# Patient Record
Sex: FEMALE | Race: WHITE | NOT HISPANIC OR LATINO | Employment: FULL TIME | URBAN - METROPOLITAN AREA
[De-identification: names, ages, dates, MRNs, and addresses within clinical notes are randomized per-mention and may not be internally consistent; named-entity substitution may affect disease eponyms.]

---

## 2017-02-09 RX ORDER — HYDROCODONE BITARTRATE AND ACETAMINOPHEN 7.5; 325 MG/1; MG/1
1 TABLET ORAL 2 TIMES DAILY PRN
Qty: 60 TABLET | Refills: 0 | Status: SHIPPED | OUTPATIENT
Start: 2017-02-09 | End: 2017-04-07 | Stop reason: SDUPTHER

## 2017-02-10 ENCOUNTER — TELEPHONE (OUTPATIENT)
Dept: PAIN MEDICINE | Facility: CLINIC | Age: 57
End: 2017-02-10

## 2017-02-10 RX ORDER — GABAPENTIN 300 MG/1
300 CAPSULE ORAL 2 TIMES DAILY
Qty: 60 CAPSULE | Refills: 1 | Status: SHIPPED | OUTPATIENT
Start: 2017-02-10 | End: 2017-05-05 | Stop reason: SDUPTHER

## 2017-02-21 ENCOUNTER — OFFICE VISIT (OUTPATIENT)
Dept: PAIN MEDICINE | Facility: CLINIC | Age: 57
End: 2017-02-21
Payer: COMMERCIAL

## 2017-02-21 VITALS
WEIGHT: 174.63 LBS | BODY MASS INDEX: 32.99 KG/M2 | RESPIRATION RATE: 18 BRPM | TEMPERATURE: 99 F | DIASTOLIC BLOOD PRESSURE: 72 MMHG | HEART RATE: 68 BPM | SYSTOLIC BLOOD PRESSURE: 120 MMHG

## 2017-02-21 DIAGNOSIS — M47.814 FACET ARTHROPATHY, THORACIC: Primary | ICD-10-CM

## 2017-02-21 DIAGNOSIS — M50.30 DDD (DEGENERATIVE DISC DISEASE), CERVICAL: ICD-10-CM

## 2017-02-21 DIAGNOSIS — M51.36 DDD (DEGENERATIVE DISC DISEASE), LUMBAR: ICD-10-CM

## 2017-02-21 PROCEDURE — 1160F RVW MEDS BY RX/DR IN RCRD: CPT | Mod: S$GLB,,, | Performed by: PHYSICIAN ASSISTANT

## 2017-02-21 PROCEDURE — 99999 PR PBB SHADOW E&M-EST. PATIENT-LVL IV: CPT | Mod: PBBFAC,,, | Performed by: PHYSICIAN ASSISTANT

## 2017-02-21 PROCEDURE — 99214 OFFICE O/P EST MOD 30 MIN: CPT | Mod: S$GLB,,, | Performed by: PHYSICIAN ASSISTANT

## 2017-02-21 RX ORDER — MIDAZOLAM HYDROCHLORIDE 5 MG/ML
4 INJECTION INTRAMUSCULAR; INTRAVENOUS ONCE
Status: CANCELLED | OUTPATIENT
Start: 2017-03-14

## 2017-02-21 RX ORDER — SODIUM CHLORIDE, SODIUM LACTATE, POTASSIUM CHLORIDE, CALCIUM CHLORIDE 600; 310; 30; 20 MG/100ML; MG/100ML; MG/100ML; MG/100ML
INJECTION, SOLUTION INTRAVENOUS CONTINUOUS
Status: CANCELLED | OUTPATIENT
Start: 2017-03-14

## 2017-02-27 NOTE — PROGRESS NOTES
This note was completed with dictation software and grammatical errors may exist.    CC:Back pain, neck pain    HPI: The patient is a 56-year-old woman with history of hypothyroidism, otherwise fairly healthy but a long history of scoliosis causing back pain, self-referred for continued back pain.  She is status post C7-T1 cervical interlaminar epidural steroid injection on 12/9/16 with almost complete relief.  She denies having significant neck pain at this time.  She does have low back pain radiating to the right leg but this is currently tolerable.  Her biggest complaint today is a return of her right thoracic spine pain.  She states that this as a dull constant pain and she is unable to get comfortable.  She does have some relief with gabapentin at night but can't take this during the day.  She denies weakness, numbness, bladder or bowel incontinence.    Pain intervention history: She has been seeing Dr. Huffman for the last several years and has undergone epidural steroid injections and radiofrequency ablations with good relief.  The last was done about one year ago however.  As far as medications she has been taking Hydrocodone 7.5/325 one to 2 times a day at most and gabapentin 300 mg at night. She is status post cervical epidural steroid injection on 10/19/15 with 50% relief of her neck pain. She is status post right L4 transforaminal epidural steroid injection on 11/23/15 with 100% relief.   She is status post right T3, 4, 5 and 6 medial branch radio frequency ablation on 2/5/16 with greater than 50% relief.  She is status post right L4 transforaminal epidural sterile injection on 6/14/16 with significant relief lasting only about a month.  She is status post C7-T1 cervical interlaminar epidural steroid injection on 7/14/16 with 30-40% relief.   She is status post L5/S1 interlaminar epidural steroidal injection to the right on 8/12/16 with 100% relief of her right leg pain.  She is status post C7-T1  cervical interlaminar epidural steroid injection on 12/9/16 with almost complete relief.    According to her last pain management physician, she had undergone a right side T3, 4, 5, 6 medial branch radiofrequency ablation on 8/8/14 with good relief.  He had scheduled her for another one but did not complete this.      ROS: She reports fatigability, headaches, hypothyroidism, joint stiffness, back pain, difficulty sleeping and anxiety.  Balance of review of systems is negative.      Medical, surgical, family and social history reviewed elsewhere in record.  She is a  at [employer].    Medications/Allergies: See med card    Vitals:    02/21/17 1417   BP: 120/72   Pulse: 68   Resp: 18   Temp: 98.5 °F (36.9 °C)   TempSrc: Oral   Weight: 79.2 kg (174 lb 9.7 oz)   PainSc:   4   PainLoc: Back         Physical exam:  Gen: A and O x3, pleasant, well-groomed  Skin: No rashes or obvious lesions  HEENT: PERRLA, no obvious deformities on ears or in canals. Trachea midline.  CVS: Regular rate and rhythm, normal palpable pulses.  Resp:No increased work of breathing, symmetrical chest rise.  Abdomen: Soft, NT/ND.  Musculoskeletal:No antalgic gait.     Neuro:  Upper extremities: 5/5 strength bilaterally.  Lower extremities: 5/5 strength bilaterally.    Reflexes: Patellar 2+, Achilles 2+ bilaterally.  Upper extremity reflexes 2+ bilaterally equal.  Sensory:  Intact and symmetrical to light touch and pinprick in L2-S1 dermatomes bilaterally.    Lumbar spine:  Lumbar spine: Range of motion is full with flexion and extension without increased pain.  Brayden's test causes no increased pain on either side.    Supine straight leg raise is negative bilaterally.  Internal and external rotation of the hip causes no increased pain on either side.  Myofascial exam: No tenderness to palpation across lumbar paraspinous muscles.     Cervical spine exam: Range of motion is full with flexion, extension and lateral rotation without  increased pain.   Myofascial exam: No tenderness to palpation to the cervical paraspinous muscles.  Moderate tenderness to palpation to the right upper thoracic paraspinous muscles.    Imagin/16/15 Xray Scoliosis survey: There is thoracic dextroscoliosis with mild lumbar compensatory levoscoliosis. No structural abnormalities are evident. Diffuse spondylosis noted in the cervical, thoracic and to lesser extent lumbar spine. No fractures are identified. There is slight increased kyphotic curvature of the thoracic spine with alignment being otherwise normal. Measurements and evaluation are limited due to underpenetration. Land angle measures approximately in the thoracic spine is of approximately 17.0 degrees and for the lumbar spine 17.4 degrees. Soft tissues are unremarkable. IMPRESSION: 1. S shaped scoliotic curvature of the thoracolumbar spine with Land angle of approximately 17 degrees. 2. Diffuse spondylosis.    MRI report 12: Report notes that there is dextroconvex rotary curvature of the thoracic spine.  Posterior alignment is maintained.  There are scattered vertebral body hemangiomas.  There is minimal posterior disc bulges noted at T5/6, T6/7, T7/8 and T8/9.  There is no central spinal stenosis or neural foraminal narrowing.    MRI right shoulder 11: Minor distal subscapularis tendinosis.  Negative for full-thickness or significant partial-thickness rotator cuff tendon tear.  There is a small amount of subacromial subdeltoid bursal fluid which can be seen with recent injection or mild bursitis.    MRI cervical spine report 3/25/11.  Note that this is comparison to previous study on 2008.  This is a report only, no images were available to me.  The C2/3 disc appears grossly intact with no significant bulging.  C3/4 there is mild protrusion of the disc with no herniation and no nerve root entrapment seen.  Spinal stenosis is not noted.  At C4/5 there is broad-based disc protrusion which  compresses the subarachnoid space without cord compression.  There is no definite nerve root compression seen.  At C5/6 there is vertebral body lesion which is bright on T2 and dark on T1 but it does not enhance and may represent an atypical hemangioma.  This is unchanged from previous study.  At C5/6 there is a protrusion of the disc asymmetrically to the left but no significant entrapment.  No change from previous study.  At C6/7 there is a focal protrusion of the disc causing compression of the subarachnoid space without definite cord or nerve root compression.  C7/T1 appears intact.    Lumbar spine MRI report 3/31/14: No acute abnormality or significant degenerative changes.  No canal or foraminal stenosis.    Lumbar spine MRI 10/19/15  T12-L1: No central canal or neuroforaminal stenosis. No disc protrusion or extrusion.  L1-L2: No central canal or neuroforaminal stenosis. No disc protrusion or extrusion.  L2-L3: No central canal or neuroforaminal stenosis. No disc protrusion or extrusion.  L3-L4: No central canal or neuroforaminal stenosis. No disc protrusion or extrusion.  L4-L5: There is a broad disc bulge which extends into both neuroforamina. There is prominent ligamentum flavum thickening and facet arthropathy. There is mild bilateral neuroforaminal stenosis. No central canal stenosis.  L5-S1: There is bilateral hypertrophic facet arthropathy and ligamentum flavum thickening. No central canal or neuroforaminal stenosis. No disc protrusion or extrusion.    Cervical spine MRI 6/29/16  At C2-C3, the minimal interstitial excision covers a very mild broad-based disc bulge most prominent centrally but does not deform the ventral cord.  There is no canal or foraminal stenosis.  At C3-C4, there is mildly decreased disc height with a broad-based disc bulge most prominent centrally.  This minimally contact the ventral cord without significant deformity.  The canal is widely patent.  There is uncovertebral  hypertrophy and facet arthropathy, but the foramina are patent.  At C4-C5, there is decreased disc height with a broad disc bulge-marginal osteophyte complex that mildly lateralizes to the left and blends imperceptibly with right greater than left uncovertebral hypertrophy.  With ligamentum hypertrophy, there is no significant central canal stenosis.  Uncovertebral hypertrophy and facet arthropathy are causing mild left and moderate right foraminal stenoses.  At C5-C6, there is a broad-based disc bulge and osteophyte complex most prominent centrally.  There is also ligamentum flavum hypertrophy present, but the canal is patent.  There is mild left foraminal stenosis.  The right foramen is patent.  At C6-C7, there is disc desiccation with a minimal broad-based disc bulge most prominent in the right paracentral canal.  The canal is widely patent.  There is mild left foraminal stenosis.  At C7-T1, there is no significant disc bulge, protrusion, or herniation/extrusion.  The canal and foramina are widely patent.      Assessment:  The patient is a 56-year-old woman with history of hypothyroidism, otherwise fairly healthy but a long history of scoliosis causing back pain, self-referred for continued back pain.    1. Facet arthropathy, thoracic  Vital signs    Activity as tolerated    Place 18-22 [facility] IV     Verify informed consent    Notify physician     Notify physician     Notify physician (specify)    Diet NPO    Case Request Operating Room: RADIOFREQUENCY THERMOCOAGULATION T3, T4, T5,T6    Place in Outpatient    lactated ringers infusion    midazolam (PF) 5 mg/mL injection 4 mg   2. DDD (degenerative disc disease), lumbar     3. DDD (degenerative disc disease), cervical         Plan:  1.  The patient had almost complete relief of her neck pain following the cervical JINA.  This can be repeated in the future if necessary.  2.  I will schedule her to repeat right T3, 4, 5 and 6 medial branch radiofrequency  ablation.  She had about 11 months of almost complete relief with this the last time.  3.  If her low back and right leg pain worsens, we can repeat an L5/S1 interlaminar JINA to the right.  4.  Follow-up in 4 weeks post procedure or sooner as needed.    Greater than 50% of this 25 minute visit was spent on counseling the patient.

## 2017-03-13 ENCOUNTER — TELEPHONE (OUTPATIENT)
Dept: SURGERY | Facility: HOSPITAL | Age: 57
End: 2017-03-13

## 2017-03-14 ENCOUNTER — HOSPITAL ENCOUNTER (OUTPATIENT)
Dept: RADIOLOGY | Facility: HOSPITAL | Age: 57
Discharge: HOME | End: 2017-03-14
Attending: ANESTHESIOLOGY | Admitting: ANESTHESIOLOGY
Payer: COMMERCIAL

## 2017-03-14 DIAGNOSIS — M51.34 DDD (DEGENERATIVE DISC DISEASE), THORACIC: ICD-10-CM

## 2017-04-07 ENCOUNTER — PATIENT MESSAGE (OUTPATIENT)
Dept: PAIN MEDICINE | Facility: CLINIC | Age: 57
End: 2017-04-07

## 2017-04-07 RX ORDER — HYDROCODONE BITARTRATE AND ACETAMINOPHEN 7.5; 325 MG/1; MG/1
1 TABLET ORAL 2 TIMES DAILY PRN
Qty: 60 TABLET | Refills: 0 | Status: SHIPPED | OUTPATIENT
Start: 2017-04-07 | End: 2017-05-30 | Stop reason: SDUPTHER

## 2017-04-28 ENCOUNTER — OFFICE VISIT (OUTPATIENT)
Dept: PAIN MEDICINE | Facility: CLINIC | Age: 57
End: 2017-04-28
Payer: COMMERCIAL

## 2017-04-28 VITALS
WEIGHT: 167.31 LBS | DIASTOLIC BLOOD PRESSURE: 70 MMHG | BODY MASS INDEX: 31.62 KG/M2 | TEMPERATURE: 98 F | RESPIRATION RATE: 18 BRPM | SYSTOLIC BLOOD PRESSURE: 130 MMHG | HEART RATE: 74 BPM

## 2017-04-28 DIAGNOSIS — M54.12 CERVICAL RADICULOPATHY: ICD-10-CM

## 2017-04-28 DIAGNOSIS — M51.36 DDD (DEGENERATIVE DISC DISEASE), LUMBAR: ICD-10-CM

## 2017-04-28 DIAGNOSIS — M54.16 RIGHT LUMBAR RADICULOPATHY: ICD-10-CM

## 2017-04-28 DIAGNOSIS — M50.30 DDD (DEGENERATIVE DISC DISEASE), CERVICAL: ICD-10-CM

## 2017-04-28 DIAGNOSIS — M47.814 FACET ARTHROPATHY, THORACIC: Primary | ICD-10-CM

## 2017-04-28 DIAGNOSIS — M47.812 FACET ARTHROPATHY, CERVICAL: ICD-10-CM

## 2017-04-28 PROCEDURE — 99214 OFFICE O/P EST MOD 30 MIN: CPT | Mod: S$GLB,,, | Performed by: PHYSICIAN ASSISTANT

## 2017-04-28 PROCEDURE — 1160F RVW MEDS BY RX/DR IN RCRD: CPT | Mod: S$GLB,,, | Performed by: PHYSICIAN ASSISTANT

## 2017-04-28 PROCEDURE — 99999 PR PBB SHADOW E&M-EST. PATIENT-LVL V: CPT | Mod: PBBFAC,,, | Performed by: PHYSICIAN ASSISTANT

## 2017-04-28 RX ORDER — MIDAZOLAM HYDROCHLORIDE 5 MG/ML
4 INJECTION INTRAMUSCULAR; INTRAVENOUS ONCE
Status: CANCELLED | OUTPATIENT
Start: 2017-06-27

## 2017-04-28 RX ORDER — SODIUM CHLORIDE, SODIUM LACTATE, POTASSIUM CHLORIDE, CALCIUM CHLORIDE 600; 310; 30; 20 MG/100ML; MG/100ML; MG/100ML; MG/100ML
INJECTION, SOLUTION INTRAVENOUS CONTINUOUS
Status: CANCELLED | OUTPATIENT
Start: 2017-05-30

## 2017-04-28 RX ORDER — SODIUM CHLORIDE, SODIUM LACTATE, POTASSIUM CHLORIDE, CALCIUM CHLORIDE 600; 310; 30; 20 MG/100ML; MG/100ML; MG/100ML; MG/100ML
INJECTION, SOLUTION INTRAVENOUS CONTINUOUS
Status: CANCELLED | OUTPATIENT
Start: 2017-06-27

## 2017-04-28 RX ORDER — MIDAZOLAM HYDROCHLORIDE 5 MG/ML
4 INJECTION INTRAMUSCULAR; INTRAVENOUS ONCE
Status: CANCELLED | OUTPATIENT
Start: 2017-05-30

## 2017-05-01 NOTE — PROGRESS NOTES
This note was completed with dictation software and grammatical errors may exist.    CC:Back pain, neck pain    HPI: The patient is a 56-year-old woman with history of hypothyroidism, otherwise fairly healthy but a long history of scoliosis causing back pain, self-referred for continued back pain.  She returns in follow-up today with multiple complaints.  She reports her right mid back pain greater than neck pain greater than low back and right leg pain.  She states that this is significantly affecting her quality of life.  Her right mid back pain bothers her with whatever she does.  She feels that her most recent cervical JINA is starting to wear off.  She reports that her right leg pain is worse in the mornings.  She denies weakness, numbness, bladder or bowel incontinence.    Pain intervention history: She has been seeing Dr. Huffman for the last several years and has undergone epidural steroid injections and radiofrequency ablations with good relief.  The last was done about one year ago however.  As far as medications she has been taking Hydrocodone 7.5/325 one to 2 times a day at most and gabapentin 300 mg at night. She is status post cervical epidural steroid injection on 10/19/15 with 50% relief of her neck pain. She is status post right L4 transforaminal epidural steroid injection on 11/23/15 with 100% relief.   She is status post right T3, 4, 5 and 6 medial branch radio frequency ablation on 2/5/16 with greater than 50% relief.  She is status post right L4 transforaminal epidural sterile injection on 6/14/16 with significant relief lasting only about a month.  She is status post C7-T1 cervical interlaminar epidural steroid injection on 7/14/16 with 30-40% relief.   She is status post L5/S1 interlaminar epidural steroidal injection to the right on 8/12/16 with 100% relief of her right leg pain.  She is status post C7-T1 cervical interlaminar epidural steroid injection on 12/9/16 with almost complete  relief.    According to her last pain management physician, she had undergone a right side T3, 4, 5, 6 medial branch radiofrequency ablation on 8/8/14 with good relief.  He had scheduled her for another one but did not complete this.      ROS: She reports fatigability, headaches, hypothyroidism, joint stiffness, back pain, difficulty sleeping and anxiety.  Balance of review of systems is negative.      Medical, surgical, family and social history reviewed elsewhere in record.  She is a  at [employer].    Medications/Allergies: See med card    Vitals:    04/28/17 0949   BP: 130/70   Pulse: 74   Resp: 18   Temp: 98.2 °F (36.8 °C)   TempSrc: Oral   Weight: 75.9 kg (167 lb 5.3 oz)   PainSc:   4   PainLoc: Back         Physical exam:  Gen: A and O x3, pleasant, well-groomed  Skin: No rashes or obvious lesions  HEENT: PERRLA, no obvious deformities on ears or in canals. Trachea midline.  CVS: Regular rate and rhythm, normal palpable pulses.  Resp:No increased work of breathing, symmetrical chest rise.  Abdomen: Soft, NT/ND.  Musculoskeletal:No antalgic gait.     Neuro:  Upper extremities: 5/5 strength bilaterally.  Lower extremities: 5/5 strength bilaterally.    Reflexes: Patellar 2+, Achilles 2+ bilaterally.  Upper extremity reflexes 2+ bilaterally equal.  Sensory:  Intact and symmetrical to light touch and pinprick in L2-S1 dermatomes bilaterally.    Cervical spine exam: Range of motion is full with flexion, extension and lateral rotation without increased pain.   Myofascial exam: No tenderness to palpation to the cervical paraspinous muscles.  Exquisite tenderness to palpation to the right upper thoracic paraspinous muscles.    Lumbar spine:  Lumbar spine: Range of motion is full with flexion and extension without increased pain.  Brayden's test causes no increased pain on either side.    Supine straight leg raise is negative bilaterally.  Internal and external rotation of the hip causes no increased  pain on either side.  Myofascial exam: No tenderness to palpation across lumbar paraspinous muscles.         Imagin/16/15 Xray Scoliosis survey: There is thoracic dextroscoliosis with mild lumbar compensatory levoscoliosis. No structural abnormalities are evident. Diffuse spondylosis noted in the cervical, thoracic and to lesser extent lumbar spine. No fractures are identified. There is slight increased kyphotic curvature of the thoracic spine with alignment being otherwise normal. Measurements and evaluation are limited due to underpenetration. Land angle measures approximately in the thoracic spine is of approximately 17.0 degrees and for the lumbar spine 17.4 degrees. Soft tissues are unremarkable. IMPRESSION: 1. S shaped scoliotic curvature of the thoracolumbar spine with Land angle of approximately 17 degrees. 2. Diffuse spondylosis.    MRI report 12: Report notes that there is dextroconvex rotary curvature of the thoracic spine.  Posterior alignment is maintained.  There are scattered vertebral body hemangiomas.  There is minimal posterior disc bulges noted at T5/6, T6/7, T7/8 and T8/9.  There is no central spinal stenosis or neural foraminal narrowing.    MRI right shoulder 11: Minor distal subscapularis tendinosis.  Negative for full-thickness or significant partial-thickness rotator cuff tendon tear.  There is a small amount of subacromial subdeltoid bursal fluid which can be seen with recent injection or mild bursitis.    MRI cervical spine report 3/25/11.  Note that this is comparison to previous study on 2008.  This is a report only, no images were available to me.  The C2/3 disc appears grossly intact with no significant bulging.  C3/4 there is mild protrusion of the disc with no herniation and no nerve root entrapment seen.  Spinal stenosis is not noted.  At C4/5 there is broad-based disc protrusion which compresses the subarachnoid space without cord compression.  There is no  definite nerve root compression seen.  At C5/6 there is vertebral body lesion which is bright on T2 and dark on T1 but it does not enhance and may represent an atypical hemangioma.  This is unchanged from previous study.  At C5/6 there is a protrusion of the disc asymmetrically to the left but no significant entrapment.  No change from previous study.  At C6/7 there is a focal protrusion of the disc causing compression of the subarachnoid space without definite cord or nerve root compression.  C7/T1 appears intact.    Lumbar spine MRI report 3/31/14: No acute abnormality or significant degenerative changes.  No canal or foraminal stenosis.    Lumbar spine MRI 10/19/15  T12-L1: No central canal or neuroforaminal stenosis. No disc protrusion or extrusion.  L1-L2: No central canal or neuroforaminal stenosis. No disc protrusion or extrusion.  L2-L3: No central canal or neuroforaminal stenosis. No disc protrusion or extrusion.  L3-L4: No central canal or neuroforaminal stenosis. No disc protrusion or extrusion.  L4-L5: There is a broad disc bulge which extends into both neuroforamina. There is prominent ligamentum flavum thickening and facet arthropathy. There is mild bilateral neuroforaminal stenosis. No central canal stenosis.  L5-S1: There is bilateral hypertrophic facet arthropathy and ligamentum flavum thickening. No central canal or neuroforaminal stenosis. No disc protrusion or extrusion.    Cervical spine MRI 6/29/16  At C2-C3, the minimal interstitial excision covers a very mild broad-based disc bulge most prominent centrally but does not deform the ventral cord.  There is no canal or foraminal stenosis.  At C3-C4, there is mildly decreased disc height with a broad-based disc bulge most prominent centrally.  This minimally contact the ventral cord without significant deformity.  The canal is widely patent.  There is uncovertebral hypertrophy and facet arthropathy, but the foramina are patent.  At C4-C5, there is  decreased disc height with a broad disc bulge-marginal osteophyte complex that mildly lateralizes to the left and blends imperceptibly with right greater than left uncovertebral hypertrophy.  With ligamentum hypertrophy, there is no significant central canal stenosis.  Uncovertebral hypertrophy and facet arthropathy are causing mild left and moderate right foraminal stenoses.  At C5-C6, there is a broad-based disc bulge and osteophyte complex most prominent centrally.  There is also ligamentum flavum hypertrophy present, but the canal is patent.  There is mild left foraminal stenosis.  The right foramen is patent.  At C6-C7, there is disc desiccation with a minimal broad-based disc bulge most prominent in the right paracentral canal.  The canal is widely patent.  There is mild left foraminal stenosis.  At C7-T1, there is no significant disc bulge, protrusion, or herniation/extrusion.  The canal and foramina are widely patent.      Assessment:  The patient is a 56-year-old woman with history of hypothyroidism, otherwise fairly healthy but a long history of scoliosis causing back pain, self-referred for continued back pain.    1. Facet arthropathy, thoracic  Vital signs    Activity as tolerated    Place 18-22 [facility] IV     Verify informed consent    Notify physician     Notify physician     Notify physician (specify)    Diet NPO    Case Request Operating Room: RADIOFREQUENCY THERMOCOAGULATION THORACIC T3, T4, T5, T6    Place in Outpatient    lactated ringers infusion    midazolam (PF) 5 mg/mL injection 4 mg    Vital signs    Activity as tolerated    Place 18-22 [facility] peripheral IV     Verify informed consent    Notify physician     Notify physician     Notify physician (specify)    Diet NPO    Case Request Operating Room: INJECTION-STEROID-EPIDURAL-CERVICAL    Place in Outpatient    lactated ringers infusion    midazolam (PF) 5 mg/mL injection 4 mg   2. Facet arthropathy, cervical     3. DDD (degenerative  disc disease), lumbar     4. DDD (degenerative disc disease), cervical     5. Cervical radiculopathy     6. Right lumbar radiculopathy         Plan:  1.  I will set her up to repeat right T3, 4, 5 and 6 medial branch RFA.  We will follow this with repeating a cervical JINA 3-4 weeks later.  2.  If her low back and right leg pain worsens, we can repeat an L5/S1 interlaminar JINA to the right.  3.  Follow-up 4 weeks after the cervical JINA or sooner as needed.    Greater than 50% of this 25 minute visit was spent on counseling the patient.

## 2017-05-05 RX ORDER — GABAPENTIN 300 MG/1
CAPSULE ORAL
Qty: 60 CAPSULE | Refills: 5 | Status: SHIPPED | OUTPATIENT
Start: 2017-05-05 | End: 2017-08-28 | Stop reason: SDUPTHER

## 2017-05-30 ENCOUNTER — SURGERY (OUTPATIENT)
Age: 57
End: 2017-05-30

## 2017-05-30 ENCOUNTER — HOSPITAL ENCOUNTER (OUTPATIENT)
Dept: RADIOLOGY | Facility: HOSPITAL | Age: 57
Discharge: HOME | End: 2017-05-30
Attending: ANESTHESIOLOGY | Admitting: ANESTHESIOLOGY
Payer: COMMERCIAL

## 2017-05-30 ENCOUNTER — HOSPITAL ENCOUNTER (OUTPATIENT)
Facility: HOSPITAL | Age: 57
Discharge: HOME | End: 2017-05-30
Attending: ANESTHESIOLOGY | Admitting: ANESTHESIOLOGY
Payer: COMMERCIAL

## 2017-05-30 VITALS
TEMPERATURE: 98 F | WEIGHT: 165 LBS | HEART RATE: 67 BPM | RESPIRATION RATE: 16 BRPM | BODY MASS INDEX: 30.36 KG/M2 | HEIGHT: 62 IN | SYSTOLIC BLOOD PRESSURE: 114 MMHG | OXYGEN SATURATION: 100 % | DIASTOLIC BLOOD PRESSURE: 61 MMHG

## 2017-05-30 DIAGNOSIS — M47.814 FACET ARTHROPATHY, THORACIC: Primary | ICD-10-CM

## 2017-05-30 DIAGNOSIS — M51.34 DDD (DEGENERATIVE DISC DISEASE), THORACIC: ICD-10-CM

## 2017-05-30 PROCEDURE — 64634 DESTROY C/TH FACET JNT ADDL: CPT | Mod: PO | Performed by: ANESTHESIOLOGY

## 2017-05-30 PROCEDURE — 76000 FLUOROSCOPY <1 HR PHYS/QHP: CPT | Mod: TC,PO

## 2017-05-30 PROCEDURE — 25000003 PHARM REV CODE 250: Mod: PO | Performed by: ANESTHESIOLOGY

## 2017-05-30 PROCEDURE — 64633 DESTROY CERV/THOR FACET JNT: CPT | Mod: RT,,, | Performed by: ANESTHESIOLOGY

## 2017-05-30 PROCEDURE — 64634 DESTROY C/TH FACET JNT ADDL: CPT | Mod: RT,,, | Performed by: ANESTHESIOLOGY

## 2017-05-30 PROCEDURE — 63600175 PHARM REV CODE 636 W HCPCS: Mod: PO | Performed by: ANESTHESIOLOGY

## 2017-05-30 PROCEDURE — 64633 DESTROY CERV/THOR FACET JNT: CPT | Mod: PO | Performed by: ANESTHESIOLOGY

## 2017-05-30 PROCEDURE — 99152 MOD SED SAME PHYS/QHP 5/>YRS: CPT | Mod: ,,, | Performed by: ANESTHESIOLOGY

## 2017-05-30 RX ORDER — HYDROCODONE BITARTRATE AND ACETAMINOPHEN 7.5; 325 MG/1; MG/1
1 TABLET ORAL 2 TIMES DAILY PRN
Qty: 60 TABLET | Refills: 0 | Status: SHIPPED | OUTPATIENT
Start: 2017-05-30 | End: 2017-06-22 | Stop reason: SDUPTHER

## 2017-05-30 RX ORDER — MIDAZOLAM HYDROCHLORIDE 5 MG/ML
4 INJECTION INTRAMUSCULAR; INTRAVENOUS ONCE
Status: COMPLETED | OUTPATIENT
Start: 2017-05-30 | End: 2017-05-30

## 2017-05-30 RX ORDER — LIDOCAINE HYDROCHLORIDE 20 MG/ML
INJECTION, SOLUTION EPIDURAL; INFILTRATION; INTRACAUDAL; PERINEURAL
Status: DISCONTINUED | OUTPATIENT
Start: 2017-05-30 | End: 2017-05-30 | Stop reason: HOSPADM

## 2017-05-30 RX ORDER — METHYLPREDNISOLONE ACETATE 40 MG/ML
INJECTION, SUSPENSION INTRA-ARTICULAR; INTRALESIONAL; INTRAMUSCULAR; SOFT TISSUE
Status: DISCONTINUED | OUTPATIENT
Start: 2017-05-30 | End: 2017-05-30 | Stop reason: HOSPADM

## 2017-05-30 RX ORDER — LIDOCAINE HYDROCHLORIDE 10 MG/ML
1 INJECTION, SOLUTION EPIDURAL; INFILTRATION; INTRACAUDAL; PERINEURAL ONCE
Status: COMPLETED | OUTPATIENT
Start: 2017-05-30 | End: 2017-05-30

## 2017-05-30 RX ORDER — SODIUM CHLORIDE, SODIUM LACTATE, POTASSIUM CHLORIDE, CALCIUM CHLORIDE 600; 310; 30; 20 MG/100ML; MG/100ML; MG/100ML; MG/100ML
INJECTION, SOLUTION INTRAVENOUS CONTINUOUS
Status: DISCONTINUED | OUTPATIENT
Start: 2017-05-30 | End: 2017-05-30 | Stop reason: HOSPADM

## 2017-05-30 RX ORDER — LIDOCAINE HYDROCHLORIDE 10 MG/ML
INJECTION, SOLUTION EPIDURAL; INFILTRATION; INTRACAUDAL; PERINEURAL
Status: DISCONTINUED | OUTPATIENT
Start: 2017-05-30 | End: 2017-05-30 | Stop reason: HOSPADM

## 2017-05-30 RX ORDER — FENTANYL CITRATE 50 UG/ML
INJECTION, SOLUTION INTRAMUSCULAR; INTRAVENOUS
Status: DISCONTINUED | OUTPATIENT
Start: 2017-05-30 | End: 2017-05-30 | Stop reason: HOSPADM

## 2017-05-30 RX ORDER — SODIUM CHLORIDE 9 MG/ML
INJECTION, SOLUTION INTRAMUSCULAR; INTRAVENOUS; SUBCUTANEOUS
Status: DISCONTINUED | OUTPATIENT
Start: 2017-05-30 | End: 2017-05-30 | Stop reason: HOSPADM

## 2017-05-30 RX ADMIN — LIDOCAINE HYDROCHLORIDE 4 ML: 20 INJECTION, SOLUTION EPIDURAL; INFILTRATION; INTRACAUDAL; PERINEURAL at 01:05

## 2017-05-30 RX ADMIN — METHYLPREDNISOLONE ACETATE 40 MG: 40 INJECTION, SUSPENSION INTRA-ARTICULAR; INTRALESIONAL; INTRAMUSCULAR; SOFT TISSUE at 01:05

## 2017-05-30 RX ADMIN — FENTANYL CITRATE 25 MCG: 50 INJECTION, SOLUTION INTRAMUSCULAR; INTRAVENOUS at 01:05

## 2017-05-30 RX ADMIN — MIDAZOLAM HYDROCHLORIDE 4 MG: 5 INJECTION, SOLUTION INTRAMUSCULAR; INTRAVENOUS at 01:05

## 2017-05-30 RX ADMIN — SODIUM CHLORIDE, SODIUM LACTATE, POTASSIUM CHLORIDE, AND CALCIUM CHLORIDE: .6; .31; .03; .02 INJECTION, SOLUTION INTRAVENOUS at 12:05

## 2017-05-30 RX ADMIN — LIDOCAINE HYDROCHLORIDE 5 ML: 10 INJECTION, SOLUTION EPIDURAL; INFILTRATION; INTRACAUDAL; PERINEURAL at 01:05

## 2017-05-30 RX ADMIN — SODIUM CHLORIDE 3 ML: 9 INJECTION INTRAMUSCULAR; INTRAVENOUS; SUBCUTANEOUS at 01:05

## 2017-05-30 RX ADMIN — LIDOCAINE HYDROCHLORIDE: 10 INJECTION, SOLUTION EPIDURAL; INFILTRATION; INTRACAUDAL; PERINEURAL at 12:05

## 2017-05-30 NOTE — DISCHARGE INSTRUCTIONS
Home care instructions  Apply ice pack to the injection site for 20 minutes periods for the first 24 hrs for soreness/discomfort at injection site DO NOT USE HEAT FOR 24 HOURS  Keep site clean and dry for 24 hours, remove bandaid when desired  Do not drive until tomorrow    Avoid strenuous activities for 2 days    Resume home medication as prescribed today  Resume Aspirin, Plavix, or Coumadin the day after the procedure unless otherwise instructed.    SEE IMMEDIATE MEDICAL HELP FOR:  Severe increase in your usual pain or appearance of new pain  Prolonged or increasing weakness or numbness in the legs or arms  Drainage, redness, active bleeding, or increased swelling at the injection site  Temperature over 100.0 degrees F.  Headache that increases when your head is upright and decreases when you lie flat    CALL 911 OR GO DIRECTLY TO EMERGENCY DEPARTMENT FOR:  Shortness of breath, chest pain, or problems breathing

## 2017-05-30 NOTE — OP NOTE
PROCEDURE DATE: 5/30/2017    PROCEDURE:  Radiofrequency ablation of the right T4,5,6,7 medial branch nerves on the right-side utilizing fluoroscopy    DIAGNOSIS:  Thoracic facet arthopathy    Post op Diagnosis: Same    PHYSICIAN: Floyd Coleman MD    MEDICATIONS INJECTED:  From a mixture of 4ml of 2% lidocaine and 40mg of methylprednisone, 1ml of this solution was injected at each level.    LOCAL ANESTHETIC USED: Lidocaine 1%, 4 ml given at each site.    SEDATION MEDICATIONS: 4mg versed, 50mcg fentanyl    ESTIMATED BLOOD LOSS:  none    COMPLICATIONS:  none    TECHNIQUE:  A time out was taken to identify patient and procedure side prior to starting the procedure. Laying in a prone position, the patient was prepped and draped in the usual sterile fashion using ChloraPrep and sterile towels.  The levels were determined under fluoroscopic guidance and then marked.  Local anesthetic was given by raising a wheal at the skin over each site and then infiltrated approximately 2cm deeper.  A 20-gauge  100 mm Red Tricycle RF needle was introduced to the anatomic location of the right T4,5,6,7 medial branch nerves.  Motor stimulation up to 2 Volts at each level confirmed no motor nerve involvement.  Impedance was less than 800 ohms at each level. The above noted medication was then injected slowly.  Ablation was performed per level utilizing Walker radiofrequency generator 80°C for 90 seconds. The patient tolerated the procedure well.     The patient was monitored after the procedure.  Patient was given post procedure and discharge instructions to follow at home.  The patient was discharged in a stable condition

## 2017-05-30 NOTE — DISCHARGE SUMMARY
[facility]  Discharge Note  Short Stay    Admit Date: 5/30/2017    Discharge Date: 5/30/2017    Attending Physician: Floyd Coleman MD     Discharge Provider: Floyd Coleman    Diagnoses:  Active Hospital Problems    Diagnosis  POA    *Facet arthropathy, thoracic [M12.88]  Yes      Resolved Hospital Problems    Diagnosis Date Resolved POA   No resolved problems to display.       Discharged Condition: good    Final Diagnoses: Facet arthropathy, thoracic [M12.88]    Disposition: Home or Self Care    Hospital Course: no complications, uneventful    Outcome of Hospitalization, Treatment, Procedure, or Surgery:  Patient was admitted for outpatient procedure. The patient underwent procedure without complications and are discharged home    Follow up/Patient Instructions:  Follow up as scheduled/Patient has received instructions and follow up date    Medications:  Continue previous medications      Discharge Procedure Orders  Diet general     Activity as tolerated     Call MD for:  temperature >100.4     Call MD for:  severe uncontrolled pain     Call MD for:  redness, tenderness, or signs of infection (pain, swelling, redness, odor or green/yellow discharge around incision site)     Call MD for:  severe persistent headache     No dressing needed           Discharge Procedure Orders (must include Diet, Follow-up, Activity):    Discharge Procedure Orders (must include Diet, Follow-up, Activity)  Diet general     Activity as tolerated     Call MD for:  temperature >100.4     Call MD for:  severe uncontrolled pain     Call MD for:  redness, tenderness, or signs of infection (pain, swelling, redness, odor or green/yellow discharge around incision site)     Call MD for:  severe persistent headache     No dressing needed

## 2017-05-30 NOTE — H&P
CC: Back pain    HPI: The patient is a 57yo woman with a history of thoracic facet arthropathy here for a right T3,4,5,6 medial branch RFA. There are no major changes in history and physical from 4/28/17.    Past Medical History:   Diagnosis Date    Anxiety 8/25/2015    Arthritis     Cervical spondylosis     DDD (degenerative disc disease), lumbar     Difficult intubation 03/2016    anterior larynx, pt with metal dental appliance, unable to do glidescope, used LMA    Encounter for blood transfusion     Insomnia 8/25/2015    PONV (postoperative nausea and vomiting)     Scoliosis     Unspecified hypothyroidism 8/25/2015       Past Surgical History:   Procedure Laterality Date    BILATERAL OOPHORECTOMY      CHOLECYSTECTOMY  2010    COLONOSCOPY      Epidural Steroid Injection      Pain managment, at another facility, cervical, thoracic    Epidural Steroid injection      Pain management, cervical    HYSTERECTOMY      total    MOUTH SURGERY      Braces, with temporary metal implants in upper gum    RADIOFREQUENCY ABLATION  02/2016    thoracic nerve    TONSILLECTOMY      VULVA SURGERY  03/2016       Family History   Problem Relation Age of Onset    Arthritis Mother     Celiac disease Mother     COPD Father        Social History     Social History    Marital status:      Spouse name: N/A    Number of children: N/A    Years of education: N/A     Social History Main Topics    Smoking status: Never Smoker    Smokeless tobacco: Never Used    Alcohol use No    Drug use: No    Sexual activity: Yes     Partners: Male     Other Topics Concern    None     Social History Narrative    None       No current facility-administered medications for this encounter.        Review of patient's allergies indicates:   Allergen Reactions    Compazine [prochlorperazine edisylate] Anaphylaxis       Vitals:    05/26/17 1059 05/30/17 1213   BP:  (!) 143/71   Pulse:  72   Resp:  18   Temp:  97.3 °F (36.3 °C)  "  TempSrc:  Skin   SpO2:  98%   Weight: 74.8 kg (165 lb)    Height: 5' 2" (1.575 m)        REVIEW OF SYSTEMS:     GENERAL: No weight loss, malaise or fevers.  HEENT:  No recent changes in vision or hearing  NECK: Negative for lumps, no difficulty with swallowing.  RESPIRATORY: Negative for cough, wheezing or shortness of breath, patient denies any recent URI.  CARDIOVASCULAR: Negative for chest pain, leg swelling or palpitations.  GI: Negative for abdominal discomfort, blood in stools or black stools or change in bowel habits.  MUSCULOSKELETAL: See HPI.  SKIN: Negative for lesions, rash, and itching.  PSYCH: No suicidal or homicidal ideations, no current mood disturbances.  HEMATOLOGY/LYMPHOLOGY: Negative for prolonged bleeding, bruising easily or swollen nodes. Patient is not currently taking any anti-coagulants  ENDO: No history of diabetes or thyroid dysfunction  NEURO: No history of syncope, paralysis, seizures or tremors.All other reviewed and negative other than HPI.    Physical exam:  Gen: A and O x3, pleasant, well-groomed  Skin: No rashes or obvious lesions  HEENT: PERRLA, no obvious deformities on ears or in canals. No thyroid masses, trachea midline, no palpable lymph nodes in neck, axilla.  CVS: Regular rate and rhythm, normal S1 and S2, no murmurs.  Resp: Clear to auscultation bilaterally.  Abdomen: Soft, NT/ND, normal bowel sounds present.  Musculoskeletal/Neuro: Moving all extremities    Assessment:  Facet arthropathy, thoracic  -     Case Request Operating Room: RADIOFREQUENCY THERMOCOAGULATION THORACIC T3, T4, T5, T6  -     Activity as tolerated; Standing  -     Place in Outpatient; Standing  -     Diet NPO; Standing  -     lactated ringers infusion; Inject into the vein continuous.  -     midazolam (PF) injection 4 mg; Inject 0.8 mLs (4 mg total) into the vein once.  -     Notify physician ; Standing  -     Notify physician ; Standing  -     Notify physician (specify); Standing  -     Place 18-22 " [address] ; Standing  -     Verify informed consent; Standing  -     Vital signs; Standing

## 2017-06-22 RX ORDER — HYDROCODONE BITARTRATE AND ACETAMINOPHEN 7.5; 325 MG/1; MG/1
1 TABLET ORAL 2 TIMES DAILY PRN
Qty: 60 TABLET | Refills: 0 | Status: SHIPPED | OUTPATIENT
Start: 2017-06-22 | End: 2017-06-29 | Stop reason: SDUPTHER

## 2017-06-23 ENCOUNTER — TELEPHONE (OUTPATIENT)
Dept: PAIN MEDICINE | Facility: CLINIC | Age: 57
End: 2017-06-23

## 2017-06-23 NOTE — TELEPHONE ENCOUNTER
My chart message:    Lilliam Sidhu,     Thank you. It seems the pharmacy will not fill the prescription until Tuesday. I don't normally take enough of these meds to finish a prescription. Unfortunately, it seems I've waited too long between injections and the pain has increased significantly.      Spoke with pharmacy and they stated patient got medication filled on 5/30 so she should have 6 days left. Please advise.

## 2017-06-26 NOTE — TELEPHONE ENCOUNTER
Patient is only requesting 4 pills until her procedure with Dr. Coleman on tomorrow. Pharmacy will not fill prescription Dr Steen sent on 06/22/2017 stating it's too soon. Please advise.

## 2017-06-27 ENCOUNTER — HOSPITAL ENCOUNTER (OUTPATIENT)
Dept: RADIOLOGY | Facility: HOSPITAL | Age: 57
Discharge: HOME | End: 2017-06-27
Attending: ANESTHESIOLOGY
Payer: COMMERCIAL

## 2017-06-27 ENCOUNTER — SURGERY (OUTPATIENT)
Age: 57
End: 2017-06-27

## 2017-06-27 ENCOUNTER — HOSPITAL ENCOUNTER (OUTPATIENT)
Facility: HOSPITAL | Age: 57
Discharge: HOME | End: 2017-06-27
Attending: ANESTHESIOLOGY | Admitting: ANESTHESIOLOGY
Payer: COMMERCIAL

## 2017-06-27 DIAGNOSIS — M54.12 CERVICAL RADICULOPATHY: Primary | ICD-10-CM

## 2017-06-27 DIAGNOSIS — M47.812 FACET ARTHROPATHY, CERVICAL: ICD-10-CM

## 2017-06-27 DIAGNOSIS — M47.814 FACET ARTHROPATHY, THORACIC: ICD-10-CM

## 2017-06-27 DIAGNOSIS — M50.30 DDD (DEGENERATIVE DISC DISEASE), CERVICAL: ICD-10-CM

## 2017-06-27 PROCEDURE — 63600175 PHARM REV CODE 636 W HCPCS: Mod: PO | Performed by: ANESTHESIOLOGY

## 2017-06-27 PROCEDURE — 76000 FLUOROSCOPY <1 HR PHYS/QHP: CPT | Mod: TC,PO

## 2017-06-27 PROCEDURE — 62321 NJX INTERLAMINAR CRV/THRC: CPT | Mod: ,,, | Performed by: ANESTHESIOLOGY

## 2017-06-27 PROCEDURE — 25500020 PHARM REV CODE 255: Mod: PO | Performed by: ANESTHESIOLOGY

## 2017-06-27 PROCEDURE — 62321 NJX INTERLAMINAR CRV/THRC: CPT | Mod: PO | Performed by: ANESTHESIOLOGY

## 2017-06-27 PROCEDURE — 25000003 PHARM REV CODE 250: Mod: PO | Performed by: ANESTHESIOLOGY

## 2017-06-27 RX ORDER — LIDOCAINE HYDROCHLORIDE 10 MG/ML
INJECTION, SOLUTION EPIDURAL; INFILTRATION; INTRACAUDAL; PERINEURAL
Status: DISCONTINUED | OUTPATIENT
Start: 2017-06-27 | End: 2017-06-27 | Stop reason: HOSPADM

## 2017-06-27 RX ORDER — SODIUM CHLORIDE 9 MG/ML
INJECTION, SOLUTION INTRAMUSCULAR; INTRAVENOUS; SUBCUTANEOUS
Status: DISCONTINUED | OUTPATIENT
Start: 2017-06-27 | End: 2017-06-27 | Stop reason: HOSPADM

## 2017-06-27 RX ORDER — SODIUM CHLORIDE, SODIUM LACTATE, POTASSIUM CHLORIDE, CALCIUM CHLORIDE 600; 310; 30; 20 MG/100ML; MG/100ML; MG/100ML; MG/100ML
INJECTION, SOLUTION INTRAVENOUS CONTINUOUS
Status: DISCONTINUED | OUTPATIENT
Start: 2017-06-27 | End: 2017-06-27 | Stop reason: HOSPADM

## 2017-06-27 RX ORDER — MIDAZOLAM HYDROCHLORIDE 5 MG/ML
4 INJECTION INTRAMUSCULAR; INTRAVENOUS ONCE
Status: COMPLETED | OUTPATIENT
Start: 2017-06-27 | End: 2017-06-27

## 2017-06-27 RX ORDER — METHYLPREDNISOLONE ACETATE 80 MG/ML
INJECTION, SUSPENSION INTRA-ARTICULAR; INTRALESIONAL; INTRAMUSCULAR; SOFT TISSUE
Status: DISCONTINUED | OUTPATIENT
Start: 2017-06-27 | End: 2017-06-27 | Stop reason: HOSPADM

## 2017-06-27 RX ADMIN — SODIUM CHLORIDE 4 ML: 9 INJECTION INTRAMUSCULAR; INTRAVENOUS; SUBCUTANEOUS at 01:06

## 2017-06-27 RX ADMIN — SODIUM CHLORIDE, SODIUM LACTATE, POTASSIUM CHLORIDE, AND CALCIUM CHLORIDE: .6; .31; .03; .02 INJECTION, SOLUTION INTRAVENOUS at 12:06

## 2017-06-27 RX ADMIN — LIDOCAINE HYDROCHLORIDE 5 ML: 10 INJECTION, SOLUTION EPIDURAL; INFILTRATION; INTRACAUDAL; PERINEURAL at 01:06

## 2017-06-27 RX ADMIN — IOHEXOL 3 ML: 300 INJECTION, SOLUTION INTRAVENOUS at 01:06

## 2017-06-27 RX ADMIN — METHYLPREDNISOLONE ACETATE 80 MG: 80 INJECTION, SUSPENSION INTRA-ARTICULAR; INTRALESIONAL; INTRAMUSCULAR; SOFT TISSUE at 01:06

## 2017-06-27 RX ADMIN — MIDAZOLAM HYDROCHLORIDE 4 MG: 5 INJECTION, SOLUTION INTRAMUSCULAR; INTRAVENOUS at 01:06

## 2017-06-27 NOTE — OP NOTE
PROCEDURE DATE: 6/27/2017    Procedure: C7-T1 cervical interlaminar epidural steroid injection under utilizing fluoroscopy.    Diagnosis: Cervical Radiculopathy    POSTOP DIAGNOSIS: SAME    Physician: Floyd Coleman MD    Medications injected:  Methylprednisone 80mg followed by a slow injection of 4 mL sterile, preservative-free normal saline.    Local anesthetic used: Lidocaine 1%, 4 ml.    Sedation Medications: 4mg versed    Complications:  none    Estimated blood loss: none    Technique:  A time-out was taken to identify patient and procedure prior to starting the procedure.  With the patient laying in a prone position with the neck in a mid-flexed forward position, the area was prepped and draped in the usual sterile fashion using ChloraPrep and a fenestrated drape.  The area was determined under AP fluoroscopic guidance.  Local anesthetic was given using a 25-gauge 1.5 inch needle by raising a wheal and then infiltrating ventrally.  A 3.5 inch 20-gauge Touhy needle was introduced under fluoroscopic guidance to meet the lamina of C7.  The needle was then hinged under the lamina then advanced using loss of resistance technique.  Once the tip of the needle was in the desired position, the contrast dye Omnipaque was injected to determine placement and no uptake.  The steroid was then injected slowly followed by a slow injection of 4 mL of the sterile preservative-free normal saline.  The patient tolerated the procedure well.    The patient was monitored after the procedure and was given post-procedure and discharge instructions to follow at home. The patient was discharged in a stable condition.

## 2017-06-27 NOTE — PLAN OF CARE
Vss, vinod po fluids, denies pain, ambulates easily.  States ready to go home.  Discharged from facility with family.

## 2017-06-27 NOTE — H&P
CC: Neck pain    HPI: The patient is a 57yo with a history of cervical radiculopathy here for cervical JINA. There are no major changes in history and physical from 4/28/17.    Past Medical History:   Diagnosis Date    Anxiety 8/25/2015    Arthritis     Cervical spondylosis     DDD (degenerative disc disease), lumbar     Difficult intubation 03/2016    anterior larynx, pt with metal dental appliance, unable to do glidescope, used LMA    Encounter for blood transfusion     Facet arthropathy, thoracic     Hormone replacement therapy (HRT)     Insomnia 8/25/2015    PONV (postoperative nausea and vomiting)     Scoliosis     Unspecified hypothyroidism 8/25/2015       Past Surgical History:   Procedure Laterality Date    BILATERAL OOPHORECTOMY      CHOLECYSTECTOMY  2010    COLONOSCOPY      Epidural Steroid Injection      Pain managment, at another facility, cervical, thoracic    Epidural Steroid injection      Pain management, cervical    HYSTERECTOMY      total    MOUTH SURGERY      Braces, with temporary metal implants in upper gum    RADIOFREQUENCY ABLATION  02/2016    thoracic nerve    TONSILLECTOMY      VULVA SURGERY  03/2016       Family History   Problem Relation Age of Onset    Arthritis Mother     Celiac disease Mother     COPD Father        Social History     Social History    Marital status:      Spouse name: N/A    Number of children: N/A    Years of education: N/A     Social History Main Topics    Smoking status: Never Smoker    Smokeless tobacco: Never Used    Alcohol use No    Drug use: No    Sexual activity: Yes     Partners: Male     Other Topics Concern    None     Social History Narrative    None       No current facility-administered medications for this encounter.        Review of patient's allergies indicates:   Allergen Reactions    Compazine [prochlorperazine edisylate] Anaphylaxis       Vitals:    06/20/17 1316 06/27/17 1243   BP:  (!) 142/72   Pulse:  74  "  Resp:  17   Temp:  97.7 °F (36.5 °C)   TempSrc:  Skin   SpO2:  99%   Weight: 75.8 kg (167 lb)    Height: 5' 1" (1.549 m)        REVIEW OF SYSTEMS:     GENERAL: No weight loss, malaise or fevers.  HEENT:  No recent changes in vision or hearing  NECK: Negative for lumps, no difficulty with swallowing.  RESPIRATORY: Negative for cough, wheezing or shortness of breath, patient denies any recent URI.  CARDIOVASCULAR: Negative for chest pain, leg swelling or palpitations.  GI: Negative for abdominal discomfort, blood in stools or black stools or change in bowel habits.  MUSCULOSKELETAL: See HPI.  SKIN: Negative for lesions, rash, and itching.  PSYCH: No suicidal or homicidal ideations, no current mood disturbances.  HEMATOLOGY/LYMPHOLOGY: Negative for prolonged bleeding, bruising easily or swollen nodes. Patient is not currently taking any anti-coagulants  ENDO: No history of diabetes or thyroid dysfunction  NEURO: No history of syncope, paralysis, seizures or tremors.All other reviewed and negative other than HPI.    Physical exam:  Gen: A and O x3, pleasant, well-groomed  Skin: No rashes or obvious lesions  HEENT: PERRLA, no obvious deformities on ears or in canals. No thyroid masses, trachea midline, no palpable lymph nodes in neck, axilla.  CVS: Regular rate and rhythm, normal S1 and S2, no murmurs.  Resp: Clear to auscultation bilaterally.  Abdomen: Soft, NT/ND, normal bowel sounds present.  Musculoskeletal/Neuro: Moving all extremities    Assessment:  Facet arthropathy, thoracic  -     Case Request Operating Room: INJECTION-STEROID-EPIDURAL-CERVICAL  -     Activity as tolerated; Standing  -     Place in Outpatient; Standing  -     Diet NPO; Standing  -     lactated ringers infusion; Inject into the vein continuous.  -     midazolam (PF) injection 4 mg; Inject 0.8 mLs (4 mg total) into the vein once.  -     Notify physician ; Standing  -     Notify physician ; Standing  -     Notify physician (specify); Standing  - "     Place 18-22 [facility] IV ; Standing  -     Verify informed consent; Standing  -     Vital signs; Standing    Facet arthropathy, cervical

## 2017-06-27 NOTE — DISCHARGE SUMMARY
[facility]  Discharge Note  Short Stay    Admit Date: 6/27/2017    Discharge Date: 6/27/2017    Attending Physician: Floyd Coleman MD     Discharge Provider: Floyd Coleman    Diagnoses:  Active Hospital Problems    Diagnosis  POA    *Cervical radiculopathy [M54.12]  Yes      Resolved Hospital Problems    Diagnosis Date Resolved POA   No resolved problems to display.       Discharged Condition: good    Final Diagnoses: Facet arthropathy, thoracic [M12.88]    Disposition: Home or Self Care    Hospital Course: no complications, uneventful    Outcome of Hospitalization, Treatment, Procedure, or Surgery:  Patient was admitted for outpatient procedure. The patient underwent procedure without complications and are discharged home    Follow up/Patient Instructions:  Follow up as scheduled/Patient has received instructions and follow up date    Medications:  Continue previous medications      Discharge Procedure Orders  Diet general     Activity as tolerated     Call MD for:  temperature >100.4     Call MD for:  severe uncontrolled pain     Call MD for:  redness, tenderness, or signs of infection (pain, swelling, redness, odor or green/yellow discharge around incision site)     Call MD for:  severe persistent headache     No dressing needed           Discharge Procedure Orders (must include Diet, Follow-up, Activity):    Discharge Procedure Orders (must include Diet, Follow-up, Activity)  Diet general     Activity as tolerated     Call MD for:  temperature >100.4     Call MD for:  severe uncontrolled pain     Call MD for:  redness, tenderness, or signs of infection (pain, swelling, redness, odor or green/yellow discharge around incision site)     Call MD for:  severe persistent headache     No dressing needed

## 2017-06-28 VITALS
DIASTOLIC BLOOD PRESSURE: 64 MMHG | RESPIRATION RATE: 16 BRPM | OXYGEN SATURATION: 100 % | HEART RATE: 80 BPM | BODY MASS INDEX: 31.53 KG/M2 | HEIGHT: 61 IN | WEIGHT: 167 LBS | TEMPERATURE: 97 F | SYSTOLIC BLOOD PRESSURE: 118 MMHG

## 2017-06-29 RX ORDER — HYDROCODONE BITARTRATE AND ACETAMINOPHEN 7.5; 325 MG/1; MG/1
1 TABLET ORAL 2 TIMES DAILY PRN
Qty: 60 TABLET | Refills: 0 | Status: SHIPPED | OUTPATIENT
Start: 2017-07-27 | End: 2017-08-28 | Stop reason: SDUPTHER

## 2017-07-06 ENCOUNTER — OFFICE VISIT (OUTPATIENT)
Dept: PAIN MEDICINE | Facility: CLINIC | Age: 57
End: 2017-07-06
Payer: COMMERCIAL

## 2017-07-06 VITALS
TEMPERATURE: 98 F | RESPIRATION RATE: 18 BRPM | WEIGHT: 175.69 LBS | BODY MASS INDEX: 33.2 KG/M2 | SYSTOLIC BLOOD PRESSURE: 120 MMHG | DIASTOLIC BLOOD PRESSURE: 70 MMHG | HEART RATE: 74 BPM

## 2017-07-06 DIAGNOSIS — M50.30 DDD (DEGENERATIVE DISC DISEASE), CERVICAL: Primary | ICD-10-CM

## 2017-07-06 DIAGNOSIS — M54.16 LUMBAR RADICULOPATHY: ICD-10-CM

## 2017-07-06 DIAGNOSIS — M51.36 DDD (DEGENERATIVE DISC DISEASE), LUMBAR: ICD-10-CM

## 2017-07-06 DIAGNOSIS — M47.812 FACET ARTHROPATHY, CERVICAL: ICD-10-CM

## 2017-07-06 DIAGNOSIS — M54.12 CERVICAL RADICULOPATHY: ICD-10-CM

## 2017-07-06 DIAGNOSIS — M47.814 FACET ARTHROPATHY, THORACIC: ICD-10-CM

## 2017-07-06 DIAGNOSIS — M54.16 RIGHT LUMBAR RADICULOPATHY: ICD-10-CM

## 2017-07-06 PROCEDURE — 99215 OFFICE O/P EST HI 40 MIN: CPT | Mod: S$GLB,,, | Performed by: PHYSICIAN ASSISTANT

## 2017-07-06 PROCEDURE — 99999 PR PBB SHADOW E&M-EST. PATIENT-LVL IV: CPT | Mod: PBBFAC,,, | Performed by: PHYSICIAN ASSISTANT

## 2017-07-06 RX ORDER — ALPRAZOLAM 0.5 MG/1
1 TABLET, ORALLY DISINTEGRATING ORAL ONCE AS NEEDED
Status: CANCELLED | OUTPATIENT
Start: 2017-08-07 | End: 2017-08-07

## 2017-07-10 NOTE — PROGRESS NOTES
This note was completed with dictation software and grammatical errors may exist.    CC:Back pain, neck pain    HPI: The patient is a 56-year-old woman with history of hypothyroidism, otherwise fairly healthy but a long history of scoliosis causing back pain, self-referred for continued back pain.  She is status post right T4, 5, 6 and 7 medial branch radiofrequency ablation on 5/30/17 with 100% relief.  She is status post C7-T1 cervical interlaminar epidural steroid injection on 6/27/17 with 80% relief.  She reports mild neck pain but has worsening right low back and right leg pain.  This is typically worse in the mornings, improved with pain medication.  She denies having any weakness, numbness, bladder or bowel incontinence.    Pain intervention history: She has been seeing Dr. Huffman for the last several years and has undergone epidural steroid injections and radiofrequency ablations with good relief.  The last was done about one year ago however.  As far as medications she has been taking Hydrocodone 7.5/325 one to 2 times a day at most and gabapentin 300 mg at night. She is status post cervical epidural steroid injection on 10/19/15 with 50% relief of her neck pain. She is status post right L4 transforaminal epidural steroid injection on 11/23/15 with 100% relief.   She is status post right T3, 4, 5 and 6 medial branch radio frequency ablation on 2/5/16 with greater than 50% relief.  She is status post right L4 transforaminal epidural sterile injection on 6/14/16 with significant relief lasting only about a month.  She is status post C7-T1 cervical interlaminar epidural steroid injection on 7/14/16 with 30-40% relief.   She is status post L5/S1 interlaminar epidural steroidal injection to the right on 8/12/16 with 100% relief of her right leg pain.  She is status post C7-T1 cervical interlaminar epidural steroid injection on 12/9/16 with almost complete relief.  She is status post right T4, 5, 6 and 7 medial  branch radiofrequency ablation on 5/30/17 with 100% relief.  She is status post C7-T1 cervical interlaminar epidural steroid injection on 6/27/17 with 80% relief.    According to her last pain management physician, she had undergone a right side T3, 4, 5, 6 medial branch radiofrequency ablation on 8/8/14 with good relief.  He had scheduled her for another one but did not complete this.      ROS: She reports fatigability, headaches, hypothyroidism, joint stiffness, back pain, difficulty sleeping and anxiety.  Balance of review of systems is negative.      Medical, surgical, family and social history reviewed elsewhere in record.  She is a  at [employer].    Medications/Allergies: See med card    Vitals:    07/06/17 1114   BP: 120/70   Pulse: 74   Resp: 18   Temp: 98.4 °F (36.9 °C)   TempSrc: Oral   Weight: 79.7 kg (175 lb 11.3 oz)   PainSc:   4   PainLoc: Back         Physical exam:  Gen: A and O x3, pleasant, well-groomed  Skin: No rashes or obvious lesions  HEENT: PERRLA, no obvious deformities on ears or in canals. Trachea midline.  CVS: Regular rate and rhythm, normal palpable pulses.  Resp:No increased work of breathing, symmetrical chest rise.  Abdomen: Soft, NT/ND.  Musculoskeletal:No antalgic gait.     Neuro:  Upper extremities: 5/5 strength bilaterally.  Lower extremities: 5/5 strength bilaterally.    Reflexes: Patellar 2+, Achilles 2+ bilaterally.  Upper extremity reflexes 2+ bilaterally equal.  Sensory:  Intact and symmetrical to light touch and pinprick in L2-S1 dermatomes bilaterally.    Cervical spine exam: Range of motion is full with flexion, extension and lateral rotation without increased pain.   Myofascial exam: No tenderness to palpation to the cervical paraspinous muscles.      Lumbar spine:  Lumbar spine: Range of motion is full with flexion and extension with increased right low back pain during each maneuver but especially with extension.  Brayden's test causes no increased pain  on either side.    Supine straight leg raise causes right low back and right leg pain.  Internal and external rotation of the hip causes no increased pain on either side.  Myofascial exam: Mild tenderness to palpation across the right lumbar paraspinous muscles.       Imagin/16/15 Xray Scoliosis survey: There is thoracic dextroscoliosis with mild lumbar compensatory levoscoliosis. No structural abnormalities are evident. Diffuse spondylosis noted in the cervical, thoracic and to lesser extent lumbar spine. No fractures are identified. There is slight increased kyphotic curvature of the thoracic spine with alignment being otherwise normal. Measurements and evaluation are limited due to underpenetration. Land angle measures approximately in the thoracic spine is of approximately 17.0 degrees and for the lumbar spine 17.4 degrees. Soft tissues are unremarkable. IMPRESSION: 1. S shaped scoliotic curvature of the thoracolumbar spine with Land angle of approximately 17 degrees. 2. Diffuse spondylosis.    MRI report 12: Report notes that there is dextroconvex rotary curvature of the thoracic spine.  Posterior alignment is maintained.  There are scattered vertebral body hemangiomas.  There is minimal posterior disc bulges noted at T5/6, T6/7, T7/8 and T8/9.  There is no central spinal stenosis or neural foraminal narrowing.    MRI right shoulder 11: Minor distal subscapularis tendinosis.  Negative for full-thickness or significant partial-thickness rotator cuff tendon tear.  There is a small amount of subacromial subdeltoid bursal fluid which can be seen with recent injection or mild bursitis.    MRI cervical spine report 3/25/11.  Note that this is comparison to previous study on 2008.  This is a report only, no images were available to me.  The C2/3 disc appears grossly intact with no significant bulging.  C3/4 there is mild protrusion of the disc with no herniation and no nerve root entrapment seen.   Spinal stenosis is not noted.  At C4/5 there is broad-based disc protrusion which compresses the subarachnoid space without cord compression.  There is no definite nerve root compression seen.  At C5/6 there is vertebral body lesion which is bright on T2 and dark on T1 but it does not enhance and may represent an atypical hemangioma.  This is unchanged from previous study.  At C5/6 there is a protrusion of the disc asymmetrically to the left but no significant entrapment.  No change from previous study.  At C6/7 there is a focal protrusion of the disc causing compression of the subarachnoid space without definite cord or nerve root compression.  C7/T1 appears intact.    Lumbar spine MRI report 3/31/14: No acute abnormality or significant degenerative changes.  No canal or foraminal stenosis.    Lumbar spine MRI 10/19/15  T12-L1: No central canal or neuroforaminal stenosis. No disc protrusion or extrusion.  L1-L2: No central canal or neuroforaminal stenosis. No disc protrusion or extrusion.  L2-L3: No central canal or neuroforaminal stenosis. No disc protrusion or extrusion.  L3-L4: No central canal or neuroforaminal stenosis. No disc protrusion or extrusion.  L4-L5: There is a broad disc bulge which extends into both neuroforamina. There is prominent ligamentum flavum thickening and facet arthropathy. There is mild bilateral neuroforaminal stenosis. No central canal stenosis.  L5-S1: There is bilateral hypertrophic facet arthropathy and ligamentum flavum thickening. No central canal or neuroforaminal stenosis. No disc protrusion or extrusion.    Cervical spine MRI 6/29/16  At C2-C3, the minimal interstitial excision covers a very mild broad-based disc bulge most prominent centrally but does not deform the ventral cord.  There is no canal or foraminal stenosis.  At C3-C4, there is mildly decreased disc height with a broad-based disc bulge most prominent centrally.  This minimally contact the ventral cord without  significant deformity.  The canal is widely patent.  There is uncovertebral hypertrophy and facet arthropathy, but the foramina are patent.  At C4-C5, there is decreased disc height with a broad disc bulge-marginal osteophyte complex that mildly lateralizes to the left and blends imperceptibly with right greater than left uncovertebral hypertrophy.  With ligamentum hypertrophy, there is no significant central canal stenosis.  Uncovertebral hypertrophy and facet arthropathy are causing mild left and moderate right foraminal stenoses.  At C5-C6, there is a broad-based disc bulge and osteophyte complex most prominent centrally.  There is also ligamentum flavum hypertrophy present, but the canal is patent.  There is mild left foraminal stenosis.  The right foramen is patent.  At C6-C7, there is disc desiccation with a minimal broad-based disc bulge most prominent in the right paracentral canal.  The canal is widely patent.  There is mild left foraminal stenosis.  At C7-T1, there is no significant disc bulge, protrusion, or herniation/extrusion.  The canal and foramina are widely patent.      Assessment:  The patient is a 56-year-old woman with history of hypothyroidism, otherwise fairly healthy but a long history of scoliosis causing back pain, self-referred for continued back pain.    1. DDD (degenerative disc disease), cervical  Ambulatory Referral to Physical/Occupational Therapy   2. DDD (degenerative disc disease), lumbar  Ambulatory Referral to Physical/Occupational Therapy   3. Facet arthropathy, cervical     4. Facet arthropathy, thoracic  Ambulatory Referral to Physical/Occupational Therapy   5. Cervical radiculopathy     6. Right lumbar radiculopathy  Ambulatory Referral to Physical/Occupational Therapy   7. Lumbar radiculopathy  Vital signs    Activity as tolerated    Verify informed consent    Notify physician     Notify physician     Notify physician (specify)    Diet NPO    Case Request Operating Room:  INJECTION-STEROID-EPIDURAL-LUMBAR    Place in Outpatient    alprazolam ODT dissolvable tablet 1 mg       Plan:  1.  The patient did well following the right T3, 4, 5 and 6 medial branch RFA and cervical JINA.  These procedures can be repeated in the future if necessary.  2.  For her worsening low back and right leg pain, I will set her up with an L5/S1 interlaminar JINA to the right.  3.  She will continue to take gabapentin and we discussed increasing this temporarily if her pain worsens prior to having a procedure.  4.  Dr. Coleman provided a prescription for hydrocodone-acetaminophen 7.5/325 mg #60.  5.  We will avoid NSAIDs because she reports developing diverticulitis when taking these.  6.  I completed orders for physical therapy at [facility].  7.  Follow-up in 4 weeks post procedure sooner as needed.    Greater than 50% of this 40 minute visit was spent on counseling the patient.

## 2017-08-07 ENCOUNTER — HOSPITAL ENCOUNTER (OUTPATIENT)
Facility: HOSPITAL | Age: 57
Discharge: HOME | End: 2017-08-07
Attending: ANESTHESIOLOGY | Admitting: ANESTHESIOLOGY
Payer: COMMERCIAL

## 2017-08-07 ENCOUNTER — SURGERY (OUTPATIENT)
Age: 57
End: 2017-08-07

## 2017-08-07 ENCOUNTER — HOSPITAL ENCOUNTER (OUTPATIENT)
Dept: RADIOLOGY | Facility: HOSPITAL | Age: 57
Discharge: HOME | End: 2017-08-07
Attending: ANESTHESIOLOGY
Payer: COMMERCIAL

## 2017-08-07 VITALS
HEART RATE: 82 BPM | BODY MASS INDEX: 31.72 KG/M2 | TEMPERATURE: 98 F | SYSTOLIC BLOOD PRESSURE: 140 MMHG | WEIGHT: 168 LBS | OXYGEN SATURATION: 97 % | HEIGHT: 61 IN | DIASTOLIC BLOOD PRESSURE: 78 MMHG | RESPIRATION RATE: 16 BRPM

## 2017-08-07 DIAGNOSIS — M54.16 LUMBAR RADICULOPATHY: ICD-10-CM

## 2017-08-07 DIAGNOSIS — M51.36 DDD (DEGENERATIVE DISC DISEASE), LUMBAR: ICD-10-CM

## 2017-08-07 PROCEDURE — 99152 MOD SED SAME PHYS/QHP 5/>YRS: CPT | Mod: ,,, | Performed by: ANESTHESIOLOGY

## 2017-08-07 PROCEDURE — 63600175 PHARM REV CODE 636 W HCPCS: Mod: PO | Performed by: ANESTHESIOLOGY

## 2017-08-07 PROCEDURE — 62323 NJX INTERLAMINAR LMBR/SAC: CPT | Mod: ,,, | Performed by: ANESTHESIOLOGY

## 2017-08-07 PROCEDURE — 25000003 PHARM REV CODE 250: Mod: PO | Performed by: ANESTHESIOLOGY

## 2017-08-07 PROCEDURE — 76000 FLUOROSCOPY <1 HR PHYS/QHP: CPT | Mod: TC,PO

## 2017-08-07 PROCEDURE — 25500020 PHARM REV CODE 255: Mod: PO | Performed by: ANESTHESIOLOGY

## 2017-08-07 PROCEDURE — 62323 NJX INTERLAMINAR LMBR/SAC: CPT | Mod: PO | Performed by: ANESTHESIOLOGY

## 2017-08-07 PROCEDURE — A4216 STERILE WATER/SALINE, 10 ML: HCPCS | Mod: PO | Performed by: ANESTHESIOLOGY

## 2017-08-07 RX ORDER — MIDAZOLAM HYDROCHLORIDE 2 MG/2ML
INJECTION, SOLUTION INTRAMUSCULAR; INTRAVENOUS
Status: DISCONTINUED | OUTPATIENT
Start: 2017-08-07 | End: 2017-08-07 | Stop reason: HOSPADM

## 2017-08-07 RX ORDER — SODIUM CHLORIDE 9 MG/ML
INJECTION, SOLUTION INTRAMUSCULAR; INTRAVENOUS; SUBCUTANEOUS
Status: DISCONTINUED | OUTPATIENT
Start: 2017-08-07 | End: 2017-08-07 | Stop reason: HOSPADM

## 2017-08-07 RX ORDER — SODIUM CHLORIDE, SODIUM LACTATE, POTASSIUM CHLORIDE, CALCIUM CHLORIDE 600; 310; 30; 20 MG/100ML; MG/100ML; MG/100ML; MG/100ML
INJECTION, SOLUTION INTRAVENOUS CONTINUOUS
Status: DISCONTINUED | OUTPATIENT
Start: 2017-08-07 | End: 2017-08-07 | Stop reason: HOSPADM

## 2017-08-07 RX ORDER — LIDOCAINE HYDROCHLORIDE 10 MG/ML
1 INJECTION INFILTRATION; PERINEURAL ONCE
Status: COMPLETED | OUTPATIENT
Start: 2017-08-07 | End: 2017-08-07

## 2017-08-07 RX ORDER — LIDOCAINE HYDROCHLORIDE 10 MG/ML
INJECTION, SOLUTION EPIDURAL; INFILTRATION; INTRACAUDAL; PERINEURAL
Status: DISCONTINUED | OUTPATIENT
Start: 2017-08-07 | End: 2017-08-07 | Stop reason: HOSPADM

## 2017-08-07 RX ORDER — ALPRAZOLAM 0.5 MG/1
1 TABLET, ORALLY DISINTEGRATING ORAL ONCE AS NEEDED
Status: DISCONTINUED | OUTPATIENT
Start: 2017-08-07 | End: 2017-08-07

## 2017-08-07 RX ORDER — METHYLPREDNISOLONE ACETATE 80 MG/ML
INJECTION, SUSPENSION INTRA-ARTICULAR; INTRALESIONAL; INTRAMUSCULAR; SOFT TISSUE
Status: DISCONTINUED | OUTPATIENT
Start: 2017-08-07 | End: 2017-08-07 | Stop reason: HOSPADM

## 2017-08-07 RX ADMIN — SODIUM CHLORIDE, SODIUM LACTATE, POTASSIUM CHLORIDE, AND CALCIUM CHLORIDE: .6; .31; .03; .02 INJECTION, SOLUTION INTRAVENOUS at 04:08

## 2017-08-07 RX ADMIN — SODIUM CHLORIDE 4 ML: 9 INJECTION INTRAMUSCULAR; INTRAVENOUS; SUBCUTANEOUS at 04:08

## 2017-08-07 RX ADMIN — MIDAZOLAM HYDROCHLORIDE 4 MG: 1 INJECTION, SOLUTION INTRAMUSCULAR; INTRAVENOUS at 04:08

## 2017-08-07 RX ADMIN — LIDOCAINE HYDROCHLORIDE 3 ML: 10 INJECTION, SOLUTION EPIDURAL; INFILTRATION; INTRACAUDAL; PERINEURAL at 04:08

## 2017-08-07 RX ADMIN — METHYLPREDNISOLONE ACETATE 80 MG: 80 INJECTION, SUSPENSION INTRA-ARTICULAR; INTRALESIONAL; INTRAMUSCULAR; SOFT TISSUE at 04:08

## 2017-08-07 RX ADMIN — LIDOCAINE HYDROCHLORIDE 1 ML: 10 INJECTION, SOLUTION EPIDURAL; INFILTRATION; INTRACAUDAL; PERINEURAL at 04:08

## 2017-08-07 RX ADMIN — IOHEXOL 1 ML: 300 INJECTION, SOLUTION INTRAVENOUS at 04:08

## 2017-08-07 NOTE — OP NOTE

## 2017-08-07 NOTE — PLAN OF CARE
Patient tolerating oral liquids without difficulty. No apparent s&s of distress noted at this time, no complaints voiced at this time. Injection site free from drainage. Discharge instructions reviewed with patient/family/friend with good verbal feedback received. Patient ready for discharge

## 2017-08-07 NOTE — H&P
CC: Back pain    HPI: The patient is a 57yo woman with a history of lumbar radiculopathy here for L5/S1 JINA. There are no major changes in history and physical from 7/6/17.    Past Medical History:   Diagnosis Date    Anxiety 8/25/2015    Arthritis     Cervical spondylosis     DDD (degenerative disc disease), lumbar     Difficult intubation 03/2016    anterior larynx, pt with metal dental appliance, unable to do glidescope, used LMA    Encounter for blood transfusion     Facet arthropathy, thoracic     Hormone replacement therapy (HRT)     Insomnia 8/25/2015    PONV (postoperative nausea and vomiting)     Scoliosis     Unspecified hypothyroidism 8/25/2015       Past Surgical History:   Procedure Laterality Date    BILATERAL OOPHORECTOMY      CHOLECYSTECTOMY  2010    COLONOSCOPY      Epidural Steroid Injection      Pain managment, at another facility, cervical, thoracic    Epidural Steroid injection      Pain management, cervical    HYSTERECTOMY      total    MOUTH SURGERY      Braces, with temporary metal implants in upper gum    RADIOFREQUENCY ABLATION  02/2016    thoracic nerve    TONSILLECTOMY      VULVA SURGERY  03/2016       Family History   Problem Relation Age of Onset    Arthritis Mother     Celiac disease Mother     COPD Father        Social History     Social History    Marital status:      Spouse name: N/A    Number of children: N/A    Years of education: N/A     Social History Main Topics    Smoking status: Never Smoker    Smokeless tobacco: Never Used    Alcohol use No    Drug use: No    Sexual activity: Yes     Partners: Male     Other Topics Concern    None     Social History Narrative    None       Current Facility-Administered Medications   Medication Dose Route Frequency Provider Last Rate Last Dose    alprazolam ODT dissolvable tablet 1 mg  1 mg Oral Once PRN Floyd Coleman MD           Review of patient's allergies indicates:   Allergen Reactions  "   Compazine [prochlorperazine edisylate] Anaphylaxis       Vitals:    08/07/17 1531   BP: 131/61   Pulse: 75   Resp: 18   Temp: 97.5 °F (36.4 °C)   TempSrc: Temporal   SpO2: 97%   Weight: 76.2 kg (168 lb)   Height: 5' 1" (1.549 m)       REVIEW OF SYSTEMS:     GENERAL: No weight loss, malaise or fevers.  HEENT:  No recent changes in vision or hearing  NECK: Negative for lumps, no difficulty with swallowing.  RESPIRATORY: Negative for cough, wheezing or shortness of breath, patient denies any recent URI.  CARDIOVASCULAR: Negative for chest pain, leg swelling or palpitations.  GI: Negative for abdominal discomfort, blood in stools or black stools or change in bowel habits.  MUSCULOSKELETAL: See HPI.  SKIN: Negative for lesions, rash, and itching.  PSYCH: No suicidal or homicidal ideations, no current mood disturbances.  HEMATOLOGY/LYMPHOLOGY: Negative for prolonged bleeding, bruising easily or swollen nodes. Patient is not currently taking any anti-coagulants  ENDO: No history of diabetes or thyroid dysfunction  NEURO: No history of syncope, paralysis, seizures or tremors.All other reviewed and negative other than HPI.    Physical exam:  Gen: A and O x3, pleasant, well-groomed  Skin: No rashes or obvious lesions  HEENT: PERRLA, no obvious deformities on ears or in canals. No thyroid masses, trachea midline, no palpable lymph nodes in neck, axilla.  CVS: Regular rate and rhythm, normal S1 and S2, no murmurs.  Resp: Clear to auscultation bilaterally.  Abdomen: Soft, NT/ND, normal bowel sounds present.  Musculoskeletal/Neuro: Moving all extremities    Assessment:  Lumbar radiculopathy  -     Case Request Operating Room: INJECTION-STEROID-EPIDURAL-LUMBAR  -     Activity as tolerated; Standing  -     Place in Outpatient; Standing  -     Diet NPO; Standing  -     alprazolam ODT dissolvable tablet 1 mg; Take 2 tablets (1 mg total) by mouth once as needed for Anxiety.  -     Notify physician ; Standing  -     Notify " physician ; Standing  -     Notify physician (specify); Standing  -     Verify informed consent; Standing  -     Vital signs; Standing

## 2017-08-07 NOTE — DISCHARGE SUMMARY
[facility]  Discharge Note  Short Stay    Admit Date: 8/7/2017    Discharge Date: 8/7/2017    Attending Physician: Floyd Coleman MD     Discharge Provider: Floyd Coleman    Diagnoses:  Active Hospital Problems    Diagnosis  POA    *Lumbar radiculopathy [M54.16]  Yes      Resolved Hospital Problems    Diagnosis Date Resolved POA   No resolved problems to display.       Discharged Condition: good    Final Diagnoses: Lumbar radiculopathy [M54.16]    Disposition: Home or Self Care    Hospital Course: no complications, uneventful    Outcome of Hospitalization, Treatment, Procedure, or Surgery:  Patient was admitted for outpatient procedure. The patient underwent procedure without complications and are discharged home    Follow up/Patient Instructions:  Follow up as scheduled/Patient has received instructions and follow up date    Medications:  Continue previous medications      Discharge Procedure Orders  Diet general     Activity as tolerated     Call MD for:  temperature >100.4     Call MD for:  severe uncontrolled pain     Call MD for:  redness, tenderness, or signs of infection (pain, swelling, redness, odor or green/yellow discharge around incision site)     Call MD for:  severe persistent headache     No dressing needed           Discharge Procedure Orders (must include Diet, Follow-up, Activity):    Discharge Procedure Orders (must include Diet, Follow-up, Activity)  Diet general     Activity as tolerated     Call MD for:  temperature >100.4     Call MD for:  severe uncontrolled pain     Call MD for:  redness, tenderness, or signs of infection (pain, swelling, redness, odor or green/yellow discharge around incision site)     Call MD for:  severe persistent headache     No dressing needed

## 2017-08-28 RX ORDER — GABAPENTIN 300 MG/1
300 CAPSULE ORAL 2 TIMES DAILY
Qty: 60 CAPSULE | Refills: 5 | Status: SHIPPED | OUTPATIENT
Start: 2017-08-28 | End: 2018-01-10

## 2017-08-28 RX ORDER — HYDROCODONE BITARTRATE AND ACETAMINOPHEN 7.5; 325 MG/1; MG/1
1 TABLET ORAL 2 TIMES DAILY PRN
Qty: 60 TABLET | Refills: 0 | Status: SHIPPED | OUTPATIENT
Start: 2017-08-28 | End: 2017-09-21 | Stop reason: SDUPTHER

## 2017-08-29 ENCOUNTER — PATIENT MESSAGE (OUTPATIENT)
Dept: PAIN MEDICINE | Facility: CLINIC | Age: 57
End: 2017-08-29

## 2017-09-21 ENCOUNTER — OFFICE VISIT (OUTPATIENT)
Dept: PAIN MEDICINE | Facility: CLINIC | Age: 57
End: 2017-09-21
Payer: COMMERCIAL

## 2017-09-21 VITALS
SYSTOLIC BLOOD PRESSURE: 120 MMHG | TEMPERATURE: 99 F | WEIGHT: 179.69 LBS | HEART RATE: 70 BPM | RESPIRATION RATE: 18 BRPM | DIASTOLIC BLOOD PRESSURE: 74 MMHG | BODY MASS INDEX: 33.95 KG/M2

## 2017-09-21 DIAGNOSIS — M50.30 DDD (DEGENERATIVE DISC DISEASE), CERVICAL: ICD-10-CM

## 2017-09-21 DIAGNOSIS — M47.814 FACET ARTHROPATHY, THORACIC: ICD-10-CM

## 2017-09-21 DIAGNOSIS — M79.10 MUSCULAR PAIN: ICD-10-CM

## 2017-09-21 DIAGNOSIS — R26.81 GAIT INSTABILITY: ICD-10-CM

## 2017-09-21 DIAGNOSIS — M54.16 RIGHT LUMBAR RADICULOPATHY: ICD-10-CM

## 2017-09-21 DIAGNOSIS — M51.36 DDD (DEGENERATIVE DISC DISEASE), LUMBAR: Primary | ICD-10-CM

## 2017-09-21 DIAGNOSIS — M25.551 RIGHT HIP PAIN: ICD-10-CM

## 2017-09-21 PROCEDURE — 3008F BODY MASS INDEX DOCD: CPT | Mod: S$GLB,,, | Performed by: PHYSICIAN ASSISTANT

## 2017-09-21 PROCEDURE — 99214 OFFICE O/P EST MOD 30 MIN: CPT | Mod: S$GLB,,, | Performed by: PHYSICIAN ASSISTANT

## 2017-09-21 PROCEDURE — 99999 PR PBB SHADOW E&M-EST. PATIENT-LVL IV: CPT | Mod: PBBFAC,,, | Performed by: PHYSICIAN ASSISTANT

## 2017-09-21 RX ORDER — HYDROCODONE BITARTRATE AND ACETAMINOPHEN 7.5; 325 MG/1; MG/1
1 TABLET ORAL 2 TIMES DAILY PRN
Qty: 60 TABLET | Refills: 0 | Status: SHIPPED | OUTPATIENT
Start: 2017-09-27 | End: 2017-10-25 | Stop reason: SDUPTHER

## 2017-09-22 NOTE — PROGRESS NOTES
This note was completed with dictation software and grammatical errors may exist.    CC:Back pain, neck pain    HPI: The patient is a 56-year-old woman with history of hypothyroidism, otherwise fairly healthy but a long history of scoliosis causing back pain, self-referred for continued back pain.  She is status post L5/S1 interlaminar epidural steroid injection on a/7/17 with excellent relief lasting 2 weeks, now reporting 0% relief.  She complains of low back pain radiating into her right lateral hip, lateral thigh, lateral shin and into her right lateral foot.  This is significantly worse with sitting, improved with standing.   She reports some difficulty with her balance and feels that her right hip is not stable.  She was unable to go to physical therapy because [facility] is out of network for her insurance.  She denies weakness, numbness, bladder or bowel incontinence.    Pain intervention history: She has been seeing Dr. Huffman for the last several years and has undergone epidural steroid injections and radiofrequency ablations with good relief.  The last was done about one year ago however.  As far as medications she has been taking Hydrocodone 7.5/325 one to 2 times a day at most and gabapentin 300 mg at night. She is status post cervical epidural steroid injection on 10/19/15 with 50% relief of her neck pain. She is status post right L4 transforaminal epidural steroid injection on 11/23/15 with 100% relief.   She is status post right T3, 4, 5 and 6 medial branch radio frequency ablation on 2/5/16 with greater than 50% relief.  She is status post right L4 transforaminal epidural sterile injection on 6/14/16 with significant relief lasting only about a month.  She is status post C7-T1 cervical interlaminar epidural steroid injection on 7/14/16 with 30-40% relief.   She is status post L5/S1 interlaminar epidural steroidal injection to the right on 8/12/16 with 100% relief of her right leg pain.  She is  status post C7-T1 cervical interlaminar epidural steroid injection on 12/9/16 with almost complete relief.  She is status post right T4, 5, 6 and 7 medial branch radiofrequency ablation on 5/30/17 with 100% relief.  She is status post C7-T1 cervical interlaminar epidural steroid injection on 6/27/17 with 80% relief.  She is status post L5/S1 interlaminar epidural steroid injection on a/7/17 with excellent relief lasting 2 weeks, now reporting 0% relief.    According to her last pain management physician, she had undergone a right side T3, 4, 5, 6 medial branch radiofrequency ablation on 8/8/14 with good relief.  He had scheduled her for another one but did not complete this.      ROS: She reports fatigability, headaches, hypothyroidism, joint stiffness, back pain, difficulty sleeping and anxiety.  Balance of review of systems is negative.      Medical, surgical, family and social history reviewed elsewhere in record.  She is a  at [employer].    Medications/Allergies: See med card    Vitals:    09/21/17 1501   BP: 120/74   Pulse: 70   Resp: 18   Temp: 98.5 °F (36.9 °C)   TempSrc: Oral   Weight: 81.5 kg (179 lb 10.8 oz)   PainSc:   4   PainLoc: Back         Physical exam:  Gen: A and O x3, pleasant, well-groomed  Skin: No rashes or obvious lesions  HEENT: PERRLA, no obvious deformities on ears or in canals. Trachea midline.  CVS: Regular rate and rhythm, normal palpable pulses.  Resp:No increased work of breathing, symmetrical chest rise.  Abdomen: Soft, NT/ND.  Musculoskeletal:No antalgic gait.     Neuro:  Upper extremities: 5/5 strength bilaterally.  Lower extremities: 5/5 strength bilaterally.    Reflexes: Patellar 2+, Achilles 2+ bilaterally.  Upper extremity reflexes 2+ bilaterally equal.  Sensory:  Intact and symmetrical to light touch and pinprick in L2-S1 dermatomes bilaterally.    Cervical spine exam: Range of motion is full with flexion, extension and lateral rotation without increased pain.    Myofascial exam: No tenderness to palpation to the cervical paraspinous muscles.  Mild tenderness palpation to the right thoracic paraspinous muscles.    Lumbar spine:  Lumbar spine: Range of motion is full with flexion and extension with increased right low back pain during each maneuver but especially with extension.  Brayden's test causes no increased pain on either side.    Supine straight leg raise causes right low back and right leg pain.  Internal and external rotation of the hip causes no increased pain on either side.  Myofascial exam: Mild tenderness to palpation across the right lumbar paraspinous muscles and right upper gluteal region.       Imagin/16/15 Xray Scoliosis survey: There is thoracic dextroscoliosis with mild lumbar compensatory levoscoliosis. No structural abnormalities are evident. Diffuse spondylosis noted in the cervical, thoracic and to lesser extent lumbar spine. No fractures are identified. There is slight increased kyphotic curvature of the thoracic spine with alignment being otherwise normal. Measurements and evaluation are limited due to underpenetration. Land angle measures approximately in the thoracic spine is of approximately 17.0 degrees and for the lumbar spine 17.4 degrees. Soft tissues are unremarkable. IMPRESSION: 1. S shaped scoliotic curvature of the thoracolumbar spine with Land angle of approximately 17 degrees. 2. Diffuse spondylosis.    MRI report 12: Report notes that there is dextroconvex rotary curvature of the thoracic spine.  Posterior alignment is maintained.  There are scattered vertebral body hemangiomas.  There is minimal posterior disc bulges noted at T5/6, T6/7, T7/8 and T8/9.  There is no central spinal stenosis or neural foraminal narrowing.    MRI right shoulder 11: Minor distal subscapularis tendinosis.  Negative for full-thickness or significant partial-thickness rotator cuff tendon tear.  There is a small amount of subacromial  subdeltoid bursal fluid which can be seen with recent injection or mild bursitis.    MRI cervical spine report 3/25/11.  Note that this is comparison to previous study on 7/29/2008.  This is a report only, no images were available to me.  The C2/3 disc appears grossly intact with no significant bulging.  C3/4 there is mild protrusion of the disc with no herniation and no nerve root entrapment seen.  Spinal stenosis is not noted.  At C4/5 there is broad-based disc protrusion which compresses the subarachnoid space without cord compression.  There is no definite nerve root compression seen.  At C5/6 there is vertebral body lesion which is bright on T2 and dark on T1 but it does not enhance and may represent an atypical hemangioma.  This is unchanged from previous study.  At C5/6 there is a protrusion of the disc asymmetrically to the left but no significant entrapment.  No change from previous study.  At C6/7 there is a focal protrusion of the disc causing compression of the subarachnoid space without definite cord or nerve root compression.  C7/T1 appears intact.    Lumbar spine MRI report 3/31/14: No acute abnormality or significant degenerative changes.  No canal or foraminal stenosis.    Lumbar spine MRI 10/19/15  T12-L1: No central canal or neuroforaminal stenosis. No disc protrusion or extrusion.  L1-L2: No central canal or neuroforaminal stenosis. No disc protrusion or extrusion.  L2-L3: No central canal or neuroforaminal stenosis. No disc protrusion or extrusion.  L3-L4: No central canal or neuroforaminal stenosis. No disc protrusion or extrusion.  L4-L5: There is a broad disc bulge which extends into both neuroforamina. There is prominent ligamentum flavum thickening and facet arthropathy. There is mild bilateral neuroforaminal stenosis. No central canal stenosis.  L5-S1: There is bilateral hypertrophic facet arthropathy and ligamentum flavum thickening. No central canal or neuroforaminal stenosis. No disc  protrusion or extrusion.    Cervical spine MRI 6/29/16  At C2-C3, the minimal interstitial excision covers a very mild broad-based disc bulge most prominent centrally but does not deform the ventral cord.  There is no canal or foraminal stenosis.  At C3-C4, there is mildly decreased disc height with a broad-based disc bulge most prominent centrally.  This minimally contact the ventral cord without significant deformity.  The canal is widely patent.  There is uncovertebral hypertrophy and facet arthropathy, but the foramina are patent.  At C4-C5, there is decreased disc height with a broad disc bulge-marginal osteophyte complex that mildly lateralizes to the left and blends imperceptibly with right greater than left uncovertebral hypertrophy.  With ligamentum hypertrophy, there is no significant central canal stenosis.  Uncovertebral hypertrophy and facet arthropathy are causing mild left and moderate right foraminal stenoses.  At C5-C6, there is a broad-based disc bulge and osteophyte complex most prominent centrally.  There is also ligamentum flavum hypertrophy present, but the canal is patent.  There is mild left foraminal stenosis.  The right foramen is patent.  At C6-C7, there is disc desiccation with a minimal broad-based disc bulge most prominent in the right paracentral canal.  The canal is widely patent.  There is mild left foraminal stenosis.  At C7-T1, there is no significant disc bulge, protrusion, or herniation/extrusion.  The canal and foramina are widely patent.      Assessment:  The patient is a 56-year-old woman with history of hypothyroidism, otherwise fairly healthy but a long history of scoliosis causing back pain, self-referred for continued back pain.    1. DDD (degenerative disc disease), lumbar  MRI Lumbar Spine Without Contrast    Ambulatory Referral to Physical/Occupational Therapy   2. Right hip pain  X-Ray Hips Bilateral 2 View Incl AP Pelvis   3. DDD (degenerative disc disease), cervical   Ambulatory Referral to Physical/Occupational Therapy   4. Facet arthropathy, thoracic  Ambulatory Referral to Physical/Occupational Therapy   5. Right lumbar radiculopathy  Ambulatory Referral to Physical/Occupational Therapy   6. Gait instability  Ambulatory Referral to Physical/Occupational Therapy   7. Muscular pain  Ambulatory Referral to Physical/Occupational Therapy       Plan:  1.  Since she did not have any relief of her radicular right leg pain following the L5/S1 interlaminar JINA, I will update her lumbar spine MRI.  I have also ordered hip x-rays to rule out hip pathology.  2.  I completed orders for physical therapy and dry needling at [place].  3.  Dr. Coleman provided a prescription for hydrocodone-acetaminophen 7.5/325 mg #60.  4.  Follow-up in a week or so to review MRI results.    Greater than 50% of this 30 minute visit was spent on counseling the patient.

## 2017-09-25 ENCOUNTER — PATIENT MESSAGE (OUTPATIENT)
Dept: PAIN MEDICINE | Facility: CLINIC | Age: 57
End: 2017-09-25

## 2017-09-25 DIAGNOSIS — M25.551 RIGHT HIP PAIN: ICD-10-CM

## 2017-09-25 DIAGNOSIS — D16.9 OSTEOCHONDROMA: Primary | ICD-10-CM

## 2017-09-28 ENCOUNTER — OFFICE VISIT (OUTPATIENT)
Dept: PAIN MEDICINE | Facility: CLINIC | Age: 57
End: 2017-09-28
Payer: COMMERCIAL

## 2017-09-28 DIAGNOSIS — M79.10 MUSCULAR PAIN: ICD-10-CM

## 2017-09-28 DIAGNOSIS — M50.30 DDD (DEGENERATIVE DISC DISEASE), CERVICAL: ICD-10-CM

## 2017-09-28 DIAGNOSIS — M41.9 SCOLIOSIS OF THORACIC SPINE, UNSPECIFIED SCOLIOSIS TYPE: ICD-10-CM

## 2017-09-28 DIAGNOSIS — M54.16 RIGHT LUMBAR RADICULOPATHY: ICD-10-CM

## 2017-09-28 DIAGNOSIS — M51.36 DDD (DEGENERATIVE DISC DISEASE), LUMBAR: ICD-10-CM

## 2017-09-28 DIAGNOSIS — M47.814 FACET ARTHROPATHY, THORACIC: ICD-10-CM

## 2017-09-28 DIAGNOSIS — M47.816 FACET HYPERTROPHY OF LUMBAR REGION: Primary | ICD-10-CM

## 2017-09-28 PROCEDURE — 3008F BODY MASS INDEX DOCD: CPT | Mod: S$GLB,,, | Performed by: PHYSICIAN ASSISTANT

## 2017-09-28 PROCEDURE — 99999 PR PBB SHADOW E&M-EST. PATIENT-LVL III: CPT | Mod: PBBFAC,,, | Performed by: PHYSICIAN ASSISTANT

## 2017-09-28 PROCEDURE — 99214 OFFICE O/P EST MOD 30 MIN: CPT | Mod: S$GLB,,, | Performed by: PHYSICIAN ASSISTANT

## 2017-09-28 RX ORDER — SODIUM CHLORIDE, SODIUM LACTATE, POTASSIUM CHLORIDE, CALCIUM CHLORIDE 600; 310; 30; 20 MG/100ML; MG/100ML; MG/100ML; MG/100ML
INJECTION, SOLUTION INTRAVENOUS CONTINUOUS
Status: CANCELLED | OUTPATIENT
Start: 2017-10-17

## 2017-09-28 NOTE — PROGRESS NOTES
This note was completed with dictation software and grammatical errors may exist.    CC:Back pain, neck pain    HPI: The patient is a 56-year-old woman with history of hypothyroidism, otherwise fairly healthy but a long history of scoliosis causing back pain, self-referred for continued back pain.  She returns in follow-up today to review her lumbar spine MRI results and hip x-ray results.  She continues to complain of right low back pain radiating to the right lateral hip and lateral thigh, shin and right lateral foot.  Pain is worse with sitting and improved with standing.  She did dry needling at [facility] on Tuesday and had significant relief that day and the following day.  She denies weakness, numbness, bladder or bowel incontinence.    Pain intervention history: She has been seeing Dr. Huffman for the last several years and has undergone epidural steroid injections and radiofrequency ablations with good relief.  The last was done about one year ago however.  As far as medications she has been taking Hydrocodone 7.5/325 one to 2 times a day at most and gabapentin 300 mg at night. She is status post cervical epidural steroid injection on 10/19/15 with 50% relief of her neck pain. She is status post right L4 transforaminal epidural steroid injection on 11/23/15 with 100% relief.   She is status post right T3, 4, 5 and 6 medial branch radio frequency ablation on 2/5/16 with greater than 50% relief.  She is status post right L4 transforaminal epidural sterile injection on 6/14/16 with significant relief lasting only about a month.  She is status post C7-T1 cervical interlaminar epidural steroid injection on 7/14/16 with 30-40% relief.   She is status post L5/S1 interlaminar epidural steroidal injection to the right on 8/12/16 with 100% relief of her right leg pain.  She is status post C7-T1 cervical interlaminar epidural steroid injection on 12/9/16 with almost complete relief.  She is status post  right T4, 5, 6 and 7 medial branch radiofrequency ablation on 5/30/17 with 100% relief.  She is status post C7-T1 cervical interlaminar epidural steroid injection on 6/27/17 with 80% relief.  She is status post L5/S1 interlaminar epidural steroid injection on a/7/17 with excellent relief lasting 2 weeks, now reporting 0% relief.    According to her last pain management physician, she had undergone a right side T3, 4, 5, 6 medial branch radiofrequency ablation on 8/8/14 with good relief.  He had scheduled her for another one but did not complete this.      ROS: She reports fatigability, headaches, hypothyroidism, joint stiffness, back pain, difficulty sleeping and anxiety.  Balance of review of systems is negative.      Medical, surgical, family and social history reviewed elsewhere in record.  She is a  at hospitals.    Medications/Allergies: See med card    There were no vitals filed for this visit.      Physical exam:  Gen: A and O x3, pleasant, well-groomed  Skin: No rashes or obvious lesions  HEENT: PERRLA, no obvious deformities on ears or in canals. Trachea midline.  CVS: Regular rate and rhythm, normal palpable pulses.  Resp:No increased work of breathing, symmetrical chest rise.  Abdomen: Soft, NT/ND.  Musculoskeletal:No antalgic gait.     Neuro:  Upper extremities: 5/5 strength bilaterally.  Lower extremities: 5/5 strength bilaterally.    Reflexes: Patellar 2+, Achilles 2+ bilaterally.  Upper extremity reflexes 2+ bilaterally equal.  Sensory:  Intact and symmetrical to light touch and pinprick in L2-S1 dermatomes bilaterally.    Cervical spine exam: Range of motion is full with flexion, extension and lateral rotation without increased pain.   Myofascial exam: No tenderness to palpation to the cervical paraspinous muscles.  Mild tenderness palpation to the right thoracic paraspinous muscles.    Lumbar spine:  Lumbar spine: Range of motion is full with flexion and extension with increased  right low back pain during each maneuver but especially with extension.  Brayden's test causes no increased pain on either side.    Supine straight leg raise causes right low back and right leg pain.  Internal and external rotation of the hip causes no increased pain on either side.  Myofascial exam: Mild tenderness to palpation across the right lumbar paraspinous muscles and right upper gluteal region.       Imagin/16/15 Xray Scoliosis survey: There is thoracic dextroscoliosis with mild lumbar compensatory levoscoliosis. No structural abnormalities are evident. Diffuse spondylosis noted in the cervical, thoracic and to lesser extent lumbar spine. No fractures are identified. There is slight increased kyphotic curvature of the thoracic spine with alignment being otherwise normal. Measurements and evaluation are limited due to underpenetration. Land angle measures approximately in the thoracic spine is of approximately 17.0 degrees and for the lumbar spine 17.4 degrees. Soft tissues are unremarkable. IMPRESSION: 1. S shaped scoliotic curvature of the thoracolumbar spine with Land angle of approximately 17 degrees. 2. Diffuse spondylosis.    MRI report 12: Report notes that there is dextroconvex rotary curvature of the thoracic spine.  Posterior alignment is maintained.  There are scattered vertebral body hemangiomas.  There is minimal posterior disc bulges noted at T5/6, T6/7, T7/8 and T8/9.  There is no central spinal stenosis or neural foraminal narrowing.    MRI right shoulder 11: Minor distal subscapularis tendinosis.  Negative for full-thickness or significant partial-thickness rotator cuff tendon tear.  There is a small amount of subacromial subdeltoid bursal fluid which can be seen with recent injection or mild bursitis.    MRI cervical spine report 3/25/11.  Note that this is comparison to previous study on 2008.  This is a report only, no images were available to me.  The C2/3 disc  appears grossly intact with no significant bulging.  C3/4 there is mild protrusion of the disc with no herniation and no nerve root entrapment seen.  Spinal stenosis is not noted.  At C4/5 there is broad-based disc protrusion which compresses the subarachnoid space without cord compression.  There is no definite nerve root compression seen.  At C5/6 there is vertebral body lesion which is bright on T2 and dark on T1 but it does not enhance and may represent an atypical hemangioma.  This is unchanged from previous study.  At C5/6 there is a protrusion of the disc asymmetrically to the left but no significant entrapment.  No change from previous study.  At C6/7 there is a focal protrusion of the disc causing compression of the subarachnoid space without definite cord or nerve root compression.  C7/T1 appears intact.    Lumbar spine MRI report 3/31/14: No acute abnormality or significant degenerative changes.  No canal or foraminal stenosis.    Lumbar spine MRI 10/19/15  T12-L1: No central canal or neuroforaminal stenosis. No disc protrusion or extrusion.  L1-L2: No central canal or neuroforaminal stenosis. No disc protrusion or extrusion.  L2-L3: No central canal or neuroforaminal stenosis. No disc protrusion or extrusion.  L3-L4: No central canal or neuroforaminal stenosis. No disc protrusion or extrusion.  L4-L5: There is a broad disc bulge which extends into both neuroforamina. There is prominent ligamentum flavum thickening and facet arthropathy. There is mild bilateral neuroforaminal stenosis. No central canal stenosis.  L5-S1: There is bilateral hypertrophic facet arthropathy and ligamentum flavum thickening. No central canal or neuroforaminal stenosis. No disc protrusion or extrusion.    Cervical spine MRI 6/29/16  At C2-C3, the minimal interstitial excision covers a very mild broad-based disc bulge most prominent centrally but does not deform the ventral cord.  There is no canal or foraminal stenosis.  At  C3-C4, there is mildly decreased disc height with a broad-based disc bulge most prominent centrally.  This minimally contact the ventral cord without significant deformity.  The canal is widely patent.  There is uncovertebral hypertrophy and facet arthropathy, but the foramina are patent.  At C4-C5, there is decreased disc height with a broad disc bulge-marginal osteophyte complex that mildly lateralizes to the left and blends imperceptibly with right greater than left uncovertebral hypertrophy.  With ligamentum hypertrophy, there is no significant central canal stenosis.  Uncovertebral hypertrophy and facet arthropathy are causing mild left and moderate right foraminal stenoses.  At C5-C6, there is a broad-based disc bulge and osteophyte complex most prominent centrally.  There is also ligamentum flavum hypertrophy present, but the canal is patent.  There is mild left foraminal stenosis.  The right foramen is patent.  At C6-C7, there is disc desiccation with a minimal broad-based disc bulge most prominent in the right paracentral canal.  The canal is widely patent.  There is mild left foraminal stenosis.  At C7-T1, there is no significant disc bulge, protrusion, or herniation/extrusion.  The canal and foramina are widely patent.    MRI lumbar spine 9/22/17  L1-L2 demonstrates no significant disc protrusion, central spinal canal stenosis, or neural foraminal stenosis.  L2-L3 demonstrates no significant disc protrusion, central spinal canal stenosis, or neural foraminal stenosis. Mild facet arthrosis is seen.  L3-L4 demonstrates no significant disc protrusion, central spinal canal stenosis, or neural foraminal stenosis. Mild facet arthrosis is seen.  L4-L5 demonstrates mild broad-based disc bulge, ligamentum flavum hypertrophy, and mild/moderate bilateral facet arthrosis. No significant central spinal canal stenosis is seen. Mild bilateral lateral recess narrowing and neural foraminal narrowing is seen. Similar  findings were seen on the prior MRI.  L5-S1 demonstrates no significant posterior disc protrusion. Moderate bilateral facet arthrosis is seen along ligamentum flavum hypertrophy. No significant central spinal canal or neural foraminal stenosis is seen.    Hip x-rays 9/22/17  AP view of the pelvis and frog leg views of both hips were obtained. No evidence of acute displaced fracture or dislocation is visualized.  No radiopaque foreign bodies are visualized. The bilateral superior joint spaces are grossly maintained. Mild bilateral sacroiliac joint degenerative changes are seen including subchondral sclerosis and small marginal osteophytes. Mild to moderate symphyseal degenerative changes are seen. There is a slightly expansile cortically-based focus along the lateral proximal right femoral metadiaphysis. This could reflect a sessile osteochondroma, but correlation with MRI of the right hip is recommended.      Assessment:  The patient is a 56-year-old woman with history of hypothyroidism, otherwise fairly healthy but a long history of scoliosis causing back pain, self-referred for continued back pain.    1. Facet hypertrophy of lumbar region  Place in Outpatient    Vital signs    Activity as tolerated    Place 18-22 [facility] peripheral IV     Verify informed consent    Notify physician     Notify physician     Notify physician (specify)    Diet NPO    POCT urine pregnancy    POCT glucose    Case Request Operating Room: BLOCK-NERVE-MEDIAL BRANCH-LUMBAR L3, L4, L5    lactated ringers infusion   2. DDD (degenerative disc disease), lumbar     3. Right lumbar radiculopathy     4. Muscular pain     5. Scoliosis of thoracic spine, unspecified scoliosis type     6. Facet arthropathy, thoracic     7. DDD (degenerative disc disease), cervical         Plan:  1.  I reviewed the patient's lumbar spine MRI with her which is most significant for facet arthropathy as well as some triangulation of the canal and lateral recess narrowing  at L4-5.  Since she just had a lumbar JINA without lasting relief, we discussed that her pain may be more facet mediated.  I will schedule her for right L3, 4 and 5 medial branch blocks and RFA if indicated.  2.  She is going to continue dry needling at [facility] once a week.  3.  We discussed her hip x-rays and that she likely has an osteochondroma which is benign but we will further evaluate this with a right hip MRI to make sure.  4.  Follow-up in 4 weeks post procedure sooner as needed.

## 2017-10-11 ENCOUNTER — PATIENT MESSAGE (OUTPATIENT)
Dept: PAIN MEDICINE | Facility: CLINIC | Age: 57
End: 2017-10-11

## 2017-10-11 ENCOUNTER — TELEPHONE (OUTPATIENT)
Dept: PAIN MEDICINE | Facility: CLINIC | Age: 57
End: 2017-10-11

## 2017-10-11 DIAGNOSIS — S76.011A TEAR OF RIGHT GLUTEUS MINIMUS TENDON, INITIAL ENCOUNTER: Primary | ICD-10-CM

## 2017-10-11 DIAGNOSIS — M70.61 TROCHANTERIC BURSITIS, RIGHT HIP: ICD-10-CM

## 2017-10-11 DIAGNOSIS — S76.311A PARTIAL HAMSTRING TEAR, RIGHT, INITIAL ENCOUNTER: ICD-10-CM

## 2017-10-11 NOTE — TELEPHONE ENCOUNTER
Discussed MRI results of right hip with patient.  I placed a referral to Dr. Ramsay for further evaluation.  We will still proceed with procedure next week.

## 2017-10-16 DIAGNOSIS — M51.36 DDD (DEGENERATIVE DISC DISEASE), LUMBAR: Primary | ICD-10-CM

## 2017-10-17 ENCOUNTER — HOSPITAL ENCOUNTER (OUTPATIENT)
Facility: HOSPITAL | Age: 57
Discharge: HOME | End: 2017-10-17
Attending: ANESTHESIOLOGY | Admitting: ANESTHESIOLOGY
Payer: COMMERCIAL

## 2017-10-17 ENCOUNTER — SURGERY (OUTPATIENT)
Age: 57
End: 2017-10-17

## 2017-10-17 ENCOUNTER — HOSPITAL ENCOUNTER (OUTPATIENT)
Dept: RADIOLOGY | Facility: HOSPITAL | Age: 57
Discharge: HOME | End: 2017-10-17
Attending: ANESTHESIOLOGY
Payer: COMMERCIAL

## 2017-10-17 VITALS
SYSTOLIC BLOOD PRESSURE: 136 MMHG | BODY MASS INDEX: 31.72 KG/M2 | HEIGHT: 61 IN | DIASTOLIC BLOOD PRESSURE: 60 MMHG | TEMPERATURE: 97 F | WEIGHT: 168 LBS | HEART RATE: 87 BPM | OXYGEN SATURATION: 97 % | RESPIRATION RATE: 16 BRPM

## 2017-10-17 DIAGNOSIS — M51.36 DDD (DEGENERATIVE DISC DISEASE), LUMBAR: ICD-10-CM

## 2017-10-17 DIAGNOSIS — M47.816 FACET HYPERTROPHY OF LUMBAR REGION: Primary | ICD-10-CM

## 2017-10-17 PROCEDURE — 64493 INJ PARAVERT F JNT L/S 1 LEV: CPT | Mod: RT,,, | Performed by: ANESTHESIOLOGY

## 2017-10-17 PROCEDURE — 64494 INJ PARAVERT F JNT L/S 2 LEV: CPT | Mod: RT,,, | Performed by: ANESTHESIOLOGY

## 2017-10-17 PROCEDURE — 64495 INJ PARAVERT F JNT L/S 3 LEV: CPT | Mod: PO | Performed by: ANESTHESIOLOGY

## 2017-10-17 PROCEDURE — 64495 INJ PARAVERT F JNT L/S 3 LEV: CPT | Mod: RT,,, | Performed by: ANESTHESIOLOGY

## 2017-10-17 PROCEDURE — 64494 INJ PARAVERT F JNT L/S 2 LEV: CPT | Mod: PO | Performed by: ANESTHESIOLOGY

## 2017-10-17 PROCEDURE — 64493 INJ PARAVERT F JNT L/S 1 LEV: CPT | Mod: PO | Performed by: ANESTHESIOLOGY

## 2017-10-17 PROCEDURE — 25000003 PHARM REV CODE 250: Mod: PO | Performed by: ANESTHESIOLOGY

## 2017-10-17 PROCEDURE — 25500020 PHARM REV CODE 255: Mod: PO | Performed by: ANESTHESIOLOGY

## 2017-10-17 PROCEDURE — 99152 MOD SED SAME PHYS/QHP 5/>YRS: CPT | Mod: ,,, | Performed by: ANESTHESIOLOGY

## 2017-10-17 PROCEDURE — 76000 FLUOROSCOPY <1 HR PHYS/QHP: CPT | Mod: TC,PO

## 2017-10-17 PROCEDURE — 63600175 PHARM REV CODE 636 W HCPCS: Mod: PO | Performed by: ANESTHESIOLOGY

## 2017-10-17 RX ORDER — SODIUM CHLORIDE, SODIUM LACTATE, POTASSIUM CHLORIDE, CALCIUM CHLORIDE 600; 310; 30; 20 MG/100ML; MG/100ML; MG/100ML; MG/100ML
INJECTION, SOLUTION INTRAVENOUS CONTINUOUS
Status: DISCONTINUED | OUTPATIENT
Start: 2017-10-17 | End: 2017-10-17 | Stop reason: HOSPADM

## 2017-10-17 RX ORDER — LIDOCAINE HYDROCHLORIDE 10 MG/ML
INJECTION, SOLUTION EPIDURAL; INFILTRATION; INTRACAUDAL; PERINEURAL
Status: DISCONTINUED | OUTPATIENT
Start: 2017-10-17 | End: 2017-10-17 | Stop reason: HOSPADM

## 2017-10-17 RX ORDER — MIDAZOLAM HYDROCHLORIDE 2 MG/2ML
INJECTION, SOLUTION INTRAMUSCULAR; INTRAVENOUS
Status: DISCONTINUED | OUTPATIENT
Start: 2017-10-17 | End: 2017-10-17 | Stop reason: HOSPADM

## 2017-10-17 RX ORDER — BUPIVACAINE HYDROCHLORIDE 2.5 MG/ML
INJECTION, SOLUTION EPIDURAL; INFILTRATION; INTRACAUDAL
Status: DISCONTINUED | OUTPATIENT
Start: 2017-10-17 | End: 2017-10-17 | Stop reason: HOSPADM

## 2017-10-17 RX ADMIN — BUPIVACAINE HYDROCHLORIDE 4 ML: 2.5 INJECTION, SOLUTION EPIDURAL; INFILTRATION; INTRACAUDAL; PERINEURAL at 04:10

## 2017-10-17 RX ADMIN — MIDAZOLAM HYDROCHLORIDE 2 MG: 1 INJECTION, SOLUTION INTRAMUSCULAR; INTRAVENOUS at 04:10

## 2017-10-17 RX ADMIN — LIDOCAINE HYDROCHLORIDE 10 ML: 10 INJECTION, SOLUTION EPIDURAL; INFILTRATION; INTRACAUDAL; PERINEURAL at 04:10

## 2017-10-17 RX ADMIN — IOHEXOL 3 ML: 300 INJECTION, SOLUTION INTRAVENOUS at 04:10

## 2017-10-17 NOTE — H&P (VIEW-ONLY)
This note was completed with dictation software and grammatical errors may exist.    CC:Back pain, neck pain    HPI: The patient is a 56-year-old woman with history of hypothyroidism, otherwise fairly healthy but a long history of scoliosis causing back pain, self-referred for continued back pain.  She returns in follow-up today to review her lumbar spine MRI results and hip x-ray results.  She continues to complain of right low back pain radiating to the right lateral hip and lateral thigh, shin and right lateral foot.  Pain is worse with sitting and improved with standing.  She did dry needling at [facility] on Tuesday and had significant relief that day and the following day.  She denies weakness, numbness, bladder or bowel incontinence.    Pain intervention history: She has been seeing Dr. Huffman for the last several years and has undergone epidural steroid injections and radiofrequency ablations with good relief.  The last was done about one year ago however.  As far as medications she has been taking Hydrocodone 7.5/325 one to 2 times a day at most and gabapentin 300 mg at night. She is status post cervical epidural steroid injection on 10/19/15 with 50% relief of her neck pain. She is status post right L4 transforaminal epidural steroid injection on 11/23/15 with 100% relief.   She is status post right T3, 4, 5 and 6 medial branch radio frequency ablation on 2/5/16 with greater than 50% relief.  She is status post right L4 transforaminal epidural sterile injection on 6/14/16 with significant relief lasting only about a month.  She is status post C7-T1 cervical interlaminar epidural steroid injection on 7/14/16 with 30-40% relief.   She is status post L5/S1 interlaminar epidural steroidal injection to the right on 8/12/16 with 100% relief of her right leg pain.  She is status post C7-T1 cervical interlaminar epidural steroid injection on 12/9/16 with almost complete relief.  She is status post  right T4, 5, 6 and 7 medial branch radiofrequency ablation on 5/30/17 with 100% relief.  She is status post C7-T1 cervical interlaminar epidural steroid injection on 6/27/17 with 80% relief.  She is status post L5/S1 interlaminar epidural steroid injection on a/7/17 with excellent relief lasting 2 weeks, now reporting 0% relief.    According to her last pain management physician, she had undergone a right side T3, 4, 5, 6 medial branch radiofrequency ablation on 8/8/14 with good relief.  He had scheduled her for another one but did not complete this.      ROS: She reports fatigability, headaches, hypothyroidism, joint stiffness, back pain, difficulty sleeping and anxiety.  Balance of review of systems is negative.      Medical, surgical, family and social history reviewed elsewhere in record.  She is a  at [employer].    Medications/Allergies: See med card    There were no vitals filed for this visit.      Physical exam:  Gen: A and O x3, pleasant, well-groomed  Skin: No rashes or obvious lesions  HEENT: PERRLA, no obvious deformities on ears or in canals. Trachea midline.  CVS: Regular rate and rhythm, normal palpable pulses.  Resp:No increased work of breathing, symmetrical chest rise.  Abdomen: Soft, NT/ND.  Musculoskeletal:No antalgic gait.     Neuro:  Upper extremities: 5/5 strength bilaterally.  Lower extremities: 5/5 strength bilaterally.    Reflexes: Patellar 2+, Achilles 2+ bilaterally.  Upper extremity reflexes 2+ bilaterally equal.  Sensory:  Intact and symmetrical to light touch and pinprick in L2-S1 dermatomes bilaterally.    Cervical spine exam: Range of motion is full with flexion, extension and lateral rotation without increased pain.   Myofascial exam: No tenderness to palpation to the cervical paraspinous muscles.  Mild tenderness palpation to the right thoracic paraspinous muscles.    Lumbar spine:  Lumbar spine: Range of motion is full with flexion and extension with increased  right low back pain during each maneuver but especially with extension.  Brayden's test causes no increased pain on either side.    Supine straight leg raise causes right low back and right leg pain.  Internal and external rotation of the hip causes no increased pain on either side.  Myofascial exam: Mild tenderness to palpation across the right lumbar paraspinous muscles and right upper gluteal region.       Imagin/16/15 Xray Scoliosis survey: There is thoracic dextroscoliosis with mild lumbar compensatory levoscoliosis. No structural abnormalities are evident. Diffuse spondylosis noted in the cervical, thoracic and to lesser extent lumbar spine. No fractures are identified. There is slight increased kyphotic curvature of the thoracic spine with alignment being otherwise normal. Measurements and evaluation are limited due to underpenetration. Land angle measures approximately in the thoracic spine is of approximately 17.0 degrees and for the lumbar spine 17.4 degrees. Soft tissues are unremarkable. IMPRESSION: 1. S shaped scoliotic curvature of the thoracolumbar spine with Land angle of approximately 17 degrees. 2. Diffuse spondylosis.    MRI report 12: Report notes that there is dextroconvex rotary curvature of the thoracic spine.  Posterior alignment is maintained.  There are scattered vertebral body hemangiomas.  There is minimal posterior disc bulges noted at T5/6, T6/7, T7/8 and T8/9.  There is no central spinal stenosis or neural foraminal narrowing.    MRI right shoulder 11: Minor distal subscapularis tendinosis.  Negative for full-thickness or significant partial-thickness rotator cuff tendon tear.  There is a small amount of subacromial subdeltoid bursal fluid which can be seen with recent injection or mild bursitis.    MRI cervical spine report 3/25/11.  Note that this is comparison to previous study on 2008.  This is a report only, no images were available to me.  The C2/3 disc  appears grossly intact with no significant bulging.  C3/4 there is mild protrusion of the disc with no herniation and no nerve root entrapment seen.  Spinal stenosis is not noted.  At C4/5 there is broad-based disc protrusion which compresses the subarachnoid space without cord compression.  There is no definite nerve root compression seen.  At C5/6 there is vertebral body lesion which is bright on T2 and dark on T1 but it does not enhance and may represent an atypical hemangioma.  This is unchanged from previous study.  At C5/6 there is a protrusion of the disc asymmetrically to the left but no significant entrapment.  No change from previous study.  At C6/7 there is a focal protrusion of the disc causing compression of the subarachnoid space without definite cord or nerve root compression.  C7/T1 appears intact.    Lumbar spine MRI report 3/31/14: No acute abnormality or significant degenerative changes.  No canal or foraminal stenosis.    Lumbar spine MRI 10/19/15  T12-L1: No central canal or neuroforaminal stenosis. No disc protrusion or extrusion.  L1-L2: No central canal or neuroforaminal stenosis. No disc protrusion or extrusion.  L2-L3: No central canal or neuroforaminal stenosis. No disc protrusion or extrusion.  L3-L4: No central canal or neuroforaminal stenosis. No disc protrusion or extrusion.  L4-L5: There is a broad disc bulge which extends into both neuroforamina. There is prominent ligamentum flavum thickening and facet arthropathy. There is mild bilateral neuroforaminal stenosis. No central canal stenosis.  L5-S1: There is bilateral hypertrophic facet arthropathy and ligamentum flavum thickening. No central canal or neuroforaminal stenosis. No disc protrusion or extrusion.    Cervical spine MRI 6/29/16  At C2-C3, the minimal interstitial excision covers a very mild broad-based disc bulge most prominent centrally but does not deform the ventral cord.  There is no canal or foraminal stenosis.  At  C3-C4, there is mildly decreased disc height with a broad-based disc bulge most prominent centrally.  This minimally contact the ventral cord without significant deformity.  The canal is widely patent.  There is uncovertebral hypertrophy and facet arthropathy, but the foramina are patent.  At C4-C5, there is decreased disc height with a broad disc bulge-marginal osteophyte complex that mildly lateralizes to the left and blends imperceptibly with right greater than left uncovertebral hypertrophy.  With ligamentum hypertrophy, there is no significant central canal stenosis.  Uncovertebral hypertrophy and facet arthropathy are causing mild left and moderate right foraminal stenoses.  At C5-C6, there is a broad-based disc bulge and osteophyte complex most prominent centrally.  There is also ligamentum flavum hypertrophy present, but the canal is patent.  There is mild left foraminal stenosis.  The right foramen is patent.  At C6-C7, there is disc desiccation with a minimal broad-based disc bulge most prominent in the right paracentral canal.  The canal is widely patent.  There is mild left foraminal stenosis.  At C7-T1, there is no significant disc bulge, protrusion, or herniation/extrusion.  The canal and foramina are widely patent.    MRI lumbar spine 9/22/17  L1-L2 demonstrates no significant disc protrusion, central spinal canal stenosis, or neural foraminal stenosis.  L2-L3 demonstrates no significant disc protrusion, central spinal canal stenosis, or neural foraminal stenosis. Mild facet arthrosis is seen.  L3-L4 demonstrates no significant disc protrusion, central spinal canal stenosis, or neural foraminal stenosis. Mild facet arthrosis is seen.  L4-L5 demonstrates mild broad-based disc bulge, ligamentum flavum hypertrophy, and mild/moderate bilateral facet arthrosis. No significant central spinal canal stenosis is seen. Mild bilateral lateral recess narrowing and neural foraminal narrowing is seen. Similar  findings were seen on the prior MRI.  L5-S1 demonstrates no significant posterior disc protrusion. Moderate bilateral facet arthrosis is seen along ligamentum flavum hypertrophy. No significant central spinal canal or neural foraminal stenosis is seen.    Hip x-rays 9/22/17  AP view of the pelvis and frog leg views of both hips were obtained. No evidence of acute displaced fracture or dislocation is visualized.  No radiopaque foreign bodies are visualized. The bilateral superior joint spaces are grossly maintained. Mild bilateral sacroiliac joint degenerative changes are seen including subchondral sclerosis and small marginal osteophytes. Mild to moderate symphyseal degenerative changes are seen. There is a slightly expansile cortically-based focus along the lateral proximal right femoral metadiaphysis. This could reflect a sessile osteochondroma, but correlation with MRI of the right hip is recommended.      Assessment:  The patient is a 56-year-old woman with history of hypothyroidism, otherwise fairly healthy but a long history of scoliosis causing back pain, self-referred for continued back pain.    1. Facet hypertrophy of lumbar region  Place in Outpatient    Vital signs    Activity as tolerated    Place 18-22 [facility] peripheral IV     Verify informed consent    Notify physician     Notify physician     Notify physician (specify)    Diet NPO    POCT urine pregnancy    POCT glucose    Case Request Operating Room: BLOCK-NERVE-MEDIAL BRANCH-LUMBAR L3, L4, L5    lactated ringers infusion   2. DDD (degenerative disc disease), lumbar     3. Right lumbar radiculopathy     4. Muscular pain     5. Scoliosis of thoracic spine, unspecified scoliosis type     6. Facet arthropathy, thoracic     7. DDD (degenerative disc disease), cervical         Plan:  1.  I reviewed the patient's lumbar spine MRI with her which is most significant for facet arthropathy as well as some triangulation of the canal and lateral recess narrowing  at L4-5.  Since she just had a lumbar JINA without lasting relief, we discussed that her pain may be more facet mediated.  I will schedule her for right L3, 4 and 5 medial branch blocks and RFA if indicated.  2.  She is going to continue dry needling at [facility] once a week.  3.  We discussed her hip x-rays and that she likely has an osteochondroma which is benign but we will further evaluate this with a right hip MRI to make sure.  4.  Follow-up in 4 weeks post procedure sooner as needed.

## 2017-10-17 NOTE — DISCHARGE INSTRUCTIONS
Home care instructions  Apply ice pack to the injection site for 20 minutes periods for the first 24 hrs for soreness/discomfort at injection site DO NOT USE HEAT FOR 24 HOURS  Keep site clean and dry for 24 hours, remove bandaid when desired  Do not drive until tomorrow  Return to normal activity today.  Pain clinic will call you tomorrow to check your progress.  Make take 2 weeks to feel the full effects   Resume home medication as prescribed today  Resume Aspirin, Plavix, or Coumadin the day after the procedure unless otherwise instructed.    SEE IMMEDIATE MEDICAL HELP FOR:  Severe increase in your usual pain or appearance of new pain  Prolonged or increasing weakness or numbness in the legs or arms  Drainage, redness, active bleeding, or increased swelling at the injection site  Temperature over 100.0 degrees F.  Headache that increases when your head is upright and decreases when you lie flat    CALL 911 OR GO DIRECTLY TO EMERGENCY DEPARTMENT FOR:  Shortness of breath, chest pain, or problems breathing

## 2017-10-17 NOTE — OP NOTE
PROCEDURE DATE: 10/17/2017    PROCEDURE:  Right L3,4,5 medial branch nerve block on the right-side    DIAGNOSIS:  Lumbar facet hypertrophy     Post Op diagnosis: Same    PHYSICIAN: Floyd Coleman MD    MEDICATIONS INJECTED: 0.25% bupivicaine, 1ml at each level    LOCAL ANESTHETIC USED: Lidocaine 1%, 2ml at each level    SEDATION MEDICATIONS: 4mg versed    ESTIMATED BLOOD LOSS:  none    COMPLICATIONS:  none    TECHNIQUE: A time out was taken to identify the patient, procedure and side of the procedure. The patient was placed in a prone position, then prepped and draped in the usual sterile fashion using ChloraPrep and sterile towels.  The levels were determined under fluoroscopic guidance and then marked.  Local anesthetic was given by raising a wheal at the skin over each site and then infiltrated approximately 2cm deeper.  A 25-gauge 3.5 inch needle was introduced to the anatomic location of the right L3,4,5 medial branch nerves on the right side.  Appropriate location and medication spread confirmed by injecting 0.5ml of Omnipaque. The above medication was then injected. The patient tolerated the procedure well.     The patient was monitored after the procedure. The patient will be contacted in the next few days to determine extent of relief.  Patient was given post procedure and discharge instructions to follow at home.  The patient was discharged in a stable condition.

## 2017-10-17 NOTE — DISCHARGE SUMMARY
[facility]  Discharge Note  Short Stay    Admit Date: 10/17/2017    Discharge Date: 10/17/2017    Attending Physician: Floyd Coleman MD     Discharge Provider: Floyd Coleman    Diagnoses:  Active Hospital Problems    Diagnosis  POA    *Facet hypertrophy of lumbar region [M47.896]  Yes      Resolved Hospital Problems    Diagnosis Date Resolved POA   No resolved problems to display.       Discharged Condition: good    Final Diagnoses: Facet hypertrophy of lumbar region [M47.896]    Disposition: Home or Self Care    Hospital Course: no complications, uneventful    Outcome of Hospitalization, Treatment, Procedure, or Surgery:  Patient was admitted for outpatient procedure. The patient underwent procedure without complications and are discharged home    Follow up/Patient Instructions:  Follow up as scheduled/Patient has received instructions and follow up date    Medications:  Continue previous medications      Discharge Procedure Orders  Diet general     Activity as tolerated     Call MD for:  temperature >100.4     Call MD for:  severe uncontrolled pain     Call MD for:  redness, tenderness, or signs of infection (pain, swelling, redness, odor or green/yellow discharge around incision site)     Call MD for:  severe persistent headache     No dressing needed           Discharge Procedure Orders (must include Diet, Follow-up, Activity):    Discharge Procedure Orders (must include Diet, Follow-up, Activity)  Diet general     Activity as tolerated     Call MD for:  temperature >100.4     Call MD for:  severe uncontrolled pain     Call MD for:  redness, tenderness, or signs of infection (pain, swelling, redness, odor or green/yellow discharge around incision site)     Call MD for:  severe persistent headache     No dressing needed

## 2017-10-18 ENCOUNTER — TELEPHONE (OUTPATIENT)
Dept: PAIN MEDICINE | Facility: CLINIC | Age: 57
End: 2017-10-18

## 2017-10-18 DIAGNOSIS — M47.816 FACET HYPERTROPHY OF LUMBAR REGION: Primary | ICD-10-CM

## 2017-10-18 RX ORDER — SODIUM CHLORIDE, SODIUM LACTATE, POTASSIUM CHLORIDE, CALCIUM CHLORIDE 600; 310; 30; 20 MG/100ML; MG/100ML; MG/100ML; MG/100ML
INJECTION, SOLUTION INTRAVENOUS CONTINUOUS
Status: CANCELLED | OUTPATIENT
Start: 2017-10-19

## 2017-10-18 NOTE — TELEPHONE ENCOUNTER
----- Message from Nida Brambila sent at 10/18/2017 10:34 AM CDT -----  Contact: pt   Patient called and asked for a call back about her appointment for tomorrow call back [phone]

## 2017-10-18 NOTE — TELEPHONE ENCOUNTER
Spoke with the patient regarding the block done on 10/17 and received greater than 85% relief. The pain starts on the right buttock and travels down to back of the leg then turns outward and then runs down the outside to the right foot down to the little toe. This is where the block relief takes place and received relief to the knee and the back of the knee to the back outside portion of the knee. Stated that now she can sit where before could not before without putting all the weight on the left buttock with the right foot elevated. She would have to shift to sit on the left side. After the block she was able to sit and could put feet on the floor when sitting in the chair. When she was driving she had to sit and drive with the right leg up which makes it difficult to drive. Patient stated that after the block she was able to sit in a normal position with the leg on the floor. The block lasted about 6 hours before the pain started to return. RFA scheduled for 10/19.

## 2017-10-19 ENCOUNTER — HOSPITAL ENCOUNTER (OUTPATIENT)
Facility: HOSPITAL | Age: 57
Discharge: HOME | End: 2017-10-19
Attending: ANESTHESIOLOGY | Admitting: ANESTHESIOLOGY
Payer: COMMERCIAL

## 2017-10-19 ENCOUNTER — SURGERY (OUTPATIENT)
Age: 57
End: 2017-10-19

## 2017-10-19 ENCOUNTER — HOSPITAL ENCOUNTER (OUTPATIENT)
Dept: RADIOLOGY | Facility: HOSPITAL | Age: 57
Discharge: HOME | End: 2017-10-19
Attending: ANESTHESIOLOGY | Admitting: ANESTHESIOLOGY
Payer: COMMERCIAL

## 2017-10-19 VITALS
HEART RATE: 68 BPM | OXYGEN SATURATION: 97 % | SYSTOLIC BLOOD PRESSURE: 114 MMHG | BODY MASS INDEX: 31.72 KG/M2 | WEIGHT: 168 LBS | TEMPERATURE: 98 F | DIASTOLIC BLOOD PRESSURE: 78 MMHG | RESPIRATION RATE: 16 BRPM | HEIGHT: 61 IN

## 2017-10-19 DIAGNOSIS — M51.36 DDD (DEGENERATIVE DISC DISEASE), LUMBAR: ICD-10-CM

## 2017-10-19 DIAGNOSIS — M47.816 FACET HYPERTROPHY OF LUMBAR REGION: Primary | ICD-10-CM

## 2017-10-19 DIAGNOSIS — M51.36 DDD (DEGENERATIVE DISC DISEASE), LUMBAR: Primary | ICD-10-CM

## 2017-10-19 PROCEDURE — 64636 DESTROY L/S FACET JNT ADDL: CPT | Mod: RT,,, | Performed by: ANESTHESIOLOGY

## 2017-10-19 PROCEDURE — A4216 STERILE WATER/SALINE, 10 ML: HCPCS | Mod: PO | Performed by: ANESTHESIOLOGY

## 2017-10-19 PROCEDURE — 76000 FLUOROSCOPY <1 HR PHYS/QHP: CPT | Mod: TC,PO

## 2017-10-19 PROCEDURE — 64636 DESTROY L/S FACET JNT ADDL: CPT | Mod: PO | Performed by: ANESTHESIOLOGY

## 2017-10-19 PROCEDURE — 99152 MOD SED SAME PHYS/QHP 5/>YRS: CPT | Mod: ,,, | Performed by: ANESTHESIOLOGY

## 2017-10-19 PROCEDURE — 64635 DESTROY LUMB/SAC FACET JNT: CPT | Mod: PO | Performed by: ANESTHESIOLOGY

## 2017-10-19 PROCEDURE — 25000003 PHARM REV CODE 250: Mod: PO | Performed by: ANESTHESIOLOGY

## 2017-10-19 PROCEDURE — 63600175 PHARM REV CODE 636 W HCPCS: Mod: PO | Performed by: ANESTHESIOLOGY

## 2017-10-19 PROCEDURE — 64635 DESTROY LUMB/SAC FACET JNT: CPT | Mod: RT,,, | Performed by: ANESTHESIOLOGY

## 2017-10-19 RX ORDER — FENTANYL CITRATE 50 UG/ML
INJECTION, SOLUTION INTRAMUSCULAR; INTRAVENOUS
Status: DISCONTINUED | OUTPATIENT
Start: 2017-10-19 | End: 2017-10-19 | Stop reason: HOSPADM

## 2017-10-19 RX ORDER — MIDAZOLAM HYDROCHLORIDE 2 MG/2ML
INJECTION, SOLUTION INTRAMUSCULAR; INTRAVENOUS
Status: DISCONTINUED | OUTPATIENT
Start: 2017-10-19 | End: 2017-10-19 | Stop reason: HOSPADM

## 2017-10-19 RX ORDER — LIDOCAINE HYDROCHLORIDE 10 MG/ML
INJECTION, SOLUTION EPIDURAL; INFILTRATION; INTRACAUDAL; PERINEURAL
Status: DISCONTINUED | OUTPATIENT
Start: 2017-10-19 | End: 2017-10-19 | Stop reason: HOSPADM

## 2017-10-19 RX ORDER — METHYLPREDNISOLONE ACETATE 40 MG/ML
INJECTION, SUSPENSION INTRA-ARTICULAR; INTRALESIONAL; INTRAMUSCULAR; SOFT TISSUE
Status: DISCONTINUED | OUTPATIENT
Start: 2017-10-19 | End: 2017-10-19 | Stop reason: HOSPADM

## 2017-10-19 RX ORDER — LIDOCAINE HYDROCHLORIDE 20 MG/ML
INJECTION, SOLUTION EPIDURAL; INFILTRATION; INTRACAUDAL; PERINEURAL
Status: DISCONTINUED | OUTPATIENT
Start: 2017-10-19 | End: 2017-10-19 | Stop reason: HOSPADM

## 2017-10-19 RX ORDER — SODIUM CHLORIDE 9 MG/ML
INJECTION, SOLUTION INTRAMUSCULAR; INTRAVENOUS; SUBCUTANEOUS
Status: DISCONTINUED | OUTPATIENT
Start: 2017-10-19 | End: 2017-10-19 | Stop reason: HOSPADM

## 2017-10-19 RX ORDER — LIDOCAINE HYDROCHLORIDE 10 MG/ML
1 INJECTION INFILTRATION; PERINEURAL ONCE
Status: COMPLETED | OUTPATIENT
Start: 2017-10-19 | End: 2017-10-19

## 2017-10-19 RX ADMIN — LIDOCAINE HYDROCHLORIDE 4 ML: 20 INJECTION, SOLUTION EPIDURAL; INFILTRATION; INTRACAUDAL; PERINEURAL at 12:10

## 2017-10-19 RX ADMIN — MIDAZOLAM HYDROCHLORIDE 1 MG: 1 INJECTION, SOLUTION INTRAMUSCULAR; INTRAVENOUS at 12:10

## 2017-10-19 RX ADMIN — SODIUM CHLORIDE 3 ML: 9 INJECTION INTRAMUSCULAR; INTRAVENOUS; SUBCUTANEOUS at 12:10

## 2017-10-19 RX ADMIN — LIDOCAINE HYDROCHLORIDE 1 ML: 10 INJECTION, SOLUTION EPIDURAL; INFILTRATION; INTRACAUDAL; PERINEURAL at 12:10

## 2017-10-19 RX ADMIN — MIDAZOLAM HYDROCHLORIDE 2 MG: 1 INJECTION, SOLUTION INTRAMUSCULAR; INTRAVENOUS at 12:10

## 2017-10-19 RX ADMIN — METHYLPREDNISOLONE ACETATE 40 MG: 40 INJECTION, SUSPENSION INTRA-ARTICULAR; INTRALESIONAL; INTRAMUSCULAR; SOFT TISSUE at 12:10

## 2017-10-19 RX ADMIN — FENTANYL CITRATE 25 MCG: 50 INJECTION, SOLUTION INTRAMUSCULAR; INTRAVENOUS at 12:10

## 2017-10-19 RX ADMIN — LIDOCAINE HYDROCHLORIDE 10 ML: 10 INJECTION, SOLUTION EPIDURAL; INFILTRATION; INTRACAUDAL; PERINEURAL at 12:10

## 2017-10-19 NOTE — OP NOTE
PROCEDURE DATE: 10/19/2017    PROCEDURE:  Radiofrequency ablation of the right L3,4,5 medial branch nerves on the right-side utilizing fluoroscopy    DIAGNOSIS:  Lumbar facet hypertrophy    Post op Diagnosis: Same    PHYSICIAN: Floyd Coleman MD    MEDICATIONS INJECTED:  From a mixture of 4ml of 2% lidocaine and 40mg of methylprednisone, 1ml of this solution was injected at each level.    LOCAL ANESTHETIC USED: Lidocaine 1%, 4 ml given at each site.    SEDATION MEDICATIONS: 4mg versed, 25mcg fentanyl    ESTIMATED BLOOD LOSS:  none    COMPLICATIONS:  none    TECHNIQUE:  A time out was taken to identify patient and procedure side prior to starting the procedure. Laying in a prone position, the patient was prepped and draped in the usual sterile fashion using ChloraPrep and sterile towels.  The levels were determined under fluoroscopic guidance and then marked.  Local anesthetic was given by raising a wheal at the skin over each site and then infiltrated approximately 2cm deeper.  A 20-gauge  100 mm [employer] RF needle was introduced to the anatomic location of the right L3,4,5 medial branch nerves.  Motor stimulation up to 2 Volts at each level confirmed no motor nerve involvement.  Impedance was less than 800 ohms at each level. The above noted medication was then injected slowly.  Ablation was performed per level utilizing Nataliya radiofrequency generator 80°C for 90 seconds. The patient tolerated the procedure well.     The patient was monitored after the procedure.  Patient was given post procedure and discharge instructions to follow at home.  The patient was discharged in a stable condition

## 2017-10-19 NOTE — DISCHARGE SUMMARY
[facility]  Discharge Note  Short Stay    Admit Date: 10/19/2017    Discharge Date: 10/19/2017    Attending Physician: Floyd Coleman MD     Discharge Provider: Floyd Coleman    Diagnoses:  Active Hospital Problems    Diagnosis  POA    *Facet hypertrophy of lumbar region [M47.896]  Yes      Resolved Hospital Problems    Diagnosis Date Resolved POA   No resolved problems to display.       Discharged Condition: good    Final Diagnoses: Facet hypertrophy of lumbar region [M47.896]    Disposition: Home or Self Care    Hospital Course: no complications, uneventful    Outcome of Hospitalization, Treatment, Procedure, or Surgery:  Patient was admitted for outpatient procedure. The patient underwent procedure without complications and are discharged home    Follow up/Patient Instructions:  Follow up as scheduled/Patient has received instructions and follow up date    Medications:  Continue previous medications      Discharge Procedure Orders  Diet general     Activity as tolerated     Call MD for:  temperature >100.4     Call MD for:  severe uncontrolled pain     Call MD for:  redness, tenderness, or signs of infection (pain, swelling, redness, odor or green/yellow discharge around incision site)     Call MD for:  severe persistent headache     No dressing needed           Discharge Procedure Orders (must include Diet, Follow-up, Activity):    Discharge Procedure Orders (must include Diet, Follow-up, Activity)  Diet general     Activity as tolerated     Call MD for:  temperature >100.4     Call MD for:  severe uncontrolled pain     Call MD for:  redness, tenderness, or signs of infection (pain, swelling, redness, odor or green/yellow discharge around incision site)     Call MD for:  severe persistent headache     No dressing needed

## 2017-10-19 NOTE — H&P (VIEW-ONLY)
This note was completed with dictation software and grammatical errors may exist.    CC:Back pain, neck pain    HPI: The patient is a 56-year-old woman with history of hypothyroidism, otherwise fairly healthy but a long history of scoliosis causing back pain, self-referred for continued back pain.  She returns in follow-up today to review her lumbar spine MRI results and hip x-ray results.  She continues to complain of right low back pain radiating to the right lateral hip and lateral thigh, shin and right lateral foot.  Pain is worse with sitting and improved with standing.  She did dry needling at [facility] on Tuesday and had significant relief that day and the following day.  She denies weakness, numbness, bladder or bowel incontinence.    Pain intervention history: She has been seeing Dr. Huffman for the last several years and has undergone epidural steroid injections and radiofrequency ablations with good relief.  The last was done about one year ago however.  As far as medications she has been taking Hydrocodone 7.5/325 one to 2 times a day at most and gabapentin 300 mg at night. She is status post cervical epidural steroid injection on 10/19/15 with 50% relief of her neck pain. She is status post right L4 transforaminal epidural steroid injection on 11/23/15 with 100% relief.   She is status post right T3, 4, 5 and 6 medial branch radio frequency ablation on 2/5/16 with greater than 50% relief.  She is status post right L4 transforaminal epidural sterile injection on 6/14/16 with significant relief lasting only about a month.  She is status post C7-T1 cervical interlaminar epidural steroid injection on 7/14/16 with 30-40% relief.   She is status post L5/S1 interlaminar epidural steroidal injection to the right on 8/12/16 with 100% relief of her right leg pain.  She is status post C7-T1 cervical interlaminar epidural steroid injection on 12/9/16 with almost complete relief.  She is status post  right T4, 5, 6 and 7 medial branch radiofrequency ablation on 5/30/17 with 100% relief.  She is status post C7-T1 cervical interlaminar epidural steroid injection on 6/27/17 with 80% relief.  She is status post L5/S1 interlaminar epidural steroid injection on a/7/17 with excellent relief lasting 2 weeks, now reporting 0% relief.    According to her last pain management physician, she had undergone a right side T3, 4, 5, 6 medial branch radiofrequency ablation on 8/8/14 with good relief.  He had scheduled her for another one but did not complete this.      ROS: She reports fatigability, headaches, hypothyroidism, joint stiffness, back pain, difficulty sleeping and anxiety.  Balance of review of systems is negative.      Medical, surgical, family and social history reviewed elsewhere in record.  She is a  at [employer].    Medications/Allergies: See med card    There were no vitals filed for this visit.      Physical exam:  Gen: A and O x3, pleasant, well-groomed  Skin: No rashes or obvious lesions  HEENT: PERRLA, no obvious deformities on ears or in canals. Trachea midline.  CVS: Regular rate and rhythm, normal palpable pulses.  Resp:No increased work of breathing, symmetrical chest rise.  Abdomen: Soft, NT/ND.  Musculoskeletal:No antalgic gait.     Neuro:  Upper extremities: 5/5 strength bilaterally.  Lower extremities: 5/5 strength bilaterally.    Reflexes: Patellar 2+, Achilles 2+ bilaterally.  Upper extremity reflexes 2+ bilaterally equal.  Sensory:  Intact and symmetrical to light touch and pinprick in L2-S1 dermatomes bilaterally.    Cervical spine exam: Range of motion is full with flexion, extension and lateral rotation without increased pain.   Myofascial exam: No tenderness to palpation to the cervical paraspinous muscles.  Mild tenderness palpation to the right thoracic paraspinous muscles.    Lumbar spine:  Lumbar spine: Range of motion is full with flexion and extension with increased  right low back pain during each maneuver but especially with extension.  Brayden's test causes no increased pain on either side.    Supine straight leg raise causes right low back and right leg pain.  Internal and external rotation of the hip causes no increased pain on either side.  Myofascial exam: Mild tenderness to palpation across the right lumbar paraspinous muscles and right upper gluteal region.       Imagin/16/15 Xray Scoliosis survey: There is thoracic dextroscoliosis with mild lumbar compensatory levoscoliosis. No structural abnormalities are evident. Diffuse spondylosis noted in the cervical, thoracic and to lesser extent lumbar spine. No fractures are identified. There is slight increased kyphotic curvature of the thoracic spine with alignment being otherwise normal. Measurements and evaluation are limited due to underpenetration. Land angle measures approximately in the thoracic spine is of approximately 17.0 degrees and for the lumbar spine 17.4 degrees. Soft tissues are unremarkable. IMPRESSION: 1. S shaped scoliotic curvature of the thoracolumbar spine with Land angle of approximately 17 degrees. 2. Diffuse spondylosis.    MRI report 12: Report notes that there is dextroconvex rotary curvature of the thoracic spine.  Posterior alignment is maintained.  There are scattered vertebral body hemangiomas.  There is minimal posterior disc bulges noted at T5/6, T6/7, T7/8 and T8/9.  There is no central spinal stenosis or neural foraminal narrowing.    MRI right shoulder 11: Minor distal subscapularis tendinosis.  Negative for full-thickness or significant partial-thickness rotator cuff tendon tear.  There is a small amount of subacromial subdeltoid bursal fluid which can be seen with recent injection or mild bursitis.    MRI cervical spine report 3/25/11.  Note that this is comparison to previous study on 2008.  This is a report only, no images were available to me.  The C2/3 disc  appears grossly intact with no significant bulging.  C3/4 there is mild protrusion of the disc with no herniation and no nerve root entrapment seen.  Spinal stenosis is not noted.  At C4/5 there is broad-based disc protrusion which compresses the subarachnoid space without cord compression.  There is no definite nerve root compression seen.  At C5/6 there is vertebral body lesion which is bright on T2 and dark on T1 but it does not enhance and may represent an atypical hemangioma.  This is unchanged from previous study.  At C5/6 there is a protrusion of the disc asymmetrically to the left but no significant entrapment.  No change from previous study.  At C6/7 there is a focal protrusion of the disc causing compression of the subarachnoid space without definite cord or nerve root compression.  C7/T1 appears intact.    Lumbar spine MRI report 3/31/14: No acute abnormality or significant degenerative changes.  No canal or foraminal stenosis.    Lumbar spine MRI 10/19/15  T12-L1: No central canal or neuroforaminal stenosis. No disc protrusion or extrusion.  L1-L2: No central canal or neuroforaminal stenosis. No disc protrusion or extrusion.  L2-L3: No central canal or neuroforaminal stenosis. No disc protrusion or extrusion.  L3-L4: No central canal or neuroforaminal stenosis. No disc protrusion or extrusion.  L4-L5: There is a broad disc bulge which extends into both neuroforamina. There is prominent ligamentum flavum thickening and facet arthropathy. There is mild bilateral neuroforaminal stenosis. No central canal stenosis.  L5-S1: There is bilateral hypertrophic facet arthropathy and ligamentum flavum thickening. No central canal or neuroforaminal stenosis. No disc protrusion or extrusion.    Cervical spine MRI 6/29/16  At C2-C3, the minimal interstitial excision covers a very mild broad-based disc bulge most prominent centrally but does not deform the ventral cord.  There is no canal or foraminal stenosis.  At  C3-C4, there is mildly decreased disc height with a broad-based disc bulge most prominent centrally.  This minimally contact the ventral cord without significant deformity.  The canal is widely patent.  There is uncovertebral hypertrophy and facet arthropathy, but the foramina are patent.  At C4-C5, there is decreased disc height with a broad disc bulge-marginal osteophyte complex that mildly lateralizes to the left and blends imperceptibly with right greater than left uncovertebral hypertrophy.  With ligamentum hypertrophy, there is no significant central canal stenosis.  Uncovertebral hypertrophy and facet arthropathy are causing mild left and moderate right foraminal stenoses.  At C5-C6, there is a broad-based disc bulge and osteophyte complex most prominent centrally.  There is also ligamentum flavum hypertrophy present, but the canal is patent.  There is mild left foraminal stenosis.  The right foramen is patent.  At C6-C7, there is disc desiccation with a minimal broad-based disc bulge most prominent in the right paracentral canal.  The canal is widely patent.  There is mild left foraminal stenosis.  At C7-T1, there is no significant disc bulge, protrusion, or herniation/extrusion.  The canal and foramina are widely patent.    MRI lumbar spine 9/22/17  L1-L2 demonstrates no significant disc protrusion, central spinal canal stenosis, or neural foraminal stenosis.  L2-L3 demonstrates no significant disc protrusion, central spinal canal stenosis, or neural foraminal stenosis. Mild facet arthrosis is seen.  L3-L4 demonstrates no significant disc protrusion, central spinal canal stenosis, or neural foraminal stenosis. Mild facet arthrosis is seen.  L4-L5 demonstrates mild broad-based disc bulge, ligamentum flavum hypertrophy, and mild/moderate bilateral facet arthrosis. No significant central spinal canal stenosis is seen. Mild bilateral lateral recess narrowing and neural foraminal narrowing is seen. Similar  findings were seen on the prior MRI.  L5-S1 demonstrates no significant posterior disc protrusion. Moderate bilateral facet arthrosis is seen along ligamentum flavum hypertrophy. No significant central spinal canal or neural foraminal stenosis is seen.    Hip x-rays 9/22/17  AP view of the pelvis and frog leg views of both hips were obtained. No evidence of acute displaced fracture or dislocation is visualized.  No radiopaque foreign bodies are visualized. The bilateral superior joint spaces are grossly maintained. Mild bilateral sacroiliac joint degenerative changes are seen including subchondral sclerosis and small marginal osteophytes. Mild to moderate symphyseal degenerative changes are seen. There is a slightly expansile cortically-based focus along the lateral proximal right femoral metadiaphysis. This could reflect a sessile osteochondroma, but correlation with MRI of the right hip is recommended.      Assessment:  The patient is a 56-year-old woman with history of hypothyroidism, otherwise fairly healthy but a long history of scoliosis causing back pain, self-referred for continued back pain.    1. Facet hypertrophy of lumbar region  Place in Outpatient    Vital signs    Activity as tolerated    Place 18-22 [facility] peripheral IV     Verify informed consent    Notify physician     Notify physician     Notify physician (specify)    Diet NPO    POCT urine pregnancy    POCT glucose    Case Request Operating Room: BLOCK-NERVE-MEDIAL BRANCH-LUMBAR L3, L4, L5    lactated ringers infusion   2. DDD (degenerative disc disease), lumbar     3. Right lumbar radiculopathy     4. Muscular pain     5. Scoliosis of thoracic spine, unspecified scoliosis type     6. Facet arthropathy, thoracic     7. DDD (degenerative disc disease), cervical         Plan:  1.  I reviewed the patient's lumbar spine MRI with her which is most significant for facet arthropathy as well as some triangulation of the canal and lateral recess narrowing  at L4-5.  Since she just had a lumbar JINA without lasting relief, we discussed that her pain may be more facet mediated.  I will schedule her for right L3, 4 and 5 medial branch blocks and RFA if indicated.  2.  She is going to continue dry needling at [facility] once a week.  3.  We discussed her hip x-rays and that she likely has an osteochondroma which is benign but we will further evaluate this with a right hip MRI to make sure.  4.  Follow-up in 4 weeks post procedure sooner as needed.

## 2017-10-19 NOTE — PLAN OF CARE
PT TO PACU S/P RADIOFREQ OF LUMBAR SPINE.  DENIES C/O PAIN AT THIS TIME.  VSS.  SEE [facility] FOR DETAILS FURTHER.

## 2017-10-19 NOTE — DISCHARGE INSTRUCTIONS
Home care instructions  Apply ice pack to the injection site for 20 minutes periods for the first 24 hrs for soreness/discomfort at injection site DO NOT USE HEAT FOR 24 HOURS  Keep site clean and dry for 24 hours, remove bandaid when desired  Do not drive until tomorrow  Take care when walking after a lumbar injection  Avoid strenuous activities for 2 days  Make take 2 weeks to feel the full effects   Resume home medication as prescribed today  Resume Aspirin, Plavix, or Coumadin the day after the procedure unless otherwise instructed.    SEE IMMEDIATE MEDICAL HELP FOR:  Severe increase in your usual pain or appearance of new pain  Prolonged or increasing weakness or numbness in the legs or arms  Drainage, redness, active bleeding, or increased swelling at the injection site  Temperature over 100.0 degrees F.  Headache that increases when your head is upright and decreases when you lie flat    CALL 911 OR GO DIRECTLY TO EMERGENCY DEPARTMENT FOR:  Shortness of breath, chest pain, or problems breathing  Recovery After Procedural Sedation (Adult)  You have been given medicine by vein to make you sleep during your surgery. This may have included both a pain medicine and sleeping medicine. Most of the effects have worn off. But you may still have some drowsiness for the next 6 to 8 hours.  Home care  Follow these guidelines when you get home:  · For the next 8 hours, you should be watched by a responsible adult. This person should make sure your condition is not getting worse.  · Don't drink any alcohol for the next 24 hours.  · Don't drive, operate dangerous machinery, or make important business or personal decisions during the next 24 hours.  Note: Your healthcare provider may tell you not to take any medicine by mouth for pain or sleep in the next 4 hours. These medicines may react with the medicines you were given in the hospital. This could cause a much stronger response than usual.  Follow-up care  Follow up with  your healthcare provider if you are not alert and back to your usual level of activity within 12 hours.  When to seek medical advice  Call your healthcare provider right away if any of these occur:  · Drowsiness gets worse  · Weakness or dizziness gets worse  · Repeated vomiting  · You can't be awakened   Date Last Reviewed: 10/18/2016  © 1917-8156 [employer]. [address] [place] [address], [place] [address]. All rights reserved. This information is not intended as a substitute for professional medical care. Always follow your healthcare professional's instructions.

## 2017-10-24 ENCOUNTER — PATIENT MESSAGE (OUTPATIENT)
Dept: PAIN MEDICINE | Facility: CLINIC | Age: 57
End: 2017-10-24

## 2017-10-24 ENCOUNTER — OFFICE VISIT (OUTPATIENT)
Dept: PHYSICAL MEDICINE AND REHAB | Facility: CLINIC | Age: 57
End: 2017-10-24
Payer: COMMERCIAL

## 2017-10-24 VITALS
HEART RATE: 99 BPM | WEIGHT: 165 LBS | HEIGHT: 61 IN | DIASTOLIC BLOOD PRESSURE: 87 MMHG | BODY MASS INDEX: 31.15 KG/M2 | SYSTOLIC BLOOD PRESSURE: 139 MMHG

## 2017-10-24 DIAGNOSIS — M76.899 HAMSTRING TENDINITIS AT ORIGIN: ICD-10-CM

## 2017-10-24 DIAGNOSIS — M25.551 RIGHT HIP PAIN: Primary | ICD-10-CM

## 2017-10-24 PROCEDURE — 99203 OFFICE O/P NEW LOW 30 MIN: CPT | Mod: S$GLB,,, | Performed by: PHYSICAL MEDICINE & REHABILITATION

## 2017-10-24 PROCEDURE — 99999 PR PBB SHADOW E&M-EST. PATIENT-LVL II: CPT | Mod: PBBFAC,,, | Performed by: PHYSICAL MEDICINE & REHABILITATION

## 2017-10-24 NOTE — PROGRESS NOTES
[facility]    CHIEF COMPLAINT:   Chief Complaint   Patient presents with    Hip Pain     right hip pain     HISTORY OF PRESENT ILLNESS: Vonnie Garces is a 56 y.o. female who presents to me for the first time for evaluation and treatment of right hip pain.  She's had pain in the area for the past few years.  There was no specific injury or traumatic event.  The pain is been increasing over recent months.  She rates her pain as a 3 on a scale of 1-10.  Pain is achy and sharp in nature.  He's had no prior specific treatment for this issue.  She notes increased pain when sitting.  She locates her pain most probably to the inferior aspect of the right buttock region.  The pain does radiate down the posterior thigh at times.    Review of Systems   Constitutional: Negative for fever.   HENT: Negative for drooling.    Eyes: Negative for discharge.   Respiratory: Negative for choking.    Cardiovascular: Negative for chest pain.   Genitourinary: Negative for flank pain.   Skin: Negative for wound.   Allergic/Immunologic: Negative for immunocompromised state.   Neurological: Negative for tremors and syncope.   Psychiatric/Behavioral: Negative for behavioral problems.     Past Medical History:   Past Medical History:   Diagnosis Date    Anxiety 8/25/2015    Arthritis     Cervical spondylosis     DDD (degenerative disc disease), lumbar     Difficult intubation 03/2016    anterior larynx, pt with metal dental appliance, unable to do glidescope, used LMA    Encounter for blood transfusion     Facet arthropathy, thoracic     Hormone replacement therapy (HRT)     Insomnia 8/25/2015    PONV (postoperative nausea and vomiting)     Scoliosis     Unspecified hypothyroidism 8/25/2015       Past Surgical History:   Past Surgical History:   Procedure Laterality Date    BILATERAL OOPHORECTOMY      CHOLECYSTECTOMY  2010    COLONOSCOPY      Epidural Steroid Injection      Pain managment, at  another facility, cervical, thoracic    Epidural Steroid injection      Pain management, cervical    HYSTERECTOMY      total    MOUTH SURGERY      Braces, with temporary metal implants in upper gum    RADIOFREQUENCY ABLATION  02/2016    thoracic nerve    TONSILLECTOMY      VULVA SURGERY  03/2016       Family History:   Family History   Problem Relation Age of Onset    Arthritis Mother     Celiac disease Mother     COPD Father        Medications:   Current Outpatient Prescriptions on File Prior to Visit   Medication Sig Dispense Refill    amoxicillin-clavulanate 875-125mg (AUGMENTIN) 875-125 mg per tablet Take 1 tablet by mouth 2 (two) times daily. 14 tablet 0    estradiol (VIVELLE-DOT) 0.1 mg/24 hr PTSW Place 1 patch onto the skin twice a week.      gabapentin (NEURONTIN) 300 MG capsule Take 1 capsule (300 mg total) by mouth 2 (two) times daily. 60 capsule 5    hydrocodone-acetaminophen 7.5-325mg (NORCO) 7.5-325 mg per tablet Take 1 tablet by mouth 2 (two) times daily as needed for Pain. 60 tablet 0    levothyroxine (SYNTHROID) 125 MCG tablet Take 1 tablet (125 mcg total) by mouth before breakfast. 90 tablet 1    METHYL SALICYLATE/MENTH/CAMPH (PAIN RELIEVING CREAM TOP) Apply 1 application topically daily as needed.      mupirocin (BACTROBAN) 2 % ointment Apply topically 3 (three) times daily. 22 g 0    ondansetron (ZOFRAN-ODT) 8 MG TbDL Take 1 tablet (8 mg total) by mouth every 6 (six) hours as needed. 20 tablet 0     No current facility-administered medications on file prior to visit.        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    PHYSICAL EXAMINATION:   General    Vitals:    10/24/17 0903   BP: 139/87   Pulse: 99   Weight: 74.8 kg (165 lb)   Height: 5' 1" (1.549 m)     Constitutional: Oriented to person, place, and time. No apparent distress. Appears well-developed and well-nourished. Pleasant.  HENT:   Head: Normocephalic and atraumatic.   Eyes: Right eye exhibits no discharge. Left eye exhibits no discharge. No scleral icterus.   Pulmonary/Chest: Effort normal. No respiratory distress.   Abdominal: There is no guarding.   Neurological: Alert and oriented to person, place, and time.   Psychiatric: Behavior is normal.   Right Hip Exam     Tenderness   The patient is experiencing tenderness in the lateral, posterior and ischial tuberosity (She is tender over the greater trochanter, piriformis muscle and hamstring origin.  Her most tender area is the initial tuberosity).    Range of Motion   Flexion: 130   Internal Rotation: 20   External Rotation: 60   Abduction: 40   Adduction: 20     Muscle Strength   Abduction: 4/5   Adduction: 5/5   Flexion: 4/5     Tests   MAK: negative    Other   Scars: present  Sensation: normal  Pulse: present      Left Hip Exam     Tenderness   The patient is experiencing no tenderness.         Range of Motion   Flexion: normal   Internal Rotation: normal   External Rotation: normal   Abduction: normal   Adduction: normal     Muscle Strength   Abduction: 5/5   Adduction: 5/5   Flexion: 5/5     Other   Erythema: absent  Scars: absent  Sensation: normal  Pulse: present        INSPECTION: There is no swelling, ecchymoses, erythema or gross deformity about the right hip.  LIGAMENTOUS LAXITY AND STABILITY: Negative MAK test. No pain with SI joint compression. Negative log roll.  There is some hamstring pain with resisted knee " flexion and hip extension.  GAIT/DYNAMIC: Her gait is normal.    Imaging  MRI of the right hip from 10/10/2017: Moderate right greater trochanteric bursitis with mild partial-thickness tearing the right gluteus minimus tendon insertion.  Mild partial-thickness tearing of the common hamstring tendon origin. Small sessile osteochondroma versus desmoid along the lateral aspect of the right proximal femoral metadiaphysis    X-ray of the hip from 9/22/2017: No acute displaced fracture or dislocation is visualized.  2. There is a slightly expansile cortically based and smoothly marginated focus along the lateral right femoral metadiaphysis which may reflect a sessile osteochondroma. However, correlation with pre-and post contrast MRI is recommended.    Data Reviewed: X-ray, MRI    Supportive Actions: Independent visualization of images or test specimens    ASSESSMENT:   1. Right hip pain    2. Hamstring tendinitis at origin      PLAN:     1. Time was spent reviewing the above diagnosis in depth with Vonnie today, including acute management and rehabilitation.     2.  She has symptoms, physical exam findings, and MRI findings all consistent with a diagnosis of right proximal hamstring tendinosis/tearing.  We discussed several options for her issue including injection of corticosteroid, percutaneous needle fenestration, and platelet rich plasma injection.  At this time, she wishes to contact but these options and contact us when she would like to schedule one the above procedures.    3.  We discuss the likely need for physical therapy following the procedure directed to her hamstring and tendon origin.     4. RTC prn.    The above note was completed, in part, with the aid of Dragon dictation software/hardware. Translation errors may be present.

## 2017-10-26 RX ORDER — HYDROCODONE BITARTRATE AND ACETAMINOPHEN 7.5; 325 MG/1; MG/1
1 TABLET ORAL 2 TIMES DAILY PRN
Qty: 60 TABLET | Refills: 0 | Status: SHIPPED | OUTPATIENT
Start: 2017-10-26 | End: 2017-10-30 | Stop reason: SDUPTHER

## 2017-10-26 NOTE — TELEPHONE ENCOUNTER
I actually don't think that it would necessarily hurt to try the injection with steroid first and if the injection helps but is not producing long-term relief, then you can pursue the PRP.  I definitely think it will be difficult to get insurance to cover the PRP but at least a few due to steroid injection first and show benefit and it does not last, then you can have further evidence that it may be beneficial.  Sorry about the dog attack, that's crazy!

## 2017-10-27 ENCOUNTER — PATIENT MESSAGE (OUTPATIENT)
Dept: PAIN MEDICINE | Facility: CLINIC | Age: 57
End: 2017-10-27

## 2017-10-30 RX ORDER — HYDROCODONE BITARTRATE AND ACETAMINOPHEN 7.5; 325 MG/1; MG/1
1 TABLET ORAL 2 TIMES DAILY PRN
Qty: 60 TABLET | Refills: 0 | Status: SHIPPED | OUTPATIENT
Start: 2017-10-30 | End: 2017-11-28 | Stop reason: SDUPTHER

## 2017-11-28 ENCOUNTER — PATIENT MESSAGE (OUTPATIENT)
Dept: PAIN MEDICINE | Facility: CLINIC | Age: 57
End: 2017-11-28

## 2017-11-28 RX ORDER — HYDROCODONE BITARTRATE AND ACETAMINOPHEN 7.5; 325 MG/1; MG/1
1 TABLET ORAL 2 TIMES DAILY PRN
Qty: 60 TABLET | Refills: 0 | Status: SHIPPED | OUTPATIENT
Start: 2017-11-28 | End: 2017-12-26 | Stop reason: SDUPTHER

## 2017-12-05 ENCOUNTER — PATIENT MESSAGE (OUTPATIENT)
Dept: PAIN MEDICINE | Facility: CLINIC | Age: 57
End: 2017-12-05

## 2017-12-11 ENCOUNTER — OFFICE VISIT (OUTPATIENT)
Dept: PAIN MEDICINE | Facility: CLINIC | Age: 57
End: 2017-12-11
Payer: COMMERCIAL

## 2017-12-11 VITALS
BODY MASS INDEX: 34.07 KG/M2 | TEMPERATURE: 98 F | WEIGHT: 180.31 LBS | SYSTOLIC BLOOD PRESSURE: 120 MMHG | DIASTOLIC BLOOD PRESSURE: 70 MMHG | RESPIRATION RATE: 18 BRPM | HEART RATE: 74 BPM

## 2017-12-11 DIAGNOSIS — M50.30 DDD (DEGENERATIVE DISC DISEASE), CERVICAL: ICD-10-CM

## 2017-12-11 DIAGNOSIS — M79.671 RIGHT FOOT PAIN: ICD-10-CM

## 2017-12-11 DIAGNOSIS — M47.814 SPONDYLOSIS OF THORACIC REGION WITHOUT MYELOPATHY OR RADICULOPATHY: Primary | ICD-10-CM

## 2017-12-11 DIAGNOSIS — M47.816 LUMBAR SPONDYLOSIS: ICD-10-CM

## 2017-12-11 PROCEDURE — 99999 PR PBB SHADOW E&M-EST. PATIENT-LVL V: CPT | Mod: PBBFAC,,, | Performed by: PHYSICIAN ASSISTANT

## 2017-12-11 PROCEDURE — 99214 OFFICE O/P EST MOD 30 MIN: CPT | Mod: S$GLB,,, | Performed by: PHYSICIAN ASSISTANT

## 2017-12-11 RX ORDER — SODIUM CHLORIDE, SODIUM LACTATE, POTASSIUM CHLORIDE, CALCIUM CHLORIDE 600; 310; 30; 20 MG/100ML; MG/100ML; MG/100ML; MG/100ML
INJECTION, SOLUTION INTRAVENOUS CONTINUOUS
Status: CANCELLED | OUTPATIENT
Start: 2017-12-14

## 2017-12-13 NOTE — PROGRESS NOTES
This note was completed with dictation software and grammatical errors may exist.    CC:Back pain, neck pain    HPI: The patient is a 57-year-old woman with history of hypothyroidism, otherwise fairly healthy but a long history of scoliosis causing back pain, self-referred for continued back pain.  She is status post right L3, 4 and 5 medial branch radiofrequency ablation on 10/19/17 with 80% relief.  She points minimal low back pain.  However, she states that she recently woke up with her right-sided thoracic spine pain.  This is in the same location as before.  She describes it as aching, burning, significantly worse with driving and worse in the afternoons as the day goes on.  She has some mild relief with pain medication.  She does report that her blood pressure and heart rate has been elevating when she is in severe pain.  Otherwise it remains normal as it is today.  She denies weakness, numbness, bladder or bowel incontinence.  Her other complaint today is right Achilles/right lateral foot pain.  She has seen an outside podiatrist for this but would like another opinion.    Pain intervention history: She has been seeing Dr. Huffman for the last several years and has undergone epidural steroid injections and radiofrequency ablations with good relief.  The last was done about one year ago however.  As far as medications she has been taking Hydrocodone 7.5/325 one to 2 times a day at most and gabapentin 300 mg at night. She is status post cervical epidural steroid injection on 10/19/15 with 50% relief of her neck pain. She is status post right L4 transforaminal epidural steroid injection on 11/23/15 with 100% relief.   She is status post right T3, 4, 5 and 6 medial branch radio frequency ablation on 2/5/16 with greater than 50% relief.  She is status post right L4 transforaminal epidural sterile injection on 6/14/16 with significant relief lasting only about a month.  She is status post C7-T1 cervical  interlaminar epidural steroid injection on 7/14/16 with 30-40% relief.   She is status post L5/S1 interlaminar epidural steroidal injection to the right on 8/12/16 with 100% relief of her right leg pain.  She is status post C7-T1 cervical interlaminar epidural steroid injection on 12/9/16 with almost complete relief.  She is status post right T4, 5, 6 and 7 medial branch radiofrequency ablation on 5/30/17 with 100% relief.  She is status post C7-T1 cervical interlaminar epidural steroid injection on 6/27/17 with 80% relief.  She is status post L5/S1 interlaminar epidural steroid injection on a/7/17 with excellent relief lasting 2 weeks, now reporting 0% relief.  She is status post right L3, 4 and 5 medial branch radiofrequency ablation on 10/19/17 with 80% relief.     According to her last pain management physician, she had undergone a right side T3, 4, 5, 6 medial branch radiofrequency ablation on 8/8/14 with good relief.  He had scheduled her for another one but did not complete this.      ROS: She reports fatigability, headaches, hypothyroidism, joint stiffness, back pain, difficulty sleeping and anxiety.  Balance of review of systems is negative.      Medical, surgical, family and social history reviewed elsewhere in record.  She is a  at hospitals.    Medications/Allergies: See med card    Vitals:    12/11/17 0939   BP: 120/70   Pulse: 74   Resp: 18   Temp: 97.8 °F (36.6 °C)   TempSrc: Oral   Weight: 81.8 kg (180 lb 5.4 oz)   PainSc:   4   PainLoc: Back         Physical exam:  Gen: A and O x3, pleasant, well-groomed  Skin: No rashes or obvious lesions  HEENT: PERRLA, no obvious deformities on ears or in canals. Trachea midline.  CVS: Regular rate and rhythm, normal palpable pulses.  Resp:No increased work of breathing, symmetrical chest rise.  Abdomen: Soft, NT/ND.  Musculoskeletal:No antalgic gait.     Neuro:  Upper extremities: 5/5 strength bilaterally.  Lower extremities: 5/5 strength  bilaterally.    Reflexes: Patellar 2+, Achilles 2+ bilaterally.  Upper extremity reflexes 2+ bilaterally equal.  Sensory:  Intact and symmetrical to light touch and pinprick in L2-S1 dermatomes bilaterally.    Cervical spine exam: Range of motion is full with flexion, extension and lateral rotation without increased pain.   Myofascial exam: No tenderness to palpation to the cervical paraspinous muscles.  Exquisite tenderness palpation to the right mid thoracic paraspinous muscles approximately T4-T7.    Lumbar spine:  Lumbar spine: Range of motion is full with flexion and extension without increased pain.  Brayden's test causes no increased pain on either side.    Supine straight leg raise causes right low back and right leg pain.  Internal and external rotation of the hip causes no increased pain on either side.  Myofascial exam: No tenderness palpation to the lumbar paraspinous muscles.      Imagin/16/15 Xray Scoliosis survey: There is thoracic dextroscoliosis with mild lumbar compensatory levoscoliosis. No structural abnormalities are evident. Diffuse spondylosis noted in the cervical, thoracic and to lesser extent lumbar spine. No fractures are identified. There is slight increased kyphotic curvature of the thoracic spine with alignment being otherwise normal. Measurements and evaluation are limited due to underpenetration. Land angle measures approximately in the thoracic spine is of approximately 17.0 degrees and for the lumbar spine 17.4 degrees. Soft tissues are unremarkable. IMPRESSION: 1. S shaped scoliotic curvature of the thoracolumbar spine with Land angle of approximately 17 degrees. 2. Diffuse spondylosis.    MRI report 12: Report notes that there is dextroconvex rotary curvature of the thoracic spine.  Posterior alignment is maintained.  There are scattered vertebral body hemangiomas.  There is minimal posterior disc bulges noted at T5/6, T6/7, T7/8 and T8/9.  There is no central spinal  stenosis or neural foraminal narrowing.    MRI right shoulder 6/24/11: Minor distal subscapularis tendinosis.  Negative for full-thickness or significant partial-thickness rotator cuff tendon tear.  There is a small amount of subacromial subdeltoid bursal fluid which can be seen with recent injection or mild bursitis.    MRI cervical spine report 3/25/11.  Note that this is comparison to previous study on 7/29/2008.  This is a report only, no images were available to me.  The C2/3 disc appears grossly intact with no significant bulging.  C3/4 there is mild protrusion of the disc with no herniation and no nerve root entrapment seen.  Spinal stenosis is not noted.  At C4/5 there is broad-based disc protrusion which compresses the subarachnoid space without cord compression.  There is no definite nerve root compression seen.  At C5/6 there is vertebral body lesion which is bright on T2 and dark on T1 but it does not enhance and may represent an atypical hemangioma.  This is unchanged from previous study.  At C5/6 there is a protrusion of the disc asymmetrically to the left but no significant entrapment.  No change from previous study.  At C6/7 there is a focal protrusion of the disc causing compression of the subarachnoid space without definite cord or nerve root compression.  C7/T1 appears intact.    Lumbar spine MRI report 3/31/14: No acute abnormality or significant degenerative changes.  No canal or foraminal stenosis.    Lumbar spine MRI 10/19/15  T12-L1: No central canal or neuroforaminal stenosis. No disc protrusion or extrusion.  L1-L2: No central canal or neuroforaminal stenosis. No disc protrusion or extrusion.  L2-L3: No central canal or neuroforaminal stenosis. No disc protrusion or extrusion.  L3-L4: No central canal or neuroforaminal stenosis. No disc protrusion or extrusion.  L4-L5: There is a broad disc bulge which extends into both neuroforamina. There is prominent ligamentum flavum thickening and  facet arthropathy. There is mild bilateral neuroforaminal stenosis. No central canal stenosis.  L5-S1: There is bilateral hypertrophic facet arthropathy and ligamentum flavum thickening. No central canal or neuroforaminal stenosis. No disc protrusion or extrusion.    Cervical spine MRI 6/29/16  At C2-C3, the minimal interstitial excision covers a very mild broad-based disc bulge most prominent centrally but does not deform the ventral cord.  There is no canal or foraminal stenosis.  At C3-C4, there is mildly decreased disc height with a broad-based disc bulge most prominent centrally.  This minimally contact the ventral cord without significant deformity.  The canal is widely patent.  There is uncovertebral hypertrophy and facet arthropathy, but the foramina are patent.  At C4-C5, there is decreased disc height with a broad disc bulge-marginal osteophyte complex that mildly lateralizes to the left and blends imperceptibly with right greater than left uncovertebral hypertrophy.  With ligamentum hypertrophy, there is no significant central canal stenosis.  Uncovertebral hypertrophy and facet arthropathy are causing mild left and moderate right foraminal stenoses.  At C5-C6, there is a broad-based disc bulge and osteophyte complex most prominent centrally.  There is also ligamentum flavum hypertrophy present, but the canal is patent.  There is mild left foraminal stenosis.  The right foramen is patent.  At C6-C7, there is disc desiccation with a minimal broad-based disc bulge most prominent in the right paracentral canal.  The canal is widely patent.  There is mild left foraminal stenosis.  At C7-T1, there is no significant disc bulge, protrusion, or herniation/extrusion.  The canal and foramina are widely patent.    MRI lumbar spine 9/22/17  L1-L2 demonstrates no significant disc protrusion, central spinal canal stenosis, or neural foraminal stenosis.  L2-L3 demonstrates no significant disc protrusion, central spinal  canal stenosis, or neural foraminal stenosis. Mild facet arthrosis is seen.  L3-L4 demonstrates no significant disc protrusion, central spinal canal stenosis, or neural foraminal stenosis. Mild facet arthrosis is seen.  L4-L5 demonstrates mild broad-based disc bulge, ligamentum flavum hypertrophy, and mild/moderate bilateral facet arthrosis. No significant central spinal canal stenosis is seen. Mild bilateral lateral recess narrowing and neural foraminal narrowing is seen. Similar findings were seen on the prior MRI.  L5-S1 demonstrates no significant posterior disc protrusion. Moderate bilateral facet arthrosis is seen along ligamentum flavum hypertrophy. No significant central spinal canal or neural foraminal stenosis is seen.    Hip x-rays 9/22/17  AP view of the pelvis and frog leg views of both hips were obtained. No evidence of acute displaced fracture or dislocation is visualized.  No radiopaque foreign bodies are visualized. The bilateral superior joint spaces are grossly maintained. Mild bilateral sacroiliac joint degenerative changes are seen including subchondral sclerosis and small marginal osteophytes. Mild to moderate symphyseal degenerative changes are seen. There is a slightly expansile cortically-based focus along the lateral proximal right femoral metadiaphysis. This could reflect a sessile osteochondroma, but correlation with MRI of the right hip is recommended.      Assessment:  The patient is a 57-year-old woman with history of hypothyroidism, otherwise fairly healthy but a long history of scoliosis causing back pain, self-referred for continued back pain.    1. Spondylosis of thoracic region without myelopathy or radiculopathy  Vital signs    Activity as tolerated    [facility] [place] [facility]     Verify informed consent    Notify physician     Notify physician     Notify physician (specify)    Diet NPO    Case Request Operating Room: ABLATION, RADIOFREQUENCY T4,5,6,7    Place in  Outpatient    lactated ringers infusion   2. Lumbar spondylosis     3. DDD (degenerative disc disease), cervical     4. Right foot pain  Ambulatory consult to Podiatry       Plan:  1.  The patient had excellent relief following the right L3, 4 and 5 medial branch RFA.  This can be repeated in the future if necessary.  2.  Since her thoracic back pain has returned, I will schedule her for right T4, 5, 6 and 7 medial branch RFA.  She has done very well with this in the past.  3.  Placed a referral to podiatry for her right foot pain.  4.  Follow-up in 4 weeks post procedure or sooner as needed.    Greater than 50% of this 30 minute visit was spent in counseling the patient.

## 2017-12-14 ENCOUNTER — HOSPITAL ENCOUNTER (OUTPATIENT)
Facility: HOSPITAL | Age: 57
Discharge: HOME | End: 2017-12-14
Attending: ANESTHESIOLOGY | Admitting: ANESTHESIOLOGY
Payer: COMMERCIAL

## 2017-12-14 ENCOUNTER — SURGERY (OUTPATIENT)
Age: 57
End: 2017-12-14

## 2017-12-14 ENCOUNTER — TELEPHONE (OUTPATIENT)
Dept: PODIATRY | Facility: CLINIC | Age: 57
End: 2017-12-14

## 2017-12-14 VITALS
DIASTOLIC BLOOD PRESSURE: 56 MMHG | RESPIRATION RATE: 18 BRPM | HEART RATE: 67 BPM | HEIGHT: 61 IN | OXYGEN SATURATION: 98 % | BODY MASS INDEX: 33.99 KG/M2 | SYSTOLIC BLOOD PRESSURE: 120 MMHG | WEIGHT: 180 LBS | TEMPERATURE: 98 F

## 2017-12-14 DIAGNOSIS — M79.671 RIGHT FOOT PAIN: ICD-10-CM

## 2017-12-14 DIAGNOSIS — M47.24 THORACIC SPONDYLOSIS WITH RADICULOPATHY: Primary | ICD-10-CM

## 2017-12-14 DIAGNOSIS — M47.814 SPONDYLOSIS OF THORACIC REGION WITHOUT MYELOPATHY OR RADICULOPATHY: ICD-10-CM

## 2017-12-14 PROCEDURE — 64634 DESTROY C/TH FACET JNT ADDL: CPT | Mod: PO | Performed by: ANESTHESIOLOGY

## 2017-12-14 PROCEDURE — 63600175 PHARM REV CODE 636 W HCPCS: Mod: PO | Performed by: ANESTHESIOLOGY

## 2017-12-14 PROCEDURE — 99152 MOD SED SAME PHYS/QHP 5/>YRS: CPT | Mod: ,,, | Performed by: ANESTHESIOLOGY

## 2017-12-14 PROCEDURE — 25000003 PHARM REV CODE 250: Mod: PO | Performed by: ANESTHESIOLOGY

## 2017-12-14 PROCEDURE — 64633 DESTROY CERV/THOR FACET JNT: CPT | Mod: PO | Performed by: ANESTHESIOLOGY

## 2017-12-14 PROCEDURE — 64633 DESTROY CERV/THOR FACET JNT: CPT | Mod: RT,,, | Performed by: ANESTHESIOLOGY

## 2017-12-14 PROCEDURE — 64634 DESTROY C/TH FACET JNT ADDL: CPT | Mod: RT,,, | Performed by: ANESTHESIOLOGY

## 2017-12-14 RX ORDER — SODIUM CHLORIDE, SODIUM LACTATE, POTASSIUM CHLORIDE, CALCIUM CHLORIDE 600; 310; 30; 20 MG/100ML; MG/100ML; MG/100ML; MG/100ML
INJECTION, SOLUTION INTRAVENOUS CONTINUOUS
Status: DISCONTINUED | OUTPATIENT
Start: 2017-12-14 | End: 2017-12-14 | Stop reason: HOSPADM

## 2017-12-14 RX ORDER — LIDOCAINE HYDROCHLORIDE 10 MG/ML
INJECTION INFILTRATION; PERINEURAL
Status: DISCONTINUED | OUTPATIENT
Start: 2017-12-14 | End: 2017-12-14 | Stop reason: HOSPADM

## 2017-12-14 RX ORDER — MIDAZOLAM HYDROCHLORIDE 2 MG/2ML
INJECTION, SOLUTION INTRAMUSCULAR; INTRAVENOUS
Status: DISCONTINUED | OUTPATIENT
Start: 2017-12-14 | End: 2017-12-14 | Stop reason: HOSPADM

## 2017-12-14 RX ORDER — METHYLPREDNISOLONE ACETATE 40 MG/ML
INJECTION, SUSPENSION INTRA-ARTICULAR; INTRALESIONAL; INTRAMUSCULAR; SOFT TISSUE
Status: DISCONTINUED | OUTPATIENT
Start: 2017-12-14 | End: 2017-12-14 | Stop reason: HOSPADM

## 2017-12-14 RX ORDER — FENTANYL CITRATE 50 UG/ML
INJECTION, SOLUTION INTRAMUSCULAR; INTRAVENOUS
Status: DISCONTINUED | OUTPATIENT
Start: 2017-12-14 | End: 2017-12-14 | Stop reason: HOSPADM

## 2017-12-14 RX ORDER — LIDOCAINE HYDROCHLORIDE 20 MG/ML
INJECTION, SOLUTION INFILTRATION; PERINEURAL
Status: DISCONTINUED | OUTPATIENT
Start: 2017-12-14 | End: 2017-12-14 | Stop reason: HOSPADM

## 2017-12-14 RX ADMIN — MIDAZOLAM HYDROCHLORIDE 2 MG: 1 INJECTION, SOLUTION INTRAMUSCULAR; INTRAVENOUS at 12:12

## 2017-12-14 RX ADMIN — METHYLPREDNISOLONE ACETATE 40 MG: 40 INJECTION, SUSPENSION INTRA-ARTICULAR; INTRALESIONAL; INTRAMUSCULAR; SOFT TISSUE at 12:12

## 2017-12-14 RX ADMIN — LIDOCAINE HYDROCHLORIDE 4 ML: 20 INJECTION, SOLUTION INFILTRATION; PERINEURAL at 12:12

## 2017-12-14 RX ADMIN — SODIUM CHLORIDE, SODIUM LACTATE, POTASSIUM CHLORIDE, CALCIUM CHLORIDE: 600; 310; 30; 20 INJECTION, SOLUTION INTRAVENOUS at 11:12

## 2017-12-14 RX ADMIN — FENTANYL CITRATE 25 MCG: 50 INJECTION, SOLUTION INTRAMUSCULAR; INTRAVENOUS at 12:12

## 2017-12-14 RX ADMIN — LIDOCAINE HYDROCHLORIDE 5 ML: 10 INJECTION, SOLUTION INFILTRATION; PERINEURAL at 12:12

## 2017-12-14 RX ADMIN — MIDAZOLAM HYDROCHLORIDE 2 MG: 1 INJECTION, SOLUTION INTRAMUSCULAR; INTRAVENOUS at 11:12

## 2017-12-14 NOTE — OP NOTE
PROCEDURE DATE: 12/14/2017    PROCEDURE:  Radiofrequency ablation of the right T4,5,6,7 medial branch nerves on the right-side utilizing fluoroscopy    DIAGNOSIS:  Thoracic spondylosis    Post op Diagnosis: Same    PHYSICIAN: Floyd Coleman MD    MEDICATIONS INJECTED:  From a mixture of 4ml of 2% lidocaine and 40mg of methylprednisone, 1ml of this solution was injected at each level.    LOCAL ANESTHETIC USED: Lidocaine 1%, 3 ml given at each site.    SEDATION MEDICATIONS: 4mg versed, 50mcg fentanyl    ESTIMATED BLOOD LOSS:  none    COMPLICATIONS:  none    TECHNIQUE:  A time out was taken to identify patient and procedure side prior to starting the procedure. Laying in a prone position, the patient was prepped and draped in the usual sterile fashion using ChloraPrep and sterile towels.  The levels were determined under fluoroscopic guidance and then marked.  Local anesthetic was given by raising a wheal at the skin over each site and then infiltrated approximately 2cm deeper.  A 20-gauge  100 mm Vocollect RF needle was introduced to the anatomic location of the right T4,5,6,7 medial branch nerves.  Motor stimulation up to 2 Volts at each level confirmed no motor nerve involvement.  Impedance was less than 800 ohms at each level. The above noted medication was then injected slowly.  Ablation was performed per level utilizing Nataliya radiofrequency generator 80°C for 90 seconds. The patient tolerated the procedure well.     The patient was monitored after the procedure.  Patient was given post procedure and discharge instructions to follow at home.  The patient was discharged in a stable condition

## 2017-12-14 NOTE — INTERVAL H&P NOTE
The patient has been examined and the H&P has been reviewed:    I concur with the findings and no changes have occurred since H&P was written.    Anesthesia/Surgery risks, benefits and alternative options discussed and understood by patient/family.          Active Hospital Problems    Diagnosis  POA    Right foot pain [M79.671]  Yes      Resolved Hospital Problems    Diagnosis Date Resolved POA   No resolved problems to display.

## 2017-12-14 NOTE — PLAN OF CARE
Vss, vinod po fluids, denies pain, ambulates easily. IV removed, catheter intact. Discharge instructions provided and states understanding. States ready to go home.  Discharged from facility with family.

## 2017-12-14 NOTE — TELEPHONE ENCOUNTER
Left message that a referral has been put in by pain management for her to be seen in podiatry.  To please call for appt.

## 2017-12-14 NOTE — H&P (VIEW-ONLY)
This note was completed with dictation software and grammatical errors may exist.    CC:Back pain, neck pain    HPI: The patient is a 57-year-old woman with history of hypothyroidism, otherwise fairly healthy but a long history of scoliosis causing back pain, self-referred for continued back pain.  She is status post right L3, 4 and 5 medial branch radiofrequency ablation on 10/19/17 with 80% relief.  She points minimal low back pain.  However, she states that she recently woke up with her right-sided thoracic spine pain.  This is in the same location as before.  She describes it as aching, burning, significantly worse with driving and worse in the afternoons as the day goes on.  She has some mild relief with pain medication.  She does report that her blood pressure and heart rate has been elevating when she is in severe pain.  Otherwise it remains normal as it is today.  She denies weakness, numbness, bladder or bowel incontinence.  Her other complaint today is right Achilles/right lateral foot pain.  She has seen an outside podiatrist for this but would like another opinion.    Pain intervention history: She has been seeing Dr. Huffman for the last several years and has undergone epidural steroid injections and radiofrequency ablations with good relief.  The last was done about one year ago however.  As far as medications she has been taking Hydrocodone 7.5/325 one to 2 times a day at most and gabapentin 300 mg at night. She is status post cervical epidural steroid injection on 10/19/15 with 50% relief of her neck pain. She is status post right L4 transforaminal epidural steroid injection on 11/23/15 with 100% relief.   She is status post right T3, 4, 5 and 6 medial branch radio frequency ablation on 2/5/16 with greater than 50% relief.  She is status post right L4 transforaminal epidural sterile injection on 6/14/16 with significant relief lasting only about a month.  She is status post C7-T1 cervical  interlaminar epidural steroid injection on 7/14/16 with 30-40% relief.   She is status post L5/S1 interlaminar epidural steroidal injection to the right on 8/12/16 with 100% relief of her right leg pain.  She is status post C7-T1 cervical interlaminar epidural steroid injection on 12/9/16 with almost complete relief.  She is status post right T4, 5, 6 and 7 medial branch radiofrequency ablation on 5/30/17 with 100% relief.  She is status post C7-T1 cervical interlaminar epidural steroid injection on 6/27/17 with 80% relief.  She is status post L5/S1 interlaminar epidural steroid injection on a/7/17 with excellent relief lasting 2 weeks, now reporting 0% relief.  She is status post right L3, 4 and 5 medial branch radiofrequency ablation on 10/19/17 with 80% relief.     According to her last pain management physician, she had undergone a right side T3, 4, 5, 6 medial branch radiofrequency ablation on 8/8/14 with good relief.  He had scheduled her for another one but did not complete this.      ROS: She reports fatigability, headaches, hypothyroidism, joint stiffness, back pain, difficulty sleeping and anxiety.  Balance of review of systems is negative.      Medical, surgical, family and social history reviewed elsewhere in record.  She is a  at [employer].    Medications/Allergies: See med card    Vitals:    12/11/17 0939   BP: 120/70   Pulse: 74   Resp: 18   Temp: 97.8 °F (36.6 °C)   TempSrc: Oral   Weight: 81.8 kg (180 lb 5.4 oz)   PainSc:   4   PainLoc: Back         Physical exam:  Gen: A and O x3, pleasant, well-groomed  Skin: No rashes or obvious lesions  HEENT: PERRLA, no obvious deformities on ears or in canals. Trachea midline.  CVS: Regular rate and rhythm, normal palpable pulses.  Resp:No increased work of breathing, symmetrical chest rise.  Abdomen: Soft, NT/ND.  Musculoskeletal:No antalgic gait.     Neuro:  Upper extremities: 5/5 strength bilaterally.  Lower extremities: 5/5 strength  bilaterally.    Reflexes: Patellar 2+, Achilles 2+ bilaterally.  Upper extremity reflexes 2+ bilaterally equal.  Sensory:  Intact and symmetrical to light touch and pinprick in L2-S1 dermatomes bilaterally.    Cervical spine exam: Range of motion is full with flexion, extension and lateral rotation without increased pain.   Myofascial exam: No tenderness to palpation to the cervical paraspinous muscles.  Exquisite tenderness palpation to the right mid thoracic paraspinous muscles approximately T4-T7.    Lumbar spine:  Lumbar spine: Range of motion is full with flexion and extension without increased pain.  Brayden's test causes no increased pain on either side.    Supine straight leg raise causes right low back and right leg pain.  Internal and external rotation of the hip causes no increased pain on either side.  Myofascial exam: No tenderness palpation to the lumbar paraspinous muscles.      Imagin/16/15 Xray Scoliosis survey: There is thoracic dextroscoliosis with mild lumbar compensatory levoscoliosis. No structural abnormalities are evident. Diffuse spondylosis noted in the cervical, thoracic and to lesser extent lumbar spine. No fractures are identified. There is slight increased kyphotic curvature of the thoracic spine with alignment being otherwise normal. Measurements and evaluation are limited due to underpenetration. Land angle measures approximately in the thoracic spine is of approximately 17.0 degrees and for the lumbar spine 17.4 degrees. Soft tissues are unremarkable. IMPRESSION: 1. S shaped scoliotic curvature of the thoracolumbar spine with Land angle of approximately 17 degrees. 2. Diffuse spondylosis.    MRI report 12: Report notes that there is dextroconvex rotary curvature of the thoracic spine.  Posterior alignment is maintained.  There are scattered vertebral body hemangiomas.  There is minimal posterior disc bulges noted at T5/6, T6/7, T7/8 and T8/9.  There is no central spinal  stenosis or neural foraminal narrowing.    MRI right shoulder 6/24/11: Minor distal subscapularis tendinosis.  Negative for full-thickness or significant partial-thickness rotator cuff tendon tear.  There is a small amount of subacromial subdeltoid bursal fluid which can be seen with recent injection or mild bursitis.    MRI cervical spine report 3/25/11.  Note that this is comparison to previous study on 7/29/2008.  This is a report only, no images were available to me.  The C2/3 disc appears grossly intact with no significant bulging.  C3/4 there is mild protrusion of the disc with no herniation and no nerve root entrapment seen.  Spinal stenosis is not noted.  At C4/5 there is broad-based disc protrusion which compresses the subarachnoid space without cord compression.  There is no definite nerve root compression seen.  At C5/6 there is vertebral body lesion which is bright on T2 and dark on T1 but it does not enhance and may represent an atypical hemangioma.  This is unchanged from previous study.  At C5/6 there is a protrusion of the disc asymmetrically to the left but no significant entrapment.  No change from previous study.  At C6/7 there is a focal protrusion of the disc causing compression of the subarachnoid space without definite cord or nerve root compression.  C7/T1 appears intact.    Lumbar spine MRI report 3/31/14: No acute abnormality or significant degenerative changes.  No canal or foraminal stenosis.    Lumbar spine MRI 10/19/15  T12-L1: No central canal or neuroforaminal stenosis. No disc protrusion or extrusion.  L1-L2: No central canal or neuroforaminal stenosis. No disc protrusion or extrusion.  L2-L3: No central canal or neuroforaminal stenosis. No disc protrusion or extrusion.  L3-L4: No central canal or neuroforaminal stenosis. No disc protrusion or extrusion.  L4-L5: There is a broad disc bulge which extends into both neuroforamina. There is prominent ligamentum flavum thickening and  facet arthropathy. There is mild bilateral neuroforaminal stenosis. No central canal stenosis.  L5-S1: There is bilateral hypertrophic facet arthropathy and ligamentum flavum thickening. No central canal or neuroforaminal stenosis. No disc protrusion or extrusion.    Cervical spine MRI 6/29/16  At C2-C3, the minimal interstitial excision covers a very mild broad-based disc bulge most prominent centrally but does not deform the ventral cord.  There is no canal or foraminal stenosis.  At C3-C4, there is mildly decreased disc height with a broad-based disc bulge most prominent centrally.  This minimally contact the ventral cord without significant deformity.  The canal is widely patent.  There is uncovertebral hypertrophy and facet arthropathy, but the foramina are patent.  At C4-C5, there is decreased disc height with a broad disc bulge-marginal osteophyte complex that mildly lateralizes to the left and blends imperceptibly with right greater than left uncovertebral hypertrophy.  With ligamentum hypertrophy, there is no significant central canal stenosis.  Uncovertebral hypertrophy and facet arthropathy are causing mild left and moderate right foraminal stenoses.  At C5-C6, there is a broad-based disc bulge and osteophyte complex most prominent centrally.  There is also ligamentum flavum hypertrophy present, but the canal is patent.  There is mild left foraminal stenosis.  The right foramen is patent.  At C6-C7, there is disc desiccation with a minimal broad-based disc bulge most prominent in the right paracentral canal.  The canal is widely patent.  There is mild left foraminal stenosis.  At C7-T1, there is no significant disc bulge, protrusion, or herniation/extrusion.  The canal and foramina are widely patent.    MRI lumbar spine 9/22/17  L1-L2 demonstrates no significant disc protrusion, central spinal canal stenosis, or neural foraminal stenosis.  L2-L3 demonstrates no significant disc protrusion, central spinal  canal stenosis, or neural foraminal stenosis. Mild facet arthrosis is seen.  L3-L4 demonstrates no significant disc protrusion, central spinal canal stenosis, or neural foraminal stenosis. Mild facet arthrosis is seen.  L4-L5 demonstrates mild broad-based disc bulge, ligamentum flavum hypertrophy, and mild/moderate bilateral facet arthrosis. No significant central spinal canal stenosis is seen. Mild bilateral lateral recess narrowing and neural foraminal narrowing is seen. Similar findings were seen on the prior MRI.  L5-S1 demonstrates no significant posterior disc protrusion. Moderate bilateral facet arthrosis is seen along ligamentum flavum hypertrophy. No significant central spinal canal or neural foraminal stenosis is seen.    Hip x-rays 9/22/17  AP view of the pelvis and frog leg views of both hips were obtained. No evidence of acute displaced fracture or dislocation is visualized.  No radiopaque foreign bodies are visualized. The bilateral superior joint spaces are grossly maintained. Mild bilateral sacroiliac joint degenerative changes are seen including subchondral sclerosis and small marginal osteophytes. Mild to moderate symphyseal degenerative changes are seen. There is a slightly expansile cortically-based focus along the lateral proximal right femoral metadiaphysis. This could reflect a sessile osteochondroma, but correlation with MRI of the right hip is recommended.      Assessment:  The patient is a 57-year-old woman with history of hypothyroidism, otherwise fairly healthy but a long history of scoliosis causing back pain, self-referred for continued back pain.    1. Spondylosis of thoracic region without myelopathy or radiculopathy  Vital signs    Activity as tolerated    Place 18-22 [facility] peripheral IV     Verify informed consent    Notify physician     Notify physician     Notify physician (specify)    Diet NPO    Case Request Operating Room: ABLATION, RADIOFREQUENCY T4,5,6,7    Place in  Outpatient    lactated ringers infusion   2. Lumbar spondylosis     3. DDD (degenerative disc disease), cervical     4. Right foot pain  Ambulatory consult to Podiatry       Plan:  1.  The patient had excellent relief following the right L3, 4 and 5 medial branch RFA.  This can be repeated in the future if necessary.  2.  Since her thoracic back pain has returned, I will schedule her for right T4, 5, 6 and 7 medial branch RFA.  She has done very well with this in the past.  3.  Placed a referral to podiatry for her right foot pain.  4.  Follow-up in 4 weeks post procedure or sooner as needed.    Greater than 50% of this 30 minute visit was spent in counseling the patient.

## 2017-12-14 NOTE — DISCHARGE INSTRUCTIONS
Home care instructions  Apply ice pack to the injection site for 20 minutes periods for the first 24 hrs for soreness/discomfort at injection site DO NOT USE HEAT FOR 24 HOURS  Keep site clean and dry for 24 hours, remove bandaid when desired  Do not drive until tomorrow  Take care when walking after a lumbar injection  Avoid strenuous activities for 2 days  Make take 2 weeks to feel the full effects   Resume home medication as prescribed today  Resume Aspirin, Plavix, or Coumadin the day after the procedure unless otherwise instructed.    SEE IMMEDIATE MEDICAL HELP FOR:  Severe increase in your usual pain or appearance of new pain  Prolonged or increasing weakness or numbness in the legs or arms  Drainage, redness, active bleeding, or increased swelling at the injection site  Temperature over 100.0 degrees F.  Headache that increases when your head is upright and decreases when you lie flat    CALL 911 OR GO DIRECTLY TO EMERGENCY DEPARTMENT FOR:  Shortness of breath, chest pain, or problems breathing      Recovery After Procedural Sedation (Adult)  You have been given medicine by vein to make you sleep during your surgery. This may have included both a pain medicine and sleeping medicine. Most of the effects have worn off. But you may still have some drowsiness for the next 6 to 8 hours.  Home care  Follow these guidelines when you get home:  · For the next 8 hours, you should be watched by a responsible adult. This person should make sure your condition is not getting worse.  · Don't drink any alcohol for the next 24 hours.  · Don't drive, operate dangerous machinery, or make important business or personal decisions during the next 24 hours.  Note: Your healthcare provider may tell you not to take any medicine by mouth for pain or sleep in the next 4 hours. These medicines may react with the medicines you were given in the hospital. This could cause a much stronger response than usual.  Follow-up care  Follow up  with your healthcare provider if you are not alert and back to your usual level of activity within 12 hours.  When to seek medical advice  Call your healthcare provider right away if any of these occur:  · Drowsiness gets worse  · Weakness or dizziness gets worse  · Repeated vomiting  · You can't be awakened   Date Last Reviewed: 10/18/2016  © 3623-5646 [employer]. [address] [place] [address], [place] [address]. All rights reserved. This information is not intended as a substitute for professional medical care. Always follow your healthcare professional's instructions.

## 2017-12-14 NOTE — DISCHARGE SUMMARY
[facility]  Discharge Note  Short Stay    Admit Date: 12/14/2017    Discharge Date: 12/14/2017    Attending Physician: Floyd Coleman MD     Discharge Provider: Floyd Coleman    Diagnoses:  Active Hospital Problems    Diagnosis  POA    *Thoracic spondylosis with radiculopathy [M47.24]  Yes      Resolved Hospital Problems    Diagnosis Date Resolved POA   No resolved problems to display.       Discharged Condition: good    Final Diagnoses: Thoracic spondylosis with radiculopathy [M47.24]    Disposition: Home or Self Care    Hospital Course: no complications, uneventful    Outcome of Hospitalization, Treatment, Procedure, or Surgery:  Patient was admitted for outpatient procedure. The patient underwent procedure without complications and are discharged home    Follow up/Patient Instructions:  Follow up as scheduled/Patient has received instructions and follow up date    Medications:  Continue previous medications      Discharge Procedure Orders  Diet general     Activity as tolerated     Call MD for:  temperature >100.4     Call MD for:  severe uncontrolled pain     Call MD for:  redness, tenderness, or signs of infection (pain, swelling, redness, odor or green/yellow discharge around incision site)     Call MD for:  severe persistent headache     No dressing needed           Discharge Procedure Orders (must include Diet, Follow-up, Activity):    Discharge Procedure Orders (must include Diet, Follow-up, Activity)  Diet general     Activity as tolerated     Call MD for:  temperature >100.4     Call MD for:  severe uncontrolled pain     Call MD for:  redness, tenderness, or signs of infection (pain, swelling, redness, odor or green/yellow discharge around incision site)     Call MD for:  severe persistent headache     No dressing needed

## 2017-12-15 ENCOUNTER — PATIENT MESSAGE (OUTPATIENT)
Dept: PAIN MEDICINE | Facility: CLINIC | Age: 57
End: 2017-12-15

## 2017-12-20 ENCOUNTER — PATIENT MESSAGE (OUTPATIENT)
Dept: PAIN MEDICINE | Facility: CLINIC | Age: 57
End: 2017-12-20

## 2017-12-25 ENCOUNTER — PATIENT MESSAGE (OUTPATIENT)
Dept: PAIN MEDICINE | Facility: CLINIC | Age: 57
End: 2017-12-25

## 2017-12-26 RX ORDER — HYDROCODONE BITARTRATE AND ACETAMINOPHEN 7.5; 325 MG/1; MG/1
1 TABLET ORAL 2 TIMES DAILY PRN
Qty: 60 TABLET | Refills: 0 | Status: SHIPPED | OUTPATIENT
Start: 2017-12-26 | End: 2018-02-27 | Stop reason: SDUPTHER

## 2018-01-10 ENCOUNTER — OFFICE VISIT (OUTPATIENT)
Dept: PODIATRY | Facility: CLINIC | Age: 58
End: 2018-01-10
Payer: COMMERCIAL

## 2018-01-10 ENCOUNTER — OFFICE VISIT (OUTPATIENT)
Dept: PAIN MEDICINE | Facility: CLINIC | Age: 58
End: 2018-01-10
Payer: COMMERCIAL

## 2018-01-10 VITALS
DIASTOLIC BLOOD PRESSURE: 72 MMHG | HEART RATE: 76 BPM | WEIGHT: 177 LBS | TEMPERATURE: 99 F | RESPIRATION RATE: 18 BRPM | BODY MASS INDEX: 33.45 KG/M2 | SYSTOLIC BLOOD PRESSURE: 120 MMHG

## 2018-01-10 VITALS — HEIGHT: 61 IN | WEIGHT: 177 LBS | BODY MASS INDEX: 33.42 KG/M2

## 2018-01-10 DIAGNOSIS — M79.671 RIGHT FOOT PAIN: ICD-10-CM

## 2018-01-10 DIAGNOSIS — M47.816 LUMBAR SPONDYLOSIS: ICD-10-CM

## 2018-01-10 DIAGNOSIS — M50.30 DDD (DEGENERATIVE DISC DISEASE), CERVICAL: ICD-10-CM

## 2018-01-10 DIAGNOSIS — M21.6X1 EQUINUS DEFORMITY OF BOTH FEET: ICD-10-CM

## 2018-01-10 DIAGNOSIS — M72.2 PLANTAR FASCIITIS: Primary | ICD-10-CM

## 2018-01-10 DIAGNOSIS — M47.814 SPONDYLOSIS OF THORACIC REGION WITHOUT MYELOPATHY OR RADICULOPATHY: Primary | ICD-10-CM

## 2018-01-10 DIAGNOSIS — M21.6X2 EQUINUS DEFORMITY OF BOTH FEET: ICD-10-CM

## 2018-01-10 PROCEDURE — 99214 OFFICE O/P EST MOD 30 MIN: CPT | Mod: S$GLB,,, | Performed by: PHYSICIAN ASSISTANT

## 2018-01-10 PROCEDURE — 99999 PR PBB SHADOW E&M-EST. PATIENT-LVL III: CPT | Mod: PBBFAC,,, | Performed by: PODIATRIST

## 2018-01-10 PROCEDURE — 99999 PR PBB SHADOW E&M-EST. PATIENT-LVL III: CPT | Mod: PBBFAC,,, | Performed by: PHYSICIAN ASSISTANT

## 2018-01-10 PROCEDURE — 99203 OFFICE O/P NEW LOW 30 MIN: CPT | Mod: S$GLB,,, | Performed by: PODIATRIST

## 2018-01-10 RX ORDER — DICLOFENAC SODIUM 10 MG/G
2 GEL TOPICAL 4 TIMES DAILY
Qty: 1 TUBE | Refills: 3 | Status: SHIPPED | OUTPATIENT
Start: 2018-01-10 | End: 2018-03-20 | Stop reason: SDUPTHER

## 2018-01-10 RX ORDER — GABAPENTIN 100 MG/1
100 CAPSULE ORAL NIGHTLY
Qty: 30 CAPSULE | Refills: 5 | Status: SHIPPED | OUTPATIENT
Start: 2018-01-10 | End: 2018-03-20 | Stop reason: SDUPTHER

## 2018-01-10 RX ORDER — METHYLPREDNISOLONE 4 MG/1
TABLET ORAL
Qty: 1 PACKAGE | Refills: 0 | Status: SHIPPED | OUTPATIENT
Start: 2018-01-10 | End: 2018-01-31

## 2018-01-10 NOTE — PATIENT INSTRUCTIONS
- Perform stretching exercises, see handout for details, to address tightness of calf muscles.      - Recommend wearing supportive shoes or orthopedic sandals only.  Avoid barefoot walking, flip flops, and Crocs, as this will exacerbate current symptoms.      - Recommend wearing a pair of OTC insoles.  Given both verbal and written information regarding this.    - Recommend icing the affected heel a minimum of 20 minutes daily.    - Avoid high impact activities such as squatting, stooping, and running as these activities will exacerbate symptoms.      - May consider applying a topical analgesic (voltaren) to help with pain symptoms.

## 2018-01-10 NOTE — PROGRESS NOTES
Subjective:      Patient ID: Vonnie Garces is a 57 y.o. female.    Chief Complaint: Foot Pain (rt foot pain hx of plantar faciitis bottom of heel and achilles tendon)  Patient presents to clinic with the chief complaint of bilateral heel pain, Rt. > Lt., with an onset of symptoms > 4 months.  Describes pain as sharp and currently rates as a 3/10.  Symptoms are consistent with post static dyskinesia.  Symptoms are alleviated with rest and wearing shoes with a modest heel.  She as suffered with plantar fasciitis in the past and has been self treating by stretching, rolling a frozen water bottle with her feet, and limiting her physical activity with minimal improvement.   Presents today for additional treatment options.  Denies definitive trauma to the heels.  Denies any additional pedal complaints.        Past Medical History:   Diagnosis Date    Anxiety 8/25/2015    Arthritis     Cervical spondylosis     DDD (degenerative disc disease), lumbar     Difficult intubation 03/2016    anterior larynx, pt with metal dental appliance, unable to do glidescope, used LMA    Encounter for blood transfusion     Facet arthropathy, thoracic     Hormone replacement therapy (HRT)     Insomnia 8/25/2015    PONV (postoperative nausea and vomiting)     Scoliosis     Unspecified hypothyroidism 8/25/2015       Past Surgical History:   Procedure Laterality Date    BILATERAL OOPHORECTOMY      CHOLECYSTECTOMY  2010    COLONOSCOPY      Epidural Steroid Injection      Pain managment, at another facility, cervical, thoracic    Epidural Steroid injection      Pain management, cervical    HYSTERECTOMY      total    MOUTH SURGERY      Braces, with temporary metal implants in upper gum    RADIOFREQUENCY ABLATION  02/2016    thoracic nerve    TONSILLECTOMY      VULVA SURGERY  03/2016       Family History   Problem Relation Age of Onset    Arthritis Mother     Celiac disease Mother     COPD Father        Social  History     Social History    Marital status:      Spouse name: N/A    Number of children: N/A    Years of education: N/A     Social History Main Topics    Smoking status: Never Smoker    Smokeless tobacco: Never Used    Alcohol use No    Drug use: No    Sexual activity: Yes     Partners: Male     Other Topics Concern    None     Social History Narrative    None       Current Outpatient Prescriptions   Medication Sig Dispense Refill    ALPRAZolam (XANAX) 0.25 MG tablet Take 1 tablet (0.25 mg total) by mouth 2 (two) times daily as needed for Anxiety. 30 tablet 1    diclofenac sodium 1 % Gel Apply 2 g topically 4 (four) times daily. 1 Tube 3    estradiol (VIVELLE-DOT) 0.1 mg/24 hr PTSW Place 1 patch onto the skin twice a week.      gabapentin (NEURONTIN) 100 MG capsule Take 1 capsule (100 mg total) by mouth every evening. 30 capsule 5    hydrocodone-acetaminophen 7.5-325mg (NORCO) 7.5-325 mg per tablet Take 1 tablet by mouth 2 (two) times daily as needed for Pain. 60 tablet 0    levothyroxine (SYNTHROID) 125 MCG tablet Take 1 tablet (125 mcg total) by mouth before breakfast. 90 tablet 1    METHYL SALICYLATE/MENTH/CAMPH (PAIN RELIEVING CREAM TOP) Apply 1 application topically daily as needed.      methylPREDNISolone (MEDROL DOSEPACK) 4 mg tablet use as directed 1 Package 0    ondansetron (ZOFRAN-ODT) 8 MG TbDL Take 1 tablet (8 mg total) by mouth every 6 (six) hours as needed. 20 tablet 0     No current facility-administered medications for this visit.        Review of patient's allergies indicates:   Allergen Reactions    Compazine [prochlorperazine edisylate] Anaphylaxis         Review of Systems   Constitution: Negative for chills and fever.   Cardiovascular: Negative for claudication and leg swelling.   Skin: Negative for color change and nail changes.   Musculoskeletal: Positive for back pain. Negative for joint swelling.   Neurological: Negative for numbness and paresthesias.    Psychiatric/Behavioral: Negative for altered mental status.           Objective:      Physical Exam   Constitutional: She is oriented to person, place, and time. She appears well-developed and well-nourished. No distress.   Cardiovascular:   Pulses:       Dorsalis pedis pulses are 2+ on the right side, and 2+ on the left side.        Posterior tibial pulses are 2+ on the right side, and 2+ on the left side.   CFT <3 seconds bilateral.  Pedal hair growth present bilateral.   No varicosities noted bilateral.  No bilateral lower extremity edema.   Musculoskeletal: She exhibits tenderness. She exhibits no edema.   Muscle strength 5/5 in all muscle groups bilateral.  No tenderness nor crepitation with ROM of foot/ankle joints bilateral.  Pain with palpation of bilateral medial calcaneal tubercle with the Rt. > Lt.  (-) single heel squeeze test bilateral.  Bilateral pes cavus foot type.  Bilateral gastrocnemius equinus.  Bilateral retrocalcaneal exostosis.   Neurological: She is alert and oriented to person, place, and time. She has normal strength. No sensory deficit.   Light touch intact bilateral.     Skin: Skin is warm, dry and intact. Capillary refill takes less than 2 seconds. No abrasion, no bruising, no burn, no ecchymosis, no laceration, no lesion, no petechiae and no rash noted. She is not diaphoretic. No erythema. No pallor.   Pedal skin has normal turgor, temperature, and texture bilateral.  Toenails x 10 appear normotrophic. Examination of the skin reveals no evidence of significant maceration, rashes, open lesions, suspicious appearing nevi or other concerning lesions.              Assessment:       Encounter Diagnoses   Name Primary?    Plantar fasciitis Yes    Equinus deformity of both feet          Plan:       Vonnie was seen today for foot pain.    Diagnoses and all orders for this visit:    Plantar fasciitis  -     methylPREDNISolone (MEDROL DOSEPACK) 4 mg tablet; use as directed  -      diclofenac sodium 1 % Gel; Apply 2 g topically 4 (four) times daily.    Equinus deformity of both feet      I counseled the patient on her conditions, their implications and medical management.    - Reviewed appropriate stretching exercises to address bilateral equinus.    - Recommend wearing supportive shoes and avoidance of barefoot walking, flip flops, and Crocs, as this will exacerbate current symptoms.      - Recommend wearing a pair of OTC insoles that resist compensation and accommodate for pes cavus foot type.    - Recommend icing the affected heel a minimum of 20 minutes daily.     - Discussed avoidance of high impact activities such as squatting, stooping, and running as these activities will exacerbate symptoms.      - May consider applying a topical analgesic (Voltaren gel) to help with pain symptoms.      - RTC prn or sooner if symptoms fail to resolve.    Uriel Anderson DPM

## 2018-01-12 NOTE — PROGRESS NOTES
This note was completed with dictation software and grammatical errors may exist.    CC:Back pain, neck pain    HPI: The patient is a 57-year-old woman with history of hypothyroidism, otherwise fairly healthy but a long history of scoliosis causing back pain, self-referred for continued back pain.  She is status post right T4, 5, 6 and 7 medial branch radiofrequency ablation on 12/14/17 with 100% relief.  Currently her lower lumbar region is doing ok.  She reports having some neck pain when riding in her car to and from work.  She feels like she has some muscular pain in the upper lumbar region and lower thoracic region from sitting but she is able to do stretches throughout the day.  She is also doing some dry needling in [place].  Her main complaint is right foot, heel and Achilles pain.  She saw podiatry for this today.  She is taking less pain medication since her RFA and overall is doing well.  She denies weakness, numbness, bladder or bowel incontinence.    Pain intervention history: She has been seeing Dr. Huffman for the last several years and has undergone epidural steroid injections and radiofrequency ablations with good relief.  The last was done about one year ago however.  As far as medications she has been taking Hydrocodone 7.5/325 one to 2 times a day at most and gabapentin 300 mg at night. She is status post cervical epidural steroid injection on 10/19/15 with 50% relief of her neck pain. She is status post right L4 transforaminal epidural steroid injection on 11/23/15 with 100% relief.   She is status post right T3, 4, 5 and 6 medial branch radio frequency ablation on 2/5/16 with greater than 50% relief.  She is status post right L4 transforaminal epidural sterile injection on 6/14/16 with significant relief lasting only about a month.  She is status post C7-T1 cervical interlaminar epidural steroid injection on 7/14/16 with 30-40% relief.   She is status post L5/S1 interlaminar epidural  steroidal injection to the right on 8/12/16 with 100% relief of her right leg pain.  She is status post C7-T1 cervical interlaminar epidural steroid injection on 12/9/16 with almost complete relief.  She is status post right T4, 5, 6 and 7 medial branch radiofrequency ablation on 5/30/17 with 100% relief.  She is status post C7-T1 cervical interlaminar epidural steroid injection on 6/27/17 with 80% relief.  She is status post L5/S1 interlaminar epidural steroid injection on a/7/17 with excellent relief lasting 2 weeks, now reporting 0% relief.  She is status post right L3, 4 and 5 medial branch radiofrequency ablation on 10/19/17 with 80% relief.   She is status post right T4, 5, 6 and 7 medial branch radiofrequency ablation on 12/14/17 with 100% relief.     According to her last pain management physician, she had undergone a right side T3, 4, 5, 6 medial branch radiofrequency ablation on 8/8/14 with good relief.  He had scheduled her for another one but did not complete this.      ROS: She reports fatigability, headaches, hypothyroidism, joint stiffness, back pain, difficulty sleeping and anxiety.  Balance of review of systems is negative.      Medical, surgical, family and social history reviewed elsewhere in record.  She is a  at [employer].    Medications/Allergies: See med card    Vitals:    01/10/18 1439   BP: 120/72   Pulse: 76   Resp: 18   Temp: 98.5 °F (36.9 °C)   TempSrc: Oral   Weight: 80.3 kg (177 lb 0.5 oz)   PainSc:   4   PainLoc: Back         Physical exam:  Gen: A and O x3, pleasant, well-groomed  Skin: No rashes or obvious lesions  HEENT: PERRLA, no obvious deformities on ears or in canals. Trachea midline.  CVS: Regular rate and rhythm, normal palpable pulses.  Resp:No increased work of breathing, symmetrical chest rise.  Abdomen: Soft, NT/ND.  Musculoskeletal:No antalgic gait.     Neuro:  Upper extremities: 5/5 strength bilaterally.  Lower extremities: 5/5 strength bilaterally.     Reflexes: Patellar 2+, Achilles 2+ bilaterally.  Upper extremity reflexes 2+ bilaterally equal.  Sensory:  Intact and symmetrical to light touch and pinprick in L2-S1 dermatomes bilaterally.    Cervical spine exam: Range of motion is full with flexion, extension and lateral rotation without increased pain.   Myofascial exam: No tenderness to palpation to the cervical paraspinous muscles.      Lumbar spine:  Lumbar spine: Range of motion is full with flexion and extension without increased pain.  Brayden's test causes no increased pain on either side.    Supine straight leg raise causes right low back and right leg pain.  Internal and external rotation of the hip causes no increased pain on either side.  Myofascial exam: mild tenderness palpation to the lower thoracic and upper lumbar paraspinous muscles.       Imagin/16/15 Xray Scoliosis survey: There is thoracic dextroscoliosis with mild lumbar compensatory levoscoliosis. No structural abnormalities are evident. Diffuse spondylosis noted in the cervical, thoracic and to lesser extent lumbar spine. No fractures are identified. There is slight increased kyphotic curvature of the thoracic spine with alignment being otherwise normal. Measurements and evaluation are limited due to underpenetration. Land angle measures approximately in the thoracic spine is of approximately 17.0 degrees and for the lumbar spine 17.4 degrees. Soft tissues are unremarkable. IMPRESSION: 1. S shaped scoliotic curvature of the thoracolumbar spine with Land angle of approximately 17 degrees. 2. Diffuse spondylosis.    MRI report 12: Report notes that there is dextroconvex rotary curvature of the thoracic spine.  Posterior alignment is maintained.  There are scattered vertebral body hemangiomas.  There is minimal posterior disc bulges noted at T5/6, T6/7, T7/8 and T8/9.  There is no central spinal stenosis or neural foraminal narrowing.    MRI right shoulder 11: Minor distal  subscapularis tendinosis.  Negative for full-thickness or significant partial-thickness rotator cuff tendon tear.  There is a small amount of subacromial subdeltoid bursal fluid which can be seen with recent injection or mild bursitis.    MRI cervical spine report 3/25/11.  Note that this is comparison to previous study on 7/29/2008.  This is a report only, no images were available to me.  The C2/3 disc appears grossly intact with no significant bulging.  C3/4 there is mild protrusion of the disc with no herniation and no nerve root entrapment seen.  Spinal stenosis is not noted.  At C4/5 there is broad-based disc protrusion which compresses the subarachnoid space without cord compression.  There is no definite nerve root compression seen.  At C5/6 there is vertebral body lesion which is bright on T2 and dark on T1 but it does not enhance and may represent an atypical hemangioma.  This is unchanged from previous study.  At C5/6 there is a protrusion of the disc asymmetrically to the left but no significant entrapment.  No change from previous study.  At C6/7 there is a focal protrusion of the disc causing compression of the subarachnoid space without definite cord or nerve root compression.  C7/T1 appears intact.    Lumbar spine MRI report 3/31/14: No acute abnormality or significant degenerative changes.  No canal or foraminal stenosis.    Lumbar spine MRI 10/19/15  T12-L1: No central canal or neuroforaminal stenosis. No disc protrusion or extrusion.  L1-L2: No central canal or neuroforaminal stenosis. No disc protrusion or extrusion.  L2-L3: No central canal or neuroforaminal stenosis. No disc protrusion or extrusion.  L3-L4: No central canal or neuroforaminal stenosis. No disc protrusion or extrusion.  L4-L5: There is a broad disc bulge which extends into both neuroforamina. There is prominent ligamentum flavum thickening and facet arthropathy. There is mild bilateral neuroforaminal stenosis. No central canal  stenosis.  L5-S1: There is bilateral hypertrophic facet arthropathy and ligamentum flavum thickening. No central canal or neuroforaminal stenosis. No disc protrusion or extrusion.    Cervical spine MRI 6/29/16  At C2-C3, the minimal interstitial excision covers a very mild broad-based disc bulge most prominent centrally but does not deform the ventral cord.  There is no canal or foraminal stenosis.  At C3-C4, there is mildly decreased disc height with a broad-based disc bulge most prominent centrally.  This minimally contact the ventral cord without significant deformity.  The canal is widely patent.  There is uncovertebral hypertrophy and facet arthropathy, but the foramina are patent.  At C4-C5, there is decreased disc height with a broad disc bulge-marginal osteophyte complex that mildly lateralizes to the left and blends imperceptibly with right greater than left uncovertebral hypertrophy.  With ligamentum hypertrophy, there is no significant central canal stenosis.  Uncovertebral hypertrophy and facet arthropathy are causing mild left and moderate right foraminal stenoses.  At C5-C6, there is a broad-based disc bulge and osteophyte complex most prominent centrally.  There is also ligamentum flavum hypertrophy present, but the canal is patent.  There is mild left foraminal stenosis.  The right foramen is patent.  At C6-C7, there is disc desiccation with a minimal broad-based disc bulge most prominent in the right paracentral canal.  The canal is widely patent.  There is mild left foraminal stenosis.  At C7-T1, there is no significant disc bulge, protrusion, or herniation/extrusion.  The canal and foramina are widely patent.    MRI lumbar spine 9/22/17  L1-L2 demonstrates no significant disc protrusion, central spinal canal stenosis, or neural foraminal stenosis.  L2-L3 demonstrates no significant disc protrusion, central spinal canal stenosis, or neural foraminal stenosis. Mild facet arthrosis is seen.  L3-L4  demonstrates no significant disc protrusion, central spinal canal stenosis, or neural foraminal stenosis. Mild facet arthrosis is seen.  L4-L5 demonstrates mild broad-based disc bulge, ligamentum flavum hypertrophy, and mild/moderate bilateral facet arthrosis. No significant central spinal canal stenosis is seen. Mild bilateral lateral recess narrowing and neural foraminal narrowing is seen. Similar findings were seen on the prior MRI.  L5-S1 demonstrates no significant posterior disc protrusion. Moderate bilateral facet arthrosis is seen along ligamentum flavum hypertrophy. No significant central spinal canal or neural foraminal stenosis is seen.    Hip x-rays 9/22/17  AP view of the pelvis and frog leg views of both hips were obtained. No evidence of acute displaced fracture or dislocation is visualized.  No radiopaque foreign bodies are visualized. The bilateral superior joint spaces are grossly maintained. Mild bilateral sacroiliac joint degenerative changes are seen including subchondral sclerosis and small marginal osteophytes. Mild to moderate symphyseal degenerative changes are seen. There is a slightly expansile cortically-based focus along the lateral proximal right femoral metadiaphysis. This could reflect a sessile osteochondroma, but correlation with MRI of the right hip is recommended.      Assessment:  The patient is a 57-year-old woman with history of hypothyroidism, otherwise fairly healthy but a long history of scoliosis causing back pain, self-referred for continued back pain.    1. Spondylosis of thoracic region without myelopathy or radiculopathy     2. DDD (degenerative disc disease), cervical     3. Lumbar spondylosis     4. Right foot pain         Plan:  1.  The patient had complete relief following the right T4, 5, 6 and 7 medial branch RFA.  This can be repeated in the future if necessary.  2.  For her muscular back pain she is doing dry needling and stretching.  3.  She currently does not  need a prescription for hydrocodone.  4.  Follow-up in 3 months or sooner as needed.    Greater than 50% of this 25 minute visit was spent in counseling the patient.

## 2018-01-22 ENCOUNTER — PATIENT MESSAGE (OUTPATIENT)
Dept: PODIATRY | Facility: CLINIC | Age: 58
End: 2018-01-22

## 2018-02-15 ENCOUNTER — PATIENT MESSAGE (OUTPATIENT)
Dept: PAIN MEDICINE | Facility: CLINIC | Age: 58
End: 2018-02-15

## 2018-02-23 ENCOUNTER — PATIENT MESSAGE (OUTPATIENT)
Dept: PAIN MEDICINE | Facility: CLINIC | Age: 58
End: 2018-02-23

## 2018-02-25 ENCOUNTER — PATIENT MESSAGE (OUTPATIENT)
Dept: PAIN MEDICINE | Facility: CLINIC | Age: 58
End: 2018-02-25

## 2018-02-27 RX ORDER — HYDROCODONE BITARTRATE AND ACETAMINOPHEN 7.5; 325 MG/1; MG/1
TABLET ORAL
Qty: 60 TABLET | Refills: 0 | Status: SHIPPED | OUTPATIENT
Start: 2018-02-27 | End: 2018-03-20 | Stop reason: SDUPTHER

## 2018-03-09 ENCOUNTER — OFFICE VISIT (OUTPATIENT)
Dept: PAIN MEDICINE | Facility: CLINIC | Age: 58
End: 2018-03-09
Payer: COMMERCIAL

## 2018-03-09 VITALS
RESPIRATION RATE: 18 BRPM | BODY MASS INDEX: 33.91 KG/M2 | WEIGHT: 179.44 LBS | TEMPERATURE: 97 F | HEART RATE: 72 BPM | SYSTOLIC BLOOD PRESSURE: 146 MMHG | DIASTOLIC BLOOD PRESSURE: 73 MMHG | OXYGEN SATURATION: 100 %

## 2018-03-09 DIAGNOSIS — M47.816 LUMBAR SPONDYLOSIS: ICD-10-CM

## 2018-03-09 DIAGNOSIS — M54.12 CERVICAL RADICULOPATHY: Primary | ICD-10-CM

## 2018-03-09 DIAGNOSIS — M47.814 SPONDYLOSIS OF THORACIC REGION WITHOUT MYELOPATHY OR RADICULOPATHY: ICD-10-CM

## 2018-03-09 DIAGNOSIS — M50.30 DDD (DEGENERATIVE DISC DISEASE), CERVICAL: ICD-10-CM

## 2018-03-09 DIAGNOSIS — M51.36 DDD (DEGENERATIVE DISC DISEASE), LUMBAR: ICD-10-CM

## 2018-03-09 DIAGNOSIS — R76.8 POSITIVE ANA (ANTINUCLEAR ANTIBODY): ICD-10-CM

## 2018-03-09 PROCEDURE — 99214 OFFICE O/P EST MOD 30 MIN: CPT | Mod: S$GLB,,, | Performed by: PHYSICIAN ASSISTANT

## 2018-03-09 PROCEDURE — 99999 PR PBB SHADOW E&M-EST. PATIENT-LVL V: CPT | Mod: PBBFAC,,, | Performed by: PHYSICIAN ASSISTANT

## 2018-03-09 RX ORDER — SODIUM CHLORIDE, SODIUM LACTATE, POTASSIUM CHLORIDE, CALCIUM CHLORIDE 600; 310; 30; 20 MG/100ML; MG/100ML; MG/100ML; MG/100ML
INJECTION, SOLUTION INTRAVENOUS CONTINUOUS
Status: CANCELLED | OUTPATIENT
Start: 2018-03-16

## 2018-03-11 NOTE — PROGRESS NOTES
This note was completed with dictation software and grammatical errors may exist.    CC:Back pain, neck pain    HPI: The patient is a 57-year-old woman with history of hypothyroidism, otherwise fairly healthy but a long history of scoliosis causing back pain, self-referred for continued back pain.  She returns in follow-up today with severe worsening neck pain.  She complains of right greater than left neck pain radiating to the right trapezius muscle.  This is random but very constant.  Currently she denies any thoracic pain but does have some mild her right low back pain.  She also reports pain in her lower legs as well as reddish discoloration.  This had improved when she took an oral steroid a couple months ago but the relief was only short-term.  She reports having a positive FERNANDO and has an appointment with rheumatology in August.    Pain intervention history: She has been seeing Dr. Huffman for the last several years and has undergone epidural steroid injections and radiofrequency ablations with good relief.  The last was done about one year ago however.  As far as medications she has been taking Hydrocodone 7.5/325 one to 2 times a day at most and gabapentin 300 mg at night. She is status post cervical epidural steroid injection on 10/19/15 with 50% relief of her neck pain. She is status post right L4 transforaminal epidural steroid injection on 11/23/15 with 100% relief.   She is status post right T3, 4, 5 and 6 medial branch radio frequency ablation on 2/5/16 with greater than 50% relief.  She is status post right L4 transforaminal epidural sterile injection on 6/14/16 with significant relief lasting only about a month.  She is status post C7-T1 cervical interlaminar epidural steroid injection on 7/14/16 with 30-40% relief.   She is status post L5/S1 interlaminar epidural steroidal injection to the right on 8/12/16 with 100% relief of her right leg pain.  She is status post C7-T1 cervical interlaminar  epidural steroid injection on 12/9/16 with almost complete relief.  She is status post right T4, 5, 6 and 7 medial branch radiofrequency ablation on 5/30/17 with 100% relief.  She is status post C7-T1 cervical interlaminar epidural steroid injection on 6/27/17 with 80% relief.  She is status post L5/S1 interlaminar epidural steroid injection on a/7/17 with excellent relief lasting 2 weeks, now reporting 0% relief.  She is status post right L3, 4 and 5 medial branch radiofrequency ablation on 10/19/17 with 80% relief.   She is status post right T4, 5, 6 and 7 medial branch radiofrequency ablation on 12/14/17 with 100% relief.     According to her last pain management physician, she had undergone a right side T3, 4, 5, 6 medial branch radiofrequency ablation on 8/8/14 with good relief.  He had scheduled her for another one but did not complete this.      ROS: She reports fatigability, headaches, hypothyroidism, joint stiffness, back pain, difficulty sleeping and anxiety.  Balance of review of systems is negative.      Medical, surgical, family and social history reviewed elsewhere in record.  She is a  at [employer].    Medications/Allergies: See med card    Vitals:    03/09/18 1453   BP: (!) 146/73   Pulse: 72   Resp: 18   Temp: 97 °F (36.1 °C)   TempSrc: Oral   SpO2: 100%   Weight: 81.4 kg (179 lb 7.3 oz)   PainSc:   4   PainLoc: Neck         Physical exam:  Gen: A and O x3, pleasant, well-groomed  Skin: No rashes or obvious lesions  HEENT: PERRLA, no obvious deformities on ears or in canals. Trachea midline.  CVS: Regular rate and rhythm, normal palpable pulses.  Resp:No increased work of breathing, symmetrical chest rise.  Abdomen: Soft, NT/ND.  Musculoskeletal:No antalgic gait.     Neuro:  Upper extremities: 5/5 strength bilaterally.  Lower extremities: 5/5 strength bilaterally.    Reflexes: Patellar 2+, Achilles 2+ bilaterally.  Upper extremity reflexes 2+ bilaterally equal.  Sensory:  Intact and  symmetrical to light touch and pinprick in L2-S1 dermatomes bilaterally.    Cervical spine exam: Range of motion is full with flexion, extension and lateral rotation with increased right greater than left neck pain during each maneuver.  Right lateral rotation causes increased right trapezius muscle pain.  Myofascial exam: Moderate tenderness to palpation to the right cervical paraspinous muscles and right trapezius muscle.    Lumbar spine:  Lumbar spine: Range of motion is full with flexion and extension without increased pain.  Brayden's test causes no increased pain on either side.    Supine straight leg raise causes right low back and right leg pain.  Internal and external rotation of the hip causes no increased pain on either side.  Myofascial exam: No tenderness to palpation to the thoracic or lumbar paraspinous muscles.      Imagin/16/15 Xray Scoliosis survey: There is thoracic dextroscoliosis with mild lumbar compensatory levoscoliosis. No structural abnormalities are evident. Diffuse spondylosis noted in the cervical, thoracic and to lesser extent lumbar spine. No fractures are identified. There is slight increased kyphotic curvature of the thoracic spine with alignment being otherwise normal. Measurements and evaluation are limited due to underpenetration. Land angle measures approximately in the thoracic spine is of approximately 17.0 degrees and for the lumbar spine 17.4 degrees. Soft tissues are unremarkable. IMPRESSION: 1. S shaped scoliotic curvature of the thoracolumbar spine with Land angle of approximately 17 degrees. 2. Diffuse spondylosis.    MRI report 12: Report notes that there is dextroconvex rotary curvature of the thoracic spine.  Posterior alignment is maintained.  There are scattered vertebral body hemangiomas.  There is minimal posterior disc bulges noted at T5/6, T6/7, T7/8 and T8/9.  There is no central spinal stenosis or neural foraminal narrowing.    MRI right shoulder  6/24/11: Minor distal subscapularis tendinosis.  Negative for full-thickness or significant partial-thickness rotator cuff tendon tear.  There is a small amount of subacromial subdeltoid bursal fluid which can be seen with recent injection or mild bursitis.    MRI cervical spine report 3/25/11.  Note that this is comparison to previous study on 7/29/2008.  This is a report only, no images were available to me.  The C2/3 disc appears grossly intact with no significant bulging.  C3/4 there is mild protrusion of the disc with no herniation and no nerve root entrapment seen.  Spinal stenosis is not noted.  At C4/5 there is broad-based disc protrusion which compresses the subarachnoid space without cord compression.  There is no definite nerve root compression seen.  At C5/6 there is vertebral body lesion which is bright on T2 and dark on T1 but it does not enhance and may represent an atypical hemangioma.  This is unchanged from previous study.  At C5/6 there is a protrusion of the disc asymmetrically to the left but no significant entrapment.  No change from previous study.  At C6/7 there is a focal protrusion of the disc causing compression of the subarachnoid space without definite cord or nerve root compression.  C7/T1 appears intact.    Lumbar spine MRI report 3/31/14: No acute abnormality or significant degenerative changes.  No canal or foraminal stenosis.    Lumbar spine MRI 10/19/15  T12-L1: No central canal or neuroforaminal stenosis. No disc protrusion or extrusion.  L1-L2: No central canal or neuroforaminal stenosis. No disc protrusion or extrusion.  L2-L3: No central canal or neuroforaminal stenosis. No disc protrusion or extrusion.  L3-L4: No central canal or neuroforaminal stenosis. No disc protrusion or extrusion.  L4-L5: There is a broad disc bulge which extends into both neuroforamina. There is prominent ligamentum flavum thickening and facet arthropathy. There is mild bilateral neuroforaminal  stenosis. No central canal stenosis.  L5-S1: There is bilateral hypertrophic facet arthropathy and ligamentum flavum thickening. No central canal or neuroforaminal stenosis. No disc protrusion or extrusion.    Cervical spine MRI 6/29/16  At C2-C3, the minimal interstitial excision covers a very mild broad-based disc bulge most prominent centrally but does not deform the ventral cord.  There is no canal or foraminal stenosis.  At C3-C4, there is mildly decreased disc height with a broad-based disc bulge most prominent centrally.  This minimally contact the ventral cord without significant deformity.  The canal is widely patent.  There is uncovertebral hypertrophy and facet arthropathy, but the foramina are patent.  At C4-C5, there is decreased disc height with a broad disc bulge-marginal osteophyte complex that mildly lateralizes to the left and blends imperceptibly with right greater than left uncovertebral hypertrophy.  With ligamentum hypertrophy, there is no significant central canal stenosis.  Uncovertebral hypertrophy and facet arthropathy are causing mild left and moderate right foraminal stenoses.  At C5-C6, there is a broad-based disc bulge and osteophyte complex most prominent centrally.  There is also ligamentum flavum hypertrophy present, but the canal is patent.  There is mild left foraminal stenosis.  The right foramen is patent.  At C6-C7, there is disc desiccation with a minimal broad-based disc bulge most prominent in the right paracentral canal.  The canal is widely patent.  There is mild left foraminal stenosis.  At C7-T1, there is no significant disc bulge, protrusion, or herniation/extrusion.  The canal and foramina are widely patent.    MRI lumbar spine 9/22/17  L1-L2 demonstrates no significant disc protrusion, central spinal canal stenosis, or neural foraminal stenosis.  L2-L3 demonstrates no significant disc protrusion, central spinal canal stenosis, or neural foraminal stenosis. Mild facet  arthrosis is seen.  L3-L4 demonstrates no significant disc protrusion, central spinal canal stenosis, or neural foraminal stenosis. Mild facet arthrosis is seen.  L4-L5 demonstrates mild broad-based disc bulge, ligamentum flavum hypertrophy, and mild/moderate bilateral facet arthrosis. No significant central spinal canal stenosis is seen. Mild bilateral lateral recess narrowing and neural foraminal narrowing is seen. Similar findings were seen on the prior MRI.  L5-S1 demonstrates no significant posterior disc protrusion. Moderate bilateral facet arthrosis is seen along ligamentum flavum hypertrophy. No significant central spinal canal or neural foraminal stenosis is seen.    Hip x-rays 9/22/17  AP view of the pelvis and frog leg views of both hips were obtained. No evidence of acute displaced fracture or dislocation is visualized.  No radiopaque foreign bodies are visualized. The bilateral superior joint spaces are grossly maintained. Mild bilateral sacroiliac joint degenerative changes are seen including subchondral sclerosis and small marginal osteophytes. Mild to moderate symphyseal degenerative changes are seen. There is a slightly expansile cortically-based focus along the lateral proximal right femoral metadiaphysis. This could reflect a sessile osteochondroma, but correlation with MRI of the right hip is recommended.      Assessment:  The patient is a 57-year-old woman with history of hypothyroidism, otherwise fairly healthy but a long history of scoliosis causing back pain, self-referred for continued back pain.    1. Cervical radiculopathy  Vital signs    Activity as tolerated    Place 18-22 gauage peripheral IV     Verify informed consent    Notify physician     Notify physician     Notify physician (specify)    Diet NPO    Case Request Operating Room: INJECTION-STEROID-EPIDURAL-CERVICAL    Place in Outpatient    lactated ringers infusion   2. DDD (degenerative disc disease), cervical     3. Spondylosis  of thoracic region without myelopathy or radiculopathy     4. Lumbar spondylosis     5. DDD (degenerative disc disease), lumbar     6. Positive FERNANDO (antinuclear antibody)         Plan:  1.  For neck pain, I will set her up to repeat a cervical JINA to the right.  She has done well with this in the past.  2.  She'll continue gabapentin 100 mg in morning and 300 mg at night.  3.  She continues take hydrocodone but feels that she may need to take this more often because of her bilateral leg pain.  She does not have an appointment with rheumatology until August.  4.  We discussed repeating a lumbar JINA in the future if necessary and/or lumbar medial branch RFA.  5.  Follow-up in 4 weeks post procedure or sooner as needed.    Greater than 50% of this 30 minute visit was spent in counseling the patient.

## 2018-03-15 DIAGNOSIS — M50.30 DDD (DEGENERATIVE DISC DISEASE), CERVICAL: Primary | ICD-10-CM

## 2018-03-16 ENCOUNTER — SURGERY (OUTPATIENT)
Age: 58
End: 2018-03-16

## 2018-03-16 ENCOUNTER — HOSPITAL ENCOUNTER (OUTPATIENT)
Facility: HOSPITAL | Age: 58
Discharge: HOME | End: 2018-03-16
Attending: ANESTHESIOLOGY | Admitting: ANESTHESIOLOGY
Payer: COMMERCIAL

## 2018-03-16 ENCOUNTER — HOSPITAL ENCOUNTER (OUTPATIENT)
Dept: RADIOLOGY | Facility: HOSPITAL | Age: 58
Discharge: HOME | End: 2018-03-16
Attending: ANESTHESIOLOGY | Admitting: ANESTHESIOLOGY
Payer: COMMERCIAL

## 2018-03-16 VITALS
TEMPERATURE: 97 F | OXYGEN SATURATION: 99 % | DIASTOLIC BLOOD PRESSURE: 63 MMHG | RESPIRATION RATE: 16 BRPM | SYSTOLIC BLOOD PRESSURE: 140 MMHG | HEART RATE: 69 BPM | HEIGHT: 61 IN

## 2018-03-16 DIAGNOSIS — M54.12 CERVICAL RADICULOPATHY: Primary | ICD-10-CM

## 2018-03-16 DIAGNOSIS — M50.30 DDD (DEGENERATIVE DISC DISEASE), CERVICAL: ICD-10-CM

## 2018-03-16 PROCEDURE — 62321 NJX INTERLAMINAR CRV/THRC: CPT | Mod: ,,, | Performed by: ANESTHESIOLOGY

## 2018-03-16 PROCEDURE — 63600175 PHARM REV CODE 636 W HCPCS: Mod: PO | Performed by: ANESTHESIOLOGY

## 2018-03-16 PROCEDURE — 62321 NJX INTERLAMINAR CRV/THRC: CPT | Mod: PO | Performed by: ANESTHESIOLOGY

## 2018-03-16 PROCEDURE — 99152 MOD SED SAME PHYS/QHP 5/>YRS: CPT | Mod: ,,, | Performed by: ANESTHESIOLOGY

## 2018-03-16 PROCEDURE — 76000 FLUOROSCOPY <1 HR PHYS/QHP: CPT | Mod: TC,PO

## 2018-03-16 PROCEDURE — 25500020 PHARM REV CODE 255: Mod: PO | Performed by: ANESTHESIOLOGY

## 2018-03-16 PROCEDURE — 25000003 PHARM REV CODE 250: Mod: PO | Performed by: ANESTHESIOLOGY

## 2018-03-16 RX ORDER — METHYLPREDNISOLONE ACETATE 80 MG/ML
INJECTION, SUSPENSION INTRA-ARTICULAR; INTRALESIONAL; INTRAMUSCULAR; SOFT TISSUE
Status: DISCONTINUED | OUTPATIENT
Start: 2018-03-16 | End: 2018-03-16 | Stop reason: HOSPADM

## 2018-03-16 RX ORDER — SODIUM CHLORIDE, SODIUM LACTATE, POTASSIUM CHLORIDE, CALCIUM CHLORIDE 600; 310; 30; 20 MG/100ML; MG/100ML; MG/100ML; MG/100ML
INJECTION, SOLUTION INTRAVENOUS CONTINUOUS
Status: DISCONTINUED | OUTPATIENT
Start: 2018-03-16 | End: 2018-03-16 | Stop reason: HOSPADM

## 2018-03-16 RX ORDER — MIDAZOLAM HYDROCHLORIDE 1 MG/ML
INJECTION INTRAMUSCULAR; INTRAVENOUS
Status: DISCONTINUED | OUTPATIENT
Start: 2018-03-16 | End: 2018-03-16 | Stop reason: HOSPADM

## 2018-03-16 RX ORDER — LIDOCAINE HYDROCHLORIDE 10 MG/ML
INJECTION, SOLUTION EPIDURAL; INFILTRATION; INTRACAUDAL; PERINEURAL
Status: DISCONTINUED | OUTPATIENT
Start: 2018-03-16 | End: 2018-03-16 | Stop reason: HOSPADM

## 2018-03-16 RX ADMIN — MIDAZOLAM HYDROCHLORIDE 1 MG: 1 INJECTION, SOLUTION INTRAMUSCULAR; INTRAVENOUS at 03:03

## 2018-03-16 RX ADMIN — METHYLPREDNISOLONE ACETATE 80 MG: 80 INJECTION, SUSPENSION INTRA-ARTICULAR; INTRALESIONAL; INTRAMUSCULAR; SOFT TISSUE at 03:03

## 2018-03-16 RX ADMIN — MIDAZOLAM HYDROCHLORIDE 2 MG: 1 INJECTION, SOLUTION INTRAMUSCULAR; INTRAVENOUS at 03:03

## 2018-03-16 RX ADMIN — LIDOCAINE HYDROCHLORIDE 10 ML: 10 INJECTION, SOLUTION EPIDURAL; INFILTRATION; INTRACAUDAL; PERINEURAL at 03:03

## 2018-03-16 RX ADMIN — IOHEXOL 3 ML: 300 INJECTION, SOLUTION INTRAVENOUS at 03:03

## 2018-03-16 RX ADMIN — SODIUM CHLORIDE, SODIUM LACTATE, POTASSIUM CHLORIDE, CALCIUM CHLORIDE: 600; 310; 30; 20 INJECTION, SOLUTION INTRAVENOUS at 02:03

## 2018-03-16 NOTE — OP NOTE
PROCEDURE DATE: 3/16/2018    Procedure: C7-T1 cervical interlaminar epidural steroid injection under utilizing fluoroscopy.    Diagnosis: Cervical Radiculopathy    POSTOP DIAGNOSIS: SAME    Physician: Floyd Coleman MD    Medications injected:  Methylprednisone 80mg followed by a slow injection of 4 mL sterile, preservative-free normal saline.    Local anesthetic used: Lidocaine 1%, 4 ml.    Sedation Medications: 4mg versed    Complications:  none    Estimated blood loss: none    Technique:  A time-out was taken to identify patient and procedure prior to starting the procedure.  With the patient laying in a prone position with the neck in a mid-flexed forward position, the area was prepped and draped in the usual sterile fashion using ChloraPrep and a fenestrated drape.  The area was determined under AP fluoroscopic guidance.  Local anesthetic was given using a 25-gauge 1.5 inch needle by raising a wheal and then infiltrating ventrally.  A 3.5 inch 20-gauge Touhy needle was introduced under fluoroscopic guidance to meet the lamina of C7.  The needle was then hinged under the lamina then advanced using loss of resistance technique.  Once the tip of the needle was in the desired position, the contrast dye Omnipaque was injected to determine placement and no uptake.  The steroid was then injected slowly followed by a slow injection of 4 mL of the sterile preservative-free normal saline.  The patient tolerated the procedure well.    The patient was monitored after the procedure and was given post-procedure and discharge instructions to follow at home. The patient was discharged in a stable condition.

## 2018-03-16 NOTE — DISCHARGE SUMMARY
[facility]  Discharge Note  Short Stay    Admit Date: 3/16/2018    Discharge Date: 3/16/2018    Attending Physician: Floyd Coleman MD     Discharge Provider: Floyd Coleman    Diagnoses:  Active Hospital Problems    Diagnosis  POA    *Cervical radiculopathy [M54.12]  Yes      Resolved Hospital Problems    Diagnosis Date Resolved POA   No resolved problems to display.       Discharged Condition: good    Final Diagnoses: Cervical radiculopathy [M54.12]    Disposition: Home or Self Care    Hospital Course: no complications, uneventful    Outcome of Hospitalization, Treatment, Procedure, or Surgery:  Patient was admitted for outpatient procedure. The patient underwent procedure without complications and are discharged home    Follow up/Patient Instructions:  Follow up as scheduled/Patient has received instructions and follow up date    Medications:  Continue previous medications      Discharge Procedure Orders  Call MD for:  temperature >100.4     Call MD for:  severe uncontrolled pain     Call MD for:  redness, tenderness, or signs of infection (pain, swelling, redness, odor or green/yellow discharge around incision site)     Call MD for:  severe persistent headache     No dressing needed           Discharge Procedure Orders (must include Diet, Follow-up, Activity):    Discharge Procedure Orders (must include Diet, Follow-up, Activity)  Call MD for:  temperature >100.4     Call MD for:  severe uncontrolled pain     Call MD for:  redness, tenderness, or signs of infection (pain, swelling, redness, odor or green/yellow discharge around incision site)     Call MD for:  severe persistent headache     No dressing needed

## 2018-03-16 NOTE — DISCHARGE INSTRUCTIONS
Home care instructions  Apply ice pack to the injection site for 20 minutes periods for the first 24 hrs for soreness/discomfort at injection site DO NOT USE HEAT FOR 24 HOURS  Keep site clean and dry for 24 hours, remove bandaid when desired  Do not drive until tomorrow  Take care when walking after a lumbar injection  Avoid strenuous activities for 2 days  Make take 2 weeks to feel the full effects   Resume home medication as prescribed today  Resume Aspirin, Plavix, or Coumadin the day after the procedure unless otherwise instructed.    SEE IMMEDIATE MEDICAL HELP FOR:  Severe increase in your usual pain or appearance of new pain  Prolonged or increasing weakness or numbness in the legs or arms  Drainage, redness, active bleeding, or increased swelling at the injection site  Temperature over 100.0 degrees F.  Headache that increases when your head is upright and decreases when you lie flat    CALL 911 OR GO DIRECTLY TO EMERGENCY DEPARTMENT FOR:  Shortness of breath, chest pain, or problems breathing      Recovery After Procedural Sedation (Adult)  You have been given medicine by vein to make you sleep during your surgery. This may have included both a pain medicine and sleeping medicine. Most of the effects have worn off. But you may still have some drowsiness for the next 6 to 8 hours.  Home care  Follow these guidelines when you get home:  · For the next 8 hours, you should be watched by a responsible adult. This person should make sure your condition is not getting worse.  · Don't drink any alcohol for the next 24 hours.  · Don't drive, operate dangerous machinery, or make important business or personal decisions during the next 24 hours.  Note: Your healthcare provider may tell you not to take any medicine by mouth for pain or sleep in the next 4 hours. These medicines may react with the medicines you were given in the hospital. This could cause a much stronger response than usual.  Follow-up care  Follow up  with your healthcare provider if you are not alert and back to your usual level of activity within 12 hours.  When to seek medical advice  Call your healthcare provider right away if any of these occur:  · Drowsiness gets worse  · Weakness or dizziness gets worse  · Repeated vomiting  · You can't be awakened   Date Last Reviewed: 10/18/2016  © 2512-6102 [email or web address]. [address] [place] [address], [place] [address]. All rights reserved. This information is not intended as a substitute for professional medical care. Always follow your healthcare professional's instructions.

## 2018-03-16 NOTE — PLAN OF CARE
Pt AAOx3. Resp even, unlabored. No complaints of pain at this time. Injection site without redness, swelling or drainage. No dressing in place. NAD noted. Pt tolerating PO well. Discharge and follow-up instructions discussed. Pt and family verbalize understanding. Pt ready for discharge.

## 2018-03-16 NOTE — H&P (VIEW-ONLY)
This note was completed with dictation software and grammatical errors may exist.    CC:Back pain, neck pain    HPI: The patient is a 57-year-old woman with history of hypothyroidism, otherwise fairly healthy but a long history of scoliosis causing back pain, self-referred for continued back pain.  She returns in follow-up today with severe worsening neck pain.  She complains of right greater than left neck pain radiating to the right trapezius muscle.  This is random but very constant.  Currently she denies any thoracic pain but does have some mild her right low back pain.  She also reports pain in her lower legs as well as reddish discoloration.  This had improved when she took an oral steroid a couple months ago but the relief was only short-term.  She reports having a positive FERNANDO and has an appointment with rheumatology in August.    Pain intervention history: She has been seeing Dr. Huffman for the last several years and has undergone epidural steroid injections and radiofrequency ablations with good relief.  The last was done about one year ago however.  As far as medications she has been taking Hydrocodone 7.5/325 one to 2 times a day at most and gabapentin 300 mg at night. She is status post cervical epidural steroid injection on 10/19/15 with 50% relief of her neck pain. She is status post right L4 transforaminal epidural steroid injection on 11/23/15 with 100% relief.   She is status post right T3, 4, 5 and 6 medial branch radio frequency ablation on 2/5/16 with greater than 50% relief.  She is status post right L4 transforaminal epidural sterile injection on 6/14/16 with significant relief lasting only about a month.  She is status post C7-T1 cervical interlaminar epidural steroid injection on 7/14/16 with 30-40% relief.   She is status post L5/S1 interlaminar epidural steroidal injection to the right on 8/12/16 with 100% relief of her right leg pain.  She is status post C7-T1 cervical interlaminar  epidural steroid injection on 12/9/16 with almost complete relief.  She is status post right T4, 5, 6 and 7 medial branch radiofrequency ablation on 5/30/17 with 100% relief.  She is status post C7-T1 cervical interlaminar epidural steroid injection on 6/27/17 with 80% relief.  She is status post L5/S1 interlaminar epidural steroid injection on a/7/17 with excellent relief lasting 2 weeks, now reporting 0% relief.  She is status post right L3, 4 and 5 medial branch radiofrequency ablation on 10/19/17 with 80% relief.   She is status post right T4, 5, 6 and 7 medial branch radiofrequency ablation on 12/14/17 with 100% relief.     According to her last pain management physician, she had undergone a right side T3, 4, 5, 6 medial branch radiofrequency ablation on 8/8/14 with good relief.  He had scheduled her for another one but did not complete this.      ROS: She reports fatigability, headaches, hypothyroidism, joint stiffness, back pain, difficulty sleeping and anxiety.  Balance of review of systems is negative.      Medical, surgical, family and social history reviewed elsewhere in record.  She is a  at [employer].    Medications/Allergies: See med card    Vitals:    03/09/18 1453   BP: (!) 146/73   Pulse: 72   Resp: 18   Temp: 97 °F (36.1 °C)   TempSrc: Oral   SpO2: 100%   Weight: 81.4 kg (179 lb 7.3 oz)   PainSc:   4   PainLoc: Neck         Physical exam:  Gen: A and O x3, pleasant, well-groomed  Skin: No rashes or obvious lesions  HEENT: PERRLA, no obvious deformities on ears or in canals. Trachea midline.  CVS: Regular rate and rhythm, normal palpable pulses.  Resp:No increased work of breathing, symmetrical chest rise.  Abdomen: Soft, NT/ND.  Musculoskeletal:No antalgic gait.     Neuro:  Upper extremities: 5/5 strength bilaterally.  Lower extremities: 5/5 strength bilaterally.    Reflexes: Patellar 2+, Achilles 2+ bilaterally.  Upper extremity reflexes 2+ bilaterally equal.  Sensory:  Intact and  symmetrical to light touch and pinprick in L2-S1 dermatomes bilaterally.    Cervical spine exam: Range of motion is full with flexion, extension and lateral rotation with increased right greater than left neck pain during each maneuver.  Right lateral rotation causes increased right trapezius muscle pain.  Myofascial exam: Moderate tenderness to palpation to the right cervical paraspinous muscles and right trapezius muscle.    Lumbar spine:  Lumbar spine: Range of motion is full with flexion and extension without increased pain.  Brayden's test causes no increased pain on either side.    Supine straight leg raise causes right low back and right leg pain.  Internal and external rotation of the hip causes no increased pain on either side.  Myofascial exam: No tenderness to palpation to the thoracic or lumbar paraspinous muscles.      Imagin/16/15 Xray Scoliosis survey: There is thoracic dextroscoliosis with mild lumbar compensatory levoscoliosis. No structural abnormalities are evident. Diffuse spondylosis noted in the cervical, thoracic and to lesser extent lumbar spine. No fractures are identified. There is slight increased kyphotic curvature of the thoracic spine with alignment being otherwise normal. Measurements and evaluation are limited due to underpenetration. Land angle measures approximately in the thoracic spine is of approximately 17.0 degrees and for the lumbar spine 17.4 degrees. Soft tissues are unremarkable. IMPRESSION: 1. S shaped scoliotic curvature of the thoracolumbar spine with Land angle of approximately 17 degrees. 2. Diffuse spondylosis.    MRI report 12: Report notes that there is dextroconvex rotary curvature of the thoracic spine.  Posterior alignment is maintained.  There are scattered vertebral body hemangiomas.  There is minimal posterior disc bulges noted at T5/6, T6/7, T7/8 and T8/9.  There is no central spinal stenosis or neural foraminal narrowing.    MRI right shoulder  6/24/11: Minor distal subscapularis tendinosis.  Negative for full-thickness or significant partial-thickness rotator cuff tendon tear.  There is a small amount of subacromial subdeltoid bursal fluid which can be seen with recent injection or mild bursitis.    MRI cervical spine report 3/25/11.  Note that this is comparison to previous study on 7/29/2008.  This is a report only, no images were available to me.  The C2/3 disc appears grossly intact with no significant bulging.  C3/4 there is mild protrusion of the disc with no herniation and no nerve root entrapment seen.  Spinal stenosis is not noted.  At C4/5 there is broad-based disc protrusion which compresses the subarachnoid space without cord compression.  There is no definite nerve root compression seen.  At C5/6 there is vertebral body lesion which is bright on T2 and dark on T1 but it does not enhance and may represent an atypical hemangioma.  This is unchanged from previous study.  At C5/6 there is a protrusion of the disc asymmetrically to the left but no significant entrapment.  No change from previous study.  At C6/7 there is a focal protrusion of the disc causing compression of the subarachnoid space without definite cord or nerve root compression.  C7/T1 appears intact.    Lumbar spine MRI report 3/31/14: No acute abnormality or significant degenerative changes.  No canal or foraminal stenosis.    Lumbar spine MRI 10/19/15  T12-L1: No central canal or neuroforaminal stenosis. No disc protrusion or extrusion.  L1-L2: No central canal or neuroforaminal stenosis. No disc protrusion or extrusion.  L2-L3: No central canal or neuroforaminal stenosis. No disc protrusion or extrusion.  L3-L4: No central canal or neuroforaminal stenosis. No disc protrusion or extrusion.  L4-L5: There is a broad disc bulge which extends into both neuroforamina. There is prominent ligamentum flavum thickening and facet arthropathy. There is mild bilateral neuroforaminal  stenosis. No central canal stenosis.  L5-S1: There is bilateral hypertrophic facet arthropathy and ligamentum flavum thickening. No central canal or neuroforaminal stenosis. No disc protrusion or extrusion.    Cervical spine MRI 6/29/16  At C2-C3, the minimal interstitial excision covers a very mild broad-based disc bulge most prominent centrally but does not deform the ventral cord.  There is no canal or foraminal stenosis.  At C3-C4, there is mildly decreased disc height with a broad-based disc bulge most prominent centrally.  This minimally contact the ventral cord without significant deformity.  The canal is widely patent.  There is uncovertebral hypertrophy and facet arthropathy, but the foramina are patent.  At C4-C5, there is decreased disc height with a broad disc bulge-marginal osteophyte complex that mildly lateralizes to the left and blends imperceptibly with right greater than left uncovertebral hypertrophy.  With ligamentum hypertrophy, there is no significant central canal stenosis.  Uncovertebral hypertrophy and facet arthropathy are causing mild left and moderate right foraminal stenoses.  At C5-C6, there is a broad-based disc bulge and osteophyte complex most prominent centrally.  There is also ligamentum flavum hypertrophy present, but the canal is patent.  There is mild left foraminal stenosis.  The right foramen is patent.  At C6-C7, there is disc desiccation with a minimal broad-based disc bulge most prominent in the right paracentral canal.  The canal is widely patent.  There is mild left foraminal stenosis.  At C7-T1, there is no significant disc bulge, protrusion, or herniation/extrusion.  The canal and foramina are widely patent.    MRI lumbar spine 9/22/17  L1-L2 demonstrates no significant disc protrusion, central spinal canal stenosis, or neural foraminal stenosis.  L2-L3 demonstrates no significant disc protrusion, central spinal canal stenosis, or neural foraminal stenosis. Mild facet  arthrosis is seen.  L3-L4 demonstrates no significant disc protrusion, central spinal canal stenosis, or neural foraminal stenosis. Mild facet arthrosis is seen.  L4-L5 demonstrates mild broad-based disc bulge, ligamentum flavum hypertrophy, and mild/moderate bilateral facet arthrosis. No significant central spinal canal stenosis is seen. Mild bilateral lateral recess narrowing and neural foraminal narrowing is seen. Similar findings were seen on the prior MRI.  L5-S1 demonstrates no significant posterior disc protrusion. Moderate bilateral facet arthrosis is seen along ligamentum flavum hypertrophy. No significant central spinal canal or neural foraminal stenosis is seen.    Hip x-rays 9/22/17  AP view of the pelvis and frog leg views of both hips were obtained. No evidence of acute displaced fracture or dislocation is visualized.  No radiopaque foreign bodies are visualized. The bilateral superior joint spaces are grossly maintained. Mild bilateral sacroiliac joint degenerative changes are seen including subchondral sclerosis and small marginal osteophytes. Mild to moderate symphyseal degenerative changes are seen. There is a slightly expansile cortically-based focus along the lateral proximal right femoral metadiaphysis. This could reflect a sessile osteochondroma, but correlation with MRI of the right hip is recommended.      Assessment:  The patient is a 57-year-old woman with history of hypothyroidism, otherwise fairly healthy but a long history of scoliosis causing back pain, self-referred for continued back pain.    1. Cervical radiculopathy  Vital signs    Activity as tolerated    Place 18-22 gauage peripheral IV     Verify informed consent    Notify physician     Notify physician     Notify physician (specify)    Diet NPO    Case Request Operating Room: INJECTION-STEROID-EPIDURAL-CERVICAL    Place in Outpatient    lactated ringers infusion   2. DDD (degenerative disc disease), cervical     3. Spondylosis  of thoracic region without myelopathy or radiculopathy     4. Lumbar spondylosis     5. DDD (degenerative disc disease), lumbar     6. Positive FERNANDO (antinuclear antibody)         Plan:  1.  For neck pain, I will set her up to repeat a cervical JINA to the right.  She has done well with this in the past.  2.  She'll continue gabapentin 100 mg in morning and 300 mg at night.  3.  She continues take hydrocodone but feels that she may need to take this more often because of her bilateral leg pain.  She does not have an appointment with rheumatology until August.  4.  We discussed repeating a lumbar JINA in the future if necessary and/or lumbar medial branch RFA.  5.  Follow-up in 4 weeks post procedure or sooner as needed.    Greater than 50% of this 30 minute visit was spent in counseling the patient.

## 2018-03-19 ENCOUNTER — PATIENT MESSAGE (OUTPATIENT)
Dept: PAIN MEDICINE | Facility: CLINIC | Age: 58
End: 2018-03-19

## 2018-03-20 DIAGNOSIS — M72.2 PLANTAR FASCIITIS: ICD-10-CM

## 2018-03-20 DIAGNOSIS — M72.2 PLANTAR FASCIITIS: Primary | ICD-10-CM

## 2018-03-20 RX ORDER — DICLOFENAC SODIUM 10 MG/G
2 GEL TOPICAL 4 TIMES DAILY
Qty: 1 TUBE | Refills: 3 | Status: SHIPPED | OUTPATIENT
Start: 2018-03-20 | End: 2018-08-08

## 2018-03-20 RX ORDER — HYDROCODONE BITARTRATE AND ACETAMINOPHEN 7.5; 325 MG/1; MG/1
1 TABLET ORAL 2 TIMES DAILY PRN
Qty: 60 TABLET | Refills: 0 | Status: CANCELLED | OUTPATIENT
Start: 2018-03-20

## 2018-03-20 RX ORDER — HYDROCODONE BITARTRATE AND ACETAMINOPHEN 7.5; 325 MG/1; MG/1
1 TABLET ORAL 2 TIMES DAILY PRN
Qty: 60 TABLET | Refills: 0 | Status: SHIPPED | OUTPATIENT
Start: 2018-03-20 | End: 2018-04-26 | Stop reason: SDUPTHER

## 2018-03-20 RX ORDER — GABAPENTIN 100 MG/1
100 CAPSULE ORAL NIGHTLY
Qty: 30 CAPSULE | Refills: 5 | Status: SHIPPED | OUTPATIENT
Start: 2018-03-20 | End: 2018-06-19

## 2018-03-20 RX ORDER — DICLOFENAC SODIUM 10 MG/G
2 GEL TOPICAL 4 TIMES DAILY
Qty: 1 TUBE | Refills: 3 | Status: CANCELLED | OUTPATIENT
Start: 2018-03-20 | End: 2018-03-30

## 2018-04-26 RX ORDER — HYDROCODONE BITARTRATE AND ACETAMINOPHEN 7.5; 325 MG/1; MG/1
1 TABLET ORAL 2 TIMES DAILY PRN
Qty: 60 TABLET | Refills: 0 | Status: SHIPPED | OUTPATIENT
Start: 2018-04-28 | End: 2018-05-28

## 2018-04-27 ENCOUNTER — OFFICE VISIT (OUTPATIENT)
Dept: PAIN MEDICINE | Facility: CLINIC | Age: 58
End: 2018-04-27
Payer: COMMERCIAL

## 2018-04-27 VITALS
OXYGEN SATURATION: 100 % | TEMPERATURE: 96 F | HEART RATE: 67 BPM | RESPIRATION RATE: 18 BRPM | WEIGHT: 181.13 LBS | BODY MASS INDEX: 34.22 KG/M2 | SYSTOLIC BLOOD PRESSURE: 126 MMHG | DIASTOLIC BLOOD PRESSURE: 60 MMHG

## 2018-04-27 DIAGNOSIS — M47.816 LUMBAR SPONDYLOSIS: ICD-10-CM

## 2018-04-27 DIAGNOSIS — R76.8 POSITIVE ANA (ANTINUCLEAR ANTIBODY): ICD-10-CM

## 2018-04-27 DIAGNOSIS — M54.16 RIGHT LUMBAR RADICULOPATHY: Primary | ICD-10-CM

## 2018-04-27 DIAGNOSIS — M47.814 SPONDYLOSIS OF THORACIC REGION WITHOUT MYELOPATHY OR RADICULOPATHY: ICD-10-CM

## 2018-04-27 DIAGNOSIS — M54.12 CERVICAL RADICULOPATHY: ICD-10-CM

## 2018-04-27 DIAGNOSIS — M50.30 DDD (DEGENERATIVE DISC DISEASE), CERVICAL: ICD-10-CM

## 2018-04-27 PROCEDURE — 99214 OFFICE O/P EST MOD 30 MIN: CPT | Mod: S$GLB,,, | Performed by: PHYSICIAN ASSISTANT

## 2018-04-27 PROCEDURE — 99999 PR PBB SHADOW E&M-EST. PATIENT-LVL III: CPT | Mod: PBBFAC,,, | Performed by: PHYSICIAN ASSISTANT

## 2018-04-27 RX ORDER — ALPRAZOLAM 0.5 MG/1
1 TABLET, ORALLY DISINTEGRATING ORAL ONCE AS NEEDED
Status: CANCELLED | OUTPATIENT
Start: 2018-05-15 | End: 2018-05-15

## 2018-04-27 NOTE — PROGRESS NOTES
This note was completed with dictation software and grammatical errors may exist.    CC:Back pain, neck pain    HPI: The patient is a 57-year-old woman with history of hypothyroidism, otherwise fairly healthy but a long history of scoliosis causing back pain, self-referred for continued back pain.  She is status post C7-T1 cervical interlaminar epidural steroid injection on 3/16/18 with at least 80% relief.  She denies major neck or thoracic back pain today, her main complaint today is right low back pain radiating to the right lateral thigh, lateral shin.  This is worse with walking and improved with sitting and pain medication.  During last visit, we discussed her positive FERNANDO and her pain and reddish discoloration in her lower legs.  This is still present but has improved since her steroid injection.  She denies weakness, numbness, bladder or bowel incontinence.    Pain intervention history: She has been seeing Dr. Huffman for the last several years and has undergone epidural steroid injections and radiofrequency ablations with good relief.  According to her last pain management physician, she had undergone a right side T3, 4, 5, 6 medial branch radiofrequency ablation on 8/8/14 with good relief.  He had scheduled her for another one but did not complete this.  As far as medications she has been taking Hydrocodone 7.5/325 one to 2 times a day at most and gabapentin 300 mg at night. She is status post cervical epidural steroid injection on 10/19/15 with 50% relief of her neck pain. She is status post right L4 transforaminal epidural steroid injection on 11/23/15 with 100% relief.   She is status post right T3, 4, 5 and 6 medial branch radio frequency ablation on 2/5/16 with greater than 50% relief.  She is status post right L4 transforaminal epidural sterile injection on 6/14/16 with significant relief lasting only about a month.  She is status post C7-T1 cervical interlaminar epidural steroid injection on  7/14/16 with 30-40% relief.   She is status post L5/S1 interlaminar epidural steroidal injection to the right on 8/12/16 with 100% relief of her right leg pain.  She is status post C7-T1 cervical interlaminar epidural steroid injection on 12/9/16 with almost complete relief.  She is status post right T4, 5, 6 and 7 medial branch radiofrequency ablation on 5/30/17 with 100% relief.  She is status post C7-T1 cervical interlaminar epidural steroid injection on 6/27/17 with 80% relief.  She is status post L5/S1 interlaminar epidural steroid injection on a/7/17 with excellent relief lasting 2 weeks, now reporting 0% relief.  She is status post right L3, 4 and 5 medial branch radiofrequency ablation on 10/19/17 with 80% relief.   She is status post right T4, 5, 6 and 7 medial branch radiofrequency ablation on 12/14/17 with 100% relief.  She is status post C7-T1 cervical interlaminar epidural steroid injection on 3/16/18 with at least 80% relief.     ROS: She reports fatigability, headaches, hypothyroidism, joint stiffness, back pain, difficulty sleeping and anxiety.  Balance of review of systems is negative.      Medical, surgical, family and social history reviewed elsewhere in record.  She is a  at [employer].    Medications/Allergies: See med card    Vitals:    04/27/18 1125   BP: 126/60   Pulse: 67   Resp: 18   Temp: 96.2 °F (35.7 °C)   TempSrc: Oral   SpO2: 100%   Weight: 82.2 kg (181 lb 1.7 oz)   PainSc:   2   PainLoc: Neck         Physical exam:  Gen: A and O x3, pleasant, well-groomed  Skin: No rashes or obvious lesions  HEENT: PERRLA, no obvious deformities on ears or in canals. Trachea midline.  CVS: Regular rate and rhythm, normal palpable pulses.  Resp:No increased work of breathing, symmetrical chest rise.  Abdomen: Soft, NT/ND.  Musculoskeletal:No antalgic gait.     Neuro:  Upper extremities: 5/5 strength bilaterally.  Lower extremities: 5/5 strength bilaterally.    Reflexes: Patellar 2+,  Achilles 2+ bilaterally.  Upper extremity reflexes 2+ bilaterally equal.  Sensory:  Intact and symmetrical to light touch and pinprick in L2-S1 dermatomes bilaterally.    Cervical spine exam: Range of motion is full with flexion, extension and lateral rotation without pain.  Myofascial exam: No tenderness to palpation to the cervical paraspinous muscles.    Lumbar spine:  Lumbar spine: Range of motion is full with flexion and extension with increased right low back pain during each maneuver.  Brayden's test causes no increased pain on either side.    Supine straight leg raise is negative bilaterally.  Internal and external rotation of the hip causes no increased pain on either side.  Myofascial exam: Mild tenderness to palpation to the right lumbar paraspinous muscles.      Imagin/16/15 Xray Scoliosis survey: There is thoracic dextroscoliosis with mild lumbar compensatory levoscoliosis. No structural abnormalities are evident. Diffuse spondylosis noted in the cervical, thoracic and to lesser extent lumbar spine. No fractures are identified. There is slight increased kyphotic curvature of the thoracic spine with alignment being otherwise normal. Measurements and evaluation are limited due to underpenetration. Land angle measures approximately in the thoracic spine is of approximately 17.0 degrees and for the lumbar spine 17.4 degrees. Soft tissues are unremarkable. IMPRESSION: 1. S shaped scoliotic curvature of the thoracolumbar spine with Land angle of approximately 17 degrees. 2. Diffuse spondylosis.    MRI report 12: Report notes that there is dextroconvex rotary curvature of the thoracic spine.  Posterior alignment is maintained.  There are scattered vertebral body hemangiomas.  There is minimal posterior disc bulges noted at T5/6, T6/7, T7/8 and T8/9.  There is no central spinal stenosis or neural foraminal narrowing.    MRI right shoulder 11: Minor distal subscapularis tendinosis.  Negative for  full-thickness or significant partial-thickness rotator cuff tendon tear.  There is a small amount of subacromial subdeltoid bursal fluid which can be seen with recent injection or mild bursitis.    MRI cervical spine report 3/25/11.  Note that this is comparison to previous study on 7/29/2008.  This is a report only, no images were available to me.  The C2/3 disc appears grossly intact with no significant bulging.  C3/4 there is mild protrusion of the disc with no herniation and no nerve root entrapment seen.  Spinal stenosis is not noted.  At C4/5 there is broad-based disc protrusion which compresses the subarachnoid space without cord compression.  There is no definite nerve root compression seen.  At C5/6 there is vertebral body lesion which is bright on T2 and dark on T1 but it does not enhance and may represent an atypical hemangioma.  This is unchanged from previous study.  At C5/6 there is a protrusion of the disc asymmetrically to the left but no significant entrapment.  No change from previous study.  At C6/7 there is a focal protrusion of the disc causing compression of the subarachnoid space without definite cord or nerve root compression.  C7/T1 appears intact.    Lumbar spine MRI report 3/31/14: No acute abnormality or significant degenerative changes.  No canal or foraminal stenosis.    Lumbar spine MRI 10/19/15  T12-L1: No central canal or neuroforaminal stenosis. No disc protrusion or extrusion.  L1-L2: No central canal or neuroforaminal stenosis. No disc protrusion or extrusion.  L2-L3: No central canal or neuroforaminal stenosis. No disc protrusion or extrusion.  L3-L4: No central canal or neuroforaminal stenosis. No disc protrusion or extrusion.  L4-L5: There is a broad disc bulge which extends into both neuroforamina. There is prominent ligamentum flavum thickening and facet arthropathy. There is mild bilateral neuroforaminal stenosis. No central canal stenosis.  L5-S1: There is bilateral  hypertrophic facet arthropathy and ligamentum flavum thickening. No central canal or neuroforaminal stenosis. No disc protrusion or extrusion.    Cervical spine MRI 6/29/16  At C2-C3, the minimal interstitial excision covers a very mild broad-based disc bulge most prominent centrally but does not deform the ventral cord.  There is no canal or foraminal stenosis.  At C3-C4, there is mildly decreased disc height with a broad-based disc bulge most prominent centrally.  This minimally contact the ventral cord without significant deformity.  The canal is widely patent.  There is uncovertebral hypertrophy and facet arthropathy, but the foramina are patent.  At C4-C5, there is decreased disc height with a broad disc bulge-marginal osteophyte complex that mildly lateralizes to the left and blends imperceptibly with right greater than left uncovertebral hypertrophy.  With ligamentum hypertrophy, there is no significant central canal stenosis.  Uncovertebral hypertrophy and facet arthropathy are causing mild left and moderate right foraminal stenoses.  At C5-C6, there is a broad-based disc bulge and osteophyte complex most prominent centrally.  There is also ligamentum flavum hypertrophy present, but the canal is patent.  There is mild left foraminal stenosis.  The right foramen is patent.  At C6-C7, there is disc desiccation with a minimal broad-based disc bulge most prominent in the right paracentral canal.  The canal is widely patent.  There is mild left foraminal stenosis.  At C7-T1, there is no significant disc bulge, protrusion, or herniation/extrusion.  The canal and foramina are widely patent.    MRI lumbar spine 9/22/17  L1-L2 demonstrates no significant disc protrusion, central spinal canal stenosis, or neural foraminal stenosis.  L2-L3 demonstrates no significant disc protrusion, central spinal canal stenosis, or neural foraminal stenosis. Mild facet arthrosis is seen.  L3-L4 demonstrates no significant disc  protrusion, central spinal canal stenosis, or neural foraminal stenosis. Mild facet arthrosis is seen.  L4-L5 demonstrates mild broad-based disc bulge, ligamentum flavum hypertrophy, and mild/moderate bilateral facet arthrosis. No significant central spinal canal stenosis is seen. Mild bilateral lateral recess narrowing and neural foraminal narrowing is seen. Similar findings were seen on the prior MRI.  L5-S1 demonstrates no significant posterior disc protrusion. Moderate bilateral facet arthrosis is seen along ligamentum flavum hypertrophy. No significant central spinal canal or neural foraminal stenosis is seen.    Hip x-rays 9/22/17  AP view of the pelvis and frog leg views of both hips were obtained. No evidence of acute displaced fracture or dislocation is visualized.  No radiopaque foreign bodies are visualized. The bilateral superior joint spaces are grossly maintained. Mild bilateral sacroiliac joint degenerative changes are seen including subchondral sclerosis and small marginal osteophytes. Mild to moderate symphyseal degenerative changes are seen. There is a slightly expansile cortically-based focus along the lateral proximal right femoral metadiaphysis. This could reflect a sessile osteochondroma, but correlation with MRI of the right hip is recommended.      Assessment:  The patient is a 57-year-old woman with history of hypothyroidism, otherwise fairly healthy but a long history of scoliosis causing back pain, self-referred for continued back pain.    1. Right lumbar radiculopathy     2. Lumbar spondylosis     3. Cervical radiculopathy     4. DDD (degenerative disc disease), cervical     5. Spondylosis of thoracic region without myelopathy or radiculopathy     6. Positive FERNANDO (antinuclear antibody)         Plan:  1.  She had almost complete relief following the cervical JINA.  This can be repeated in the future if necessary.  2.  I will schedule her for an L5/S1 interlaminar JINA to the right.  She has  done well with this in the past.  If she has relief of her right leg pain but not of her back pain, we can repeat lumbar RFA.  3.  Dr. Coleman provided prescription for hydrocodone-acetaminophen 7.5/325 mg up to 2 times a day as needed for pain.  I have reviewed the [place] Board [employer] website and there are no abberancies.    4.  Follow-up in 4 weeks post procedure or sooner as needed.    Greater than 50% of this 25 minute visit was spent in counseling the patient.

## 2018-05-10 ENCOUNTER — TELEPHONE (OUTPATIENT)
Dept: PAIN MEDICINE | Facility: CLINIC | Age: 58
End: 2018-05-10

## 2018-05-14 DIAGNOSIS — M51.36 DDD (DEGENERATIVE DISC DISEASE), LUMBAR: Primary | ICD-10-CM

## 2018-05-15 ENCOUNTER — HOSPITAL ENCOUNTER (OUTPATIENT)
Facility: HOSPITAL | Age: 58
Discharge: HOME | End: 2018-05-15
Attending: ANESTHESIOLOGY | Admitting: ANESTHESIOLOGY
Payer: COMMERCIAL

## 2018-05-15 ENCOUNTER — HOSPITAL ENCOUNTER (OUTPATIENT)
Dept: RADIOLOGY | Facility: HOSPITAL | Age: 58
Discharge: HOME | End: 2018-05-15
Attending: ANESTHESIOLOGY
Payer: COMMERCIAL

## 2018-05-15 ENCOUNTER — SURGERY (OUTPATIENT)
Age: 58
End: 2018-05-15

## 2018-05-15 VITALS
TEMPERATURE: 98 F | HEIGHT: 61 IN | RESPIRATION RATE: 16 BRPM | OXYGEN SATURATION: 96 % | SYSTOLIC BLOOD PRESSURE: 120 MMHG | HEART RATE: 81 BPM | DIASTOLIC BLOOD PRESSURE: 75 MMHG

## 2018-05-15 DIAGNOSIS — M51.36 DDD (DEGENERATIVE DISC DISEASE), LUMBAR: ICD-10-CM

## 2018-05-15 DIAGNOSIS — M54.16 RIGHT LUMBAR RADICULOPATHY: Primary | ICD-10-CM

## 2018-05-15 PROCEDURE — 99152 MOD SED SAME PHYS/QHP 5/>YRS: CPT | Mod: ,,, | Performed by: ANESTHESIOLOGY

## 2018-05-15 PROCEDURE — 63600175 PHARM REV CODE 636 W HCPCS: Mod: PO | Performed by: ANESTHESIOLOGY

## 2018-05-15 PROCEDURE — 62323 NJX INTERLAMINAR LMBR/SAC: CPT | Mod: PO | Performed by: ANESTHESIOLOGY

## 2018-05-15 PROCEDURE — 25500020 PHARM REV CODE 255: Mod: PO | Performed by: ANESTHESIOLOGY

## 2018-05-15 PROCEDURE — 25000003 PHARM REV CODE 250: Mod: PO | Performed by: ANESTHESIOLOGY

## 2018-05-15 PROCEDURE — 76000 FLUOROSCOPY <1 HR PHYS/QHP: CPT | Mod: TC,PO

## 2018-05-15 PROCEDURE — 62323 NJX INTERLAMINAR LMBR/SAC: CPT | Mod: ,,, | Performed by: ANESTHESIOLOGY

## 2018-05-15 PROCEDURE — A4216 STERILE WATER/SALINE, 10 ML: HCPCS | Mod: PO | Performed by: ANESTHESIOLOGY

## 2018-05-15 RX ORDER — MIDAZOLAM HYDROCHLORIDE 2 MG/2ML
INJECTION, SOLUTION INTRAMUSCULAR; INTRAVENOUS
Status: DISCONTINUED | OUTPATIENT
Start: 2018-05-15 | End: 2018-05-15 | Stop reason: HOSPADM

## 2018-05-15 RX ORDER — SODIUM CHLORIDE 9 MG/ML
INJECTION, SOLUTION INTRAMUSCULAR; INTRAVENOUS; SUBCUTANEOUS
Status: DISCONTINUED | OUTPATIENT
Start: 2018-05-15 | End: 2018-05-15 | Stop reason: HOSPADM

## 2018-05-15 RX ORDER — ALPRAZOLAM 0.5 MG/1
1 TABLET, ORALLY DISINTEGRATING ORAL ONCE AS NEEDED
Status: DISCONTINUED | OUTPATIENT
Start: 2018-05-15 | End: 2018-05-15

## 2018-05-15 RX ORDER — METHYLPREDNISOLONE ACETATE 80 MG/ML
INJECTION, SUSPENSION INTRA-ARTICULAR; INTRALESIONAL; INTRAMUSCULAR; SOFT TISSUE
Status: DISCONTINUED | OUTPATIENT
Start: 2018-05-15 | End: 2018-05-15 | Stop reason: HOSPADM

## 2018-05-15 RX ORDER — LIDOCAINE HYDROCHLORIDE 10 MG/ML
INJECTION, SOLUTION EPIDURAL; INFILTRATION; INTRACAUDAL; PERINEURAL
Status: DISCONTINUED | OUTPATIENT
Start: 2018-05-15 | End: 2018-05-15 | Stop reason: HOSPADM

## 2018-05-15 RX ADMIN — MIDAZOLAM HYDROCHLORIDE 1 MG: 1 INJECTION, SOLUTION INTRAMUSCULAR; INTRAVENOUS at 02:05

## 2018-05-15 RX ADMIN — MIDAZOLAM HYDROCHLORIDE 2 MG: 1 INJECTION, SOLUTION INTRAMUSCULAR; INTRAVENOUS at 02:05

## 2018-05-15 RX ADMIN — METHYLPREDNISOLONE ACETATE 80 MG: 80 INJECTION, SUSPENSION INTRA-ARTICULAR; INTRALESIONAL; INTRAMUSCULAR; SOFT TISSUE at 02:05

## 2018-05-15 RX ADMIN — LIDOCAINE HYDROCHLORIDE 10 ML: 10 INJECTION, SOLUTION EPIDURAL; INFILTRATION; INTRACAUDAL; PERINEURAL at 02:05

## 2018-05-15 RX ADMIN — IOHEXOL 3 ML: 300 INJECTION, SOLUTION INTRAVENOUS at 02:05

## 2018-05-15 RX ADMIN — SODIUM CHLORIDE 4 ML: 9 INJECTION INTRAMUSCULAR; INTRAVENOUS; SUBCUTANEOUS at 02:05

## 2018-05-15 NOTE — INTERVAL H&P NOTE
The patient has been examined and the H&P has been reviewed:    I concur with the findings and no changes have occurred since H&P was written.    Anesthesia/Surgery risks, benefits and alternative options discussed and understood by patient/family.          Active Hospital Problems    Diagnosis  POA    Right lumbar radiculopathy [M54.16]  Yes      Resolved Hospital Problems    Diagnosis Date Resolved POA   No resolved problems to display.

## 2018-05-15 NOTE — DISCHARGE SUMMARY
[facility]  Discharge Note  Short Stay    Admit Date: 5/15/2018    Discharge Date: 5/15/2018    Attending Physician: Floyd Coleman MD     Discharge Provider: Floyd Coleman    Diagnoses:  Active Hospital Problems    Diagnosis  POA    *Right lumbar radiculopathy [M54.16]  Yes      Resolved Hospital Problems    Diagnosis Date Resolved POA   No resolved problems to display.       Discharged Condition: good    Final Diagnoses: Right lumbar radiculopathy [M54.16]    Disposition: Home or Self Care    Hospital Course: no complications, uneventful    Outcome of Hospitalization, Treatment, Procedure, or Surgery:  Patient was admitted for outpatient procedure. The patient underwent procedure without complications and are discharged home    Follow up/Patient Instructions:  Follow up as scheduled/Patient has received instructions and follow up date    Medications:  Continue previous medications      Discharge Procedure Orders  Call MD for:  temperature >100.4     Call MD for:  severe uncontrolled pain     Call MD for:  redness, tenderness, or signs of infection (pain, swelling, redness, odor or green/yellow discharge around incision site)     Call MD for:  severe persistent headache     No dressing needed           Discharge Procedure Orders (must include Diet, Follow-up, Activity):    Discharge Procedure Orders (must include Diet, Follow-up, Activity)  Call MD for:  temperature >100.4     Call MD for:  severe uncontrolled pain     Call MD for:  redness, tenderness, or signs of infection (pain, swelling, redness, odor or green/yellow discharge around incision site)     Call MD for:  severe persistent headache     No dressing needed

## 2018-05-15 NOTE — H&P (VIEW-ONLY)
This note was completed with dictation software and grammatical errors may exist.    CC:Back pain, neck pain    HPI: The patient is a 57-year-old woman with history of hypothyroidism, otherwise fairly healthy but a long history of scoliosis causing back pain, self-referred for continued back pain.  She is status post C7-T1 cervical interlaminar epidural steroid injection on 3/16/18 with at least 80% relief.  She denies major neck or thoracic back pain today, her main complaint today is right low back pain radiating to the right lateral thigh, lateral shin.  This is worse with walking and improved with sitting and pain medication.  During last visit, we discussed her positive FERNANDO and her pain and reddish discoloration in her lower legs.  This is still present but has improved since her steroid injection.  She denies weakness, numbness, bladder or bowel incontinence.    Pain intervention history: She has been seeing Dr. Huffman for the last several years and has undergone epidural steroid injections and radiofrequency ablations with good relief.  According to her last pain management physician, she had undergone a right side T3, 4, 5, 6 medial branch radiofrequency ablation on 8/8/14 with good relief.  He had scheduled her for another one but did not complete this.  As far as medications she has been taking Hydrocodone 7.5/325 one to 2 times a day at most and gabapentin 300 mg at night. She is status post cervical epidural steroid injection on 10/19/15 with 50% relief of her neck pain. She is status post right L4 transforaminal epidural steroid injection on 11/23/15 with 100% relief.   She is status post right T3, 4, 5 and 6 medial branch radio frequency ablation on 2/5/16 with greater than 50% relief.  She is status post right L4 transforaminal epidural sterile injection on 6/14/16 with significant relief lasting only about a month.  She is status post C7-T1 cervical interlaminar epidural steroid injection on  7/14/16 with 30-40% relief.   She is status post L5/S1 interlaminar epidural steroidal injection to the right on 8/12/16 with 100% relief of her right leg pain.  She is status post C7-T1 cervical interlaminar epidural steroid injection on 12/9/16 with almost complete relief.  She is status post right T4, 5, 6 and 7 medial branch radiofrequency ablation on 5/30/17 with 100% relief.  She is status post C7-T1 cervical interlaminar epidural steroid injection on 6/27/17 with 80% relief.  She is status post L5/S1 interlaminar epidural steroid injection on a/7/17 with excellent relief lasting 2 weeks, now reporting 0% relief.  She is status post right L3, 4 and 5 medial branch radiofrequency ablation on 10/19/17 with 80% relief.   She is status post right T4, 5, 6 and 7 medial branch radiofrequency ablation on 12/14/17 with 100% relief.  She is status post C7-T1 cervical interlaminar epidural steroid injection on 3/16/18 with at least 80% relief.     ROS: She reports fatigability, headaches, hypothyroidism, joint stiffness, back pain, difficulty sleeping and anxiety.  Balance of review of systems is negative.      Medical, surgical, family and social history reviewed elsewhere in record.  She is a  at [employer].    Medications/Allergies: See med card    Vitals:    04/27/18 1125   BP: 126/60   Pulse: 67   Resp: 18   Temp: 96.2 °F (35.7 °C)   TempSrc: Oral   SpO2: 100%   Weight: 82.2 kg (181 lb 1.7 oz)   PainSc:   2   PainLoc: Neck         Physical exam:  Gen: A and O x3, pleasant, well-groomed  Skin: No rashes or obvious lesions  HEENT: PERRLA, no obvious deformities on ears or in canals. Trachea midline.  CVS: Regular rate and rhythm, normal palpable pulses.  Resp:No increased work of breathing, symmetrical chest rise.  Abdomen: Soft, NT/ND.  Musculoskeletal:No antalgic gait.     Neuro:  Upper extremities: 5/5 strength bilaterally.  Lower extremities: 5/5 strength bilaterally.    Reflexes: Patellar 2+,  Achilles 2+ bilaterally.  Upper extremity reflexes 2+ bilaterally equal.  Sensory:  Intact and symmetrical to light touch and pinprick in L2-S1 dermatomes bilaterally.    Cervical spine exam: Range of motion is full with flexion, extension and lateral rotation without pain.  Myofascial exam: No tenderness to palpation to the cervical paraspinous muscles.    Lumbar spine:  Lumbar spine: Range of motion is full with flexion and extension with increased right low back pain during each maneuver.  Brayden's test causes no increased pain on either side.    Supine straight leg raise is negative bilaterally.  Internal and external rotation of the hip causes no increased pain on either side.  Myofascial exam: Mild tenderness to palpation to the right lumbar paraspinous muscles.      Imagin/16/15 Xray Scoliosis survey: There is thoracic dextroscoliosis with mild lumbar compensatory levoscoliosis. No structural abnormalities are evident. Diffuse spondylosis noted in the cervical, thoracic and to lesser extent lumbar spine. No fractures are identified. There is slight increased kyphotic curvature of the thoracic spine with alignment being otherwise normal. Measurements and evaluation are limited due to underpenetration. Land angle measures approximately in the thoracic spine is of approximately 17.0 degrees and for the lumbar spine 17.4 degrees. Soft tissues are unremarkable. IMPRESSION: 1. S shaped scoliotic curvature of the thoracolumbar spine with Land angle of approximately 17 degrees. 2. Diffuse spondylosis.    MRI report 12: Report notes that there is dextroconvex rotary curvature of the thoracic spine.  Posterior alignment is maintained.  There are scattered vertebral body hemangiomas.  There is minimal posterior disc bulges noted at T5/6, T6/7, T7/8 and T8/9.  There is no central spinal stenosis or neural foraminal narrowing.    MRI right shoulder 11: Minor distal subscapularis tendinosis.  Negative for  full-thickness or significant partial-thickness rotator cuff tendon tear.  There is a small amount of subacromial subdeltoid bursal fluid which can be seen with recent injection or mild bursitis.    MRI cervical spine report 3/25/11.  Note that this is comparison to previous study on 7/29/2008.  This is a report only, no images were available to me.  The C2/3 disc appears grossly intact with no significant bulging.  C3/4 there is mild protrusion of the disc with no herniation and no nerve root entrapment seen.  Spinal stenosis is not noted.  At C4/5 there is broad-based disc protrusion which compresses the subarachnoid space without cord compression.  There is no definite nerve root compression seen.  At C5/6 there is vertebral body lesion which is bright on T2 and dark on T1 but it does not enhance and may represent an atypical hemangioma.  This is unchanged from previous study.  At C5/6 there is a protrusion of the disc asymmetrically to the left but no significant entrapment.  No change from previous study.  At C6/7 there is a focal protrusion of the disc causing compression of the subarachnoid space without definite cord or nerve root compression.  C7/T1 appears intact.    Lumbar spine MRI report 3/31/14: No acute abnormality or significant degenerative changes.  No canal or foraminal stenosis.    Lumbar spine MRI 10/19/15  T12-L1: No central canal or neuroforaminal stenosis. No disc protrusion or extrusion.  L1-L2: No central canal or neuroforaminal stenosis. No disc protrusion or extrusion.  L2-L3: No central canal or neuroforaminal stenosis. No disc protrusion or extrusion.  L3-L4: No central canal or neuroforaminal stenosis. No disc protrusion or extrusion.  L4-L5: There is a broad disc bulge which extends into both neuroforamina. There is prominent ligamentum flavum thickening and facet arthropathy. There is mild bilateral neuroforaminal stenosis. No central canal stenosis.  L5-S1: There is bilateral  hypertrophic facet arthropathy and ligamentum flavum thickening. No central canal or neuroforaminal stenosis. No disc protrusion or extrusion.    Cervical spine MRI 6/29/16  At C2-C3, the minimal interstitial excision covers a very mild broad-based disc bulge most prominent centrally but does not deform the ventral cord.  There is no canal or foraminal stenosis.  At C3-C4, there is mildly decreased disc height with a broad-based disc bulge most prominent centrally.  This minimally contact the ventral cord without significant deformity.  The canal is widely patent.  There is uncovertebral hypertrophy and facet arthropathy, but the foramina are patent.  At C4-C5, there is decreased disc height with a broad disc bulge-marginal osteophyte complex that mildly lateralizes to the left and blends imperceptibly with right greater than left uncovertebral hypertrophy.  With ligamentum hypertrophy, there is no significant central canal stenosis.  Uncovertebral hypertrophy and facet arthropathy are causing mild left and moderate right foraminal stenoses.  At C5-C6, there is a broad-based disc bulge and osteophyte complex most prominent centrally.  There is also ligamentum flavum hypertrophy present, but the canal is patent.  There is mild left foraminal stenosis.  The right foramen is patent.  At C6-C7, there is disc desiccation with a minimal broad-based disc bulge most prominent in the right paracentral canal.  The canal is widely patent.  There is mild left foraminal stenosis.  At C7-T1, there is no significant disc bulge, protrusion, or herniation/extrusion.  The canal and foramina are widely patent.    MRI lumbar spine 9/22/17  L1-L2 demonstrates no significant disc protrusion, central spinal canal stenosis, or neural foraminal stenosis.  L2-L3 demonstrates no significant disc protrusion, central spinal canal stenosis, or neural foraminal stenosis. Mild facet arthrosis is seen.  L3-L4 demonstrates no significant disc  protrusion, central spinal canal stenosis, or neural foraminal stenosis. Mild facet arthrosis is seen.  L4-L5 demonstrates mild broad-based disc bulge, ligamentum flavum hypertrophy, and mild/moderate bilateral facet arthrosis. No significant central spinal canal stenosis is seen. Mild bilateral lateral recess narrowing and neural foraminal narrowing is seen. Similar findings were seen on the prior MRI.  L5-S1 demonstrates no significant posterior disc protrusion. Moderate bilateral facet arthrosis is seen along ligamentum flavum hypertrophy. No significant central spinal canal or neural foraminal stenosis is seen.    Hip x-rays 9/22/17  AP view of the pelvis and frog leg views of both hips were obtained. No evidence of acute displaced fracture or dislocation is visualized.  No radiopaque foreign bodies are visualized. The bilateral superior joint spaces are grossly maintained. Mild bilateral sacroiliac joint degenerative changes are seen including subchondral sclerosis and small marginal osteophytes. Mild to moderate symphyseal degenerative changes are seen. There is a slightly expansile cortically-based focus along the lateral proximal right femoral metadiaphysis. This could reflect a sessile osteochondroma, but correlation with MRI of the right hip is recommended.      Assessment:  The patient is a 57-year-old woman with history of hypothyroidism, otherwise fairly healthy but a long history of scoliosis causing back pain, self-referred for continued back pain.    1. Right lumbar radiculopathy     2. Lumbar spondylosis     3. Cervical radiculopathy     4. DDD (degenerative disc disease), cervical     5. Spondylosis of thoracic region without myelopathy or radiculopathy     6. Positive FERNANDO (antinuclear antibody)         Plan:  1.  She had almost complete relief following the cervical JINA.  This can be repeated in the future if necessary.  2.  I will schedule her for an L5/S1 interlaminar JINA to the right.  She has  done well with this in the past.  If she has relief of her right leg pain but not of her back pain, we can repeat lumbar RFA.  3.  Dr. Coleman provided prescription for hydrocodone-acetaminophen 7.5/325 mg up to 2 times a day as needed for pain.  I have reviewed the [place] Board of Pharmacy website and there are no abberancies.    4.  Follow-up in 4 weeks post procedure or sooner as needed.    Greater than 50% of this 25 minute visit was spent in counseling the patient.

## 2018-05-15 NOTE — OP NOTE

## 2018-06-03 ENCOUNTER — PATIENT MESSAGE (OUTPATIENT)
Dept: PAIN MEDICINE | Facility: CLINIC | Age: 58
End: 2018-06-03

## 2018-06-04 ENCOUNTER — PATIENT MESSAGE (OUTPATIENT)
Dept: PAIN MEDICINE | Facility: CLINIC | Age: 58
End: 2018-06-04

## 2018-06-04 RX ORDER — HYDROCODONE BITARTRATE AND ACETAMINOPHEN 7.5; 325 MG/1; MG/1
1 TABLET ORAL 2 TIMES DAILY PRN
Qty: 60 TABLET | Refills: 0 | Status: SHIPPED | OUTPATIENT
Start: 2018-06-04 | End: 2018-06-19 | Stop reason: SDUPTHER

## 2018-06-04 RX ORDER — GABAPENTIN 100 MG/1
100 CAPSULE ORAL NIGHTLY
Qty: 30 CAPSULE | Refills: 5 | Status: CANCELLED | OUTPATIENT
Start: 2018-06-04 | End: 2019-06-04

## 2018-06-04 RX ORDER — GABAPENTIN 300 MG/1
300 CAPSULE ORAL NIGHTLY
Qty: 30 CAPSULE | Refills: 5 | Status: SHIPPED | OUTPATIENT
Start: 2018-06-04 | End: 2018-06-19

## 2018-06-19 ENCOUNTER — OFFICE VISIT (OUTPATIENT)
Dept: PAIN MEDICINE | Facility: CLINIC | Age: 58
End: 2018-06-19
Payer: COMMERCIAL

## 2018-06-19 VITALS
DIASTOLIC BLOOD PRESSURE: 70 MMHG | RESPIRATION RATE: 18 BRPM | OXYGEN SATURATION: 99 % | WEIGHT: 177.81 LBS | TEMPERATURE: 97 F | BODY MASS INDEX: 33.6 KG/M2 | SYSTOLIC BLOOD PRESSURE: 142 MMHG | HEART RATE: 84 BPM

## 2018-06-19 DIAGNOSIS — M41.9 SCOLIOSIS OF LUMBAR SPINE, UNSPECIFIED SCOLIOSIS TYPE: Primary | ICD-10-CM

## 2018-06-19 PROCEDURE — 99999 PR PBB SHADOW E&M-EST. PATIENT-LVL IV: CPT | Mod: PBBFAC,,, | Performed by: ANESTHESIOLOGY

## 2018-06-19 PROCEDURE — 99213 OFFICE O/P EST LOW 20 MIN: CPT | Mod: S$GLB,,, | Performed by: ANESTHESIOLOGY

## 2018-06-19 RX ORDER — TOPIRAMATE 50 MG/1
50 TABLET, FILM COATED ORAL NIGHTLY
Qty: 30 TABLET | Refills: 2 | Status: SHIPPED | OUTPATIENT
Start: 2018-06-19 | End: 2018-08-08

## 2018-06-19 RX ORDER — HYDROCODONE BITARTRATE AND ACETAMINOPHEN 7.5; 325 MG/1; MG/1
1 TABLET ORAL 2 TIMES DAILY PRN
Qty: 60 TABLET | Refills: 0 | Status: SHIPPED | OUTPATIENT
Start: 2018-08-03 | End: 2018-06-26 | Stop reason: SDUPTHER

## 2018-06-19 RX ORDER — HYDROCODONE BITARTRATE AND ACETAMINOPHEN 7.5; 325 MG/1; MG/1
1 TABLET ORAL 2 TIMES DAILY PRN
Qty: 60 TABLET | Refills: 0 | Status: SHIPPED | OUTPATIENT
Start: 2018-07-04 | End: 2018-08-03

## 2018-06-19 NOTE — PROGRESS NOTES
This note was completed with dictation software and grammatical errors may exist.    CC:Back pain, neck pain    HPI: The patient is a 57-year-old woman with history of hypothyroidism, otherwise fairly healthy but a long history of scoliosis causing back pain, self-referred for continued back pain.  She is status post lumbar epidural steroid injection at L5/S1 on 5/15/18 with 90% relief of her bilateral low back and right leg pain.  She states that this is much better than it probably ever has been.  She also feels that her cervical spine region continues to do very well without discomfort.  Unfortunately she does complain of midthoracic pain like she has had in the past, who like pressure, tight worse on the right side than the left side.  It is worse with sitting too long, bending, worse the end of the day.  She denies any radiation into the chest.  Her other complaint is that she is having difficulty sleeping, has been taking gabapentin and it does seem to help but she cannot take less than 300 otherwise it does not help but at this amount it also worsens her sleep apnea, she uses a CPAP.      Pain intervention history: She has been seeing Dr. Huffman for the last several years and has undergone epidural steroid injections and radiofrequency ablations with good relief.  According to her last pain management physician, she had undergone a right side T3, 4, 5, 6 medial branch radiofrequency ablation on 8/8/14 with good relief.  He had scheduled her for another one but did not complete this.  As far as medications she has been taking Hydrocodone 7.5/325 one to 2 times a day at most and gabapentin 300 mg at night. She is status post cervical epidural steroid injection on 10/19/15 with 50% relief of her neck pain. She is status post right L4 transforaminal epidural steroid injection on 11/23/15 with 100% relief.   She is status post right T3, 4, 5 and 6 medial branch radio frequency ablation on 2/5/16 with greater  than 50% relief.  She is status post right L4 transforaminal epidural sterile injection on 6/14/16 with significant relief lasting only about a month.  She is status post C7-T1 cervical interlaminar epidural steroid injection on 7/14/16 with 30-40% relief.   She is status post L5/S1 interlaminar epidural steroidal injection to the right on 8/12/16 with 100% relief of her right leg pain.  She is status post C7-T1 cervical interlaminar epidural steroid injection on 12/9/16 with almost complete relief.  She is status post right T4, 5, 6 and 7 medial branch radiofrequency ablation on 5/30/17 with 100% relief.  She is status post C7-T1 cervical interlaminar epidural steroid injection on 6/27/17 with 80% relief.  She is status post L5/S1 interlaminar epidural steroid injection on a/7/17 with excellent relief lasting 2 weeks, now reporting 0% relief.  She is status post right L3, 4 and 5 medial branch radiofrequency ablation on 10/19/17 with 80% relief.   She is status post right T4, 5, 6 and 7 medial branch radiofrequency ablation on 12/14/17 with 100% relief.  She is status post C7-T1 cervical interlaminar epidural steroid injection on 3/16/18 with at least 80% relief.     ROS: She reports fatigability, headaches, hypothyroidism, joint stiffness, back pain, difficulty sleeping and anxiety.  Balance of review of systems is negative.      Medical, surgical, family and social history reviewed elsewhere in record.  She is a  at \[employer]\"".    Medications/Allergies: See med card    Vitals:    06/19/18 1041   BP: (!) 142/70   Pulse: 84   Resp: 18   Temp: 96.8 °F (36 °C)   TempSrc: Oral   SpO2: 99%   Weight: 80.7 kg (177 lb 12.8 oz)   PainSc:   2   PainLoc: Back         Physical exam:  Gen: A and O x3, pleasant, well-groomed  Skin: No rashes or obvious lesions  HEENT: PERRLA, no obvious deformities on ears or in canals. Trachea midline.  CVS: Regular rate and rhythm, normal palpable pulses.  Resp:No increased  work of breathing, symmetrical chest rise.  Abdomen: Soft, NT/ND.  Musculoskeletal:No antalgic gait.     Neuro:  Upper extremities: 5/5 strength bilaterally.  Lower extremities: 5/5 strength bilaterally.    Reflexes: Patellar 2+, Achilles 2+ bilaterally.  Upper extremity reflexes 2+ bilaterally equal.  Sensory:  Intact and symmetrical to light touch and pinprick in L2-S1 dermatomes bilaterally.    Cervical spine exam: Range of motion is full with flexion, extension and lateral rotation without pain.  Myofascial exam: No tenderness to palpation to the cervical paraspinous muscles.    Lumbar spine:  Lumbar spine: Range of motion is full with flexion and extension with minimal increased pain in her bilateral low back.  She does have significant discomfort with flexion in her mid back however and this seems to be in about the area of T6 down to T10.  Brayden's test causes no increased pain on either side.    Supine straight leg raise is negative bilaterally.  Internal and external rotation of the hip causes no increased pain on either side.  Myofascial exam: Mild tenderness to palpation to the right lumbar paraspinous muscles.      Imagin/16/15 Xray Scoliosis survey: There is thoracic dextroscoliosis with mild lumbar compensatory levoscoliosis. No structural abnormalities are evident. Diffuse spondylosis noted in the cervical, thoracic and to lesser extent lumbar spine. No fractures are identified. There is slight increased kyphotic curvature of the thoracic spine with alignment being otherwise normal. Measurements and evaluation are limited due to underpenetration. Land angle measures approximately in the thoracic spine is of approximately 17.0 degrees and for the lumbar spine 17.4 degrees. Soft tissues are unremarkable. IMPRESSION: 1. S shaped scoliotic curvature of the thoracolumbar spine with Land angle of approximately 17 degrees. 2. Diffuse spondylosis.    MRI report 12: Report notes that there is  dextroconvex rotary curvature of the thoracic spine.  Posterior alignment is maintained.  There are scattered vertebral body hemangiomas.  There is minimal posterior disc bulges noted at T5/6, T6/7, T7/8 and T8/9.  There is no central spinal stenosis or neural foraminal narrowing.    MRI right shoulder 6/24/11: Minor distal subscapularis tendinosis.  Negative for full-thickness or significant partial-thickness rotator cuff tendon tear.  There is a small amount of subacromial subdeltoid bursal fluid which can be seen with recent injection or mild bursitis.    MRI cervical spine report 3/25/11.  Note that this is comparison to previous study on 7/29/2008.  This is a report only, no images were available to me.  The C2/3 disc appears grossly intact with no significant bulging.  C3/4 there is mild protrusion of the disc with no herniation and no nerve root entrapment seen.  Spinal stenosis is not noted.  At C4/5 there is broad-based disc protrusion which compresses the subarachnoid space without cord compression.  There is no definite nerve root compression seen.  At C5/6 there is vertebral body lesion which is bright on T2 and dark on T1 but it does not enhance and may represent an atypical hemangioma.  This is unchanged from previous study.  At C5/6 there is a protrusion of the disc asymmetrically to the left but no significant entrapment.  No change from previous study.  At C6/7 there is a focal protrusion of the disc causing compression of the subarachnoid space without definite cord or nerve root compression.  C7/T1 appears intact.    Lumbar spine MRI report 3/31/14: No acute abnormality or significant degenerative changes.  No canal or foraminal stenosis.    Lumbar spine MRI 10/19/15  T12-L1: No central canal or neuroforaminal stenosis. No disc protrusion or extrusion.  L1-L2: No central canal or neuroforaminal stenosis. No disc protrusion or extrusion.  L2-L3: No central canal or neuroforaminal stenosis. No disc  protrusion or extrusion.  L3-L4: No central canal or neuroforaminal stenosis. No disc protrusion or extrusion.  L4-L5: There is a broad disc bulge which extends into both neuroforamina. There is prominent ligamentum flavum thickening and facet arthropathy. There is mild bilateral neuroforaminal stenosis. No central canal stenosis.  L5-S1: There is bilateral hypertrophic facet arthropathy and ligamentum flavum thickening. No central canal or neuroforaminal stenosis. No disc protrusion or extrusion.    Cervical spine MRI 6/29/16  At C2-C3, the minimal interstitial excision covers a very mild broad-based disc bulge most prominent centrally but does not deform the ventral cord.  There is no canal or foraminal stenosis.  At C3-C4, there is mildly decreased disc height with a broad-based disc bulge most prominent centrally.  This minimally contact the ventral cord without significant deformity.  The canal is widely patent.  There is uncovertebral hypertrophy and facet arthropathy, but the foramina are patent.  At C4-C5, there is decreased disc height with a broad disc bulge-marginal osteophyte complex that mildly lateralizes to the left and blends imperceptibly with right greater than left uncovertebral hypertrophy.  With ligamentum hypertrophy, there is no significant central canal stenosis.  Uncovertebral hypertrophy and facet arthropathy are causing mild left and moderate right foraminal stenoses.  At C5-C6, there is a broad-based disc bulge and osteophyte complex most prominent centrally.  There is also ligamentum flavum hypertrophy present, but the canal is patent.  There is mild left foraminal stenosis.  The right foramen is patent.  At C6-C7, there is disc desiccation with a minimal broad-based disc bulge most prominent in the right paracentral canal.  The canal is widely patent.  There is mild left foraminal stenosis.  At C7-T1, there is no significant disc bulge, protrusion, or herniation/extrusion.  The canal  and foramina are widely patent.    MRI lumbar spine 9/22/17  L1-L2 demonstrates no significant disc protrusion, central spinal canal stenosis, or neural foraminal stenosis.  L2-L3 demonstrates no significant disc protrusion, central spinal canal stenosis, or neural foraminal stenosis. Mild facet arthrosis is seen.  L3-L4 demonstrates no significant disc protrusion, central spinal canal stenosis, or neural foraminal stenosis. Mild facet arthrosis is seen.  L4-L5 demonstrates mild broad-based disc bulge, ligamentum flavum hypertrophy, and mild/moderate bilateral facet arthrosis. No significant central spinal canal stenosis is seen. Mild bilateral lateral recess narrowing and neural foraminal narrowing is seen. Similar findings were seen on the prior MRI.  L5-S1 demonstrates no significant posterior disc protrusion. Moderate bilateral facet arthrosis is seen along ligamentum flavum hypertrophy. No significant central spinal canal or neural foraminal stenosis is seen.    Hip x-rays 9/22/17  AP view of the pelvis and frog leg views of both hips were obtained. No evidence of acute displaced fracture or dislocation is visualized.  No radiopaque foreign bodies are visualized. The bilateral superior joint spaces are grossly maintained. Mild bilateral sacroiliac joint degenerative changes are seen including subchondral sclerosis and small marginal osteophytes. Mild to moderate symphyseal degenerative changes are seen. There is a slightly expansile cortically-based focus along the lateral proximal right femoral metadiaphysis. This could reflect a sessile osteochondroma, but correlation with MRI of the right hip is recommended.      Assessment:  The patient is a 57-year-old woman with history of hypothyroidism, otherwise fairly healthy but a long history of scoliosis causing back pain, self-referred for continued back pain.    1. Scoliosis of lumbar spine, unspecified scoliosis type  X-Ray Spine Survey AP And Lateral        Plan:  1.  We discussed that we could try a different anti-neuropathic medication going to switch her from gabapentin to Topamax.  I've given her 50 mg take nightly to see if this is helpful she will let me know if she is having any side effects with this.  2.  As far as her mid back pain, I'm going to get another scoliosis survey to make sure that she is not having any worsening curvature, the last x-rays were in 2016.  I will call her with the results.  3.  We also discussed starting an exercise program, she has learned exercises for her back in physical therapy in the past, I told her to try this in the morning since she is too uncomfortable in the afternoon.  Hopefully by exercising the morning she will feel better overall.  Otherwise have given her follow-up appointment for 3 months.

## 2018-06-22 ENCOUNTER — TELEPHONE (OUTPATIENT)
Dept: CARDIOLOGY | Facility: CLINIC | Age: 58
End: 2018-06-22

## 2018-06-22 NOTE — TELEPHONE ENCOUNTER
Contact patient and left a voicemail regarding rescheduling her 7/3/2018 appointment with Dr. Wills to 6/27/2015 at 11:45am.

## 2018-06-22 NOTE — TELEPHONE ENCOUNTER
----- Message from Kenyatta Jonas sent at 6/22/2018  8:52 AM CDT -----  Contact: Claudette with [facility]  Type:  Sooner Apoointment Request    Caller is requesting a sooner appointment.  Caller declined first available appointment listed below.  Caller will not accept being placed on the waitlist and is requesting a message be sent to doctor.    Name of Caller:  Claudette  When is the first available appointment?  7/3  Symptoms:  Patient is not feeling well, feeling weak and palpatations  Best Call Back Number:    Additional Information:

## 2018-06-22 NOTE — TELEPHONE ENCOUNTER
----- Message from Claudette Vera sent at 6/22/2018  2:21 PM CDT -----  Regarding: VIP Appointment Request   Good Afternoon,    One of our [employer] benefactors,  Mrs. Garces,  would like to be worked in  next week if possible with Dr. Ellsworth for heart palpitations.     Dr. Elslworth has treated Mrs. Garces's daughter in the past. She was highly impressed with the level of care her daugther received  and would naturally like to see Dr. Ellsworth for her own needs.     Thank you for your assistance with this request. Please let me know the date and time and I will contact Mrs. Garces with updated appointment information.    Thank you again,     Claudette Vera  [facility]  [phone]

## 2018-06-22 NOTE — TELEPHONE ENCOUNTER
Spoke with patient she is having chest heaviness and elevated blood pressure.  Already seen her pcp. Offered to see one of 's associates. She refuses wanting to only see him. Reports her daughter and him are friends and would like to be seen next week.  Please advise if you can work patient in.

## 2018-06-26 ENCOUNTER — OFFICE VISIT (OUTPATIENT)
Dept: CARDIOLOGY | Facility: CLINIC | Age: 58
End: 2018-06-26
Payer: COMMERCIAL

## 2018-06-26 VITALS
HEART RATE: 76 BPM | SYSTOLIC BLOOD PRESSURE: 138 MMHG | WEIGHT: 177.25 LBS | DIASTOLIC BLOOD PRESSURE: 84 MMHG | HEIGHT: 61 IN | BODY MASS INDEX: 33.47 KG/M2

## 2018-06-26 DIAGNOSIS — R06.02 SOB (SHORTNESS OF BREATH): ICD-10-CM

## 2018-06-26 DIAGNOSIS — R07.9 CHEST PAIN, UNSPECIFIED TYPE: Primary | ICD-10-CM

## 2018-06-26 PROCEDURE — 99999 PR PBB SHADOW E&M-EST. PATIENT-LVL II: CPT | Mod: PBBFAC,,, | Performed by: INTERNAL MEDICINE

## 2018-06-26 PROCEDURE — 99204 OFFICE O/P NEW MOD 45 MIN: CPT | Mod: S$GLB,,, | Performed by: INTERNAL MEDICINE

## 2018-06-26 NOTE — Clinical Note
June 26, 2018      Provider Ariana [place] - Cardiology  [address] [facility] [address]  [facility] [address]  Phone: [phone]          Patient: Vonnie Garces   MR Number: [id number]   YOB: 1960   Date of Visit: 6/26/2018       Dear Provider Ariana:    Thank you for referring Vonnie Garces to me for evaluation. Attached you will find relevant portions of my assessment and plan of care.    If you have questions, please do not hesitate to call me. I look forward to following Vonnie Garces along with you.    Sincerely,    Russell Ellsworth MD    Enclosure  CC:  No Recipients    If you would like to receive this communication electronically, please contact [email or web address] or [phone] to request more information on [facility] Link access.    For providers and/or their staff who would like to refer a patient to [facility], please contact us through our one-stop-shop provider referral line, [facility], at [phone].    If you feel you have received this communication in error or would no longer like to receive these types of communications, please e-mail [email or web address]

## 2018-06-26 NOTE — PROGRESS NOTES
Subjective:    Patient ID:  Vonnie Garces is a 57 y.o. female who presents for evaluation of chest pain    HPI  She comes with atypical chest pain for the last few months, not related to any type of activity  BP better after starting hctz    Review of Systems   Constitution: Negative for decreased appetite, weakness, malaise/fatigue, weight gain and weight loss.   Cardiovascular: Positive for chest pain. Negative for dyspnea on exertion, leg swelling, palpitations and syncope.   Respiratory: Negative for cough and shortness of breath.    Gastrointestinal: Negative.    All other systems reviewed and are negative.       Objective:    Physical Exam   Constitutional: She is oriented to person, place, and time. She appears well-developed and well-nourished.   HENT:   Head: Normocephalic.   Eyes: Pupils are equal, round, and reactive to light.   Neck: Normal range of motion. Neck supple. No JVD present. Carotid bruit is not present. No thyromegaly present.   Cardiovascular: Normal rate, regular rhythm, normal heart sounds, intact distal pulses and normal pulses.  PMI is not displaced.  Exam reveals no gallop.    No murmur heard.  Pulmonary/Chest: Effort normal and breath sounds normal.   Abdominal: Soft. Normal appearance. She exhibits no mass. There is no hepatosplenomegaly. There is no tenderness.   Musculoskeletal: Normal range of motion. She exhibits no edema.   Neurological: She is alert and oriented to person, place, and time. She has normal strength and normal reflexes. No sensory deficit.   Skin: Skin is warm and intact.   Psychiatric: She has a normal mood and affect.   Nursing note and vitals reviewed.        Assessment:       1. Chest pain, unspecified type    2. SOB (shortness of breath)         Plan:   Stress echo/ccfd  Call with results

## 2018-06-27 ENCOUNTER — CLINICAL SUPPORT (OUTPATIENT)
Dept: CARDIOLOGY | Facility: CLINIC | Age: 58
End: 2018-06-27
Attending: INTERNAL MEDICINE
Payer: COMMERCIAL

## 2018-06-27 VITALS — BODY MASS INDEX: 33.42 KG/M2 | WEIGHT: 177 LBS | HEIGHT: 61 IN

## 2018-06-27 DIAGNOSIS — R06.02 SOB (SHORTNESS OF BREATH): ICD-10-CM

## 2018-06-27 DIAGNOSIS — R07.9 CHEST PAIN, UNSPECIFIED TYPE: ICD-10-CM

## 2018-06-27 PROCEDURE — 99999 PR PBB SHADOW E&M-EST. PATIENT-LVL I: CPT | Mod: PBBFAC,,,

## 2018-06-27 PROCEDURE — 93351 STRESS TTE COMPLETE: CPT | Mod: S$GLB,,, | Performed by: INTERNAL MEDICINE

## 2018-06-28 ENCOUNTER — PATIENT MESSAGE (OUTPATIENT)
Dept: CARDIOLOGY | Facility: CLINIC | Age: 58
End: 2018-06-28

## 2018-07-02 ENCOUNTER — TELEPHONE (OUTPATIENT)
Dept: CARDIOLOGY | Facility: CLINIC | Age: 58
End: 2018-07-02

## 2018-07-02 LAB
ASCENDING AORTA: 2.67 CM
AV MEAN GRADIENT: 5.92 MMHG
AV PEAK GRADIENT: 10.13 MMHG
AV VALVE AREA: 2.05 CM2
BSA FOR ECHO PROCEDURE: 1.86 M2
CV ECHO LV RWT: 0.39 CM
CV STRESS BASE HR: 85
CVPEAKDIABP: 71
CVPEAKSYSBP: 136
CVRESTSYSBP: 123
DOP CALC AO PEAK VEL: 1.59 M/S
DOP CALC AO VTI: 34.47 CM
DOP CALC LVOT AREA: 2.69 CM2
DOP CALC LVOT DIAMETER: 1.85 CM
DOP CALC LVOT STROKE VOLUME: 70.69 CM3
DOP CALCLVOT PEAK VEL VTI: 26.31 CM
E WAVE DECELERATION TIME: 317.38 MSEC
E/A RATIO: 0.81
E/E' RATIO: 7.8
ECHO LV POSTERIOR WALL: 0.71 CM (ref 0.6–1.1)
FRACTIONAL SHORTENING: 48 % (ref 28–44)
INTERVENTRICULAR SEPTUM: 0.87 CM (ref 0.6–1.1)
IVRT: 0.13 MSEC
LA MAJOR: 4.67 CM
LA MINOR: 4.91 CM
LA WIDTH: 3.35 CM
LEFT ATRIUM SIZE: 3.05 CM
LEFT ATRIUM VOLUME INDEX: 22.4 ML/M2
LEFT ATRIUM VOLUME: 41.57 CM3
LEFT INTERNAL DIMENSION IN SYSTOLE: 2.1 CM (ref 2.1–4)
LEFT VENTRICLE MASS INDEX: 50 G/M2
LEFT VENTRICULAR INTERNAL DIMENSION IN DIASTOLE: 4.03 CM (ref 3.5–6)
LEFT VENTRICULAR MASS: 93.04 G
LV LATERAL E/E' RATIO: 6.5
LV SEPTAL E/E' RATIO: 9.75
MV PEAK A VEL: 0.96 M/S
MV PEAK E VEL: 0.78 M/S
MV STENOSIS PRESSURE HALF TIME: 92.04 MS
MV VALVE AREA P 1/2 METHOD: 2.39 CM2
PISA TR MAX VEL: 2.44 M/S
PULM VEIN S/D RATIO: 2
PV PEAK D VEL: 0.34 M/S
PV PEAK S VEL: 0.68 M/S
RA MAJOR: 4.31 CM
RA WIDTH: 3.07 CM
RIGHT VENTRICULAR END-DIASTOLIC DIMENSION: 3.27 CM
SINUS: 2.55 CM
STJ: 2.21 CM
STRESS ECHO POST ESTIMATED WORKLOAD: 9 METS
STRESS ECHO POST EXERCISE DUR MIN: 5 MIN
STRESS ECHO POST EXERCISE DUR SEC: 22
TDI LATERAL: 0.12
TDI SEPTAL: 0.08
TDI: 0.1
TR MAX PG: 23.81 MMHG
TRICUSPID ANNULAR PLANE SYSTOLIC EXCURSION: 0.02 CM

## 2018-07-02 NOTE — TELEPHONE ENCOUNTER
----- Message from Alma Egan sent at 7/2/2018  8:27 AM CDT -----  Contact: pt  Pt calling states that she got a notice on the portal that she no showed for a test on 6/27 but she states that she was there did test and when finished the tech told her that she was finished. Also she states that she has not gotten a call with the results of test took,would like a call back today please concerning this...[phone] (home)

## 2018-08-08 ENCOUNTER — OFFICE VISIT (OUTPATIENT)
Dept: RHEUMATOLOGY | Facility: CLINIC | Age: 58
End: 2018-08-08
Payer: COMMERCIAL

## 2018-08-08 ENCOUNTER — PATIENT MESSAGE (OUTPATIENT)
Dept: RHEUMATOLOGY | Facility: CLINIC | Age: 58
End: 2018-08-08

## 2018-08-08 VITALS
HEIGHT: 61 IN | SYSTOLIC BLOOD PRESSURE: 130 MMHG | DIASTOLIC BLOOD PRESSURE: 80 MMHG | RESPIRATION RATE: 14 BRPM | HEART RATE: 65 BPM | BODY MASS INDEX: 33.88 KG/M2 | WEIGHT: 179.44 LBS

## 2018-08-08 DIAGNOSIS — R21 RASH: ICD-10-CM

## 2018-08-08 DIAGNOSIS — M02.30 REACTIVE ARTHRITIS: Primary | ICD-10-CM

## 2018-08-08 DIAGNOSIS — R76.8 ANA POSITIVE: ICD-10-CM

## 2018-08-08 DIAGNOSIS — E06.3 HASHIMOTO'S THYROIDITIS: ICD-10-CM

## 2018-08-08 PROCEDURE — 96372 THER/PROPH/DIAG INJ SC/IM: CPT | Mod: S$GLB,,, | Performed by: INTERNAL MEDICINE

## 2018-08-08 PROCEDURE — 99999 PR PBB SHADOW E&M-EST. PATIENT-LVL IV: CPT | Mod: PBBFAC,,, | Performed by: INTERNAL MEDICINE

## 2018-08-08 PROCEDURE — 99205 OFFICE O/P NEW HI 60 MIN: CPT | Mod: 25,S$GLB,, | Performed by: INTERNAL MEDICINE

## 2018-08-08 RX ORDER — KETOROLAC TROMETHAMINE 30 MG/ML
60 INJECTION, SOLUTION INTRAMUSCULAR; INTRAVENOUS
Status: COMPLETED | OUTPATIENT
Start: 2018-08-08 | End: 2018-08-08

## 2018-08-08 RX ORDER — PREDNISONE 2.5 MG/1
5 TABLET ORAL DAILY
Qty: 60 TABLET | Refills: 3 | Status: SHIPPED | OUTPATIENT
Start: 2018-08-08 | End: 2018-09-07

## 2018-08-08 RX ORDER — METHYLPREDNISOLONE ACETATE 80 MG/ML
160 INJECTION, SUSPENSION INTRA-ARTICULAR; INTRALESIONAL; INTRAMUSCULAR; SOFT TISSUE
Status: COMPLETED | OUTPATIENT
Start: 2018-08-08 | End: 2018-08-08

## 2018-08-08 RX ORDER — IBUPROFEN AND FAMOTIDINE 26.6; 8 MG/1; MG/1
1 TABLET ORAL 3 TIMES DAILY
Qty: 90 TABLET | Refills: 11 | Status: SHIPPED | OUTPATIENT
Start: 2018-08-08 | End: 2019-01-28 | Stop reason: SDUPTHER

## 2018-08-08 RX ORDER — METHOCARBAMOL 750 MG/1
750 TABLET, FILM COATED ORAL 3 TIMES DAILY
Qty: 90 TABLET | Refills: 3 | Status: SHIPPED | OUTPATIENT
Start: 2018-08-08 | End: 2018-09-07

## 2018-08-08 RX ADMIN — KETOROLAC TROMETHAMINE 60 MG: 30 INJECTION, SOLUTION INTRAMUSCULAR; INTRAVENOUS at 11:08

## 2018-08-08 RX ADMIN — METHYLPREDNISOLONE ACETATE 160 MG: 80 INJECTION, SUSPENSION INTRA-ARTICULAR; INTRALESIONAL; INTRAMUSCULAR; SOFT TISSUE at 11:08

## 2018-08-08 NOTE — PROGRESS NOTES
Administered 2 cc ( 30 mg/ml ) of toradol to the left upper outer gluteal. Informed of s/s to report verbalized understanding. No adverse reactions noted.    Lot # [id number]  Expiration 1 dec 2019    Administered 2 cc ( 80 mg/ml ) of depomedrol to the right upper outer gluteal. Informed of s/s to report verbalized understanding. No adverse reactions noted.    Lot # [id number]  Expiration 07/19

## 2018-08-08 NOTE — LETTER
August 8, 2018      Hernan Benton MD  [address] [facility]  [address]  [facility] [address]           [place] - Rheumatology  [address] [facility] [address] [place] [address]  Phone: [phone]  Fax: [phone]          Patient: Vonnie Garces   MR Number: [id number]   YOB: 1960   Date of Visit: 8/8/2018       Dear Dr. Hernan Benton:    Thank you for referring Vonnie Garces to me for evaluation. Attached you will find relevant portions of my assessment and plan of care.    If you have questions, please do not hesitate to call me. I look forward to following Vonnie Garces along with you.    Sincerely,    Landen Garcia MD    Enclosure  CC:  No Recipients    If you would like to receive this communication electronically, please contact [email or web address] or [phone] to request more information on [facility] Link access.    For providers and/or their staff who would like to refer a patient to [facility], please contact us through our one-stop-shop provider referral line, [facility], at [phone].    If you feel you have received this communication in error or would no longer like to receive these types of communications, please e-mail [email or web address]

## 2018-08-08 NOTE — PROGRESS NOTES
Subjective:       Patient ID: Vonnie Garces is a 57 y.o. female.    Chief Complaint: Positive FERNANDO    HPI: pt had a FERNANDO positive, at age 42 yr old, with  Livido reticularis legs and a rash on her lower ext,  Prednisone helped. Patient complains of arthralgias and myalgias for which has been present for a few years. Pain is located in multiple joints, both shoulder(s), both elbow(s), both wrist(s), both MCP(s): 1st, 2nd, 3rd, 4th and 5th, both PIP(s): 1st, 2nd, 3rd, 4th and 5th, both DIP(s): 1st and 2nd, both hip(s), both knee(s) and both MTP(s): 1st, 2nd, 3rd, 4th and 5th, is described as aching, pulsating, shooting and throbbing, and is constant, moderate .  Associated symptoms include: crepitation, decreased range of motion, edema, effusion, tenderness and warmth.  Patient's mother was diagnosed with ankylosing spondylitis the daughter has a presumed autoimmune disease no family history of miscarriages or blood clot.  Social history is positive for 2 kids.  The patient had scoliosis diagnosis as a child she was part of a research program that the physical therapy as opposed to surgery for her scoliosis.  Also of note she had a hysterectomy age 42  and she feels that her symptoms started soon after.  The patient has am gelling lasting 40-50 minutes gelling with inactivity pain back upper lower back as well as hands wrists knees and feet the patient does stretching in the a.m. and during the day to relieve some of the discomfort it helps minimal at best.  Patient received Medrol Dosepak for her rash and of note had significant decrease in overall pain.      Review of Systems   Constitutional: Positive for chills and fatigue. Negative for activity change, appetite change, diaphoresis, fever and unexpected weight change.   HENT: Negative for congestion, dental problem, ear discharge, ear pain, facial swelling, mouth sores, nosebleeds, postnasal drip, rhinorrhea, sinus pressure, sneezing, sore throat,  "tinnitus, trouble swallowing and voice change.    Eyes: Negative for photophobia, pain, discharge, redness and itching.   Respiratory: Negative for apnea, cough, chest tightness, shortness of breath and wheezing.    Cardiovascular: Positive for leg swelling. Negative for chest pain and palpitations.   Gastrointestinal: Positive for abdominal pain. Negative for abdominal distention, constipation, diarrhea, nausea and vomiting.   Endocrine: Negative for cold intolerance, heat intolerance, polydipsia and polyuria.   Genitourinary: Negative for decreased urine volume, difficulty urinating, dysuria, flank pain, frequency, hematuria and urgency.   Musculoskeletal: Positive for arthralgias, back pain, joint swelling, myalgias, neck pain and neck stiffness. Negative for gait problem.   Skin: Positive for rash. Negative for pallor and wound.   Allergic/Immunologic: Negative for immunocompromised state.   Neurological: Positive for weakness. Negative for dizziness, tremors, numbness and headaches.   Hematological: Negative for adenopathy. Does not bruise/bleed easily.   Psychiatric/Behavioral: Negative for sleep disturbance. The patient is not nervous/anxious.          Objective:   /80   Pulse 65   Resp 14   Ht 5' 1" (1.549 m)   Wt 81.4 kg (179 lb 7.3 oz)   BMI 33.91 kg/m²      Physical Exam   Vitals reviewed.  Constitutional: She is oriented to person, place, and time.   HENT:   Head: Normocephalic and atraumatic.   Eyes: EOM are normal. Pupils are equal, round, and reactive to light. Right eye exhibits no discharge. Left eye exhibits no discharge.   Neck: Neck supple. No thyromegaly present.   Cardiovascular: Normal rate, regular rhythm and normal heart sounds.  Exam reveals no gallop and no friction rub.    No murmur heard.  Pulmonary/Chest: Breath sounds normal. She has no wheezes. She has no rales. She exhibits no tenderness.   Abdominal: There is no tenderness. There is no rebound and no guarding.       Right " Side Rheumatological Exam     Examination finds the elbow normal.    The patient is tender to palpation of the shoulder, wrist, knee, 1st PIP, 1st MCP, 2nd PIP, 2nd MCP, 3rd PIP, 3rd MCP, 4th PIP, 4th MCP, 5th PIP and 5th MCP    She has swelling of the 1st PIP, 1st MCP, 2nd PIP, 2nd MCP, 3rd PIP, 3rd MCP, 4th PIP, 4th MCP, 5th PIP and 5th MCP    Shoulder Exam   Tenderness Location: no tenderness    Range of Motion   Active Abduction: abnormal   Adduction: abnormal  Sensation: normal    Knee Exam   Patellofemoral Crepitus: positive  Effusion: positive  Sensation: normal    Hip Exam   Tenderness Location: posterior  Sensation: normal    Elbow/Wrist Exam   Tenderness Location: no tenderness  Sensation: normal    Left Side Rheumatological Exam     The patient is tender to palpation of the shoulder, elbow, wrist, knee, 1st PIP, 1st MCP, 2nd PIP, 2nd MCP, 3rd PIP, 3rd MCP, 4th PIP, 4th MCP, 5th PIP and 5th MCP.    She has swelling of the 1st PIP, 1st MCP, 2nd PIP, 2nd MCP, 3rd PIP, 3rd MCP, 4th PIP, 4th MCP, 5th PIP and 5th MCP    Shoulder Exam   Tenderness Location: no tenderness    Range of Motion   Active Abduction: abnormal   Sensation: normal    Knee Exam     Patellofemoral Crepitus: positive  Effusion: positive  Sensation: normal    Hip Exam   Tenderness Location: posterior  Sensation: normal    Elbow/Wrist Exam   Sensation: normal      Back/Neck Exam   General Inspection   Gait: normal         Lymphadenopathy:     She has no cervical adenopathy.   Neurological: She is alert and oriented to person, place, and time. Gait normal.   Skin: Skin is dry. No rash noted. No erythema. There is pallor.     Psychiatric: Mood and affect normal.   Musculoskeletal: She exhibits tenderness and deformity.           Results for orders placed or performed in visit on 07/10/18   CBC auto differential   Result Value Ref Range    WBC 6.39 3.90 - 12.70 K/uL    RBC 4.65 4.00 - 5.40 M/uL    Hemoglobin 13.7 12.0 - 16.0 g/dL    Hematocrit  40.8 37.0 - 48.5 %    MCV 88 82 - 98 fL    MCH 29.5 27.0 - 31.0 pg    MCHC 33.6 32.0 - 36.0 g/dL    RDW 13.0 11.5 - 14.5 %    Platelets 244 150 - 350 K/uL    MPV 10.2 9.2 - 12.9 fL    Gran # (ANC) 4.2 1.8 - 7.7 K/uL    Lymph # 1.5 1.0 - 4.8 K/uL    Mono # 0.5 0.3 - 1.0 K/uL    Eos # 0.1 0.0 - 0.5 K/uL    Baso # 0.03 0.00 - 0.20 K/uL    nRBC 0 0 /100 WBC    Gran% 66.5 38.0 - 73.0 %    Lymph% 24.1 18.0 - 48.0 %    Mono% 7.0 4.0 - 15.0 %    Eosinophil% 1.9 0.0 - 8.0 %    Basophil% 0.5 0.0 - 1.9 %    Differential Method Automated    Comprehensive metabolic panel   Result Value Ref Range    Sodium 141 136 - 145 mmol/L    Potassium 3.7 3.5 - 5.1 mmol/L    Chloride 99 95 - 110 mmol/L    CO2 29 22 - 31 mmol/L    Glucose 92 70 - 110 mg/dL    BUN, Bld 12 7 - 18 mg/dL    Creatinine 0.58 0.50 - 1.40 mg/dL    Calcium 9.8 8.4 - 10.2 mg/dL    Total Protein 7.6 6.0 - 8.4 g/dL    Albumin 4.5 3.5 - 5.2 g/dL    Total Bilirubin 1.1 0.2 - 1.3 mg/dL    Alkaline Phosphatase 79 38 - 145 U/L    AST 26 14 - 36 U/L    ALT 46 (H) 10 - 44 U/L    Anion Gap 13 8 - 16 mmol/L    eGFR if African American >60 >60 mL/min/1.73 m^2    eGFR if non African American >60 >60 mL/min/1.73 m^2   TSH   Result Value Ref Range    TSH 1.600 0.400 - 4.000 uIU/mL   T3, free   Result Value Ref Range    T3, Free 3.68 2.77 - 5.27 pg/mL   T4, free   Result Value Ref Range    Free T4 1.38 0.78 - 2.19 ng/dL   Hemoglobin A1c   Result Value Ref Range    Hemoglobin A1C 5.4 0.0 - 5.6 %    Estimated Avg Glucose 108 68 - 131 mg/dL   Thyroid Peroxidase and Thyroblobulin Ab   Result Value Ref Range    Thyroperoxidase Antibodies 11.0 (H) 0.0 - 9.0 IU/mL    Thyroglobulin Ab Screen <0.9 0.0 - 4.0 IU/mL       Assessment:       1. Reactive arthritis    2. MUNA positive    3. Rash    4. Hashimoto's thyroiditis            Plan:       Vonnie was seen today for positive muna.    Diagnoses and all orders for this visit:    Reactive arthritis  -     MUNA; Future  -     Anti Sm/RNP Antibody;  Future  -     Anti-DNA antibody, double-stranded; Future  -     Anti-Histone Antibody; Future  -     Anti-scleroderma antibody; Future  -     Anti-Smith antibody; Future  -     Sjogrens syndrome-A extractable nuclear antibody; Future  -     Sjogrens syndrome-B extractable nuclear antibody; Future  -     Sedimentation rate; Future  -     Rheumatoid factor; Future  -     IgA; Future  -     IgG; Future  -     IgG 1, 2, 3, and 4; Future  -     IgM; Future  -     Anti-thyroglobulin antibody; Future  -     HLA B27 Antigen; Future  -     C3 complement; Future  -     Celiac Disease HLA Genotyping; Future  -     Celiac Disease Panel; Future  -     Celiac Disease HLA Genotyping  -     FERNANDO  -     Anti Sm/RNP Antibody  -     Anti-DNA antibody, double-stranded  -     Anti-Histone Antibody  -     Anti-scleroderma antibody  -     Anti-Smith antibody  -     Sjogrens syndrome-A extractable nuclear antibody  -     Sjogrens syndrome-B extractable nuclear antibody  -     Sedimentation rate  -     Rheumatoid factor  -     IgA  -     IgG  -     IgG 1, 2, 3, and 4  -     IgM  -     Anti-thyroglobulin antibody  -     HLA B27 Antigen  -     C3 complement  -     Celiac Disease Panel  -     ketorolac injection 60 mg; Inject 2 mLs (60 mg total) into the muscle one time.  -     methylPREDNISolone acetate injection 160 mg; Inject 2 mLs (160 mg total) into the muscle one time.  -     ibuprofen-famotidine (DUEXIS) 800-26.6 mg Tab; Take 1 tablet by mouth 3 (three) times daily.  -     predniSONE (DELTASONE) 2.5 MG tablet; Take 2 tablets (5 mg total) by mouth once daily.    FERNANDO positive  -     FERNANDO; Future  -     Anti Sm/RNP Antibody; Future  -     Anti-DNA antibody, double-stranded; Future  -     Anti-Histone Antibody; Future  -     Anti-scleroderma antibody; Future  -     Anti-Smith antibody; Future  -     Sjogrens syndrome-A extractable nuclear antibody; Future  -     Sjogrens syndrome-B extractable nuclear antibody; Future  -     Sedimentation  rate; Future  -     Rheumatoid factor; Future  -     IgA; Future  -     IgG; Future  -     IgG 1, 2, 3, and 4; Future  -     IgM; Future  -     Anti-thyroglobulin antibody; Future  -     HLA B27 Antigen; Future  -     C3 complement; Future  -     Celiac Disease HLA Genotyping; Future  -     Celiac Disease Panel; Future  -     Celiac Disease HLA Genotyping  -     FERNANDO  -     Anti Sm/RNP Antibody  -     Anti-DNA antibody, double-stranded  -     Anti-Histone Antibody  -     Anti-scleroderma antibody  -     Anti-Smith antibody  -     Sjogrens syndrome-A extractable nuclear antibody  -     Sjogrens syndrome-B extractable nuclear antibody  -     Sedimentation rate  -     Rheumatoid factor  -     IgA  -     IgG  -     IgG 1, 2, 3, and 4  -     IgM  -     Anti-thyroglobulin antibody  -     HLA B27 Antigen  -     C3 complement  -     Celiac Disease Panel  -     ketorolac injection 60 mg; Inject 2 mLs (60 mg total) into the muscle one time.  -     methylPREDNISolone acetate injection 160 mg; Inject 2 mLs (160 mg total) into the muscle one time.  -     ibuprofen-famotidine (DUEXIS) 800-26.6 mg Tab; Take 1 tablet by mouth 3 (three) times daily.  -     predniSONE (DELTASONE) 2.5 MG tablet; Take 2 tablets (5 mg total) by mouth once daily.    Rash  -     FERNANDO; Future  -     Anti Sm/RNP Antibody; Future  -     Anti-DNA antibody, double-stranded; Future  -     Anti-Histone Antibody; Future  -     Anti-scleroderma antibody; Future  -     Anti-Smith antibody; Future  -     Sjogrens syndrome-A extractable nuclear antibody; Future  -     Sjogrens syndrome-B extractable nuclear antibody; Future  -     Sedimentation rate; Future  -     Rheumatoid factor; Future  -     IgA; Future  -     IgG; Future  -     IgG 1, 2, 3, and 4; Future  -     IgM; Future  -     Anti-thyroglobulin antibody; Future  -     HLA B27 Antigen; Future  -     C3 complement; Future  -     Celiac Disease HLA Genotyping; Future  -     Celiac Disease Panel; Future  -      Celiac Disease HLA Genotyping  -     FERNANDO  -     Anti Sm/RNP Antibody  -     Anti-DNA antibody, double-stranded  -     Anti-Histone Antibody  -     Anti-scleroderma antibody  -     Anti-Smith antibody  -     Sjogrens syndrome-A extractable nuclear antibody  -     Sjogrens syndrome-B extractable nuclear antibody  -     Sedimentation rate  -     Rheumatoid factor  -     IgA  -     IgG  -     IgG 1, 2, 3, and 4  -     IgM  -     Anti-thyroglobulin antibody  -     HLA B27 Antigen  -     C3 complement  -     Celiac Disease Panel  -     ketorolac injection 60 mg; Inject 2 mLs (60 mg total) into the muscle one time.  -     methylPREDNISolone acetate injection 160 mg; Inject 2 mLs (160 mg total) into the muscle one time.  -     ibuprofen-famotidine (DUEXIS) 800-26.6 mg Tab; Take 1 tablet by mouth 3 (three) times daily.  -     predniSONE (DELTASONE) 2.5 MG tablet; Take 2 tablets (5 mg total) by mouth once daily.    Hashimoto's thyroiditis  -     ibuprofen-famotidine (DUEXIS) 800-26.6 mg Tab; Take 1 tablet by mouth 3 (three) times daily.  -     predniSONE (DELTASONE) 2.5 MG tablet; Take 2 tablets (5 mg total) by mouth once daily.    Other orders  -     methocarbamol (ROBAXIN) 750 MG Tab; Take 1 tablet (750 mg total) by mouth 3 (three) times daily. As needed     At present patient appears to have mixed connective tissue disorder she does already have an autoimmune disease of Hashimoto's with a mother with ankylosing I will check her HLA B27 we are going to start a mild muscle relaxer p.r.n. prednisone and Duexis which is simply ibuprofen p.r.n.

## 2018-08-27 ENCOUNTER — PATIENT MESSAGE (OUTPATIENT)
Dept: RHEUMATOLOGY | Facility: CLINIC | Age: 58
End: 2018-08-27

## 2018-09-04 ENCOUNTER — PATIENT MESSAGE (OUTPATIENT)
Dept: PAIN MEDICINE | Facility: CLINIC | Age: 58
End: 2018-09-04

## 2018-09-04 RX ORDER — HYDROCODONE BITARTRATE AND ACETAMINOPHEN 7.5; 325 MG/1; MG/1
1 TABLET ORAL EVERY 12 HOURS PRN
Qty: 60 TABLET | Refills: 0 | Status: SHIPPED | OUTPATIENT
Start: 2018-09-04 | End: 2018-09-26 | Stop reason: SDUPTHER

## 2018-09-12 ENCOUNTER — PATIENT MESSAGE (OUTPATIENT)
Dept: RHEUMATOLOGY | Facility: CLINIC | Age: 58
End: 2018-09-12

## 2018-09-13 ENCOUNTER — TELEPHONE (OUTPATIENT)
Dept: RHEUMATOLOGY | Facility: CLINIC | Age: 58
End: 2018-09-13

## 2018-09-13 NOTE — TELEPHONE ENCOUNTER
----- Message from Diego Garcia sent at 9/12/2018  3:04 PM CDT -----  Contact: PT  Type:  Patient Returning Call    Who Called:  pt  Who Left Message for Patient:  Pt thinks it was victorina  Does the patient know what this is regarding?:  Test results  Best Call Back Number:    Additional Information:  Call was not noted on the chart

## 2018-09-13 NOTE — TELEPHONE ENCOUNTER
Contacted patient regarding lab results. Informed patient that results show 90 percent chance of having allergy or sensitivity to gluten. Dr Garcia advised to remove gluten from diet to see if symptoms go away. Patient stated her mother has celiac so she is aware of what not to eat. Patient has upcoming appointment on 10-1-18 to follow up on diet change.

## 2018-09-17 ENCOUNTER — TELEPHONE (OUTPATIENT)
Dept: PAIN MEDICINE | Facility: CLINIC | Age: 58
End: 2018-09-17

## 2018-09-17 NOTE — TELEPHONE ENCOUNTER
----- Message from Blanca Peters sent at 9/17/2018 10:36 AM CDT -----  Contact: pt  Pt is requesting to speak with nurse in regards to her appt on tomorrow  Pt would like to know if she can get her appt schedule for a different time because she forgot to schedule an appt to have her X Ray done  Please call to advise  Call back   Thanks

## 2018-09-24 ENCOUNTER — PATIENT MESSAGE (OUTPATIENT)
Dept: PAIN MEDICINE | Facility: CLINIC | Age: 58
End: 2018-09-24

## 2018-09-25 ENCOUNTER — OFFICE VISIT (OUTPATIENT)
Dept: PAIN MEDICINE | Facility: CLINIC | Age: 58
End: 2018-09-25
Payer: COMMERCIAL

## 2018-09-25 VITALS
TEMPERATURE: 97 F | DIASTOLIC BLOOD PRESSURE: 73 MMHG | RESPIRATION RATE: 18 BRPM | OXYGEN SATURATION: 99 % | SYSTOLIC BLOOD PRESSURE: 128 MMHG | HEART RATE: 74 BPM | WEIGHT: 174.5 LBS | BODY MASS INDEX: 32.97 KG/M2

## 2018-09-25 DIAGNOSIS — M54.12 CERVICAL RADICULOPATHY: ICD-10-CM

## 2018-09-25 DIAGNOSIS — M54.16 RIGHT LUMBAR RADICULOPATHY: ICD-10-CM

## 2018-09-25 DIAGNOSIS — M51.36 DDD (DEGENERATIVE DISC DISEASE), LUMBAR: ICD-10-CM

## 2018-09-25 DIAGNOSIS — M47.814 SPONDYLOSIS OF THORACIC REGION WITHOUT MYELOPATHY OR RADICULOPATHY: Primary | ICD-10-CM

## 2018-09-25 DIAGNOSIS — M41.9 SCOLIOSIS OF LUMBAR SPINE, UNSPECIFIED SCOLIOSIS TYPE: ICD-10-CM

## 2018-09-25 DIAGNOSIS — M50.30 DDD (DEGENERATIVE DISC DISEASE), CERVICAL: ICD-10-CM

## 2018-09-25 DIAGNOSIS — M47.816 LUMBAR SPONDYLOSIS: ICD-10-CM

## 2018-09-25 PROCEDURE — 99999 PR PBB SHADOW E&M-EST. PATIENT-LVL V: CPT | Mod: PBBFAC,,, | Performed by: PHYSICIAN ASSISTANT

## 2018-09-25 PROCEDURE — 99214 OFFICE O/P EST MOD 30 MIN: CPT | Mod: S$GLB,,, | Performed by: PHYSICIAN ASSISTANT

## 2018-09-25 RX ORDER — SODIUM CHLORIDE, SODIUM LACTATE, POTASSIUM CHLORIDE, CALCIUM CHLORIDE 600; 310; 30; 20 MG/100ML; MG/100ML; MG/100ML; MG/100ML
INJECTION, SOLUTION INTRAVENOUS CONTINUOUS
Status: CANCELLED | OUTPATIENT
Start: 2018-10-05

## 2018-09-26 RX ORDER — HYDROCODONE BITARTRATE AND ACETAMINOPHEN 7.5; 325 MG/1; MG/1
1 TABLET ORAL 2 TIMES DAILY PRN
Qty: 60 TABLET | Refills: 0 | Status: SHIPPED | OUTPATIENT
Start: 2018-10-04 | End: 2018-11-03

## 2018-09-26 RX ORDER — HYDROCODONE BITARTRATE AND ACETAMINOPHEN 7.5; 325 MG/1; MG/1
1 TABLET ORAL 2 TIMES DAILY PRN
Qty: 60 TABLET | Refills: 0 | Status: SHIPPED | OUTPATIENT
Start: 2018-11-03 | End: 2018-11-15 | Stop reason: SDUPTHER

## 2018-09-26 RX ORDER — HYDROCODONE BITARTRATE AND ACETAMINOPHEN 7.5; 325 MG/1; MG/1
1 TABLET ORAL 2 TIMES DAILY PRN
Qty: 60 TABLET | Refills: 0 | Status: SHIPPED | OUTPATIENT
Start: 2018-12-03 | End: 2019-01-02 | Stop reason: SDUPTHER

## 2018-09-26 NOTE — PROGRESS NOTES
This note was completed with dictation software and grammatical errors may exist.    CC:Back pain, neck pain    HPI: The patient is a 57-year-old woman with history of hypothyroidism, otherwise fairly healthy but a long history of scoliosis causing back pain, self-referred for continued back pain.  She returns in follow-up today with multiple pain complaints.  Her worst pain is in the right mid thoracic region.  She states that this had been slowly returning but tolerable up until this morning when she woke up with severe pain. This has been constant and is only improved with medication.  She reports mild neck pain but is still having benefit from the cervical JINA in March.  She reports complete relief of her right leg pain and almost complete relief of her low back pain following her lumbar JINA in May.  She denies having weakness, numbness, bladder or bowel incontinence.    Pain intervention history: She has been seeing Dr. Huffman for the last several years and has undergone epidural steroid injections and radiofrequency ablations with good relief.  According to her last pain management physician, she had undergone a right side T3, 4, 5, 6 medial branch radiofrequency ablation on 8/8/14 with good relief.  He had scheduled her for another one but did not complete this.  As far as medications she has been taking Hydrocodone 7.5/325 one to 2 times a day at most and gabapentin 300 mg at night. She is status post cervical epidural steroid injection on 10/19/15 with 50% relief of her neck pain. She is status post right L4 transforaminal epidural steroid injection on 11/23/15 with 100% relief.   She is status post right T3, 4, 5 and 6 medial branch radio frequency ablation on 2/5/16 with greater than 50% relief.  She is status post right L4 transforaminal epidural sterile injection on 6/14/16 with significant relief lasting only about a month.  She is status post C7-T1 cervical interlaminar epidural steroid injection on  7/14/16 with 30-40% relief.   She is status post L5/S1 interlaminar epidural steroidal injection to the right on 8/12/16 with 100% relief of her right leg pain.  She is status post C7-T1 cervical interlaminar epidural steroid injection on 12/9/16 with almost complete relief.  She is status post right T4, 5, 6 and 7 medial branch radiofrequency ablation on 5/30/17 with 100% relief.  She is status post C7-T1 cervical interlaminar epidural steroid injection on 6/27/17 with 80% relief.  She is status post L5/S1 interlaminar epidural steroid injection on a/7/17 with excellent relief lasting 2 weeks, now reporting 0% relief.  She is status post right L3, 4 and 5 medial branch radiofrequency ablation on 10/19/17 with 80% relief.   She is status post right T4, 5, 6 and 7 medial branch radiofrequency ablation on 12/14/17 with 100% relief.  She is status post C7-T1 cervical interlaminar epidural steroid injection on 3/16/18 with at least 80% relief.     ROS: She reports fatigability, headaches, hypothyroidism, joint stiffness, back pain, difficulty sleeping and anxiety.  Balance of review of systems is negative.      Medical, surgical, family and social history reviewed elsewhere in record.  She is a  at [employer].    Medications/Allergies: See med card    Vitals:    09/25/18 1506   BP: 128/73   Pulse: 74   Resp: 18   Temp: 97 °F (36.1 °C)   TempSrc: Oral   SpO2: 99%   Weight: 79.2 kg (174 lb 7.9 oz)   PainSc:   4   PainLoc: Back         Physical exam:  Gen: A and O x3, pleasant, well-groomed  Skin: No rashes or obvious lesions  HEENT: PERRLA, no obvious deformities on ears or in canals. Trachea midline.  CVS: Regular rate and rhythm, normal palpable pulses.  Resp:No increased work of breathing, symmetrical chest rise.  Abdomen: Soft, NT/ND.  Musculoskeletal:No antalgic gait.     Neuro:  Upper extremities: 5/5 strength bilaterally.  Lower extremities: 5/5 strength bilaterally.    Reflexes: Patellar 2+,  Achilles 2+ bilaterally.  Upper extremity reflexes 2+ bilaterally equal.  Sensory:  Intact and symmetrical to light touch and pinprick in L2-S1 dermatomes bilaterally.    Cervical spine exam: Range of motion is full with flexion, extension and lateral rotation without pain.  Myofascial exam: No tenderness to palpation to the cervical paraspinous muscles.  Moderate tenderness to palpation to the right midthoracic paraspinous muscles.    Lumbar spine:  Lumbar spine: Range of motion is full with flexion and extension with minimal increased pain in her bilateral low back.    Brayden's test causes no increased pain on either side.    Supine straight leg raise is negative bilaterally.  Internal and external rotation of the hip causes no increased pain on either side.  Myofascial exam: Mild tenderness to palpation to the right lumbar paraspinous muscles.      Imagin/16/15 Xray Scoliosis survey: There is thoracic dextroscoliosis with mild lumbar compensatory levoscoliosis. No structural abnormalities are evident. Diffuse spondylosis noted in the cervical, thoracic and to lesser extent lumbar spine. No fractures are identified. There is slight increased kyphotic curvature of the thoracic spine with alignment being otherwise normal. Measurements and evaluation are limited due to underpenetration. Land angle measures approximately in the thoracic spine is of approximately 17.0 degrees and for the lumbar spine 17.4 degrees. Soft tissues are unremarkable. IMPRESSION: 1. S shaped scoliotic curvature of the thoracolumbar spine with Land angle of approximately 17 degrees. 2. Diffuse spondylosis.    MRI report 12: Report notes that there is dextroconvex rotary curvature of the thoracic spine.  Posterior alignment is maintained.  There are scattered vertebral body hemangiomas.  There is minimal posterior disc bulges noted at T5/6, T6/7, T7/8 and T8/9.  There is no central spinal stenosis or neural foraminal  narrowing.    MRI right shoulder 6/24/11: Minor distal subscapularis tendinosis.  Negative for full-thickness or significant partial-thickness rotator cuff tendon tear.  There is a small amount of subacromial subdeltoid bursal fluid which can be seen with recent injection or mild bursitis.    MRI cervical spine report 3/25/11.  Note that this is comparison to previous study on 7/29/2008.  This is a report only, no images were available to me.  The C2/3 disc appears grossly intact with no significant bulging.  C3/4 there is mild protrusion of the disc with no herniation and no nerve root entrapment seen.  Spinal stenosis is not noted.  At C4/5 there is broad-based disc protrusion which compresses the subarachnoid space without cord compression.  There is no definite nerve root compression seen.  At C5/6 there is vertebral body lesion which is bright on T2 and dark on T1 but it does not enhance and may represent an atypical hemangioma.  This is unchanged from previous study.  At C5/6 there is a protrusion of the disc asymmetrically to the left but no significant entrapment.  No change from previous study.  At C6/7 there is a focal protrusion of the disc causing compression of the subarachnoid space without definite cord or nerve root compression.  C7/T1 appears intact.    Lumbar spine MRI report 3/31/14: No acute abnormality or significant degenerative changes.  No canal or foraminal stenosis.    Lumbar spine MRI 10/19/15  T12-L1: No central canal or neuroforaminal stenosis. No disc protrusion or extrusion.  L1-L2: No central canal or neuroforaminal stenosis. No disc protrusion or extrusion.  L2-L3: No central canal or neuroforaminal stenosis. No disc protrusion or extrusion.  L3-L4: No central canal or neuroforaminal stenosis. No disc protrusion or extrusion.  L4-L5: There is a broad disc bulge which extends into both neuroforamina. There is prominent ligamentum flavum thickening and facet arthropathy. There is mild  bilateral neuroforaminal stenosis. No central canal stenosis.  L5-S1: There is bilateral hypertrophic facet arthropathy and ligamentum flavum thickening. No central canal or neuroforaminal stenosis. No disc protrusion or extrusion.    Cervical spine MRI 6/29/16  At C2-C3, the minimal interstitial excision covers a very mild broad-based disc bulge most prominent centrally but does not deform the ventral cord.  There is no canal or foraminal stenosis.  At C3-C4, there is mildly decreased disc height with a broad-based disc bulge most prominent centrally.  This minimally contact the ventral cord without significant deformity.  The canal is widely patent.  There is uncovertebral hypertrophy and facet arthropathy, but the foramina are patent.  At C4-C5, there is decreased disc height with a broad disc bulge-marginal osteophyte complex that mildly lateralizes to the left and blends imperceptibly with right greater than left uncovertebral hypertrophy.  With ligamentum hypertrophy, there is no significant central canal stenosis.  Uncovertebral hypertrophy and facet arthropathy are causing mild left and moderate right foraminal stenoses.  At C5-C6, there is a broad-based disc bulge and osteophyte complex most prominent centrally.  There is also ligamentum flavum hypertrophy present, but the canal is patent.  There is mild left foraminal stenosis.  The right foramen is patent.  At C6-C7, there is disc desiccation with a minimal broad-based disc bulge most prominent in the right paracentral canal.  The canal is widely patent.  There is mild left foraminal stenosis.  At C7-T1, there is no significant disc bulge, protrusion, or herniation/extrusion.  The canal and foramina are widely patent.    MRI lumbar spine 9/22/17  L1-L2 demonstrates no significant disc protrusion, central spinal canal stenosis, or neural foraminal stenosis.  L2-L3 demonstrates no significant disc protrusion, central spinal canal stenosis, or neural  foraminal stenosis. Mild facet arthrosis is seen.  L3-L4 demonstrates no significant disc protrusion, central spinal canal stenosis, or neural foraminal stenosis. Mild facet arthrosis is seen.  L4-L5 demonstrates mild broad-based disc bulge, ligamentum flavum hypertrophy, and mild/moderate bilateral facet arthrosis. No significant central spinal canal stenosis is seen. Mild bilateral lateral recess narrowing and neural foraminal narrowing is seen. Similar findings were seen on the prior MRI.  L5-S1 demonstrates no significant posterior disc protrusion. Moderate bilateral facet arthrosis is seen along ligamentum flavum hypertrophy. No significant central spinal canal or neural foraminal stenosis is seen.    Hip x-rays 9/22/17  AP view of the pelvis and frog leg views of both hips were obtained. No evidence of acute displaced fracture or dislocation is visualized.  No radiopaque foreign bodies are visualized. The bilateral superior joint spaces are grossly maintained. Mild bilateral sacroiliac joint degenerative changes are seen including subchondral sclerosis and small marginal osteophytes. Mild to moderate symphyseal degenerative changes are seen. There is a slightly expansile cortically-based focus along the lateral proximal right femoral metadiaphysis. This could reflect a sessile osteochondroma, but correlation with MRI of the right hip is recommended.      Assessment:  The patient is a 57-year-old woman with history of hypothyroidism, otherwise fairly healthy but a long history of scoliosis causing back pain, self-referred for continued back pain.    1. Spondylosis of thoracic region without myelopathy or radiculopathy  Vital signs    Place 18-22 gauage peripheral IV     Verify informed consent    Notify physician     Notify physician     Notify physician (specify)    Diet NPO    Case Request Operating Room: RADIOFREQUENCY ABLATION THORACIC T4, T5, T6, T7    Place in Outpatient    lactated ringers infusion   2.  Scoliosis of lumbar spine, unspecified scoliosis type     3. Right lumbar radiculopathy     4. Lumbar spondylosis     5. Cervical radiculopathy     6. DDD (degenerative disc disease), cervical     7. DDD (degenerative disc disease), lumbar         Plan:  1.  I will schedule the patient to repeat right T4, 5, 6 and 7 medial branch radiofrequency ablation.  She has had excellent relief with this in the past.  2.  Dr. Coleman provided prescriptions for hydrocodone-acetaminophen 7.5/325 mg up to 2 times a day as needed for pain.  I have reviewed the [place] [employer] website and there are no abberancies.    3.  Follow-up in 4 weeks postprocedure or sooner as needed.    Greater than 50% of this 30 min visit was spent counseling patient.

## 2018-09-26 NOTE — H&P (VIEW-ONLY)
This note was completed with dictation software and grammatical errors may exist.    CC:Back pain, neck pain    HPI: The patient is a 57-year-old woman with history of hypothyroidism, otherwise fairly healthy but a long history of scoliosis causing back pain, self-referred for continued back pain.  She returns in follow-up today with multiple pain complaints.  Her worst pain is in the right mid thoracic region.  She states that this had been slowly returning but tolerable up until this morning when she woke up with severe pain. This has been constant and is only improved with medication.  She reports mild neck pain but is still having benefit from the cervical JINA in March.  She reports complete relief of her right leg pain and almost complete relief of her low back pain following her lumbar JINA in May.  She denies having weakness, numbness, bladder or bowel incontinence.    Pain intervention history: She has been seeing Dr. Huffman for the last several years and has undergone epidural steroid injections and radiofrequency ablations with good relief.  According to her last pain management physician, she had undergone a right side T3, 4, 5, 6 medial branch radiofrequency ablation on 8/8/14 with good relief.  He had scheduled her for another one but did not complete this.  As far as medications she has been taking Hydrocodone 7.5/325 one to 2 times a day at most and gabapentin 300 mg at night. She is status post cervical epidural steroid injection on 10/19/15 with 50% relief of her neck pain. She is status post right L4 transforaminal epidural steroid injection on 11/23/15 with 100% relief.   She is status post right T3, 4, 5 and 6 medial branch radio frequency ablation on 2/5/16 with greater than 50% relief.  She is status post right L4 transforaminal epidural sterile injection on 6/14/16 with significant relief lasting only about a month.  She is status post C7-T1 cervical interlaminar epidural steroid injection on  7/14/16 with 30-40% relief.   She is status post L5/S1 interlaminar epidural steroidal injection to the right on 8/12/16 with 100% relief of her right leg pain.  She is status post C7-T1 cervical interlaminar epidural steroid injection on 12/9/16 with almost complete relief.  She is status post right T4, 5, 6 and 7 medial branch radiofrequency ablation on 5/30/17 with 100% relief.  She is status post C7-T1 cervical interlaminar epidural steroid injection on 6/27/17 with 80% relief.  She is status post L5/S1 interlaminar epidural steroid injection on a/7/17 with excellent relief lasting 2 weeks, now reporting 0% relief.  She is status post right L3, 4 and 5 medial branch radiofrequency ablation on 10/19/17 with 80% relief.   She is status post right T4, 5, 6 and 7 medial branch radiofrequency ablation on 12/14/17 with 100% relief.  She is status post C7-T1 cervical interlaminar epidural steroid injection on 3/16/18 with at least 80% relief.     ROS: She reports fatigability, headaches, hypothyroidism, joint stiffness, back pain, difficulty sleeping and anxiety.  Balance of review of systems is negative.      Medical, surgical, family and social history reviewed elsewhere in record.  She is a  at [employer].    Medications/Allergies: See med card    Vitals:    09/25/18 1506   BP: 128/73   Pulse: 74   Resp: 18   Temp: 97 °F (36.1 °C)   TempSrc: Oral   SpO2: 99%   Weight: 79.2 kg (174 lb 7.9 oz)   PainSc:   4   PainLoc: Back         Physical exam:  Gen: A and O x3, pleasant, well-groomed  Skin: No rashes or obvious lesions  HEENT: PERRLA, no obvious deformities on ears or in canals. Trachea midline.  CVS: Regular rate and rhythm, normal palpable pulses.  Resp:No increased work of breathing, symmetrical chest rise.  Abdomen: Soft, NT/ND.  Musculoskeletal:No antalgic gait.     Neuro:  Upper extremities: 5/5 strength bilaterally.  Lower extremities: 5/5 strength bilaterally.    Reflexes: Patellar 2+,  Achilles 2+ bilaterally.  Upper extremity reflexes 2+ bilaterally equal.  Sensory:  Intact and symmetrical to light touch and pinprick in L2-S1 dermatomes bilaterally.    Cervical spine exam: Range of motion is full with flexion, extension and lateral rotation without pain.  Myofascial exam: No tenderness to palpation to the cervical paraspinous muscles.  Moderate tenderness to palpation to the right midthoracic paraspinous muscles.    Lumbar spine:  Lumbar spine: Range of motion is full with flexion and extension with minimal increased pain in her bilateral low back.    Brayden's test causes no increased pain on either side.    Supine straight leg raise is negative bilaterally.  Internal and external rotation of the hip causes no increased pain on either side.  Myofascial exam: Mild tenderness to palpation to the right lumbar paraspinous muscles.      Imagin/16/15 Xray Scoliosis survey: There is thoracic dextroscoliosis with mild lumbar compensatory levoscoliosis. No structural abnormalities are evident. Diffuse spondylosis noted in the cervical, thoracic and to lesser extent lumbar spine. No fractures are identified. There is slight increased kyphotic curvature of the thoracic spine with alignment being otherwise normal. Measurements and evaluation are limited due to underpenetration. Land angle measures approximately in the thoracic spine is of approximately 17.0 degrees and for the lumbar spine 17.4 degrees. Soft tissues are unremarkable. IMPRESSION: 1. S shaped scoliotic curvature of the thoracolumbar spine with Land angle of approximately 17 degrees. 2. Diffuse spondylosis.    MRI report 12: Report notes that there is dextroconvex rotary curvature of the thoracic spine.  Posterior alignment is maintained.  There are scattered vertebral body hemangiomas.  There is minimal posterior disc bulges noted at T5/6, T6/7, T7/8 and T8/9.  There is no central spinal stenosis or neural foraminal  narrowing.    MRI right shoulder 6/24/11: Minor distal subscapularis tendinosis.  Negative for full-thickness or significant partial-thickness rotator cuff tendon tear.  There is a small amount of subacromial subdeltoid bursal fluid which can be seen with recent injection or mild bursitis.    MRI cervical spine report 3/25/11.  Note that this is comparison to previous study on 7/29/2008.  This is a report only, no images were available to me.  The C2/3 disc appears grossly intact with no significant bulging.  C3/4 there is mild protrusion of the disc with no herniation and no nerve root entrapment seen.  Spinal stenosis is not noted.  At C4/5 there is broad-based disc protrusion which compresses the subarachnoid space without cord compression.  There is no definite nerve root compression seen.  At C5/6 there is vertebral body lesion which is bright on T2 and dark on T1 but it does not enhance and may represent an atypical hemangioma.  This is unchanged from previous study.  At C5/6 there is a protrusion of the disc asymmetrically to the left but no significant entrapment.  No change from previous study.  At C6/7 there is a focal protrusion of the disc causing compression of the subarachnoid space without definite cord or nerve root compression.  C7/T1 appears intact.    Lumbar spine MRI report 3/31/14: No acute abnormality or significant degenerative changes.  No canal or foraminal stenosis.    Lumbar spine MRI 10/19/15  T12-L1: No central canal or neuroforaminal stenosis. No disc protrusion or extrusion.  L1-L2: No central canal or neuroforaminal stenosis. No disc protrusion or extrusion.  L2-L3: No central canal or neuroforaminal stenosis. No disc protrusion or extrusion.  L3-L4: No central canal or neuroforaminal stenosis. No disc protrusion or extrusion.  L4-L5: There is a broad disc bulge which extends into both neuroforamina. There is prominent ligamentum flavum thickening and facet arthropathy. There is mild  bilateral neuroforaminal stenosis. No central canal stenosis.  L5-S1: There is bilateral hypertrophic facet arthropathy and ligamentum flavum thickening. No central canal or neuroforaminal stenosis. No disc protrusion or extrusion.    Cervical spine MRI 6/29/16  At C2-C3, the minimal interstitial excision covers a very mild broad-based disc bulge most prominent centrally but does not deform the ventral cord.  There is no canal or foraminal stenosis.  At C3-C4, there is mildly decreased disc height with a broad-based disc bulge most prominent centrally.  This minimally contact the ventral cord without significant deformity.  The canal is widely patent.  There is uncovertebral hypertrophy and facet arthropathy, but the foramina are patent.  At C4-C5, there is decreased disc height with a broad disc bulge-marginal osteophyte complex that mildly lateralizes to the left and blends imperceptibly with right greater than left uncovertebral hypertrophy.  With ligamentum hypertrophy, there is no significant central canal stenosis.  Uncovertebral hypertrophy and facet arthropathy are causing mild left and moderate right foraminal stenoses.  At C5-C6, there is a broad-based disc bulge and osteophyte complex most prominent centrally.  There is also ligamentum flavum hypertrophy present, but the canal is patent.  There is mild left foraminal stenosis.  The right foramen is patent.  At C6-C7, there is disc desiccation with a minimal broad-based disc bulge most prominent in the right paracentral canal.  The canal is widely patent.  There is mild left foraminal stenosis.  At C7-T1, there is no significant disc bulge, protrusion, or herniation/extrusion.  The canal and foramina are widely patent.    MRI lumbar spine 9/22/17  L1-L2 demonstrates no significant disc protrusion, central spinal canal stenosis, or neural foraminal stenosis.  L2-L3 demonstrates no significant disc protrusion, central spinal canal stenosis, or neural  foraminal stenosis. Mild facet arthrosis is seen.  L3-L4 demonstrates no significant disc protrusion, central spinal canal stenosis, or neural foraminal stenosis. Mild facet arthrosis is seen.  L4-L5 demonstrates mild broad-based disc bulge, ligamentum flavum hypertrophy, and mild/moderate bilateral facet arthrosis. No significant central spinal canal stenosis is seen. Mild bilateral lateral recess narrowing and neural foraminal narrowing is seen. Similar findings were seen on the prior MRI.  L5-S1 demonstrates no significant posterior disc protrusion. Moderate bilateral facet arthrosis is seen along ligamentum flavum hypertrophy. No significant central spinal canal or neural foraminal stenosis is seen.    Hip x-rays 9/22/17  AP view of the pelvis and frog leg views of both hips were obtained. No evidence of acute displaced fracture or dislocation is visualized.  No radiopaque foreign bodies are visualized. The bilateral superior joint spaces are grossly maintained. Mild bilateral sacroiliac joint degenerative changes are seen including subchondral sclerosis and small marginal osteophytes. Mild to moderate symphyseal degenerative changes are seen. There is a slightly expansile cortically-based focus along the lateral proximal right femoral metadiaphysis. This could reflect a sessile osteochondroma, but correlation with MRI of the right hip is recommended.      Assessment:  The patient is a 57-year-old woman with history of hypothyroidism, otherwise fairly healthy but a long history of scoliosis causing back pain, self-referred for continued back pain.    1. Spondylosis of thoracic region without myelopathy or radiculopathy  Vital signs    Place 18-22 gauage peripheral IV     Verify informed consent    Notify physician     Notify physician     Notify physician (specify)    Diet NPO    Case Request Operating Room: RADIOFREQUENCY ABLATION THORACIC T4, T5, T6, T7    Place in Outpatient    lactated ringers infusion   2.  Scoliosis of lumbar spine, unspecified scoliosis type     3. Right lumbar radiculopathy     4. Lumbar spondylosis     5. Cervical radiculopathy     6. DDD (degenerative disc disease), cervical     7. DDD (degenerative disc disease), lumbar         Plan:  1.  I will schedule the patient to repeat right T4, 5, 6 and 7 medial branch radiofrequency ablation.  She has had excellent relief with this in the past.  2.  Dr. Coleman provided prescriptions for hydrocodone-acetaminophen 7.5/325 mg up to 2 times a day as needed for pain.  I have reviewed the [place] Board of Pharmacy website and there are no abberancies.    3.  Follow-up in 4 weeks postprocedure or sooner as needed.    Greater than 50% of this 30 min visit was spent counseling patient.

## 2018-10-01 ENCOUNTER — DOCUMENTATION ONLY (OUTPATIENT)
Dept: RHEUMATOLOGY | Facility: CLINIC | Age: 58
End: 2018-10-01

## 2018-10-01 ENCOUNTER — OFFICE VISIT (OUTPATIENT)
Dept: RHEUMATOLOGY | Facility: CLINIC | Age: 58
End: 2018-10-01
Payer: COMMERCIAL

## 2018-10-01 VITALS
BODY MASS INDEX: 33.34 KG/M2 | DIASTOLIC BLOOD PRESSURE: 75 MMHG | HEIGHT: 61 IN | SYSTOLIC BLOOD PRESSURE: 135 MMHG | WEIGHT: 176.56 LBS | HEART RATE: 69 BPM

## 2018-10-01 DIAGNOSIS — M02.30 REACTIVE ARTHRITIS: Primary | ICD-10-CM

## 2018-10-01 DIAGNOSIS — J30.2 SEASONAL ALLERGIES: ICD-10-CM

## 2018-10-01 DIAGNOSIS — K90.0 CELIAC SYNDROME: ICD-10-CM

## 2018-10-01 PROCEDURE — 99214 OFFICE O/P EST MOD 30 MIN: CPT | Mod: S$GLB,,, | Performed by: INTERNAL MEDICINE

## 2018-10-01 PROCEDURE — 99999 PR PBB SHADOW E&M-EST. PATIENT-LVL IV: CPT | Mod: PBBFAC,,, | Performed by: INTERNAL MEDICINE

## 2018-10-01 NOTE — PROGRESS NOTES
Subjective:       Patient ID: Vonnie Garces is a 57 y.o. female.    Chief Complaint: Follow-up    Follow up:  Patient complains of arthralgias and myalgias for which has been present for a few years. Pain is located in multiple joints, both shoulder(s), both elbow(s), both wrist(s), both MCP(s): 1st, 2nd, 3rd, 4th and 5th, both PIP(s): 1st, 2nd, 3rd, 4th and 5th, both DIP(s): 1st and 2nd, both hip(s), both knee(s) and both MTP(s): 1st, 2nd, 3rd, 4th and 5th, is described as aching, pulsating, shooting and throbbing, and is constant, moderate .  Associated symptoms include: crepitation, decreased range of motion, edema, effusion, tenderness and warmth.        Review of Systems   Constitutional: Positive for chills and fatigue. Negative for activity change, appetite change, diaphoresis, fever and unexpected weight change.   HENT: Negative for congestion, dental problem, ear discharge, ear pain, facial swelling, mouth sores, nosebleeds, postnasal drip, rhinorrhea, sinus pressure, sneezing, sore throat, tinnitus, trouble swallowing and voice change.    Eyes: Negative for photophobia, pain, discharge, redness and itching.   Respiratory: Negative for apnea, cough, chest tightness, shortness of breath and wheezing.    Cardiovascular: Positive for leg swelling. Negative for chest pain and palpitations.   Gastrointestinal: Positive for abdominal pain. Negative for abdominal distention, constipation, diarrhea, nausea and vomiting.   Endocrine: Negative for cold intolerance, heat intolerance, polydipsia and polyuria.   Genitourinary: Negative for decreased urine volume, difficulty urinating, dysuria, flank pain, frequency, hematuria and urgency.   Musculoskeletal: Positive for arthralgias, back pain, joint swelling, myalgias, neck pain and neck stiffness. Negative for gait problem.   Skin: Positive for rash. Negative for pallor and wound.   Allergic/Immunologic: Negative for immunocompromised state.   Neurological:  "Positive for weakness. Negative for dizziness, tremors, numbness and headaches.   Hematological: Negative for adenopathy. Does not bruise/bleed easily.   Psychiatric/Behavioral: Negative for sleep disturbance. The patient is not nervous/anxious.          Objective:   /75   Pulse 69   Ht 5' 1" (1.549 m)   Wt 80.1 kg (176 lb 9.4 oz)   BMI 33.37 kg/m²      Physical Exam   Vitals reviewed.  Constitutional: She is oriented to person, place, and time.   HENT:   Head: Normocephalic and atraumatic.   Eyes: EOM are normal. Pupils are equal, round, and reactive to light. Right eye exhibits no discharge. Left eye exhibits no discharge.   Neck: Neck supple. No thyromegaly present.   Cardiovascular: Normal rate, regular rhythm and normal heart sounds.  Exam reveals no gallop and no friction rub.    No murmur heard.  Pulmonary/Chest: Breath sounds normal. She has no wheezes. She has no rales. She exhibits no tenderness.   Abdominal: There is no tenderness. There is no rebound and no guarding.       Right Side Rheumatological Exam     Examination finds the elbow normal.    The patient is tender to palpation of the shoulder, wrist, knee, 1st PIP, 1st MCP, 2nd PIP, 2nd MCP, 3rd PIP, 3rd MCP, 4th PIP, 4th MCP, 5th PIP and 5th MCP    She has swelling of the 1st PIP, 1st MCP, 2nd PIP, 2nd MCP, 3rd PIP, 3rd MCP, 4th PIP, 4th MCP, 5th PIP and 5th MCP    Shoulder Exam   Tenderness Location: no tenderness    Range of Motion   Active abduction: abnormal   Adduction: abnormal  Sensation: normal    Knee Exam   Patellofemoral Crepitus: positive  Effusion: positive  Sensation: normal    Hip Exam   Tenderness Location: posterior  Sensation: normal    Elbow/Wrist Exam   Tenderness Location: no tenderness  Sensation: normal    Left Side Rheumatological Exam     The patient is tender to palpation of the shoulder, elbow, wrist, knee, 1st PIP, 1st MCP, 2nd PIP, 2nd MCP, 3rd PIP, 3rd MCP, 4th PIP, 4th MCP, 5th PIP and 5th MCP.    She has " swelling of the 1st PIP, 1st MCP, 2nd PIP, 2nd MCP, 3rd PIP, 3rd MCP, 4th PIP, 4th MCP, 5th PIP and 5th MCP    Shoulder Exam   Tenderness Location: no tenderness    Range of Motion   Active abduction: abnormal   Sensation: normal    Knee Exam     Patellofemoral Crepitus: positive  Effusion: positive  Sensation: normal    Hip Exam   Tenderness Location: posterior  Sensation: normal    Elbow/Wrist Exam   Sensation: normal      Back/Neck Exam   General Inspection   Gait: normal         Lymphadenopathy:     She has no cervical adenopathy.   Neurological: She is alert and oriented to person, place, and time. Gait normal.   Skin: Skin is dry. No rash noted. No erythema. There is pallor.     Psychiatric: Mood and affect normal.   Musculoskeletal: She exhibits tenderness and deformity.           Results for orders placed or performed in visit on 08/08/18   FERNANDO   Result Value Ref Range    FERNANDO Screen None Detected None Detected   Anti Sm/RNP Antibody   Result Value Ref Range    Osorio (GATITO) Ab, IgG 0 0 - 40 AU/mL    RNP Antibody 0 0 - 40 AU/mL   Anti-DNA antibody, double-stranded   Result Value Ref Range    ds DNA Ab None Detected None Detected   ANTI-HISTONE ANTIBODY   Result Value Ref Range    Anti-Histone Antibody 0.4 0.0 - 0.9 Units   Anti-scleroderma antibody   Result Value Ref Range    SCL-70 Antibody 9 0 - 40 AU/mL   Anti-Smith antibody   Result Value Ref Range    Osorio (GATITO) Ab, IgG 0 0 - 40 AU/mL   Sjogrens syndrome-A extractable nuclear antibody   Result Value Ref Range    SSA Antibody 8 0 - 40 AU/mL    SSA 60 (Ro) GATITO Antibody 0 0 - 40 AU/mL   Sjogrens syndrome-B extractable nuclear antibody   Result Value Ref Range    SSB Antibody 6 0 - 40 AU/mL   Sedimentation rate   Result Value Ref Range    Sed Rate 19 0 - 29 mm/Hr   Rheumatoid factor   Result Value Ref Range    Rheumatoid Factor <8.6 0.0 - 15.0 IU/mL   IgA   Result Value Ref Range    IgA 193 40 - 350 mg/dL   IgG   Result Value Ref Range    IgG - Serum 974 650 -  1600 mg/dL   IgG 1, 2, 3, and 4   Result Value Ref Range    IgG 1 532 240 - 1118 mg/dL    IgG 2 298 124 - 549 mg/dL    IgG 3 47 21 - 134 mg/dL    IgG 4 27 1 - 123 mg/dL   IgM   Result Value Ref Range    IgM 62 50 - 300 mg/dL   Anti-thyroglobulin antibody   Result Value Ref Range    Thyroglobulin Ab Screen <0.9 0.0 - 4.0 IU/mL   HLA B27 Antigen   Result Value Ref Range    HLA B27 Negative Negative   C3 complement   Result Value Ref Range    Complement (C-3) 112 50 - 180 mg/dL   Celiac Disease HLA Genotyping   Result Value Ref Range    HLA Celiac Specimen Whole Blood     Celiac (HLA-DQA1*05) Positive (A)     Celiac (HLA-DQB1*02) Positive (A)     Celiac (HLA-DQ8) Negative     HLA Celiac Interpretation See Note    Celiac Disease Comprehensive Panel   Result Value Ref Range    IgA 193 68 - 408 mg/dL   Tissue transglutaminase, IgA   Result Value Ref Range    Celiac Anit-Human Tissue Transglutaminase Iga 0 0 - 3 U/mL       Assessment:       1. Reactive arthritis    2. Celiac syndrome    3. Seasonal allergies            Plan:       Vonnie was seen today for follow-up.    Diagnoses and all orders for this visit:    Reactive arthritis  -     Ambulatory consult to Allergy  -     CBC auto differential; Future  -     Comprehensive metabolic panel; Future  -     Sedimentation rate; Future  -     CBC auto differential  -     Comprehensive metabolic panel  -     Sedimentation rate  -     CBC auto differential; Future  -     Comprehensive metabolic panel; Future  -     Sedimentation rate; Future  -     C-reactive protein; Future    Celiac syndrome  -     Ambulatory consult to Allergy  -     CBC auto differential; Future  -     Comprehensive metabolic panel; Future  -     Sedimentation rate; Future  -     CBC auto differential  -     Comprehensive metabolic panel  -     Sedimentation rate  -     CBC auto differential; Future  -     Comprehensive metabolic panel; Future  -     Sedimentation rate; Future  -     C-reactive protein;  Future    Seasonal allergies  -     Ambulatory consult to Allergy  -     CBC auto differential; Future  -     Comprehensive metabolic panel; Future  -     Sedimentation rate; Future  -     CBC auto differential  -     Comprehensive metabolic panel  -     Sedimentation rate  -     CBC auto differential; Future  -     Comprehensive metabolic panel; Future  -     Sedimentation rate; Future  -     C-reactive protein; Future      If doing poorly will add plaquenil

## 2018-10-03 ENCOUNTER — PATIENT MESSAGE (OUTPATIENT)
Dept: RHEUMATOLOGY | Facility: CLINIC | Age: 58
End: 2018-10-03

## 2018-10-03 ENCOUNTER — TELEPHONE (OUTPATIENT)
Dept: PAIN MEDICINE | Facility: CLINIC | Age: 58
End: 2018-10-03

## 2018-10-03 NOTE — TELEPHONE ENCOUNTER
----- Message from Kenyatta Jonas sent at 10/3/2018  9:53 AM CDT -----  Contact: Patient  Patient would like to reschedule her procedure that is scheduled for 10/5.  Please call to discuss.  Call Back#[phone]  Thanks

## 2018-10-11 DIAGNOSIS — M51.34 DDD (DEGENERATIVE DISC DISEASE), THORACIC: Primary | ICD-10-CM

## 2018-10-12 ENCOUNTER — HOSPITAL ENCOUNTER (OUTPATIENT)
Facility: HOSPITAL | Age: 58
Discharge: HOME | End: 2018-10-12
Attending: ANESTHESIOLOGY | Admitting: ANESTHESIOLOGY
Payer: COMMERCIAL

## 2018-10-12 ENCOUNTER — HOSPITAL ENCOUNTER (OUTPATIENT)
Dept: RADIOLOGY | Facility: HOSPITAL | Age: 58
Discharge: HOME | End: 2018-10-12
Attending: ANESTHESIOLOGY
Payer: COMMERCIAL

## 2018-10-12 VITALS
TEMPERATURE: 97 F | DIASTOLIC BLOOD PRESSURE: 65 MMHG | OXYGEN SATURATION: 98 % | RESPIRATION RATE: 17 BRPM | SYSTOLIC BLOOD PRESSURE: 130 MMHG | HEART RATE: 63 BPM

## 2018-10-12 DIAGNOSIS — M51.34 DDD (DEGENERATIVE DISC DISEASE), THORACIC: ICD-10-CM

## 2018-10-12 DIAGNOSIS — M47.814 SPONDYLOSIS OF THORACIC REGION WITHOUT MYELOPATHY OR RADICULOPATHY: ICD-10-CM

## 2018-10-12 DIAGNOSIS — M47.814 THORACIC SPONDYLOSIS: Primary | ICD-10-CM

## 2018-10-12 PROCEDURE — 25000003 PHARM REV CODE 250: Mod: PO | Performed by: ANESTHESIOLOGY

## 2018-10-12 PROCEDURE — 63600175 PHARM REV CODE 636 W HCPCS: Mod: PO | Performed by: ANESTHESIOLOGY

## 2018-10-12 PROCEDURE — 64634 DESTROY C/TH FACET JNT ADDL: CPT | Mod: RT,,, | Performed by: ANESTHESIOLOGY

## 2018-10-12 PROCEDURE — 64633 DESTROY CERV/THOR FACET JNT: CPT | Mod: RT,,, | Performed by: ANESTHESIOLOGY

## 2018-10-12 PROCEDURE — 99152 MOD SED SAME PHYS/QHP 5/>YRS: CPT | Mod: ,,, | Performed by: ANESTHESIOLOGY

## 2018-10-12 PROCEDURE — 64633 DESTROY CERV/THOR FACET JNT: CPT | Mod: PO | Performed by: ANESTHESIOLOGY

## 2018-10-12 PROCEDURE — 76000 FLUOROSCOPY <1 HR PHYS/QHP: CPT | Mod: TC,PO

## 2018-10-12 PROCEDURE — 64634 DESTROY C/TH FACET JNT ADDL: CPT | Mod: PO | Performed by: ANESTHESIOLOGY

## 2018-10-12 RX ORDER — SODIUM CHLORIDE, SODIUM LACTATE, POTASSIUM CHLORIDE, CALCIUM CHLORIDE 600; 310; 30; 20 MG/100ML; MG/100ML; MG/100ML; MG/100ML
INJECTION, SOLUTION INTRAVENOUS CONTINUOUS
Status: DISCONTINUED | OUTPATIENT
Start: 2018-10-12 | End: 2018-10-12 | Stop reason: HOSPADM

## 2018-10-12 RX ORDER — LIDOCAINE HYDROCHLORIDE 20 MG/ML
INJECTION, SOLUTION EPIDURAL; INFILTRATION; INTRACAUDAL; PERINEURAL
Status: DISCONTINUED | OUTPATIENT
Start: 2018-10-12 | End: 2018-10-12 | Stop reason: HOSPADM

## 2018-10-12 RX ORDER — MIDAZOLAM HYDROCHLORIDE 2 MG/2ML
INJECTION, SOLUTION INTRAMUSCULAR; INTRAVENOUS
Status: DISCONTINUED | OUTPATIENT
Start: 2018-10-12 | End: 2018-10-12 | Stop reason: HOSPADM

## 2018-10-12 RX ORDER — FENTANYL CITRATE 50 UG/ML
INJECTION, SOLUTION INTRAMUSCULAR; INTRAVENOUS
Status: DISCONTINUED | OUTPATIENT
Start: 2018-10-12 | End: 2018-10-12 | Stop reason: HOSPADM

## 2018-10-12 RX ORDER — METHYLPREDNISOLONE ACETATE 40 MG/ML
INJECTION, SUSPENSION INTRA-ARTICULAR; INTRALESIONAL; INTRAMUSCULAR; SOFT TISSUE
Status: DISCONTINUED | OUTPATIENT
Start: 2018-10-12 | End: 2018-10-12 | Stop reason: HOSPADM

## 2018-10-12 RX ORDER — LIDOCAINE HYDROCHLORIDE 10 MG/ML
INJECTION, SOLUTION EPIDURAL; INFILTRATION; INTRACAUDAL; PERINEURAL
Status: DISCONTINUED | OUTPATIENT
Start: 2018-10-12 | End: 2018-10-12 | Stop reason: HOSPADM

## 2018-10-12 RX ADMIN — SODIUM CHLORIDE, SODIUM LACTATE, POTASSIUM CHLORIDE, AND CALCIUM CHLORIDE: .6; .31; .03; .02 INJECTION, SOLUTION INTRAVENOUS at 03:10

## 2018-10-12 NOTE — INTERVAL H&P NOTE
The patient has been examined and the H&P has been reviewed:    I concur with the findings and no changes have occurred since H&P was written.    Anesthesia/Surgery risks, benefits and alternative options discussed and understood by patient/family.          Active Hospital Problems    Diagnosis  POA    Thoracic spondylosis [M47.814]  Yes      Resolved Hospital Problems   No resolved problems to display.

## 2018-10-12 NOTE — DISCHARGE SUMMARY
[facility]  Discharge Note  Short Stay    Admit Date: 10/12/2018    Discharge Date: 10/12/2018    Attending Physician: Floyd Coleman MD     Discharge Provider: Floyd Coleman    Diagnoses:  Active Hospital Problems    Diagnosis  POA    *Thoracic spondylosis [M47.814]  Yes      Resolved Hospital Problems   No resolved problems to display.       Discharged Condition: good    Final Diagnoses: Spondylosis of thoracic region without myelopathy or radiculopathy [M47.814]    Disposition: Home or Self Care    Hospital Course: no complications, uneventful    Outcome of Hospitalization, Treatment, Procedure, or Surgery:  Patient was admitted for outpatient procedure. The patient underwent procedure without complications and are discharged home    Follow up/Patient Instructions:  Follow up as scheduled in Pain Management clinic in 3-4 weeks/Patient has received instructions and follow up date and time    Medications:  Continue previous medications    Discharge Procedure Orders   Call MD for:  temperature >100.4     Call MD for:  severe uncontrolled pain     Call MD for:  redness, tenderness, or signs of infection (pain, swelling, redness, odor or green/yellow discharge around incision site)     Call MD for:  severe persistent headache     No dressing needed         Discharge Procedure Orders (must include Diet, Follow-up, Activity):   Discharge Procedure Orders (must include Diet, Follow-up, Activity)   Call MD for:  temperature >100.4     Call MD for:  severe uncontrolled pain     Call MD for:  redness, tenderness, or signs of infection (pain, swelling, redness, odor or green/yellow discharge around incision site)     Call MD for:  severe persistent headache     No dressing needed

## 2018-10-12 NOTE — DISCHARGE INSTRUCTIONS
Home care instructions  Apply ice pack to the injection site for 20 minutes periods for the first 24 hrs for soreness/discomfort at injection site DO NOT USE HEAT FOR 24 HOURS  Keep site clean and dry for 24 hours, remove bandaid when desired  Do not drive until tomorrow  Return to normal activity.  Pain clinic will call to check on you Monday.  Make take 2 weeks to feel the full effects   Resume home medication as prescribed today  Resume Aspirin, Plavix, or Coumadin the day after the procedure unless otherwise instructed.    SEE IMMEDIATE MEDICAL HELP FOR:  Severe increase in your usual pain or appearance of new pain  Prolonged or increasing weakness or numbness in the legs or arms  Drainage, redness, active bleeding, or increased swelling at the injection site  Temperature over 100.0 degrees F.  Headache that increases when your head is upright and decreases when you lie flat    CALL 911 OR GO DIRECTLY TO EMERGENCY DEPARTMENT FOR:  Shortness of breath, chest pain, or problems breathing

## 2018-10-12 NOTE — OP NOTE
PROCEDURE DATE: 10/12/2018    PROCEDURE:  Radiofrequency ablation of the right T4,5,6,7 medial branch nerves on the right-side utilizing fluoroscopy    DIAGNOSIS:  Thoracic spondylosis    Post op Diagnosis: Same    PHYSICIAN: Floyd Coleman MD    MEDICATIONS INJECTED:  From a mixture of 4ml of 2% lidocaine and 40mg of methylprednisone, 1ml of this solution was injected at each level.    LOCAL ANESTHETIC USED: Lidocaine 1%, 4 ml given at each site.    SEDATION MEDICATIONS: 4mg versed, 50mcg fentanyl    ESTIMATED BLOOD LOSS:  none    COMPLICATIONS:  none    TECHNIQUE:  A time out was taken to identify patient and procedure side prior to starting the procedure. Laying in a prone position, the patient was prepped and draped in the usual sterile fashion using ChloraPrep and sterile towels.  The levels were determined under fluoroscopic guidance and then marked.  Local anesthetic was given by raising a wheal at the skin over each site and then infiltrated approximately 2cm deeper.  A 20-gauge  100 mm ImageTag RF needle was introduced to the anatomic location of the right T4,5,6,7 medial branch nerves.  Motor stimulation up to 2 Volts at each level confirmed no motor nerve involvement.  Impedance was less than 800 ohms at each level. The above noted medication was then injected slowly.  Ablation was performed per level utilizing Nataliya radiofrequency generator 80°C for 90 seconds. The patient tolerated the procedure well.     The patient was monitored after the procedure.  Patient was given post procedure and discharge instructions to follow at home.  The patient was discharged in a stable condition

## 2018-11-06 ENCOUNTER — TELEPHONE (OUTPATIENT)
Dept: PAIN MEDICINE | Facility: CLINIC | Age: 58
End: 2018-11-06

## 2018-11-06 ENCOUNTER — OFFICE VISIT (OUTPATIENT)
Dept: PAIN MEDICINE | Facility: CLINIC | Age: 58
End: 2018-11-06
Payer: COMMERCIAL

## 2018-11-06 VITALS
HEIGHT: 61 IN | HEART RATE: 62 BPM | WEIGHT: 177.81 LBS | SYSTOLIC BLOOD PRESSURE: 138 MMHG | DIASTOLIC BLOOD PRESSURE: 88 MMHG | BODY MASS INDEX: 33.57 KG/M2

## 2018-11-06 DIAGNOSIS — M50.30 DDD (DEGENERATIVE DISC DISEASE), CERVICAL: ICD-10-CM

## 2018-11-06 DIAGNOSIS — M41.9 SCOLIOSIS OF THORACIC SPINE, UNSPECIFIED SCOLIOSIS TYPE: ICD-10-CM

## 2018-11-06 DIAGNOSIS — M47.814 SPONDYLOSIS OF THORACIC REGION WITHOUT MYELOPATHY OR RADICULOPATHY: Primary | ICD-10-CM

## 2018-11-06 DIAGNOSIS — M51.36 DDD (DEGENERATIVE DISC DISEASE), LUMBAR: ICD-10-CM

## 2018-11-06 DIAGNOSIS — M41.9 SCOLIOSIS OF LUMBAR SPINE, UNSPECIFIED SCOLIOSIS TYPE: ICD-10-CM

## 2018-11-06 PROCEDURE — 99999 PR PBB SHADOW E&M-EST. PATIENT-LVL III: CPT | Mod: PBBFAC,,, | Performed by: PHYSICIAN ASSISTANT

## 2018-11-06 PROCEDURE — 99214 OFFICE O/P EST MOD 30 MIN: CPT | Mod: S$GLB,,, | Performed by: PHYSICIAN ASSISTANT

## 2018-11-06 NOTE — TELEPHONE ENCOUNTER
----- Message from Wilfrid Smith sent at 11/6/2018 11:38 AM CST -----  Contact: pt  Pt is calling to see if she would be able to come in on November 15 instead of today, there is nothing for me to schedule, pls call pt back and advise  Call Back#[phone]  Thanks

## 2018-11-06 NOTE — PROGRESS NOTES
This note was completed with dictation software and grammatical errors may exist.    CC:Back pain, neck pain    HPI: The patient is a 57-year-old woman with history of hypothyroidism, otherwise fairly healthy but a long history of scoliosis causing back pain, self-referred for continued back pain.  The she is status post right T4, 5, 6 and 7 medial branch radiofrequency ablation on 10/12/2018 with 100% relief.  She reports intermittent low back pain and mi neck pain but still reports relief from her epidural steroid injections earlier this year.  She has been seeing Rheumatology and has been referred to immunology to have further workup of her autoimmune processes.  She reports possibly having a respiratory fungal infection.  She continues to take pain medication but has taken less since her procedure and overall has been doing well. She denies weakness, numbness, bladder or bowel incontinence.    Pain intervention history: She has been seeing Dr. Huffman for the last several years and has undergone epidural steroid injections and radiofrequency ablations with good relief.  According to her last pain management physician, she had undergone a right side T3, 4, 5, 6 medial branch radiofrequency ablation on 8/8/14 with good relief.  He had scheduled her for another one but did not complete this.  As far as medications she has been taking Hydrocodone 7.5/325 one to 2 times a day at most and gabapentin 300 mg at night. She is status post cervical epidural steroid injection on 10/19/15 with 50% relief of her neck pain. She is status post right L4 transforaminal epidural steroid injection on 11/23/15 with 100% relief.   She is status post right T3, 4, 5 and 6 medial branch radio frequency ablation on 2/5/16 with greater than 50% relief.  She is status post right L4 transforaminal epidural sterile injection on 6/14/16 with significant relief lasting only about a month.  She is status post C7-T1 cervical interlaminar  "epidural steroid injection on 7/14/16 with 30-40% relief.   She is status post L5/S1 interlaminar epidural steroidal injection to the right on 8/12/16 with 100% relief of her right leg pain.  She is status post C7-T1 cervical interlaminar epidural steroid injection on 12/9/16 with almost complete relief.  She is status post right T4, 5, 6 and 7 medial branch radiofrequency ablation on 5/30/17 with 100% relief.  She is status post C7-T1 cervical interlaminar epidural steroid injection on 6/27/17 with 80% relief.  She is status post L5/S1 interlaminar epidural steroid injection on a/7/17 with excellent relief lasting 2 weeks, now reporting 0% relief.  She is status post right L3, 4 and 5 medial branch radiofrequency ablation on 10/19/17 with 80% relief.   She is status post right T4, 5, 6 and 7 medial branch radiofrequency ablation on 12/14/17 with 100% relief.  She is status post C7-T1 cervical interlaminar epidural steroid injection on 3/16/18 with at least 80% relief.   The she is status post right T4, 5, 6 and 7 medial branch radiofrequency ablation on 10/12/2018 with 100% relief.     ROS: She reports fatigability, headaches, hypothyroidism, joint stiffness, back pain, difficulty sleeping and anxiety.  Balance of review of systems is negative.      Medical, surgical, family and social history reviewed elsewhere in record.  She is a  at [employer].    Medications/Allergies: See med card    Vitals:    11/06/18 1354   BP: 138/88   Pulse: 62   Weight: 80.7 kg (177 lb 12.8 oz)   Height: 5' 1" (1.549 m)   PainSc:   2   PainLoc: Back         Physical exam:  Gen: A and O x3, pleasant, well-groomed  Skin: No rashes or obvious lesions  HEENT: PERRLA, no obvious deformities on ears or in canals. Trachea midline.  CVS: Regular rate and rhythm, normal palpable pulses.  Resp:No increased work of breathing, symmetrical chest rise.  Abdomen: Soft, NT/ND.  Musculoskeletal:No antalgic gait.     Neuro:  Upper " extremities: 5/5 strength bilaterally.  Lower extremities: 5/5 strength bilaterally.    Reflexes: Patellar 2+, Achilles 2+ bilaterally.  Upper extremity reflexes 2+ bilaterally equal.  Sensory:  Intact and symmetrical to light touch and pinprick in L2-S1 dermatomes bilaterally.    Cervical spine exam: Range of motion is full with flexion, extension and lateral rotation without pain.  Myofascial exam: No tenderness to palpation to the cervical or thoracic paraspinous muscles.    Lumbar spine:  Lumbar spine: Range of motion is full with flexion and extension with minimal increased pain in her bilateral low back.    Brayden's test causes no increased pain on either side.    Supine straight leg raise is negative bilaterally.  Internal and external rotation of the hip causes no increased pain on either side.  Myofascial exam: Minimal tenderness to palpation to the right lumbar paraspinous muscles.      Imagin/16/15 Xray Scoliosis survey: There is thoracic dextroscoliosis with mild lumbar compensatory levoscoliosis. No structural abnormalities are evident. Diffuse spondylosis noted in the cervical, thoracic and to lesser extent lumbar spine. No fractures are identified. There is slight increased kyphotic curvature of the thoracic spine with alignment being otherwise normal. Measurements and evaluation are limited due to underpenetration. Land angle measures approximately in the thoracic spine is of approximately 17.0 degrees and for the lumbar spine 17.4 degrees. Soft tissues are unremarkable. IMPRESSION: 1. S shaped scoliotic curvature of the thoracolumbar spine with Land angle of approximately 17 degrees. 2. Diffuse spondylosis.    MRI report 12: Report notes that there is dextroconvex rotary curvature of the thoracic spine.  Posterior alignment is maintained.  There are scattered vertebral body hemangiomas.  There is minimal posterior disc bulges noted at T5/6, T6/7, T7/8 and T8/9.  There is no central spinal  stenosis or neural foraminal narrowing.    MRI right shoulder 6/24/11: Minor distal subscapularis tendinosis.  Negative for full-thickness or significant partial-thickness rotator cuff tendon tear.  There is a small amount of subacromial subdeltoid bursal fluid which can be seen with recent injection or mild bursitis.    MRI cervical spine report 3/25/11.  Note that this is comparison to previous study on 7/29/2008.  This is a report only, no images were available to me.  The C2/3 disc appears grossly intact with no significant bulging.  C3/4 there is mild protrusion of the disc with no herniation and no nerve root entrapment seen.  Spinal stenosis is not noted.  At C4/5 there is broad-based disc protrusion which compresses the subarachnoid space without cord compression.  There is no definite nerve root compression seen.  At C5/6 there is vertebral body lesion which is bright on T2 and dark on T1 but it does not enhance and may represent an atypical hemangioma.  This is unchanged from previous study.  At C5/6 there is a protrusion of the disc asymmetrically to the left but no significant entrapment.  No change from previous study.  At C6/7 there is a focal protrusion of the disc causing compression of the subarachnoid space without definite cord or nerve root compression.  C7/T1 appears intact.    Lumbar spine MRI report 3/31/14: No acute abnormality or significant degenerative changes.  No canal or foraminal stenosis.    Lumbar spine MRI 10/19/15  T12-L1: No central canal or neuroforaminal stenosis. No disc protrusion or extrusion.  L1-L2: No central canal or neuroforaminal stenosis. No disc protrusion or extrusion.  L2-L3: No central canal or neuroforaminal stenosis. No disc protrusion or extrusion.  L3-L4: No central canal or neuroforaminal stenosis. No disc protrusion or extrusion.  L4-L5: There is a broad disc bulge which extends into both neuroforamina. There is prominent ligamentum flavum thickening and  facet arthropathy. There is mild bilateral neuroforaminal stenosis. No central canal stenosis.  L5-S1: There is bilateral hypertrophic facet arthropathy and ligamentum flavum thickening. No central canal or neuroforaminal stenosis. No disc protrusion or extrusion.    Cervical spine MRI 6/29/16  At C2-C3, the minimal interstitial excision covers a very mild broad-based disc bulge most prominent centrally but does not deform the ventral cord.  There is no canal or foraminal stenosis.  At C3-C4, there is mildly decreased disc height with a broad-based disc bulge most prominent centrally.  This minimally contact the ventral cord without significant deformity.  The canal is widely patent.  There is uncovertebral hypertrophy and facet arthropathy, but the foramina are patent.  At C4-C5, there is decreased disc height with a broad disc bulge-marginal osteophyte complex that mildly lateralizes to the left and blends imperceptibly with right greater than left uncovertebral hypertrophy.  With ligamentum hypertrophy, there is no significant central canal stenosis.  Uncovertebral hypertrophy and facet arthropathy are causing mild left and moderate right foraminal stenoses.  At C5-C6, there is a broad-based disc bulge and osteophyte complex most prominent centrally.  There is also ligamentum flavum hypertrophy present, but the canal is patent.  There is mild left foraminal stenosis.  The right foramen is patent.  At C6-C7, there is disc desiccation with a minimal broad-based disc bulge most prominent in the right paracentral canal.  The canal is widely patent.  There is mild left foraminal stenosis.  At C7-T1, there is no significant disc bulge, protrusion, or herniation/extrusion.  The canal and foramina are widely patent.    MRI lumbar spine 9/22/17  L1-L2 demonstrates no significant disc protrusion, central spinal canal stenosis, or neural foraminal stenosis.  L2-L3 demonstrates no significant disc protrusion, central spinal  canal stenosis, or neural foraminal stenosis. Mild facet arthrosis is seen.  L3-L4 demonstrates no significant disc protrusion, central spinal canal stenosis, or neural foraminal stenosis. Mild facet arthrosis is seen.  L4-L5 demonstrates mild broad-based disc bulge, ligamentum flavum hypertrophy, and mild/moderate bilateral facet arthrosis. No significant central spinal canal stenosis is seen. Mild bilateral lateral recess narrowing and neural foraminal narrowing is seen. Similar findings were seen on the prior MRI.  L5-S1 demonstrates no significant posterior disc protrusion. Moderate bilateral facet arthrosis is seen along ligamentum flavum hypertrophy. No significant central spinal canal or neural foraminal stenosis is seen.    Hip x-rays 9/22/17  AP view of the pelvis and frog leg views of both hips were obtained. No evidence of acute displaced fracture or dislocation is visualized.  No radiopaque foreign bodies are visualized. The bilateral superior joint spaces are grossly maintained. Mild bilateral sacroiliac joint degenerative changes are seen including subchondral sclerosis and small marginal osteophytes. Mild to moderate symphyseal degenerative changes are seen. There is a slightly expansile cortically-based focus along the lateral proximal right femoral metadiaphysis. This could reflect a sessile osteochondroma, but correlation with MRI of the right hip is recommended.      Assessment:  The patient is a 57-year-old woman with history of hypothyroidism, otherwise fairly healthy but a long history of scoliosis causing back pain, self-referred for continued back pain.    1. Spondylosis of thoracic region without myelopathy or radiculopathy     2. Scoliosis of thoracic spine, unspecified scoliosis type     3. Scoliosis of lumbar spine, unspecified scoliosis type     4. DDD (degenerative disc disease), lumbar     5. DDD (degenerative disc disease), cervical         Plan:  1.  She had complete relief following  the right T4, 5, 6 and 7 medial branch RFA.  This can be repeated in the future if necessary.  She recently had a CT of the chest and we reviewed this for the purposes of viewing her thoracic spine scoliosis.  2.  We can repeat cervical or lumbar epidural steroid injections in the future if necessary.  3.  She continues to take hydrocodone as needed, has taken this less since the procedure and will call if she needs a prescription before her next visit.  She has prescriptions dated for November and December.  4.  Follow-up in 3 months or sooner as needed.

## 2018-11-15 ENCOUNTER — OFFICE VISIT (OUTPATIENT)
Dept: OBSTETRICS AND GYNECOLOGY | Facility: CLINIC | Age: 58
End: 2018-11-15
Payer: COMMERCIAL

## 2018-11-15 VITALS
DIASTOLIC BLOOD PRESSURE: 70 MMHG | SYSTOLIC BLOOD PRESSURE: 132 MMHG | WEIGHT: 179.44 LBS | BODY MASS INDEX: 33.91 KG/M2

## 2018-11-15 DIAGNOSIS — Z90.710 S/P HYSTERECTOMY WITH OOPHORECTOMY: ICD-10-CM

## 2018-11-15 DIAGNOSIS — R53.83 FATIGUE, UNSPECIFIED TYPE: ICD-10-CM

## 2018-11-15 DIAGNOSIS — Z01.419 GYNECOLOGIC EXAM NORMAL: Primary | ICD-10-CM

## 2018-11-15 DIAGNOSIS — Z79.890 HORMONE REPLACEMENT THERAPY (HRT): ICD-10-CM

## 2018-11-15 DIAGNOSIS — Z90.721 S/P HYSTERECTOMY WITH OOPHORECTOMY: ICD-10-CM

## 2018-11-15 PROCEDURE — 99386 PREV VISIT NEW AGE 40-64: CPT | Mod: S$GLB,,, | Performed by: OBSTETRICS & GYNECOLOGY

## 2018-11-15 PROCEDURE — 99999 PR PBB SHADOW E&M-EST. PATIENT-LVL III: CPT | Mod: PBBFAC,,, | Performed by: OBSTETRICS & GYNECOLOGY

## 2018-11-15 RX ORDER — ESTRADIOL 0.1 MG/D
1 FILM, EXTENDED RELEASE TRANSDERMAL
Qty: 8 PATCH | Refills: 11 | Status: SHIPPED | OUTPATIENT
Start: 2018-11-15 | End: 2019-11-19 | Stop reason: SDUPTHER

## 2018-11-15 NOTE — PROGRESS NOTES
Chief Complaint   Patient presents with    Establish Care     hysterectomy; mammo will be done at [facility]     History of Present Illness: Vonnie Garces is a 58 y.o. female that presents today 11/15/2018 for well gyn visit.    Past Medical History:   Diagnosis Date    Anxiety 8/25/2015    Arthritis     Cervical spondylosis     Chronic back pain     DDD (degenerative disc disease), lumbar     Difficult intubation 03/2016    anterior larynx, pt with metal dental appliance, unable to do glidescope, used LMA    Encounter for blood transfusion     Facet arthropathy, thoracic     Hormone replacement therapy (HRT)     Insomnia 8/25/2015    Neck pain     PONV (postoperative nausea and vomiting)     Right foot pain     Scoliosis     Unspecified hypothyroidism 8/25/2015       Past Surgical History:   Procedure Laterality Date    ABLATION, RADIOFREQUENCY T4,5,6,7 Right 12/14/2017    [facility]    BILATERAL OOPHORECTOMY      BLOCK-NERVE-MEDIAL BRANCH-LUMBAR L3, L4, L5 Right 10/17/2017    [facility]    CHOLECYSTECTOMY  2010    COLONOSCOPY  2016    [place]    Epidural Steroid Injection      Pain managment, at another facility, cervical, thoracic    Epidural Steroid injection      Pain management, cervical    JINA-TRANSFORAMINAL L4 Right 6/14/2016    [facility]    JINA-TRANSFORAMINAL L4/5 Right 11/23/2015    [facility]    HYSTERECTOMY      total    INJECTION-STEROID-EPIDURAL-CERVICAL N/A 3/16/2018    [facility]    INJECTION-STEROID-EPIDURAL-CERVICAL N/A 6/27/2017    [facility]    INJECTION-STEROID-EPIDURAL-CERVICAL N/A 12/9/2016    [facility]    INJECTION-STEROID-EPIDURAL-CERVICAL N/A 7/14/2016    [facility]     INJECTION-STEROID-EPIDURAL-CERVICAL N/A 10/19/2015    [facility]    INJECTION-STEROID-EPIDURAL-LUMBAR N/A 5/15/2018    [facility]    INJECTION-STEROID-EPIDURAL-LUMBAR N/A 8/7/2017    [facility]    INJECTION-STEROID-EPIDURAL-LUMBAR N/A 8/12/2016    [facility]    MOUTH SURGERY      Braces, with temporary metal implants in upper gum    PERINEOPLASTY-w/WIDE LOCAL EXCISION N/A 3/18/2016    [facility]    RADIOFREQUENCY ABLATION  02/2016    thoracic nerve    RADIOFREQUENCY ABLATION THORACIC T4, T5, T6, T7 Right 10/12/2018    [facility]    RADIOFREQUENCY THERMOCOAGULATION (RFTC)-NERVE-MEDIAN BRANCH-LUMBAR L3,4,5 Right 10/19/2017    [facility]    RADIOFREQUENCY THERMOCOAGULATION MEDIAN BRANCHES T3,4,5,6 Right 2/5/2016    [facility]    RADIOFREQUENCY THERMOCOAGULATION THORACIC T3, T4, T5, T6 Right 5/30/2017    [facility]    TONSILLECTOMY      VULVA SURGERY  03/2016       Current Outpatient Medications   Medication Sig Dispense Refill    ALPRAZolam (XANAX) 0.25 MG tablet TAKE 1 TABLET BY MOUTH TWICE DAILY AS NEEDED for anxiety 30 tablet 0    estradiol (VIVELLE-DOT) 0.1 mg/24 hr PTSW Place 1 patch onto the skin twice a week. 8 patch 11    [START ON 12/3/2018] HYDROcodone-acetaminophen (NORCO) 7.5-325 mg per tablet Take 1 tablet by mouth 2 (two) times daily as needed for Pain. 60 tablet 0    ibuprofen-famotidine (DUEXIS) 800-26.6 mg Tab Take 1 tablet by mouth 3 (three) times daily. 90 tablet 11    METHYL SALICYLATE/MENTH/CAMPH (PAIN RELIEVING CREAM TOP) Apply 1 application topically daily as needed.      ondansetron (ZOFRAN-ODT) 8 MG TbDL Take 1 tablet (8 mg total) by mouth every 6 (six)  hours as needed. 20 tablet 0    SYNTHROID 125 mcg tablet TAKE 1 TABLET BY MOUTH before breakfast 90 tablet 1     No current facility-administered medications for this visit.        Review of patient's allergies indicates:   Allergen Reactions    Compazine [prochlorperazine edisylate] Anaphylaxis    Avelox [moxifloxacin] Rash       Family History   Problem Relation Age of Onset    Arthritis Mother     Celiac disease Mother     COPD Father        Social History     Socioeconomic History    Marital status:      Spouse name: None    Number of children: None    Years of education: None    Highest education level: None   Social Needs    Financial resource strain: None    Food insecurity - worry: None    Food insecurity - inability: None    Transportation needs - medical: None    Transportation needs - non-medical: None   Occupational History    None   Tobacco Use    Smoking status: Never Smoker    Smokeless tobacco: Never Used   Substance and Sexual Activity    Alcohol use: No    Drug use: No    Sexual activity: Yes     Partners: Male   Other Topics Concern    None   Social History Narrative    None       OB History   No data available       Review of Symptoms:  GENERAL: fatigue and loss of concentration.   SKIN: Denies rash or lesions.   HEAD: Denies head injury or headache.   NODES: Denies enlarged lymph nodes.   CHEST: Denies chest pain or shortness of breath.   CARDIOVASCULAR: Denies palpitations or left sided chest pain.   ABDOMEN: No abdominal pain, constipation, diarrhea, nausea, vomiting or rectal bleeding. ++celiac disease  URINARY: No frequency, dysuria, hematuria, or burning on urination.  HEMATOLOGIC: No easy bruisability or excessive bleeding.   MUSCULOSKELETAL: back pain    /70   Wt 81.4 kg (179 lb 7.3 oz)   Physical Exam:  APPEARANCE: Well nourished, well developed, in no acute distress.  SKIN: Normal skin turgor, no lesions.  NECK: Neck symmetric without masses    RESPIRATORY: Normal respiratory effort with no retractions or use of accessory muscles  CARDIOVASCULAR: Peripheral vascular system with no swelling no varicosities and palpation of pulses normal  LYMPHATIC: No enlargements of the lymph nodes noted in the neck, axillae, or groin  ABDOMEN: Soft. No tenderness or masses. No hepatosplenomegaly. No hernias.  BREASTS: Symmetrical, no skin changes or visible lesions. No palpable masses, nipple discharge or adenopathy bilaterally.  PELVIC: Normal external female genitalia without lesions. Normal hair distribution. Adequate perineal body, normal urethral meatus. Urethra with no masses.  Bladder nontender. Vagina moist and well rugated without lesions or discharge. No significant cystocele or rectocele.  Adnexa without masses or tenderness. Urethra and bladder normal.   EXTREMITIES: No clubbing cyanosis or edema.    ASSESSMENT/PLAN:  Gynecologic exam normal    Hormone replacement therapy (HRT)  -     estradiol (VIVELLE-DOT) 0.1 mg/24 hr PTSW; Place 1 patch onto the skin twice a week.  Dispense: 8 patch; Refill: 11    S/P hysterectomy with oophorectomy    Fatigue, unspecified type      We discussed compounded HRT with testo. Mammo plans with [facility]    Patient was counseled today on Pap guidelines, recommendation for yearly exams, mammograms every other year after the age of 40 and annually after the age of 50, Colonoscopy after the age of 50, Dexa Bone Scan and calcium and vitamin D supplementation in menopause and to see her PCP for other health maintenance.   FOLLOW-UP:prn

## 2019-01-02 ENCOUNTER — OFFICE VISIT (OUTPATIENT)
Dept: PAIN MEDICINE | Facility: CLINIC | Age: 59
End: 2019-01-02
Payer: COMMERCIAL

## 2019-01-02 VITALS
HEART RATE: 64 BPM | DIASTOLIC BLOOD PRESSURE: 72 MMHG | SYSTOLIC BLOOD PRESSURE: 122 MMHG | BODY MASS INDEX: 33.6 KG/M2 | WEIGHT: 177.81 LBS | TEMPERATURE: 99 F | RESPIRATION RATE: 18 BRPM

## 2019-01-02 DIAGNOSIS — M54.16 LUMBAR RADICULOPATHY: Primary | ICD-10-CM

## 2019-01-02 DIAGNOSIS — M50.30 DDD (DEGENERATIVE DISC DISEASE), CERVICAL: ICD-10-CM

## 2019-01-02 DIAGNOSIS — M51.36 DDD (DEGENERATIVE DISC DISEASE), LUMBAR: ICD-10-CM

## 2019-01-02 DIAGNOSIS — M41.9 SCOLIOSIS OF THORACIC SPINE, UNSPECIFIED SCOLIOSIS TYPE: ICD-10-CM

## 2019-01-02 DIAGNOSIS — M47.814 SPONDYLOSIS OF THORACIC REGION WITHOUT MYELOPATHY OR RADICULOPATHY: ICD-10-CM

## 2019-01-02 DIAGNOSIS — M47.816 LUMBAR SPONDYLOSIS: ICD-10-CM

## 2019-01-02 DIAGNOSIS — M41.9 SCOLIOSIS OF LUMBAR SPINE, UNSPECIFIED SCOLIOSIS TYPE: ICD-10-CM

## 2019-01-02 PROCEDURE — 99999 PR PBB SHADOW E&M-EST. PATIENT-LVL III: ICD-10-PCS | Mod: PBBFAC,,, | Performed by: PHYSICIAN ASSISTANT

## 2019-01-02 PROCEDURE — 99999 PR PBB SHADOW E&M-EST. PATIENT-LVL III: CPT | Mod: PBBFAC,,, | Performed by: PHYSICIAN ASSISTANT

## 2019-01-02 PROCEDURE — 99214 PR OFFICE/OUTPT VISIT, EST, LEVL IV, 30-39 MIN: ICD-10-PCS | Mod: S$GLB,,, | Performed by: PHYSICIAN ASSISTANT

## 2019-01-02 PROCEDURE — 99214 OFFICE O/P EST MOD 30 MIN: CPT | Mod: S$GLB,,, | Performed by: PHYSICIAN ASSISTANT

## 2019-01-02 RX ORDER — HYDROCODONE BITARTRATE AND ACETAMINOPHEN 7.5; 325 MG/1; MG/1
1 TABLET ORAL 2 TIMES DAILY PRN
Qty: 60 TABLET | Refills: 0 | Status: SHIPPED | OUTPATIENT
Start: 2019-02-21 | End: 2019-03-13 | Stop reason: SDUPTHER

## 2019-01-02 RX ORDER — HYDROCODONE BITARTRATE AND ACETAMINOPHEN 7.5; 325 MG/1; MG/1
1 TABLET ORAL 2 TIMES DAILY PRN
Qty: 60 TABLET | Refills: 0 | Status: SHIPPED | OUTPATIENT
Start: 2019-01-22 | End: 2019-02-21

## 2019-01-02 RX ORDER — ALPRAZOLAM 0.5 MG/1
1 TABLET, ORALLY DISINTEGRATING ORAL ONCE AS NEEDED
Status: CANCELLED | OUTPATIENT
Start: 2019-01-16 | End: 2030-06-13

## 2019-01-02 NOTE — PROGRESS NOTES
This note was completed with dictation software and grammatical errors may exist.    CC:Back pain, neck pain    HPI: The patient is a 58-year-old woman with history of hypothyroidism, otherwise fairly healthy but a long history of scoliosis causing back pain, self-referred for continued back pain.  She returns in follow-up today with worsening low back pain. She describes right low back pain that radiates to the right buttock and right posterior and lateral thigh.  This pain started about 1 month ago and has been slowly worsening.  She had about 6 months of almost complete relief following her last lumbar JINA in May.  Her pain is typically worse at the end of the day and at night.  She has difficulty getting in a comfortable position.  She reports relief with pain medication.  She denies weakness, numbness, bladder or bowel incontinence.    Pain intervention history: She has been seeing Dr. Huffman for the last several years and has undergone epidural steroid injections and radiofrequency ablations with good relief.  According to her last pain management physician, she had undergone a right side T3, 4, 5, 6 medial branch radiofrequency ablation on 8/8/14 with good relief.  He had scheduled her for another one but did not complete this.  As far as medications she has been taking Hydrocodone 7.5/325 one to 2 times a day at most and gabapentin 300 mg at night. She is status post cervical epidural steroid injection on 10/19/15 with 50% relief of her neck pain. She is status post right L4 transforaminal epidural steroid injection on 11/23/15 with 100% relief.   She is status post right T3, 4, 5 and 6 medial branch radio frequency ablation on 2/5/16 with greater than 50% relief.  She is status post right L4 transforaminal epidural sterile injection on 6/14/16 with significant relief lasting only about a month.  She is status post C7-T1 cervical interlaminar epidural steroid injection on 7/14/16 with 30-40% relief.   She  is status post L5/S1 interlaminar epidural steroidal injection to the right on 8/12/16 with 100% relief of her right leg pain.  She is status post C7-T1 cervical interlaminar epidural steroid injection on 12/9/16 with almost complete relief.  She is status post right T4, 5, 6 and 7 medial branch radiofrequency ablation on 5/30/17 with 100% relief.  She is status post C7-T1 cervical interlaminar epidural steroid injection on 6/27/17 with 80% relief.  She is status post L5/S1 interlaminar epidural steroid injection on a/7/17 with excellent relief lasting 2 weeks, now reporting 0% relief.  She is status post right L3, 4 and 5 medial branch radiofrequency ablation on 10/19/17 with 80% relief.   She is status post right T4, 5, 6 and 7 medial branch radiofrequency ablation on 12/14/17 with 100% relief.  She is status post C7-T1 cervical interlaminar epidural steroid injection on 3/16/18 with at least 80% relief.  She is status post lumbar epidural steroid injection at L5/S1 on 5/15/18 with 90% relief of her bilateral low back and right leg pain.  The she is status post right T4, 5, 6 and 7 medial branch radiofrequency ablation on 10/12/2018 with 100% relief.     ROS: She reports fatigability, headaches, hypothyroidism, joint stiffness, back pain, difficulty sleeping and anxiety.  Balance of review of systems is negative.      Medical, surgical, family and social history reviewed elsewhere in record.  She is a  at [employer] [facility] [place].    Medications/Allergies: See med card    Vitals:    01/02/19 1338   BP: 122/72   Pulse: 64   Resp: 18   Temp: 98.6 °F (37 °C)   TempSrc: Oral   Weight: 80.7 kg (177 lb 12.8 oz)   PainSc:   4   PainLoc: Back         Physical exam:  Gen: A and O x3, pleasant, well-groomed  Skin: No rashes or obvious lesions  HEENT: PERRLA, no obvious deformities on ears or in canals. Trachea midline.  CVS: Regular rate and rhythm, normal palpable pulses.  Resp:No increased work of breathing, symmetrical  chest rise.  Abdomen: Soft, NT/ND.  Musculoskeletal:No antalgic gait.     Neuro:  Upper extremities: 5/5 strength bilaterally.  Lower extremities: 5/5 strength bilaterally.    Reflexes: Patellar 2+, Achilles 2+ bilaterally.  Upper extremity reflexes 2+ bilaterally equal.  Sensory:  Intact and symmetrical to light touch and pinprick in L2-S1 dermatomes bilaterally.    Cervical spine exam: Range of motion is full with flexion, extension and lateral rotation without pain.  Myofascial exam: No tenderness to palpation to the cervical or thoracic paraspinous muscles.    Lumbar spine:  Lumbar spine: Range of motion is full with flexion without increased pain. Range of motion is full with extension with increased right low back pain during extension and right oblique extension.  Brayden's test causes no increased pain on either side.    Supine straight leg raise causes right low back and right buttock pain.  Internal and external rotation of the hip causes no increased pain on either side.  Myofascial exam:  Moderate tenderness to palpation to the right lumbar paraspinous muscles.      Imagin/16/15 Xray Scoliosis survey: There is thoracic dextroscoliosis with mild lumbar compensatory levoscoliosis. No structural abnormalities are evident. Diffuse spondylosis noted in the cervical, thoracic and to lesser extent lumbar spine. No fractures are identified. There is slight increased kyphotic curvature of the thoracic spine with alignment being otherwise normal. Measurements and evaluation are limited due to underpenetration. Land angle measures approximately in the thoracic spine is of approximately 17.0 degrees and for the lumbar spine 17.4 degrees. Soft tissues are unremarkable. IMPRESSION: 1. S shaped scoliotic curvature of the thoracolumbar spine with Land angle of approximately 17 degrees. 2. Diffuse spondylosis.    MRI report 12: Report notes that there is dextroconvex rotary curvature of the thoracic spine.   Posterior alignment is maintained.  There are scattered vertebral body hemangiomas.  There is minimal posterior disc bulges noted at T5/6, T6/7, T7/8 and T8/9.  There is no central spinal stenosis or neural foraminal narrowing.    MRI right shoulder 6/24/11: Minor distal subscapularis tendinosis.  Negative for full-thickness or significant partial-thickness rotator cuff tendon tear.  There is a small amount of subacromial subdeltoid bursal fluid which can be seen with recent injection or mild bursitis.    MRI cervical spine report 3/25/11.  Note that this is comparison to previous study on 7/29/2008.  This is a report only, no images were available to me.  The C2/3 disc appears grossly intact with no significant bulging.  C3/4 there is mild protrusion of the disc with no herniation and no nerve root entrapment seen.  Spinal stenosis is not noted.  At C4/5 there is broad-based disc protrusion which compresses the subarachnoid space without cord compression.  There is no definite nerve root compression seen.  At C5/6 there is vertebral body lesion which is bright on T2 and dark on T1 but it does not enhance and may represent an atypical hemangioma.  This is unchanged from previous study.  At C5/6 there is a protrusion of the disc asymmetrically to the left but no significant entrapment.  No change from previous study.  At C6/7 there is a focal protrusion of the disc causing compression of the subarachnoid space without definite cord or nerve root compression.  C7/T1 appears intact.    Lumbar spine MRI report 3/31/14: No acute abnormality or significant degenerative changes.  No canal or foraminal stenosis.    Lumbar spine MRI 10/19/15  T12-L1: No central canal or neuroforaminal stenosis. No disc protrusion or extrusion.  L1-L2: No central canal or neuroforaminal stenosis. No disc protrusion or extrusion.  L2-L3: No central canal or neuroforaminal stenosis. No disc protrusion or extrusion.  L3-L4: No central canal or  neuroforaminal stenosis. No disc protrusion or extrusion.  L4-L5: There is a broad disc bulge which extends into both neuroforamina. There is prominent ligamentum flavum thickening and facet arthropathy. There is mild bilateral neuroforaminal stenosis. No central canal stenosis.  L5-S1: There is bilateral hypertrophic facet arthropathy and ligamentum flavum thickening. No central canal or neuroforaminal stenosis. No disc protrusion or extrusion.    Cervical spine MRI 6/29/16  At C2-C3, the minimal interstitial excision covers a very mild broad-based disc bulge most prominent centrally but does not deform the ventral cord.  There is no canal or foraminal stenosis.  At C3-C4, there is mildly decreased disc height with a broad-based disc bulge most prominent centrally.  This minimally contact the ventral cord without significant deformity.  The canal is widely patent.  There is uncovertebral hypertrophy and facet arthropathy, but the foramina are patent.  At C4-C5, there is decreased disc height with a broad disc bulge-marginal osteophyte complex that mildly lateralizes to the left and blends imperceptibly with right greater than left uncovertebral hypertrophy.  With ligamentum hypertrophy, there is no significant central canal stenosis.  Uncovertebral hypertrophy and facet arthropathy are causing mild left and moderate right foraminal stenoses.  At C5-C6, there is a broad-based disc bulge and osteophyte complex most prominent centrally.  There is also ligamentum flavum hypertrophy present, but the canal is patent.  There is mild left foraminal stenosis.  The right foramen is patent.  At C6-C7, there is disc desiccation with a minimal broad-based disc bulge most prominent in the right paracentral canal.  The canal is widely patent.  There is mild left foraminal stenosis.  At C7-T1, there is no significant disc bulge, protrusion, or herniation/extrusion.  The canal and foramina are widely patent.    MRI lumbar spine  9/22/17  L1-L2 demonstrates no significant disc protrusion, central spinal canal stenosis, or neural foraminal stenosis.  L2-L3 demonstrates no significant disc protrusion, central spinal canal stenosis, or neural foraminal stenosis. Mild facet arthrosis is seen.  L3-L4 demonstrates no significant disc protrusion, central spinal canal stenosis, or neural foraminal stenosis. Mild facet arthrosis is seen.  L4-L5 demonstrates mild broad-based disc bulge, ligamentum flavum hypertrophy, and mild/moderate bilateral facet arthrosis. No significant central spinal canal stenosis is seen. Mild bilateral lateral recess narrowing and neural foraminal narrowing is seen. Similar findings were seen on the prior MRI.  L5-S1 demonstrates no significant posterior disc protrusion. Moderate bilateral facet arthrosis is seen along ligamentum flavum hypertrophy. No significant central spinal canal or neural foraminal stenosis is seen.    Hip x-rays 9/22/17  AP view of the pelvis and frog leg views of both hips were obtained. No evidence of acute displaced fracture or dislocation is visualized.  No radiopaque foreign bodies are visualized. The bilateral superior joint spaces are grossly maintained. Mild bilateral sacroiliac joint degenerative changes are seen including subchondral sclerosis and small marginal osteophytes. Mild to moderate symphyseal degenerative changes are seen. There is a slightly expansile cortically-based focus along the lateral proximal right femoral metadiaphysis. This could reflect a sessile osteochondroma, but correlation with MRI of the right hip is recommended.      Assessment:  The patient is a 58-year-old woman with history of hypothyroidism, otherwise fairly healthy but a long history of scoliosis causing back pain, self-referred for continued back pain.    1. Lumbar radiculopathy     2. DDD (degenerative disc disease), lumbar     3. Lumbar spondylosis     4. Scoliosis of thoracic spine, unspecified scoliosis  type     5. Scoliosis of lumbar spine, unspecified scoliosis type     6. Spondylosis of thoracic region without myelopathy or radiculopathy     7. DDD (degenerative disc disease), cervical         Plan:  1.  Since the patient has a return of her lumbar radicular pain, I will schedule her for an L5/S1 interlaminar JINA to the right.  She had 6 months of relief with her last injection.  2.  We discussed repeating a cervical JINA in the future if necessary but currently her neck pain is very with tolerable.   3.  Dr. Coleman provided prescriptions for hydrocodone-acetaminophen 7.5/325 mg up to 2 times daily as needed for pain.  I have reviewed the [place] [employer] website and there are no abberancies.  Her prescriptions typically last longer than 1 month.  4.  Follow-up in 4 weeks postprocedure sooner as needed.    Greater than 50% of this 25 min visit was spent counseling the patient.

## 2019-01-02 NOTE — H&P (VIEW-ONLY)
This note was completed with dictation software and grammatical errors may exist.    CC:Back pain, neck pain    HPI: The patient is a 58-year-old woman with history of hypothyroidism, otherwise fairly healthy but a long history of scoliosis causing back pain, self-referred for continued back pain.  She returns in follow-up today with worsening low back pain. She describes right low back pain that radiates to the right buttock and right posterior and lateral thigh.  This pain started about 1 month ago and has been slowly worsening.  She had about 6 months of almost complete relief following her last lumbar JINA in May.  Her pain is typically worse at the end of the day and at night.  She has difficulty getting in a comfortable position.  She reports relief with pain medication.  She denies weakness, numbness, bladder or bowel incontinence.    Pain intervention history: She has been seeing Dr. Huffman for the last several years and has undergone epidural steroid injections and radiofrequency ablations with good relief.  According to her last pain management physician, she had undergone a right side T3, 4, 5, 6 medial branch radiofrequency ablation on 8/8/14 with good relief.  He had scheduled her for another one but did not complete this.  As far as medications she has been taking Hydrocodone 7.5/325 one to 2 times a day at most and gabapentin 300 mg at night. She is status post cervical epidural steroid injection on 10/19/15 with 50% relief of her neck pain. She is status post right L4 transforaminal epidural steroid injection on 11/23/15 with 100% relief.   She is status post right T3, 4, 5 and 6 medial branch radio frequency ablation on 2/5/16 with greater than 50% relief.  She is status post right L4 transforaminal epidural sterile injection on 6/14/16 with significant relief lasting only about a month.  She is status post C7-T1 cervical interlaminar epidural steroid injection on 7/14/16 with 30-40% relief.   She  is status post L5/S1 interlaminar epidural steroidal injection to the right on 8/12/16 with 100% relief of her right leg pain.  She is status post C7-T1 cervical interlaminar epidural steroid injection on 12/9/16 with almost complete relief.  She is status post right T4, 5, 6 and 7 medial branch radiofrequency ablation on 5/30/17 with 100% relief.  She is status post C7-T1 cervical interlaminar epidural steroid injection on 6/27/17 with 80% relief.  She is status post L5/S1 interlaminar epidural steroid injection on a/7/17 with excellent relief lasting 2 weeks, now reporting 0% relief.  She is status post right L3, 4 and 5 medial branch radiofrequency ablation on 10/19/17 with 80% relief.   She is status post right T4, 5, 6 and 7 medial branch radiofrequency ablation on 12/14/17 with 100% relief.  She is status post C7-T1 cervical interlaminar epidural steroid injection on 3/16/18 with at least 80% relief.  She is status post lumbar epidural steroid injection at L5/S1 on 5/15/18 with 90% relief of her bilateral low back and right leg pain.  The she is status post right T4, 5, 6 and 7 medial branch radiofrequency ablation on 10/12/2018 with 100% relief.     ROS: She reports fatigability, headaches, hypothyroidism, joint stiffness, back pain, difficulty sleeping and anxiety.  Balance of review of systems is negative.      Medical, surgical, family and social history reviewed elsewhere in record.  She is a  at [employer].    Medications/Allergies: See med card    Vitals:    01/02/19 1338   BP: 122/72   Pulse: 64   Resp: 18   Temp: 98.6 °F (37 °C)   TempSrc: Oral   Weight: 80.7 kg (177 lb 12.8 oz)   PainSc:   4   PainLoc: Back         Physical exam:  Gen: A and O x3, pleasant, well-groomed  Skin: No rashes or obvious lesions  HEENT: PERRLA, no obvious deformities on ears or in canals. Trachea midline.  CVS: Regular rate and rhythm, normal palpable pulses.  Resp:No increased work of breathing, symmetrical  chest rise.  Abdomen: Soft, NT/ND.  Musculoskeletal:No antalgic gait.     Neuro:  Upper extremities: 5/5 strength bilaterally.  Lower extremities: 5/5 strength bilaterally.    Reflexes: Patellar 2+, Achilles 2+ bilaterally.  Upper extremity reflexes 2+ bilaterally equal.  Sensory:  Intact and symmetrical to light touch and pinprick in L2-S1 dermatomes bilaterally.    Cervical spine exam: Range of motion is full with flexion, extension and lateral rotation without pain.  Myofascial exam: No tenderness to palpation to the cervical or thoracic paraspinous muscles.    Lumbar spine:  Lumbar spine: Range of motion is full with flexion without increased pain. Range of motion is full with extension with increased right low back pain during extension and right oblique extension.  Brayden's test causes no increased pain on either side.    Supine straight leg raise causes right low back and right buttock pain.  Internal and external rotation of the hip causes no increased pain on either side.  Myofascial exam:  Moderate tenderness to palpation to the right lumbar paraspinous muscles.      Imagin/16/15 Xray Scoliosis survey: There is thoracic dextroscoliosis with mild lumbar compensatory levoscoliosis. No structural abnormalities are evident. Diffuse spondylosis noted in the cervical, thoracic and to lesser extent lumbar spine. No fractures are identified. There is slight increased kyphotic curvature of the thoracic spine with alignment being otherwise normal. Measurements and evaluation are limited due to underpenetration. Land angle measures approximately in the thoracic spine is of approximately 17.0 degrees and for the lumbar spine 17.4 degrees. Soft tissues are unremarkable. IMPRESSION: 1. S shaped scoliotic curvature of the thoracolumbar spine with Land angle of approximately 17 degrees. 2. Diffuse spondylosis.    MRI report 12: Report notes that there is dextroconvex rotary curvature of the thoracic spine.   Posterior alignment is maintained.  There are scattered vertebral body hemangiomas.  There is minimal posterior disc bulges noted at T5/6, T6/7, T7/8 and T8/9.  There is no central spinal stenosis or neural foraminal narrowing.    MRI right shoulder 6/24/11: Minor distal subscapularis tendinosis.  Negative for full-thickness or significant partial-thickness rotator cuff tendon tear.  There is a small amount of subacromial subdeltoid bursal fluid which can be seen with recent injection or mild bursitis.    MRI cervical spine report 3/25/11.  Note that this is comparison to previous study on 7/29/2008.  This is a report only, no images were available to me.  The C2/3 disc appears grossly intact with no significant bulging.  C3/4 there is mild protrusion of the disc with no herniation and no nerve root entrapment seen.  Spinal stenosis is not noted.  At C4/5 there is broad-based disc protrusion which compresses the subarachnoid space without cord compression.  There is no definite nerve root compression seen.  At C5/6 there is vertebral body lesion which is bright on T2 and dark on T1 but it does not enhance and may represent an atypical hemangioma.  This is unchanged from previous study.  At C5/6 there is a protrusion of the disc asymmetrically to the left but no significant entrapment.  No change from previous study.  At C6/7 there is a focal protrusion of the disc causing compression of the subarachnoid space without definite cord or nerve root compression.  C7/T1 appears intact.    Lumbar spine MRI report 3/31/14: No acute abnormality or significant degenerative changes.  No canal or foraminal stenosis.    Lumbar spine MRI 10/19/15  T12-L1: No central canal or neuroforaminal stenosis. No disc protrusion or extrusion.  L1-L2: No central canal or neuroforaminal stenosis. No disc protrusion or extrusion.  L2-L3: No central canal or neuroforaminal stenosis. No disc protrusion or extrusion.  L3-L4: No central canal or  neuroforaminal stenosis. No disc protrusion or extrusion.  L4-L5: There is a broad disc bulge which extends into both neuroforamina. There is prominent ligamentum flavum thickening and facet arthropathy. There is mild bilateral neuroforaminal stenosis. No central canal stenosis.  L5-S1: There is bilateral hypertrophic facet arthropathy and ligamentum flavum thickening. No central canal or neuroforaminal stenosis. No disc protrusion or extrusion.    Cervical spine MRI 6/29/16  At C2-C3, the minimal interstitial excision covers a very mild broad-based disc bulge most prominent centrally but does not deform the ventral cord.  There is no canal or foraminal stenosis.  At C3-C4, there is mildly decreased disc height with a broad-based disc bulge most prominent centrally.  This minimally contact the ventral cord without significant deformity.  The canal is widely patent.  There is uncovertebral hypertrophy and facet arthropathy, but the foramina are patent.  At C4-C5, there is decreased disc height with a broad disc bulge-marginal osteophyte complex that mildly lateralizes to the left and blends imperceptibly with right greater than left uncovertebral hypertrophy.  With ligamentum hypertrophy, there is no significant central canal stenosis.  Uncovertebral hypertrophy and facet arthropathy are causing mild left and moderate right foraminal stenoses.  At C5-C6, there is a broad-based disc bulge and osteophyte complex most prominent centrally.  There is also ligamentum flavum hypertrophy present, but the canal is patent.  There is mild left foraminal stenosis.  The right foramen is patent.  At C6-C7, there is disc desiccation with a minimal broad-based disc bulge most prominent in the right paracentral canal.  The canal is widely patent.  There is mild left foraminal stenosis.  At C7-T1, there is no significant disc bulge, protrusion, or herniation/extrusion.  The canal and foramina are widely patent.    MRI lumbar spine  9/22/17  L1-L2 demonstrates no significant disc protrusion, central spinal canal stenosis, or neural foraminal stenosis.  L2-L3 demonstrates no significant disc protrusion, central spinal canal stenosis, or neural foraminal stenosis. Mild facet arthrosis is seen.  L3-L4 demonstrates no significant disc protrusion, central spinal canal stenosis, or neural foraminal stenosis. Mild facet arthrosis is seen.  L4-L5 demonstrates mild broad-based disc bulge, ligamentum flavum hypertrophy, and mild/moderate bilateral facet arthrosis. No significant central spinal canal stenosis is seen. Mild bilateral lateral recess narrowing and neural foraminal narrowing is seen. Similar findings were seen on the prior MRI.  L5-S1 demonstrates no significant posterior disc protrusion. Moderate bilateral facet arthrosis is seen along ligamentum flavum hypertrophy. No significant central spinal canal or neural foraminal stenosis is seen.    Hip x-rays 9/22/17  AP view of the pelvis and frog leg views of both hips were obtained. No evidence of acute displaced fracture or dislocation is visualized.  No radiopaque foreign bodies are visualized. The bilateral superior joint spaces are grossly maintained. Mild bilateral sacroiliac joint degenerative changes are seen including subchondral sclerosis and small marginal osteophytes. Mild to moderate symphyseal degenerative changes are seen. There is a slightly expansile cortically-based focus along the lateral proximal right femoral metadiaphysis. This could reflect a sessile osteochondroma, but correlation with MRI of the right hip is recommended.      Assessment:  The patient is a 58-year-old woman with history of hypothyroidism, otherwise fairly healthy but a long history of scoliosis causing back pain, self-referred for continued back pain.    1. Lumbar radiculopathy     2. DDD (degenerative disc disease), lumbar     3. Lumbar spondylosis     4. Scoliosis of thoracic spine, unspecified scoliosis  type     5. Scoliosis of lumbar spine, unspecified scoliosis type     6. Spondylosis of thoracic region without myelopathy or radiculopathy     7. DDD (degenerative disc disease), cervical         Plan:  1.  Since the patient has a return of her lumbar radicular pain, I will schedule her for an L5/S1 interlaminar JINA to the right.  She had 6 months of relief with her last injection.  2.  We discussed repeating a cervical JINA in the future if necessary but currently her neck pain is very with tolerable.   3.  Dr. Coleman provided prescriptions for hydrocodone-acetaminophen 7.5/325 mg up to 2 times daily as needed for pain.  I have reviewed the [place] Board of Pharmacy website and there are no abberancies.  Her prescriptions typically last longer than 1 month.  4.  Follow-up in 4 weeks postprocedure sooner as needed.    Greater than 50% of this 25 min visit was spent counseling the patient.

## 2019-01-15 DIAGNOSIS — M51.36 DDD (DEGENERATIVE DISC DISEASE), LUMBAR: Primary | ICD-10-CM

## 2019-01-16 ENCOUNTER — HOSPITAL ENCOUNTER (OUTPATIENT)
Dept: RADIOLOGY | Facility: HOSPITAL | Age: 59
Discharge: HOME | End: 2019-01-16
Attending: ANESTHESIOLOGY
Payer: COMMERCIAL

## 2019-01-16 ENCOUNTER — HOSPITAL ENCOUNTER (OUTPATIENT)
Facility: HOSPITAL | Age: 59
Discharge: HOME | End: 2019-01-16
Attending: ANESTHESIOLOGY | Admitting: ANESTHESIOLOGY
Payer: COMMERCIAL

## 2019-01-16 DIAGNOSIS — M51.36 DDD (DEGENERATIVE DISC DISEASE), LUMBAR: ICD-10-CM

## 2019-01-16 DIAGNOSIS — M54.16 LUMBAR RADICULOPATHY: Primary | ICD-10-CM

## 2019-01-16 PROCEDURE — 76000 FLUOROSCOPY <1 HR PHYS/QHP: CPT | Mod: TC,PO

## 2019-01-16 PROCEDURE — 99152 PR MOD CONSCIOUS SEDATION, SAME PHYS, 5+ YRS, FIRST 15 MIN: ICD-10-PCS | Mod: ,,, | Performed by: ANESTHESIOLOGY

## 2019-01-16 PROCEDURE — A4216 STERILE WATER/SALINE, 10 ML: HCPCS | Mod: PO | Performed by: ANESTHESIOLOGY

## 2019-01-16 PROCEDURE — 99152 MOD SED SAME PHYS/QHP 5/>YRS: CPT | Mod: ,,, | Performed by: ANESTHESIOLOGY

## 2019-01-16 PROCEDURE — 62323 PR INJ LUMBAR/SACRAL, W/IMAGING GUIDANCE: ICD-10-PCS | Mod: ,,, | Performed by: ANESTHESIOLOGY

## 2019-01-16 PROCEDURE — 63600175 PHARM REV CODE 636 W HCPCS: Mod: PO | Performed by: ANESTHESIOLOGY

## 2019-01-16 PROCEDURE — 25000003 PHARM REV CODE 250: Mod: PO | Performed by: ANESTHESIOLOGY

## 2019-01-16 PROCEDURE — 62323 NJX INTERLAMINAR LMBR/SAC: CPT | Mod: ,,, | Performed by: ANESTHESIOLOGY

## 2019-01-16 PROCEDURE — 25500020 PHARM REV CODE 255: Mod: PO | Performed by: ANESTHESIOLOGY

## 2019-01-16 PROCEDURE — 62323 NJX INTERLAMINAR LMBR/SAC: CPT | Mod: PO | Performed by: ANESTHESIOLOGY

## 2019-01-16 RX ORDER — SODIUM CHLORIDE, SODIUM LACTATE, POTASSIUM CHLORIDE, CALCIUM CHLORIDE 600; 310; 30; 20 MG/100ML; MG/100ML; MG/100ML; MG/100ML
INJECTION, SOLUTION INTRAVENOUS CONTINUOUS
Status: DISCONTINUED | OUTPATIENT
Start: 2019-01-16 | End: 2019-01-16 | Stop reason: HOSPADM

## 2019-01-16 RX ORDER — METHYLPREDNISOLONE ACETATE 80 MG/ML
INJECTION, SUSPENSION INTRA-ARTICULAR; INTRALESIONAL; INTRAMUSCULAR; SOFT TISSUE
Status: DISCONTINUED | OUTPATIENT
Start: 2019-01-16 | End: 2019-01-16 | Stop reason: HOSPADM

## 2019-01-16 RX ORDER — MIDAZOLAM HYDROCHLORIDE 2 MG/2ML
INJECTION, SOLUTION INTRAMUSCULAR; INTRAVENOUS
Status: DISCONTINUED | OUTPATIENT
Start: 2019-01-16 | End: 2019-01-16 | Stop reason: HOSPADM

## 2019-01-16 RX ORDER — SODIUM CHLORIDE 9 MG/ML
INJECTION, SOLUTION INTRAMUSCULAR; INTRAVENOUS; SUBCUTANEOUS
Status: DISCONTINUED | OUTPATIENT
Start: 2019-01-16 | End: 2019-01-16 | Stop reason: HOSPADM

## 2019-01-16 RX ORDER — ALPRAZOLAM 0.5 MG/1
1 TABLET, ORALLY DISINTEGRATING ORAL ONCE AS NEEDED
Status: DISCONTINUED | OUTPATIENT
Start: 2019-01-16 | End: 2019-01-16

## 2019-01-16 RX ORDER — LIDOCAINE HYDROCHLORIDE 10 MG/ML
INJECTION, SOLUTION EPIDURAL; INFILTRATION; INTRACAUDAL; PERINEURAL
Status: DISCONTINUED | OUTPATIENT
Start: 2019-01-16 | End: 2019-01-16 | Stop reason: HOSPADM

## 2019-01-16 RX ADMIN — SODIUM CHLORIDE, SODIUM LACTATE, POTASSIUM CHLORIDE, AND CALCIUM CHLORIDE: .6; .31; .03; .02 INJECTION, SOLUTION INTRAVENOUS at 02:01

## 2019-01-16 NOTE — OP NOTE

## 2019-01-16 NOTE — DISCHARGE SUMMARY
[facility]  Discharge Note  Short Stay    Admit Date: 1/16/2019    Discharge Date: 1/16/2019    Attending Physician: Floyd Coleman MD     Discharge Provider: Floyd Coleman    Diagnoses:  Active Hospital Problems    Diagnosis  POA    *Lumbar radiculopathy [M54.16]  Yes      Resolved Hospital Problems   No resolved problems to display.       Discharged Condition: good    Final Diagnoses: Lumbar radiculopathy [M54.16]    Disposition: Home or Self Care    Hospital Course: no complications, uneventful    Outcome of Hospitalization, Treatment, Procedure, or Surgery:  Patient was admitted for outpatient procedure. The patient underwent procedure without complications and are discharged home    Follow up/Patient Instructions:  Follow up as scheduled in Pain Management clinic in 3-4 weeks/Patient has received instructions and follow up date and time    Medications:  Continue previous medications    Discharge Procedure Orders   Call MD for:  temperature >100.4     Call MD for:  severe uncontrolled pain     Call MD for:  redness, tenderness, or signs of infection (pain, swelling, redness, odor or green/yellow discharge around incision site)     Call MD for:  severe persistent headache     No dressing needed         Discharge Procedure Orders (must include Diet, Follow-up, Activity):   Discharge Procedure Orders (must include Diet, Follow-up, Activity)   Call MD for:  temperature >100.4     Call MD for:  severe uncontrolled pain     Call MD for:  redness, tenderness, or signs of infection (pain, swelling, redness, odor or green/yellow discharge around incision site)     Call MD for:  severe persistent headache     No dressing needed

## 2019-01-16 NOTE — DISCHARGE INSTRUCTIONS
Home care instructions  Apply ice pack to the injection site for 20 minutes periods for the first 24 hrs for soreness/discomfort at injection site DO NOT USE HEAT FOR 24 HOURS  Keep site clean and dry for 24 hours, remove bandaid when desired  Do not drive until tomorrow  Take care when walking after a lumbar injection  Avoid strenuous activities for 2 days  Make take 2 weeks to feel the full effects   Resume home medication as prescribed today  Resume Aspirin, Plavix, or Coumadin the day after the procedure unless otherwise instructed.    SEE IMMEDIATE MEDICAL HELP FOR:  Severe increase in your usual pain or appearance of new pain  Prolonged or increasing weakness or numbness in the legs or arms  Drainage, redness, active bleeding, or increased swelling at the injection site  Temperature over 100.0 degrees F.  Headache that increases when your head is upright and decreases when you lie flat    CALL 911 OR GO DIRECTLY TO EMERGENCY DEPARTMENT FOR:  Shortness of breath, chest pain, or problems breathing        Recovery After Procedural Sedation (Adult)  You have been given medicine by vein to make you sleep during your surgery. This may have included both a pain medicine and sleeping medicine. Most of the effects have worn off. But you may still have some drowsiness for the next 6 to 8 hours.  Home care  Follow these guidelines when you get home:  · For the next 8 hours, you should be watched by a responsible adult. This person should make sure your condition is not getting worse.  · Don't drink any alcohol for the next 24 hours.  · Don't drive, operate dangerous machinery, or make important business or personal decisions during the next 24 hours.  Note: Your healthcare provider may tell you not to take any medicine by mouth for pain or sleep in the next 4 hours. These medicines may react with the medicines you were given in the hospital. This could cause a much stronger response than usual.  Follow-up care  Follow up  with your healthcare provider if you are not alert and back to your usual level of activity within 12 hours.  When to seek medical advice  Call your healthcare provider right away if any of these occur:  · Drowsiness gets worse  · Weakness or dizziness gets worse  · Repeated vomiting  · You can't be awakened   Date Last Reviewed: 10/18/2016  © 7479-4251 [employer] [address] [place] [address], [place] [address]. All rights reserved. This information is not intended as a substitute for professional medical care. Always follow your healthcare professional's instructions.

## 2019-01-17 VITALS
DIASTOLIC BLOOD PRESSURE: 70 MMHG | OXYGEN SATURATION: 98 % | TEMPERATURE: 97 F | WEIGHT: 170 LBS | BODY MASS INDEX: 32.1 KG/M2 | HEART RATE: 65 BPM | HEIGHT: 61 IN | SYSTOLIC BLOOD PRESSURE: 134 MMHG | RESPIRATION RATE: 16 BRPM

## 2019-01-28 DIAGNOSIS — E06.3 HASHIMOTO'S THYROIDITIS: ICD-10-CM

## 2019-01-28 DIAGNOSIS — M02.30 REACTIVE ARTHRITIS: ICD-10-CM

## 2019-01-28 DIAGNOSIS — R21 RASH: ICD-10-CM

## 2019-01-28 DIAGNOSIS — R76.8 ANA POSITIVE: ICD-10-CM

## 2019-01-28 RX ORDER — IBUPROFEN AND FAMOTIDINE 26.6; 8 MG/1; MG/1
1 TABLET ORAL 3 TIMES DAILY
Qty: 90 TABLET | Refills: 11 | Status: SHIPPED | OUTPATIENT
Start: 2019-01-28 | End: 2021-03-23 | Stop reason: SDUPTHER

## 2019-02-04 ENCOUNTER — TELEPHONE (OUTPATIENT)
Dept: PAIN MEDICINE | Facility: CLINIC | Age: 59
End: 2019-02-04

## 2019-02-16 ENCOUNTER — PATIENT MESSAGE (OUTPATIENT)
Dept: RHEUMATOLOGY | Facility: CLINIC | Age: 59
End: 2019-02-16

## 2019-02-25 ENCOUNTER — TELEPHONE (OUTPATIENT)
Dept: RHEUMATOLOGY | Facility: CLINIC | Age: 59
End: 2019-02-25

## 2019-02-25 NOTE — TELEPHONE ENCOUNTER
----- Message from Blanca Peters sent at 2/25/2019  9:35 AM CST -----  Contact: pt  ..Type: Needs Medical Advice    Who Called: pt  Symptoms (please be specific): Auto Immune rash all over body  How long has patient had these symptoms:  3 weeks    Best Call Back Number:   Additional Information: Pt stated she has a Burning rash all over her body that keeps coming back, needs advise on what to do.  Please call to advise  Thanks

## 2019-02-25 NOTE — TELEPHONE ENCOUNTER
Contacted patient and scheduled appointment with Destiny GALDAMEZ for rash assessment. Patient aware of date and time of appointment.

## 2019-02-26 ENCOUNTER — OFFICE VISIT (OUTPATIENT)
Dept: DERMATOLOGY | Facility: CLINIC | Age: 59
End: 2019-02-26
Payer: COMMERCIAL

## 2019-02-26 ENCOUNTER — OFFICE VISIT (OUTPATIENT)
Dept: RHEUMATOLOGY | Facility: CLINIC | Age: 59
End: 2019-02-26
Payer: COMMERCIAL

## 2019-02-26 VITALS — HEIGHT: 61 IN | BODY MASS INDEX: 32.85 KG/M2 | RESPIRATION RATE: 16 BRPM | WEIGHT: 174 LBS

## 2019-02-26 VITALS
BODY MASS INDEX: 32.89 KG/M2 | DIASTOLIC BLOOD PRESSURE: 87 MMHG | HEIGHT: 61 IN | HEART RATE: 78 BPM | WEIGHT: 174.19 LBS | SYSTOLIC BLOOD PRESSURE: 146 MMHG

## 2019-02-26 DIAGNOSIS — R21 RASH: Primary | ICD-10-CM

## 2019-02-26 DIAGNOSIS — R21 RASH AND NONSPECIFIC SKIN ERUPTION: ICD-10-CM

## 2019-02-26 DIAGNOSIS — K90.0 CELIAC SYNDROME: ICD-10-CM

## 2019-02-26 DIAGNOSIS — R20.9 DISTURBANCE OF SKIN SENSATION: Primary | ICD-10-CM

## 2019-02-26 DIAGNOSIS — E06.3 HASHIMOTO'S THYROIDITIS: ICD-10-CM

## 2019-02-26 DIAGNOSIS — M02.30 REACTIVE ARTHRITIS: ICD-10-CM

## 2019-02-26 DIAGNOSIS — R11.0 NAUSEA: ICD-10-CM

## 2019-02-26 DIAGNOSIS — E66.9 OBESITY (BMI 30.0-34.9): ICD-10-CM

## 2019-02-26 PROBLEM — E66.811 OBESITY (BMI 30.0-34.9): Status: ACTIVE | Noted: 2019-02-26

## 2019-02-26 PROBLEM — M54.16 RIGHT LUMBAR RADICULOPATHY: Status: RESOLVED | Noted: 2018-05-15 | Resolved: 2019-02-26

## 2019-02-26 PROCEDURE — 88312 SPECIAL STAINS GROUP 1: CPT | Performed by: PATHOLOGY

## 2019-02-26 PROCEDURE — 99214 PR OFFICE/OUTPT VISIT, EST, LEVL IV, 30-39 MIN: ICD-10-PCS | Mod: 25,S$GLB,, | Performed by: PHYSICIAN ASSISTANT

## 2019-02-26 PROCEDURE — 88341 PR IHC OR ICC EACH ADD'L SINGLE ANTIBODY  STAINPR: ICD-10-PCS | Mod: 26,,, | Performed by: PATHOLOGY

## 2019-02-26 PROCEDURE — 99999 PR PBB SHADOW E&M-EST. PATIENT-LVL III: ICD-10-PCS | Mod: PBBFAC,,, | Performed by: PHYSICIAN ASSISTANT

## 2019-02-26 PROCEDURE — 88342 IMHCHEM/IMCYTCHM 1ST ANTB: CPT | Mod: 26,,, | Performed by: PATHOLOGY

## 2019-02-26 PROCEDURE — 11104 PR PUNCH BIOPSY, SKIN, SINGLE LESION: ICD-10-PCS | Mod: S$GLB,,, | Performed by: DERMATOLOGY

## 2019-02-26 PROCEDURE — 99203 PR OFFICE/OUTPT VISIT, NEW, LEVL III, 30-44 MIN: ICD-10-PCS | Mod: 25,S$GLB,, | Performed by: DERMATOLOGY

## 2019-02-26 PROCEDURE — 96372 THER/PROPH/DIAG INJ SC/IM: CPT | Mod: S$GLB,,, | Performed by: PHYSICIAN ASSISTANT

## 2019-02-26 PROCEDURE — 88312 SPECIAL STAINS GROUP 1: CPT | Mod: 26,,, | Performed by: PATHOLOGY

## 2019-02-26 PROCEDURE — 88341 IMHCHEM/IMCYTCHM EA ADD ANTB: CPT | Mod: 26,,, | Performed by: PATHOLOGY

## 2019-02-26 PROCEDURE — 96372 PR INJECTION,THERAP/PROPH/DIAG2ST, IM OR SUBCUT: ICD-10-PCS | Mod: S$GLB,,, | Performed by: PHYSICIAN ASSISTANT

## 2019-02-26 PROCEDURE — 99999 PR PBB SHADOW E&M-EST. PATIENT-LVL III: ICD-10-PCS | Mod: PBBFAC,,, | Performed by: DERMATOLOGY

## 2019-02-26 PROCEDURE — 88305 TISSUE EXAM BY PATHOLOGIST: CPT | Performed by: PATHOLOGY

## 2019-02-26 PROCEDURE — 99999 PR PBB SHADOW E&M-EST. PATIENT-LVL III: CPT | Mod: PBBFAC,,, | Performed by: DERMATOLOGY

## 2019-02-26 PROCEDURE — 11104 PUNCH BX SKIN SINGLE LESION: CPT | Mod: S$GLB,,, | Performed by: DERMATOLOGY

## 2019-02-26 PROCEDURE — 88305 TISSUE EXAM BY PATHOLOGIST: CPT | Mod: 26,,, | Performed by: PATHOLOGY

## 2019-02-26 PROCEDURE — 88305 TISSUE SPECIMEN TO PATHOLOGY, DERMATOLOGY: ICD-10-PCS | Mod: 26,,, | Performed by: PATHOLOGY

## 2019-02-26 PROCEDURE — 88312 TISSUE SPECIMEN TO PATHOLOGY, DERMATOLOGY: ICD-10-PCS | Mod: 26,,, | Performed by: PATHOLOGY

## 2019-02-26 PROCEDURE — 99203 OFFICE O/P NEW LOW 30 MIN: CPT | Mod: 25,S$GLB,, | Performed by: DERMATOLOGY

## 2019-02-26 PROCEDURE — 88341 IMHCHEM/IMCYTCHM EA ADD ANTB: CPT | Performed by: PATHOLOGY

## 2019-02-26 PROCEDURE — 99214 OFFICE O/P EST MOD 30 MIN: CPT | Mod: 25,S$GLB,, | Performed by: PHYSICIAN ASSISTANT

## 2019-02-26 PROCEDURE — 88342 CHG IMMUNOCYTOCHEMISTRY: ICD-10-PCS | Mod: 26,,, | Performed by: PATHOLOGY

## 2019-02-26 PROCEDURE — 99999 PR PBB SHADOW E&M-EST. PATIENT-LVL III: CPT | Mod: PBBFAC,,, | Performed by: PHYSICIAN ASSISTANT

## 2019-02-26 RX ORDER — METHYLPREDNISOLONE ACETATE 80 MG/ML
160 INJECTION, SUSPENSION INTRA-ARTICULAR; INTRALESIONAL; INTRAMUSCULAR; SOFT TISSUE
Status: COMPLETED | OUTPATIENT
Start: 2019-02-26 | End: 2019-02-26

## 2019-02-26 RX ORDER — TRIAMCINOLONE ACETONIDE 1 MG/G
CREAM TOPICAL 2 TIMES DAILY
Qty: 454 G | Refills: 1 | Status: SHIPPED | OUTPATIENT
Start: 2019-02-26 | End: 2019-05-28 | Stop reason: SDUPTHER

## 2019-02-26 RX ORDER — METHYLPREDNISOLONE 4 MG/1
TABLET ORAL
Qty: 1 PACKAGE | Refills: 0 | Status: SHIPPED | OUTPATIENT
Start: 2019-02-26 | End: 2019-04-02

## 2019-02-26 RX ORDER — ONDANSETRON 8 MG/1
8 TABLET, ORALLY DISINTEGRATING ORAL EVERY 6 HOURS PRN
Qty: 30 TABLET | Refills: 0 | Status: SHIPPED | OUTPATIENT
Start: 2019-02-26 | End: 2021-06-23

## 2019-02-26 RX ADMIN — METHYLPREDNISOLONE ACETATE 160 MG: 80 INJECTION, SUSPENSION INTRA-ARTICULAR; INTRALESIONAL; INTRAMUSCULAR; SOFT TISSUE at 01:02

## 2019-02-26 NOTE — PROGRESS NOTES
Subjective:       Patient ID: Vonnie Garces is a 58 y.o. female.    Chief Complaint: Disease Management and Rash    Mrs. Garces is a 58 year old female who presents to clinic for urgent evaluation of rash. She is a new patient to me. She has history of reactive arthritis and hashimoto's thyroiditis currently on duexis and occasional use of prednisone for flares. She is here today for eval of new onset rash that started on her forearms on 2/10 and has since spread to her chest, neck, back, and legs. Rash is not pruritic, but is described as burning in nature. She has tried OTC benadryl, tagamet, prednisone 5 mg, and topical benadryl and hydrocortisone with no relief. She denies new soaps, lotions, detergents. No new medications. She is following her gluten free diet. She complains of increased arthralgias involving her shoulders, neck, and spine over the last week. She complains of generalized fatigue and fogginess.     Current treatment:  1. Duexis TID PRN  2. Prednisone 2.5 mg PRN      Review of Systems   Constitutional: Negative for activity change, appetite change, chills, fatigue and fever.   Eyes: Negative for visual disturbance.   Respiratory: Negative for cough and shortness of breath.    Cardiovascular: Negative for chest pain, palpitations and leg swelling.   Gastrointestinal: Negative for abdominal pain, constipation, diarrhea, nausea and vomiting.   Musculoskeletal: Positive for arthralgias, back pain, myalgias and neck pain. Negative for gait problem.   Skin: Positive for rash.   Neurological: Negative for dizziness, weakness, light-headedness and headaches.   Psychiatric/Behavioral: The patient is nervous/anxious.          Objective:     Vitals:    02/26/19 1004   BP: (!) 146/87   Pulse: 78       Past Medical History:   Diagnosis Date    Anxiety 8/25/2015    Arthritis     Cervical spondylosis     Chronic back pain     DDD (degenerative disc disease), lumbar     Difficult intubation  03/2016    anterior larynx, pt with metal dental appliance, unable to do glidescope, used LMA    Encounter for blood transfusion     Facet arthropathy, thoracic     Hormone replacement therapy (HRT)     Insomnia 8/25/2015    Neck pain     PONV (postoperative nausea and vomiting)     Right foot pain     Scoliosis     Unspecified hypothyroidism 8/25/2015     Past Surgical History:   Procedure Laterality Date    ABLATION, RADIOFREQUENCY T4,5,6,7 Right 12/14/2017    [facility]    BILATERAL OOPHORECTOMY      BLOCK-NERVE-MEDIAL BRANCH-LUMBAR L3, L4, L5 Right 10/17/2017    [facility]    CHOLECYSTECTOMY  2010    COLONOSCOPY  2016    [facility]    Epidural Steroid Injection      Pain managment, at another facility, cervical, thoracic    Epidural Steroid injection      Pain management, cervical    JINA-TRANSFORAMINAL L4 Right 6/14/2016    [facility]    JINA-TRANSFORAMINAL L4/5 Right 11/23/2015    [facility]    HYSTERECTOMY      total    INJECTION-STEROID-EPIDURAL-CERVICAL N/A 3/16/2018    [facility]    INJECTION-STEROID-EPIDURAL-CERVICAL N/A 6/27/2017    [facility]    INJECTION-STEROID-EPIDURAL-CERVICAL N/A 12/9/2016    [facility]    INJECTION-STEROID-EPIDURAL-CERVICAL N/A 7/14/2016    [facility]    INJECTION-STEROID-EPIDURAL-CERVICAL N/A 10/19/2015    [facility]    INJECTION-STEROID-EPIDURAL-LUMBAR N/A 5/15/2018    [facility]    INJECTION-STEROID-EPIDURAL-LUMBAR N/A 8/7/2017    [facility]    INJECTION-STEROID-EPIDURAL-LUMBAR N/A 8/12/2016    [facility]     Injection-steroid-epidural-lumbar L5/S1 Right 1/16/2019    [facility]    MOUTH SURGERY      Braces, with temporary metal implants in upper gum    PERINEOPLASTY-w/WIDE LOCAL EXCISION N/A 3/18/2016    [facility]    RADIOFREQUENCY ABLATION  02/2016    thoracic nerve    RADIOFREQUENCY ABLATION THORACIC T4, T5, T6, T7 Right 10/12/2018    [facility]    RADIOFREQUENCY THERMOCOAGULATION (RFTC)-NERVE-MEDIAN BRANCH-LUMBAR L3,4,5 Right 10/19/2017    [facility]    RADIOFREQUENCY THERMOCOAGULATION MEDIAN BRANCHES T3,4,5,6 Right 2/5/2016    [facility]    RADIOFREQUENCY THERMOCOAGULATION THORACIC T3, T4, T5, T6 Right 5/30/2017    [facility]    TONSILLECTOMY      VULVA SURGERY  03/2016          Physical Exam   Constitutional:   Obese body habitus.   Eyes: Right conjunctiva is not injected. Left conjunctiva is not injected. Right eye exhibits normal extraocular motion. Left eye exhibits normal extraocular motion.   Neck: No JVD present. No thyromegaly present.   Cardiovascular: Normal rate and regular rhythm.  Exam reveals no decreased pulses.    Pulmonary/Chest: She has no wheezes. She has no rhonchi. She has no rales.       Right Side Rheumatological Exam     Examination finds the elbow, wrist, knee, 1st PIP, 1st MCP, 2nd PIP, 2nd MCP, 3rd PIP, 3rd MCP, 4th PIP, 4th MCP, 5th PIP and 5th MCP normal.    The patient is tender to palpation of the shoulder    Left Side Rheumatological Exam     Examination finds the elbow, wrist, knee, 1st PIP, 1st MCP, 2nd PIP, 2nd MCP, 3rd PIP, 3rd MCP, 4th PIP, 4th MCP, 5th PIP and 5th MCP normal.    The patient is tender to palpation of the shoulder.      Lymphadenopathy:     She has no cervical adenopathy.   Neurological: Gait normal.   Skin: Rash (erythematous oval lesions-  some lesions with scaly appearance involving the forearms, chest, abdomen, back, posterior legs) noted.     Psychiatric: Mood and affect normal.         Assessment:       1. Rash    2. Reactive arthritis    3. Celiac syndrome    4. Hashimoto's thyroiditis    5. Nausea    6. Obesity (BMI 30.0-34.9)            Plan:       Rash  -     Ambulatory consult to Dermatology  -     methylPREDNISolone acetate injection 160 mg  -     methylPREDNISolone (MEDROL DOSEPACK) 4 mg tablet; use as directed  Dispense: 1 Package; Refill: 0    Reactive arthritis  -     methylPREDNISolone acetate injection 160 mg  -     methylPREDNISolone (MEDROL DOSEPACK) 4 mg tablet; use as directed  Dispense: 1 Package; Refill: 0    Celiac syndrome    Hashimoto's thyroiditis    Nausea  -     ondansetron (ZOFRAN-ODT) 8 MG TbDL; Take 1 tablet (8 mg total) by mouth every 6 (six) hours as needed.  Dispense: 30 tablet; Refill: 0    Obesity (BMI 30.0-34.9)          Assessment:  58 year old female with  Reactive arthritis, hashimoto's thyroiditis (+thyroperoxidase antibody), history of FERNANDO positivity (repeat testing negative), celiac syndrome    Plan:  1. Refer to Dermatology for evaluation, differential includes allergic rxn vs. pityriasis rosea. Does not appear to be autoimmune in nature.  2. Depomedrol 160 mg IM given today for arthritis flare followed by medrol dose pack starting tomorrow  3. Zofran refill provided  4. Continue celiac diet  5. Follow up with PCP of obesity management    Follow up: 2 months with Dr. Garcia with routine labs prior

## 2019-02-26 NOTE — LETTER
February 27, 2019      Destiny Blanc PA-C  [address] [facility] [address] [place] [address]           [facility]  [address] [facility] [address] [place] [address]  Phone: [phone]  Fax: [phone]          Patient: Vonnie Garces   MR Number: [id number]   YOB: 1960   Date of Visit: 2/26/2019       Dear Destiny Blanc:    Thank you for referring Vonnie Garces to me for evaluation. Attached you will find relevant portions of my assessment and plan of care.    If you have questions, please do not hesitate to call me. I look forward to following Vonnie Garces along with you.    Sincerely,    Sita Escamilla MD    Enclosure  CC:  No Recipients    If you would like to receive this communication electronically, please contact [email or web address] or [phone] to request more information on [facility] Link access.    For providers and/or their staff who would like to refer a patient to [facility], please contact us through our one-stop-shop provider referral line, [facility], at [phone].    If you feel you have received this communication in error or would no longer like to receive these types of communications, please e-mail [email or web address]

## 2019-02-26 NOTE — PROGRESS NOTES
Administered 2 cc ( 80 mg/ml ) of depomedrol to the right upper outer gluteal. Informed of s/s to report verbalized understanding. No adverse reactions noted.    Lot # [id number]  Expiration 03/20

## 2019-02-26 NOTE — PROGRESS NOTES
"  Subjective:       Patient ID:  Vonnie Garces is a 58 y.o. female who presents for   Chief Complaint   Patient presents with    Rash     59 yo F presents for evaluation of a red raised rash that began around Feb 10th. She has a h/o reactive arthritis and hashimoto's thyroiditis (currently on duexis and occasional use of prednisone for flares). This rash started on her arms (R arm-->L arm) and spread to her trunk.  She denies prior URI Sx, only fatigue and fogginess.  The rash is not itchy.  It "feels burning sensation from inside body." The rash is worsening despite prednisone (low dose), benadryl, tagamet    Pt denies any new medications in last 6 months.        No sick contacts    Past Medical History:   Diagnosis Date    Anxiety 8/25/2015    Arthritis     Cervical spondylosis     Chronic back pain     DDD (degenerative disc disease), lumbar     Difficult intubation 03/2016    anterior larynx, pt with metal dental appliance, unable to do glidescope, used LMA    Encounter for blood transfusion     Facet arthropathy, thoracic     Hormone replacement therapy (HRT)     Insomnia 8/25/2015    Neck pain     PONV (postoperative nausea and vomiting)     Right foot pain     Scoliosis     Unspecified hypothyroidism 8/25/2015     Review of Systems   HENT: Negative for mouth sores.    Genitourinary: Negative for genital sores.   Skin: Positive for rash, dry skin, daily sunscreen use and activity-related sunscreen use. Negative for sensitivity to antibiotic ointment, sensitivity to bandage adhesive and tendency to form keloidal scars.   Hematologic/Lymphatic: Does not bruise/bleed easily.        Objective:    Physical Exam   Constitutional: She appears well-developed and well-nourished.   HENT:   Mouth/Throat: Lips normal.    Eyes: Lids are normal.    Neurological: She is alert and oriented to person, place, and time.   Psychiatric: She has a normal mood and affect.   Skin:   Areas Examined " (abnormalities noted in diagram):   Head / Face Inspection Performed  Neck Inspection Performed  Chest / Axilla Inspection Performed  Abdomen Inspection Performed  Genitals / Buttocks / Groin Inspection Performed  Back Inspection Performed  RUE Inspected  LUE Inspection Performed  RLE Inspected  LLE Inspection Performed  Nails and Digits Inspection Performed              Diagram Legend     Erythematous scaling macule/papule c/w actinic keratosis       Vascular papule c/w angioma      Pigmented verrucoid papule/plaque c/w seborrheic keratosis      Yellow umbilicated papule c/w sebaceous hyperplasia      Irregularly shaped tan macule c/w lentigo     1-2 mm smooth white papules consistent with Milia      Movable subcutaneous cyst with punctum c/w epidermal inclusion cyst      Subcutaneous movable cyst c/w pilar cyst      Firm pink to brown papule c/w dermatofibroma      Pedunculated fleshy papule(s) c/w skin tag(s)      Evenly pigmented macule c/w junctional nevus     Mildly variegated pigmented, slightly irregular-bordered macule c/w mildly atypical nevus      Flesh colored to evenly pigmented papule c/w intradermal nevus       Pink pearly papule/plaque c/w basal cell carcinoma      Erythematous hyperkeratotic cursted plaque c/w SCC      Surgical scar with no sign of skin cancer recurrence      Open and closed comedones      Inflammatory papules and pustules      Verrucoid papule consistent consistent with wart     Erythematous eczematous patches and plaques     Dystrophic onycholytic nail with subungual debris c/w onychomycosis     Umbilicated papule    Erythematous-base heme-crusted tan verrucoid plaque consistent with inflamed seborrheic keratosis     Erythematous Silvery Scaling Plaque c/w Psoriasis     See annotation      Assessment / Plan:      Pathology Orders:     Normal Orders This Visit    Tissue Specimen To Pathology, Dermatology     Questions:    Directional Terms:  Other(comment)    Clinical Information:   57 yo F with 2 week h/o erythematous papules and plaques on trunk and proximal limbs; suspect pityriasis rosea    Specific Site:  R abdomen        Rash and nonspecific skin eruption  -     Tissue Specimen To Pathology, Dermatology  Punch biopsy procedure note:  Punch biopsy performed after verbal consent obtained. Area marked and prepped with alcohol. Approximately 1cc of 1% lidocaine with epinephrine injected. 4 mm disposable punch used to remove lesion. Hemostasis obtained and biopsy site closed with 2 Prolene sutures. Wound care instructions reviewed with patient and handout given.    Disturbance of skin sensation/Burning sensation  Continue antihistamines qam and qhs (Zyrtec, Benadryl)  -     triamcinolone acetonide 0.1% (KENALOG) 0.1 % cream; Apply topically 2 (two) times daily.  Dispense: 454 g; Refill: 1             Follow-up in about 2 weeks (around 3/12/2019).

## 2019-03-11 ENCOUNTER — PATIENT MESSAGE (OUTPATIENT)
Dept: DERMATOLOGY | Facility: CLINIC | Age: 59
End: 2019-03-11

## 2019-03-13 ENCOUNTER — OFFICE VISIT (OUTPATIENT)
Dept: PAIN MEDICINE | Facility: CLINIC | Age: 59
End: 2019-03-13
Payer: COMMERCIAL

## 2019-03-13 VITALS
BODY MASS INDEX: 33.28 KG/M2 | HEART RATE: 72 BPM | TEMPERATURE: 97 F | OXYGEN SATURATION: 95 % | RESPIRATION RATE: 20 BRPM | WEIGHT: 176.13 LBS | DIASTOLIC BLOOD PRESSURE: 61 MMHG | SYSTOLIC BLOOD PRESSURE: 139 MMHG

## 2019-03-13 DIAGNOSIS — M47.814 SPONDYLOSIS OF THORACIC REGION WITHOUT MYELOPATHY OR RADICULOPATHY: ICD-10-CM

## 2019-03-13 DIAGNOSIS — M50.30 DDD (DEGENERATIVE DISC DISEASE), CERVICAL: ICD-10-CM

## 2019-03-13 DIAGNOSIS — M41.9 SCOLIOSIS OF THORACIC SPINE, UNSPECIFIED SCOLIOSIS TYPE: ICD-10-CM

## 2019-03-13 DIAGNOSIS — M47.816 LUMBAR SPONDYLOSIS: ICD-10-CM

## 2019-03-13 DIAGNOSIS — M51.36 DDD (DEGENERATIVE DISC DISEASE), LUMBAR: Primary | ICD-10-CM

## 2019-03-13 PROCEDURE — 99215 PR OFFICE/OUTPT VISIT, EST, LEVL V, 40-54 MIN: ICD-10-PCS | Mod: S$GLB,,, | Performed by: PHYSICIAN ASSISTANT

## 2019-03-13 PROCEDURE — 99999 PR PBB SHADOW E&M-EST. PATIENT-LVL IV: CPT | Mod: PBBFAC,,, | Performed by: PHYSICIAN ASSISTANT

## 2019-03-13 PROCEDURE — 99215 OFFICE O/P EST HI 40 MIN: CPT | Mod: S$GLB,,, | Performed by: PHYSICIAN ASSISTANT

## 2019-03-13 PROCEDURE — 99999 PR PBB SHADOW E&M-EST. PATIENT-LVL IV: ICD-10-PCS | Mod: PBBFAC,,, | Performed by: PHYSICIAN ASSISTANT

## 2019-03-13 RX ORDER — HYDROCODONE BITARTRATE AND ACETAMINOPHEN 7.5; 325 MG/1; MG/1
1 TABLET ORAL 2 TIMES DAILY PRN
Qty: 60 TABLET | Refills: 0 | Status: SHIPPED | OUTPATIENT
Start: 2019-05-27 | End: 2019-04-02 | Stop reason: SDUPTHER

## 2019-03-13 RX ORDER — HYDROCODONE BITARTRATE AND ACETAMINOPHEN 7.5; 325 MG/1; MG/1
1 TABLET ORAL 2 TIMES DAILY PRN
Qty: 60 TABLET | Refills: 0 | Status: SHIPPED | OUTPATIENT
Start: 2019-03-28 | End: 2019-04-27

## 2019-03-13 RX ORDER — HYDROCODONE BITARTRATE AND ACETAMINOPHEN 7.5; 325 MG/1; MG/1
1 TABLET ORAL 2 TIMES DAILY PRN
Qty: 60 TABLET | Refills: 0 | Status: SHIPPED | OUTPATIENT
Start: 2019-04-27 | End: 2019-04-02 | Stop reason: SDUPTHER

## 2019-03-17 NOTE — PROGRESS NOTES
This note was completed with dictation software and grammatical errors may exist.    CC:Back pain, neck pain    HPI: The patient is a 58-year-old woman with history of hypothyroidism, otherwise fairly healthy but a long history of scoliosis causing back pain, self-referred for continued back pain. She is status post L5/S1 interlaminar epidural steroid injection on 01/16/2019 with almost 100% relief of her low back pain. She continues to have neck pain and thoracic back pain but currently she feels that her pain is tolerable with her medication.  She reports having a severe autoimmune flare up that lasted 2 weeks.  She saw her rheumatologist and was given a steroid injection with improvement.  She denies weakness, numbness, bladder or bowel incontinence.    Pain intervention history: She has been seeing Dr. Huffman for the last several years and has undergone epidural steroid injections and radiofrequency ablations with good relief.  According to her last pain management physician, she had undergone a right side T3, 4, 5, 6 medial branch radiofrequency ablation on 8/8/14 with good relief.  He had scheduled her for another one but did not complete this.  As far as medications she has been taking Hydrocodone 7.5/325 one to 2 times a day at most and gabapentin 300 mg at night. She is status post cervical epidural steroid injection on 10/19/15 with 50% relief of her neck pain. She is status post right L4 transforaminal epidural steroid injection on 11/23/15 with 100% relief.   She is status post right T3, 4, 5 and 6 medial branch radio frequency ablation on 2/5/16 with greater than 50% relief.  She is status post right L4 transforaminal epidural sterile injection on 6/14/16 with significant relief lasting only about a month.  She is status post C7-T1 cervical interlaminar epidural steroid injection on 7/14/16 with 30-40% relief.   She is status post L5/S1 interlaminar epidural steroidal injection to the right on 8/12/16  with 100% relief of her right leg pain.  She is status post C7-T1 cervical interlaminar epidural steroid injection on 12/9/16 with almost complete relief.  She is status post right T4, 5, 6 and 7 medial branch radiofrequency ablation on 5/30/17 with 100% relief.  She is status post C7-T1 cervical interlaminar epidural steroid injection on 6/27/17 with 80% relief.  She is status post L5/S1 interlaminar epidural steroid injection on a/7/17 with excellent relief lasting 2 weeks, now reporting 0% relief.  She is status post right L3, 4 and 5 medial branch radiofrequency ablation on 10/19/17 with 80% relief.   She is status post right T4, 5, 6 and 7 medial branch radiofrequency ablation on 12/14/17 with 100% relief.  She is status post C7-T1 cervical interlaminar epidural steroid injection on 3/16/18 with at least 80% relief.  She is status post lumbar epidural steroid injection at L5/S1 on 5/15/18 with 90% relief of her bilateral low back and right leg pain.  The she is status post right T4, 5, 6 and 7 medial branch radiofrequency ablation on 10/12/2018 with 100% relief.  She is status post L5/S1 interlaminar epidural steroid injection on 01/16/2019 with almost 100% relief of her low back pain.    ROS: She reports fatigability, headaches, hypothyroidism, joint stiffness, back pain, difficulty sleeping and anxiety.  Balance of review of systems is negative.      Medical, surgical, family and social history reviewed elsewhere in record.  She is a  at [employer].    Medications/Allergies: See med card    Vitals:    03/13/19 1321   BP: 139/61   Pulse: 72   Resp: 20   Temp: 96.9 °F (36.1 °C)   TempSrc: Oral   SpO2: 95%   Weight: 79.9 kg (176 lb 2.4 oz)   PainSc:   4   PainLoc: Back         Physical exam:  Gen: A and O x3, pleasant, well-groomed  Skin: No rashes or obvious lesions  HEENT: PERRLA, no obvious deformities on ears or in canals. Trachea midline.  CVS: Regular rate and rhythm, normal palpable  pulses.  Resp:No increased work of breathing, symmetrical chest rise.  Abdomen: Soft, NT/ND.  Musculoskeletal:No antalgic gait.     Neuro:  Upper extremities: 5/5 strength bilaterally.  Lower extremities: 5/5 strength bilaterally.    Reflexes: Patellar 0+, Achilles 0+ bilaterally.  Upper extremity reflexes 2+ bilaterally equal.  Sensory:  Intact and symmetrical to light touch and pinprick in L2-S1 dermatomes bilaterally.    Cervical spine exam: Range of motion is full with flexion, extension and lateral rotation without pain.  Myofascial exam: No tenderness to palpation to the cervical or thoracic paraspinous muscles.    Lumbar spine:  Lumbar spine: Range of motion is full with flexion without increased pain. Range of motion is full with extension without increased pain.  Braydne's test causes no increased pain on either side.    Supine straight leg raise is negative bilaterally.  Internal and external rotation of the hip causes no increased pain on either side.  Myofascial exam:  No tenderness to palpation to the lumbar paraspinous muscles.      Imagin/16/15 Xray Scoliosis survey: There is thoracic dextroscoliosis with mild lumbar compensatory levoscoliosis. No structural abnormalities are evident. Diffuse spondylosis noted in the cervical, thoracic and to lesser extent lumbar spine. No fractures are identified. There is slight increased kyphotic curvature of the thoracic spine with alignment being otherwise normal. Measurements and evaluation are limited due to underpenetration. Land angle measures approximately in the thoracic spine is of approximately 17.0 degrees and for the lumbar spine 17.4 degrees. Soft tissues are unremarkable. IMPRESSION: 1. S shaped scoliotic curvature of the thoracolumbar spine with Land angle of approximately 17 degrees. 2. Diffuse spondylosis.    MRI report 12: Report notes that there is dextroconvex rotary curvature of the thoracic spine.  Posterior alignment is maintained.   There are scattered vertebral body hemangiomas.  There is minimal posterior disc bulges noted at T5/6, T6/7, T7/8 and T8/9.  There is no central spinal stenosis or neural foraminal narrowing.    MRI right shoulder 6/24/11: Minor distal subscapularis tendinosis.  Negative for full-thickness or significant partial-thickness rotator cuff tendon tear.  There is a small amount of subacromial subdeltoid bursal fluid which can be seen with recent injection or mild bursitis.    MRI cervical spine report 3/25/11.  Note that this is comparison to previous study on 7/29/2008.  This is a report only, no images were available to me.  The C2/3 disc appears grossly intact with no significant bulging.  C3/4 there is mild protrusion of the disc with no herniation and no nerve root entrapment seen.  Spinal stenosis is not noted.  At C4/5 there is broad-based disc protrusion which compresses the subarachnoid space without cord compression.  There is no definite nerve root compression seen.  At C5/6 there is vertebral body lesion which is bright on T2 and dark on T1 but it does not enhance and may represent an atypical hemangioma.  This is unchanged from previous study.  At C5/6 there is a protrusion of the disc asymmetrically to the left but no significant entrapment.  No change from previous study.  At C6/7 there is a focal protrusion of the disc causing compression of the subarachnoid space without definite cord or nerve root compression.  C7/T1 appears intact.    Lumbar spine MRI report 3/31/14: No acute abnormality or significant degenerative changes.  No canal or foraminal stenosis.    Lumbar spine MRI 10/19/15  T12-L1: No central canal or neuroforaminal stenosis. No disc protrusion or extrusion.  L1-L2: No central canal or neuroforaminal stenosis. No disc protrusion or extrusion.  L2-L3: No central canal or neuroforaminal stenosis. No disc protrusion or extrusion.  L3-L4: No central canal or neuroforaminal stenosis. No disc  protrusion or extrusion.  L4-L5: There is a broad disc bulge which extends into both neuroforamina. There is prominent ligamentum flavum thickening and facet arthropathy. There is mild bilateral neuroforaminal stenosis. No central canal stenosis.  L5-S1: There is bilateral hypertrophic facet arthropathy and ligamentum flavum thickening. No central canal or neuroforaminal stenosis. No disc protrusion or extrusion.    Cervical spine MRI 6/29/16  At C2-C3, the minimal interstitial excision covers a very mild broad-based disc bulge most prominent centrally but does not deform the ventral cord.  There is no canal or foraminal stenosis.  At C3-C4, there is mildly decreased disc height with a broad-based disc bulge most prominent centrally.  This minimally contact the ventral cord without significant deformity.  The canal is widely patent.  There is uncovertebral hypertrophy and facet arthropathy, but the foramina are patent.  At C4-C5, there is decreased disc height with a broad disc bulge-marginal osteophyte complex that mildly lateralizes to the left and blends imperceptibly with right greater than left uncovertebral hypertrophy.  With ligamentum hypertrophy, there is no significant central canal stenosis.  Uncovertebral hypertrophy and facet arthropathy are causing mild left and moderate right foraminal stenoses.  At C5-C6, there is a broad-based disc bulge and osteophyte complex most prominent centrally.  There is also ligamentum flavum hypertrophy present, but the canal is patent.  There is mild left foraminal stenosis.  The right foramen is patent.  At C6-C7, there is disc desiccation with a minimal broad-based disc bulge most prominent in the right paracentral canal.  The canal is widely patent.  There is mild left foraminal stenosis.  At C7-T1, there is no significant disc bulge, protrusion, or herniation/extrusion.  The canal and foramina are widely patent.    MRI lumbar spine 9/22/17  L1-L2 demonstrates no  significant disc protrusion, central spinal canal stenosis, or neural foraminal stenosis.  L2-L3 demonstrates no significant disc protrusion, central spinal canal stenosis, or neural foraminal stenosis. Mild facet arthrosis is seen.  L3-L4 demonstrates no significant disc protrusion, central spinal canal stenosis, or neural foraminal stenosis. Mild facet arthrosis is seen.  L4-L5 demonstrates mild broad-based disc bulge, ligamentum flavum hypertrophy, and mild/moderate bilateral facet arthrosis. No significant central spinal canal stenosis is seen. Mild bilateral lateral recess narrowing and neural foraminal narrowing is seen. Similar findings were seen on the prior MRI.  L5-S1 demonstrates no significant posterior disc protrusion. Moderate bilateral facet arthrosis is seen along ligamentum flavum hypertrophy. No significant central spinal canal or neural foraminal stenosis is seen.    Hip x-rays 9/22/17  AP view of the pelvis and frog leg views of both hips were obtained. No evidence of acute displaced fracture or dislocation is visualized.  No radiopaque foreign bodies are visualized. The bilateral superior joint spaces are grossly maintained. Mild bilateral sacroiliac joint degenerative changes are seen including subchondral sclerosis and small marginal osteophytes. Mild to moderate symphyseal degenerative changes are seen. There is a slightly expansile cortically-based focus along the lateral proximal right femoral metadiaphysis. This could reflect a sessile osteochondroma, but correlation with MRI of the right hip is recommended.      Assessment:  The patient is a 58-year-old woman with history of hypothyroidism, otherwise fairly healthy but a long history of scoliosis causing back pain, self-referred for continued back pain.    1. DDD (degenerative disc disease), lumbar     2. Lumbar spondylosis     3. Scoliosis of thoracic spine, unspecified scoliosis type     4. Spondylosis of thoracic region without  myelopathy or radiculopathy     5. DDD (degenerative disc disease), cervical         Plan:  1.  The patient had almost complete relief following the L5/S1 interlaminar JINA.  This can be repeated in the future if necessary.  2.  We discussed repeating cervical and thoracic procedures in the future as well but currently she is managing her pain well.  3.  We discussed her autoimmune flare ups and this is typically not frequent.  She states that she is going to try to avoid intramuscular steroids during this so that she can receive epidural steroid injections when needed.  We discussed an alternative by giving her a shot of Toradol in the future if necessary whether that through our clinic or Rheumatology.  4.  Dr. Coleman provided prescriptions for hydrocodone-acetaminophen 7.5/325 mg up to 2 times daily as needed for pain.  I have reviewed the [place] Board of Pharmacy website and there are no abberancies.    5.  Follow-up in 3 months or sooner as needed.    Greater than 50% of this 45 min visit was spent counseling the patient.

## 2019-04-02 ENCOUNTER — OFFICE VISIT (OUTPATIENT)
Dept: RHEUMATOLOGY | Facility: CLINIC | Age: 59
End: 2019-04-02
Payer: COMMERCIAL

## 2019-04-02 VITALS
WEIGHT: 176 LBS | HEIGHT: 61 IN | SYSTOLIC BLOOD PRESSURE: 136 MMHG | HEART RATE: 68 BPM | BODY MASS INDEX: 33.23 KG/M2 | DIASTOLIC BLOOD PRESSURE: 79 MMHG

## 2019-04-02 DIAGNOSIS — R11.0 NAUSEA: ICD-10-CM

## 2019-04-02 DIAGNOSIS — M02.30 REACTIVE ARTHRITIS: ICD-10-CM

## 2019-04-02 DIAGNOSIS — R76.8 ANA POSITIVE: ICD-10-CM

## 2019-04-02 DIAGNOSIS — K90.0 CELIAC SYNDROME: Primary | ICD-10-CM

## 2019-04-02 PROCEDURE — 99999 PR PBB SHADOW E&M-EST. PATIENT-LVL III: CPT | Mod: PBBFAC,,, | Performed by: INTERNAL MEDICINE

## 2019-04-02 PROCEDURE — 99214 PR OFFICE/OUTPT VISIT, EST, LEVL IV, 30-39 MIN: ICD-10-PCS | Mod: S$GLB,,, | Performed by: INTERNAL MEDICINE

## 2019-04-02 PROCEDURE — 99214 OFFICE O/P EST MOD 30 MIN: CPT | Mod: S$GLB,,, | Performed by: INTERNAL MEDICINE

## 2019-04-02 PROCEDURE — 99999 PR PBB SHADOW E&M-EST. PATIENT-LVL III: ICD-10-PCS | Mod: PBBFAC,,, | Performed by: INTERNAL MEDICINE

## 2019-04-02 RX ORDER — PREDNISONE 2.5 MG/1
7.5 TABLET ORAL DAILY
Qty: 90 TABLET | Refills: 3 | Status: SHIPPED | OUTPATIENT
Start: 2019-04-02 | End: 2019-05-02

## 2019-04-02 RX ORDER — HYDROXYCHLOROQUINE SULFATE 200 MG/1
200 TABLET, FILM COATED ORAL 2 TIMES DAILY
Qty: 60 TABLET | Refills: 6 | Status: SHIPPED | OUTPATIENT
Start: 2019-04-02 | End: 2019-05-02

## 2019-04-02 ASSESSMENT — ROUTINE ASSESSMENT OF PATIENT INDEX DATA (RAPID3)
MDHAQ FUNCTION SCORE: .7
PSYCHOLOGICAL DISTRESS SCORE: 2.2
PATIENT GLOBAL ASSESSMENT SCORE: 4
TOTAL RAPID3 SCORE: 3.61
PAIN SCORE: 4.5

## 2019-04-02 NOTE — PROGRESS NOTES
Subjective:       Patient ID: Vonnie Garces is a 58 y.o. female.    Chief Complaint: Arthritis (reactive arthritis)    Follow up: 58 year old female DX with Pityriasis rosacea.  . Rash is not pruritic, but is described as burning in nature.    Patient complains of arthralgias and myalgias for which has been present for a few years. Pain is located in multiple joints, both shoulder(s), both elbow(s), both wrist(s), both MCP(s): 1st, 2nd, 3rd, 4th and 5th, both PIP(s): 1st, 2nd, 3rd, 4th and 5th, both DIP(s): 1st and 2nd, both hip(s), both knee(s) and both MTP(s): 1st, 2nd, 3rd, 4th and 5th, is described as aching, pulsating, shooting and throbbing, and is constant, moderate .  Associated symptoms include: crepitation, decreased range of motion, edema, effusion, tenderness and warmth.      Arthritis   Associated symptoms include arthralgias, myalgias, neck pain and a rash. Pertinent negatives include no abdominal pain, chest pain, chills, coughing, fatigue, fever, headaches, nausea, vomiting or weakness.     Review of Systems   Constitutional: Negative for activity change, appetite change, chills, fatigue and fever.   Eyes: Negative for visual disturbance.   Respiratory: Negative for cough and shortness of breath.    Cardiovascular: Negative for chest pain, palpitations and leg swelling.   Gastrointestinal: Negative for abdominal pain, constipation, diarrhea, nausea and vomiting.   Musculoskeletal: Positive for arthralgias, arthritis, back pain, myalgias and neck pain. Negative for gait problem.   Skin: Positive for rash.   Neurological: Negative for dizziness, weakness, light-headedness and headaches.   Psychiatric/Behavioral: The patient is nervous/anxious.          Objective:     Vitals:    04/02/19 1032   BP: 136/79   Pulse: 68            Physical Exam   Constitutional:   Obese body habitus.   Eyes: Right conjunctiva is not injected. Left conjunctiva is not injected. Right eye exhibits normal extraocular  motion. Left eye exhibits normal extraocular motion.   Neck: No JVD present. No thyromegaly present.   Cardiovascular: Normal rate and regular rhythm.  Exam reveals no decreased pulses.    Pulmonary/Chest: She has no wheezes. She has no rhonchi. She has no rales.       Right Side Rheumatological Exam     Examination finds the elbow, wrist, knee, 1st PIP, 1st MCP, 2nd PIP, 2nd MCP, 3rd PIP, 3rd MCP, 4th PIP, 4th MCP, 5th PIP and 5th MCP normal.    The patient is tender to palpation of the shoulder    Left Side Rheumatological Exam     Examination finds the elbow, wrist, knee, 1st PIP, 1st MCP, 2nd PIP, 2nd MCP, 3rd PIP, 3rd MCP, 4th PIP, 4th MCP, 5th PIP and 5th MCP normal.    The patient is tender to palpation of the shoulder.      Lymphadenopathy:     She has no cervical adenopathy.   Neurological: Gait normal.   Skin: Rash (erythematous oval lesions- some lesions with scaly appearance involving the forearms, chest, abdomen, back, posterior legs) noted.     Psychiatric: Mood and affect normal.         Results for orders placed or performed in visit on 10/23/18   CBC auto differential   Result Value Ref Range    WBC 9.53 3.90 - 12.70 K/uL    RBC 4.73 4.00 - 5.40 M/uL    Hemoglobin 13.5 12.0 - 16.0 g/dL    Hematocrit 42.4 37.0 - 48.5 %    MCV 90 82 - 98 fL    MCH 28.5 27.0 - 31.0 pg    MCHC 31.8 (L) 32.0 - 36.0 g/dL    RDW 13.3 11.5 - 14.5 %    Platelets 252 150 - 350 K/uL    MPV 9.7 9.2 - 12.9 fL    Gran # (ANC) 7.3 1.8 - 7.7 K/uL    Lymph # 1.5 1.0 - 4.8 K/uL    Mono # 0.5 0.3 - 1.0 K/uL    Eos # 0.1 0.0 - 0.5 K/uL    Baso # 0.03 0.00 - 0.20 K/uL    nRBC 0 0 /100 WBC    Gran% 76.7 (H) 38.0 - 73.0 %    Lymph% 15.8 (L) 18.0 - 48.0 %    Mono% 5.7 4.0 - 15.0 %    Eosinophil% 1.5 0.0 - 8.0 %    Basophil% 0.3 0.0 - 1.9 %    Differential Method Automated    Comprehensive metabolic panel   Result Value Ref Range    Sodium 140 136 - 145 mmol/L    Potassium 4.1 3.5 - 5.1 mmol/L    Chloride 101 95 - 110 mmol/L    CO2 30 22 -  31 mmol/L    Glucose 87 70 - 110 mg/dL    BUN, Bld 16 7 - 18 mg/dL    Creatinine 0.62 0.50 - 1.40 mg/dL    Calcium 9.8 8.4 - 10.2 mg/dL    Total Protein 7.7 6.0 - 8.4 g/dL    Albumin 4.7 3.5 - 5.2 g/dL    Total Bilirubin 0.8 0.2 - 1.3 mg/dL    Alkaline Phosphatase 82 38 - 145 U/L    AST 17 14 - 36 U/L    ALT 32 10 - 44 U/L    Anion Gap 9 8 - 16 mmol/L    eGFR if African American >60 >60 mL/min/1.73 m^2    eGFR if non African American >60 >60 mL/min/1.73 m^2   Gluten IgE   Result Value Ref Range    ALLERGEN GLUTEN IGE <0.10 <=0.34 kU/L   ALLERGEN CLAMS   Result Value Ref Range    Clams <0.10 <=0.34 kU/L   ALLERGEN-CODFISH   Result Value Ref Range    Codfish IgE <0.10 <=0.34 kU/L   ALLERGEN CORN   Result Value Ref Range    Corn <0.10 <=0.34 kU/L   ALLERGEN EGG WHITE   Result Value Ref Range    Egg White <0.10 <=0.34 kU/L   ALLERGEN MILK   Result Value Ref Range    Milk IgE <0.10 <=0.34 kU/L   ALLERGEN PEANUT   Result Value Ref Range    Peanut IgE <0.10 <=0.34 kU/L   ALLERGEN-SCALLOP   Result Value Ref Range    Scallop IgE <0.10 <=0.34 kU/L   ALLERGEN SESAME SEED IGE   Result Value Ref Range    Sesame Seed, IgE <0.10 <=0.34 kU/L   ALLERGEN SHRIMP   Result Value Ref Range    Shrimp IgE <0.10 <=0.34 kU/L   ALLERGEN SOYBEAN   Result Value Ref Range    Soybean IgE <0.10 <=0.34 kU/L   ALLERGEN WALNUTS   Result Value Ref Range    [place] <0.10 <=0.34 kU/L   ALLERGEN WHEAT   Result Value Ref Range    Wheat IgE <0.10 <=0.34 kU/L   Immunoglobulins (IgG, IgA, IgM) Quantitative   Result Value Ref Range    IgG - Serum 1002 650 - 1600 mg/dL    IgA 210 40 - 350 mg/dL    IgM 72 50 - 300 mg/dL   IgE   Result Value Ref Range    IgE <35 0 - 100 IU/mL   IgG 1, 2, 3, and 4   Result Value Ref Range    IgG 1 556 240 - 1118 mg/dL    IgG 2 292 124 - 549 mg/dL    IgG 3 54 21 - 134 mg/dL    IgG 4 30 1 - 123 mg/dL   S.pneumoniae IgG Serotypes   Result Value Ref Range    Pneumococcal Serotype 1 IgG (P13,PNX) 0.05 ug/mL    Pneumococcal Serotype 3  IgG  (P13,PNX) 0.55 ug/mL    Pneumococcal Serotype 4 IgG  (P7,P13,PNX) 0.07 ug/mL    Pneumococcal Serotype 5 IgG (P13,PNX) 2.60 ug/mL    Pneumococcal Serotype 6B IgG (P7,P13,PNX) 0.40 ug/mL    Pneumococcal Serotype 7F IgG (P13,PNX) 1.74 ug/mL    Pneumococcal Serotype 8 IgG (PNX) 0.06 ug/mL    Pneumococcal Serotype 9N IgG (PNX) 0.15 ug/mL    Pneumococcal Serotype 9V IgG (P7,P13,PNX) 0.20 ug/mL    Pneumococcal Serotype 12F IgG (PNX) 0.05 ug/mL    Pneumococcal Serotype 14 IgG (P7,P13,PNX) 0.45 ug/mL    Pneumococcal Serotype 18C IgG (P7,P13,PNX) 1.75 ug/mL    Pneumococcal Serotype 19F IgG (P7,P13,PNX) 3.57 ug/mL    Pneumococcal Serotype 23F IgG (P7,P13,PNX) 0.69 ug/mL    Pneumococcal Serotype Interpretation See Note    Lymphocyte Subset Panel 5 Extended   Result Value Ref Range    CD3 % Total T Cell 76 62 - 87 %    Absolute CD3 1214 570 - 2400 cells/uL    CD4 % [place] T Cell 44 32 - 64 %    Absolute CD4 700 430 - 1800 cells/uL    CD8 % Suppressor T Cell 32 15 - 46 %    Absolute CD8 513 210 - 1200 cells/uL    CD4/CD8 Ratio 1.38 0.80 - 3.90 ratio    CD19 B Cells 13 6 - 23 %    Absolute CD19 214 91 - 610 cells/uL    NATURAL KILLER CELLS 9 4 - 26 %    Absolute Natural Killer Cells 151 78 - 470 cells/uL    Lymphocyte Subset Pnl 5 Information See Note    TSH   Result Value Ref Range    TSH 1.780 0.400 - 4.000 uIU/mL   Joel-Barr Virus antibody panel   Result Value Ref Range    Joel-Barr Virus Capsid Ag IgG >750.0 (H) 0.0 - 21.9 U/mL    Joel-Barr Virus Capsid Ag IgM <10.0 0.0 - 43.9 U/mL   RAST ALLERGEN INTERPRETATION   Result Value Ref Range    RAST Allergen Interpretation See Note        Assessment:       1. Celiac syndrome    2. Reactive arthritis    3. Nausea    4. FERNANDO positive            Plan:       Celiac syndrome  -     TSH; Future; Expected date: 04/02/2019  -     T4, free; Future; Expected date: 04/02/2019  -     T3, free; Future; Expected date: 04/02/2019  -     Vitamin D; Future; Expected date: 04/02/2019  -      Thyroid peroxidase antibody; Future; Expected date: 04/02/2019  -     Anti-thyroglobulin antibody; Future; Expected date: 04/02/2019  -     Sedimentation rate; Future; Expected date: 04/02/2019  -     C-reactive protein; Future; Expected date: 04/02/2019  -     hydroxychloroquine (PLAQUENIL) 200 mg tablet; Take 1 tablet (200 mg total) by mouth 2 (two) times daily. for 60 doses  Dispense: 60 tablet; Refill: 6  -     predniSONE (DELTASONE) 2.5 MG tablet; Take 3 tablets (7.5 mg total) by mouth once daily.  Dispense: 90 tablet; Refill: 3    Reactive arthritis  -     TSH; Future; Expected date: 04/02/2019  -     T4, free; Future; Expected date: 04/02/2019  -     T3, free; Future; Expected date: 04/02/2019  -     Vitamin D; Future; Expected date: 04/02/2019  -     Thyroid peroxidase antibody; Future; Expected date: 04/02/2019  -     Anti-thyroglobulin antibody; Future; Expected date: 04/02/2019  -     Sedimentation rate; Future; Expected date: 04/02/2019  -     C-reactive protein; Future; Expected date: 04/02/2019  -     hydroxychloroquine (PLAQUENIL) 200 mg tablet; Take 1 tablet (200 mg total) by mouth 2 (two) times daily. for 60 doses  Dispense: 60 tablet; Refill: 6  -     predniSONE (DELTASONE) 2.5 MG tablet; Take 3 tablets (7.5 mg total) by mouth once daily.  Dispense: 90 tablet; Refill: 3    Nausea  -     TSH; Future; Expected date: 04/02/2019  -     T4, free; Future; Expected date: 04/02/2019  -     T3, free; Future; Expected date: 04/02/2019  -     Vitamin D; Future; Expected date: 04/02/2019  -     Thyroid peroxidase antibody; Future; Expected date: 04/02/2019  -     Anti-thyroglobulin antibody; Future; Expected date: 04/02/2019  -     Sedimentation rate; Future; Expected date: 04/02/2019  -     C-reactive protein; Future; Expected date: 04/02/2019  -     hydroxychloroquine (PLAQUENIL) 200 mg tablet; Take 1 tablet (200 mg total) by mouth 2 (two) times daily. for 60 doses  Dispense: 60 tablet; Refill: 6  -      predniSONE (DELTASONE) 2.5 MG tablet; Take 3 tablets (7.5 mg total) by mouth once daily.  Dispense: 90 tablet; Refill: 3    FERNANDO positive  -     TSH; Future; Expected date: 04/02/2019  -     T4, free; Future; Expected date: 04/02/2019  -     T3, free; Future; Expected date: 04/02/2019  -     Vitamin D; Future; Expected date: 04/02/2019  -     Thyroid peroxidase antibody; Future; Expected date: 04/02/2019  -     Anti-thyroglobulin antibody; Future; Expected date: 04/02/2019  -     Sedimentation rate; Future; Expected date: 04/02/2019  -     C-reactive protein; Future; Expected date: 04/02/2019  -     hydroxychloroquine (PLAQUENIL) 200 mg tablet; Take 1 tablet (200 mg total) by mouth 2 (two) times daily. for 60 doses  Dispense: 60 tablet; Refill: 6  -     predniSONE (DELTASONE) 2.5 MG tablet; Take 3 tablets (7.5 mg total) by mouth once daily.  Dispense: 90 tablet; Refill: 3          Starting plaquenil

## 2019-05-03 ENCOUNTER — PATIENT MESSAGE (OUTPATIENT)
Dept: PAIN MEDICINE | Facility: CLINIC | Age: 59
End: 2019-05-03

## 2019-05-03 DIAGNOSIS — M54.16 LUMBAR RADICULOPATHY: Primary | ICD-10-CM

## 2019-05-03 RX ORDER — ALPRAZOLAM 0.5 MG/1
1 TABLET, ORALLY DISINTEGRATING ORAL ONCE AS NEEDED
Status: CANCELLED | OUTPATIENT
Start: 2019-05-14 | End: 2030-10-09

## 2019-05-03 NOTE — TELEPHONE ENCOUNTER
Spoke with the patient and lumbar injection scheduled for 5/14 with a 1 week follow up. She will be leaving [facility] June [facility] and may need the thoracic RFA before leaving. She will discuss that at her follow up.

## 2019-05-13 DIAGNOSIS — M51.34 DDD (DEGENERATIVE DISC DISEASE), THORACIC: ICD-10-CM

## 2019-05-13 DIAGNOSIS — M51.36 DDD (DEGENERATIVE DISC DISEASE), LUMBAR: Primary | ICD-10-CM

## 2019-05-14 ENCOUNTER — HOSPITAL ENCOUNTER (OUTPATIENT)
Facility: HOSPITAL | Age: 59
Discharge: HOME | End: 2019-05-14
Attending: ANESTHESIOLOGY | Admitting: ANESTHESIOLOGY
Payer: COMMERCIAL

## 2019-05-14 ENCOUNTER — HOSPITAL ENCOUNTER (OUTPATIENT)
Dept: RADIOLOGY | Facility: HOSPITAL | Age: 59
Discharge: HOME | End: 2019-05-14
Attending: ANESTHESIOLOGY
Payer: COMMERCIAL

## 2019-05-14 VITALS
TEMPERATURE: 97 F | SYSTOLIC BLOOD PRESSURE: 128 MMHG | HEIGHT: 61 IN | HEART RATE: 64 BPM | RESPIRATION RATE: 18 BRPM | WEIGHT: 165 LBS | OXYGEN SATURATION: 98 % | DIASTOLIC BLOOD PRESSURE: 60 MMHG | BODY MASS INDEX: 31.15 KG/M2

## 2019-05-14 DIAGNOSIS — M51.36 DDD (DEGENERATIVE DISC DISEASE), LUMBAR: ICD-10-CM

## 2019-05-14 DIAGNOSIS — M54.16 LUMBAR RADICULOPATHY: Primary | ICD-10-CM

## 2019-05-14 PROCEDURE — 63600175 PHARM REV CODE 636 W HCPCS: Mod: PO | Performed by: ANESTHESIOLOGY

## 2019-05-14 PROCEDURE — 99152 PR MOD CONSCIOUS SEDATION, SAME PHYS, 5+ YRS, FIRST 15 MIN: ICD-10-PCS | Mod: ,,, | Performed by: ANESTHESIOLOGY

## 2019-05-14 PROCEDURE — 62323 NJX INTERLAMINAR LMBR/SAC: CPT | Performed by: ANESTHESIOLOGY

## 2019-05-14 PROCEDURE — 76000 FLUOROSCOPY <1 HR PHYS/QHP: CPT | Mod: TC,PO

## 2019-05-14 PROCEDURE — 62326 NJX INTERLAMINAR LMBR/SAC: CPT | Mod: PO | Performed by: ANESTHESIOLOGY

## 2019-05-14 PROCEDURE — 62323 NJX INTERLAMINAR LMBR/SAC: CPT | Mod: ,,, | Performed by: ANESTHESIOLOGY

## 2019-05-14 PROCEDURE — 25000003 PHARM REV CODE 250: Mod: PO | Performed by: ANESTHESIOLOGY

## 2019-05-14 PROCEDURE — 25500020 PHARM REV CODE 255: Mod: PO | Performed by: ANESTHESIOLOGY

## 2019-05-14 PROCEDURE — 99152 MOD SED SAME PHYS/QHP 5/>YRS: CPT | Mod: ,,, | Performed by: ANESTHESIOLOGY

## 2019-05-14 PROCEDURE — 62323 PR INJ LUMBAR/SACRAL, W/IMAGING GUIDANCE: ICD-10-PCS | Mod: ,,, | Performed by: ANESTHESIOLOGY

## 2019-05-14 RX ORDER — SODIUM CHLORIDE, SODIUM LACTATE, POTASSIUM CHLORIDE, CALCIUM CHLORIDE 600; 310; 30; 20 MG/100ML; MG/100ML; MG/100ML; MG/100ML
INJECTION, SOLUTION INTRAVENOUS CONTINUOUS
Status: DISCONTINUED | OUTPATIENT
Start: 2019-05-14 | End: 2019-05-14 | Stop reason: HOSPADM

## 2019-05-14 RX ORDER — METHYLPREDNISOLONE ACETATE 80 MG/ML
INJECTION, SUSPENSION INTRA-ARTICULAR; INTRALESIONAL; INTRAMUSCULAR; SOFT TISSUE
Status: DISCONTINUED | OUTPATIENT
Start: 2019-05-14 | End: 2019-05-14 | Stop reason: HOSPADM

## 2019-05-14 RX ORDER — LIDOCAINE HYDROCHLORIDE 10 MG/ML
1 INJECTION INFILTRATION; PERINEURAL ONCE
Status: COMPLETED | OUTPATIENT
Start: 2019-05-14 | End: 2019-05-14

## 2019-05-14 RX ORDER — LIDOCAINE HYDROCHLORIDE 10 MG/ML
INJECTION, SOLUTION EPIDURAL; INFILTRATION; INTRACAUDAL; PERINEURAL
Status: DISCONTINUED | OUTPATIENT
Start: 2019-05-14 | End: 2019-05-14 | Stop reason: HOSPADM

## 2019-05-14 RX ORDER — ALPRAZOLAM 0.5 MG/1
1 TABLET, ORALLY DISINTEGRATING ORAL ONCE AS NEEDED
Status: DISCONTINUED | OUTPATIENT
Start: 2019-05-14 | End: 2019-05-14

## 2019-05-14 RX ORDER — MIDAZOLAM HYDROCHLORIDE 1 MG/ML
INJECTION INTRAMUSCULAR; INTRAVENOUS
Status: DISCONTINUED | OUTPATIENT
Start: 2019-05-14 | End: 2019-05-14 | Stop reason: HOSPADM

## 2019-05-14 RX ADMIN — LIDOCAINE HYDROCHLORIDE: 10 INJECTION, SOLUTION EPIDURAL; INFILTRATION; INTRACAUDAL; PERINEURAL at 01:05

## 2019-05-14 RX ADMIN — SODIUM CHLORIDE, SODIUM LACTATE, POTASSIUM CHLORIDE, AND CALCIUM CHLORIDE: .6; .31; .03; .02 INJECTION, SOLUTION INTRAVENOUS at 01:05

## 2019-05-14 NOTE — OP NOTE

## 2019-05-14 NOTE — PLAN OF CARE
Vss, vinod po fluids, denies pain, ambulates easily. IV removed, catheter intact. Discharge instructions provided and states understanding. States ready to go home.  Discharged from facility with family per wheelchair.

## 2019-05-14 NOTE — DISCHARGE INSTRUCTIONS
Home care instructions  Apply ice pack to the injection site for 20 minutes periods for the first 24 hrs for soreness/discomfort at injection site DO NOT USE HEAT FOR 24 HOURS  Keep site clean and dry for 24 hours, remove bandaid when desired  Do not drive until tomorrow  Take care when walking after a lumbar injection  Avoid strenuous activities for 2 days  Make take 2 weeks to feel the full effects   Resume home medication as prescribed today  Resume Aspirin, Plavix, or Coumadin the day after the procedure unless otherwise instructed.    SEE IMMEDIATE MEDICAL HELP FOR:  Severe increase in your usual pain or appearance of new pain  Prolonged or increasing weakness or numbness in the legs or arms  Drainage, redness, active bleeding, or increased swelling at the injection site  Temperature over 100.0 degrees F.  Headache that increases when your head is upright and decreases when you lie flat    CALL 911 OR GO DIRECTLY TO EMERGENCY DEPARTMENT FOR:  Shortness of breath, chest pain, or problems breathing      Recovery After Procedural Sedation (Adult)  You have been given medicine by vein to make you sleep during your surgery. This may have included both a pain medicine and sleeping medicine. Most of the effects have worn off. But you may still have some drowsiness for the next 6 to 8 hours.  Home care  Follow these guidelines when you get home:  · For the next 8 hours, you should be watched by a responsible adult. This person should make sure your condition is not getting worse.  · Don't drink any alcohol for the next 24 hours.  · Don't drive, operate dangerous machinery, or make important business or personal decisions during the next 24 hours.  Note: Your healthcare provider may tell you not to take any medicine by mouth for pain or sleep in the next 4 hours. These medicines may react with the medicines you were given in the hospital. This could cause a much stronger response than usual.  Follow-up care  Follow up  with your healthcare provider if you are not alert and back to your usual level of activity within 12 hours.  When to seek medical advice  Call your healthcare provider right away if any of these occur:  · Drowsiness gets worse  · Weakness or dizziness gets worse  · Repeated vomiting  · You can't be awakened   Date Last Reviewed: 10/18/2016  © 2523-3539 [employer]. [address] [place] [address], [place] [address]. All rights reserved. This information is not intended as a substitute for professional medical care. Always follow your healthcare professional's instructions.

## 2019-05-14 NOTE — H&P
CC: Back pain    HPI: The patient is a 57yo woman with a history of lumbar radiculopathy here for L5/S1 JINA. There are no major changes in history and physical from 3/13/19.    Past Medical History:   Diagnosis Date    Anxiety 8/25/2015    Arthritis     Cervical spondylosis     Chronic back pain     DDD (degenerative disc disease), lumbar     Difficult intubation 03/2016    anterior larynx, pt with metal dental appliance, unable to do glidescope, used LMA    Encounter for blood transfusion     Facet arthropathy, thoracic     Hormone replacement therapy (HRT)     Insomnia 8/25/2015    Neck pain     PONV (postoperative nausea and vomiting)     Right foot pain     Scoliosis     Unspecified hypothyroidism 8/25/2015       Past Surgical History:   Procedure Laterality Date    ABLATION, RADIOFREQUENCY T4,5,6,7 Right 12/14/2017    [facility] OR    BILATERAL OOPHORECTOMY      BLOCK-NERVE-MEDIAL BRANCH-LUMBAR L3, L4, L5 Right 10/17/2017    [facility]    CHOLECYSTECTOMY  2010    COL[place]OSCOPY  2016    [place]    Epidural Steroid Injection      Pain managment, at another facility, cervical, thoracic    Epidural Steroid injection      Pain management, cervical    JINA-TRANSFORAMINAL L4 Right 6/14/2016    [facility]    JINA-TRANSFORAMINAL L4/5 Right 11/23/2015    [facility]    HYSTERECTOMY      total    INJECTION-STEROID-EPIDURAL-CERVICAL N/A 3/16/2018    [facility]    INJECTION-STEROID-EPIDURAL-CERVICAL N/A 6/27/2017    [facility]    INJECTION-STEROID-EPIDURAL-CERVICAL N/A 12/9/2016    [facility]    INJECTION-STEROID-EPIDURAL-CERVICAL N/A 7/14/2016    [facility]    INJECTION-STEROID-EPIDURAL-CERVICAL N/A 10/19/2015     [facility]    INJECTION-STEROID-EPIDURAL-LUMBAR N/A 5/15/2018    [facility]    INJECTION-STEROID-EPIDURAL-LUMBAR N/A 8/7/2017    Performed [facility] at [facility]    INJECTION-STEROID-EPIDURAL-LUMBAR N/A 8/12/2016    [facility] at [facility] [place]    Injection-steroid-epidural-lumbar L5/S1 Right 1/16/2019    [facility]    MOUTH SURGERY      Braces, with temporary metal implants in upper gum    PERINEOPLASTY-w/WIDE LOCAL EXCISION N/A 3/18/2016    [facility]    RADIOFREQUENCY ABLATION  02/2016    thoracic nerve    RADIOFREQUENCY ABLATION THORACIC T4, T5, T6, T7 Right 10/12/2018    Performed by [facility] at [facility] [place]    RADIOFREQUENCY THERMOCOAGULATION (RFTC)-NERVE-MEDIAN BRANCH-LUMBAR L3,4,5 Right 10/19/2017    [facility] [place]    RADIOFREQUENCY THERMOCOAGULATION MEDIAN BRANCHES T3,4,5,6 Right 2/5/2016    Performed by [facility] at [facility] [place]    RADIOFREQUENCY THERMOCOAGULATION THORACIC T3, T4, T5, T6 Right 5/30/2017    Per[facility] at [facility]    TONSILLECTOMY      VULVA SURGERY  03/2016       Family History   Problem Relation Age of Onset    Arthritis Mother     Celiac disease Mother     COPD Father        Social History     Socioeconomic History    Marital status:      Spouse name: Not on file    Number of children: Not on file    Years of education: Not on file    Highest education level: Not on file   Occupational History    Not on file   Social Needs    Financial resource strain: Not on file    Food insecurity:     Worry: Not on file     Inability: Not on file    Transportation needs:     Medical: Not on file     Non-medical: Not on file   Tobacco Use    Smoking status: Never Smoker    Smokeless tobacco: Never Used   Substance and Sexual  "Activity    Alcohol use: No    Drug use: No    Sexual activity: Yes     Partners: Male   Lifestyle    Physical activity:     Days per week: Not on file     Minutes per session: Not on file    Stress: Not on file   Relationships    Social connections:     Talks on phone: Not on file     Gets together: Not on file     Attends Gnosticism service: Not on file     Active member of club or organization: Not on file     Attends meetings of clubs or organizations: Not on file     Relationship status: Not on file   Other Topics Concern    Not on file   Social History Narrative    Not on file       No current facility-administered medications for this encounter.        Review of patient's allergies indicates:   Allergen Reactions    Compazine [prochlorperazine edisylate] Anaphylaxis    Avelox [moxifloxacin] Rash       Vitals:    05/14/19 1345   BP: (!) 168/76   Pulse: 61   Resp: 16   Temp: 97.7 °F (36.5 °C)   TempSrc: Skin   SpO2: 98%   Weight: 74.8 kg (165 lb)   Height: 5' 1" (1.549 m)       REVIEW OF SYSTEMS:     GENERAL: No weight loss, malaise or fevers.  HEENT:  No recent changes in vision or hearing  NECK: Negative for lumps, no difficulty with swallowing.  RESPIRATORY: Negative for cough, wheezing or shortness of breath, patient denies any recent URI.  CARDIOVASCULAR: Negative for chest pain, leg swelling or palpitations.  GI: Negative for abdominal discomfort, blood in stools or black stools or change in bowel habits.  MUSCULOSKELETAL: See HPI.  SKIN: Negative for lesions, rash, and itching.  PSYCH: No suicidal or homicidal ideations, no current mood disturbances.  HEMATOLOGY/LYMPHOLOGY: Negative for prolonged bleeding, bruising easily or swollen nodes. Patient is not currently taking any anti-coagulants  ENDO: No history of diabetes or thyroid dysfunction  NEURO: No history of syncope, paralysis, seizures or tremors.All other reviewed and negative other than HPI.    Physical exam:  Gen: A and O x3, pleasant, " well-groomed  Skin: No rashes or obvious lesions  HEENT: PERRLA, no obvious deformities on ears or in canals. No thyroid masses, trachea midline, no palpable lymph nodes in neck, axilla.  CVS: Regular rate and rhythm, normal S1 and S2, no murmurs.  Resp: Clear to auscultation bilaterally.  Abdomen: Soft, NT/ND, normal bowel sounds present.  Musculoskeletal/Neuro: Moving all extremities    Assessment:  Lumbar radiculopathy  -     Case Request Operating Room: Injection-steroid-epidural-lumbar  -     Place in Outpatient; Standing  -     Diet NPO; Standing  -     Discontinue: alprazolam ODT dissolvable tablet 1 mg  -     Notify physician ; Standing  -     Notify physician ; Standing  -     Notify physician (specify); Standing  -     Verify informed consent; Standing  -     Vital signs; Standing    Other orders  -     IP VTE LOW RISK PATIENT; Standing  -     Insert peripheral IV; Standing  -     lactated ringers infusion  -     lidocaine HCL 10 mg/ml (1%) injection 1 mL

## 2019-05-14 NOTE — DISCHARGE SUMMARY
[facility]  Discharge Note  Short Stay    Admit Date: 5/14/2019    Discharge Date: 5/14/2019    Attending Physician: Floyd Coleman MD     Discharge Provider: Floyd Coleman    Diagnoses:  Active Hospital Problems    Diagnosis  POA    *Lumbar radiculopathy [M54.16]  Yes      Resolved Hospital Problems   No resolved problems to display.       Discharged Condition: good    Final Diagnoses: Lumbar radiculopathy [M54.16]    Disposition: Home or Self Care    Hospital Course: no complications, uneventful    Outcome of Hospitalization, Treatment, Procedure, or Surgery:  Patient was admitted for outpatient procedure. The patient underwent procedure without complications and are discharged home    Follow up/Patient Instructions:  Follow up as scheduled in Pain Management clinic in 3-4 weeks/Patient has received instructions and follow up date and time    Medications:  Continue previous medications    Discharge Procedure Orders   Call MD for:  temperature >100.4     Call MD for:  severe uncontrolled pain     Call MD for:  redness, tenderness, or signs of infection (pain, swelling, redness, odor or green/yellow discharge around incision site)     Call MD for:  severe persistent headache     No dressing needed         Discharge Procedure Orders (must include Diet, Follow-up, Activity):   Discharge Procedure Orders (must include Diet, Follow-up, Activity)   Call MD for:  temperature >100.4     Call MD for:  severe uncontrolled pain     Call MD for:  redness, tenderness, or signs of infection (pain, swelling, redness, odor or green/yellow discharge around incision site)     Call MD for:  severe persistent headache     No dressing needed

## 2019-05-22 ENCOUNTER — OFFICE VISIT (OUTPATIENT)
Dept: PAIN MEDICINE | Facility: CLINIC | Age: 59
End: 2019-05-22
Payer: COMMERCIAL

## 2019-05-22 VITALS
HEIGHT: 61 IN | WEIGHT: 173.75 LBS | HEART RATE: 71 BPM | SYSTOLIC BLOOD PRESSURE: 141 MMHG | BODY MASS INDEX: 32.8 KG/M2 | DIASTOLIC BLOOD PRESSURE: 89 MMHG

## 2019-05-22 DIAGNOSIS — M41.9 SCOLIOSIS OF THORACIC SPINE, UNSPECIFIED SCOLIOSIS TYPE: ICD-10-CM

## 2019-05-22 DIAGNOSIS — M50.30 DDD (DEGENERATIVE DISC DISEASE), CERVICAL: ICD-10-CM

## 2019-05-22 DIAGNOSIS — M47.814 SPONDYLOSIS OF THORACIC REGION WITHOUT MYELOPATHY OR RADICULOPATHY: Primary | ICD-10-CM

## 2019-05-22 DIAGNOSIS — M51.36 DDD (DEGENERATIVE DISC DISEASE), LUMBAR: ICD-10-CM

## 2019-05-22 PROCEDURE — 99214 OFFICE O/P EST MOD 30 MIN: CPT | Mod: S$GLB,,, | Performed by: PHYSICIAN ASSISTANT

## 2019-05-22 PROCEDURE — 99999 PR PBB SHADOW E&M-EST. PATIENT-LVL IV: CPT | Mod: PBBFAC,,, | Performed by: PHYSICIAN ASSISTANT

## 2019-05-22 PROCEDURE — 99214 PR OFFICE/OUTPT VISIT, EST, LEVL IV, 30-39 MIN: ICD-10-PCS | Mod: S$GLB,,, | Performed by: PHYSICIAN ASSISTANT

## 2019-05-22 PROCEDURE — 99999 PR PBB SHADOW E&M-EST. PATIENT-LVL IV: ICD-10-PCS | Mod: PBBFAC,,, | Performed by: PHYSICIAN ASSISTANT

## 2019-05-22 RX ORDER — SODIUM CHLORIDE, SODIUM LACTATE, POTASSIUM CHLORIDE, CALCIUM CHLORIDE 600; 310; 30; 20 MG/100ML; MG/100ML; MG/100ML; MG/100ML
INJECTION, SOLUTION INTRAVENOUS CONTINUOUS
Status: CANCELLED | OUTPATIENT
Start: 2019-06-04

## 2019-05-22 NOTE — H&P (VIEW-ONLY)
This note was completed with dictation software and grammatical errors may exist.    CC:Back pain, neck pain    HPI: The patient is a 58-year-old woman with history of hypothyroidism, otherwise fairly healthy but a long history of scoliosis causing back pain, self-referred for continued back pain. She is status post L5/S1 interlaminar epidural steroid injection on 05/14/2019 with 97-98% relief.  Her main complaint is right mid thoracic back pain.  This is worse at work and improved with rest and medication.  She denies weakness, numbness, bladder or bowel incontinence.    Pain intervention history: She has been seeing Dr. Huffman for the last several years and has undergone epidural steroid injections and radiofrequency ablations with good relief.  According to her last pain management physician, she had undergone a right side T3, 4, 5, 6 medial branch radiofrequency ablation on 8/8/14 with good relief.  He had scheduled her for another one but did not complete this.  As far as medications she has been taking Hydrocodone 7.5/325 one to 2 times a day at most and gabapentin 300 mg at night. She is status post cervical epidural steroid injection on 10/19/15 with 50% relief of her neck pain. She is status post right L4 transforaminal epidural steroid injection on 11/23/15 with 100% relief.   She is status post right T3, 4, 5 and 6 medial branch radio frequency ablation on 2/5/16 with greater than 50% relief.  She is status post right L4 transforaminal epidural sterile injection on 6/14/16 with significant relief lasting only about a month.  She is status post C7-T1 cervical interlaminar epidural steroid injection on 7/14/16 with 30-40% relief.   She is status post L5/S1 interlaminar epidural steroidal injection to the right on 8/12/16 with 100% relief of her right leg pain.  She is status post C7-T1 cervical interlaminar epidural steroid injection on 12/9/16 with almost complete relief.  She is status post right T4, 5,  "6 and 7 medial branch radiofrequency ablation on 5/30/17 with 100% relief.  She is status post C7-T1 cervical interlaminar epidural steroid injection on 6/27/17 with 80% relief.  She is status post L5/S1 interlaminar epidural steroid injection on a/7/17 with excellent relief lasting 2 weeks, now reporting 0% relief.  She is status post right L3, 4 and 5 medial branch radiofrequency ablation on 10/19/17 with 80% relief.   She is status post right T4, 5, 6 and 7 medial branch radiofrequency ablation on 12/14/17 with 100% relief.  She is status post C7-T1 cervical interlaminar epidural steroid injection on 3/16/18 with at least 80% relief.  She is status post lumbar epidural steroid injection at L5/S1 on 5/15/18 with 90% relief of her bilateral low back and right leg pain.  The she is status post right T4, 5, 6 and 7 medial branch radiofrequency ablation on 10/12/2018 with 100% relief.  She is status post L5/S1 interlaminar epidural steroid injection on 01/16/2019 with almost 100% relief of her low back pain.    ROS: She reports fatigability, headaches, hypothyroidism, joint stiffness, back pain, difficulty sleeping and anxiety.  Balance of review of systems is negative.      Medical, surgical, family and social history reviewed elsewhere in record.  She is a  at [employer].    Medications/Allergies: See med card    Vitals:    05/22/19 1307   BP: (!) 141/89   Pulse: 71   Weight: 78.8 kg (173 lb 11.6 oz)   Height: 5' 1" (1.549 m)   PainSc:   3   PainLoc: Back         Physical exam:  Gen: A and O x3, pleasant, well-groomed  Skin: No rashes or obvious lesions  HEENT: PERRLA, no obvious deformities on ears or in canals. Trachea midline.  CVS: Regular rate and rhythm, normal palpable pulses.  Resp:No increased work of breathing, symmetrical chest rise.  Abdomen: Soft, NT/ND.  Musculoskeletal:No antalgic gait.     Neuro:  Upper extremities: 5/5 strength bilaterally.  Lower extremities: 5/5 strength " bilaterally.    Reflexes: Patellar 0+, Achilles 0+ bilaterally.  Upper extremity reflexes 2+ bilaterally equal.  Sensory:  Intact and symmetrical to light touch and pinprick in L2-S1 dermatomes bilaterally.    Cervical spine exam: Range of motion is full with flexion, extension and lateral rotation without pain.  Myofascial exam: No tenderness to palpation to the cervical paraspinous muscles.  Moderate tenderness to palpation to the mid thoracic paraspinous muscles.    Lumbar spine:  Lumbar spine: Range of motion is full with flexion without increased pain. Range of motion is full with extension without increased pain.  Brayden's test causes no increased pain on either side.    Supine straight leg raise is negative bilaterally.  Internal and external rotation of the hip causes no increased pain on either side.  Myofascial exam:  No tenderness to palpation to the lumbar paraspinous muscles.      Imagin/16/15 Xray Scoliosis survey: There is thoracic dextroscoliosis with mild lumbar compensatory levoscoliosis. No structural abnormalities are evident. Diffuse spondylosis noted in the cervical, thoracic and to lesser extent lumbar spine. No fractures are identified. There is slight increased kyphotic curvature of the thoracic spine with alignment being otherwise normal. Measurements and evaluation are limited due to underpenetration. Land angle measures approximately in the thoracic spine is of approximately 17.0 degrees and for the lumbar spine 17.4 degrees. Soft tissues are unremarkable. IMPRESSION: 1. S shaped scoliotic curvature of the thoracolumbar spine with Land angle of approximately 17 degrees. 2. Diffuse spondylosis.    MRI report 12: Report notes that there is dextroconvex rotary curvature of the thoracic spine.  Posterior alignment is maintained.  There are scattered vertebral body hemangiomas.  There is minimal posterior disc bulges noted at T5/6, T6/7, T7/8 and T8/9.  There is no central spinal  stenosis or neural foraminal narrowing.    MRI right shoulder 6/24/11: Minor distal subscapularis tendinosis.  Negative for full-thickness or significant partial-thickness rotator cuff tendon tear.  There is a small amount of subacromial subdeltoid bursal fluid which can be seen with recent injection or mild bursitis.    MRI cervical spine report 3/25/11.  Note that this is comparison to previous study on 7/29/2008.  This is a report only, no images were available to me.  The C2/3 disc appears grossly intact with no significant bulging.  C3/4 there is mild protrusion of the disc with no herniation and no nerve root entrapment seen.  Spinal stenosis is not noted.  At C4/5 there is broad-based disc protrusion which compresses the subarachnoid space without cord compression.  There is no definite nerve root compression seen.  At C5/6 there is vertebral body lesion which is bright on T2 and dark on T1 but it does not enhance and may represent an atypical hemangioma.  This is unchanged from previous study.  At C5/6 there is a protrusion of the disc asymmetrically to the left but no significant entrapment.  No change from previous study.  At C6/7 there is a focal protrusion of the disc causing compression of the subarachnoid space without definite cord or nerve root compression.  C7/T1 appears intact.    Lumbar spine MRI report 3/31/14: No acute abnormality or significant degenerative changes.  No canal or foraminal stenosis.    Lumbar spine MRI 10/19/15  T12-L1: No central canal or neuroforaminal stenosis. No disc protrusion or extrusion.  L1-L2: No central canal or neuroforaminal stenosis. No disc protrusion or extrusion.  L2-L3: No central canal or neuroforaminal stenosis. No disc protrusion or extrusion.  L3-L4: No central canal or neuroforaminal stenosis. No disc protrusion or extrusion.  L4-L5: There is a broad disc bulge which extends into both neuroforamina. There is prominent ligamentum flavum thickening and  facet arthropathy. There is mild bilateral neuroforaminal stenosis. No central canal stenosis.  L5-S1: There is bilateral hypertrophic facet arthropathy and ligamentum flavum thickening. No central canal or neuroforaminal stenosis. No disc protrusion or extrusion.    Cervical spine MRI 6/29/16  At C2-C3, the minimal interstitial excision covers a very mild broad-based disc bulge most prominent centrally but does not deform the ventral cord.  There is no canal or foraminal stenosis.  At C3-C4, there is mildly decreased disc height with a broad-based disc bulge most prominent centrally.  This minimally contact the ventral cord without significant deformity.  The canal is widely patent.  There is uncovertebral hypertrophy and facet arthropathy, but the foramina are patent.  At C4-C5, there is decreased disc height with a broad disc bulge-marginal osteophyte complex that mildly lateralizes to the left and blends imperceptibly with right greater than left uncovertebral hypertrophy.  With ligamentum hypertrophy, there is no significant central canal stenosis.  Uncovertebral hypertrophy and facet arthropathy are causing mild left and moderate right foraminal stenoses.  At C5-C6, there is a broad-based disc bulge and osteophyte complex most prominent centrally.  There is also ligamentum flavum hypertrophy present, but the canal is patent.  There is mild left foraminal stenosis.  The right foramen is patent.  At C6-C7, there is disc desiccation with a minimal broad-based disc bulge most prominent in the right paracentral canal.  The canal is widely patent.  There is mild left foraminal stenosis.  At C7-T1, there is no significant disc bulge, protrusion, or herniation/extrusion.  The canal and foramina are widely patent.    MRI lumbar spine 9/22/17  L1-L2 demonstrates no significant disc protrusion, central spinal canal stenosis, or neural foraminal stenosis.  L2-L3 demonstrates no significant disc protrusion, central spinal  canal stenosis, or neural foraminal stenosis. Mild facet arthrosis is seen.  L3-L4 demonstrates no significant disc protrusion, central spinal canal stenosis, or neural foraminal stenosis. Mild facet arthrosis is seen.  L4-L5 demonstrates mild broad-based disc bulge, ligamentum flavum hypertrophy, and mild/moderate bilateral facet arthrosis. No significant central spinal canal stenosis is seen. Mild bilateral lateral recess narrowing and neural foraminal narrowing is seen. Similar findings were seen on the prior MRI.  L5-S1 demonstrates no significant posterior disc protrusion. Moderate bilateral facet arthrosis is seen along ligamentum flavum hypertrophy. No significant central spinal canal or neural foraminal stenosis is seen.    Hip x-rays 9/22/17  AP view of the pelvis and frog leg views of both hips were obtained. No evidence of acute displaced fracture or dislocation is visualized.  No radiopaque foreign bodies are visualized. The bilateral superior joint spaces are grossly maintained. Mild bilateral sacroiliac joint degenerative changes are seen including subchondral sclerosis and small marginal osteophytes. Mild to moderate symphyseal degenerative changes are seen. There is a slightly expansile cortically-based focus along the lateral proximal right femoral metadiaphysis. This could reflect a sessile osteochondroma, but correlation with MRI of the right hip is recommended.      Assessment:  The patient is a 58-year-old woman with history of hypothyroidism, otherwise fairly healthy but a long history of scoliosis causing back pain, self-referred for continued back pain.    1. Spondylosis of thoracic region without myelopathy or radiculopathy  Vital signs    Place 18-22 gauage peripheral IV     Verify informed consent    Notify physician     Notify physician     Notify physician (specify)    Diet NPO    Case Request Operating Room: Radiofrequency Ablation T4,5,6,7    Place in Outpatient    lactated ringers  infusion   2. DDD (degenerative disc disease), lumbar     3. DDD (degenerative disc disease), cervical     4. Scoliosis of thoracic spine, unspecified scoliosis type         Plan:  1.  The patient did well following the L5/S1 interlaminar JINA in this can be repeated in the future if necessary.  2.  I will set her up to repeat right T4, 5, 6 and 7 medial branch radiofrequency ablation.  She has done well with this in the past.  3.  She continues to take hydrocodone twice daily as needed for pain and has another prescription that she will fill at the end of the month.  4.  Follow-up in 4 weeks postprocedure or sooner as needed.    Greater than 50% of this 25 min visit was spent counseling the patient.

## 2019-05-22 NOTE — PROGRESS NOTES
This note was completed with dictation software and grammatical errors may exist.    CC:Back pain, neck pain    HPI: The patient is a 58-year-old woman with history of hypothyroidism, otherwise fairly healthy but a long history of scoliosis causing back pain, self-referred for continued back pain. She is status post L5/S1 interlaminar epidural steroid injection on 05/14/2019 with 97-98% relief.  Her main complaint is right mid thoracic back pain.  This is worse at work and improved with rest and medication.  She denies weakness, numbness, bladder or bowel incontinence.    Pain intervention history: She has been seeing Dr. Huffman for the last several years and has undergone epidural steroid injections and radiofrequency ablations with good relief.  According to her last pain management physician, she had undergone a right side T3, 4, 5, 6 medial branch radiofrequency ablation on 8/8/14 with good relief.  He had scheduled her for another one but did not complete this.  As far as medications she has been taking Hydrocodone 7.5/325 one to 2 times a day at most and gabapentin 300 mg at night. She is status post cervical epidural steroid injection on 10/19/15 with 50% relief of her neck pain. She is status post right L4 transforaminal epidural steroid injection on 11/23/15 with 100% relief.   She is status post right T3, 4, 5 and 6 medial branch radio frequency ablation on 2/5/16 with greater than 50% relief.  She is status post right L4 transforaminal epidural sterile injection on 6/14/16 with significant relief lasting only about a month.  She is status post C7-T1 cervical interlaminar epidural steroid injection on 7/14/16 with 30-40% relief.   She is status post L5/S1 interlaminar epidural steroidal injection to the right on 8/12/16 with 100% relief of her right leg pain.  She is status post C7-T1 cervical interlaminar epidural steroid injection on 12/9/16 with almost complete relief.  She is status post right T4, 5,  "6 and 7 medial branch radiofrequency ablation on 5/30/17 with 100% relief.  She is status post C7-T1 cervical interlaminar epidural steroid injection on 6/27/17 with 80% relief.  She is status post L5/S1 interlaminar epidural steroid injection on a/7/17 with excellent relief lasting 2 weeks, now reporting 0% relief.  She is status post right L3, 4 and 5 medial branch radiofrequency ablation on 10/19/17 with 80% relief.   She is status post right T4, 5, 6 and 7 medial branch radiofrequency ablation on 12/14/17 with 100% relief.  She is status post C7-T1 cervical interlaminar epidural steroid injection on 3/16/18 with at least 80% relief.  She is status post lumbar epidural steroid injection at L5/S1 on 5/15/18 with 90% relief of her bilateral low back and right leg pain.  The she is status post right T4, 5, 6 and 7 medial branch radiofrequency ablation on 10/12/2018 with 100% relief.  She is status post L5/S1 interlaminar epidural steroid injection on 01/16/2019 with almost 100% relief of her low back pain.    ROS: She reports fatigability, headaches, hypothyroidism, joint stiffness, back pain, difficulty sleeping and anxiety.  Balance of review of systems is negative.      Medical, surgical, family and social history reviewed elsewhere in record.  She is a  at \[employer]\"".    Medications/Allergies: See med card    Vitals:    05/22/19 1307   BP: (!) 141/89   Pulse: 71   Weight: 78.8 kg (173 lb 11.6 oz)   Height: 5' 1" (1.549 m)   PainSc:   3   PainLoc: Back         Physical exam:  Gen: A and O x3, pleasant, well-groomed  Skin: No rashes or obvious lesions  HEENT: PERRLA, no obvious deformities on ears or in canals. Trachea midline.  CVS: Regular rate and rhythm, normal palpable pulses.  Resp:No increased work of breathing, symmetrical chest rise.  Abdomen: Soft, NT/ND.  Musculoskeletal:No antalgic gait.     Neuro:  Upper extremities: 5/5 strength bilaterally.  Lower extremities: 5/5 strength " bilaterally.    Reflexes: Patellar 0+, Achilles 0+ bilaterally.  Upper extremity reflexes 2+ bilaterally equal.  Sensory:  Intact and symmetrical to light touch and pinprick in L2-S1 dermatomes bilaterally.    Cervical spine exam: Range of motion is full with flexion, extension and lateral rotation without pain.  Myofascial exam: No tenderness to palpation to the cervical paraspinous muscles.  Moderate tenderness to palpation to the mid thoracic paraspinous muscles.    Lumbar spine:  Lumbar spine: Range of motion is full with flexion without increased pain. Range of motion is full with extension without increased pain.  Brayden's test causes no increased pain on either side.    Supine straight leg raise is negative bilaterally.  Internal and external rotation of the hip causes no increased pain on either side.  Myofascial exam:  No tenderness to palpation to the lumbar paraspinous muscles.      Imagin/16/15 Xray Scoliosis survey: There is thoracic dextroscoliosis with mild lumbar compensatory levoscoliosis. No structural abnormalities are evident. Diffuse spondylosis noted in the cervical, thoracic and to lesser extent lumbar spine. No fractures are identified. There is slight increased kyphotic curvature of the thoracic spine with alignment being otherwise normal. Measurements and evaluation are limited due to underpenetration. Land angle measures approximately in the thoracic spine is of approximately 17.0 degrees and for the lumbar spine 17.4 degrees. Soft tissues are unremarkable. IMPRESSION: 1. S shaped scoliotic curvature of the thoracolumbar spine with Land angle of approximately 17 degrees. 2. Diffuse spondylosis.    MRI report 12: Report notes that there is dextroconvex rotary curvature of the thoracic spine.  Posterior alignment is maintained.  There are scattered vertebral body hemangiomas.  There is minimal posterior disc bulges noted at T5/6, T6/7, T7/8 and T8/9.  There is no central spinal  stenosis or neural foraminal narrowing.    MRI right shoulder 6/24/11: Minor distal subscapularis tendinosis.  Negative for full-thickness or significant partial-thickness rotator cuff tendon tear.  There is a small amount of subacromial subdeltoid bursal fluid which can be seen with recent injection or mild bursitis.    MRI cervical spine report 3/25/11.  Note that this is comparison to previous study on 7/29/2008.  This is a report only, no images were available to me.  The C2/3 disc appears grossly intact with no significant bulging.  C3/4 there is mild protrusion of the disc with no herniation and no nerve root entrapment seen.  Spinal stenosis is not noted.  At C4/5 there is broad-based disc protrusion which compresses the subarachnoid space without cord compression.  There is no definite nerve root compression seen.  At C5/6 there is vertebral body lesion which is bright on T2 and dark on T1 but it does not enhance and may represent an atypical hemangioma.  This is unchanged from previous study.  At C5/6 there is a protrusion of the disc asymmetrically to the left but no significant entrapment.  No change from previous study.  At C6/7 there is a focal protrusion of the disc causing compression of the subarachnoid space without definite cord or nerve root compression.  C7/T1 appears intact.    Lumbar spine MRI report 3/31/14: No acute abnormality or significant degenerative changes.  No canal or foraminal stenosis.    Lumbar spine MRI 10/19/15  T12-L1: No central canal or neuroforaminal stenosis. No disc protrusion or extrusion.  L1-L2: No central canal or neuroforaminal stenosis. No disc protrusion or extrusion.  L2-L3: No central canal or neuroforaminal stenosis. No disc protrusion or extrusion.  L3-L4: No central canal or neuroforaminal stenosis. No disc protrusion or extrusion.  L4-L5: There is a broad disc bulge which extends into both neuroforamina. There is prominent ligamentum flavum thickening and  facet arthropathy. There is mild bilateral neuroforaminal stenosis. No central canal stenosis.  L5-S1: There is bilateral hypertrophic facet arthropathy and ligamentum flavum thickening. No central canal or neuroforaminal stenosis. No disc protrusion or extrusion.    Cervical spine MRI 6/29/16  At C2-C3, the minimal interstitial excision covers a very mild broad-based disc bulge most prominent centrally but does not deform the ventral cord.  There is no canal or foraminal stenosis.  At C3-C4, there is mildly decreased disc height with a broad-based disc bulge most prominent centrally.  This minimally contact the ventral cord without significant deformity.  The canal is widely patent.  There is uncovertebral hypertrophy and facet arthropathy, but the foramina are patent.  At C4-C5, there is decreased disc height with a broad disc bulge-marginal osteophyte complex that mildly lateralizes to the left and blends imperceptibly with right greater than left uncovertebral hypertrophy.  With ligamentum hypertrophy, there is no significant central canal stenosis.  Uncovertebral hypertrophy and facet arthropathy are causing mild left and moderate right foraminal stenoses.  At C5-C6, there is a broad-based disc bulge and osteophyte complex most prominent centrally.  There is also ligamentum flavum hypertrophy present, but the canal is patent.  There is mild left foraminal stenosis.  The right foramen is patent.  At C6-C7, there is disc desiccation with a minimal broad-based disc bulge most prominent in the right paracentral canal.  The canal is widely patent.  There is mild left foraminal stenosis.  At C7-T1, there is no significant disc bulge, protrusion, or herniation/extrusion.  The canal and foramina are widely patent.    MRI lumbar spine 9/22/17  L1-L2 demonstrates no significant disc protrusion, central spinal canal stenosis, or neural foraminal stenosis.  L2-L3 demonstrates no significant disc protrusion, central spinal  canal stenosis, or neural foraminal stenosis. Mild facet arthrosis is seen.  L3-L4 demonstrates no significant disc protrusion, central spinal canal stenosis, or neural foraminal stenosis. Mild facet arthrosis is seen.  L4-L5 demonstrates mild broad-based disc bulge, ligamentum flavum hypertrophy, and mild/moderate bilateral facet arthrosis. No significant central spinal canal stenosis is seen. Mild bilateral lateral recess narrowing and neural foraminal narrowing is seen. Similar findings were seen on the prior MRI.  L5-S1 demonstrates no significant posterior disc protrusion. Moderate bilateral facet arthrosis is seen along ligamentum flavum hypertrophy. No significant central spinal canal or neural foraminal stenosis is seen.    Hip x-rays 9/22/17  AP view of the pelvis and frog leg views of both hips were obtained. No evidence of acute displaced fracture or dislocation is visualized.  No radiopaque foreign bodies are visualized. The bilateral superior joint spaces are grossly maintained. Mild bilateral sacroiliac joint degenerative changes are seen including subchondral sclerosis and small marginal osteophytes. Mild to moderate symphyseal degenerative changes are seen. There is a slightly expansile cortically-based focus along the lateral proximal right femoral metadiaphysis. This could reflect a sessile osteochondroma, but correlation with MRI of the right hip is recommended.      Assessment:  The patient is a 58-year-old woman with history of hypothyroidism, otherwise fairly healthy but a long history of scoliosis causing back pain, self-referred for continued back pain.    1. Spondylosis of thoracic region without myelopathy or radiculopathy  Vital signs    Place 18-22 gauage peripheral IV     Verify informed consent    Notify physician     Notify physician     Notify physician (specify)    Diet NPO    Case Request Operating Room: Radiofrequency Ablation T4,5,6,7    Place in Outpatient    lactated ringers  infusion   2. DDD (degenerative disc disease), lumbar     3. DDD (degenerative disc disease), cervical     4. Scoliosis of thoracic spine, unspecified scoliosis type         Plan:  1.  The patient did well following the L5/S1 interlaminar JINA in this can be repeated in the future if necessary.  2.  I will set her up to repeat right T4, 5, 6 and 7 medial branch radiofrequency ablation.  She has done well with this in the past.  3.  She continues to take hydrocodone twice daily as needed for pain and has another prescription that she will fill at the end of the month.  4.  Follow-up in 4 weeks postprocedure or sooner as needed.    Greater than 50% of this 25 min visit was spent counseling the patient.

## 2019-05-28 DIAGNOSIS — R20.9 DISTURBANCE OF SKIN SENSATION: ICD-10-CM

## 2019-05-28 RX ORDER — TRIAMCINOLONE ACETONIDE 1 MG/G
CREAM TOPICAL 2 TIMES DAILY
Qty: 454 G | Refills: 1 | Status: SHIPPED | OUTPATIENT
Start: 2019-05-28 | End: 2019-12-17 | Stop reason: SDUPTHER

## 2019-06-04 ENCOUNTER — PATIENT MESSAGE (OUTPATIENT)
Dept: PAIN MEDICINE | Facility: CLINIC | Age: 59
End: 2019-06-04

## 2019-06-04 ENCOUNTER — HOSPITAL ENCOUNTER (OUTPATIENT)
Facility: HOSPITAL | Age: 59
Discharge: HOME | End: 2019-06-04
Attending: ANESTHESIOLOGY | Admitting: ANESTHESIOLOGY
Payer: COMMERCIAL

## 2019-06-04 ENCOUNTER — HOSPITAL ENCOUNTER (OUTPATIENT)
Dept: RADIOLOGY | Facility: HOSPITAL | Age: 59
Discharge: HOME | End: 2019-06-04
Attending: ANESTHESIOLOGY
Payer: COMMERCIAL

## 2019-06-04 VITALS
HEIGHT: 61 IN | WEIGHT: 170 LBS | DIASTOLIC BLOOD PRESSURE: 72 MMHG | TEMPERATURE: 98 F | RESPIRATION RATE: 14 BRPM | HEART RATE: 70 BPM | BODY MASS INDEX: 32.1 KG/M2 | OXYGEN SATURATION: 97 % | SYSTOLIC BLOOD PRESSURE: 126 MMHG

## 2019-06-04 DIAGNOSIS — M47.814 THORACIC SPONDYLOSIS: Primary | ICD-10-CM

## 2019-06-04 DIAGNOSIS — M51.34 DDD (DEGENERATIVE DISC DISEASE), THORACIC: ICD-10-CM

## 2019-06-04 DIAGNOSIS — M47.814 SPONDYLOSIS OF THORACIC REGION WITHOUT MYELOPATHY OR RADICULOPATHY: ICD-10-CM

## 2019-06-04 PROCEDURE — 64633 DESTROY CERV/THOR FACET JNT: CPT | Mod: RT,,, | Performed by: ANESTHESIOLOGY

## 2019-06-04 PROCEDURE — 64633 DESTROY CERV/THOR FACET JNT: CPT | Mod: PO | Performed by: ANESTHESIOLOGY

## 2019-06-04 PROCEDURE — 64634 PR DESTROY C/TH FACET JNT ADDL: ICD-10-PCS | Mod: RT,,, | Performed by: ANESTHESIOLOGY

## 2019-06-04 PROCEDURE — 99152 MOD SED SAME PHYS/QHP 5/>YRS: CPT | Mod: ,,, | Performed by: ANESTHESIOLOGY

## 2019-06-04 PROCEDURE — 25000003 PHARM REV CODE 250: Mod: PO | Performed by: ANESTHESIOLOGY

## 2019-06-04 PROCEDURE — 64633 PR DESTROY CERV/THOR FACET JNT: ICD-10-PCS | Mod: RT,,, | Performed by: ANESTHESIOLOGY

## 2019-06-04 PROCEDURE — 64634 DESTROY C/TH FACET JNT ADDL: CPT | Mod: PO | Performed by: ANESTHESIOLOGY

## 2019-06-04 PROCEDURE — 63600175 PHARM REV CODE 636 W HCPCS: Mod: PO | Performed by: ANESTHESIOLOGY

## 2019-06-04 PROCEDURE — 76000 FLUOROSCOPY <1 HR PHYS/QHP: CPT | Mod: TC,PO

## 2019-06-04 PROCEDURE — 64634 DESTROY C/TH FACET JNT ADDL: CPT | Mod: RT,,, | Performed by: ANESTHESIOLOGY

## 2019-06-04 PROCEDURE — 99152 PR MOD CONSCIOUS SEDATION, SAME PHYS, 5+ YRS, FIRST 15 MIN: ICD-10-PCS | Mod: ,,, | Performed by: ANESTHESIOLOGY

## 2019-06-04 RX ORDER — LIDOCAINE HYDROCHLORIDE 20 MG/ML
INJECTION, SOLUTION EPIDURAL; INFILTRATION; INTRACAUDAL; PERINEURAL
Status: DISCONTINUED | OUTPATIENT
Start: 2019-06-04 | End: 2019-06-04 | Stop reason: HOSPADM

## 2019-06-04 RX ORDER — FENTANYL CITRATE 50 UG/ML
INJECTION, SOLUTION INTRAMUSCULAR; INTRAVENOUS
Status: DISCONTINUED | OUTPATIENT
Start: 2019-06-04 | End: 2019-06-04 | Stop reason: HOSPADM

## 2019-06-04 RX ORDER — METHYLPREDNISOLONE ACETATE 40 MG/ML
INJECTION, SUSPENSION INTRA-ARTICULAR; INTRALESIONAL; INTRAMUSCULAR; SOFT TISSUE
Status: DISCONTINUED | OUTPATIENT
Start: 2019-06-04 | End: 2019-06-04 | Stop reason: HOSPADM

## 2019-06-04 RX ORDER — MIDAZOLAM HYDROCHLORIDE 2 MG/2ML
INJECTION, SOLUTION INTRAMUSCULAR; INTRAVENOUS
Status: DISCONTINUED | OUTPATIENT
Start: 2019-06-04 | End: 2019-06-04 | Stop reason: HOSPADM

## 2019-06-04 RX ORDER — LIDOCAINE HYDROCHLORIDE 10 MG/ML
INJECTION, SOLUTION EPIDURAL; INFILTRATION; INTRACAUDAL; PERINEURAL
Status: DISCONTINUED | OUTPATIENT
Start: 2019-06-04 | End: 2019-06-04 | Stop reason: HOSPADM

## 2019-06-04 RX ORDER — LIDOCAINE HYDROCHLORIDE 10 MG/ML
1 INJECTION INFILTRATION; PERINEURAL ONCE
Status: COMPLETED | OUTPATIENT
Start: 2019-06-04 | End: 2019-06-04

## 2019-06-04 RX ORDER — SODIUM CHLORIDE, SODIUM LACTATE, POTASSIUM CHLORIDE, CALCIUM CHLORIDE 600; 310; 30; 20 MG/100ML; MG/100ML; MG/100ML; MG/100ML
INJECTION, SOLUTION INTRAVENOUS CONTINUOUS
Status: DISCONTINUED | OUTPATIENT
Start: 2019-06-04 | End: 2019-06-04 | Stop reason: HOSPADM

## 2019-06-04 RX ADMIN — SODIUM CHLORIDE, SODIUM LACTATE, POTASSIUM CHLORIDE, AND CALCIUM CHLORIDE: .6; .31; .03; .02 INJECTION, SOLUTION INTRAVENOUS at 03:06

## 2019-06-04 RX ADMIN — LIDOCAINE HYDROCHLORIDE 1 ML: 10 INJECTION, SOLUTION INFILTRATION; PERINEURAL at 03:06

## 2019-06-04 NOTE — OP NOTE
PROCEDURE DATE: 6/4/2019    PROCEDURE:  Radiofrequency ablation of the right T4,5,6,7 medial branch nerves on the right-side utilizing fluoroscopy    DIAGNOSIS:  Thoracic spondylosis    Post op Diagnosis: Same    PHYSICIAN: Floyd Coleman MD    MEDICATIONS INJECTED:  From a mixture of 4ml of 2% lidocaine and 40mg of methylprednisone, 1ml of this solution was injected at each level.    LOCAL ANESTHETIC USED: Lidocaine 1%, 3 ml given at each site.    SEDATION MEDICATIONS: 4mg versed, 50mcg fentanyl    ESTIMATED BLOOD LOSS:  none    COMPLICATIONS:  none    TECHNIQUE:  A time out was taken to identify patient and procedure side prior to starting the procedure. Laying in a prone position, the patient was prepped and draped in the usual sterile fashion using ChloraPrep and sterile towels.  The levels were determined under fluoroscopic guidance and then marked.  Local anesthetic was given by raising a wheal at the skin over each site and then infiltrated approximately 2cm deeper.  A 20-gauge  100 mm Cenzic RF needle was introduced to the anatomic location of the right T4,5,6,7 medial branch nerves.  Motor stimulation up to 2 Volts at each level confirmed no motor nerve involvement.  Impedance was less than 800 ohms at each level. The above noted medication was then injected slowly.  Ablation was performed per level utilizing Nataliya radiofrequency generator 80°C for 90 seconds. The patient tolerated the procedure well.     The patient was monitored after the procedure.  Patient was given post procedure and discharge instructions to follow at home.  The patient was discharged in a stable condition

## 2019-06-04 NOTE — DISCHARGE INSTRUCTIONS
Home care instructions  Apply ice pack to the injection site for 20 minutes periods for the first 24 hrs for soreness/discomfort at injection site DO NOT USE HEAT FOR 24 HOURS  Keep site clean and dry for 24 hours, remove bandaid when desired  Do not drive until tomorrow  Take care when walking after a lumbar injection  Avoid strenuous activities for 2 days  Make take 2 weeks to feel the full effects   Resume home medication as prescribed today  Resume Aspirin, Plavix, or Coumadin the day after the procedure unless otherwise instructed.    SEE IMMEDIATE MEDICAL HELP FOR:  Severe increase in your usual pain or appearance of new pain  Prolonged or increasing weakness or numbness in the legs or arms  Drainage, redness, active bleeding, or increased swelling at the injection site  Temperature over 100.0 degrees F.  Headache that increases when your head is upright and decreases when you lie flat    CALL 911 OR GO DIRECTLY TO EMERGENCY DEPARTMENT FOR:  Shortness of breath, chest pain, or problems breathing      Recovery After Procedural Sedation (Adult)  You have been given medicine by vein to make you sleep during your surgery. This may have included both a pain medicine and sleeping medicine. Most of the effects have worn off. But you may still have some drowsiness for the next 6 to 8 hours.  Home care  Follow these guidelines when you get home:  · For the next 8 hours, you should be watched by a responsible adult. This person should make sure your condition is not getting worse.  · Don't drink any alcohol for the next 24 hours.  · Don't drive, operate dangerous machinery, or make important business or personal decisions during the next 24 hours.  Note: Your healthcare provider may tell you not to take any medicine by mouth for pain or sleep in the next 4 hours. These medicines may react with the medicines you were given in the hospital. This could cause a much stronger response than usual.  Follow-up care  Follow up  with your healthcare provider if you are not alert and back to your usual level of activity within 12 hours.  When to seek medical advice  Call your healthcare provider right away if any of these occur:  · Drowsiness gets worse  · Weakness or dizziness gets worse  · Repeated vomiting  · You can't be awakened   Date Last Reviewed: 10/18/2016  © 9082-0227 [employer]. [address] [place] [address], [place] [address]. All rights reserved. This information is not intended as a substitute for professional medical care. Always follow your healthcare professional's instructions.

## 2019-06-04 NOTE — DISCHARGE SUMMARY
[facility]  Discharge Note  Short Stay    Admit Date: 6/4/2019    Discharge Date: 6/4/2019    Attending Physician: Floyd Coleman MD     Discharge Provider: Floyd Coleman    Diagnoses:  Active Hospital Problems    Diagnosis  POA    *Thoracic spondylosis [M47.814]  Yes      Resolved Hospital Problems   No resolved problems to display.       Discharged Condition: good    Final Diagnoses: Spondylosis of thoracic region without myelopathy or radiculopathy [M47.814]    Disposition: Home or Self Care    Hospital Course: no complications, uneventful    Outcome of Hospitalization, Treatment, Procedure, or Surgery:  Patient was admitted for outpatient procedure. The patient underwent procedure without complications and are discharged home    Follow up/Patient Instructions:  Follow up as scheduled in Pain Management clinic in 3-4 weeks/Patient has received instructions and follow up date and time    Medications:  Continue previous medications    Discharge Procedure Orders   Call MD for:  temperature >100.4     Call MD for:  severe uncontrolled pain     Call MD for:  redness, tenderness, or signs of infection (pain, swelling, redness, odor or green/yellow discharge around incision site)     Call MD for:  severe persistent headache     No dressing needed         Discharge Procedure Orders (must include Diet, Follow-up, Activity):   Discharge Procedure Orders (must include Diet, Follow-up, Activity)   Call MD for:  temperature >100.4     Call MD for:  severe uncontrolled pain     Call MD for:  redness, tenderness, or signs of infection (pain, swelling, redness, odor or green/yellow discharge around incision site)     Call MD for:  severe persistent headache     No dressing needed

## 2019-06-07 RX ORDER — HYDROCODONE BITARTRATE AND ACETAMINOPHEN 7.5; 325 MG/1; MG/1
1 TABLET ORAL 2 TIMES DAILY PRN
Qty: 60 TABLET | Refills: 0 | Status: SHIPPED | OUTPATIENT
Start: 2019-06-28 | End: 2019-06-25

## 2019-06-25 ENCOUNTER — OFFICE VISIT (OUTPATIENT)
Dept: PAIN MEDICINE | Facility: CLINIC | Age: 59
End: 2019-06-25
Payer: COMMERCIAL

## 2019-06-25 VITALS
DIASTOLIC BLOOD PRESSURE: 81 MMHG | SYSTOLIC BLOOD PRESSURE: 125 MMHG | WEIGHT: 169.75 LBS | RESPIRATION RATE: 18 BRPM | BODY MASS INDEX: 32.07 KG/M2 | OXYGEN SATURATION: 97 % | TEMPERATURE: 98 F | HEART RATE: 75 BPM

## 2019-06-25 DIAGNOSIS — M47.814 SPONDYLOSIS OF THORACIC REGION WITHOUT MYELOPATHY OR RADICULOPATHY: Primary | ICD-10-CM

## 2019-06-25 DIAGNOSIS — M51.36 DDD (DEGENERATIVE DISC DISEASE), LUMBAR: ICD-10-CM

## 2019-06-25 DIAGNOSIS — M50.30 DDD (DEGENERATIVE DISC DISEASE), CERVICAL: ICD-10-CM

## 2019-06-25 DIAGNOSIS — M41.9 SCOLIOSIS OF THORACIC SPINE, UNSPECIFIED SCOLIOSIS TYPE: ICD-10-CM

## 2019-06-25 PROCEDURE — 99999 PR PBB SHADOW E&M-EST. PATIENT-LVL IV: CPT | Mod: PBBFAC,,, | Performed by: PHYSICIAN ASSISTANT

## 2019-06-25 PROCEDURE — 99214 PR OFFICE/OUTPT VISIT, EST, LEVL IV, 30-39 MIN: ICD-10-PCS | Mod: S$GLB,,, | Performed by: PHYSICIAN ASSISTANT

## 2019-06-25 PROCEDURE — 99214 OFFICE O/P EST MOD 30 MIN: CPT | Mod: S$GLB,,, | Performed by: PHYSICIAN ASSISTANT

## 2019-06-25 PROCEDURE — 99999 PR PBB SHADOW E&M-EST. PATIENT-LVL IV: ICD-10-PCS | Mod: PBBFAC,,, | Performed by: PHYSICIAN ASSISTANT

## 2019-06-25 RX ORDER — HYDROCODONE BITARTRATE AND ACETAMINOPHEN 7.5; 325 MG/1; MG/1
1 TABLET ORAL 2 TIMES DAILY PRN
Qty: 60 TABLET | Refills: 0 | Status: SHIPPED | OUTPATIENT
Start: 2019-07-28 | End: 2019-07-01 | Stop reason: SDUPTHER

## 2019-06-25 RX ORDER — HYDROCODONE BITARTRATE AND ACETAMINOPHEN 7.5; 325 MG/1; MG/1
1 TABLET ORAL 2 TIMES DAILY PRN
Qty: 60 TABLET | Refills: 0 | Status: SHIPPED | OUTPATIENT
Start: 2019-08-27 | End: 2019-09-24 | Stop reason: SDUPTHER

## 2019-06-27 NOTE — PROGRESS NOTES
This note was completed with dictation software and grammatical errors may exist.    CC:Back pain, neck pain    HPI: The patient is a 58-year-old woman with history of hypothyroidism, otherwise fairly healthy but a long history of scoliosis causing back pain, self-referred for continued back pain. She is status post right T4, 5, 6 and 7 medial branch radiofrequency ablation on 06/04/2019 with 80% relief.  She did have some increased pain initially but is now pleased with the results.  She has mild neck and low back pain but currently she is doing well and her pain is well controlled with her medication.  She denies weakness, numbness, bladder or bowel incontinence.    Pain intervention history: She has been seeing Dr. Huffman for the last several years and has undergone epidural steroid injections and radiofrequency ablations with good relief.  According to her last pain management physician, she had undergone a right side T3, 4, 5, 6 medial branch radiofrequency ablation on 8/8/14 with good relief.  He had scheduled her for another one but did not complete this.  As far as medications she has been taking Hydrocodone 7.5/325 one to 2 times a day at most and gabapentin 300 mg at night. She is status post cervical epidural steroid injection on 10/19/15 with 50% relief of her neck pain. She is status post right L4 transforaminal epidural steroid injection on 11/23/15 with 100% relief.   She is status post right T3, 4, 5 and 6 medial branch radio frequency ablation on 2/5/16 with greater than 50% relief.  She is status post right L4 transforaminal epidural sterile injection on 6/14/16 with significant relief lasting only about a month.  She is status post C7-T1 cervical interlaminar epidural steroid injection on 7/14/16 with 30-40% relief.   She is status post L5/S1 interlaminar epidural steroidal injection to the right on 8/12/16 with 100% relief of her right leg pain.  She is status post C7-T1 cervical interlaminar  epidural steroid injection on 12/9/16 with almost complete relief.  She is status post right T4, 5, 6 and 7 medial branch radiofrequency ablation on 5/30/17 with 100% relief.  She is status post C7-T1 cervical interlaminar epidural steroid injection on 6/27/17 with 80% relief.  She is status post L5/S1 interlaminar epidural steroid injection on a/7/17 with excellent relief lasting 2 weeks, now reporting 0% relief.  She is status post right L3, 4 and 5 medial branch radiofrequency ablation on 10/19/17 with 80% relief.   She is status post right T4, 5, 6 and 7 medial branch radiofrequency ablation on 12/14/17 with 100% relief.  She is status post C7-T1 cervical interlaminar epidural steroid injection on 3/16/18 with at least 80% relief.  She is status post lumbar epidural steroid injection at L5/S1 on 5/15/18 with 90% relief of her bilateral low back and right leg pain.  The she is status post right T4, 5, 6 and 7 medial branch radiofrequency ablation on 10/12/2018 with 100% relief.  She is status post L5/S1 interlaminar epidural steroid injection on 01/16/2019 with almost 100% relief of her low back pain. She is status post right T4, 5, 6 and 7 medial branch radiofrequency ablation on 06/04/2019 with 80% relief.    ROS: She reports fatigability, headaches, hypothyroidism, joint stiffness, back pain, difficulty sleeping and anxiety.  Balance of review of systems is negative.      Medical, surgical, family and social history reviewed elsewhere in record.  She is a  at \[employer]\"".    Medications/Allergies: See med card    Vitals:    06/25/19 0902   BP: 125/81   Pulse: 75   Resp: 18   Temp: 97.5 °F (36.4 °C)   TempSrc: Oral   SpO2: 97%   Weight: 77 kg (169 lb 12.1 oz)   PainSc:   4   PainLoc: Back         Physical exam:  Gen: A and O x3, pleasant, well-groomed  Skin: No rashes or obvious lesions  HEENT: PERRLA, no obvious deformities on ears or in canals. Trachea midline.  CVS: Regular rate and rhythm,  normal palpable pulses.  Resp:No increased work of breathing, symmetrical chest rise.  Abdomen: Soft, NT/ND.  Musculoskeletal:No antalgic gait.     Neuro:  Upper extremities: 5/5 strength bilaterally.  Lower extremities: 5/5 strength bilaterally.    Reflexes: Patellar 0+, Achilles 0+ bilaterally.  Upper extremity reflexes 2+ bilaterally equal.  Sensory:  Intact and symmetrical to light touch and pinprick in L2-S1 dermatomes bilaterally.    Cervical spine exam: Range of motion is full with flexion, extension and lateral rotation without pain.  Myofascial exam: No tenderness to palpation to the cervical paraspinous muscles.  Mild tenderness to palpation to the mid thoracic paraspinous muscles.    Lumbar spine:  Lumbar spine: Range of motion is full with flexion without increased pain. Range of motion is full with extension without increased pain.  Brayden's test causes no increased pain on either side.    Supine straight leg raise is negative bilaterally.  Internal and external rotation of the hip causes no increased pain on either side.  Myofascial exam:  No tenderness to palpation to the lumbar paraspinous muscles.      Imagin/16/15 Xray Scoliosis survey: There is thoracic dextroscoliosis with mild lumbar compensatory levoscoliosis. No structural abnormalities are evident. Diffuse spondylosis noted in the cervical, thoracic and to lesser extent lumbar spine. No fractures are identified. There is slight increased kyphotic curvature of the thoracic spine with alignment being otherwise normal. Measurements and evaluation are limited due to underpenetration. Land angle measures approximately in the thoracic spine is of approximately 17.0 degrees and for the lumbar spine 17.4 degrees. Soft tissues are unremarkable. IMPRESSION: 1. S shaped scoliotic curvature of the thoracolumbar spine with Land angle of approximately 17 degrees. 2. Diffuse spondylosis.    MRI report 12: Report notes that there is dextroconvex  rotary curvature of the thoracic spine.  Posterior alignment is maintained.  There are scattered vertebral body hemangiomas.  There is minimal posterior disc bulges noted at T5/6, T6/7, T7/8 and T8/9.  There is no central spinal stenosis or neural foraminal narrowing.    MRI right shoulder 6/24/11: Minor distal subscapularis tendinosis.  Negative for full-thickness or significant partial-thickness rotator cuff tendon tear.  There is a small amount of subacromial subdeltoid bursal fluid which can be seen with recent injection or mild bursitis.    MRI cervical spine report 3/25/11.  Note that this is comparison to previous study on 7/29/2008.  This is a report only, no images were available to me.  The C2/3 disc appears grossly intact with no significant bulging.  C3/4 there is mild protrusion of the disc with no herniation and no nerve root entrapment seen.  Spinal stenosis is not noted.  At C4/5 there is broad-based disc protrusion which compresses the subarachnoid space without cord compression.  There is no definite nerve root compression seen.  At C5/6 there is vertebral body lesion which is bright on T2 and dark on T1 but it does not enhance and may represent an atypical hemangioma.  This is unchanged from previous study.  At C5/6 there is a protrusion of the disc asymmetrically to the left but no significant entrapment.  No change from previous study.  At C6/7 there is a focal protrusion of the disc causing compression of the subarachnoid space without definite cord or nerve root compression.  C7/T1 appears intact.    Lumbar spine MRI report 3/31/14: No acute abnormality or significant degenerative changes.  No canal or foraminal stenosis.    Lumbar spine MRI 10/19/15  T12-L1: No central canal or neuroforaminal stenosis. No disc protrusion or extrusion.  L1-L2: No central canal or neuroforaminal stenosis. No disc protrusion or extrusion.  L2-L3: No central canal or neuroforaminal stenosis. No disc protrusion or  extrusion.  L3-L4: No central canal or neuroforaminal stenosis. No disc protrusion or extrusion.  L4-L5: There is a broad disc bulge which extends into both neuroforamina. There is prominent ligamentum flavum thickening and facet arthropathy. There is mild bilateral neuroforaminal stenosis. No central canal stenosis.  L5-S1: There is bilateral hypertrophic facet arthropathy and ligamentum flavum thickening. No central canal or neuroforaminal stenosis. No disc protrusion or extrusion.    Cervical spine MRI 6/29/16  At C2-C3, the minimal interstitial excision covers a very mild broad-based disc bulge most prominent centrally but does not deform the ventral cord.  There is no canal or foraminal stenosis.  At C3-C4, there is mildly decreased disc height with a broad-based disc bulge most prominent centrally.  This minimally contact the ventral cord without significant deformity.  The canal is widely patent.  There is uncovertebral hypertrophy and facet arthropathy, but the foramina are patent.  At C4-C5, there is decreased disc height with a broad disc bulge-marginal osteophyte complex that mildly lateralizes to the left and blends imperceptibly with right greater than left uncovertebral hypertrophy.  With ligamentum hypertrophy, there is no significant central canal stenosis.  Uncovertebral hypertrophy and facet arthropathy are causing mild left and moderate right foraminal stenoses.  At C5-C6, there is a broad-based disc bulge and osteophyte complex most prominent centrally.  There is also ligamentum flavum hypertrophy present, but the canal is patent.  There is mild left foraminal stenosis.  The right foramen is patent.  At C6-C7, there is disc desiccation with a minimal broad-based disc bulge most prominent in the right paracentral canal.  The canal is widely patent.  There is mild left foraminal stenosis.  At C7-T1, there is no significant disc bulge, protrusion, or herniation/extrusion.  The canal and foramina are  widely patent.    MRI lumbar spine 9/22/17  L1-L2 demonstrates no significant disc protrusion, central spinal canal stenosis, or neural foraminal stenosis.  L2-L3 demonstrates no significant disc protrusion, central spinal canal stenosis, or neural foraminal stenosis. Mild facet arthrosis is seen.  L3-L4 demonstrates no significant disc protrusion, central spinal canal stenosis, or neural foraminal stenosis. Mild facet arthrosis is seen.  L4-L5 demonstrates mild broad-based disc bulge, ligamentum flavum hypertrophy, and mild/moderate bilateral facet arthrosis. No significant central spinal canal stenosis is seen. Mild bilateral lateral recess narrowing and neural foraminal narrowing is seen. Similar findings were seen on the prior MRI.  L5-S1 demonstrates no significant posterior disc protrusion. Moderate bilateral facet arthrosis is seen along ligamentum flavum hypertrophy. No significant central spinal canal or neural foraminal stenosis is seen.    Hip x-rays 9/22/17  AP view of the pelvis and frog leg views of both hips were obtained. No evidence of acute displaced fracture or dislocation is visualized.  No radiopaque foreign bodies are visualized. The bilateral superior joint spaces are grossly maintained. Mild bilateral sacroiliac joint degenerative changes are seen including subchondral sclerosis and small marginal osteophytes. Mild to moderate symphyseal degenerative changes are seen. There is a slightly expansile cortically-based focus along the lateral proximal right femoral metadiaphysis. This could reflect a sessile osteochondroma, but correlation with MRI of the right hip is recommended.      Assessment:  The patient is a 58-year-old woman with history of hypothyroidism, otherwise fairly healthy but a long history of scoliosis causing back pain, self-referred for continued back pain.    1. Spondylosis of thoracic region without myelopathy or radiculopathy     2. DDD (degenerative disc disease), lumbar      3. DDD (degenerative disc disease), cervical     4. Scoliosis of thoracic spine, unspecified scoliosis type         Plan:  1.  The patient did well following the right T4, 5, 6 and 7 medial branch RFA.  This can be repeated in the future if necessary.  2.  Dr. Coleman provided prescriptions for hydrocodone-acetaminophen up to 2 times a day as needed for pain.  I have reviewed the [place] Board of [employer]acy website and there are no abberancies.    3.  Follow-up in 3 months or sooner as needed.    Greater than 50% of this 25 min visit was spent counseling the patient.

## 2019-07-01 ENCOUNTER — PATIENT MESSAGE (OUTPATIENT)
Dept: PAIN MEDICINE | Facility: CLINIC | Age: 59
End: 2019-07-01

## 2019-07-01 ENCOUNTER — TELEPHONE (OUTPATIENT)
Dept: PAIN MEDICINE | Facility: CLINIC | Age: 59
End: 2019-07-01

## 2019-07-01 RX ORDER — HYDROCODONE BITARTRATE AND ACETAMINOPHEN 7.5; 325 MG/1; MG/1
1 TABLET ORAL 2 TIMES DAILY PRN
Qty: 60 TABLET | Refills: 0 | Status: SHIPPED | OUTPATIENT
Start: 2019-07-01 | End: 2019-07-31

## 2019-07-24 ENCOUNTER — OFFICE VISIT (OUTPATIENT)
Dept: DERMATOLOGY | Facility: CLINIC | Age: 59
End: 2019-07-24
Payer: COMMERCIAL

## 2019-07-24 VITALS — HEIGHT: 61 IN | BODY MASS INDEX: 31.91 KG/M2 | RESPIRATION RATE: 16 BRPM | WEIGHT: 169 LBS

## 2019-07-24 DIAGNOSIS — R21 RASH AND NONSPECIFIC SKIN ERUPTION: ICD-10-CM

## 2019-07-24 DIAGNOSIS — L29.9 PRURITIC CONDITION: Primary | ICD-10-CM

## 2019-07-24 PROCEDURE — 88312 TISSUE SPECIMEN TO PATHOLOGY, DERMATOLOGY: ICD-10-PCS | Mod: 26,,, | Performed by: PATHOLOGY

## 2019-07-24 PROCEDURE — 99999 PR PBB SHADOW E&M-EST. PATIENT-LVL III: CPT | Mod: PBBFAC,,, | Performed by: DERMATOLOGY

## 2019-07-24 PROCEDURE — 88312 SPECIAL STAINS GROUP 1: CPT | Performed by: PATHOLOGY

## 2019-07-24 PROCEDURE — 99214 OFFICE O/P EST MOD 30 MIN: CPT | Mod: 25,S$GLB,, | Performed by: DERMATOLOGY

## 2019-07-24 PROCEDURE — 11104 PUNCH BX SKIN SINGLE LESION: CPT | Mod: S$GLB,,, | Performed by: DERMATOLOGY

## 2019-07-24 PROCEDURE — 99214 PR OFFICE/OUTPT VISIT, EST, LEVL IV, 30-39 MIN: ICD-10-PCS | Mod: 25,S$GLB,, | Performed by: DERMATOLOGY

## 2019-07-24 PROCEDURE — 88305 TISSUE EXAM BY PATHOLOGIST: CPT | Performed by: PATHOLOGY

## 2019-07-24 PROCEDURE — 99999 PR PBB SHADOW E&M-EST. PATIENT-LVL III: ICD-10-PCS | Mod: PBBFAC,,, | Performed by: DERMATOLOGY

## 2019-07-24 PROCEDURE — 88305 TISSUE EXAM BY PATHOLOGIST: CPT | Mod: 26,,, | Performed by: PATHOLOGY

## 2019-07-24 PROCEDURE — 88312 SPECIAL STAINS GROUP 1: CPT | Mod: 26,,, | Performed by: PATHOLOGY

## 2019-07-24 PROCEDURE — 11104 PR PUNCH BIOPSY, SKIN, SINGLE LESION: ICD-10-PCS | Mod: S$GLB,,, | Performed by: DERMATOLOGY

## 2019-07-24 PROCEDURE — 88305 TISSUE SPECIMEN TO PATHOLOGY, DERMATOLOGY: ICD-10-PCS | Mod: 26,,, | Performed by: PATHOLOGY

## 2019-07-24 NOTE — PROGRESS NOTES
Subjective:       Patient ID:  Vonnie Garces is a 58 y.o. female who presents for   Chief Complaint   Patient presents with    Follow-up    PITYRIASIS ROSEA     59 yo F presents for evaluation of a rash.  This seems to be a different rash than her biopsy proven OH in 2-26-19.  She notices a faint scaly rash, itchy.  In addition, she notices a purplish discoloration on her legs, seems to be exacerbated by getting overheated.  This is asymptomatic      Tx include antihistamines qam and qhs (Zyrtec, Benadryl) late Feb 2019 - April 2019 and triamcinolone acetonide 0.1% (KENALOG) 0.1 % cream - BID               Past Medical History:   Diagnosis Date    Anxiety 8/25/2015    Arthritis     Cervical spondylosis     Chronic back pain     DDD (degenerative disc disease), lumbar     Difficult intubation 03/2016    anterior larynx, pt with metal dental appliance, unable to do glidescope, used LMA    Encounter for blood transfusion     Facet arthropathy, thoracic     Hormone replacement therapy (HRT)     Insomnia 8/25/2015    Neck pain     PONV (postoperative nausea and vomiting)     Right foot pain     Scoliosis     Unspecified hypothyroidism 8/25/2015     Review of Systems   Skin: Positive for rash, dry skin, daily sunscreen use and activity-related sunscreen use. Negative for sensitivity to antibiotic ointment, sensitivity to bandage adhesive and tendency to form keloidal scars.   Hematologic/Lymphatic: Does not bruise/bleed easily.        Objective:    Physical Exam   Constitutional: She appears well-developed and well-nourished.   Neurological: She is alert and oriented to person, place, and time.   Psychiatric: She has a normal mood and affect.   Skin:   Areas Examined (abnormalities noted in diagram):   Head / Face Inspection Performed  Neck Inspection Performed  Chest / Axilla Inspection Performed  Abdomen Inspection Performed  Back Inspection Performed  RUE Inspected  LUE Inspection  Performed  RLE Inspected  LLE Inspection Performed              Diagram Legend     Erythematous scaling macule/papule c/w actinic keratosis       Vascular papule c/w angioma      Pigmented verrucoid papule/plaque c/w seborrheic keratosis      Yellow umbilicated papule c/w sebaceous hyperplasia      Irregularly shaped tan macule c/w lentigo     1-2 mm smooth white papules consistent with Milia      Movable subcutaneous cyst with punctum c/w epidermal inclusion cyst      Subcutaneous movable cyst c/w pilar cyst      Firm pink to brown papule c/w dermatofibroma      Pedunculated fleshy papule(s) c/w skin tag(s)      Evenly pigmented macule c/w junctional nevus     Mildly variegated pigmented, slightly irregular-bordered macule c/w mildly atypical nevus      Flesh colored to evenly pigmented papule c/w intradermal nevus       Pink pearly papule/plaque c/w basal cell carcinoma      Erythematous hyperkeratotic cursted plaque c/w SCC      Surgical scar with no sign of skin cancer recurrence      Open and closed comedones      Inflammatory papules and pustules      Verrucoid papule consistent consistent with wart     Erythematous eczematous patches and plaques     Dystrophic onycholytic nail with subungual debris c/w onychomycosis     Umbilicated papule    Erythematous-base heme-crusted tan verrucoid plaque consistent with inflamed seborrheic keratosis     Erythematous Silvery Scaling Plaque c/w Psoriasis     See annotation    FINAL PATHOLOGIC DIAGNOSIS DATED 2-26-19   Skin, right abdomen, punch biopsy:  -PITYRIASIS ROSEA, consistent with    Assessment / Plan:      Pathology Orders:     Normal Orders This Visit    Tissue Specimen To Pathology, Dermatology     Questions:    Directional Terms:  Other(comment)    Clinical Information:  thin slightly scaly atrophic patches on proximal thighs and back; parapsoriasis vs CTCL vs other    Specific Site:  L thigh        Rash and nonspecific skin eruption  -     Tissue Specimen To  Pathology, Dermatology    Punch biopsy procedure note:  Punch biopsy performed after verbal consent obtained. Area marked and prepped with alcohol. Approximately 1cc of 1% lidocaine with epinephrine injected. 4 mm disposable punch used to remove lesion. Hemostasis obtained and biopsy site closed with 2 Prolene sutures. Wound care instructions reviewed with patient and handout given.    Pt deferred Bx of thighs for now since this is ASx    Pruritic condition  Cont TAC cream bid  Ok to use antihistamines prn itch         Follow up for daughter removing sutures.

## 2019-08-01 ENCOUNTER — PATIENT MESSAGE (OUTPATIENT)
Dept: RHEUMATOLOGY | Facility: CLINIC | Age: 59
End: 2019-08-01

## 2019-08-02 ENCOUNTER — PATIENT MESSAGE (OUTPATIENT)
Dept: DERMATOLOGY | Facility: CLINIC | Age: 59
End: 2019-08-02

## 2019-08-13 ENCOUNTER — PATIENT MESSAGE (OUTPATIENT)
Dept: PAIN MEDICINE | Facility: CLINIC | Age: 59
End: 2019-08-13

## 2019-08-13 NOTE — TELEPHONE ENCOUNTER
Spoke with patient. She stated she would like to get the Toradol shot tomorrow morning. She is scheduled for 9:00am. Also, she wants to see how the Toradol injection helps before scheduling the steroid injection. Please place order. Thanks.

## 2019-08-13 NOTE — TELEPHONE ENCOUNTER
Please schedule an L5/S1 interlaminar epidural steroid injection to the right.  She can come in for a Toradol injection today if she would like but I would not suggest a steroid injection along with that because that would delay her epidural steroid injection 4 weeks.  If she wants the Toradol I will put the order in.

## 2019-08-14 ENCOUNTER — CLINICAL SUPPORT (OUTPATIENT)
Dept: PAIN MEDICINE | Facility: CLINIC | Age: 59
End: 2019-08-14
Payer: COMMERCIAL

## 2019-08-14 DIAGNOSIS — M51.36 DDD (DEGENERATIVE DISC DISEASE), LUMBAR: Primary | ICD-10-CM

## 2019-08-14 PROCEDURE — 96372 PR INJECTION,THERAP/PROPH/DIAG2ST, IM OR SUBCUT: ICD-10-PCS | Mod: S$GLB,,, | Performed by: PHYSICIAN ASSISTANT

## 2019-08-14 PROCEDURE — 96372 THER/PROPH/DIAG INJ SC/IM: CPT | Mod: S$GLB,,, | Performed by: PHYSICIAN ASSISTANT

## 2019-08-14 RX ORDER — KETOROLAC TROMETHAMINE 30 MG/ML
60 INJECTION, SOLUTION INTRAMUSCULAR; INTRAVENOUS ONCE
Status: COMPLETED | OUTPATIENT
Start: 2019-08-14 | End: 2019-08-14

## 2019-08-14 RX ORDER — KETOROLAC TROMETHAMINE 30 MG/ML
60 INJECTION, SOLUTION INTRAMUSCULAR; INTRAVENOUS ONCE
Status: DISCONTINUED | OUTPATIENT
Start: 2019-08-14 | End: 2019-08-14

## 2019-08-14 RX ADMIN — KETOROLAC TROMETHAMINE 60 MG: 30 INJECTION, SOLUTION INTRAMUSCULAR; INTRAVENOUS at 09:08

## 2019-08-15 ENCOUNTER — TELEPHONE (OUTPATIENT)
Dept: PAIN MEDICINE | Facility: CLINIC | Age: 59
End: 2019-08-15

## 2019-08-15 DIAGNOSIS — M41.9 SCOLIOSIS OF LUMBAR SPINE, UNSPECIFIED SCOLIOSIS TYPE: ICD-10-CM

## 2019-08-15 DIAGNOSIS — M79.10 MUSCULAR PAIN: ICD-10-CM

## 2019-08-15 DIAGNOSIS — M51.36 DDD (DEGENERATIVE DISC DISEASE), LUMBAR: Primary | ICD-10-CM

## 2019-08-15 DIAGNOSIS — M54.16 LUMBAR RADICULOPATHY: ICD-10-CM

## 2019-08-15 DIAGNOSIS — M47.816 LUMBAR SPONDYLOSIS: ICD-10-CM

## 2019-08-15 RX ORDER — GABAPENTIN 100 MG/1
100 CAPSULE ORAL 3 TIMES DAILY
Qty: 90 CAPSULE | Refills: 5 | Status: SHIPPED | OUTPATIENT
Start: 2019-08-15 | End: 2019-09-24 | Stop reason: SDUPTHER

## 2019-08-15 NOTE — TELEPHONE ENCOUNTER
----- Message from Dany Rosas sent at 8/15/2019 11:17 AM CDT -----  Contact: same  Patient called in and stated she needs to talk to nurse about the procedure that Dr. Coleman wants to schedule.  Patient call back number is [phone]

## 2019-08-15 NOTE — TELEPHONE ENCOUNTER
Yes, she can restart her Duexis.  Gabapentin may be beneficial for her leg pain. I do not remember why she discontinued this.  Please ask her.  I completed orders for physical therapy.  Does she no where she wants to go?

## 2019-08-15 NOTE — TELEPHONE ENCOUNTER
Patient said previously she stopped this because her pain was better. She will let us know where she will like to go.

## 2019-08-15 NOTE — TELEPHONE ENCOUNTER
Spoke with patient and she stated theToradol injection a little for 7 hours. She stated the MRI is scheduled for Sunday.  She is wondering if she can start back taking the duexis today. She also is wondering if you think the gabapentin could help again? Also, she would like to start back PT somewhere close by her work. Please advise and place orders. Thanks.

## 2019-08-15 NOTE — TELEPHONE ENCOUNTER
rx sent, start 200mg nightly. Can increase to 100 in am and 200 in pm if tolerated. Or 300 mg nightly

## 2019-08-19 ENCOUNTER — TELEPHONE (OUTPATIENT)
Dept: PAIN MEDICINE | Facility: CLINIC | Age: 59
End: 2019-08-19

## 2019-08-19 NOTE — TELEPHONE ENCOUNTER
Please let the patient know I reviewed her lumbar spine MRI and there have been no changes.  If she would like to hold off on a lumbar JINA as she discussed with Jacki, we can repeat right L3, 4 and 5 RFA which will likely help her back but may not help her leg.  Also, she may have some relief with physical therapy.  I completed orders last week.

## 2019-08-20 ENCOUNTER — TELEPHONE (OUTPATIENT)
Dept: NEUROSURGERY | Facility: CLINIC | Age: 59
End: 2019-08-20

## 2019-08-20 NOTE — TELEPHONE ENCOUNTER
----- Message from Saray Pathak sent at 8/20/2019 10:34 AM CDT -----  Contact: Pt  Pt is requesting a callback from office says she need to see about getting a procedure done for her back and spine issues says she has a MRI done     Pt can be reached at [phone]

## 2019-09-24 ENCOUNTER — OFFICE VISIT (OUTPATIENT)
Dept: PAIN MEDICINE | Facility: CLINIC | Age: 59
End: 2019-09-24
Payer: COMMERCIAL

## 2019-09-24 VITALS
DIASTOLIC BLOOD PRESSURE: 73 MMHG | RESPIRATION RATE: 18 BRPM | SYSTOLIC BLOOD PRESSURE: 133 MMHG | TEMPERATURE: 98 F | BODY MASS INDEX: 33.43 KG/M2 | HEART RATE: 69 BPM | WEIGHT: 176.94 LBS

## 2019-09-24 DIAGNOSIS — M51.36 DDD (DEGENERATIVE DISC DISEASE), LUMBAR: ICD-10-CM

## 2019-09-24 DIAGNOSIS — M47.816 LUMBAR SPONDYLOSIS: ICD-10-CM

## 2019-09-24 DIAGNOSIS — M47.814 SPONDYLOSIS OF THORACIC REGION WITHOUT MYELOPATHY OR RADICULOPATHY: ICD-10-CM

## 2019-09-24 DIAGNOSIS — M54.16 LUMBAR RADICULOPATHY: ICD-10-CM

## 2019-09-24 DIAGNOSIS — M41.9 SCOLIOSIS OF LUMBAR SPINE, UNSPECIFIED SCOLIOSIS TYPE: ICD-10-CM

## 2019-09-24 DIAGNOSIS — M50.30 DDD (DEGENERATIVE DISC DISEASE), CERVICAL: ICD-10-CM

## 2019-09-24 DIAGNOSIS — Z79.891 OPIOID CONTRACT EXISTS: Primary | ICD-10-CM

## 2019-09-24 PROCEDURE — 99999 PR PBB SHADOW E&M-EST. PATIENT-LVL IV: CPT | Mod: PBBFAC,,, | Performed by: PHYSICIAN ASSISTANT

## 2019-09-24 PROCEDURE — 99214 OFFICE O/P EST MOD 30 MIN: CPT | Mod: S$GLB,,, | Performed by: PHYSICIAN ASSISTANT

## 2019-09-24 PROCEDURE — 99214 PR OFFICE/OUTPT VISIT, EST, LEVL IV, 30-39 MIN: ICD-10-PCS | Mod: S$GLB,,, | Performed by: PHYSICIAN ASSISTANT

## 2019-09-24 PROCEDURE — 99999 PR PBB SHADOW E&M-EST. PATIENT-LVL IV: ICD-10-PCS | Mod: PBBFAC,,, | Performed by: PHYSICIAN ASSISTANT

## 2019-09-24 PROCEDURE — 80307 DRUG TEST PRSMV CHEM ANLYZR: CPT

## 2019-09-24 RX ORDER — HYDROCODONE BITARTRATE AND ACETAMINOPHEN 7.5; 325 MG/1; MG/1
1 TABLET ORAL 2 TIMES DAILY PRN
Qty: 60 TABLET | Refills: 0 | Status: SHIPPED | OUTPATIENT
Start: 2019-09-28 | End: 2019-10-28

## 2019-09-24 RX ORDER — GABAPENTIN 100 MG/1
400 CAPSULE ORAL NIGHTLY
Qty: 360 CAPSULE | Refills: 3 | Status: SHIPPED | OUTPATIENT
Start: 2019-09-24 | End: 2019-12-19 | Stop reason: SDUPTHER

## 2019-09-24 RX ORDER — HYDROCODONE BITARTRATE AND ACETAMINOPHEN 7.5; 325 MG/1; MG/1
1 TABLET ORAL 2 TIMES DAILY PRN
Qty: 60 TABLET | Refills: 0 | Status: SHIPPED | OUTPATIENT
Start: 2019-11-27 | End: 2019-12-19 | Stop reason: SDUPTHER

## 2019-09-24 RX ORDER — HYDROCODONE BITARTRATE AND ACETAMINOPHEN 7.5; 325 MG/1; MG/1
1 TABLET ORAL 2 TIMES DAILY PRN
Qty: 60 TABLET | Refills: 0 | Status: SHIPPED | OUTPATIENT
Start: 2019-10-28 | End: 2019-11-27

## 2019-09-24 NOTE — PROGRESS NOTES
This note was completed with dictation software and grammatical errors may exist.    CC:Back pain, neck pain    HPI: The patient is a 58-year-old woman with history of hypothyroidism, otherwise fairly healthy but a long history of scoliosis causing back pain, self-referred for continued back pain. She returns in follow-up today with multiple pain complaints.  Currently her neck and thoracic back pain are doing well.  She had severe increased low back and right leg pain a couple months ago so we restarted gabapentin.  She is currently taking 300 mg nightly with sufficient relief of her pain. She would like to increase this to 400 mg but feels that when she tried higher doses in the past she could not think as clearly throughout the day.  Her back and leg pain is worse with prolonged standing.  Pain radiates into the right buttock, right posterior and anterolateral thigh and right calf.  She denies weakness, numbness, bladder or bowel incontinence.    Pain intervention history: She has been seeing Dr. Huffman for the last several years and has undergone epidural steroid injections and radiofrequency ablations with good relief.  According to her last pain management physician, she had undergone a right side T3, 4, 5, 6 medial branch radiofrequency ablation on 8/8/14 with good relief.  He had scheduled her for another one but did not complete this.  As far as medications she has been taking Hydrocodone 7.5/325 one to 2 times a day at most and gabapentin 300 mg at night. She is status post cervical epidural steroid injection on 10/19/15 with 50% relief of her neck pain. She is status post right L4 transforaminal epidural steroid injection on 11/23/15 with 100% relief.   She is status post right T3, 4, 5 and 6 medial branch radio frequency ablation on 2/5/16 with greater than 50% relief.  She is status post right L4 transforaminal epidural sterile injection on 6/14/16 with significant relief lasting only about a month.   She is status post C7-T1 cervical interlaminar epidural steroid injection on 7/14/16 with 30-40% relief.   She is status post L5/S1 interlaminar epidural steroidal injection to the right on 8/12/16 with 100% relief of her right leg pain.  She is status post C7-T1 cervical interlaminar epidural steroid injection on 12/9/16 with almost complete relief.  She is status post right T4, 5, 6 and 7 medial branch radiofrequency ablation on 5/30/17 with 100% relief.  She is status post C7-T1 cervical interlaminar epidural steroid injection on 6/27/17 with 80% relief.  She is status post L5/S1 interlaminar epidural steroid injection on a/7/17 with excellent relief lasting 2 weeks, now reporting 0% relief.  She is status post right L3, 4 and 5 medial branch radiofrequency ablation on 10/19/17 with 80% relief.   She is status post right T4, 5, 6 and 7 medial branch radiofrequency ablation on 12/14/17 with 100% relief.  She is status post C7-T1 cervical interlaminar epidural steroid injection on 3/16/18 with at least 80% relief.  She is status post lumbar epidural steroid injection at L5/S1 on 5/15/18 with 90% relief of her bilateral low back and right leg pain.  The she is status post right T4, 5, 6 and 7 medial branch radiofrequency ablation on 10/12/2018 with 100% relief.  She is status post L5/S1 interlaminar epidural steroid injection on 01/16/2019 with almost 100% relief of her low back pain. She is status post right T4, 5, 6 and 7 medial branch radiofrequency ablation on 06/04/2019 with 80% relief.    ROS: She reports fatigability, headaches, hypothyroidism, joint stiffness, back pain, difficulty sleeping and anxiety.  Balance of review of systems is negative.      Medical, surgical, family and social history reviewed elsewhere in record.  She is a  at [employer].    Medications/Allergies: See med card    Vitals:    09/24/19 0932   BP: 133/73   Pulse: 69   Resp: 18   Temp: 97.7 °F (36.5 °C)   TempSrc: Oral    Weight: 80.3 kg (176 lb 14.7 oz)   PainSc:   4   PainLoc: Back         Physical exam:  Gen: A and O x3, pleasant, well-groomed  Skin: No rashes or obvious lesions  HEENT: PERRLA, no obvious deformities on ears or in canals. Trachea midline.  CVS: Regular rate and rhythm, normal palpable pulses.  Resp:No increased work of breathing, symmetrical chest rise.  Abdomen: Soft, NT/ND.  Musculoskeletal:No antalgic gait.     Neuro:  Upper extremities: 5/5 strength bilaterally.  Lower extremities: 5/5 strength bilaterally.    Reflexes: Patellar 0+, Achilles 0+ bilaterally.  Upper extremity reflexes 2+ bilaterally equal.  Sensory:  Intact and symmetrical to light touch and pinprick in L2-S1 dermatomes bilaterally.    Cervical spine exam: Range of motion is full with flexion, extension and lateral rotation without pain.  Myofascial exam: No tenderness to palpation to the cervical paraspinous muscles.  Mild tenderness to palpation to the mid thoracic paraspinous muscles.    Lumbar spine:  Lumbar spine: Range of motion is full with flexion and extension with mild increased right low back pain.  Brayden's test causes no increased pain on either side.    Supine straight leg raise causes right low back and buttock pain.  Internal and external rotation of the hip causes no increased pain on either side.  Myofascial exam:  Mild tenderness to palpation to the right lumbar paraspinous muscles.      Imagin/16/15 Xray Scoliosis survey: There is thoracic dextroscoliosis with mild lumbar compensatory levoscoliosis. No structural abnormalities are evident. Diffuse spondylosis noted in the cervical, thoracic and to lesser extent lumbar spine. No fractures are identified. There is slight increased kyphotic curvature of the thoracic spine with alignment being otherwise normal. Measurements and evaluation are limited due to underpenetration. Land angle measures approximately in the thoracic spine is of approximately 17.0 degrees and for  the lumbar spine 17.4 degrees. Soft tissues are unremarkable. IMPRESSION: 1. S shaped scoliotic curvature of the thoracolumbar spine with Land angle of approximately 17 degrees. 2. Diffuse spondylosis.    MRI report 4/12/12: Report notes that there is dextroconvex rotary curvature of the thoracic spine.  Posterior alignment is maintained.  There are scattered vertebral body hemangiomas.  There is minimal posterior disc bulges noted at T5/6, T6/7, T7/8 and T8/9.  There is no central spinal stenosis or neural foraminal narrowing.    MRI right shoulder 6/24/11: Minor distal subscapularis tendinosis.  Negative for full-thickness or significant partial-thickness rotator cuff tendon tear.  There is a small amount of subacromial subdeltoid bursal fluid which can be seen with recent injection or mild bursitis.    MRI cervical spine report 3/25/11.  Note that this is comparison to previous study on 7/29/2008.  This is a report only, no images were available to me.  The C2/3 disc appears grossly intact with no significant bulging.  C3/4 there is mild protrusion of the disc with no herniation and no nerve root entrapment seen.  Spinal stenosis is not noted.  At C4/5 there is broad-based disc protrusion which compresses the subarachnoid space without cord compression.  There is no definite nerve root compression seen.  At C5/6 there is vertebral body lesion which is bright on T2 and dark on T1 but it does not enhance and may represent an atypical hemangioma.  This is unchanged from previous study.  At C5/6 there is a protrusion of the disc asymmetrically to the left but no significant entrapment.  No change from previous study.  At C6/7 there is a focal protrusion of the disc causing compression of the subarachnoid space without definite cord or nerve root compression.  C7/T1 appears intact.    Lumbar spine MRI report 3/31/14: No acute abnormality or significant degenerative changes.  No canal or foraminal stenosis.    Lumbar  spine MRI 10/19/15  T12-L1: No central canal or neuroforaminal stenosis. No disc protrusion or extrusion.  L1-L2: No central canal or neuroforaminal stenosis. No disc protrusion or extrusion.  L2-L3: No central canal or neuroforaminal stenosis. No disc protrusion or extrusion.  L3-L4: No central canal or neuroforaminal stenosis. No disc protrusion or extrusion.  L4-L5: There is a broad disc bulge which extends into both neuroforamina. There is prominent ligamentum flavum thickening and facet arthropathy. There is mild bilateral neuroforaminal stenosis. No central canal stenosis.  L5-S1: There is bilateral hypertrophic facet arthropathy and ligamentum flavum thickening. No central canal or neuroforaminal stenosis. No disc protrusion or extrusion.    Cervical spine MRI 6/29/16  At C2-C3, the minimal interstitial excision covers a very mild broad-based disc bulge most prominent centrally but does not deform the ventral cord.  There is no canal or foraminal stenosis.  At C3-C4, there is mildly decreased disc height with a broad-based disc bulge most prominent centrally.  This minimally contact the ventral cord without significant deformity.  The canal is widely patent.  There is uncovertebral hypertrophy and facet arthropathy, but the foramina are patent.  At C4-C5, there is decreased disc height with a broad disc bulge-marginal osteophyte complex that mildly lateralizes to the left and blends imperceptibly with right greater than left uncovertebral hypertrophy.  With ligamentum hypertrophy, there is no significant central canal stenosis.  Uncovertebral hypertrophy and facet arthropathy are causing mild left and moderate right foraminal stenoses.  At C5-C6, there is a broad-based disc bulge and osteophyte complex most prominent centrally.  There is also ligamentum flavum hypertrophy present, but the canal is patent.  There is mild left foraminal stenosis.  The right foramen is patent.  At C6-C7, there is disc  desiccation with a minimal broad-based disc bulge most prominent in the right paracentral canal.  The canal is widely patent.  There is mild left foraminal stenosis.  At C7-T1, there is no significant disc bulge, protrusion, or herniation/extrusion.  The canal and foramina are widely patent.    MRI lumbar spine 9/22/17  L1-L2 demonstrates no significant disc protrusion, central spinal canal stenosis, or neural foraminal stenosis.  L2-L3 demonstrates no significant disc protrusion, central spinal canal stenosis, or neural foraminal stenosis. Mild facet arthrosis is seen.  L3-L4 demonstrates no significant disc protrusion, central spinal canal stenosis, or neural foraminal stenosis. Mild facet arthrosis is seen.  L4-L5 demonstrates mild broad-based disc bulge, ligamentum flavum hypertrophy, and mild/moderate bilateral facet arthrosis. No significant central spinal canal stenosis is seen. Mild bilateral lateral recess narrowing and neural foraminal narrowing is seen. Similar findings were seen on the prior MRI.  L5-S1 demonstrates no significant posterior disc protrusion. Moderate bilateral facet arthrosis is seen along ligamentum flavum hypertrophy. No significant central spinal canal or neural foraminal stenosis is seen.    Hip x-rays 9/22/17  AP view of the pelvis and frog leg views of both hips were obtained. No evidence of acute displaced fracture or dislocation is visualized.  No radiopaque foreign bodies are visualized. The bilateral superior joint spaces are grossly maintained. Mild bilateral sacroiliac joint degenerative changes are seen including subchondral sclerosis and small marginal osteophytes. Mild to moderate symphyseal degenerative changes are seen. There is a slightly expansile cortically-based focus along the lateral proximal right femoral metadiaphysis. This could reflect a sessile osteochondroma, but correlation with MRI of the right hip is recommended.      Assessment:  The patient is a  58-year-old woman with history of hypothyroidism, otherwise fairly healthy but a long history of scoliosis causing back pain, self-referred for continued back pain.    1. Opioid contract exists  [facility] Drug Screen   2. DDD (degenerative disc disease), lumbar     3. Lumbar radiculopathy     4. Lumbar spondylosis     5. Scoliosis of lumbar spine, unspecified scoliosis type     6. Spondylosis of thoracic region without myelopathy or radiculopathy     7. DDD (degenerative disc disease), cervical         Plan:  1.  I provided a prescription for gabapentin 400 mg nightly.  If she has difficulty with her concentration, she will go back to 300 mg nightly.  The gabapentin has helped her right leg enough to where she does not feel like she needs another lumbar JINA at this time. We can repeat this for her in the future if necessary.  2.  Dr. Coleman provided prescriptions for hydrocodone-acetaminophen up to 2 times a day as needed for pain.  I have reviewed the [place] Board of Pharmacy website and there are no abberancies.  A urine drug screen was completed today.  3.  Follow-up in 3 months or sooner as needed.    Greater than 50% of this 25 min visit was spent counseling the patient.

## 2019-09-28 LAB

## 2019-10-01 ENCOUNTER — PATIENT MESSAGE (OUTPATIENT)
Dept: DERMATOLOGY | Facility: CLINIC | Age: 59
End: 2019-10-01

## 2019-11-19 DIAGNOSIS — Z79.890 HORMONE REPLACEMENT THERAPY (HRT): ICD-10-CM

## 2019-11-19 RX ORDER — ESTRADIOL 0.1 MG/D
PATCH, EXTENDED RELEASE TRANSDERMAL
Qty: 8 PATCH | Refills: 11 | Status: SHIPPED | OUTPATIENT
Start: 2019-11-19 | End: 2019-12-17 | Stop reason: SDUPTHER

## 2019-12-17 DIAGNOSIS — Z79.890 HORMONE REPLACEMENT THERAPY (HRT): ICD-10-CM

## 2019-12-17 DIAGNOSIS — R20.9 DISTURBANCE OF SKIN SENSATION: ICD-10-CM

## 2019-12-17 RX ORDER — ESTRADIOL 0.1 MG/D
1 FILM, EXTENDED RELEASE TRANSDERMAL
Qty: 8 PATCH | Refills: 11 | Status: SHIPPED | OUTPATIENT
Start: 2019-12-19 | End: 2020-08-14 | Stop reason: SDUPTHER

## 2019-12-17 NOTE — TELEPHONE ENCOUNTER
Vonnie Garces would like a refill of the following medications:         triamcinolone acetonide 0.1% (KENALOG) 0.1 % cream [Sita Escamilla MD]     Preferred pharmacy: [facility]. - - [address]   Delivery method: Pickup

## 2019-12-18 RX ORDER — TRIAMCINOLONE ACETONIDE 1 MG/G
CREAM TOPICAL 2 TIMES DAILY
Qty: 454 G | Refills: 1 | Status: SHIPPED | OUTPATIENT
Start: 2019-12-18 | End: 2020-03-24 | Stop reason: SDUPTHER

## 2019-12-19 ENCOUNTER — OFFICE VISIT (OUTPATIENT)
Dept: PAIN MEDICINE | Facility: CLINIC | Age: 59
End: 2019-12-19
Payer: COMMERCIAL

## 2019-12-19 VITALS
SYSTOLIC BLOOD PRESSURE: 126 MMHG | WEIGHT: 175.5 LBS | TEMPERATURE: 97 F | BODY MASS INDEX: 33.16 KG/M2 | RESPIRATION RATE: 18 BRPM | OXYGEN SATURATION: 98 % | DIASTOLIC BLOOD PRESSURE: 58 MMHG | HEART RATE: 70 BPM

## 2019-12-19 DIAGNOSIS — M51.36 DDD (DEGENERATIVE DISC DISEASE), LUMBAR: ICD-10-CM

## 2019-12-19 DIAGNOSIS — M41.9 SCOLIOSIS OF LUMBAR SPINE, UNSPECIFIED SCOLIOSIS TYPE: ICD-10-CM

## 2019-12-19 DIAGNOSIS — M79.671 FOOT PAIN, RIGHT: Primary | ICD-10-CM

## 2019-12-19 DIAGNOSIS — M47.816 LUMBAR SPONDYLOSIS: ICD-10-CM

## 2019-12-19 DIAGNOSIS — M79.10 MUSCULAR PAIN: ICD-10-CM

## 2019-12-19 DIAGNOSIS — M41.9 SCOLIOSIS OF THORACIC SPINE, UNSPECIFIED SCOLIOSIS TYPE: ICD-10-CM

## 2019-12-19 DIAGNOSIS — M79.651 RIGHT THIGH PAIN: ICD-10-CM

## 2019-12-19 DIAGNOSIS — M47.814 SPONDYLOSIS OF THORACIC REGION WITHOUT MYELOPATHY OR RADICULOPATHY: ICD-10-CM

## 2019-12-19 PROCEDURE — 99214 PR OFFICE/OUTPT VISIT, EST, LEVL IV, 30-39 MIN: ICD-10-PCS | Mod: S$GLB,,, | Performed by: ANESTHESIOLOGY

## 2019-12-19 PROCEDURE — 99999 PR PBB SHADOW E&M-EST. PATIENT-LVL IV: ICD-10-PCS | Mod: PBBFAC,,, | Performed by: ANESTHESIOLOGY

## 2019-12-19 PROCEDURE — 99214 OFFICE O/P EST MOD 30 MIN: CPT | Mod: S$GLB,,, | Performed by: ANESTHESIOLOGY

## 2019-12-19 PROCEDURE — 99999 PR PBB SHADOW E&M-EST. PATIENT-LVL IV: CPT | Mod: PBBFAC,,, | Performed by: ANESTHESIOLOGY

## 2019-12-19 RX ORDER — GABAPENTIN 100 MG/1
CAPSULE ORAL
Qty: 450 CAPSULE | Refills: 3 | Status: SHIPPED | OUTPATIENT
Start: 2019-12-19 | End: 2020-07-28

## 2019-12-19 RX ORDER — HYDROCODONE BITARTRATE AND ACETAMINOPHEN 7.5; 325 MG/1; MG/1
1 TABLET ORAL 2 TIMES DAILY PRN
Qty: 60 TABLET | Refills: 0 | Status: SHIPPED | OUTPATIENT
Start: 2020-01-26 | End: 2020-02-25

## 2019-12-19 RX ORDER — HYDROCODONE BITARTRATE AND ACETAMINOPHEN 7.5; 325 MG/1; MG/1
1 TABLET ORAL 2 TIMES DAILY PRN
Qty: 60 TABLET | Refills: 0 | Status: SHIPPED | OUTPATIENT
Start: 2020-02-25 | End: 2020-03-17 | Stop reason: SDUPTHER

## 2019-12-19 RX ORDER — HYDROCODONE BITARTRATE AND ACETAMINOPHEN 7.5; 325 MG/1; MG/1
1 TABLET ORAL 2 TIMES DAILY PRN
Qty: 60 TABLET | Refills: 0 | Status: SHIPPED | OUTPATIENT
Start: 2019-12-27 | End: 2020-01-26

## 2019-12-19 NOTE — PROGRESS NOTES
This note was completed with dictation software and grammatical errors may exist.    CC:Back pain, neck pain    HPI: The patient is a 59-year-old woman with history of hypothyroidism, otherwise fairly healthy but a long history of scoliosis causing back pain, self-referred for continued back pain.       She returns in follow-up today for chronic neck and back pain. She states that in terms of her neck and low back, these are actually doing very well and her pain level has been stable, no worsening.  She has some slight increasing pain in her upper right thoracic region where we have done radiofrequency ablation procedures in the past, however she states that this is fairly manageable right now.  She reports that she has had better improvement in her symptoms with the change from 300 milligrams of gabapentin at night up to 400 milligrams.  She states that she actually does very well through the evening and into the morning and feels that her pain starts to come back around 10:00 a.m..  She denies any major drowsiness or dizziness with this.    Her other big complaint today is 2 separate pains in her right leg.  She has 1 pain in her right anterior thigh over the quadriceps muscles confined to 1 4 inch area.  There is not necessarily tenderness with palpation there, states that it comes and goes, she does not state that it is more painful when sitting down, lying down or with walking, it can happen in the middle of the night and cause pain. Her other pain is in her right dorsum of her foot at the 2nd through 4th metatarsal region.  She states that this is painful to walk on, worse with flexion and extension of her toes.  There is some pain with palpation, she denies any numbness or tingling with this.      Pain intervention history: She has been seeing Dr. Huffman for the last several years and has undergone epidural steroid injections and radiofrequency ablations with good relief.  According to her last pain  management physician, she had undergone a right side T3, 4, 5, 6 medial branch radiofrequency ablation on 8/8/14 with good relief.  He had scheduled her for another one but did not complete this.  As far as medications she has been taking Hydrocodone 7.5/325 one to 2 times a day at most and gabapentin 300 mg at night. She is status post cervical epidural steroid injection on 10/19/15 with 50% relief of her neck pain. She is status post right L4 transforaminal epidural steroid injection on 11/23/15 with 100% relief.   She is status post right T3, 4, 5 and 6 medial branch radio frequency ablation on 2/5/16 with greater than 50% relief.  She is status post right L4 transforaminal epidural sterile injection on 6/14/16 with significant relief lasting only about a month.  She is status post C7-T1 cervical interlaminar epidural steroid injection on 7/14/16 with 30-40% relief.   She is status post L5/S1 interlaminar epidural steroidal injection to the right on 8/12/16 with 100% relief of her right leg pain.  She is status post C7-T1 cervical interlaminar epidural steroid injection on 12/9/16 with almost complete relief.  She is status post right T4, 5, 6 and 7 medial branch radiofrequency ablation on 5/30/17 with 100% relief.  She is status post C7-T1 cervical interlaminar epidural steroid injection on 6/27/17 with 80% relief.  She is status post L5/S1 interlaminar epidural steroid injection on a/7/17 with excellent relief lasting 2 weeks, now reporting 0% relief.  She is status post right L3, 4 and 5 medial branch radiofrequency ablation on 10/19/17 with 80% relief.   She is status post right T4, 5, 6 and 7 medial branch radiofrequency ablation on 12/14/17 with 100% relief.  She is status post C7-T1 cervical interlaminar epidural steroid injection on 3/16/18 with at least 80% relief.  She is status post lumbar epidural steroid injection at L5/S1 on 5/15/18 with 90% relief of her bilateral low back and right leg pain.  The  she is status post right T4, 5, 6 and 7 medial branch radiofrequency ablation on 10/12/2018 with 100% relief.  She is status post L5/S1 interlaminar epidural steroid injection on 01/16/2019 with almost 100% relief of her low back pain. She is status post right T4, 5, 6 and 7 medial branch radiofrequency ablation on 06/04/2019 with 80% relief.    ROS: She reports fatigability, headaches, hypothyroidism, joint stiffness, back pain, difficulty sleeping and anxiety.  Balance of review of systems is negative.      Medical, surgical, family and social history reviewed elsewhere in record.  She is a  at [employer].    Medications/Allergies: See med card    Vitals:    12/19/19 0937   BP: (!) 126/58   Pulse: 70   Resp: 18   Temp: 97 °F (36.1 °C)   TempSrc: Oral   SpO2: 98%   Weight: 79.6 kg (175 lb 7.8 oz)   PainSc:   4   PainLoc: Back         Physical exam:  Gen: A and O x3, pleasant, well-groomed  Skin: No rashes or obvious lesions  HEENT: PERRLA, no obvious deformities on ears or in canals. Trachea midline.  CVS: Regular rate and rhythm, normal palpable pulses.  Resp:No increased work of breathing, symmetrical chest rise.  Abdomen: Soft, NT/ND.  Musculoskeletal:No antalgic gait.     Neuro:  Upper extremities: 5/5 strength bilaterally.  Lower extremities: 5/5 strength bilaterally.    Reflexes: Patellar 0+, Achilles 0+ bilaterally.  Upper extremity reflexes 2+ bilaterally equal.  Sensory:  Intact and symmetrical to light touch and pinprick in L2-S1 dermatomes bilaterally.    Cervical spine exam: Range of motion is full with flexion, extension and lateral rotation without pain.  Myofascial exam: No tenderness to palpation to the cervical paraspinous muscles.  Mild tenderness to palpation to the mid thoracic paraspinous muscles.    Lumbar spine:  Lumbar spine: Range of motion is full with flexion and extension with mild increased right low back pain.  Brayden's test causes no increased pain on either side.     Supine straight leg raise causes right low back and buttock pain.  Internal and external rotation of the hip causes no increased pain on either side.  Myofascial exam:  Mild tenderness to palpation to the right lumbar paraspinous muscles.      Imagin/16/15 Xray Scoliosis survey: There is thoracic dextroscoliosis with mild lumbar compensatory levoscoliosis. No structural abnormalities are evident. Diffuse spondylosis noted in the cervical, thoracic and to lesser extent lumbar spine. No fractures are identified. There is slight increased kyphotic curvature of the thoracic spine with alignment being otherwise normal. Measurements and evaluation are limited due to underpenetration. Land angle measures approximately in the thoracic spine is of approximately 17.0 degrees and for the lumbar spine 17.4 degrees. Soft tissues are unremarkable. IMPRESSION: 1. S shaped scoliotic curvature of the thoracolumbar spine with Land angle of approximately 17 degrees. 2. Diffuse spondylosis.    MRI report 12: Report notes that there is dextroconvex rotary curvature of the thoracic spine.  Posterior alignment is maintained.  There are scattered vertebral body hemangiomas.  There is minimal posterior disc bulges noted at T5/6, T6/7, T7/8 and T8/9.  There is no central spinal stenosis or neural foraminal narrowing.    MRI right shoulder 11: Minor distal subscapularis tendinosis.  Negative for full-thickness or significant partial-thickness rotator cuff tendon tear.  There is a small amount of subacromial subdeltoid bursal fluid which can be seen with recent injection or mild bursitis.    MRI cervical spine report 3/25/11.  Note that this is comparison to previous study on 2008.  This is a report only, no images were available to me.  The C2/3 disc appears grossly intact with no significant bulging.  C3/4 there is mild protrusion of the disc with no herniation and no nerve root entrapment seen.  Spinal stenosis is not  noted.  At C4/5 there is broad-based disc protrusion which compresses the subarachnoid space without cord compression.  There is no definite nerve root compression seen.  At C5/6 there is vertebral body lesion which is bright on T2 and dark on T1 but it does not enhance and may represent an atypical hemangioma.  This is unchanged from previous study.  At C5/6 there is a protrusion of the disc asymmetrically to the left but no significant entrapment.  No change from previous study.  At C6/7 there is a focal protrusion of the disc causing compression of the subarachnoid space without definite cord or nerve root compression.  C7/T1 appears intact.    Lumbar spine MRI report 3/31/14: No acute abnormality or significant degenerative changes.  No canal or foraminal stenosis.    Lumbar spine MRI 10/19/15  T12-L1: No central canal or neuroforaminal stenosis. No disc protrusion or extrusion.  L1-L2: No central canal or neuroforaminal stenosis. No disc protrusion or extrusion.  L2-L3: No central canal or neuroforaminal stenosis. No disc protrusion or extrusion.  L3-L4: No central canal or neuroforaminal stenosis. No disc protrusion or extrusion.  L4-L5: There is a broad disc bulge which extends into both neuroforamina. There is prominent ligamentum flavum thickening and facet arthropathy. There is mild bilateral neuroforaminal stenosis. No central canal stenosis.  L5-S1: There is bilateral hypertrophic facet arthropathy and ligamentum flavum thickening. No central canal or neuroforaminal stenosis. No disc protrusion or extrusion.    Cervical spine MRI 6/29/16  At C2-C3, the minimal interstitial excision covers a very mild broad-based disc bulge most prominent centrally but does not deform the ventral cord.  There is no canal or foraminal stenosis.  At C3-C4, there is mildly decreased disc height with a broad-based disc bulge most prominent centrally.  This minimally contact the ventral cord without significant deformity.  The  canal is widely patent.  There is uncovertebral hypertrophy and facet arthropathy, but the foramina are patent.  At C4-C5, there is decreased disc height with a broad disc bulge-marginal osteophyte complex that mildly lateralizes to the left and blends imperceptibly with right greater than left uncovertebral hypertrophy.  With ligamentum hypertrophy, there is no significant central canal stenosis.  Uncovertebral hypertrophy and facet arthropathy are causing mild left and moderate right foraminal stenoses.  At C5-C6, there is a broad-based disc bulge and osteophyte complex most prominent centrally.  There is also ligamentum flavum hypertrophy present, but the canal is patent.  There is mild left foraminal stenosis.  The right foramen is patent.  At C6-C7, there is disc desiccation with a minimal broad-based disc bulge most prominent in the right paracentral canal.  The canal is widely patent.  There is mild left foraminal stenosis.  At C7-T1, there is no significant disc bulge, protrusion, or herniation/extrusion.  The canal and foramina are widely patent.    MRI lumbar spine 9/22/17  L1-L2 demonstrates no significant disc protrusion, central spinal canal stenosis, or neural foraminal stenosis.  L2-L3 demonstrates no significant disc protrusion, central spinal canal stenosis, or neural foraminal stenosis. Mild facet arthrosis is seen.  L3-L4 demonstrates no significant disc protrusion, central spinal canal stenosis, or neural foraminal stenosis. Mild facet arthrosis is seen.  L4-L5 demonstrates mild broad-based disc bulge, ligamentum flavum hypertrophy, and mild/moderate bilateral facet arthrosis. No significant central spinal canal stenosis is seen. Mild bilateral lateral recess narrowing and neural foraminal narrowing is seen. Similar findings were seen on the prior MRI.  L5-S1 demonstrates no significant posterior disc protrusion. Moderate bilateral facet arthrosis is seen along ligamentum flavum hypertrophy. No  significant central spinal canal or neural foraminal stenosis is seen.    Hip x-rays 9/22/17  AP view of the pelvis and frog leg views of both hips were obtained. No evidence of acute displaced fracture or dislocation is visualized.  No radiopaque foreign bodies are visualized. The bilateral superior joint spaces are grossly maintained. Mild bilateral sacroiliac joint degenerative changes are seen including subchondral sclerosis and small marginal osteophytes. Mild to moderate symphyseal degenerative changes are seen. There is a slightly expansile cortically-based focus along the lateral proximal right femoral metadiaphysis. This could reflect a sessile osteochondroma, but correlation with MRI of the right hip is recommended.      Assessment:  The patient is a 59-year-old woman with history of hypothyroidism, otherwise fairly healthy but a long history of scoliosis causing back pain, self-referred for continued back pain.    1. Foot pain, right  Ambulatory consult to Podiatry   2. Right thigh pain     3. DDD (degenerative disc disease), lumbar     4. Lumbar spondylosis     5. Spondylosis of thoracic region without myelopathy or radiculopathy     6. Muscular pain     7. Scoliosis of lumbar spine, unspecified scoliosis type     8. Scoliosis of thoracic spine, unspecified scoliosis type         Plan:  1.  We discussed that her right thigh pain and foot pain do not seem related, I do not see anything at L3 or L5 that would necessarily account for this.  I am not sure what is causing the right thigh pain but she definitely has increased pain on stretching of the quadriceps, would suggest that this is more muscular.  I am going to have her see Dr. Ramsay, she has seen him in in the past.  2.  For her right foot pain, she has seen Dr. Anderson but it has been several years, I am going to refer her back.  Again this does not seem to be lumbar radicular pain.  3.  In terms of her neck back and thoracic pain, these are doing  fairly well right now.  She is managing with gabapentin 400 milligrams nightly and since her pain returned somewhat at 10:00 a.m., I suggested that she take a 100 milligram capsule over the next few weeks at that time to see if this provides more relief throughout the day.  4.  She continues to have improvement in her functioning with hydrocodone twice daily 7.5/325.  I have refilled this for the next 3 months. [place] Board [employer] Pharmacy website checked and no aberrant patterns noted.      Greater than 50% of this 45min visit was spent educating and counseling this patient.

## 2019-12-26 ENCOUNTER — TELEPHONE (OUTPATIENT)
Dept: PAIN MEDICINE | Facility: CLINIC | Age: 59
End: 2019-12-26

## 2019-12-26 NOTE — TELEPHONE ENCOUNTER
----- Message from Carolina Maharaj sent at 12/26/2019 10:10 AM CST -----  Contact: [facility] pharmacy  Type:  Pharmacy Calling to Clarify an RX    Name of Caller: Ada  Pharmacy Name:  [facility]  Prescription Name:  HYDROcodone-acetaminophen (NORCO) 7.5-325 mg per tablet  What do they need to clarify?:  Stated pt was bringing script to pharmacy...it will be a day or so early, wants to make sure it is okay to fill script since patient is going out of town  Best Call Back Number:  [phone]  Additional Information:

## 2019-12-26 NOTE — TELEPHONE ENCOUNTER
Spoke with armando at the [facility] pharmacy. The patient is going out of town for 2 weeks and is requesting an early refill of her hydrocodone. The last fill date was 12/6 so this will be a week early. Please advise. Thanks.

## 2019-12-27 ENCOUNTER — TELEPHONE (OUTPATIENT)
Dept: PHYSICAL MEDICINE AND REHAB | Facility: CLINIC | Age: 59
End: 2019-12-27

## 2019-12-27 ENCOUNTER — TELEPHONE (OUTPATIENT)
Dept: PODIATRY | Facility: CLINIC | Age: 59
End: 2019-12-27

## 2019-12-27 NOTE — TELEPHONE ENCOUNTER
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 She was wanting to know if she could get in sooner,but Dr. Anderson did not have any open appts that I could see. Patient has foot pain and is wearing a boot. She is due to see Dr. Ramsay on 1/7/2020 and will stay all day or come back if she needs to if she could fit in or if anyone cancels that day or any other day. 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 Let her know I would inform the staff since everyone was out of clinic today. Patient voiced all understanding.

## 2019-12-27 NOTE — TELEPHONE ENCOUNTER
----- Message from Clara Jeffrey sent at 12/27/2019 10:26 AM CST -----  Contact: pt  Type: Needs Medical Advice    Who Called:      Best Call Back Number:     Additional Information: Requesting a call back from Nurse, regarding access to a different appt from 01/20/20 pt would like to come in sooner ,please call

## 2019-12-27 NOTE — TELEPHONE ENCOUNTER
----- Message from Clara  sent at 12/27/2019 10:24 AM CST -----  Contact: pt  Type:  Patient Returning Call    Who Called:  pt  Who Left Message for Patient:  Tali  Does the patient know what this is regarding?:  Yes ashwin  Best Call Back Number:    Additional Information:  Pt called back

## 2020-01-14 ENCOUNTER — OFFICE VISIT (OUTPATIENT)
Dept: PHYSICAL MEDICINE AND REHAB | Facility: CLINIC | Age: 60
End: 2020-01-14
Payer: COMMERCIAL

## 2020-01-14 VITALS — WEIGHT: 170 LBS | BODY MASS INDEX: 32.1 KG/M2 | HEIGHT: 61 IN

## 2020-01-14 DIAGNOSIS — M76.31 ILIOTIBIAL BAND TENDONITIS OF RIGHT SIDE: Primary | ICD-10-CM

## 2020-01-14 PROCEDURE — 99213 PR OFFICE/OUTPT VISIT, EST, LEVL III, 20-29 MIN: ICD-10-PCS | Mod: S$GLB,,, | Performed by: PHYSICAL MEDICINE & REHABILITATION

## 2020-01-14 PROCEDURE — 99213 OFFICE O/P EST LOW 20 MIN: CPT | Mod: S$GLB,,, | Performed by: PHYSICAL MEDICINE & REHABILITATION

## 2020-01-14 PROCEDURE — 99999 PR PBB SHADOW E&M-EST. PATIENT-LVL III: CPT | Mod: PBBFAC,,, | Performed by: PHYSICAL MEDICINE & REHABILITATION

## 2020-01-14 PROCEDURE — 99999 PR PBB SHADOW E&M-EST. PATIENT-LVL III: ICD-10-PCS | Mod: PBBFAC,,, | Performed by: PHYSICAL MEDICINE & REHABILITATION

## 2020-01-14 NOTE — PROGRESS NOTES
[facility]    CHIEF COMPLAINT:   Chief Complaint   Patient presents with    Hip Pain     right     HISTORY OF PRESENT ILLNESS: Vonnie Garces is a 59 y.o. female who presents to me for f/u. LCV was 10/24/17. At this visit we felt that she was likely suffering from right proximal hamstring tendinosis/tearing that resolved. She is now here for a new problem in her right anterolateral thigh. Started approx months ago without trauma and gradual onset. The pain has worsened with time. It is at times 10/10 but this is usually at night. During the day she is more easily distracted from the pain. It is waking her up at night. Worse with laying on either of her sides and descending stairs. She denies ever having an emg. She denies weakness but does not feel as stable in the right LE. The pain is improved with nothing. She had not tried ibuprofen, although she takes an opiate for her low back pain and that does help. She additionally takes gabapentin at night which helps with her thigh pain. She is currently wearing a boot on the right LE. She has not participated in PT. No catching/locking/pulling. She stretches a lot and has noticed no relief. 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     She also has pain that radiates into her right LE although she feels that these issues do not connect.      Review of Systems   Constitutional: Negative for fever.   HENT: Negative for drooling.    Eyes: Negative for discharge.   Respiratory: Negative for choking.    Cardiovascular: Negative for chest pain.   Genitourinary: Negative for flank pain.   Skin: Negative for wound.   Allergic/Immunologic: Negative for immunocompromised state.   Neurological: Negative for tremors and syncope.   Psychiatric/Behavioral: Negative for behavioral problems.     Past Medical History:   Past Medical History:   Diagnosis Date    Anxiety 8/25/2015    Arthritis     Celiac disease     Cervical spondylosis     Chronic back pain     DDD  (degenerative disc disease), lumbar     Difficult intubation 03/2016    anterior larynx, pt with metal dental appliance, unable to do glidescope, used LMA    Encounter for blood transfusion     Facet arthropathy, thoracic     Hormone replacement therapy (HRT)     Insomnia 8/25/2015    Neck pain     PONV (postoperative nausea and vomiting)     Right foot pain     Scoliosis     Unspecified hypothyroidism 8/25/2015     Past Surgical History:   Past Surgical History:   Procedure Laterality Date    BILATERAL OOPHORECTOMY      BREAST BIOPSY Left 2010    Benign    CHOLECYSTECTOMY  2010    COLONOSCOPY  2016    [place]    Epidural Steroid Injection      Pain managment, at another facility, cervical, thoracic    Epidural Steroid injection      Pain management, cervical    EPIDURAL STEROID INJECTION INTO LUMBAR SPINE Right 1/16/2019    Procedure: Injection-steroid-epidural-lumbar L5/S1;  Surgeon: Floyd Coleman MD;  Location: [facility] OR;  Service: Pain Management;  Laterality: Right;  to right    EPIDURAL STEROID INJECTION INTO LUMBAR SPINE N/A 5/14/2019    Procedure: Injection-steroid-epidural-lumbar;  Surgeon: Floyd Coleman MD;  Location: [facility] OR;  Service: Pain Management;  Laterality: N/A;  L5/S1 interlaminar    HYSTERECTOMY  2004    Complete    MOUTH SURGERY      Braces, with temporary metal implants in upper gum    OOPHORECTOMY  2004    w/ hysterectomy    RADIOFREQUENCY ABLATION  02/2016    thoracic nerve    RADIOFREQUENCY ABLATION Right 6/4/2019    Procedure: Radiofrequency Ablation T4,5,6,7;  Surgeon: Floyd Coleman MD;  Location: [facility] OR;  Service: Pain Management;  Laterality: Right;    TONSILLECTOMY      VULVA SURGERY  03/2016     Family History:   Family History   Problem Relation Age of Onset    Arthritis Mother     Celiac disease Mother     COPD Father      Medications:   Current Outpatient Medications on File Prior to Visit   Medication Sig Dispense Refill    ALPRAZolam  (XANAX) 0.25 MG tablet Take 1 tablet (0.25 mg total) by mouth 2 (two) times daily. as needed for anxiety. 30 tablet 1    estradiol (VIVELLE-DOT) 0.1 mg/24 hr PTSW Place 1 patch onto the skin twice a week. for 8 doses 8 patch 11    gabapentin (NEURONTIN) 100 MG capsule Take 100 milligrams in the morning and 400 milligrams at night 450 capsule 3    hydroCHLOROthiazide (HYDRODIURIL) 25 MG tablet Take 1 tablet (25 mg total) by mouth daily as needed. 90 tablet 1    HYDROcodone-acetaminophen (NORCO) 7.5-325 mg per tablet Take 1 tablet by mouth 2 (two) times daily as needed for Pain. 60 tablet 0    [START ON 1/26/2020] HYDROcodone-acetaminophen (NORCO) 7.5-325 mg per tablet Take 1 tablet by mouth 2 (two) times daily as needed for Pain. 60 tablet 0    [START ON 2/25/2020] HYDROcodone-acetaminophen (NORCO) 7.5-325 mg per tablet Take 1 tablet by mouth 2 (two) times daily as needed for Pain. 60 tablet 0    ibuprofen-famotidine (DUEXIS) 800-26.6 mg Tab Take 1 tablet by mouth 3 (three) times daily. 90 tablet 11    METHYL SALICYLATE/MENTH/CAMPH (PAIN RELIEVING CREAM TOP) Apply 1 application topically daily as needed.      ondansetron (ZOFRAN-ODT) 8 MG TbDL Take 1 tablet (8 mg total) by mouth every 6 (six) hours as needed. 30 tablet 0    SYNTHROID 125 mcg tablet TAKE 1 TABLET BY MOUTH BEFORE BREAKFAST 90 tablet 0    triamcinolone acetonide 0.1% (KENALOG) 0.1 % cream Apply topically 2 (two) times daily. 454 g 1     No current facility-administered medications on file prior to visit.      Allergies:   Review of patient's allergies indicates:   Allergen Reactions    Compazine [prochlorperazine edisylate] Anaphylaxis       Social History:   Social History     Socioeconomic History    Marital status:      Spouse name: Not on file    Number of children: Not on file    Years of education: Not on file    Highest education level: Not on file   Occupational History    Not on file   Social Needs    Financial resource  "strain: Not on file    Food insecurity:     Worry: Not on file     Inability: Not on file    Transportation needs:     Medical: Not on file     Non-medical: Not on file   Tobacco Use    Smoking status: Never Smoker    Smokeless tobacco: Never Used   Substance and Sexual Activity    Alcohol use: No    Drug use: No    Sexual activity: Yes     Partners: Male   Lifestyle    Physical activity:     Days per week: Not on file     Minutes per session: Not on file    Stress: Not on file   Relationships    Social connections:     Talks on phone: Not on file     Gets together: Not on file     Attends Hinduism service: Not on file     Active member of club or organization: Not on file     Attends meetings of clubs or organizations: Not on file     Relationship status: Not on file   Other Topics Concern    Not on file   Social History Narrative    Not on file     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.    PHYSICAL EXAMINATION:   General    Vitals:    01/14/20 1537   Weight: 77.1 kg (170 lb)   Height: 5' 1" (1.549 m)     Constitutional: Oriented to person, place, and time. No apparent distress. Appears well-developed and well-nourished. Pleasant.  HENT:   Head: Normocephalic and atraumatic.   Eyes: Right eye exhibits no discharge. Left eye exhibits no discharge. No scleral icterus.   Pulmonary/Chest: Effortf normal. No respiratory distress.   Abdominal: There is no guarding.   Neurological: Alert and oriented to person, place, and time.   Psychiatric: Behavior is normal.   Right Hip Exam     Tenderness   The patient is experiencing tenderness in the lateral (She is tender over the greater trochanter and right anterolateral thigh, along the course of the ITB band. ).    Range of Motion   Abduction: 40   Adduction: 20   Flexion: 130   External rotation: 60   Internal rotation: 20     Muscle Strength   Abduction: 5/5   Adduction: 5/5   Flexion: 5/5     Tests   MAK: negative    Other   Scars: " absent  Sensation: normal  Pulse: present    Comments:  No pain with resisted KE or hip aBduction  Pain in quads and right GTB with MAK test      Left Hip Exam     Tenderness   The patient is experiencing no tenderness.     Range of Motion   Abduction: normal   Adduction: normal   Flexion: normal   External rotation: normal   Internal rotation: normal     Muscle Strength   Abduction: 5/5   Adduction: 5/5   Flexion: 5/5     Other   Erythema: absent  Scars: absent  Sensation: normal  Pulse: present        INSPECTION: There is no swelling, ecchymoses, erythema or gross deformity about the right hip or thigh.  LIGAMENTOUS LAXITY AND STABILITY: No pain with SI joint compression. Negative log roll.   GAIT/DYNAMIC: Her gait is normal.    Imaging  MRI of the right hip from 10/10/2017: Moderate right greater trochanteric bursitis with mild partial-thickness tearing the right gluteus minimus tendon insertion.  Mild partial-thickness tearing of the common hamstring tendon origin. Small sessile osteochondroma versus desmoid along the lateral aspect of the right proximal femoral metadiaphysis    X-ray of the hip from 9/22/2017: No acute displaced fracture or dislocation is visualized.  2. There is a slightly expansile cortically based and smoothly marginated focus along the lateral right femoral metadiaphysis which may reflect a sessile osteochondroma. However, correlation with pre-and post contrast MRI is recommended.    Data Reviewed: X-ray, MRI    Supportive Actions: Independent visualization of images or test specimens    ASSESSMENT:   1. Iliotibial band tendonitis of right side      PLAN:     1. Time was spent reviewing the above diagnosis in depth with Vonnie today, including acute management and rehabilitation.     2.  She has symptoms, physical exam findings most consistent with a diagnosis of IT band tendonitis. We discussed several options for her issue, and Ms. Garces would like to start formal PT for stretching  and strengthening as well as appropriate therapeutic modalities. Consider injection with corticosteroid or ITB hydrodissection in the future. She additionally has corticosteroid injections regularly in her lumbar spine and would like to limit those types of injections for this at the moment. We will send her for PT at [facility].     3.  RTC prn. If not better with at least 1 month of therapy, RTC for evaluation and consideration of injection therapy.    The above note was completed, in part, with the aid of Dragon dictation software/hardware. Translation errors may be present.

## 2020-01-20 ENCOUNTER — OFFICE VISIT (OUTPATIENT)
Dept: PODIATRY | Facility: CLINIC | Age: 60
End: 2020-01-20
Payer: COMMERCIAL

## 2020-01-20 VITALS — WEIGHT: 174.5 LBS | BODY MASS INDEX: 32.94 KG/M2 | HEIGHT: 61 IN

## 2020-01-20 DIAGNOSIS — M62.461 GASTROCNEMIUS EQUINUS, RIGHT: ICD-10-CM

## 2020-01-20 DIAGNOSIS — M77.41 METATARSALGIA OF RIGHT FOOT: Primary | ICD-10-CM

## 2020-01-20 PROCEDURE — 99213 PR OFFICE/OUTPT VISIT, EST, LEVL III, 20-29 MIN: ICD-10-PCS | Mod: S$GLB,,, | Performed by: PODIATRIST

## 2020-01-20 PROCEDURE — 99999 PR PBB SHADOW E&M-EST. PATIENT-LVL II: CPT | Mod: PBBFAC,,, | Performed by: PODIATRIST

## 2020-01-20 PROCEDURE — 99213 OFFICE O/P EST LOW 20 MIN: CPT | Mod: S$GLB,,, | Performed by: PODIATRIST

## 2020-01-20 PROCEDURE — 99999 PR PBB SHADOW E&M-EST. PATIENT-LVL II: ICD-10-PCS | Mod: PBBFAC,,, | Performed by: PODIATRIST

## 2020-01-20 NOTE — LETTER
January 20, 2020      Floyd Coleman MD  [address] [facility] [address] [place] [address]           [facility]  [address] [facility] [address] [place] [address]  Phone: [phone]          Patient: Vonnie Garces   MR Number: [id number]   YOB: 1960   Date of Visit: 1/20/2020       Dear Dr. Floyd Coleman:    Thank you for referring Vonnie Garces to me for evaluation. Attached you will find relevant portions of my assessment and plan of care.    If you have questions, please do not hesitate to call me. I look forward to following Vonnie Garces along with you.    Sincerely,    Uriel Anderson, HARLEY    Enclosure  CC:  No Recipients    If you would like to receive this communication electronically, please contact [email or web address] or [phone] to request more information on [employer] access.    For providers and/or their staff who would like to refer a patient to [facility], please contact us through our one-stop-shop provider referral line, [facility] , at [phone].    If you feel you have received this communication in error or would no longer like to receive these types of communications, please e-mail [email or web address]          Problem: OCCUPATIONAL THERAPY ADULT  Goal: Performs self-care activities at highest level of function for planned discharge setting  See evaluation for individualized goals  Treatment Interventions: ADL retraining, Functional transfer training, UE strengthening/ROM, Endurance training, Cognitive reorientation, Patient/family training, Equipment evaluation/education, Compensatory technique education, Activityengagement, Energy conservation          See flowsheet documentation for full assessment, interventions and recommendations  Limitation: Decreased ADL status, Decreased UE strength, Decreased Safe judgement during ADL, Decreased endurance, Decreased cognition, Decreased high-level ADLs, Decreased self-care trans  Prognosis: Fair  Assessment: Pt is a 83 y/o male seen for OT re-reval s/p acute respiratory failure w/ decreased in O2 saturation on 100% high flow  Pt transferred to MICU and placed on bipap  Pt now back on nasal cannula  Pt initially admitted 1/1/19 w/ sepsis  Pt seen for initial OT evaluation on 1/4/19 and performed at a level of Max A UB ADLS, Total A LB ADLS, and did not perform transfers/functional mobility at the time  Pt currently performing @ level of S UB ADLS, Min A LB ADLS, Mod A transfers/functional mobility w/ HHA  Pt remains limited 2* decreased endurance, activity tolerance, standing balance, forward functional reach, decreased understanding of deficits and safety awareness, decreased functional mobility, decreased strength, and decreased I w/ ADLS/IADLS compared to previous level of functioning  Continue to recommend STR upon D/C when medically stable  Pt continues to benefit from immediate inpatient skilled OT services 3-5x/wk   Will continue to follow to address goals stated below to be met within 10-14 days:     OT Discharge Recommendation: Short Term Rehab  OT - OK to Discharge: Yes (when medically stable)      Comments: Ivelisse Stout MS, OTR/L

## 2020-01-21 NOTE — PROGRESS NOTES
"Subjective:      Patient ID: Vonnie Garces is a 59 y.o. female.    Chief Complaint: Foot Pain (rt foot between great toe and 2nd and 3rd toe)  Patient presents to clinic with the chief complaint of pain within the Rt. Forefoot that has been present since August of last year.  Relates waking up one morning with sharp pain near the base of the Rt. 2nd and 3rd toes.  States symptoms appeared to worsen with prolonged weight bearing activity.  Relates that symptoms wax and wane with today being a "better day".  She has been wearing a postoperative shoe for the past several weeks, as this has been most successful at lessening her symptoms.  Denies recent injury to the affected foot.  Denies any additional pedal complaints.        Past Medical History:   Diagnosis Date    Anxiety 8/25/2015    Arthritis     Celiac disease     Cervical spondylosis     Chronic back pain     DDD (degenerative disc disease), lumbar     Difficult intubation 03/2016    anterior larynx, pt with metal dental appliance, unable to do glidescope, used LMA    Encounter for blood transfusion     Facet arthropathy, thoracic     Hormone replacement therapy (HRT)     Insomnia 8/25/2015    Neck pain     PONV (postoperative nausea and vomiting)     Right foot pain     Scoliosis     Unspecified hypothyroidism 8/25/2015       Past Surgical History:   Procedure Laterality Date    BILATERAL OOPHORECTOMY      BREAST BIOPSY Left 2010    Benign    CHOLECYSTECTOMY  2010    [place]OS[place]  2016    [place]    Epidural Steroid Injection      Pain managment, at another facility, cervical, thoracic    Epidural Steroid injection      Pain management, cervical    EPIDURAL STEROID INJECTION INTO LUMBAR SPINE Right 1/16/2019    Procedure: Injection-steroid-epidural-lumbar L5/S1;  Surgeon: Floyd Coleman MD;  Location: [facility] OR;  Service: Pain Management;  Laterality: Right;  to right    EPIDURAL STEROID INJECTION INTO LUMBAR SPINE N/A " 5/14/2019    Procedure: Injection-steroid-epidural-lumbar;  Surgeon: Floyd Coleman MD;  Location: [facility] OR;  Service: Pain Management;  Laterality: N/A;  L5/S1 interlaminar    HYSTERECTOMY  2004    Complete    MOUTH SURGERY      Braces, with temporary metal implants in upper gum    OOPHORECTOMY  2004    w/ hysterectomy    RADIOFREQUENCY ABLATION  02/2016    thoracic nerve    RADIOFREQUENCY ABLATION Right 6/4/2019    Procedure: Radiofrequency Ablation T4,5,6,7;  Surgeon: Floyd Coleman MD;  Location: [facility] OR;  Service: Pain Management;  Laterality: Right;    TONSILLECTOMY      VULVA SURGERY  03/2016       Family History   Problem Relation Age of Onset    Arthritis Mother     Celiac disease Mother     COPD Father        Social History     Socioeconomic History    Marital status:      Spouse name: Not on file    Number of children: Not on file    Years of education: Not on file    Highest education level: Not on file   Occupational History    Not on file   Social Needs    Financial resource strain: Not on file    Food insecurity:     Worry: Not on file     Inability: Not on file    Transportation needs:     Medical: Not on file     Non-medical: Not on file   Tobacco Use    Smoking status: Never Smoker    Smokeless tobacco: Never Used   Substance and Sexual Activity    Alcohol use: No    Drug use: No    Sexual activity: Yes     Partners: Male   Lifestyle    Physical activity:     Days per week: Not on file     Minutes per session: Not on file    Stress: Not on file   Relationships    Social connections:     Talks on phone: Not on file     Gets together: Not on file     Attends Mormon service: Not on file     Active member of club or organization: Not on file     Attends meetings of clubs or organizations: Not on file     Relationship status: Not on file   Other Topics Concern    Not on file   Social History Narrative    Not on file       Current Outpatient Medications    Medication Sig Dispense Refill    ALPRAZolam (XANAX) 0.25 MG tablet Take 1 tablet (0.25 mg total) by mouth 2 (two) times daily. as needed for anxiety. 30 tablet 1    gabapentin (NEURONTIN) 100 MG capsule Take 100 milligrams in the morning and 400 milligrams at night 450 capsule 3    hydroCHLOROthiazide (HYDRODIURIL) 25 MG tablet Take 1 tablet (25 mg total) by mouth daily as needed. 90 tablet 1    HYDROcodone-acetaminophen (NORCO) 7.5-325 mg per tablet Take 1 tablet by mouth 2 (two) times daily as needed for Pain. 60 tablet 0    [START ON 1/26/2020] HYDROcodone-acetaminophen (NORCO) 7.5-325 mg per tablet Take 1 tablet by mouth 2 (two) times daily as needed for Pain. 60 tablet 0    [START ON 2/25/2020] HYDROcodone-acetaminophen (NORCO) 7.5-325 mg per tablet Take 1 tablet by mouth 2 (two) times daily as needed for Pain. 60 tablet 0    ibuprofen-famotidine (DUEXIS) 800-26.6 mg Tab Take 1 tablet by mouth 3 (three) times daily. 90 tablet 11    METHYL SALICYLATE/MENTH/CAMPH (PAIN RELIEVING CREAM TOP) Apply 1 application topically daily as needed.      metroNIDAZOLE (FLAGYL) 500 MG tablet Take 1 tablet (500 mg total) by mouth 3 (three) times daily. for 10 days 30 tablet 0    ondansetron (ZOFRAN) 4 MG tablet Take 1 tablet (4 mg total) by mouth every 8 (eight) hours as needed for Nausea. 30 tablet 0    ondansetron (ZOFRAN-ODT) 8 MG TbDL Take 1 tablet (8 mg total) by mouth every 6 (six) hours as needed. 30 tablet 0    sulfamethoxazole-trimethoprim 800-160mg (BACTRIM DS) 800-160 mg Tab Take 1 tablet by mouth 2 (two) times daily. for 10 days 20 tablet 0    SYNTHROID 125 mcg tablet TAKE 1 TABLET BY MOUTH BEFORE BREAKFAST 90 tablet 0    triamcinolone acetonide 0.1% (KENALOG) 0.1 % cream Apply topically 2 (two) times daily. 454 g 1    estradiol (VIVELLE-DOT) 0.1 mg/24 hr PTSW Place 1 patch onto the skin twice a week. for 8 doses 8 patch 11     No current facility-administered medications for this visit.         Review of patient's allergies indicates:   Allergen Reactions    Compazine [prochlorperazine edisylate] Anaphylaxis         Review of Systems   Constitution: Negative for chills and fever.   Cardiovascular: Negative for claudication and leg swelling.   Skin: Negative for color change and nail changes.   Musculoskeletal: Positive for back pain and joint pain. Negative for joint swelling, muscle cramps and muscle weakness.   Neurological: Negative for numbness and paresthesias.   Psychiatric/Behavioral: Negative for altered mental status.           Objective:      Physical Exam   Constitutional: She is oriented to person, place, and time. She appears well-developed and well-nourished. No distress.   Cardiovascular:   Pulses:       Dorsalis pedis pulses are 2+ on the right side, and 2+ on the left side.        Posterior tibial pulses are 2+ on the right side, and 2+ on the left side.   CFT <3 seconds bilateral.  Pedal hair growth present bilateral.   No varicosities noted bilateral.  Mild nonpitting edema noted to the Rt. Sub 2nd and 3rd met heads.   Musculoskeletal: She exhibits edema and tenderness.   Muscle strength 5/5 in all muscle groups bilateral.  No tenderness nor crepitation with ROM of foot/ankle joints bilateral.  Pain with palpation to the Rt. Sub 2nd and 3rd met head.  (-) Lachman sign on the Rt.  (-) song sign on the Rt.  (-) for pain with medial and lateral compression of the Rt. forefoot  Bilateral pes cavus foot type.  Bilateral gastrocnemius equinus.  Bilateral retrocalcaneal exostosis.   Neurological: She is alert and oriented to person, place, and time. She has normal strength. No sensory deficit.   Light touch intact bilateral.     Skin: Skin is warm, dry and intact. Capillary refill takes less than 2 seconds. No abrasion, no bruising, no burn, no ecchymosis, no laceration, no lesion, no petechiae and no rash noted. She is not diaphoretic. No erythema. No pallor.   Pedal skin has normal  turgor, temperature, and texture bilateral.  Toenails x 10 appear normotrophic. Examination of the skin reveals no evidence of significant maceration, rashes, open lesions, suspicious appearing nevi or other concerning lesions.              Assessment:       Encounter Diagnoses   Name Primary?    Metatarsalgia of right foot Yes    Gastrocnemius equinus, right          Plan:       Vonnie was seen today for foot pain.    Diagnoses and all orders for this visit:    Metatarsalgia of right foot    Gastrocnemius equinus, right      I counseled the patient on her conditions, their implications and medical management.    - Reviewed appropriate stretching exercises to address bilateral equinus.    - Recommend wearing supportive shoes and avoidance of barefoot walking, flip flops, and Crocs, as this will exacerbate current symptoms.      - Fitted and dispensed a metatarsal pad to offload the Rt. 2nd and 3rd met heads.  Advised to return to use of the [employer] insole will replicate the same offloading while in daily shoe gear.    - Recommend icing the affected area a minimum of 20 minutes daily.     - Discussed avoidance of high impact activities such as squatting, stooping, and running as these activities will exacerbate symptoms.      - May consider applying a topical analgesic (Voltaren gel) to help with pain symptoms.      - Recommend Physical Therapy to address Rt. Sided equinus.  She would benefit from iontophoresis, Astym, and dry needling.    - RTC prn or sooner if symptoms fail to resolve within the next 6-8 weeks.    Uriel Anderson DPM

## 2020-02-08 ENCOUNTER — PATIENT MESSAGE (OUTPATIENT)
Dept: PAIN MEDICINE | Facility: CLINIC | Age: 60
End: 2020-02-08

## 2020-02-09 ENCOUNTER — PATIENT MESSAGE (OUTPATIENT)
Dept: DERMATOLOGY | Facility: CLINIC | Age: 60
End: 2020-02-09

## 2020-02-11 RX ORDER — LIDOCAINE 50 MG/G
1 PATCH TOPICAL DAILY
Qty: 30 PATCH | Refills: 5 | Status: SHIPPED | OUTPATIENT
Start: 2020-02-11 | End: 2021-06-25

## 2020-03-02 NOTE — TELEPHONE ENCOUNTER
Please let the patient know that I sent in gabapentin to her pharmacy.  Have her start 300 mg nightly and after a few days, increase to 300 mg twice a day if tolerated.  
02-Mar-2020 14:06

## 2020-03-16 ENCOUNTER — PATIENT MESSAGE (OUTPATIENT)
Dept: PAIN MEDICINE | Facility: CLINIC | Age: 60
End: 2020-03-16

## 2020-03-17 ENCOUNTER — TELEPHONE (OUTPATIENT)
Dept: PAIN MEDICINE | Facility: CLINIC | Age: 60
End: 2020-03-17

## 2020-03-17 RX ORDER — HYDROCODONE BITARTRATE AND ACETAMINOPHEN 7.5; 325 MG/1; MG/1
1 TABLET ORAL 2 TIMES DAILY PRN
Qty: 60 TABLET | Refills: 0 | Status: SHIPPED | OUTPATIENT
Start: 2020-05-05 | End: 2020-03-27 | Stop reason: SDUPTHER

## 2020-03-17 RX ORDER — HYDROCODONE BITARTRATE AND ACETAMINOPHEN 7.5; 325 MG/1; MG/1
1 TABLET ORAL 2 TIMES DAILY PRN
Qty: 60 TABLET | Refills: 0 | Status: SHIPPED | OUTPATIENT
Start: 2020-04-05 | End: 2020-03-27 | Stop reason: SDUPTHER

## 2020-03-17 NOTE — TELEPHONE ENCOUNTER
She is now asking for increase after you sent in for her.  Please see message. She also sent another msg asking that it be sent to [place] instead.

## 2020-03-17 NOTE — TELEPHONE ENCOUNTER
Patient sent a message earlier about an increase in medication bur the prescription was sent to [facility] but the patient can't get into the hospital so she would like it sent to the [facility] in [place] please. thanks

## 2020-03-17 NOTE — TELEPHONE ENCOUNTER
I'll increase it for the next month. There should be no concern for not having access to medication, there are no shortages. She just filled her medication on 3/6/20, contact us when needing a refill and where to send it.

## 2020-03-24 DIAGNOSIS — R20.9 DISTURBANCE OF SKIN SENSATION: ICD-10-CM

## 2020-03-24 RX ORDER — TRIAMCINOLONE ACETONIDE 1 MG/G
CREAM TOPICAL 2 TIMES DAILY
Qty: 454 G | Refills: 1 | Status: SHIPPED | OUTPATIENT
Start: 2020-03-24 | End: 2021-06-23

## 2020-03-24 NOTE — TELEPHONE ENCOUNTER
Vonnie Garces would like a refill of the following medications:         triamcinolone acetonide 0.1% (KENALOG) 0.1 % cream [Sita Escamilla MD]     Preferred pharmacy: [facility] #[id number] - [place], [place] - [address]   Delivery method: Pickup

## 2020-03-26 ENCOUNTER — PATIENT MESSAGE (OUTPATIENT)
Dept: PAIN MEDICINE | Facility: CLINIC | Age: 60
End: 2020-03-26

## 2020-03-27 ENCOUNTER — PATIENT MESSAGE (OUTPATIENT)
Dept: PAIN MEDICINE | Facility: CLINIC | Age: 60
End: 2020-03-27

## 2020-03-27 RX ORDER — HYDROCODONE BITARTRATE AND ACETAMINOPHEN 7.5; 325 MG/1; MG/1
1 TABLET ORAL 2 TIMES DAILY PRN
Qty: 60 TABLET | Refills: 0 | Status: SHIPPED | OUTPATIENT
Start: 2020-04-05 | End: 2020-03-27 | Stop reason: SDUPTHER

## 2020-03-27 RX ORDER — HYDROCODONE BITARTRATE AND ACETAMINOPHEN 7.5; 325 MG/1; MG/1
1 TABLET ORAL 2 TIMES DAILY PRN
Qty: 60 TABLET | Refills: 0 | Status: SHIPPED | OUTPATIENT
Start: 2020-05-05 | End: 2020-06-04

## 2020-03-27 RX ORDER — HYDROCODONE BITARTRATE AND ACETAMINOPHEN 7.5; 325 MG/1; MG/1
1 TABLET ORAL EVERY 8 HOURS PRN
Qty: 90 TABLET | Refills: 0 | Status: SHIPPED | OUTPATIENT
Start: 2020-04-05 | End: 2020-04-03 | Stop reason: SDUPTHER

## 2020-04-03 ENCOUNTER — PATIENT MESSAGE (OUTPATIENT)
Dept: PAIN MEDICINE | Facility: CLINIC | Age: 60
End: 2020-04-03

## 2020-04-03 RX ORDER — HYDROCODONE BITARTRATE AND ACETAMINOPHEN 7.5; 325 MG/1; MG/1
1 TABLET ORAL EVERY 8 HOURS PRN
Qty: 90 TABLET | Refills: 0 | Status: SHIPPED | OUTPATIENT
Start: 2020-04-05 | End: 2020-05-05

## 2020-04-03 RX ORDER — HYDROCODONE BITARTRATE AND ACETAMINOPHEN 7.5; 325 MG/1; MG/1
1 TABLET ORAL EVERY 8 HOURS PRN
Qty: 90 TABLET | Refills: 0 | Status: CANCELLED | OUTPATIENT
Start: 2020-04-03 | End: 2020-05-03

## 2020-04-03 NOTE — TELEPHONE ENCOUNTER
Spoke with patient and she explained that the pharmacy in [place] is out of her medication. Please send her prescription to [facility] pharmacy.

## 2020-04-03 NOTE — TELEPHONE ENCOUNTER
----- Message from Kirill Kathleen sent at 4/3/2020  2:11 PM CDT -----  Contact: [facility] pharmacy  [phone]  Type:  RX Refill Request    Who Called:  [facility] pharmacy  [phone]    Refill or New Rx:  Refill    RX Name and Strength:  HYDROcodone-acetaminophen (NORCO) 7.5-325 mg per tablet 60 tablet 0 5/5/2020 6/4/2020   Sig - Route: Take 1 tablet by mouth 2 (two) times daily as needed for Pain. - Oral   Sent to pharmacy as: HYDROcodone-acetaminophen (NORCO) 7.5-325 mg per tablet   Earliest Fill Date: 5/5/2020   Notes to Pharmacy: Medically necessary for more than 7 days, ICD 10: G89.4       How is the patient currently taking it? (ex. 1XDay):  See above    Is this a 30 day or 90 day RX: 30 day    Preferred Pharmacy with phone number:      [facility] - - [address] [address]  Phone: [phone] Fax: [phone]    Local or Mail Order:  Local    Ordering Provider:  Dr Jared Harmon Call Back Number:  [facility] pharmacy  [phone]    Additional Information:  Advised ptnt is out of the medication,please send as soon as possible today before weekend.

## 2020-04-06 ENCOUNTER — TELEPHONE (OUTPATIENT)
Dept: PAIN MEDICINE | Facility: CLINIC | Age: 60
End: 2020-04-06

## 2020-04-06 RX ORDER — HYDROCODONE BITARTRATE AND ACETAMINOPHEN 7.5; 325 MG/1; MG/1
1 TABLET ORAL EVERY 8 HOURS PRN
Qty: 90 TABLET | Refills: 0 | Status: SHIPPED | OUTPATIENT
Start: 2020-04-06 | End: 2020-04-06 | Stop reason: SDUPTHER

## 2020-04-06 RX ORDER — HYDROCODONE BITARTRATE AND ACETAMINOPHEN 7.5; 325 MG/1; MG/1
1 TABLET ORAL EVERY 8 HOURS PRN
Qty: 90 TABLET | Refills: 0 | Status: SHIPPED | OUTPATIENT
Start: 2020-04-06 | End: 2020-05-06

## 2020-04-14 ENCOUNTER — OFFICE VISIT (OUTPATIENT)
Dept: RHEUMATOLOGY | Facility: CLINIC | Age: 60
End: 2020-04-14
Payer: COMMERCIAL

## 2020-04-14 VITALS — WEIGHT: 174.63 LBS | BODY MASS INDEX: 32.97 KG/M2 | HEIGHT: 61 IN

## 2020-04-14 DIAGNOSIS — M13.80 MIGRATORY POLYARTHRITIS: ICD-10-CM

## 2020-04-14 DIAGNOSIS — R23.1 LIVEDO RETICULARIS: ICD-10-CM

## 2020-04-14 DIAGNOSIS — E06.3 HASHIMOTO'S THYROIDITIS: ICD-10-CM

## 2020-04-14 DIAGNOSIS — K90.41 NON-CELIAC GLUTEN SENSITIVITY: Primary | ICD-10-CM

## 2020-04-14 PROCEDURE — 99215 OFFICE O/P EST HI 40 MIN: CPT | Mod: 95,,, | Performed by: INTERNAL MEDICINE

## 2020-04-14 PROCEDURE — 99215 PR OFFICE/OUTPT VISIT, EST, LEVL V, 40-54 MIN: ICD-10-PCS | Mod: 95,,, | Performed by: INTERNAL MEDICINE

## 2020-04-14 RX ORDER — IBUPROFEN AND FAMOTIDINE 26.6; 8 MG/1; MG/1
1 TABLET ORAL 3 TIMES DAILY
Qty: 90 TABLET | Refills: 12 | Status: SHIPPED | OUTPATIENT
Start: 2020-04-14 | End: 2020-05-14

## 2020-04-14 RX ORDER — FLUCONAZOLE 150 MG/1
150 TABLET ORAL DAILY
Qty: 7 TABLET | Refills: 3 | Status: SHIPPED | OUTPATIENT
Start: 2020-04-14 | End: 2020-04-21

## 2020-04-14 NOTE — PROGRESS NOTES
Subjective:       Patient ID: Vonnie Garces is a 59 y.o. female.    Chief Complaint: Disease Management (Follow Up)  Mrs. Garces is a 59 year old female  She has history of reactive arthritis, NCGS, hashimoto's thyroiditis currently on duexis and occasional use of prednisone for flares. She had a rash that has been on going x 1 yr, spread to her chest, neck, back, and legs. Rash is somewhat pruritic, and burning in nature. She has tried OTC benadryl, tagamet, prednisone 5 mg, and topical benadryl and hydrocortisone with no relief.  She is following her gluten free diet. She complains of increased arthralgias involving her shoulders, neck, and spine over the last week. She complains of generalized fatigue and fogginess.       Review of Systems   Constitutional: Negative for activity change and appetite change.   Eyes: Negative for visual disturbance.   Respiratory: Negative for shortness of breath.    Cardiovascular: Negative for palpitations and leg swelling.   Gastrointestinal: Negative for constipation and diarrhea.   Musculoskeletal: Positive for back pain, joint swelling, neck pain and neck stiffness. Negative for gait problem.   Skin: Positive for rash.   Neurological: Negative for dizziness and light-headedness.   Psychiatric/Behavioral: The patient is nervous/anxious.          Objective:     There were no vitals filed for this visit.         Physical Exam   Constitutional: She is oriented to person, place, and time and well-developed, well-nourished, and in no distress.   Neurological: She is alert and oriented to person, place, and time.   Psychiatric: Mood, affect and judgment normal.         Results for orders placed or performed in visit on 01/14/20   CBC auto differential   Result Value Ref Range    WBC 11.87 3.90 - 12.70 K/uL    RBC 4.37 4.00 - 5.40 M/uL    Hemoglobin 12.6 12.0 - 16.0 g/dL    Hematocrit 39.2 37.0 - 48.5 %    Mean Corpuscular Volume 90 82 - 98 fL    Mean Corpuscular Hemoglobin  28.8 27.0 - 31.0 pg    Mean Corpuscular Hemoglobin Conc 32.1 32.0 - 36.0 g/dL    RDW 13.2 11.5 - 14.5 %    Platelets 252 150 - 350 K/uL    MPV 9.9 9.2 - 12.9 fL    Immature Granulocytes 0.3 0.0 - 0.5 %    Gran # (ANC) 9.2 (H) 1.8 - 7.7 K/uL    Immature Grans (Abs) 0.03 0.00 - 0.04 K/uL    Lymph # 1.6 1.0 - 4.8 K/uL    Mono # 0.9 0.3 - 1.0 K/uL    Eos # 0.1 0.0 - 0.5 K/uL    Baso # 0.01 0.00 - 0.20 K/uL    nRBC 0 0 /100 WBC    Gran% 77.9 (H) 38.0 - 73.0 %    Lymph% 13.1 (L) 18.0 - 48.0 %    Mono% 7.8 4.0 - 15.0 %    Eosinophil% 0.8 0.0 - 8.0 %    Basophil% 0.1 0.0 - 1.9 %    Differential Method Automated    Comprehensive metabolic panel   Result Value Ref Range    Sodium 139 136 - 145 mmol/L    Potassium 3.5 3.5 - 5.1 mmol/L    Chloride 100 95 - 110 mmol/L    CO2 30 22 - 31 mmol/L    Glucose 91 70 - 110 mg/dL    BUN, Bld 7 7 - 18 mg/dL    Creatinine 0.59 0.50 - 1.40 mg/dL    Calcium 9.0 8.4 - 10.2 mg/dL    Total Protein 7.1 6.0 - 8.4 g/dL    Albumin 4.3 3.5 - 5.2 g/dL    Total Bilirubin 1.8 (H) 0.2 - 1.3 mg/dL    Alkaline Phosphatase 68 38 - 145 U/L    AST 23 14 - 36 U/L    ALT 29 10 - 44 U/L    Anion Gap 9 8 - 16 mmol/L    eGFR if African American >60 >60 mL/min/1.73 m^2    eGFR if non African American >60 >60 mL/min/1.73 m^2   Urinalysis   Result Value Ref Range    Specimen UA Urine, Unspecified     Color, UA Yellow Yellow, Straw, Naila    Appearance, UA Clear Clear    pH, UA 6.0 5.0 - 8.0    Specific Gravity, UA 1.015 1.005 - 1.030    Protein, UA Negative Negative    Glucose, UA Negative Negative    Ketones, UA Negative Negative    Bilirubin (UA) Negative Negative    Occult Blood UA Negative Negative    Nitrite, UA Negative Negative    Urobilinogen, UA 1.0 <2.0 EU/dL    Leukocytes, UA Negative Negative           Assessment:       1. Non-celiac gluten sensitivity    2. Migratory polyarthritis    3. Hashimoto's thyroiditis    4. Livedo reticularis            Plan:       Non-celiac gluten sensitivity  -      fluconazole (DIFLUCAN) 150 MG Tab; Take 1 tablet (150 mg total) by mouth once daily. for 7 days  Dispense: 7 tablet; Refill: 3  -     C3 complement; Future; Expected date: 04/14/2020  -     C4 complement; Future; Expected date: 04/14/2020  -     Complement, total; Future; Expected date: 04/14/2020  -     Ribosomal P Antibody, STERLING; Future; Expected date: 04/14/2020  -     ibuprofen-famotidine (DUEXIS) 800-26.6 mg Tab; Take 1 tablet by mouth 3 (three) times daily.  Dispense: 90 tablet; Refill: 12    Migratory polyarthritis  -     fluconazole (DIFLUCAN) 150 MG Tab; Take 1 tablet (150 mg total) by mouth once daily. for 7 days  Dispense: 7 tablet; Refill: 3  -     C3 complement; Future; Expected date: 04/14/2020  -     C4 complement; Future; Expected date: 04/14/2020  -     Complement, total; Future; Expected date: 04/14/2020  -     Ribosomal P Antibody, STERLING; Future; Expected date: 04/14/2020  -     ibuprofen-famotidine (DUEXIS) 800-26.6 mg Tab; Take 1 tablet by mouth 3 (three) times daily.  Dispense: 90 tablet; Refill: 12    Hashimoto's thyroiditis  -     fluconazole (DIFLUCAN) 150 MG Tab; Take 1 tablet (150 mg total) by mouth once daily. for 7 days  Dispense: 7 tablet; Refill: 3  -     C3 complement; Future; Expected date: 04/14/2020  -     C4 complement; Future; Expected date: 04/14/2020  -     Complement, total; Future; Expected date: 04/14/2020  -     Ribosomal P Antibody, STERLING; Future; Expected date: 04/14/2020  -     ibuprofen-famotidine (DUEXIS) 800-26.6 mg Tab; Take 1 tablet by mouth 3 (three) times daily.  Dispense: 90 tablet; Refill: 12    Livedo reticularis          Starting plaquenil 1 tab a day    Patient is aware that there is a COVID B-19 pandemic and that the immunosuppressants being taken to treat the arthritis but can increased the  risk for infection/Viruses.  It is understood to hold all DMARD with the exception of Plaquenil,  if having fever chills, cough, shortness of breath loss of sense of  smell and or myalgias.  It is understood to call the office as well as call their primary care physician and observe  social isolation      The patient location is: home  The chief complaint leading to consultation is:  Reactive arthritis  Visit type: audiovisual  Total time spent with patient:50 min  Each patient to whom he or she provides medical services by telemedicine is:  (1) informed of the relationship between the physician and patient and the respective role of any other health care provider with respect to management of the patient; and (2) notified that he or she may decline to receive medical services by telemedicine and may withdraw from such care at any time.

## 2020-04-15 ENCOUNTER — OFFICE VISIT (OUTPATIENT)
Dept: PAIN MEDICINE | Facility: CLINIC | Age: 60
End: 2020-04-15
Payer: COMMERCIAL

## 2020-04-15 ENCOUNTER — TELEPHONE (OUTPATIENT)
Dept: PAIN MEDICINE | Facility: CLINIC | Age: 60
End: 2020-04-15

## 2020-04-15 DIAGNOSIS — Z79.891 OPIOID CONTRACT EXISTS: ICD-10-CM

## 2020-04-15 DIAGNOSIS — M54.16 LUMBAR RADICULOPATHY: ICD-10-CM

## 2020-04-15 DIAGNOSIS — M47.814 SPONDYLOSIS OF THORACIC REGION WITHOUT MYELOPATHY OR RADICULOPATHY: ICD-10-CM

## 2020-04-15 DIAGNOSIS — M50.30 DDD (DEGENERATIVE DISC DISEASE), CERVICAL: ICD-10-CM

## 2020-04-15 DIAGNOSIS — M51.36 DDD (DEGENERATIVE DISC DISEASE), LUMBAR: Primary | ICD-10-CM

## 2020-04-15 PROCEDURE — 99214 PR OFFICE/OUTPT VISIT, EST, LEVL IV, 30-39 MIN: ICD-10-PCS | Mod: 95,,, | Performed by: PHYSICIAN ASSISTANT

## 2020-04-15 PROCEDURE — 99214 OFFICE O/P EST MOD 30 MIN: CPT | Mod: 95,,, | Performed by: PHYSICIAN ASSISTANT

## 2020-04-15 NOTE — PROGRESS NOTES
This note was completed with dictation software and grammatical errors may exist.    The patient location is: [facility]  The chief complaint leading to consultation is: back pain  Visit type: Virtual visit with synchronous audio and video  Total time spent with patient: 25 minutes  Each patient to whom he or she provides medical services by telemedicine is:  (1) informed of the relationship between the physician and patient and the respective role of any other health care provider with respect to management of the patient; and (2) notified that he or she may decline to receive medical services by telemedicine and may withdraw from such care at any time.    CC:Back pain, neck pain    HPI: The patient is a 59-year-old woman with history of hypothyroidism, otherwise fairly healthy but a long history of scoliosis causing back pain, self-referred for continued back pain.  She presents in follow-up with multiple pain complaints.  She complains of low back pain greater than right mid back pain greater than neck pain.  She describes bilateral low back pain that radiates the right buttock and throughout her right leg.  This is worse with walking or lying either side at night.  She typically sleeps on her back a pillow under her knees.  She reports relief with hydrocodone and Dr. Coleman increase this to 3 times a day for this month.  However, she states that she has been taking 1/2 tablet 4 times a day currently.  She has also been following with Rheumatology and was started on Plaquenil.  She denies having any weakness or numbness.    Pain intervention history: She has been seeing Dr. Huffman for the last several years and has undergone epidural steroid injections and radiofrequency ablations with good relief.  According to her last pain management physician, she had undergone a right side T3, 4, 5, 6 medial branch radiofrequency ablation on 8/8/14 with good relief.  He had scheduled her for another one but did  not complete this.  As far as medications she has been taking Hydrocodone 7.5/325 one to 2 times a day at most and gabapentin 300 mg at night. She is status post cervical epidural steroid injection on 10/19/15 with 50% relief of her neck pain. She is status post right L4 transforaminal epidural steroid injection on 11/23/15 with 100% relief.   She is status post right T3, 4, 5 and 6 medial branch radio frequency ablation on 2/5/16 with greater than 50% relief.  She is status post right L4 transforaminal epidural sterile injection on 6/14/16 with significant relief lasting only about a month.  She is status post C7-T1 cervical interlaminar epidural steroid injection on 7/14/16 with 30-40% relief.   She is status post L5/S1 interlaminar epidural steroidal injection to the right on 8/12/16 with 100% relief of her right leg pain.  She is status post C7-T1 cervical interlaminar epidural steroid injection on 12/9/16 with almost complete relief.  She is status post right T4, 5, 6 and 7 medial branch radiofrequency ablation on 5/30/17 with 100% relief.  She is status post C7-T1 cervical interlaminar epidural steroid injection on 6/27/17 with 80% relief.  She is status post L5/S1 interlaminar epidural steroid injection on a/7/17 with excellent relief lasting 2 weeks, now reporting 0% relief.  She is status post right L3, 4 and 5 medial branch radiofrequency ablation on 10/19/17 with 80% relief.   She is status post right T4, 5, 6 and 7 medial branch radiofrequency ablation on 12/14/17 with 100% relief.  She is status post C7-T1 cervical interlaminar epidural steroid injection on 3/16/18 with at least 80% relief.  She is status post lumbar epidural steroid injection at L5/S1 on 5/15/18 with 90% relief of her bilateral low back and right leg pain.  The she is status post right T4, 5, 6 and 7 medial branch radiofrequency ablation on 10/12/2018 with 100% relief.  She is status post L5/S1 interlaminar epidural steroid injection  on 2019 with almost 100% relief of her low back pain. She is status post right T4, 5, 6 and 7 medial branch radiofrequency ablation on 2019 with 80% relief.    ROS: She reports fatigability, headaches, hypothyroidism, joint stiffness, back pain, difficulty sleeping and anxiety.  Balance of review of systems is negative.      Medical, surgical, family and social history reviewed elsewhere in record.  She is a  at [employer].    Medications/Allergies: See med card    There were no vitals filed for this visit.      Physical exam:  Gen: A and O x3, pleasant, well-groomed  HEENT: PERRLA  Resp:No increased work of breathing, symmetrical chest rise.  Neuro: Moving extremities appropriately.      Imagin/16/15 Xray Scoliosis survey: There is thoracic dextroscoliosis with mild lumbar compensatory levoscoliosis. No structural abnormalities are evident. Diffuse spondylosis noted in the cervical, thoracic and to lesser extent lumbar spine. No fractures are identified. There is slight increased kyphotic curvature of the thoracic spine with alignment being otherwise normal. Measurements and evaluation are limited due to underpenetration. Land angle measures approximately in the thoracic spine is of approximately 17.0 degrees and for the lumbar spine 17.4 degrees. Soft tissues are unremarkable. IMPRESSION: 1. S shaped scoliotic curvature of the thoracolumbar spine with Land angle of approximately 17 degrees. 2. Diffuse spondylosis.    MRI report 12: Report notes that there is dextroconvex rotary curvature of the thoracic spine.  Posterior alignment is maintained.  There are scattered vertebral body hemangiomas.  There is minimal posterior disc bulges noted at T5/6, T6/7, T7/8 and T8/9.  There is no central spinal stenosis or neural foraminal narrowing.    MRI right shoulder 11: Minor distal subscapularis tendinosis.  Negative for full-thickness or significant partial-thickness rotator cuff  tendon tear.  There is a small amount of subacromial subdeltoid bursal fluid which can be seen with recent injection or mild bursitis.    MRI cervical spine report 3/25/11.  Note that this is comparison to previous study on 7/29/2008.  This is a report only, no images were available to me.  The C2/3 disc appears grossly intact with no significant bulging.  C3/4 there is mild protrusion of the disc with no herniation and no nerve root entrapment seen.  Spinal stenosis is not noted.  At C4/5 there is broad-based disc protrusion which compresses the subarachnoid space without cord compression.  There is no definite nerve root compression seen.  At C5/6 there is vertebral body lesion which is bright on T2 and dark on T1 but it does not enhance and may represent an atypical hemangioma.  This is unchanged from previous study.  At C5/6 there is a protrusion of the disc asymmetrically to the left but no significant entrapment.  No change from previous study.  At C6/7 there is a focal protrusion of the disc causing compression of the subarachnoid space without definite cord or nerve root compression.  C7/T1 appears intact.    Lumbar spine MRI report 3/31/14: No acute abnormality or significant degenerative changes.  No canal or foraminal stenosis.    Lumbar spine MRI 10/19/15  T12-L1: No central canal or neuroforaminal stenosis. No disc protrusion or extrusion.  L1-L2: No central canal or neuroforaminal stenosis. No disc protrusion or extrusion.  L2-L3: No central canal or neuroforaminal stenosis. No disc protrusion or extrusion.  L3-L4: No central canal or neuroforaminal stenosis. No disc protrusion or extrusion.  L4-L5: There is a broad disc bulge which extends into both neuroforamina. There is prominent ligamentum flavum thickening and facet arthropathy. There is mild bilateral neuroforaminal stenosis. No central canal stenosis.  L5-S1: There is bilateral hypertrophic facet arthropathy and ligamentum flavum thickening. No  central canal or neuroforaminal stenosis. No disc protrusion or extrusion.    Cervical spine MRI 6/29/16  At C2-C3, the minimal interstitial excision covers a very mild broad-based disc bulge most prominent centrally but does not deform the ventral cord.  There is no canal or foraminal stenosis.  At C3-C4, there is mildly decreased disc height with a broad-based disc bulge most prominent centrally.  This minimally contact the ventral cord without significant deformity.  The canal is widely patent.  There is uncovertebral hypertrophy and facet arthropathy, but the foramina are patent.  At C4-C5, there is decreased disc height with a broad disc bulge-marginal osteophyte complex that mildly lateralizes to the left and blends imperceptibly with right greater than left uncovertebral hypertrophy.  With ligamentum hypertrophy, there is no significant central canal stenosis.  Uncovertebral hypertrophy and facet arthropathy are causing mild left and moderate right foraminal stenoses.  At C5-C6, there is a broad-based disc bulge and osteophyte complex most prominent centrally.  There is also ligamentum flavum hypertrophy present, but the canal is patent.  There is mild left foraminal stenosis.  The right foramen is patent.  At C6-C7, there is disc desiccation with a minimal broad-based disc bulge most prominent in the right paracentral canal.  The canal is widely patent.  There is mild left foraminal stenosis.  At C7-T1, there is no significant disc bulge, protrusion, or herniation/extrusion.  The canal and foramina are widely patent.    MRI lumbar spine 9/22/17  L1-L2 demonstrates no significant disc protrusion, central spinal canal stenosis, or neural foraminal stenosis.  L2-L3 demonstrates no significant disc protrusion, central spinal canal stenosis, or neural foraminal stenosis. Mild facet arthrosis is seen.  L3-L4 demonstrates no significant disc protrusion, central spinal canal stenosis, or neural foraminal stenosis.  Mild facet arthrosis is seen.  L4-L5 demonstrates mild broad-based disc bulge, ligamentum flavum hypertrophy, and mild/moderate bilateral facet arthrosis. No significant central spinal canal stenosis is seen. Mild bilateral lateral recess narrowing and neural foraminal narrowing is seen. Similar findings were seen on the prior MRI.  L5-S1 demonstrates no significant posterior disc protrusion. Moderate bilateral facet arthrosis is seen along ligamentum flavum hypertrophy. No significant central spinal canal or neural foraminal stenosis is seen.    Hip x-rays 9/22/17  AP view of the pelvis and frog leg views of both hips were obtained. No evidence of acute displaced fracture or dislocation is visualized.  No radiopaque foreign bodies are visualized. The bilateral superior joint spaces are grossly maintained. Mild bilateral sacroiliac joint degenerative changes are seen including subchondral sclerosis and small marginal osteophytes. Mild to moderate symphyseal degenerative changes are seen. There is a slightly expansile cortically-based focus along the lateral proximal right femoral metadiaphysis. This could reflect a sessile osteochondroma, but correlation with MRI of the right hip is recommended.      Assessment:  The patient is a 59-year-old woman with history of hypothyroidism, otherwise fairly healthy but a long history of scoliosis causing back pain, self-referred for continued back pain.    1. DDD (degenerative disc disease), lumbar     2. Lumbar radiculopathy     3. Spondylosis of thoracic region without myelopathy or radiculopathy     4. DDD (degenerative disc disease), cervical     5. Opioid contract exists         Plan:  1.  Dr. Coleman recently prescribed hydrocodone-acetaminophen 7.5/325 up to 3 times a day as needed for pain.  She is currently taking 1/2 tablet 4 times a day.  She will call for her next prescription.  I have reviewed the [place] Board of Pharmacy website and there are no abberancies.     2.  We will add her to a list the patient is to contact once we are scheduling elective procedures again.  Will schedule an L5/S1 interlaminar epidural steroid injection to the right.    Greater than 50% of this 25min visit was spent educating and counseling this patient.

## 2020-04-15 NOTE — TELEPHONE ENCOUNTER
Please add this patient to procedure list to call and schedule L5/S1 interlaminar epidural steroid injection to the right.

## 2020-04-25 ENCOUNTER — PATIENT MESSAGE (OUTPATIENT)
Dept: DERMATOLOGY | Facility: CLINIC | Age: 60
End: 2020-04-25

## 2020-06-30 ENCOUNTER — OFFICE VISIT (OUTPATIENT)
Dept: PAIN MEDICINE | Facility: CLINIC | Age: 60
End: 2020-06-30
Payer: COMMERCIAL

## 2020-06-30 VITALS
RESPIRATION RATE: 17 BRPM | OXYGEN SATURATION: 99 % | BODY MASS INDEX: 33.03 KG/M2 | WEIGHT: 174.94 LBS | HEART RATE: 78 BPM | HEIGHT: 61 IN | DIASTOLIC BLOOD PRESSURE: 71 MMHG | SYSTOLIC BLOOD PRESSURE: 128 MMHG

## 2020-06-30 DIAGNOSIS — M50.30 DDD (DEGENERATIVE DISC DISEASE), CERVICAL: ICD-10-CM

## 2020-06-30 DIAGNOSIS — M47.814 SPONDYLOSIS OF THORACIC REGION WITHOUT MYELOPATHY OR RADICULOPATHY: ICD-10-CM

## 2020-06-30 DIAGNOSIS — M51.36 DDD (DEGENERATIVE DISC DISEASE), LUMBAR: Primary | ICD-10-CM

## 2020-06-30 DIAGNOSIS — Z79.891 OPIOID CONTRACT EXISTS: ICD-10-CM

## 2020-06-30 DIAGNOSIS — M54.16 LUMBAR RADICULOPATHY: ICD-10-CM

## 2020-06-30 PROCEDURE — 99215 OFFICE O/P EST HI 40 MIN: CPT | Mod: S$GLB,,, | Performed by: PHYSICIAN ASSISTANT

## 2020-06-30 PROCEDURE — 99999 PR PBB SHADOW E&M-EST. PATIENT-LVL IV: CPT | Mod: PBBFAC,,, | Performed by: PHYSICIAN ASSISTANT

## 2020-06-30 PROCEDURE — 99215 PR OFFICE/OUTPT VISIT, EST, LEVL V, 40-54 MIN: ICD-10-PCS | Mod: S$GLB,,, | Performed by: PHYSICIAN ASSISTANT

## 2020-06-30 PROCEDURE — 99999 PR PBB SHADOW E&M-EST. PATIENT-LVL IV: ICD-10-PCS | Mod: PBBFAC,,, | Performed by: PHYSICIAN ASSISTANT

## 2020-07-01 RX ORDER — HYDROCODONE BITARTRATE AND ACETAMINOPHEN 7.5; 325 MG/1; MG/1
1 TABLET ORAL 2 TIMES DAILY PRN
Qty: 60 TABLET | Refills: 0 | Status: SHIPPED | OUTPATIENT
Start: 2020-07-31 | End: 2020-07-28 | Stop reason: SDUPTHER

## 2020-07-01 RX ORDER — HYDROCODONE BITARTRATE AND ACETAMINOPHEN 7.5; 325 MG/1; MG/1
1 TABLET ORAL 2 TIMES DAILY PRN
Qty: 60 TABLET | Refills: 0 | Status: SHIPPED | OUTPATIENT
Start: 2020-08-30 | End: 2020-07-28 | Stop reason: SDUPTHER

## 2020-07-01 RX ORDER — HYDROCODONE BITARTRATE AND ACETAMINOPHEN 7.5; 325 MG/1; MG/1
1 TABLET ORAL 2 TIMES DAILY PRN
Qty: 60 TABLET | Refills: 0 | Status: SHIPPED | OUTPATIENT
Start: 2020-07-01 | End: 2020-07-31

## 2020-07-01 NOTE — TELEPHONE ENCOUNTER
Patient seen in clinic.  Please send prescriptions to her pharmacy.    I have reviewed the [place] Board of Pharmacy website and there are no abberancies.

## 2020-07-05 NOTE — PROGRESS NOTES
This note was completed with dictation software and grammatical errors may exist.    CC:Back pain, neck pain    HPI: The patient is a 59-year-old woman with history of hypothyroidism, otherwise fairly healthy but a long history of scoliosis causing back pain, self-referred for continued back pain.  She presents in follow-up today with back pain.  She continues to have her thoracic back pain but most significantly bilateral low back pain with radiation to the right buttock, posterior thigh and calf.  Her pain is typically worse from about 5:00 p.m. on through the evenings.  She does not have as much pain in the earlier part of the day.  She has been taking 1/4 to 1 whole hydrocodone depending on the time a day and how much pain she is in.  She states that 1/4 typically helps during the day but sometimes she will take 1/2.  Also 3/4 typically help at night but sometimes she will take a whole.  She feels that 3/4 of a pain pill will give her 6 and 0.5 hours of sleep at night.  She also reports that she has recently seen Dr. Julio Grey who is a cardiologist but also treats dysautonomia.  She states that she has been diagnosed with this.  She states that she has been put on supplements including Cardiamins, vitassium, magnesium, midodrine and increased her vitamin D. she states that she does not have any cardiac disease.  She denies any new weakness or numbness, no bladder or bowel incontinence.    She returns in follow-up today for chronic neck and back pain. She states that in terms of her neck and low back, these are actually doing very well and her pain level has been stable, no worsening.  She has some slight increasing pain in her upper right thoracic region where we have done radiofrequency ablation procedures in the past, however she states that this is fairly manageable right now.  She reports that she has had better improvement in her symptoms with the change from 300 milligrams of gabapentin at night up to  400 milligrams.  She states that she actually does very well through the evening and into the morning and feels that her pain starts to come back around 10:00 a.m..  She denies any major drowsiness or dizziness with this.    Her other big complaint today is 2 separate pains in her right leg.  She has 1 pain in her right anterior thigh over the quadriceps muscles confined to 1 4 inch area.  There is not necessarily tenderness with palpation there, states that it comes and goes, she does not state that it is more painful when sitting down, lying down or with walking, it can happen in the middle of the night and cause pain. Her other pain is in her right dorsum of her foot at the 2nd through 4th metatarsal region.  She states that this is painful to walk on, worse with flexion and extension of her toes.  There is some pain with palpation, she denies any numbness or tingling with this.    Pain intervention history: She has been seeing Dr. Huffman for the last several years and has undergone epidural steroid injections and radiofrequency ablations with good relief.  According to her last pain management physician, she had undergone a right side T3, 4, 5, 6 medial branch radiofrequency ablation on 8/8/14 with good relief.  He had scheduled her for another one but did not complete this.  As far as medications she has been taking Hydrocodone 7.5/325 one to 2 times a day at most and gabapentin 300 mg at night. She is status post cervical epidural steroid injection on 10/19/15 with 50% relief of her neck pain. She is status post right L4 transforaminal epidural steroid injection on 11/23/15 with 100% relief.   She is status post right T3, 4, 5 and 6 medial branch radio frequency ablation on 2/5/16 with greater than 50% relief.  She is status post right L4 transforaminal epidural sterile injection on 6/14/16 with significant relief lasting only about a month.  She is status post C7-T1 cervical interlaminar epidural steroid  "injection on 7/14/16 with 30-40% relief.   She is status post L5/S1 interlaminar epidural steroidal injection to the right on 8/12/16 with 100% relief of her right leg pain.  She is status post C7-T1 cervical interlaminar epidural steroid injection on 12/9/16 with almost complete relief.  She is status post right T4, 5, 6 and 7 medial branch radiofrequency ablation on 5/30/17 with 100% relief.  She is status post C7-T1 cervical interlaminar epidural steroid injection on 6/27/17 with 80% relief.  She is status post L5/S1 interlaminar epidural steroid injection on a/7/17 with excellent relief lasting 2 weeks, now reporting 0% relief.  She is status post right L3, 4 and 5 medial branch radiofrequency ablation on 10/19/17 with 80% relief.   She is status post right T4, 5, 6 and 7 medial branch radiofrequency ablation on 12/14/17 with 100% relief.  She is status post C7-T1 cervical interlaminar epidural steroid injection on 3/16/18 with at least 80% relief.  She is status post lumbar epidural steroid injection at L5/S1 on 5/15/18 with 90% relief of her bilateral low back and right leg pain.  The she is status post right T4, 5, 6 and 7 medial branch radiofrequency ablation on 10/12/2018 with 100% relief.  She is status post L5/S1 interlaminar epidural steroid injection on 01/16/2019 with almost 100% relief of her low back pain. She is status post right T4, 5, 6 and 7 medial branch radiofrequency ablation on 06/04/2019 with 80% relief.    ROS: She reports fatigability, headaches, hypothyroidism, joint stiffness, back pain, difficulty sleeping and anxiety.  Balance of review of systems is negative.      Medical, surgical, family and social history reviewed elsewhere in record.  She is a  at [employer].    Medications/Allergies: See med card    Vitals:    06/30/20 1421 06/30/20 1423   BP:  128/71   Pulse:  78   Resp:  17   SpO2:  99%   Weight:  79.3 kg (174 lb 15 oz)   Height:  5' 1" (1.549 m)   PainSc:   3   " 3   PainLoc: Back          Physical exam:  Gen: A and O x3, pleasant, well-groomed  Skin: No rashes or obvious lesions  HEENT: PERRLA, no obvious deformities on ears or in canals. Trachea midline.  CVS: Regular rate and rhythm, normal palpable pulses.  Resp:No increased work of breathing, symmetrical chest rise.  Abdomen: Soft, NT/ND.  Musculoskeletal:No antalgic gait.     Neuro:  Upper extremities: 5/5 strength bilaterally.  Lower extremities: 5/5 strength bilaterally.    Reflexes: Patellar 0+, Achilles 0+ bilaterally.  Upper extremity reflexes 2+ bilaterally equal.  Sensory:  Intact and symmetrical to light touch and pinprick in L2-S1 dermatomes bilaterally.    Cervical spine exam: Range of motion is full with flexion, extension and lateral rotation without pain.  Myofascial exam: No tenderness to palpation to the cervical paraspinous muscles.  Mild tenderness to palpation to the mid thoracic paraspinous muscles.    Lumbar spine:  Lumbar spine: Range of motion is full with flexion and extension with mild increased right low back pain.  Brayden's test causes no increased pain on either side.    Supine straight leg raise causes right low back and buttock pain.  Internal and external rotation of the hip causes no increased pain on either side.  Myofascial exam:  Mild tenderness to palpation to the right lumbar paraspinous muscles.      Imagin/16/15 Xray Scoliosis survey: There is thoracic dextroscoliosis with mild lumbar compensatory levoscoliosis. No structural abnormalities are evident. Diffuse spondylosis noted in the cervical, thoracic and to lesser extent lumbar spine. No fractures are identified. There is slight increased kyphotic curvature of the thoracic spine with alignment being otherwise normal. Measurements and evaluation are limited due to underpenetration. Land angle measures approximately in the thoracic spine is of approximately 17.0 degrees and for the lumbar spine 17.4 degrees. Soft tissues are  unremarkable. IMPRESSION: 1. S shaped scoliotic curvature of the thoracolumbar spine with Land angle of approximately 17 degrees. 2. Diffuse spondylosis.    MRI report 4/12/12: Report notes that there is dextroconvex rotary curvature of the thoracic spine.  Posterior alignment is maintained.  There are scattered vertebral body hemangiomas.  There is minimal posterior disc bulges noted at T5/6, T6/7, T7/8 and T8/9.  There is no central spinal stenosis or neural foraminal narrowing.    MRI right shoulder 6/24/11: Minor distal subscapularis tendinosis.  Negative for full-thickness or significant partial-thickness rotator cuff tendon tear.  There is a small amount of subacromial subdeltoid bursal fluid which can be seen with recent injection or mild bursitis.    MRI cervical spine report 3/25/11.  Note that this is comparison to previous study on 7/29/2008.  This is a report only, no images were available to me.  The C2/3 disc appears grossly intact with no significant bulging.  C3/4 there is mild protrusion of the disc with no herniation and no nerve root entrapment seen.  Spinal stenosis is not noted.  At C4/5 there is broad-based disc protrusion which compresses the subarachnoid space without cord compression.  There is no definite nerve root compression seen.  At C5/6 there is vertebral body lesion which is bright on T2 and dark on T1 but it does not enhance and may represent an atypical hemangioma.  This is unchanged from previous study.  At C5/6 there is a protrusion of the disc asymmetrically to the left but no significant entrapment.  No change from previous study.  At C6/7 there is a focal protrusion of the disc causing compression of the subarachnoid space without definite cord or nerve root compression.  C7/T1 appears intact.    Lumbar spine MRI report 3/31/14: No acute abnormality or significant degenerative changes.  No canal or foraminal stenosis.    Lumbar spine MRI 10/19/15  T12-L1: No central canal or  neuroforaminal stenosis. No disc protrusion or extrusion.  L1-L2: No central canal or neuroforaminal stenosis. No disc protrusion or extrusion.  L2-L3: No central canal or neuroforaminal stenosis. No disc protrusion or extrusion.  L3-L4: No central canal or neuroforaminal stenosis. No disc protrusion or extrusion.  L4-L5: There is a broad disc bulge which extends into both neuroforamina. There is prominent ligamentum flavum thickening and facet arthropathy. There is mild bilateral neuroforaminal stenosis. No central canal stenosis.  L5-S1: There is bilateral hypertrophic facet arthropathy and ligamentum flavum thickening. No central canal or neuroforaminal stenosis. No disc protrusion or extrusion.    Cervical spine MRI 6/29/16  At C2-C3, the minimal interstitial excision covers a very mild broad-based disc bulge most prominent centrally but does not deform the ventral cord.  There is no canal or foraminal stenosis.  At C3-C4, there is mildly decreased disc height with a broad-based disc bulge most prominent centrally.  This minimally contact the ventral cord without significant deformity.  The canal is widely patent.  There is uncovertebral hypertrophy and facet arthropathy, but the foramina are patent.  At C4-C5, there is decreased disc height with a broad disc bulge-marginal osteophyte complex that mildly lateralizes to the left and blends imperceptibly with right greater than left uncovertebral hypertrophy.  With ligamentum hypertrophy, there is no significant central canal stenosis.  Uncovertebral hypertrophy and facet arthropathy are causing mild left and moderate right foraminal stenoses.  At C5-C6, there is a broad-based disc bulge and osteophyte complex most prominent centrally.  There is also ligamentum flavum hypertrophy present, but the canal is patent.  There is mild left foraminal stenosis.  The right foramen is patent.  At C6-C7, there is disc desiccation with a minimal broad-based disc bulge most  prominent in the right paracentral canal.  The canal is widely patent.  There is mild left foraminal stenosis.  At C7-T1, there is no significant disc bulge, protrusion, or herniation/extrusion.  The canal and foramina are widely patent.    MRI lumbar spine 9/22/17  L1-L2 demonstrates no significant disc protrusion, central spinal canal stenosis, or neural foraminal stenosis.  L2-L3 demonstrates no significant disc protrusion, central spinal canal stenosis, or neural foraminal stenosis. Mild facet arthrosis is seen.  L3-L4 demonstrates no significant disc protrusion, central spinal canal stenosis, or neural foraminal stenosis. Mild facet arthrosis is seen.  L4-L5 demonstrates mild broad-based disc bulge, ligamentum flavum hypertrophy, and mild/moderate bilateral facet arthrosis. No significant central spinal canal stenosis is seen. Mild bilateral lateral recess narrowing and neural foraminal narrowing is seen. Similar findings were seen on the prior MRI.  L5-S1 demonstrates no significant posterior disc protrusion. Moderate bilateral facet arthrosis is seen along ligamentum flavum hypertrophy. No significant central spinal canal or neural foraminal stenosis is seen.    Hip x-rays 9/22/17  AP view of the pelvis and frog leg views of both hips were obtained. No evidence of acute displaced fracture or dislocation is visualized.  No radiopaque foreign bodies are visualized. The bilateral superior joint spaces are grossly maintained. Mild bilateral sacroiliac joint degenerative changes are seen including subchondral sclerosis and small marginal osteophytes. Mild to moderate symphyseal degenerative changes are seen. There is a slightly expansile cortically-based focus along the lateral proximal right femoral metadiaphysis. This could reflect a sessile osteochondroma, but correlation with MRI of the right hip is recommended.      Assessment:  The patient is a 59-year-old woman with history of hypothyroidism, otherwise  fairly healthy but a long history of scoliosis causing back pain, self-referred for continued back pain.    1. DDD (degenerative disc disease), lumbar     2. Lumbar radiculopathy     3. Spondylosis of thoracic region without myelopathy or radiculopathy     4. DDD (degenerative disc disease), cervical     5. Opioid contract exists         Plan:  1.  Dr. Coleman provided prescriptions for hydrocodone-acetaminophen 7.5/325 milligrams up to 2 times a day as needed for pain.  I have reviewed the [place] Board of Pharmacy website and there are no abberancies.    2.  I had her sign a release for the records from Dr. Grey.  3.  If her low back and right leg pain worsens she will call to repeat a lumbar JINA.  4.  Follow-up in 3 months or sooner as needed.    Greater than 50% of this 40 minutes visit was spent counseling the patient.

## 2020-08-14 DIAGNOSIS — Z79.890 HORMONE REPLACEMENT THERAPY (HRT): ICD-10-CM

## 2020-08-14 RX ORDER — ESTRADIOL 0.1 MG/D
1 FILM, EXTENDED RELEASE TRANSDERMAL
Qty: 8 PATCH | Refills: 1 | Status: SHIPPED | OUTPATIENT
Start: 2020-08-17 | End: 2020-11-06

## 2020-09-08 ENCOUNTER — PATIENT MESSAGE (OUTPATIENT)
Dept: RHEUMATOLOGY | Facility: CLINIC | Age: 60
End: 2020-09-08

## 2020-09-09 NOTE — TELEPHONE ENCOUNTER
Attempted to contact patient regarding sooner appointment. Left a message that appointment has been moved from October to September 22nd at 330 pm. Advised to contact office with any questions.

## 2020-09-09 NOTE — TELEPHONE ENCOUNTER
Does Dr. Garcia have any availability sooner for a f/u visit? She is not scheduled to see her until October.

## 2020-09-22 ENCOUNTER — PATIENT MESSAGE (OUTPATIENT)
Dept: RHEUMATOLOGY | Facility: CLINIC | Age: 60
End: 2020-09-22

## 2020-09-22 ENCOUNTER — OFFICE VISIT (OUTPATIENT)
Dept: RHEUMATOLOGY | Facility: CLINIC | Age: 60
End: 2020-09-22
Payer: COMMERCIAL

## 2020-09-22 DIAGNOSIS — K90.41 NON-CELIAC GLUTEN SENSITIVITY: Primary | ICD-10-CM

## 2020-09-22 DIAGNOSIS — M02.30 REACTIVE ARTHRITIS: ICD-10-CM

## 2020-09-22 DIAGNOSIS — M13.80 MIGRATORY POLYARTHRITIS: ICD-10-CM

## 2020-09-22 DIAGNOSIS — M35.00 SICCA SYNDROME: ICD-10-CM

## 2020-09-22 DIAGNOSIS — R23.1 LIVEDO RETICULARIS: ICD-10-CM

## 2020-09-22 DIAGNOSIS — M12.30 PALINDROMIC RHEUMATISM: ICD-10-CM

## 2020-09-22 DIAGNOSIS — E03.0 CONGENITAL HYPOTHYROIDISM WITH DIFFUSE GOITER: ICD-10-CM

## 2020-09-22 DIAGNOSIS — G90.1 DYSAUTONOMIA: ICD-10-CM

## 2020-09-22 DIAGNOSIS — E06.3 HASHIMOTO'S THYROIDITIS: ICD-10-CM

## 2020-09-22 PROCEDURE — 99215 PR OFFICE/OUTPT VISIT, EST, LEVL V, 40-54 MIN: ICD-10-PCS | Mod: 95,,, | Performed by: INTERNAL MEDICINE

## 2020-09-22 PROCEDURE — 99215 OFFICE O/P EST HI 40 MIN: CPT | Mod: 95,,, | Performed by: INTERNAL MEDICINE

## 2020-09-22 RX ORDER — LEVOTHYROXINE SODIUM 125 UG/1
125 CAPSULE ORAL DAILY
Qty: 30 CAPSULE | Refills: 5 | Status: SHIPPED | OUTPATIENT
Start: 2020-09-22 | End: 2021-04-04

## 2020-09-22 NOTE — PROGRESS NOTES
Subjective:       Patient ID: Vonnie Garces is a 59 y.o. female.    Chief Complaint: No chief complaint on file.   59 year old female  She has history of reactive arthritis, NCGS, hashimoto's thyroiditis non celiac gluten sensitivity, currently on duexis and occasional use of prednisone for flares. She had a rash that has been on going x 1 yr, spread to her chest, neck, back, and legs. Rash is somewhat pruritic, and burning in nature. She has tried OTC benadryl, tagamet, prednisone 5 mg, and topical benadryl and hydrocortisone with no relief.  She is following her gluten free diet. She c She complains of generalized fatigue and fogginess.       Review of Systems   Constitutional: Negative for activity change and appetite change.   Eyes: Negative for visual disturbance.   Respiratory: Negative for shortness of breath.    Cardiovascular: Negative for palpitations and leg swelling.   Gastrointestinal: Negative for constipation and diarrhea.   Musculoskeletal: Positive for back pain, joint swelling, neck pain and neck stiffness. Negative for gait problem.   Skin: Positive for rash.   Neurological: Negative for dizziness and light-headedness.   Psychiatric/Behavioral: The patient is nervous/anxious.          Objective:     There were no vitals filed for this visit.         Physical Exam   Constitutional: She is oriented to person, place, and time and well-developed, well-nourished, and in no distress.   Neurological: She is alert and oriented to person, place, and time.   Psychiatric: Mood, affect and judgment normal.         Results for orders placed or performed in visit on 07/29/20   COVID-19 (SARS-CoV-2) Total Antibodies   Result Value Ref Range    COVID-19 (SARS-CoV-2) Total Antibodies Non-reactive Non-reactive           Assessment:       1. Non-celiac gluten sensitivity    2. Migratory polyarthritis    3. Hashimoto's thyroiditis    4. Livedo reticularis    5. Reactive arthritis    6. Dysautonomia    7.  Sicca syndrome    8. Palindromic rheumatism    9. Congenital hypothyroidism with diffuse goiter            Plan:       Non-celiac gluten sensitivity  -     TSH; Future; Expected date: 09/22/2020  -     Thyroid Peroxidase Antibody; Future; Expected date: 09/22/2020  -     T4, free; Future; Expected date: 09/22/2020  -     T3, free; Future; Expected date: 09/22/2020    Migratory polyarthritis  -     TSH; Future; Expected date: 09/22/2020  -     Thyroid Peroxidase Antibody; Future; Expected date: 09/22/2020  -     T4, free; Future; Expected date: 09/22/2020  -     T3, free; Future; Expected date: 09/22/2020    Hashimoto's thyroiditis  -     TSH; Future; Expected date: 09/22/2020  -     Thyroid Peroxidase Antibody; Future; Expected date: 09/22/2020  -     T4, free; Future; Expected date: 09/22/2020  -     T3, free; Future; Expected date: 09/22/2020    Livedo reticularis  -     TSH; Future; Expected date: 09/22/2020  -     Thyroid Peroxidase Antibody; Future; Expected date: 09/22/2020  -     T4, free; Future; Expected date: 09/22/2020  -     T3, free; Future; Expected date: 09/22/2020    Reactive arthritis  -     TSH; Future; Expected date: 09/22/2020  -     Thyroid Peroxidase Antibody; Future; Expected date: 09/22/2020  -     T4, free; Future; Expected date: 09/22/2020  -     T3, free; Future; Expected date: 09/22/2020    Dysautonomia  -     TSH; Future; Expected date: 09/22/2020  -     Thyroid Peroxidase Antibody; Future; Expected date: 09/22/2020  -     T4, free; Future; Expected date: 09/22/2020  -     T3, free; Future; Expected date: 09/22/2020    Sicca syndrome  -     TSH; Future; Expected date: 09/22/2020  -     Thyroid Peroxidase Antibody; Future; Expected date: 09/22/2020  -     T4, free; Future; Expected date: 09/22/2020  -     T3, free; Future; Expected date: 09/22/2020    Palindromic rheumatism  -     TSH; Future; Expected date: 09/22/2020  -     Thyroid Peroxidase Antibody; Future; Expected date:  09/22/2020  -     T4, free; Future; Expected date: 09/22/2020  -     T3, free; Future; Expected date: 09/22/2020    Congenital hypothyroidism with diffuse goiter  -     levothyroxine (TIROSINT) 125 mcg Cap; Take 125 mcg by mouth once daily. Brand name  Dispense: 30 capsule; Refill: 5  -     TSH; Future; Expected date: 09/22/2020  -     Thyroid Peroxidase Antibody; Future; Expected date: 09/22/2020  -     T4, free; Future; Expected date: 09/22/2020  -     T3, free; Future; Expected date: 09/22/2020          Starting plaquenil 1 tab a day    Patient is aware that there is a COVID B-19 pandemic and that the immunosuppressants being taken to treat the arthritis but can increased the  risk for infection/Viruses.  It is understood to hold all DMARD with the exception of Plaquenil,  if having fever chills, cough, shortness of breath loss of sense of smell and or myalgias.  It is understood to call the office as well as call their primary care physician and observe  social isolation      The patient location is: home  The chief complaint leading to consultation is:  Reactive arthritis  Visit type: audiovisual  Total time spent with patient:50 min  Each patient to whom he or she provides medical services by telemedicine is:  (1) informed of the relationship between the physician and patient and the respective role of any other health care provider with respect to management of the patient; and (2) notified that he or she may decline to receive medical services by telemedicine and may withdraw from such care at any time.

## 2020-09-27 ENCOUNTER — PATIENT MESSAGE (OUTPATIENT)
Dept: RESEARCH | Facility: OTHER | Age: 60
End: 2020-09-27

## 2020-09-28 ENCOUNTER — PATIENT MESSAGE (OUTPATIENT)
Dept: PAIN MEDICINE | Facility: CLINIC | Age: 60
End: 2020-09-28

## 2020-10-01 ENCOUNTER — TELEPHONE (OUTPATIENT)
Dept: RHEUMATOLOGY | Facility: CLINIC | Age: 60
End: 2020-10-01

## 2020-10-01 ENCOUNTER — OFFICE VISIT (OUTPATIENT)
Dept: PAIN MEDICINE | Facility: CLINIC | Age: 60
End: 2020-10-01
Payer: COMMERCIAL

## 2020-10-01 DIAGNOSIS — M50.30 DDD (DEGENERATIVE DISC DISEASE), CERVICAL: ICD-10-CM

## 2020-10-01 DIAGNOSIS — M54.16 LUMBAR RADICULOPATHY: ICD-10-CM

## 2020-10-01 DIAGNOSIS — M41.9 SCOLIOSIS OF LUMBAR SPINE, UNSPECIFIED SCOLIOSIS TYPE: ICD-10-CM

## 2020-10-01 DIAGNOSIS — M41.9 SCOLIOSIS OF THORACIC SPINE, UNSPECIFIED SCOLIOSIS TYPE: ICD-10-CM

## 2020-10-01 DIAGNOSIS — M51.36 DDD (DEGENERATIVE DISC DISEASE), LUMBAR: ICD-10-CM

## 2020-10-01 DIAGNOSIS — M47.816 LUMBAR SPONDYLOSIS: ICD-10-CM

## 2020-10-01 DIAGNOSIS — M47.814 SPONDYLOSIS OF THORACIC REGION WITHOUT MYELOPATHY OR RADICULOPATHY: Primary | ICD-10-CM

## 2020-10-01 PROCEDURE — 99213 OFFICE O/P EST LOW 20 MIN: CPT | Mod: 95,,, | Performed by: ANESTHESIOLOGY

## 2020-10-01 PROCEDURE — 99213 PR OFFICE/OUTPT VISIT, EST, LEVL III, 20-29 MIN: ICD-10-PCS | Mod: 95,,, | Performed by: ANESTHESIOLOGY

## 2020-10-01 RX ORDER — HYDROCODONE BITARTRATE AND ACETAMINOPHEN 7.5; 325 MG/1; MG/1
1 TABLET ORAL EVERY 12 HOURS PRN
Qty: 60 TABLET | Refills: 0 | Status: SHIPPED | OUTPATIENT
Start: 2020-10-08 | End: 2020-11-07

## 2020-10-01 RX ORDER — HYDROCODONE BITARTRATE AND ACETAMINOPHEN 7.5; 325 MG/1; MG/1
1 TABLET ORAL EVERY 12 HOURS PRN
Qty: 60 TABLET | Refills: 0 | Status: SHIPPED | OUTPATIENT
Start: 2020-12-07 | End: 2021-01-06

## 2020-10-01 RX ORDER — HYDROCODONE BITARTRATE AND ACETAMINOPHEN 7.5; 325 MG/1; MG/1
1 TABLET ORAL EVERY 12 HOURS PRN
Qty: 60 TABLET | Refills: 0 | Status: SHIPPED | OUTPATIENT
Start: 2020-11-07 | End: 2020-12-07

## 2020-10-01 NOTE — PROGRESS NOTES
This note was completed with dictation software and grammatical errors may exist.    The patient location is: [place], [place]  The chief complaint leading to consultation is: Neck, back pain    Visit type: audiovisual    Face to Face time with patient: 12 minutes  18 minutes of total time spent on the encounter, which includes face to face time and non-face to face time preparing to see the patient (eg, review of tests), Obtaining and/or reviewing separately obtained history, Documenting clinical information in the electronic or other health record, Independently interpreting results (not separately reported) and communicating results to the patient/family/caregiver, or Care coordination (not separately reported).         Each patient to whom he or she provides medical services by telemedicine is:  (1) informed of the relationship between the physician and patient and the respective role of any other health care provider with respect to management of the patient; and (2) notified that he or she may decline to receive medical services by telemedicine and may withdraw from such care at any time.          CC:Back pain, neck pain    HPI: The patient is a 59-year-old woman with history of hypothyroidism, otherwise fairly healthy but a long history of scoliosis causing back pain, self-referred for continued back pain.  She presents in follow-up today with back pain.  She returns in follow-up today for regularly scheduled visit.  She states that she has been having some increased pain in the midback and low back but overall, no severe worsening but definitely feels that some of the last injections and radiofrequency ablations have worn off.  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  She has wanted to come back in for procedures but she has a grand baby being born in the next  several weeks and has tried to avoid getting out because of fear of COVID.  She denies any new weakness or numbness, no bowel or bladder incontinence.  She continues to take hydrocodone usually never a whole tablet at a time, generally just 1/2 tablet as needed      Pain intervention history: She has been seeing Dr. Huffman for the last several years and has undergone epidural steroid injections and radiofrequency ablations with good relief.  According to her last pain management physician, she had undergone a right side T3, 4, 5, 6 medial branch radiofrequency ablation on 8/8/14 with good relief.  He had scheduled her for another one but did not complete this.  As far as medications she has been taking Hydrocodone 7.5/325 one to 2 times a day at most and gabapentin 300 mg at night. She is status post cervical epidural steroid injection on 10/19/15 with 50% relief of her neck pain. She is status post right L4 transforaminal epidural steroid injection on 11/23/15 with 100% relief.   She is status post right T3, 4, 5 and 6 medial branch radio frequency ablation on 2/5/16 with greater than 50% relief.  She is status post right L4 transforaminal epidural sterile injection on 6/14/16 with significant relief lasting only about a month.  She is status post C7-T1 cervical interlaminar epidural steroid injection on 7/14/16 with 30-40% relief.   She is status post L5/S1 interlaminar epidural steroidal injection to the right on 8/12/16 with 100% relief of her right leg pain.  She is status post C7-T1 cervical interlaminar epidural steroid injection on 12/9/16 with almost complete relief.  She is status post right T4, 5, 6 and 7 medial branch radiofrequency ablation on 5/30/17 with 100% relief.  She is status post C7-T1 cervical interlaminar epidural steroid injection on 6/27/17 with 80% relief.  She is status post L5/S1 interlaminar epidural steroid injection on a/7/17 with excellent relief lasting 2 weeks, now reporting 0%  relief.  She is status post right L3, 4 and 5 medial branch radiofrequency ablation on 10/19/17 with 80% relief.   She is status post right T4, 5, 6 and 7 medial branch radiofrequency ablation on 17 with 100% relief.  She is status post C7-T1 cervical interlaminar epidural steroid injection on 3/16/18 with at least 80% relief.  She is status post lumbar epidural steroid injection at L5/S1 on 5/15/18 with 90% relief of her bilateral low back and right leg pain.  The she is status post right T4, 5, 6 and 7 medial branch radiofrequency ablation on 10/12/2018 with 100% relief.  She is status post L5/S1 interlaminar epidural steroid injection on 2019 with almost 100% relief of her low back pain. She is status post right T4, 5, 6 and 7 medial branch radiofrequency ablation on 2019 with 80% relief.    ROS: She reports fatigability, headaches, hypothyroidism, joint stiffness, back pain, difficulty sleeping and anxiety.  Balance of review of systems is negative.      Medical, surgical, family and social history reviewed elsewhere in record.  She is a  at [employer].    Medications/Allergies: See med card    There were no vitals filed for this visit.      Physical exam:  Gen: A and O x3, pleasant, well-groomed, moving all extremities equally      Imagin/16/15 Xray Scoliosis survey: There is thoracic dextroscoliosis with mild lumbar compensatory levoscoliosis. No structural abnormalities are evident. Diffuse spondylosis noted in the cervical, thoracic and to lesser extent lumbar spine. No fractures are identified. There is slight increased kyphotic curvature of the thoracic spine with alignment being otherwise normal. Measurements and evaluation are limited due to underpenetration. Land angle measures approximately in the thoracic spine is of approximately 17.0 degrees and for the lumbar spine 17.4 degrees. Soft tissues are unremarkable. IMPRESSION: 1. S shaped scoliotic curvature of the  thoracolumbar spine with Land angle of approximately 17 degrees. 2. Diffuse spondylosis.    MRI report 4/12/12: Report notes that there is dextroconvex rotary curvature of the thoracic spine.  Posterior alignment is maintained.  There are scattered vertebral body hemangiomas.  There is minimal posterior disc bulges noted at T5/6, T6/7, T7/8 and T8/9.  There is no central spinal stenosis or neural foraminal narrowing.    MRI right shoulder 6/24/11: Minor distal subscapularis tendinosis.  Negative for full-thickness or significant partial-thickness rotator cuff tendon tear.  There is a small amount of subacromial subdeltoid bursal fluid which can be seen with recent injection or mild bursitis.    MRI cervical spine report 3/25/11.  Note that this is comparison to previous study on 7/29/2008.  This is a report only, no images were available to me.  The C2/3 disc appears grossly intact with no significant bulging.  C3/4 there is mild protrusion of the disc with no herniation and no nerve root entrapment seen.  Spinal stenosis is not noted.  At C4/5 there is broad-based disc protrusion which compresses the subarachnoid space without cord compression.  There is no definite nerve root compression seen.  At C5/6 there is vertebral body lesion which is bright on T2 and dark on T1 but it does not enhance and may represent an atypical hemangioma.  This is unchanged from previous study.  At C5/6 there is a protrusion of the disc asymmetrically to the left but no significant entrapment.  No change from previous study.  At C6/7 there is a focal protrusion of the disc causing compression of the subarachnoid space without definite cord or nerve root compression.  C7/T1 appears intact.    Lumbar spine MRI report 3/31/14: No acute abnormality or significant degenerative changes.  No canal or foraminal stenosis.    Lumbar spine MRI 10/19/15  T12-L1: No central canal or neuroforaminal stenosis. No disc protrusion or extrusion.  L1-L2:  No central canal or neuroforaminal stenosis. No disc protrusion or extrusion.  L2-L3: No central canal or neuroforaminal stenosis. No disc protrusion or extrusion.  L3-L4: No central canal or neuroforaminal stenosis. No disc protrusion or extrusion.  L4-L5: There is a broad disc bulge which extends into both neuroforamina. There is prominent ligamentum flavum thickening and facet arthropathy. There is mild bilateral neuroforaminal stenosis. No central canal stenosis.  L5-S1: There is bilateral hypertrophic facet arthropathy and ligamentum flavum thickening. No central canal or neuroforaminal stenosis. No disc protrusion or extrusion.    Cervical spine MRI 6/29/16  At C2-C3, the minimal interstitial excision covers a very mild broad-based disc bulge most prominent centrally but does not deform the ventral cord.  There is no canal or foraminal stenosis.  At C3-C4, there is mildly decreased disc height with a broad-based disc bulge most prominent centrally.  This minimally contact the ventral cord without significant deformity.  The canal is widely patent.  There is uncovertebral hypertrophy and facet arthropathy, but the foramina are patent.  At C4-C5, there is decreased disc height with a broad disc bulge-marginal osteophyte complex that mildly lateralizes to the left and blends imperceptibly with right greater than left uncovertebral hypertrophy.  With ligamentum hypertrophy, there is no significant central canal stenosis.  Uncovertebral hypertrophy and facet arthropathy are causing mild left and moderate right foraminal stenoses.  At C5-C6, there is a broad-based disc bulge and osteophyte complex most prominent centrally.  There is also ligamentum flavum hypertrophy present, but the canal is patent.  There is mild left foraminal stenosis.  The right foramen is patent.  At C6-C7, there is disc desiccation with a minimal broad-based disc bulge most prominent in the right paracentral canal.  The canal is widely  patent.  There is mild left foraminal stenosis.  At C7-T1, there is no significant disc bulge, protrusion, or herniation/extrusion.  The canal and foramina are widely patent.    Hip x-rays 9/22/17  AP view of the pelvis and frog leg views of both hips were obtained. No evidence of acute displaced fracture or dislocation is visualized.  No radiopaque foreign bodies are visualized. The bilateral superior joint spaces are grossly maintained. Mild bilateral sacroiliac joint degenerative changes are seen including subchondral sclerosis and small marginal osteophytes. Mild to moderate symphyseal degenerative changes are seen. There is a slightly expansile cortically-based focus along the lateral proximal right femoral metadiaphysis. This could reflect a sessile osteochondroma, but correlation with MRI of the right hip is recommended.    8/18/19 MRI L-spine:  T12-L1: No disc herniation or significant posterior osteophytic ridging.  No significant spinal canal or foraminal stenosis.   L1-L2: No disc herniation or significant posterior osteophytic ridging.  Minimal left facet hypertrophy.  No significant spinal canal or foraminal stenosis.   L2-L3: No disc herniation or significant posterior osteophytic ridging.  Minimal left facet hypertrophy.  No significant spinal canal or foraminal stenosis.   L3-L4: No disc herniation or significant posterior osteophytic ridging.  Minimal bilateral facet hypertrophy.  No significant spinal canal or foraminal stenosis.   L4-L5: Mild disc bulge.  Mild bilateral facet hypertrophy.  Mild ligamentum flavum thickening.  Minimal narrowing of the bilateral lateral recesses. No significant overall spinal canal stenosis. Mild bilateral foraminal stenosis.   L5-S1: Minimal disc bulge contained in the ventral epidural fat.  Mild bilateral facet hypertrophy.  No significant spinal canal or foraminal stenosis.    Assessment:  The patient is a 59-year-old woman with history of hypothyroidism,  otherwise fairly healthy but a long history of scoliosis causing back pain, self-referred for continued back pain.    1. Spondylosis of thoracic region without myelopathy or radiculopathy     2. DDD (degenerative disc disease), cervical     3. DDD (degenerative disc disease), lumbar     4. Lumbar radiculopathy     5. Lumbar spondylosis     6. Scoliosis of lumbar spine, unspecified scoliosis type     7. Scoliosis of thoracic spine, unspecified scoliosis type         Plan:  1.  She seems to be managing fairly well at this point but she would like to come in and repeat injections and radiofrequency ablation in the future because she feels that this has definitely helped and usually helps for up to 6 months at a time sometimes longer.  She is going to contact me in a month or 2 when she would like to return and she will let us know what procedure she would like to schedule.  2.  In the meantime I have refilled her hydrocodone, takes a total of up to 2 tablets a day but sometimes less than this, has allowed prescriptions to last more than 30 days at a time.  I have sent all 3 scripts to [facility]. [place] Board of Pharmacy website checked and no aberrant patterns noted.

## 2020-10-22 ENCOUNTER — OFFICE VISIT (OUTPATIENT)
Dept: OPTOMETRY | Facility: CLINIC | Age: 60
End: 2020-10-22
Payer: COMMERCIAL

## 2020-10-22 DIAGNOSIS — H04.123 BILATERAL DRY EYES: Primary | ICD-10-CM

## 2020-10-22 PROCEDURE — 92002 PR EYE EXAM, NEW PATIENT,INTERMED: ICD-10-PCS | Mod: S$GLB,,, | Performed by: OPTOMETRIST

## 2020-10-22 PROCEDURE — 99999 PR PBB SHADOW E&M-EST. PATIENT-LVL III: ICD-10-PCS | Mod: PBBFAC,,, | Performed by: OPTOMETRIST

## 2020-10-22 PROCEDURE — 92002 INTRM OPH EXAM NEW PATIENT: CPT | Mod: S$GLB,,, | Performed by: OPTOMETRIST

## 2020-10-22 PROCEDURE — 99999 PR PBB SHADOW E&M-EST. PATIENT-LVL III: CPT | Mod: PBBFAC,,, | Performed by: OPTOMETRIST

## 2020-10-22 RX ORDER — LIDOCAINE AND PRILOCAINE 25; 25 MG/G; MG/G
CREAM TOPICAL
COMMUNITY
Start: 2020-07-28

## 2020-10-22 RX ORDER — CYCLOSPORINE 0.5 MG/ML
EMULSION OPHTHALMIC
COMMUNITY
Start: 2020-09-09 | End: 2020-10-22 | Stop reason: SDUPTHER

## 2020-10-22 RX ORDER — LOTEPREDNOL ETABONATE 5 MG/ML
1 SUSPENSION/ DROPS OPHTHALMIC 4 TIMES DAILY
Qty: 5 ML | Refills: 1 | Status: SHIPPED | OUTPATIENT
Start: 2020-10-22 | End: 2021-05-17

## 2020-10-22 RX ORDER — CYCLOSPORINE 0.5 MG/ML
EMULSION OPHTHALMIC
Qty: 60 VIAL | Refills: 11 | Status: SHIPPED | OUTPATIENT
Start: 2020-10-22 | End: 2021-06-23

## 2020-10-22 RX ORDER — GABAPENTIN 100 MG/1
CAPSULE ORAL
COMMUNITY
Start: 2020-10-20 | End: 2021-01-13

## 2020-10-22 NOTE — PROGRESS NOTES
HPI     Dry Eye      Additional comments: dry eye, sjogren's only today// [place]              Comments     dry eye, Sjogren's  Not today but pt eyes and inner orbital area get real raw and dry due to   being so dry..\  restasis has helped some  with systane complete , systane overnight gel,   pt using that in eye and skin //  restasis BID OU  Systane Complete all day long OU Systane uses on eyelids and inner orbital   areas throughout the day  And at night uses in the eye and on eyelids and   inner orbital area//  Not using steroid drops at this time          Last edited by Vicente Jackson, OD on 10/22/2020  2:43 PM. (History)              Assessment /Plan     For exam results, see Encounter Report.    Bilateral dry eyes  -     loteprednol (LOTEMAX) 0.5 % ophthalmic suspension; Place 1 drop into both eyes 4 (four) times daily. for 14 days  Dispense: 5 mL; Refill: 1  -     RESTASIS 0.05 % ophthalmic emulsion; INSTILL 1 DROP INTO BOTH EYES 2 TIMES DAILY FOR 30 DAYS  Dispense: 60 vial; Refill: 11      1. Start Lotemax drops to eye and eyelids 3-4x/day x 10 day-2 weeks. When better can restart Restasis bid ou and also can cont PF tears. RTC or call if no better in 2 weeks. Has sleep mask.

## 2020-11-10 ENCOUNTER — PATIENT MESSAGE (OUTPATIENT)
Dept: OPTOMETRY | Facility: CLINIC | Age: 60
End: 2020-11-10

## 2020-12-14 ENCOUNTER — PATIENT MESSAGE (OUTPATIENT)
Dept: PAIN MEDICINE | Facility: CLINIC | Age: 60
End: 2020-12-14

## 2020-12-15 ENCOUNTER — TELEPHONE (OUTPATIENT)
Dept: PAIN MEDICINE | Facility: CLINIC | Age: 60
End: 2020-12-15

## 2020-12-15 ENCOUNTER — OFFICE VISIT (OUTPATIENT)
Dept: PAIN MEDICINE | Facility: CLINIC | Age: 60
End: 2020-12-15
Payer: COMMERCIAL

## 2020-12-15 DIAGNOSIS — Z79.891 OPIOID CONTRACT EXISTS: ICD-10-CM

## 2020-12-15 DIAGNOSIS — M47.816 LUMBAR SPONDYLOSIS: ICD-10-CM

## 2020-12-15 DIAGNOSIS — M54.16 LUMBAR RADICULOPATHY: Primary | ICD-10-CM

## 2020-12-15 DIAGNOSIS — M54.16 LUMBAR RADICULOPATHY: ICD-10-CM

## 2020-12-15 DIAGNOSIS — M50.30 DDD (DEGENERATIVE DISC DISEASE), CERVICAL: ICD-10-CM

## 2020-12-15 DIAGNOSIS — M51.36 DDD (DEGENERATIVE DISC DISEASE), LUMBAR: ICD-10-CM

## 2020-12-15 DIAGNOSIS — M41.9 SCOLIOSIS OF LUMBAR SPINE, UNSPECIFIED SCOLIOSIS TYPE: ICD-10-CM

## 2020-12-15 DIAGNOSIS — Z13.9 SCREENING PROCEDURE: Primary | ICD-10-CM

## 2020-12-15 DIAGNOSIS — M41.9 SCOLIOSIS OF THORACIC SPINE, UNSPECIFIED SCOLIOSIS TYPE: ICD-10-CM

## 2020-12-15 DIAGNOSIS — M47.814 SPONDYLOSIS OF THORACIC REGION WITHOUT MYELOPATHY OR RADICULOPATHY: ICD-10-CM

## 2020-12-15 PROCEDURE — 99214 OFFICE O/P EST MOD 30 MIN: CPT | Mod: 95,,, | Performed by: PHYSICIAN ASSISTANT

## 2020-12-15 PROCEDURE — 99214 PR OFFICE/OUTPT VISIT, EST, LEVL IV, 30-39 MIN: ICD-10-PCS | Mod: 95,,, | Performed by: PHYSICIAN ASSISTANT

## 2020-12-15 RX ORDER — HYDROCODONE BITARTRATE AND ACETAMINOPHEN 7.5; 325 MG/1; MG/1
1 TABLET ORAL EVERY 12 HOURS PRN
Qty: 60 TABLET | Refills: 0 | Status: SHIPPED | OUTPATIENT
Start: 2021-02-05 | End: 2021-03-07

## 2020-12-15 RX ORDER — HYDROCODONE BITARTRATE AND ACETAMINOPHEN 7.5; 325 MG/1; MG/1
1 TABLET ORAL EVERY 12 HOURS PRN
Qty: 60 TABLET | Refills: 0 | Status: SHIPPED | OUTPATIENT
Start: 2021-01-06 | End: 2021-02-05

## 2020-12-15 RX ORDER — HYDROCODONE BITARTRATE AND ACETAMINOPHEN 7.5; 325 MG/1; MG/1
1 TABLET ORAL EVERY 12 HOURS PRN
Qty: 60 TABLET | Refills: 0 | Status: CANCELLED | OUTPATIENT
Start: 2021-01-06 | End: 2021-02-05

## 2020-12-15 RX ORDER — HYDROCODONE BITARTRATE AND ACETAMINOPHEN 7.5; 325 MG/1; MG/1
1 TABLET ORAL EVERY 12 HOURS PRN
Qty: 60 TABLET | Refills: 0 | Status: SHIPPED | OUTPATIENT
Start: 2021-03-07 | End: 2021-03-09 | Stop reason: SDUPTHER

## 2020-12-15 NOTE — TELEPHONE ENCOUNTER
Virtual visit completed.  Please send prescriptions to her pharmacy.    I have reviewed the [place] Board of Pharmacy website and there are no abberancies.

## 2020-12-16 NOTE — PROGRESS NOTES
This note was completed with dictation software and grammatical errors may exist.    The patient location is: [place], [place]  The chief complaint leading to consultation is:  back pain    Visit type: audiovisual    Face to Face time with patient: 25 minutes  30 minutes of total time spent on the encounter, which includes face to face time and non-face to face time preparing to see the patient (eg, review of tests), Obtaining and/or reviewing separately obtained history, Documenting clinical information in the electronic or other health record, Independently interpreting results (not separately reported) and communicating results to the patient/family/caregiver, or Care coordination (not separately reported).     Each patient to whom he or she provides medical services by telemedicine is:  (1) informed of the relationship between the physician and patient and the respective role of any other health care provider with respect to management of the patient; and (2) notified that he or she may decline to receive medical services by telemedicine and may withdraw from such care at any time.    CC:Back pain, neck pain    HPI: The patient is a 60-year-old woman with history of hypothyroidism, otherwise fairly healthy but a long history of scoliosis causing back pain, self-referred for continued back pain.  She returns in follow-up today with worsening back pain.  She states that her back pain is described as sharp, worse with prolonged standing, prolonged sitting and improved with changing positions, stretching and medication.  She does report radiating pain down her right foot like she has had in the past.  She continues to have neck pain, upper back pain and multiple joint pain but her low back pain is the worst at this time.  She continues to take pain medicine with moderate relief.  She denies weakness or numbness, no bladder or incontinence.    Pain intervention history: She has been seeing Dr. Huffman for the last  several years and has undergone epidural steroid injections and radiofrequency ablations with good relief.  According to her last pain management physician, she had undergone a right side T3, 4, 5, 6 medial branch radiofrequency ablation on 8/8/14 with good relief.  He had scheduled her for another one but did not complete this.  As far as medications she has been taking Hydrocodone 7.5/325 one to 2 times a day at most and gabapentin 300 mg at night. She is status post cervical epidural steroid injection on 10/19/15 with 50% relief of her neck pain. She is status post right L4 transforaminal epidural steroid injection on 11/23/15 with 100% relief.   She is status post right T3, 4, 5 and 6 medial branch radio frequency ablation on 2/5/16 with greater than 50% relief.  She is status post right L4 transforaminal epidural sterile injection on 6/14/16 with significant relief lasting only about a month.  She is status post C7-T1 cervical interlaminar epidural steroid injection on 7/14/16 with 30-40% relief.   She is status post L5/S1 interlaminar epidural steroidal injection to the right on 8/12/16 with 100% relief of her right leg pain.  She is status post C7-T1 cervical interlaminar epidural steroid injection on 12/9/16 with almost complete relief.  She is status post right T4, 5, 6 and 7 medial branch radiofrequency ablation on 5/30/17 with 100% relief.  She is status post C7-T1 cervical interlaminar epidural steroid injection on 6/27/17 with 80% relief.  She is status post L5/S1 interlaminar epidural steroid injection on a/7/17 with excellent relief lasting 2 weeks, now reporting 0% relief.  She is status post right L3, 4 and 5 medial branch radiofrequency ablation on 10/19/17 with 80% relief.   She is status post right T4, 5, 6 and 7 medial branch radiofrequency ablation on 12/14/17 with 100% relief.  She is status post C7-T1 cervical interlaminar epidural steroid injection on 3/16/18 with at least 80% relief.  She  is status post lumbar epidural steroid injection at L5/S1 on 5/15/18 with 90% relief of her bilateral low back and right leg pain.  The she is status post right T4, 5, 6 and 7 medial branch radiofrequency ablation on 10/12/2018 with 100% relief.  She is status post L5/S1 interlaminar epidural steroid injection on 2019 with almost 100% relief of her low back pain. She is status post right T4, 5, 6 and 7 medial branch radiofrequency ablation on 2019 with 80% relief.    ROS: She reports fatigability, headaches, hypothyroidism, joint stiffness, back pain, difficulty sleeping and anxiety.  Balance of review of systems is negative.      Medical, surgical, family and social history reviewed elsewhere in record.  She is a  at [employer].    Medications/Allergies: See med card    There were no vitals filed for this visit.      Physical exam:  Gen: A and O x3, pleasant, well-groomed, moving all extremities equally      Imagin/16/15 Xray Scoliosis survey: There is thoracic dextroscoliosis with mild lumbar compensatory levoscoliosis. No structural abnormalities are evident. Diffuse spondylosis noted in the cervical, thoracic and to lesser extent lumbar spine. No fractures are identified. There is slight increased kyphotic curvature of the thoracic spine with alignment being otherwise normal. Measurements and evaluation are limited due to underpenetration. Land angle measures approximately in the thoracic spine is of approximately 17.0 degrees and for the lumbar spine 17.4 degrees. Soft tissues are unremarkable. IMPRESSION: 1. S shaped scoliotic curvature of the thoracolumbar spine with Land angle of approximately 17 degrees. 2. Diffuse spondylosis.    MRI report 12: Report notes that there is dextroconvex rotary curvature of the thoracic spine.  Posterior alignment is maintained.  There are scattered vertebral body hemangiomas.  There is minimal posterior disc bulges noted at T5/6, T6/7,  T7/8 and T8/9.  There is no central spinal stenosis or neural foraminal narrowing.    MRI right shoulder 6/24/11: Minor distal subscapularis tendinosis.  Negative for full-thickness or significant partial-thickness rotator cuff tendon tear.  There is a small amount of subacromial subdeltoid bursal fluid which can be seen with recent injection or mild bursitis.    MRI cervical spine report 3/25/11.  Note that this is comparison to previous study on 7/29/2008.  This is a report only, no images were available to me.  The C2/3 disc appears grossly intact with no significant bulging.  C3/4 there is mild protrusion of the disc with no herniation and no nerve root entrapment seen.  Spinal stenosis is not noted.  At C4/5 there is broad-based disc protrusion which compresses the subarachnoid space without cord compression.  There is no definite nerve root compression seen.  At C5/6 there is vertebral body lesion which is bright on T2 and dark on T1 but it does not enhance and may represent an atypical hemangioma.  This is unchanged from previous study.  At C5/6 there is a protrusion of the disc asymmetrically to the left but no significant entrapment.  No change from previous study.  At C6/7 there is a focal protrusion of the disc causing compression of the subarachnoid space without definite cord or nerve root compression.  C7/T1 appears intact.    Lumbar spine MRI report 3/31/14: No acute abnormality or significant degenerative changes.  No canal or foraminal stenosis.    Lumbar spine MRI 10/19/15  T12-L1: No central canal or neuroforaminal stenosis. No disc protrusion or extrusion.  L1-L2: No central canal or neuroforaminal stenosis. No disc protrusion or extrusion.  L2-L3: No central canal or neuroforaminal stenosis. No disc protrusion or extrusion.  L3-L4: No central canal or neuroforaminal stenosis. No disc protrusion or extrusion.  L4-L5: There is a broad disc bulge which extends into both neuroforamina. There is  prominent ligamentum flavum thickening and facet arthropathy. There is mild bilateral neuroforaminal stenosis. No central canal stenosis.  L5-S1: There is bilateral hypertrophic facet arthropathy and ligamentum flavum thickening. No central canal or neuroforaminal stenosis. No disc protrusion or extrusion.    Cervical spine MRI 6/29/16  At C2-C3, the minimal interstitial excision covers a very mild broad-based disc bulge most prominent centrally but does not deform the ventral cord.  There is no canal or foraminal stenosis.  At C3-C4, there is mildly decreased disc height with a broad-based disc bulge most prominent centrally.  This minimally contact the ventral cord without significant deformity.  The canal is widely patent.  There is uncovertebral hypertrophy and facet arthropathy, but the foramina are patent.  At C4-C5, there is decreased disc height with a broad disc bulge-marginal osteophyte complex that mildly lateralizes to the left and blends imperceptibly with right greater than left uncovertebral hypertrophy.  With ligamentum hypertrophy, there is no significant central canal stenosis.  Uncovertebral hypertrophy and facet arthropathy are causing mild left and moderate right foraminal stenoses.  At C5-C6, there is a broad-based disc bulge and osteophyte complex most prominent centrally.  There is also ligamentum flavum hypertrophy present, but the canal is patent.  There is mild left foraminal stenosis.  The right foramen is patent.  At C6-C7, there is disc desiccation with a minimal broad-based disc bulge most prominent in the right paracentral canal.  The canal is widely patent.  There is mild left foraminal stenosis.  At C7-T1, there is no significant disc bulge, protrusion, or herniation/extrusion.  The canal and foramina are widely patent.    Hip x-rays 9/22/17  AP view of the pelvis and frog leg views of both hips were obtained. No evidence of acute displaced fracture or dislocation is visualized.  No  radiopaque foreign bodies are visualized. The bilateral superior joint spaces are grossly maintained. Mild bilateral sacroiliac joint degenerative changes are seen including subchondral sclerosis and small marginal osteophytes. Mild to moderate symphyseal degenerative changes are seen. There is a slightly expansile cortically-based focus along the lateral proximal right femoral metadiaphysis. This could reflect a sessile osteochondroma, but correlation with MRI of the right hip is recommended.    8/18/19 MRI L-spine:  T12-L1: No disc herniation or significant posterior osteophytic ridging.  No significant spinal canal or foraminal stenosis.   L1-L2: No disc herniation or significant posterior osteophytic ridging.  Minimal left facet hypertrophy.  No significant spinal canal or foraminal stenosis.   L2-L3: No disc herniation or significant posterior osteophytic ridging.  Minimal left facet hypertrophy.  No significant spinal canal or foraminal stenosis.   L3-L4: No disc herniation or significant posterior osteophytic ridging.  Minimal bilateral facet hypertrophy.  No significant spinal canal or foraminal stenosis.   L4-L5: Mild disc bulge.  Mild bilateral facet hypertrophy.  Mild ligamentum flavum thickening.  Minimal narrowing of the bilateral lateral recesses. No significant overall spinal canal stenosis. Mild bilateral foraminal stenosis.   L5-S1: Minimal disc bulge contained in the ventral epidural fat.  Mild bilateral facet hypertrophy.  No significant spinal canal or foraminal stenosis.    Assessment:  The patient is a 60-year-old woman with history of hypothyroidism, otherwise fairly healthy but a long history of scoliosis causing back pain, self-referred for continued back pain.    1. Lumbar radiculopathy     2. DDD (degenerative disc disease), lumbar     3. DDD (degenerative disc disease), cervical     4. Spondylosis of thoracic region without myelopathy or radiculopathy     5. Lumbar spondylosis     6.  Scoliosis of lumbar spine, unspecified scoliosis type     7. Scoliosis of thoracic spine, unspecified scoliosis type     8. Opioid contract exists         Plan:  1.  I am going to schedule patient for an L5/S1 interlaminar epidural steroid injection.  She has done well with this in the past.  2.  Dr. Coleman provided prescriptions for hydrocodone-acetaminophen 7.5/325 milligrams up to 2 times daily as needed for pain.  I have reviewed the [place] Board [employer]acy website and there are no abberancies.    3.  Follow-up in 4 weeks postprocedure sooner as needed.    Greater than 50% of this 25 minutes visit was spent counseling patient.

## 2020-12-17 RX ORDER — ALPRAZOLAM 0.5 MG/1
1 TABLET, ORALLY DISINTEGRATING ORAL ONCE AS NEEDED
Status: CANCELLED | OUTPATIENT
Start: 2021-01-29 | End: 2032-06-26

## 2021-01-25 ENCOUNTER — PATIENT MESSAGE (OUTPATIENT)
Dept: SURGERY | Facility: HOSPITAL | Age: 61
End: 2021-01-25

## 2021-02-16 ENCOUNTER — PATIENT MESSAGE (OUTPATIENT)
Dept: SURGERY | Facility: HOSPITAL | Age: 61
End: 2021-02-16

## 2021-02-17 ENCOUNTER — PATIENT MESSAGE (OUTPATIENT)
Dept: SURGERY | Facility: HOSPITAL | Age: 61
End: 2021-02-17

## 2021-02-24 ENCOUNTER — PATIENT MESSAGE (OUTPATIENT)
Dept: DERMATOLOGY | Facility: CLINIC | Age: 61
End: 2021-02-24

## 2021-03-09 ENCOUNTER — PATIENT MESSAGE (OUTPATIENT)
Dept: PAIN MEDICINE | Facility: CLINIC | Age: 61
End: 2021-03-09

## 2021-03-09 RX ORDER — HYDROCODONE BITARTRATE AND ACETAMINOPHEN 7.5; 325 MG/1; MG/1
1 TABLET ORAL EVERY 12 HOURS PRN
Qty: 60 TABLET | Refills: 0 | Status: SHIPPED | OUTPATIENT
Start: 2021-03-13 | End: 2021-04-12

## 2021-03-09 RX ORDER — HYDROCODONE BITARTRATE AND ACETAMINOPHEN 7.5; 325 MG/1; MG/1
1 TABLET ORAL EVERY 12 HOURS PRN
Qty: 60 TABLET | Refills: 0 | Status: SHIPPED | OUTPATIENT
Start: 2021-04-12 | End: 2021-05-10 | Stop reason: SDUPTHER

## 2021-03-09 RX ORDER — HYDROCODONE BITARTRATE AND ACETAMINOPHEN 7.5; 325 MG/1; MG/1
1 TABLET ORAL EVERY 12 HOURS PRN
Qty: 60 TABLET | Refills: 0 | Status: SHIPPED | OUTPATIENT
Start: 2021-05-12 | End: 2021-06-11

## 2021-03-11 ENCOUNTER — PATIENT MESSAGE (OUTPATIENT)
Dept: PAIN MEDICINE | Facility: CLINIC | Age: 61
End: 2021-03-11

## 2021-03-19 ENCOUNTER — PATIENT MESSAGE (OUTPATIENT)
Dept: PAIN MEDICINE | Facility: CLINIC | Age: 61
End: 2021-03-19

## 2021-03-19 ENCOUNTER — LAB VISIT (OUTPATIENT)
Dept: FAMILY MEDICINE | Facility: CLINIC | Age: 61
End: 2021-03-19
Payer: COMMERCIAL

## 2021-03-19 ENCOUNTER — TELEPHONE (OUTPATIENT)
Dept: SURGERY | Facility: HOSPITAL | Age: 61
End: 2021-03-19

## 2021-03-19 DIAGNOSIS — Z13.9 SCREENING PROCEDURE: ICD-10-CM

## 2021-03-19 DIAGNOSIS — M54.16 LUMBAR RADICULOPATHY: Primary | ICD-10-CM

## 2021-03-19 PROCEDURE — U0003 INFECTIOUS AGENT DETECTION BY NUCLEIC ACID (DNA OR RNA); SEVERE ACUTE RESPIRATORY SYNDROME CORONAVIRUS 2 (SARS-COV-2) (CORONAVIRUS DISEASE [COVID-19]), AMPLIFIED PROBE TECHNIQUE, MAKING USE OF HIGH THROUGHPUT TECHNOLOGIES AS DESCRIBED BY CMS-2020-01-R: HCPCS | Performed by: ANESTHESIOLOGY

## 2021-03-19 PROCEDURE — U0005 INFEC AGEN DETEC AMPLI PROBE: HCPCS | Performed by: ANESTHESIOLOGY

## 2021-03-19 RX ORDER — SODIUM CHLORIDE, SODIUM LACTATE, POTASSIUM CHLORIDE, CALCIUM CHLORIDE 600; 310; 30; 20 MG/100ML; MG/100ML; MG/100ML; MG/100ML
INJECTION, SOLUTION INTRAVENOUS CONTINUOUS
Status: CANCELLED | OUTPATIENT
Start: 2021-03-22

## 2021-03-21 LAB — SARS-COV-2 RNA RESP QL NAA+PROBE: NOT DETECTED

## 2021-03-22 ENCOUNTER — HOSPITAL ENCOUNTER (OUTPATIENT)
Facility: HOSPITAL | Age: 61
Discharge: HOME | End: 2021-03-22
Attending: ANESTHESIOLOGY | Admitting: ANESTHESIOLOGY
Payer: COMMERCIAL

## 2021-03-22 ENCOUNTER — HOSPITAL ENCOUNTER (OUTPATIENT)
Dept: RADIOLOGY | Facility: HOSPITAL | Age: 61
Discharge: HOME | End: 2021-03-22
Attending: ANESTHESIOLOGY
Payer: COMMERCIAL

## 2021-03-22 ENCOUNTER — PATIENT MESSAGE (OUTPATIENT)
Dept: RHEUMATOLOGY | Facility: CLINIC | Age: 61
End: 2021-03-22

## 2021-03-22 DIAGNOSIS — M47.814 SPONDYLOSIS OF THORACIC REGION WITHOUT MYELOPATHY OR RADICULOPATHY: ICD-10-CM

## 2021-03-22 DIAGNOSIS — M54.16 LUMBAR RADICULOPATHY: Primary | ICD-10-CM

## 2021-03-22 PROCEDURE — 62323 PR INJ LUMBAR/SACRAL, W/IMAGING GUIDANCE: ICD-10-PCS | Mod: ,,, | Performed by: ANESTHESIOLOGY

## 2021-03-22 PROCEDURE — 25000003 PHARM REV CODE 250: Mod: PO | Performed by: ANESTHESIOLOGY

## 2021-03-22 PROCEDURE — 63600175 PHARM REV CODE 636 W HCPCS: Mod: PO | Performed by: ANESTHESIOLOGY

## 2021-03-22 PROCEDURE — A4216 STERILE WATER/SALINE, 10 ML: HCPCS | Mod: PO | Performed by: ANESTHESIOLOGY

## 2021-03-22 PROCEDURE — 76000 FLUOROSCOPY <1 HR PHYS/QHP: CPT | Mod: TC,PO

## 2021-03-22 PROCEDURE — 62323 NJX INTERLAMINAR LMBR/SAC: CPT | Mod: PO | Performed by: ANESTHESIOLOGY

## 2021-03-22 PROCEDURE — 62323 NJX INTERLAMINAR LMBR/SAC: CPT | Mod: ,,, | Performed by: ANESTHESIOLOGY

## 2021-03-22 PROCEDURE — 25500020 PHARM REV CODE 255: Mod: PO | Performed by: ANESTHESIOLOGY

## 2021-03-22 RX ORDER — MIDAZOLAM HYDROCHLORIDE 1 MG/ML
INJECTION INTRAMUSCULAR; INTRAVENOUS
Status: DISCONTINUED | OUTPATIENT
Start: 2021-03-22 | End: 2021-03-22 | Stop reason: HOSPADM

## 2021-03-22 RX ORDER — SODIUM CHLORIDE, SODIUM LACTATE, POTASSIUM CHLORIDE, CALCIUM CHLORIDE 600; 310; 30; 20 MG/100ML; MG/100ML; MG/100ML; MG/100ML
INJECTION, SOLUTION INTRAVENOUS CONTINUOUS
Status: DISCONTINUED | OUTPATIENT
Start: 2021-03-22 | End: 2021-03-22 | Stop reason: HOSPADM

## 2021-03-22 RX ORDER — LIDOCAINE HYDROCHLORIDE 10 MG/ML
INJECTION, SOLUTION EPIDURAL; INFILTRATION; INTRACAUDAL; PERINEURAL
Status: DISCONTINUED | OUTPATIENT
Start: 2021-03-22 | End: 2021-03-22 | Stop reason: HOSPADM

## 2021-03-22 RX ORDER — SODIUM CHLORIDE 9 MG/ML
INJECTION, SOLUTION INTRAMUSCULAR; INTRAVENOUS; SUBCUTANEOUS
Status: DISCONTINUED | OUTPATIENT
Start: 2021-03-22 | End: 2021-03-22 | Stop reason: HOSPADM

## 2021-03-22 RX ORDER — METHYLPREDNISOLONE ACETATE 80 MG/ML
INJECTION, SUSPENSION INTRA-ARTICULAR; INTRALESIONAL; INTRAMUSCULAR; SOFT TISSUE
Status: DISCONTINUED | OUTPATIENT
Start: 2021-03-22 | End: 2021-03-22 | Stop reason: HOSPADM

## 2021-03-22 RX ADMIN — SODIUM CHLORIDE, SODIUM LACTATE, POTASSIUM CHLORIDE, AND CALCIUM CHLORIDE: .6; .31; .03; .02 INJECTION, SOLUTION INTRAVENOUS at 03:03

## 2021-03-23 ENCOUNTER — TELEPHONE (OUTPATIENT)
Dept: RHEUMATOLOGY | Facility: CLINIC | Age: 61
End: 2021-03-23

## 2021-03-23 ENCOUNTER — OFFICE VISIT (OUTPATIENT)
Dept: RHEUMATOLOGY | Facility: CLINIC | Age: 61
End: 2021-03-23
Payer: COMMERCIAL

## 2021-03-23 VITALS
WEIGHT: 170 LBS | BODY MASS INDEX: 32.1 KG/M2 | DIASTOLIC BLOOD PRESSURE: 78 MMHG | OXYGEN SATURATION: 98 % | TEMPERATURE: 98 F | HEART RATE: 78 BPM | SYSTOLIC BLOOD PRESSURE: 121 MMHG | RESPIRATION RATE: 18 BRPM | HEIGHT: 61 IN

## 2021-03-23 DIAGNOSIS — E06.3 HASHIMOTO'S THYROIDITIS: ICD-10-CM

## 2021-03-23 DIAGNOSIS — R76.8 ANA POSITIVE: ICD-10-CM

## 2021-03-23 DIAGNOSIS — M02.30 REACTIVE ARTHRITIS: ICD-10-CM

## 2021-03-23 DIAGNOSIS — R21 RASH: ICD-10-CM

## 2021-03-23 DIAGNOSIS — K90.41 NON-CELIAC GLUTEN SENSITIVITY: ICD-10-CM

## 2021-03-23 DIAGNOSIS — K90.0 CELIAC SYNDROME: ICD-10-CM

## 2021-03-23 DIAGNOSIS — M13.80 MIGRATORY POLYARTHRITIS: ICD-10-CM

## 2021-03-23 DIAGNOSIS — R23.1 LIVEDO RETICULARIS: Primary | ICD-10-CM

## 2021-03-23 PROCEDURE — 99215 OFFICE O/P EST HI 40 MIN: CPT | Mod: 95,,, | Performed by: INTERNAL MEDICINE

## 2021-03-23 PROCEDURE — 99215 PR OFFICE/OUTPT VISIT, EST, LEVL V, 40-54 MIN: ICD-10-PCS | Mod: 95,,, | Performed by: INTERNAL MEDICINE

## 2021-03-23 RX ORDER — IBUPROFEN AND FAMOTIDINE 800; 26.6 MG/1; MG/1
1 TABLET, COATED ORAL 3 TIMES DAILY
Qty: 90 TABLET | Refills: 11 | Status: SHIPPED | OUTPATIENT
Start: 2021-03-23 | End: 2021-03-23 | Stop reason: SDUPTHER

## 2021-03-24 ENCOUNTER — PATIENT MESSAGE (OUTPATIENT)
Dept: PAIN MEDICINE | Facility: CLINIC | Age: 61
End: 2021-03-24

## 2021-03-26 RX ORDER — IBUPROFEN AND FAMOTIDINE 800; 26.6 MG/1; MG/1
1 TABLET, COATED ORAL 3 TIMES DAILY PRN
Qty: 90 TABLET | Refills: 3 | Status: SHIPPED | OUTPATIENT
Start: 2021-03-26 | End: 2021-06-25

## 2021-04-27 ENCOUNTER — PATIENT MESSAGE (OUTPATIENT)
Dept: PAIN MEDICINE | Facility: CLINIC | Age: 61
End: 2021-04-27

## 2021-04-28 ENCOUNTER — PATIENT MESSAGE (OUTPATIENT)
Dept: PAIN MEDICINE | Facility: CLINIC | Age: 61
End: 2021-04-28

## 2021-05-01 ENCOUNTER — PATIENT MESSAGE (OUTPATIENT)
Dept: PAIN MEDICINE | Facility: CLINIC | Age: 61
End: 2021-05-01

## 2021-05-05 ENCOUNTER — OFFICE VISIT (OUTPATIENT)
Dept: PAIN MEDICINE | Facility: CLINIC | Age: 61
End: 2021-05-05
Payer: COMMERCIAL

## 2021-05-05 VITALS
BODY MASS INDEX: 34.82 KG/M2 | WEIGHT: 184.31 LBS | SYSTOLIC BLOOD PRESSURE: 143 MMHG | OXYGEN SATURATION: 98 % | RESPIRATION RATE: 18 BRPM | TEMPERATURE: 98 F | HEART RATE: 70 BPM | DIASTOLIC BLOOD PRESSURE: 63 MMHG

## 2021-05-05 DIAGNOSIS — Z79.891 OPIOID CONTRACT EXISTS: ICD-10-CM

## 2021-05-05 DIAGNOSIS — Z11.9 SCREENING EXAMINATION FOR INFECTIOUS DISEASE: ICD-10-CM

## 2021-05-05 DIAGNOSIS — M51.36 DDD (DEGENERATIVE DISC DISEASE), LUMBAR: ICD-10-CM

## 2021-05-05 DIAGNOSIS — M47.814: ICD-10-CM

## 2021-05-05 DIAGNOSIS — M54.16 LUMBAR RADICULOPATHY: ICD-10-CM

## 2021-05-05 DIAGNOSIS — M54.12 CERVICAL RADICULOPATHY: ICD-10-CM

## 2021-05-05 DIAGNOSIS — M47.814 SPONDYLOSIS OF THORACIC REGION WITHOUT MYELOPATHY OR RADICULOPATHY: Primary | ICD-10-CM

## 2021-05-05 DIAGNOSIS — M41.9 SCOLIOSIS OF LUMBAR SPINE, UNSPECIFIED SCOLIOSIS TYPE: ICD-10-CM

## 2021-05-05 DIAGNOSIS — M50.30 DDD (DEGENERATIVE DISC DISEASE), CERVICAL: ICD-10-CM

## 2021-05-05 DIAGNOSIS — M41.9 SCOLIOSIS OF THORACIC SPINE, UNSPECIFIED SCOLIOSIS TYPE: ICD-10-CM

## 2021-05-05 PROCEDURE — 99999 PR PBB SHADOW E&M-EST. PATIENT-LVL IV: CPT | Mod: PBBFAC,,, | Performed by: PHYSICIAN ASSISTANT

## 2021-05-05 PROCEDURE — 99214 OFFICE O/P EST MOD 30 MIN: CPT | Mod: S$GLB,,, | Performed by: PHYSICIAN ASSISTANT

## 2021-05-05 PROCEDURE — 99214 PR OFFICE/OUTPT VISIT, EST, LEVL IV, 30-39 MIN: ICD-10-PCS | Mod: S$GLB,,, | Performed by: PHYSICIAN ASSISTANT

## 2021-05-05 PROCEDURE — 99999 PR PBB SHADOW E&M-EST. PATIENT-LVL IV: ICD-10-PCS | Mod: PBBFAC,,, | Performed by: PHYSICIAN ASSISTANT

## 2021-05-05 RX ORDER — SODIUM CHLORIDE, SODIUM LACTATE, POTASSIUM CHLORIDE, CALCIUM CHLORIDE 600; 310; 30; 20 MG/100ML; MG/100ML; MG/100ML; MG/100ML
INJECTION, SOLUTION INTRAVENOUS CONTINUOUS
Status: CANCELLED | OUTPATIENT
Start: 2021-05-05

## 2021-05-07 ENCOUNTER — PATIENT MESSAGE (OUTPATIENT)
Dept: PAIN MEDICINE | Facility: CLINIC | Age: 61
End: 2021-05-07

## 2021-05-10 RX ORDER — HYDROCODONE BITARTRATE AND ACETAMINOPHEN 7.5; 325 MG/1; MG/1
1 TABLET ORAL EVERY 12 HOURS PRN
Qty: 60 TABLET | Refills: 0 | Status: SHIPPED | OUTPATIENT
Start: 2021-07-11 | End: 2021-06-25

## 2021-05-10 RX ORDER — HYDROCODONE BITARTRATE AND ACETAMINOPHEN 7.5; 325 MG/1; MG/1
1 TABLET ORAL EVERY 12 HOURS PRN
Qty: 60 TABLET | Refills: 0 | Status: SHIPPED | OUTPATIENT
Start: 2021-06-11 | End: 2021-06-25

## 2021-05-10 RX ORDER — HYDROCODONE BITARTRATE AND ACETAMINOPHEN 7.5; 325 MG/1; MG/1
1 TABLET ORAL EVERY 12 HOURS PRN
Qty: 60 TABLET | Refills: 0 | Status: SHIPPED | OUTPATIENT
Start: 2021-05-12 | End: 2021-06-11

## 2021-05-18 ENCOUNTER — LAB VISIT (OUTPATIENT)
Dept: FAMILY MEDICINE | Facility: CLINIC | Age: 61
End: 2021-05-18
Payer: COMMERCIAL

## 2021-05-18 DIAGNOSIS — Z11.9 SCREENING EXAMINATION FOR INFECTIOUS DISEASE: ICD-10-CM

## 2021-05-18 PROCEDURE — U0003 INFECTIOUS AGENT DETECTION BY NUCLEIC ACID (DNA OR RNA); SEVERE ACUTE RESPIRATORY SYNDROME CORONAVIRUS 2 (SARS-COV-2) (CORONAVIRUS DISEASE [COVID-19]), AMPLIFIED PROBE TECHNIQUE, MAKING USE OF HIGH THROUGHPUT TECHNOLOGIES AS DESCRIBED BY CMS-2020-01-R: HCPCS | Performed by: PHYSICIAN ASSISTANT

## 2021-05-18 PROCEDURE — U0005 INFEC AGEN DETEC AMPLI PROBE: HCPCS | Performed by: PHYSICIAN ASSISTANT

## 2021-05-19 LAB — SARS-COV-2 RNA RESP QL NAA+PROBE: NOT DETECTED

## 2021-05-21 ENCOUNTER — HOSPITAL ENCOUNTER (OUTPATIENT)
Facility: HOSPITAL | Age: 61
Discharge: HOME | End: 2021-05-21
Attending: ANESTHESIOLOGY | Admitting: ANESTHESIOLOGY
Payer: COMMERCIAL

## 2021-05-21 ENCOUNTER — HOSPITAL ENCOUNTER (OUTPATIENT)
Dept: RADIOLOGY | Facility: HOSPITAL | Age: 61
Discharge: HOME | End: 2021-05-21
Attending: ANESTHESIOLOGY
Payer: COMMERCIAL

## 2021-05-21 VITALS
BODY MASS INDEX: 32.1 KG/M2 | RESPIRATION RATE: 16 BRPM | SYSTOLIC BLOOD PRESSURE: 125 MMHG | TEMPERATURE: 98 F | WEIGHT: 170 LBS | OXYGEN SATURATION: 100 % | HEART RATE: 65 BPM | DIASTOLIC BLOOD PRESSURE: 67 MMHG | HEIGHT: 61 IN

## 2021-05-21 DIAGNOSIS — M47.814: ICD-10-CM

## 2021-05-21 DIAGNOSIS — M47.814 SPONDYLOSIS OF THORACIC REGION WITHOUT MYELOPATHY OR RADICULOPATHY: ICD-10-CM

## 2021-05-21 PROCEDURE — 63600175 PHARM REV CODE 636 W HCPCS: Mod: PO | Performed by: ANESTHESIOLOGY

## 2021-05-21 PROCEDURE — 99152 MOD SED SAME PHYS/QHP 5/>YRS: CPT | Mod: ,,, | Performed by: ANESTHESIOLOGY

## 2021-05-21 PROCEDURE — 25000003 PHARM REV CODE 250: Mod: PO | Performed by: ANESTHESIOLOGY

## 2021-05-21 PROCEDURE — 64634 PR DESTROY C/TH FACET JNT ADDL: ICD-10-PCS | Mod: RT,,, | Performed by: ANESTHESIOLOGY

## 2021-05-21 PROCEDURE — 64633 PR DESTROY CERV/THOR FACET JNT: ICD-10-PCS | Mod: RT,,, | Performed by: ANESTHESIOLOGY

## 2021-05-21 PROCEDURE — 64633 DESTROY CERV/THOR FACET JNT: CPT | Mod: RT,,, | Performed by: ANESTHESIOLOGY

## 2021-05-21 PROCEDURE — 99152 PR MOD CONSCIOUS SEDATION, SAME PHYS, 5+ YRS, FIRST 15 MIN: ICD-10-PCS | Mod: ,,, | Performed by: ANESTHESIOLOGY

## 2021-05-21 PROCEDURE — 64633 DESTROY CERV/THOR FACET JNT: CPT | Mod: PO | Performed by: ANESTHESIOLOGY

## 2021-05-21 PROCEDURE — 76000 FLUOROSCOPY <1 HR PHYS/QHP: CPT | Mod: TC,PO

## 2021-05-21 PROCEDURE — 64634 DESTROY C/TH FACET JNT ADDL: CPT | Mod: RT,,, | Performed by: ANESTHESIOLOGY

## 2021-05-21 PROCEDURE — 64634 DESTROY C/TH FACET JNT ADDL: CPT | Mod: PO | Performed by: ANESTHESIOLOGY

## 2021-05-21 RX ORDER — SODIUM CHLORIDE 0.9 G/100ML
IRRIGANT IRRIGATION
Status: DISCONTINUED | OUTPATIENT
Start: 2021-05-21 | End: 2021-05-21 | Stop reason: HOSPADM

## 2021-05-21 RX ORDER — METHYLPREDNISOLONE ACETATE 40 MG/ML
INJECTION, SUSPENSION INTRA-ARTICULAR; INTRALESIONAL; INTRAMUSCULAR; SOFT TISSUE
Status: DISCONTINUED | OUTPATIENT
Start: 2021-05-21 | End: 2021-05-21 | Stop reason: HOSPADM

## 2021-05-21 RX ORDER — SODIUM CHLORIDE, SODIUM LACTATE, POTASSIUM CHLORIDE, CALCIUM CHLORIDE 600; 310; 30; 20 MG/100ML; MG/100ML; MG/100ML; MG/100ML
INJECTION, SOLUTION INTRAVENOUS CONTINUOUS
Status: DISCONTINUED | OUTPATIENT
Start: 2021-05-21 | End: 2021-05-21 | Stop reason: HOSPADM

## 2021-05-21 RX ORDER — MIDAZOLAM HYDROCHLORIDE 2 MG/2ML
INJECTION, SOLUTION INTRAMUSCULAR; INTRAVENOUS
Status: DISCONTINUED | OUTPATIENT
Start: 2021-05-21 | End: 2021-05-21 | Stop reason: HOSPADM

## 2021-05-21 RX ORDER — FENTANYL CITRATE 50 UG/ML
INJECTION, SOLUTION INTRAMUSCULAR; INTRAVENOUS
Status: DISCONTINUED | OUTPATIENT
Start: 2021-05-21 | End: 2021-05-21 | Stop reason: HOSPADM

## 2021-05-21 RX ORDER — LIDOCAINE HYDROCHLORIDE 20 MG/ML
INJECTION, SOLUTION EPIDURAL; INFILTRATION; INTRACAUDAL; PERINEURAL
Status: DISCONTINUED | OUTPATIENT
Start: 2021-05-21 | End: 2021-05-21 | Stop reason: HOSPADM

## 2021-05-21 RX ORDER — LIDOCAINE HYDROCHLORIDE 10 MG/ML
INJECTION, SOLUTION EPIDURAL; INFILTRATION; INTRACAUDAL; PERINEURAL
Status: DISCONTINUED | OUTPATIENT
Start: 2021-05-21 | End: 2021-05-21 | Stop reason: HOSPADM

## 2021-05-21 RX ADMIN — SODIUM CHLORIDE, SODIUM LACTATE, POTASSIUM CHLORIDE, AND CALCIUM CHLORIDE: .6; .31; .03; .02 INJECTION, SOLUTION INTRAVENOUS at 09:05

## 2021-06-22 ENCOUNTER — OFFICE VISIT (OUTPATIENT)
Dept: OBSTETRICS AND GYNECOLOGY | Facility: CLINIC | Age: 61
End: 2021-06-22
Payer: COMMERCIAL

## 2021-06-22 VITALS
BODY MASS INDEX: 35.09 KG/M2 | WEIGHT: 185.88 LBS | SYSTOLIC BLOOD PRESSURE: 128 MMHG | HEIGHT: 61 IN | DIASTOLIC BLOOD PRESSURE: 80 MMHG

## 2021-06-22 DIAGNOSIS — Z12.31 VISIT FOR SCREENING MAMMOGRAM: ICD-10-CM

## 2021-06-22 DIAGNOSIS — Z01.419 ROUTINE GYNECOLOGICAL EXAMINATION: Primary | ICD-10-CM

## 2021-06-22 DIAGNOSIS — Z79.890 HORMONE REPLACEMENT THERAPY (HRT): ICD-10-CM

## 2021-06-22 PROCEDURE — 99396 PREV VISIT EST AGE 40-64: CPT | Mod: S$GLB,,, | Performed by: OBSTETRICS & GYNECOLOGY

## 2021-06-22 PROCEDURE — 99999 PR PBB SHADOW E&M-EST. PATIENT-LVL IV: CPT | Mod: PBBFAC,,, | Performed by: OBSTETRICS & GYNECOLOGY

## 2021-06-22 PROCEDURE — 99396 PR PREVENTIVE VISIT,EST,40-64: ICD-10-PCS | Mod: S$GLB,,, | Performed by: OBSTETRICS & GYNECOLOGY

## 2021-06-22 PROCEDURE — 99999 PR PBB SHADOW E&M-EST. PATIENT-LVL IV: ICD-10-PCS | Mod: PBBFAC,,, | Performed by: OBSTETRICS & GYNECOLOGY

## 2021-06-22 RX ORDER — ESTRADIOL 1 MG/G
1 GEL TOPICAL DAILY
Qty: 90 G | Refills: 3 | Status: SHIPPED | OUTPATIENT
Start: 2021-06-22 | End: 2021-12-07

## 2021-06-23 ENCOUNTER — OFFICE VISIT (OUTPATIENT)
Dept: RHEUMATOLOGY | Facility: CLINIC | Age: 61
End: 2021-06-23
Payer: COMMERCIAL

## 2021-06-23 DIAGNOSIS — M02.30 REACTIVE ARTHRITIS: Primary | ICD-10-CM

## 2021-06-23 DIAGNOSIS — J01.90 ACUTE NON-RECURRENT SINUSITIS, UNSPECIFIED LOCATION: ICD-10-CM

## 2021-06-23 DIAGNOSIS — E06.3 HASHIMOTO'S THYROIDITIS: ICD-10-CM

## 2021-06-23 DIAGNOSIS — K90.41 NON-CELIAC GLUTEN SENSITIVITY: ICD-10-CM

## 2021-06-23 PROCEDURE — 99214 PR OFFICE/OUTPT VISIT, EST, LEVL IV, 30-39 MIN: ICD-10-PCS | Mod: 95,,, | Performed by: PHYSICIAN ASSISTANT

## 2021-06-23 PROCEDURE — 99214 OFFICE O/P EST MOD 30 MIN: CPT | Mod: 95,,, | Performed by: PHYSICIAN ASSISTANT

## 2021-06-23 RX ORDER — PREDNISONE 2.5 MG/1
7.5 TABLET ORAL DAILY PRN
Qty: 90 TABLET | Refills: 2 | Status: SHIPPED | OUTPATIENT
Start: 2021-06-23 | End: 2022-05-23 | Stop reason: SDUPTHER

## 2021-06-23 RX ORDER — AMOXICILLIN AND CLAVULANATE POTASSIUM 875; 125 MG/1; MG/1
1 TABLET, FILM COATED ORAL 2 TIMES DAILY
Qty: 20 TABLET | Refills: 0 | Status: SHIPPED | OUTPATIENT
Start: 2021-06-23 | End: 2021-06-25 | Stop reason: SDUPTHER

## 2021-06-23 RX ORDER — LEVOTHYROXINE SODIUM 125 UG/1
1 CAPSULE ORAL DAILY
Qty: 30 CAPSULE | Refills: 5 | Status: SHIPPED | OUTPATIENT
Start: 2021-06-23 | End: 2021-09-23 | Stop reason: SDUPTHER

## 2021-06-24 RX ORDER — LOTEPREDNOL ETABONATE 5 MG/ML
1 SUSPENSION/ DROPS OPHTHALMIC 4 TIMES DAILY
COMMUNITY
End: 2021-12-07

## 2021-06-24 RX ORDER — CYCLOSPORINE 0.5 MG/ML
1 EMULSION OPHTHALMIC 2 TIMES DAILY
Status: ON HOLD | COMMUNITY
End: 2021-11-11

## 2021-08-02 ENCOUNTER — OFFICE VISIT (OUTPATIENT)
Dept: PAIN MEDICINE | Facility: CLINIC | Age: 61
End: 2021-08-02
Payer: COMMERCIAL

## 2021-08-02 VITALS
TEMPERATURE: 98 F | HEART RATE: 72 BPM | WEIGHT: 183 LBS | SYSTOLIC BLOOD PRESSURE: 131 MMHG | DIASTOLIC BLOOD PRESSURE: 68 MMHG | BODY MASS INDEX: 34.57 KG/M2 | RESPIRATION RATE: 18 BRPM | OXYGEN SATURATION: 100 %

## 2021-08-02 DIAGNOSIS — M54.12 CERVICAL RADICULOPATHY: Primary | ICD-10-CM

## 2021-08-02 DIAGNOSIS — M51.36 DDD (DEGENERATIVE DISC DISEASE), LUMBAR: ICD-10-CM

## 2021-08-02 DIAGNOSIS — M50.30 DDD (DEGENERATIVE DISC DISEASE), CERVICAL: ICD-10-CM

## 2021-08-02 DIAGNOSIS — M47.814 SPONDYLOSIS OF THORACIC REGION WITHOUT MYELOPATHY OR RADICULOPATHY: ICD-10-CM

## 2021-08-02 DIAGNOSIS — Z79.891 OPIOID CONTRACT EXISTS: ICD-10-CM

## 2021-08-02 DIAGNOSIS — Z11.9 SCREENING EXAMINATION FOR INFECTIOUS DISEASE: ICD-10-CM

## 2021-08-02 PROCEDURE — 99214 PR OFFICE/OUTPT VISIT, EST, LEVL IV, 30-39 MIN: ICD-10-PCS | Mod: S$GLB,,, | Performed by: PHYSICIAN ASSISTANT

## 2021-08-02 PROCEDURE — 99214 OFFICE O/P EST MOD 30 MIN: CPT | Mod: S$GLB,,, | Performed by: PHYSICIAN ASSISTANT

## 2021-08-02 PROCEDURE — 99999 PR PBB SHADOW E&M-EST. PATIENT-LVL V: ICD-10-PCS | Mod: PBBFAC,,, | Performed by: PHYSICIAN ASSISTANT

## 2021-08-02 PROCEDURE — 99999 PR PBB SHADOW E&M-EST. PATIENT-LVL V: CPT | Mod: PBBFAC,,, | Performed by: PHYSICIAN ASSISTANT

## 2021-08-02 RX ORDER — HYDROCODONE BITARTRATE AND ACETAMINOPHEN 7.5; 325 MG/1; MG/1
1 TABLET ORAL EVERY 12 HOURS PRN
Qty: 60 TABLET | Refills: 0 | Status: SHIPPED | OUTPATIENT
Start: 2021-08-13 | End: 2021-09-12

## 2021-08-02 RX ORDER — HYDROCODONE BITARTRATE AND ACETAMINOPHEN 7.5; 325 MG/1; MG/1
1 TABLET ORAL EVERY 12 HOURS PRN
Qty: 60 TABLET | Refills: 0 | Status: SHIPPED | OUTPATIENT
Start: 2021-09-12 | End: 2021-10-12

## 2021-08-02 RX ORDER — SODIUM CHLORIDE, SODIUM LACTATE, POTASSIUM CHLORIDE, CALCIUM CHLORIDE 600; 310; 30; 20 MG/100ML; MG/100ML; MG/100ML; MG/100ML
INJECTION, SOLUTION INTRAVENOUS CONTINUOUS
Status: CANCELLED | OUTPATIENT
Start: 2021-08-20

## 2021-08-02 RX ORDER — HYDROCODONE BITARTRATE AND ACETAMINOPHEN 7.5; 325 MG/1; MG/1
1 TABLET ORAL EVERY 12 HOURS PRN
Qty: 60 TABLET | Refills: 0 | Status: SHIPPED | OUTPATIENT
Start: 2021-10-12 | End: 2021-11-03 | Stop reason: SDUPTHER

## 2021-08-19 ENCOUNTER — TELEPHONE (OUTPATIENT)
Dept: PAIN MEDICINE | Facility: CLINIC | Age: 61
End: 2021-08-19

## 2021-08-30 ENCOUNTER — PATIENT MESSAGE (OUTPATIENT)
Dept: RHEUMATOLOGY | Facility: CLINIC | Age: 61
End: 2021-08-30

## 2021-09-23 ENCOUNTER — OFFICE VISIT (OUTPATIENT)
Dept: RHEUMATOLOGY | Facility: CLINIC | Age: 61
End: 2021-09-23
Payer: COMMERCIAL

## 2021-09-23 ENCOUNTER — PATIENT MESSAGE (OUTPATIENT)
Dept: RHEUMATOLOGY | Facility: CLINIC | Age: 61
End: 2021-09-23

## 2021-09-23 DIAGNOSIS — G90.1 DYSAUTONOMIA: ICD-10-CM

## 2021-09-23 DIAGNOSIS — M02.30 REACTIVE ARTHRITIS: ICD-10-CM

## 2021-09-23 DIAGNOSIS — I77.6 VASCULITIS: Primary | ICD-10-CM

## 2021-09-23 DIAGNOSIS — K90.0 CELIAC SYNDROME: ICD-10-CM

## 2021-09-23 DIAGNOSIS — R23.1 LIVEDO RETICULARIS: ICD-10-CM

## 2021-09-23 DIAGNOSIS — E06.3 HASHIMOTO'S THYROIDITIS: ICD-10-CM

## 2021-09-23 DIAGNOSIS — M13.80 MIGRATORY POLYARTHRITIS: ICD-10-CM

## 2021-09-23 PROCEDURE — 99214 OFFICE O/P EST MOD 30 MIN: CPT | Mod: 95,,, | Performed by: INTERNAL MEDICINE

## 2021-09-23 PROCEDURE — 99214 PR OFFICE/OUTPT VISIT, EST, LEVL IV, 30-39 MIN: ICD-10-PCS | Mod: 95,,, | Performed by: INTERNAL MEDICINE

## 2021-09-23 RX ORDER — LEVOTHYROXINE SODIUM 125 UG/1
1 CAPSULE ORAL DAILY
Qty: 30 CAPSULE | Refills: 5 | Status: SHIPPED | OUTPATIENT
Start: 2021-09-23 | End: 2021-10-06

## 2021-09-23 RX ORDER — IBUPROFEN AND FAMOTIDINE 26.6; 8 MG/1; MG/1
1 TABLET ORAL 3 TIMES DAILY
Qty: 90 TABLET | Refills: 12 | Status: SHIPPED | OUTPATIENT
Start: 2021-09-23 | End: 2022-01-25

## 2021-10-26 ENCOUNTER — PATIENT MESSAGE (OUTPATIENT)
Dept: PAIN MEDICINE | Facility: CLINIC | Age: 61
End: 2021-10-26
Payer: COMMERCIAL

## 2021-10-27 DIAGNOSIS — M54.12 CERVICAL RADICULOPATHY: Primary | ICD-10-CM

## 2021-10-27 RX ORDER — SODIUM CHLORIDE, SODIUM LACTATE, POTASSIUM CHLORIDE, CALCIUM CHLORIDE 600; 310; 30; 20 MG/100ML; MG/100ML; MG/100ML; MG/100ML
INJECTION, SOLUTION INTRAVENOUS CONTINUOUS
Status: CANCELLED | OUTPATIENT
Start: 2021-10-27

## 2021-11-03 RX ORDER — HYDROCODONE BITARTRATE AND ACETAMINOPHEN 7.5; 325 MG/1; MG/1
1 TABLET ORAL EVERY 12 HOURS PRN
Qty: 60 TABLET | Refills: 0 | Status: SHIPPED | OUTPATIENT
Start: 2021-11-03 | End: 2021-12-03

## 2021-11-08 ENCOUNTER — PATIENT MESSAGE (OUTPATIENT)
Dept: SURGERY | Facility: HOSPITAL | Age: 61
End: 2021-11-08
Payer: COMMERCIAL

## 2021-11-09 ENCOUNTER — PATIENT MESSAGE (OUTPATIENT)
Dept: FAMILY MEDICINE | Facility: CLINIC | Age: 61
End: 2021-11-09
Payer: COMMERCIAL

## 2021-11-09 ENCOUNTER — PATIENT MESSAGE (OUTPATIENT)
Dept: OBSTETRICS AND GYNECOLOGY | Facility: CLINIC | Age: 61
End: 2021-11-09
Payer: COMMERCIAL

## 2021-11-09 RX ORDER — ESTRADIOL 0.1 MG/G
1 CREAM VAGINAL
Qty: 42.5 G | Refills: 1 | Status: SHIPPED | OUTPATIENT
Start: 2021-11-11 | End: 2022-07-21 | Stop reason: SDUPTHER

## 2021-11-10 DIAGNOSIS — M47.814 SPONDYLOSIS OF THORACIC REGION WITHOUT MYELOPATHY OR RADICULOPATHY: Primary | ICD-10-CM

## 2021-11-11 ENCOUNTER — HOSPITAL ENCOUNTER (OUTPATIENT)
Dept: RADIOLOGY | Facility: HOSPITAL | Age: 61
Discharge: HOME | End: 2021-11-11
Attending: ANESTHESIOLOGY
Payer: COMMERCIAL

## 2021-11-11 ENCOUNTER — HOSPITAL ENCOUNTER (OUTPATIENT)
Facility: HOSPITAL | Age: 61
Discharge: HOME | End: 2021-11-11
Attending: ANESTHESIOLOGY | Admitting: ANESTHESIOLOGY
Payer: COMMERCIAL

## 2021-11-11 VITALS
DIASTOLIC BLOOD PRESSURE: 67 MMHG | TEMPERATURE: 98 F | SYSTOLIC BLOOD PRESSURE: 141 MMHG | RESPIRATION RATE: 18 BRPM | OXYGEN SATURATION: 96 % | HEART RATE: 64 BPM

## 2021-11-11 DIAGNOSIS — M47.814 SPONDYLOSIS OF THORACIC REGION WITHOUT MYELOPATHY OR RADICULOPATHY: ICD-10-CM

## 2021-11-11 DIAGNOSIS — M54.12 CERVICAL RADICULOPATHY: ICD-10-CM

## 2021-11-11 PROCEDURE — 25000003 PHARM REV CODE 250: Mod: PO | Performed by: ANESTHESIOLOGY

## 2021-11-11 PROCEDURE — 62321 PR INJ CERV/THORAC, W/GUIDANCE: ICD-10-PCS | Mod: ,,, | Performed by: ANESTHESIOLOGY

## 2021-11-11 PROCEDURE — 99152 MOD SED SAME PHYS/QHP 5/>YRS: CPT | Mod: ,,, | Performed by: ANESTHESIOLOGY

## 2021-11-11 PROCEDURE — 62321 NJX INTERLAMINAR CRV/THRC: CPT | Mod: ,,, | Performed by: ANESTHESIOLOGY

## 2021-11-11 PROCEDURE — 99152 PR MOD CONSCIOUS SEDATION, SAME PHYS, 5+ YRS, FIRST 15 MIN: ICD-10-PCS | Mod: ,,, | Performed by: ANESTHESIOLOGY

## 2021-11-11 PROCEDURE — A4216 STERILE WATER/SALINE, 10 ML: HCPCS | Mod: PO | Performed by: ANESTHESIOLOGY

## 2021-11-11 PROCEDURE — 63600175 PHARM REV CODE 636 W HCPCS: Mod: PO | Performed by: ANESTHESIOLOGY

## 2021-11-11 PROCEDURE — 25500020 PHARM REV CODE 255: Mod: PO | Performed by: ANESTHESIOLOGY

## 2021-11-11 PROCEDURE — 62321 NJX INTERLAMINAR CRV/THRC: CPT | Mod: PO | Performed by: ANESTHESIOLOGY

## 2021-11-11 PROCEDURE — 76000 FLUOROSCOPY <1 HR PHYS/QHP: CPT | Mod: TC,PO

## 2021-11-11 RX ORDER — SODIUM CHLORIDE 9 MG/ML
INJECTION, SOLUTION INTRAMUSCULAR; INTRAVENOUS; SUBCUTANEOUS
Status: DISCONTINUED | OUTPATIENT
Start: 2021-11-11 | End: 2021-11-11 | Stop reason: HOSPADM

## 2021-11-11 RX ORDER — SODIUM CHLORIDE, SODIUM LACTATE, POTASSIUM CHLORIDE, CALCIUM CHLORIDE 600; 310; 30; 20 MG/100ML; MG/100ML; MG/100ML; MG/100ML
INJECTION, SOLUTION INTRAVENOUS CONTINUOUS
Status: DISCONTINUED | OUTPATIENT
Start: 2021-11-11 | End: 2021-11-11 | Stop reason: HOSPADM

## 2021-11-11 RX ORDER — MIDAZOLAM HYDROCHLORIDE 2 MG/2ML
INJECTION, SOLUTION INTRAMUSCULAR; INTRAVENOUS
Status: DISCONTINUED | OUTPATIENT
Start: 2021-11-11 | End: 2021-11-11 | Stop reason: HOSPADM

## 2021-11-11 RX ORDER — METHYLPREDNISOLONE ACETATE 80 MG/ML
INJECTION, SUSPENSION INTRA-ARTICULAR; INTRALESIONAL; INTRAMUSCULAR; SOFT TISSUE
Status: DISCONTINUED | OUTPATIENT
Start: 2021-11-11 | End: 2021-11-11 | Stop reason: HOSPADM

## 2021-11-11 RX ORDER — LIDOCAINE HYDROCHLORIDE 10 MG/ML
INJECTION, SOLUTION EPIDURAL; INFILTRATION; INTRACAUDAL; PERINEURAL
Status: DISCONTINUED | OUTPATIENT
Start: 2021-11-11 | End: 2021-11-11 | Stop reason: HOSPADM

## 2021-11-11 RX ADMIN — SODIUM CHLORIDE, SODIUM LACTATE, POTASSIUM CHLORIDE, AND CALCIUM CHLORIDE: .6; .31; .03; .02 INJECTION, SOLUTION INTRAVENOUS at 03:11

## 2021-12-07 ENCOUNTER — OFFICE VISIT (OUTPATIENT)
Dept: PAIN MEDICINE | Facility: CLINIC | Age: 61
End: 2021-12-07
Payer: COMMERCIAL

## 2021-12-07 VITALS
TEMPERATURE: 98 F | DIASTOLIC BLOOD PRESSURE: 57 MMHG | SYSTOLIC BLOOD PRESSURE: 122 MMHG | OXYGEN SATURATION: 99 % | RESPIRATION RATE: 18 BRPM | HEART RATE: 64 BPM

## 2021-12-07 DIAGNOSIS — M54.12 CERVICAL RADICULOPATHY: Primary | ICD-10-CM

## 2021-12-07 DIAGNOSIS — M47.814 SPONDYLOSIS OF THORACIC REGION WITHOUT MYELOPATHY OR RADICULOPATHY: ICD-10-CM

## 2021-12-07 DIAGNOSIS — M41.9 SCOLIOSIS OF LUMBAR SPINE, UNSPECIFIED SCOLIOSIS TYPE: ICD-10-CM

## 2021-12-07 PROCEDURE — 99213 OFFICE O/P EST LOW 20 MIN: CPT | Mod: S$GLB,,, | Performed by: ANESTHESIOLOGY

## 2021-12-07 PROCEDURE — 99999 PR PBB SHADOW E&M-EST. PATIENT-LVL III: CPT | Mod: PBBFAC,,, | Performed by: ANESTHESIOLOGY

## 2021-12-07 PROCEDURE — 99999 PR PBB SHADOW E&M-EST. PATIENT-LVL III: ICD-10-PCS | Mod: PBBFAC,,, | Performed by: ANESTHESIOLOGY

## 2021-12-07 PROCEDURE — 99213 PR OFFICE/OUTPT VISIT, EST, LEVL III, 20-29 MIN: ICD-10-PCS | Mod: S$GLB,,, | Performed by: ANESTHESIOLOGY

## 2021-12-07 RX ORDER — HYDROCODONE BITARTRATE AND ACETAMINOPHEN 7.5; 325 MG/1; MG/1
1 TABLET ORAL EVERY 12 HOURS PRN
Qty: 60 TABLET | Refills: 0 | Status: SHIPPED | OUTPATIENT
Start: 2021-12-07 | End: 2022-01-06

## 2021-12-07 RX ORDER — HYDROCODONE BITARTRATE AND ACETAMINOPHEN 7.5; 325 MG/1; MG/1
1 TABLET ORAL EVERY 12 HOURS PRN
Qty: 60 TABLET | Refills: 0 | Status: SHIPPED | OUTPATIENT
Start: 2022-02-05 | End: 2022-03-07 | Stop reason: SDUPTHER

## 2021-12-07 RX ORDER — HYDROCODONE BITARTRATE AND ACETAMINOPHEN 7.5; 325 MG/1; MG/1
1 TABLET ORAL EVERY 12 HOURS PRN
Qty: 60 TABLET | Refills: 0 | Status: SHIPPED | OUTPATIENT
Start: 2022-01-06 | End: 2022-02-05

## 2022-01-13 ENCOUNTER — PATIENT MESSAGE (OUTPATIENT)
Dept: PAIN MEDICINE | Facility: CLINIC | Age: 62
End: 2022-01-13
Payer: COMMERCIAL

## 2022-01-24 ENCOUNTER — PATIENT MESSAGE (OUTPATIENT)
Dept: RHEUMATOLOGY | Facility: CLINIC | Age: 62
End: 2022-01-24
Payer: COMMERCIAL

## 2022-01-25 ENCOUNTER — PATIENT MESSAGE (OUTPATIENT)
Dept: RHEUMATOLOGY | Facility: CLINIC | Age: 62
End: 2022-01-25

## 2022-01-25 ENCOUNTER — TELEPHONE (OUTPATIENT)
Dept: RHEUMATOLOGY | Facility: CLINIC | Age: 62
End: 2022-01-25
Payer: COMMERCIAL

## 2022-01-25 ENCOUNTER — OFFICE VISIT (OUTPATIENT)
Dept: RHEUMATOLOGY | Facility: CLINIC | Age: 62
End: 2022-01-25
Payer: COMMERCIAL

## 2022-01-25 DIAGNOSIS — M02.39 REACTIVE ARTHRITIS OF MULTIPLE SITES: Primary | ICD-10-CM

## 2022-01-25 DIAGNOSIS — K90.0 CELIAC SYNDROME: ICD-10-CM

## 2022-01-25 DIAGNOSIS — E06.3 HASHIMOTO'S THYROIDITIS: ICD-10-CM

## 2022-01-25 PROCEDURE — 1160F RVW MEDS BY RX/DR IN RCRD: CPT | Mod: CPTII,95,, | Performed by: PHYSICIAN ASSISTANT

## 2022-01-25 PROCEDURE — 99214 PR OFFICE/OUTPT VISIT, EST, LEVL IV, 30-39 MIN: ICD-10-PCS | Mod: 95,,, | Performed by: PHYSICIAN ASSISTANT

## 2022-01-25 PROCEDURE — 1159F MED LIST DOCD IN RCRD: CPT | Mod: CPTII,95,, | Performed by: PHYSICIAN ASSISTANT

## 2022-01-25 PROCEDURE — 1159F PR MEDICATION LIST DOCUMENTED IN MEDICAL RECORD: ICD-10-PCS | Mod: CPTII,95,, | Performed by: PHYSICIAN ASSISTANT

## 2022-01-25 PROCEDURE — 99214 OFFICE O/P EST MOD 30 MIN: CPT | Mod: 95,,, | Performed by: PHYSICIAN ASSISTANT

## 2022-01-25 PROCEDURE — 1160F PR REVIEW ALL MEDS BY PRESCRIBER/CLIN PHARMACIST DOCUMENTED: ICD-10-PCS | Mod: CPTII,95,, | Performed by: PHYSICIAN ASSISTANT

## 2022-01-25 RX ORDER — PREDNISONE 2.5 MG/1
TABLET ORAL
Qty: 90 TABLET | Refills: 3 | Status: SHIPPED | OUTPATIENT
Start: 2022-01-25 | End: 2022-02-28

## 2022-01-25 RX ORDER — DICLOFENAC SODIUM AND MISOPROSTOL 75; 200 MG/1; UG/1
1 TABLET, DELAYED RELEASE ORAL 2 TIMES DAILY
Qty: 60 TABLET | Refills: 3 | Status: SHIPPED | OUTPATIENT
Start: 2022-01-25 | End: 2022-05-23

## 2022-01-25 NOTE — TELEPHONE ENCOUNTER
Contact patient via My Chart regarding contacting [facility] Scheduling dept for US since we are unable to schedule for them

## 2022-01-25 NOTE — TELEPHONE ENCOUNTER
----- Message from Destiny Blanc PA-C sent at 1/25/2022 11:37 AM CST -----  Schedule thyroid US at [facility]

## 2022-01-25 NOTE — PROGRESS NOTES
The patient location is: home  The chief complaint leading to consultation is: reactive arthritis    Visit type: audiovisual    Face to Face time with patient: 25 minutes  30 minutes of total time spent on the encounter, which includes face to face time and non-face to face time preparing to see the patient (eg, review of tests), Obtaining and/or reviewing separately obtained history, Documenting clinical information in the electronic or other health record, Independently interpreting results (not separately reported) and communicating results to the patient/family/caregiver, or Care coordination (not separately reported).     Each patient to whom he or she provides medical services by telemedicine is:  (1) informed of the relationship between the physician and patient and the respective role of any other health care provider with respect to management of the patient; and (2) notified that he or she may decline to receive medical services by telemedicine and may withdraw from such care at any time.    Notes:     Subjective:       Patient ID: Vonnie Garces is a 61 y.o. female.    Chief Complaint: Disease Management    Mrs. Garces is a 61 year old female who presents to telemedicine virtual visit for follow up on reactive arthritis. She has history of reactive arthritis and hashimoto's thyroiditis currently on duexis and occasional use of prednisone for flares. Unfortunately, she has not been able to get duexis with drug program. She tried ibuprofen/ famotidine split and this did not work as well in the past and caused GI upset.    She complains of arthritis flare recently described as migratory joint pain in her shoulders, knee, feet. She also reports worsening fatigue and stiffness during these flares. She started low dose prednisone 2.5-5 mg prn, which helps some. Rash has also returned and she plans to f/u Dr. Kebede next week. Sicca symptoms are unchanged. She has occasional fullness in the  neck/throat. No previous US thyroid.    She has increased her physical activity.     Labs 11/22 were stable. She wants to repeat labs while she is flaring.    Prior hx:  Plaquenil was considered last year, but she never started this as her symptoms were manageable.    Current treatment:  1. Duexis TID PRN  2. Prednisone 2.5 mg PRN    Review of Systems   Constitutional: Negative for activity change, appetite change, chills, fatigue, fever and unexpected weight change.   HENT: Negative for mouth sores and trouble swallowing.    Eyes: Negative for redness and visual disturbance.   Respiratory: Negative for cough and shortness of breath.    Cardiovascular: Negative for chest pain, palpitations and leg swelling.   Gastrointestinal: Negative for abdominal pain, constipation, diarrhea, nausea and vomiting.   Genitourinary: Negative for dysuria and genital sores.   Musculoskeletal: Positive for arthralgias, back pain, myalgias and neck pain. Negative for gait problem.   Skin: Positive for rash.   Neurological: Negative for dizziness, weakness, light-headedness and headaches.   Hematological: Does not bruise/bleed easily.   Psychiatric/Behavioral: The patient is nervous/anxious.          Objective:     There were no vitals filed for this visit.    Past Medical History:   Diagnosis Date    Anxiety 8/25/2015    Arthritis     Celiac disease     Cervical spondylosis     Chronic back pain     DDD (degenerative disc disease), lumbar     Difficult intubation 03/2016    anterior larynx, pt with metal dental appliance, unable to do glidescope, used LMA    Encounter for blood transfusion     Facet arthropathy, thoracic     Hormone replacement therapy (HRT)     Insomnia 8/25/2015    Neck pain     PONV (postoperative nausea and vomiting)     Right foot pain     Scoliosis     Sjogren's syndrome     Snoring     uses cpap, states not ROSSY    Unspecified hypothyroidism 8/25/2015     Past Surgical History:   Procedure  Laterality Date    BILATERAL OOPHORECTOMY  2004    BREAST BIOPSY Left 2010    Benign    CHOLECYSTECTOMY  2010    COLONOSCOPY  2016    [place]    Epidural Steroid Injection      Pain managment, at another facility, cervical, thoracic    Epidural Steroid injection      Pain management, cervical    EPIDURAL STEROID INJECTION INTO CERVICAL SPINE N/A 11/11/2021    Procedure: Injection-steroid-epidural-cervical, C7/T1 interlaminer;  Surgeon: Floyd Coleman MD;  Location: [facility] OR;  Service: Pain Management;  Laterality: N/A;    EPIDURAL STEROID INJECTION INTO LUMBAR SPINE Right 1/16/2019    Procedure: Injection-steroid-epidural-lumbar L5/S1;  Surgeon: Floyd Coleman MD;  Location: [facility] OR;  Service: Pain Management;  Laterality: Right;  to right    EPIDURAL STEROID INJECTION INTO LUMBAR SPINE N/A 5/14/2019    Procedure: Injection-steroid-epidural-lumbar;  Surgeon: Floyd Coleman MD;  Location: [facility] OR;  Service: Pain Management;  Laterality: N/A;  L5/S1 interlaminar    EPIDURAL STEROID INJECTION INTO LUMBAR SPINE N/A 3/22/2021    Procedure: Injection-steroid-epidural-lumbar L5/S1 interlaminer;  Surgeon: Floyd Coleman MD;  Location: [facility] OR;  Service: Pain Management;  Laterality: N/A;    HYSTERECTOMY  2004    Complete    MOUTH SURGERY      Braces, with temporary metal implants in upper gum    OOPHORECTOMY  2004    w/ hysterectomy    RADIOFREQUENCY ABLATION  02/2016    thoracic nerve    RADIOFREQUENCY ABLATION Right 6/4/2019    Procedure: Radiofrequency Ablation T4,5,6,7;  Surgeon: Floyd Coleman MD;  Location: [facility] OR;  Service: Pain Management;  Laterality: Right;    RADIOFREQUENCY ABLATION Right 5/21/2021    Procedure: RADIOFREQUENCY ABLATION,THORACIC  T4,T5,T6, T7;  Surgeon: Floyd Coleman MD;  Location: [facility] OR;  Service: Pain Management;  Laterality: Right;    TONSILLECTOMY      VULVA SURGERY  03/2016          Physical Exam   Constitutional: She is oriented to person,  place, and time.   Neurological: She is alert and oriented to person, place, and time.       Labs:  Component      Latest Ref Rng & Units 11/23/2021 11/23/2021           8:50 AM  8:50 AM   WBC      3.90 - 12.70 K/uL  6.53   RBC      4.00 - 5.40 M/uL  4.46   Hemoglobin      12.0 - 16.0 g/dL  13.0   Hematocrit      37.0 - 48.5 %  38.8   MCV      82 - 98 fL  87   MCH      27.0 - 31.0 pg  29.1   MCHC      32.0 - 36.0 g/dL  33.5   RDW      11.5 - 14.5 %  13.4   Platelets      150 - 450 K/uL  224   MPV      9.2 - 12.9 fL  10.3   Immature Granulocytes      0.0 - 0.5 %  0.2   Gran # (ANC)      1.8 - 7.7 K/uL  4.4   Immature Grans (Abs)      0.00 - 0.04 K/uL  0.01   Lymph #      1.0 - 4.8 K/uL  1.6   Mono #      0.3 - 1.0 K/uL  0.4   Eos #      0.0 - 0.5 K/uL  0.1   Baso #      0.00 - 0.20 K/uL  0.02   nRBC      0 /100 WBC  0   Gran %      38.0 - 73.0 %  66.8   Lymph %      18.0 - 48.0 %  24.3   Mono %      4.0 - 15.0 %  6.4   Eosinophil %      0.0 - 8.0 %  2.0   Basophil %      0.0 - 1.9 %  0.3   Differential Method        Automated   Sodium      136 - 145 mmol/L  143   Potassium      3.5 - 5.1 mmol/L  4.0   Chloride      95 - 110 mmol/L  105   CO2      22 - 31 mmol/L  28   Glucose      70 - 110 mg/dL  93   BUN      7 - 18 mg/dL  16   Creatinine      0.50 - 1.40 mg/dL  0.60   Calcium      8.4 - 10.2 mg/dL  9.5   PROTEIN TOTAL      6.0 - 8.4 g/dL  7.2   Albumin      3.5 - 5.2 g/dL  4.5   BILIRUBIN TOTAL      0.2 - 1.3 mg/dL  0.9   Alkaline Phosphatase      38 - 145 U/L  72   AST      14 - 36 U/L  28   ALT      0 - 35 U/L  27   Anion Gap      8 - 16 mmol/L  10   eGFR if African American      >60 mL/min/1.73 m:2  >60   eGFR if non African American      >60 mL/min/1.73 m:2  >60   Specimen UA           Color, UA      Yellow, Straw, Naila     Appearance, UA      Clear     pH, UA      5.0 - 8.0     Specific Gravity, UA      1.005 - 1.030     Protein, UA      Negative     Glucose, UA      Negative     Ketones, UA      Negative      Bilirubin (UA)      Negative     Occult Blood UA      Negative     NITRITE UA      Negative     UROBILINOGEN UA      <2.0 EU/dL     Leukocytes, UA      Negative     Osorio (GATITO) Ab, IgG      0 - 40 AU/mL 1 1   SM/RNP Antibody      0 - 19 Units  2   SSA Antibody      0 - 40 AU/mL  1   SSA 60 (Ro) GATITO Antibody      0 - 40 AU/mL  0   ds DNA Ab      0 - 24 IU  1   Anti-Histone Antibody      0.0 - 0.9 Units  0.2   Sed Rate      0 - 29 mm/Hr  12   SSB Antibody      0 - 40 AU/mL  3   TSH      0.400 - 4.000 uIU/mL  6.220 (H)   Free T4      0.78 - 2.19 ng/dL  1.18   T3, Free      2.77 - 5.27 pg/mL  3.05   Vit D, 25-Hydroxy      30 - 96 ng/mL  41   Cortisol      ug/dL  8.90   FERNANDO Screen      None Detected  None Detected   CRP      0.00 - 0.90 mg/dL  <0.50        Assessment:       1. Reactive arthritis of multiple sites    2. Hashimoto's thyroiditis    3. Celiac syndrome            Plan:       Reactive arthritis of multiple sites  -     diclofenac-misoprostol  mg-mcg (ARTHROTEC 75)  mg-mcg per tablet; Take 1 tablet by mouth 2 (two) times daily.  Dispense: 60 tablet; Refill: 3  -     predniSONE (DELTASONE) 2.5 MG tablet; 1-3 tabs PO daily PRN  Dispense: 90 tablet; Refill: 3  -     FERNANDO Screen w/Reflex; Future; Expected date: 01/25/2022  -     Anti-DNA Ab, Double-Stranded; Future; Expected date: 01/25/2022  -     CBC Auto Differential; Future; Expected date: 01/25/2022  -     C-Reactive Protein; Future; Expected date: 01/25/2022  -     Sedimentation rate; Future; Expected date: 01/25/2022  -     C3 Complement; Future; Expected date: 01/25/2022  -     C4 Complement; Future; Expected date: 01/25/2022    Hashimoto's thyroiditis  -     TSH; Future; Expected date: 01/25/2022  -     T4, Free; Future; Expected date: 01/25/2022  -     US Soft Tissue Head Neck Thyroid; Future; Expected date: 01/25/2022    Celiac syndrome          Assessment:  61 year old female with  Reactive arthritis, hashimoto's thyroiditis  (+thyroperoxidase antibody), history of FERNANDO positivity (repeat testing negative), celiac syndrome    Plan:  1. D/C duexis. Start arthrotec bid PRN  3. Prednisone 2.5 mg prn  3. Cont. tirosint  4. Thyroid US  5. Repeat labs soon    Follow up:   4 mo w/labs prior

## 2022-03-02 ENCOUNTER — PATIENT MESSAGE (OUTPATIENT)
Dept: PAIN MEDICINE | Facility: CLINIC | Age: 62
End: 2022-03-02
Payer: COMMERCIAL

## 2022-03-02 DIAGNOSIS — S76.312A HAMSTRING STRAIN, LEFT, INITIAL ENCOUNTER: Primary | ICD-10-CM

## 2022-03-07 ENCOUNTER — OFFICE VISIT (OUTPATIENT)
Dept: SPORTS MEDICINE | Facility: CLINIC | Age: 62
End: 2022-03-07
Payer: COMMERCIAL

## 2022-03-07 ENCOUNTER — OFFICE VISIT (OUTPATIENT)
Dept: PAIN MEDICINE | Facility: CLINIC | Age: 62
End: 2022-03-07
Payer: COMMERCIAL

## 2022-03-07 VITALS
HEART RATE: 75 BPM | DIASTOLIC BLOOD PRESSURE: 82 MMHG | SYSTOLIC BLOOD PRESSURE: 143 MMHG | WEIGHT: 167 LBS | BODY MASS INDEX: 31.53 KG/M2 | HEIGHT: 61 IN

## 2022-03-07 VITALS
BODY MASS INDEX: 31.72 KG/M2 | WEIGHT: 168 LBS | HEART RATE: 70 BPM | DIASTOLIC BLOOD PRESSURE: 65 MMHG | SYSTOLIC BLOOD PRESSURE: 144 MMHG | HEIGHT: 61 IN

## 2022-03-07 DIAGNOSIS — M41.9 SCOLIOSIS OF LUMBAR SPINE, UNSPECIFIED SCOLIOSIS TYPE: ICD-10-CM

## 2022-03-07 DIAGNOSIS — M51.36 DDD (DEGENERATIVE DISC DISEASE), LUMBAR: ICD-10-CM

## 2022-03-07 DIAGNOSIS — S76.312A PARTIAL HAMSTRING TEAR, LEFT, INITIAL ENCOUNTER: Primary | ICD-10-CM

## 2022-03-07 PROCEDURE — 99999 PR PBB SHADOW E&M-EST. PATIENT-LVL III: CPT | Mod: PBBFAC,,, | Performed by: ANESTHESIOLOGY

## 2022-03-07 PROCEDURE — 1159F MED LIST DOCD IN RCRD: CPT | Mod: CPTII,S$GLB,, | Performed by: ORTHOPAEDIC SURGERY

## 2022-03-07 PROCEDURE — 3077F PR MOST RECENT SYSTOLIC BLOOD PRESSURE >= 140 MM HG: ICD-10-PCS | Mod: CPTII,S$GLB,, | Performed by: ORTHOPAEDIC SURGERY

## 2022-03-07 PROCEDURE — 99999 PR PBB SHADOW E&M-EST. PATIENT-LVL III: ICD-10-PCS | Mod: PBBFAC,,, | Performed by: ANESTHESIOLOGY

## 2022-03-07 PROCEDURE — 99204 PR OFFICE/OUTPT VISIT, NEW, LEVL IV, 45-59 MIN: ICD-10-PCS | Mod: S$GLB,,, | Performed by: ORTHOPAEDIC SURGERY

## 2022-03-07 PROCEDURE — 99999 PR PBB SHADOW E&M-EST. PATIENT-LVL IV: ICD-10-PCS | Mod: PBBFAC,,, | Performed by: ORTHOPAEDIC SURGERY

## 2022-03-07 PROCEDURE — 3079F PR MOST RECENT DIASTOLIC BLOOD PRESSURE 80-89 MM HG: ICD-10-PCS | Mod: CPTII,S$GLB,, | Performed by: ORTHOPAEDIC SURGERY

## 2022-03-07 PROCEDURE — 1160F PR REVIEW ALL MEDS BY PRESCRIBER/CLIN PHARMACIST DOCUMENTED: ICD-10-PCS | Mod: CPTII,S$GLB,, | Performed by: ORTHOPAEDIC SURGERY

## 2022-03-07 PROCEDURE — 3078F PR MOST RECENT DIASTOLIC BLOOD PRESSURE < 80 MM HG: ICD-10-PCS | Mod: CPTII,S$GLB,, | Performed by: ANESTHESIOLOGY

## 2022-03-07 PROCEDURE — 1159F PR MEDICATION LIST DOCUMENTED IN MEDICAL RECORD: ICD-10-PCS | Mod: CPTII,S$GLB,, | Performed by: ORTHOPAEDIC SURGERY

## 2022-03-07 PROCEDURE — 3079F DIAST BP 80-89 MM HG: CPT | Mod: CPTII,S$GLB,, | Performed by: ORTHOPAEDIC SURGERY

## 2022-03-07 PROCEDURE — 99999 PR PBB SHADOW E&M-EST. PATIENT-LVL IV: CPT | Mod: PBBFAC,,, | Performed by: ORTHOPAEDIC SURGERY

## 2022-03-07 PROCEDURE — 1160F RVW MEDS BY RX/DR IN RCRD: CPT | Mod: CPTII,S$GLB,, | Performed by: ORTHOPAEDIC SURGERY

## 2022-03-07 PROCEDURE — 3077F SYST BP >= 140 MM HG: CPT | Mod: CPTII,S$GLB,, | Performed by: ANESTHESIOLOGY

## 2022-03-07 PROCEDURE — 3008F PR BODY MASS INDEX (BMI) DOCUMENTED: ICD-10-PCS | Mod: CPTII,S$GLB,, | Performed by: ORTHOPAEDIC SURGERY

## 2022-03-07 PROCEDURE — 99213 OFFICE O/P EST LOW 20 MIN: CPT | Mod: S$GLB,,, | Performed by: ANESTHESIOLOGY

## 2022-03-07 PROCEDURE — 3008F BODY MASS INDEX DOCD: CPT | Mod: CPTII,S$GLB,, | Performed by: ANESTHESIOLOGY

## 2022-03-07 PROCEDURE — 3077F PR MOST RECENT SYSTOLIC BLOOD PRESSURE >= 140 MM HG: ICD-10-PCS | Mod: CPTII,S$GLB,, | Performed by: ANESTHESIOLOGY

## 2022-03-07 PROCEDURE — 3008F PR BODY MASS INDEX (BMI) DOCUMENTED: ICD-10-PCS | Mod: CPTII,S$GLB,, | Performed by: ANESTHESIOLOGY

## 2022-03-07 PROCEDURE — 3008F BODY MASS INDEX DOCD: CPT | Mod: CPTII,S$GLB,, | Performed by: ORTHOPAEDIC SURGERY

## 2022-03-07 PROCEDURE — 3078F DIAST BP <80 MM HG: CPT | Mod: CPTII,S$GLB,, | Performed by: ANESTHESIOLOGY

## 2022-03-07 PROCEDURE — 3077F SYST BP >= 140 MM HG: CPT | Mod: CPTII,S$GLB,, | Performed by: ORTHOPAEDIC SURGERY

## 2022-03-07 PROCEDURE — 99213 PR OFFICE/OUTPT VISIT, EST, LEVL III, 20-29 MIN: ICD-10-PCS | Mod: S$GLB,,, | Performed by: ANESTHESIOLOGY

## 2022-03-07 PROCEDURE — 99204 OFFICE O/P NEW MOD 45 MIN: CPT | Mod: S$GLB,,, | Performed by: ORTHOPAEDIC SURGERY

## 2022-03-07 RX ORDER — HYDROCODONE BITARTRATE AND ACETAMINOPHEN 7.5; 325 MG/1; MG/1
1 TABLET ORAL EVERY 12 HOURS PRN
Qty: 60 TABLET | Refills: 0 | Status: SHIPPED | OUTPATIENT
Start: 2022-05-06 | End: 2022-05-24

## 2022-03-07 RX ORDER — HYDROCODONE BITARTRATE AND ACETAMINOPHEN 7.5; 325 MG/1; MG/1
1 TABLET ORAL EVERY 12 HOURS PRN
Qty: 60 TABLET | Refills: 0 | Status: SHIPPED | OUTPATIENT
Start: 2022-03-07 | End: 2022-04-06

## 2022-03-07 RX ORDER — HYDROCODONE BITARTRATE AND ACETAMINOPHEN 7.5; 325 MG/1; MG/1
1 TABLET ORAL EVERY 12 HOURS PRN
Qty: 60 TABLET | Refills: 0 | Status: SHIPPED | OUTPATIENT
Start: 2022-04-06 | End: 2022-06-21

## 2022-03-07 NOTE — PROGRESS NOTES
This note was completed with dictation software and grammatical errors may exist.    CC:Back pain, neck pain    HPI: The patient is a 61-year-old woman with history of hypothyroidism, otherwise fairly healthy but a long history of scoliosis causing back pain, self-referred for continued back pain.          Pain intervention history: She has been seeing Dr. Huffman for the last several years and has undergone epidural steroid injections and radiofrequency ablations with good relief.  According to her last pain management physician, she had undergone a right side T3, 4, 5, 6 medial branch radiofrequency ablation on 8/8/14 with good relief.  He had scheduled her for another one but did not complete this.  As far as medications she has been taking Hydrocodone 7.5/325 one to 2 times a day at most and gabapentin 300 mg at night. She is status post cervical epidural steroid injection on 10/19/15 with 50% relief of her neck pain. She is status post right L4 transforaminal epidural steroid injection on 11/23/15 with 100% relief.   She is status post right T3, 4, 5 and 6 medial branch radio frequency ablation on 2/5/16 with greater than 50% relief.  She is status post right L4 transforaminal epidural sterile injection on 6/14/16 with significant relief lasting only about a month.  She is status post C7-T1 cervical interlaminar epidural steroid injection on 7/14/16 with 30-40% relief.   She is status post L5/S1 interlaminar epidural steroidal injection to the right on 8/12/16 with 100% relief of her right leg pain.  She is status post C7-T1 cervical interlaminar epidural steroid injection on 12/9/16 with almost complete relief.  She is status post right T4, 5, 6 and 7 medial branch radiofrequency ablation on 5/30/17 with 100% relief.  She is status post C7-T1 cervical interlaminar epidural steroid injection on 6/27/17 with 80% relief.  She is status post L5/S1 interlaminar epidural steroid injection on a/7/17 with excellent  relief lasting 2 weeks, now reporting 0% relief.  She is status post right L3, 4 and 5 medial branch radiofrequency ablation on 10/19/17 with 80% relief.   She is status post right T4, 5, 6 and 7 medial branch radiofrequency ablation on 12/14/17 with 100% relief.  She is status post C7-T1 cervical interlaminar epidural steroid injection on 3/16/18 with at least 80% relief.  She is status post lumbar epidural steroid injection at L5/S1 on 5/15/18 with 90% relief of her bilateral low back and right leg pain.  The she is status post right T4, 5, 6 and 7 medial branch radiofrequency ablation on 10/12/2018 with 100% relief.  She is status post L5/S1 interlaminar epidural steroid injection on 01/16/2019 with almost 100% relief of her low back pain. She is status post right T4, 5, 6 and 7 medial branch radiofrequency ablation on 06/04/2019 with 80% relief.  She is status post L5/S1 interlaminar epidural steroid injection on 03/22/2021 with 85-90% relief.   She is status post right T4, 5, 6, 7 medial branch radiofrequency ablation on 05/21/2021 with 85% relief. She is status post cervical JINA C7/T1 on 11/11/2021 with greater than 70% relief.     Spine surgeries:  None    Antineuropathics:  Lidoderm 5%  NSAIDs:  Duexis 800 up to TID  Physical therapy:  She has consistently been in physical therapy, reports exercise, dry needling with at least temporary relief  Antidepressants:  Muscle relaxers:  Opioids:  Hydrocodone 7.5/325 twice daily  Antiplatelets/Anticoagulants:                ROS: She reports fatigability, headaches, hypothyroidism, joint stiffness, back pain, difficulty sleeping and anxiety.  Balance of review of systems is negative.      Past Medical History:   Diagnosis Date    Anxiety 8/25/2015    Arthritis     Celiac disease     Cervical spondylosis     Chronic back pain     DDD (degenerative disc disease), lumbar     Difficult intubation 03/2016    anterior larynx, pt with metal dental appliance, unable to  do glidescope, used LMA    Encounter for blood transfusion     Facet arthropathy, thoracic     Hormone replacement therapy (HRT)     Insomnia 8/25/2015    Neck pain     PONV (postoperative nausea and vomiting)     Right foot pain     Scoliosis     Sjogren's syndrome     Snoring     uses cpap, states not ROSSY    Unspecified hypothyroidism 8/25/2015     Past Surgical History:   Procedure Laterality Date    BILATERAL OOPHORECTOMY  2004    BREAST BIOPSY Left 2010    Benign    CHOLECYSTECTOMY  2010    COLONOSCOPY  2016    [place]    Epidural Steroid Injection      Pain managment, at another facility, cervical, thoracic    Epidural Steroid injection      Pain management, cervical    EPIDURAL STEROID INJECTION INTO CERVICAL SPINE N/A 11/11/2021    Procedure: Injection-steroid-epidural-cervical, C7/T1 interlaminer;  Surgeon: Floyd Coleman MD;  Location: [facility] OR;  Service: Pain Management;  Laterality: N/A;    EPIDURAL STEROID INJECTION INTO LUMBAR SPINE Right 1/16/2019    Procedure: Injection-steroid-epidural-lumbar L5/S1;  Surgeon: Floyd Coleman MD;  Location: [facility] OR;  Service: Pain Management;  Laterality: Right;  to right    EPIDURAL STEROID INJECTION INTO LUMBAR SPINE N/A 5/14/2019    Procedure: Injection-steroid-epidural-lumbar;  Surgeon: Floyd Coleman MD;  Location: [facility] OR;  Service: Pain Management;  Laterality: N/A;  L5/S1 interlaminar    EPIDURAL STEROID INJECTION INTO LUMBAR SPINE N/A 3/22/2021    Procedure: Injection-steroid-epidural-lumbar L5/S1 interlaminer;  Surgeon: Floyd Coleman MD;  Location: [facility] OR;  Service: Pain Management;  Laterality: N/A;    HYSTERECTOMY  2004    Complete    MOUTH SURGERY      Braces, with temporary metal implants in upper gum    OOPHORECTOMY  2004    w/ hysterectomy    RADIOFREQUENCY ABLATION  02/2016    thoracic nerve    RADIOFREQUENCY ABLATION Right 6/4/2019    Procedure: Radiofrequency Ablation T4,5,6,7;  Surgeon: Floyd DUNN  "MD Jared;  Location: [facility] OR;  Service: Pain Management;  Laterality: Right;    RADIOFREQUENCY ABLATION Right 5/21/2021    Procedure: RADIOFREQUENCY ABLATION,THORACIC  T4,T5,T6, T7;  Surgeon: Floyd Coleman MD;  Location: [facility] OR;  Service: Pain Management;  Laterality: Right;    TONSILLECTOMY      VULVA SURGERY  03/2016     Social History     Socioeconomic History    Marital status:    Tobacco Use    Smoking status: Never Smoker    Smokeless tobacco: Never Used   Substance and Sexual Activity    Alcohol use: No    Drug use: No    Sexual activity: Yes     Partners: Male      She is a  at [employer].    Medications/Allergies: See med card    Vitals:    03/07/22 0944   BP: (!) 144/65   Pulse: 70   Weight: 76.2 kg (167 lb 15.9 oz)   Height: 5' 1.2" (1.554 m)   PainSc:   4   PainLoc: Back         Physical exam:  Gen: A and O x3, pleasant, well-groomed  Skin: No rashes or obvious lesions  HEENT: PERRLA, no obvious deformities on ears or in canals. Trachea midline.  CVS: Regular rate and rhythm, normal palpable pulses.  Resp:No increased work of breathing, symmetrical chest rise.  Abdomen: Soft, NT/ND.  Musculoskeletal:No antalgic gait.      Neuro:  Upper extremities: 5/5 strength bilaterally.  Lower extremities: 5/5 strength bilaterally.    Reflexes: Patellar 0+, Achilles 0+ bilaterally.  Upper extremity reflexes 2+ bilaterally equal.  Sensory:  Intact and symmetrical to light touch and pinprick in L2-S1 dermatomes bilaterally.     Cervical spine exam: Range of motion is full with flexion, extension and lateral rotation without pain.  Myofascial exam:  Mild tenderness to palpation to the right mid thoracic paraspinous muscles.       Lumbar spine:  Lumbar spine: Range of motion is full with flexion and extension with mild increased right low back pain.  Brayden's test causes no increased pain on either side.    Supine straight leg raise causes right low back and buttock " pain.  Internal and external rotation of the hip causes no increased pain on either side.  Myofascial exam:  Mild tenderness to palpation to the right lumbar paraspinous muscles.      Imagin/16/15 Xray Scoliosis survey: There is thoracic dextroscoliosis with mild lumbar compensatory levoscoliosis. No structural abnormalities are evident. Diffuse spondylosis noted in the cervical, thoracic and to lesser extent lumbar spine. No fractures are identified. There is slight increased kyphotic curvature of the thoracic spine with alignment being otherwise normal. Measurements and evaluation are limited due to underpenetration. Land angle measures approximately in the thoracic spine is of approximately 17.0 degrees and for the lumbar spine 17.4 degrees. Soft tissues are unremarkable. IMPRESSION: 1. S shaped scoliotic curvature of the thoracolumbar spine with Land angle of approximately 17 degrees. 2. Diffuse spondylosis.    MRI report 12: Report notes that there is dextroconvex rotary curvature of the thoracic spine.  Posterior alignment is maintained.  There are scattered vertebral body hemangiomas.  There is minimal posterior disc bulges noted at T5/6, T6/7, T7/8 and T8/9.  There is no central spinal stenosis or neural foraminal narrowing.    MRI right shoulder 11: Minor distal subscapularis tendinosis.  Negative for full-thickness or significant partial-thickness rotator cuff tendon tear.  There is a small amount of subacromial subdeltoid bursal fluid which can be seen with recent injection or mild bursitis.    Cervical spine MRI 16  At C2-C3, the minimal interstitial excision covers a very mild broad-based disc bulge most prominent centrally but does not deform the ventral cord.  There is no canal or foraminal stenosis.  At C3-C4, there is mildly decreased disc height with a broad-based disc bulge most prominent centrally.  This minimally contact the ventral cord without significant deformity.  The  canal is widely patent.  There is uncovertebral hypertrophy and facet arthropathy, but the foramina are patent.  At C4-C5, there is decreased disc height with a broad disc bulge-marginal osteophyte complex that mildly lateralizes to the left and blends imperceptibly with right greater than left uncovertebral hypertrophy.  With ligamentum hypertrophy, there is no significant central canal stenosis.  Uncovertebral hypertrophy and facet arthropathy are causing mild left and moderate right foraminal stenoses.  At C5-C6, there is a broad-based disc bulge and osteophyte complex most prominent centrally.  There is also ligamentum flavum hypertrophy present, but the canal is patent.  There is mild left foraminal stenosis.  The right foramen is patent.  At C6-C7, there is disc desiccation with a minimal broad-based disc bulge most prominent in the right paracentral canal.  The canal is widely patent.  There is mild left foraminal stenosis.  At C7-T1, there is no significant disc bulge, protrusion, or herniation/extrusion.  The canal and foramina are widely patent.    Hip x-rays 9/22/17  AP view of the pelvis and frog leg views of both hips were obtained. No evidence of acute displaced fracture or dislocation is visualized.  No radiopaque foreign bodies are visualized. The bilateral superior joint spaces are grossly maintained. Mild bilateral sacroiliac joint degenerative changes are seen including subchondral sclerosis and small marginal osteophytes. Mild to moderate symphyseal degenerative changes are seen. There is a slightly expansile cortically-based focus along the lateral proximal right femoral metadiaphysis. This could reflect a sessile osteochondroma, but correlation with MRI of the right hip is recommended.    8/18/19 MRI L-spine:  T12-L1: No disc herniation or significant posterior osteophytic ridging.  No significant spinal canal or foraminal stenosis.   L1-L2: No disc herniation or significant posterior  osteophytic ridging.  Minimal left facet hypertrophy.  No significant spinal canal or foraminal stenosis.   L2-L3: No disc herniation or significant posterior osteophytic ridging.  Minimal left facet hypertrophy.  No significant spinal canal or foraminal stenosis.   L3-L4: No disc herniation or significant posterior osteophytic ridging.  Minimal bilateral facet hypertrophy.  No significant spinal canal or foraminal stenosis.   L4-L5: Mild disc bulge.  Mild bilateral facet hypertrophy.  Mild ligamentum flavum thickening.  Minimal narrowing of the bilateral lateral recesses. No significant overall spinal canal stenosis. Mild bilateral foraminal stenosis.   L5-S1: Minimal disc bulge contained in the ventral epidural fat.  Mild bilateral facet hypertrophy.  No significant spinal canal or foraminal stenosis.    Assessment:  The patient is a 61-year-old woman with history of hypothyroidism, otherwise fairly healthy but a long history of scoliosis causing back pain, self-referred for continued back pain.    1. Partial hamstring tear, left, initial encounter     2. DDD (degenerative disc disease), lumbar     3. Scoliosis of lumbar spine, unspecified scoliosis type         Plan:  1. We discussed for her left hamstring, she can start in gentle physical therapy and this should eventually resolve but it may take even up to several months.  We discussed that this was likely a tear since she had ecchymoses behind her left leg.  Nonetheless it seems to be improving enough that I do not think any surgery is going to be warranted thus, I would hold off on any further advanced imaging at this point.  She is going to let us know who she would like to see for physical therapy and I will place the orders.  2. She reports that her back pain had been worsening prior to this incident, she was considering having another intervention for her back but I would hold off at this point and hopefully physical therapy will help with this.  3. I  have refilled her hydrocodone for the next 3 months, she has not shown any signs of abuse or addiction or impulsive behavior. [place] [employer] website checked and no aberrant patterns noted.

## 2022-03-07 NOTE — PROGRESS NOTES
Subjective:          Chief Complaint: Vonnie Garces is a 61 y.o. female who had concerns including Pain of the Left Lower Leg.    Vonnie Garces is a 62 yo F here for left hamstring pain after she tripped over a speed bump 10 days ago and fell onto her left side with her leg extended.  Had immediate pain and limited ability to bear weight.  Noted ecchymosis along the mid posterior thigh which is where she localizes her pain.  Notes some buttock pain but thinks this is because of where she landed when she fell.  Denies n/t in left foot.        Review of Systems   Constitutional: Negative for fever and night sweats.   HENT: Negative for hearing loss.    Eyes: Negative for blurred vision and visual disturbance.   Cardiovascular: Negative for chest pain and leg swelling.   Respiratory: Negative for shortness of breath.    Endocrine: Negative for polyuria.   Hematologic/Lymphatic: Negative for bleeding problem.   Skin: Negative for rash.   Musculoskeletal: Negative for back pain, joint pain, joint swelling, muscle cramps and muscle weakness.   Gastrointestinal: Negative for melena.   Genitourinary: Negative for hematuria.   Neurological: Negative for loss of balance, numbness and paresthesias.   Psychiatric/Behavioral: Negative for altered mental status.                 Objective:        General: Vonnie is well-developed, well-nourished, appears stated age, in no acute distress, alert and oriented to time, place and person.     Ortho/SPM Exam    L Leg:  Ecchymosis and swelling along mid posterior light  Small palpable gap in hamstring muscle belly at mid thigh  No pain at posterior knee, no tenderness on palpation of ischial tuberosity  SILT L3-S1  DF/PF/EHL intact  Foot WWP      Xray Femur Left:  No fracture/dislocation noted            Assessment:       Encounter Diagnosis   Name Primary?    Partial hamstring tear, left, initial encounter Yes          Plan:       PT  Continue NSAID  On  Hydrocodone for neck from outside provider, can continue for hamstring  Walker for balance and to prevent falls

## 2022-03-10 ENCOUNTER — PATIENT MESSAGE (OUTPATIENT)
Dept: RHEUMATOLOGY | Facility: CLINIC | Age: 62
End: 2022-03-10
Payer: COMMERCIAL

## 2022-05-22 ENCOUNTER — PATIENT MESSAGE (OUTPATIENT)
Dept: RHEUMATOLOGY | Facility: CLINIC | Age: 62
End: 2022-05-22
Payer: COMMERCIAL

## 2022-05-23 ENCOUNTER — OFFICE VISIT (OUTPATIENT)
Dept: RHEUMATOLOGY | Facility: CLINIC | Age: 62
End: 2022-05-23
Payer: COMMERCIAL

## 2022-05-23 VITALS
DIASTOLIC BLOOD PRESSURE: 62 MMHG | WEIGHT: 165 LBS | SYSTOLIC BLOOD PRESSURE: 125 MMHG | HEIGHT: 61 IN | HEART RATE: 69 BPM | BODY MASS INDEX: 31.15 KG/M2

## 2022-05-23 DIAGNOSIS — G44.021 INTRACTABLE CHRONIC CLUSTER HEADACHE: ICD-10-CM

## 2022-05-23 DIAGNOSIS — M17.0 PRIMARY OSTEOARTHRITIS OF BOTH KNEES: Primary | ICD-10-CM

## 2022-05-23 DIAGNOSIS — R23.1 LIVEDO RETICULARIS: ICD-10-CM

## 2022-05-23 DIAGNOSIS — K90.0 CELIAC SYNDROME: ICD-10-CM

## 2022-05-23 DIAGNOSIS — E06.3 HASHIMOTO'S THYROIDITIS: ICD-10-CM

## 2022-05-23 DIAGNOSIS — M41.9 SCOLIOSIS OF THORACIC SPINE, UNSPECIFIED SCOLIOSIS TYPE: ICD-10-CM

## 2022-05-23 DIAGNOSIS — F51.01 PRIMARY INSOMNIA: ICD-10-CM

## 2022-05-23 DIAGNOSIS — M02.30 REACTIVE ARTHRITIS, UNSPECIFIED SITE: ICD-10-CM

## 2022-05-23 DIAGNOSIS — R42 DIZZINESS AND GIDDINESS: ICD-10-CM

## 2022-05-23 PROCEDURE — 99999 PR PBB SHADOW E&M-EST. PATIENT-LVL III: CPT | Mod: PBBFAC,,, | Performed by: INTERNAL MEDICINE

## 2022-05-23 PROCEDURE — 99999 PR PBB SHADOW E&M-EST. PATIENT-LVL III: ICD-10-PCS | Mod: PBBFAC,,, | Performed by: INTERNAL MEDICINE

## 2022-05-23 PROCEDURE — 99215 PR OFFICE/OUTPT VISIT, EST, LEVL V, 40-54 MIN: ICD-10-PCS | Mod: S$GLB,,, | Performed by: INTERNAL MEDICINE

## 2022-05-23 PROCEDURE — 3074F PR MOST RECENT SYSTOLIC BLOOD PRESSURE < 130 MM HG: ICD-10-PCS | Mod: CPTII,S$GLB,, | Performed by: INTERNAL MEDICINE

## 2022-05-23 PROCEDURE — 3078F DIAST BP <80 MM HG: CPT | Mod: CPTII,S$GLB,, | Performed by: INTERNAL MEDICINE

## 2022-05-23 PROCEDURE — 3008F PR BODY MASS INDEX (BMI) DOCUMENTED: ICD-10-PCS | Mod: CPTII,S$GLB,, | Performed by: INTERNAL MEDICINE

## 2022-05-23 PROCEDURE — 99215 OFFICE O/P EST HI 40 MIN: CPT | Mod: S$GLB,,, | Performed by: INTERNAL MEDICINE

## 2022-05-23 PROCEDURE — 3074F SYST BP LT 130 MM HG: CPT | Mod: CPTII,S$GLB,, | Performed by: INTERNAL MEDICINE

## 2022-05-23 PROCEDURE — 3008F BODY MASS INDEX DOCD: CPT | Mod: CPTII,S$GLB,, | Performed by: INTERNAL MEDICINE

## 2022-05-23 PROCEDURE — 3078F PR MOST RECENT DIASTOLIC BLOOD PRESSURE < 80 MM HG: ICD-10-PCS | Mod: CPTII,S$GLB,, | Performed by: INTERNAL MEDICINE

## 2022-05-23 RX ORDER — ESZOPICLONE 3 MG/1
3 TABLET, FILM COATED ORAL NIGHTLY
Qty: 30 TABLET | Refills: 4 | Status: SHIPPED | OUTPATIENT
Start: 2022-05-23 | End: 2022-06-22

## 2022-05-23 RX ORDER — PREDNISONE 2.5 MG/1
7.5 TABLET ORAL DAILY PRN
Qty: 90 TABLET | Refills: 2 | Status: SHIPPED | OUTPATIENT
Start: 2022-05-23 | End: 2022-09-13

## 2022-05-23 RX ORDER — IBUPROFEN AND FAMOTIDINE 26.6; 8 MG/1; MG/1
1 TABLET ORAL 3 TIMES DAILY
Qty: 90 TABLET | Refills: 12 | Status: SHIPPED | OUTPATIENT
Start: 2022-05-23 | End: 2022-05-24

## 2022-05-23 NOTE — PROGRESS NOTES
Subjective:       Patient ID: Vonnie Garces is a 61 y.o. female.    Chief Complaint: No chief complaint on file.    Follow up: 61 year old female  reactive arthritis. She has history of reactive arthritis and hashimoto's thyroiditis currently on duexis and occasional use of prednisone for flares. Pt started exercising  And lifting weights she fell over a speed bumpUnfortunately, she has not been able to get duexis with drug program. She tried ibuprofen/ famotidine split and this did not work as well in the past and caused GI upset. She has issues with  balance and had falls    Her migratory pain worsened  And her livideo reticularis and is worsens with pain She also reports worsening fatigue and stiffness during these flares. She started low dose prednisone 2.5-5 mg prn, which helps some.She has increased her physical activity.     Labs 11/22 were stable. She wants to repeat labs while she is flaring.    Current treatment:  1. Duexis TID PRN  2. Prednisone 2.5 mg PRN    Review of Systems   Constitutional: Positive for activity change. Negative for appetite change, chills, fatigue, fever and unexpected weight change.   HENT: Negative for mouth sores and trouble swallowing.    Eyes: Negative for redness and visual disturbance.   Respiratory: Negative for cough and shortness of breath.    Cardiovascular: Negative for chest pain, palpitations and leg swelling.   Gastrointestinal: Negative for abdominal pain, constipation, diarrhea, nausea and vomiting.   Genitourinary: Negative for dysuria and genital sores.   Musculoskeletal: Positive for arthralgias, back pain, myalgias and neck pain. Negative for gait problem.   Skin: Positive for rash.   Neurological: Negative for dizziness, weakness, light-headedness and headaches.   Hematological: Does not bruise/bleed easily.   Psychiatric/Behavioral: The patient is nervous/anxious.          Objective:     Vitals:    05/23/22 1520   BP: 125/62   Pulse: 69       Past  Medical History:   Diagnosis Date    Anxiety 8/25/2015    Arthritis     Celiac disease     Cervical spondylosis     Chronic back pain     DDD (degenerative disc disease), lumbar     Difficult intubation 03/2016    anterior larynx, pt with metal dental appliance, unable to do glidescope, used LMA    Encounter for blood transfusion     Facet arthropathy, thoracic     Hormone replacement therapy (HRT)     Insomnia 8/25/2015    Neck pain     PONV (postoperative nausea and vomiting)     Right foot pain     Scoliosis     Sjogren's syndrome     Snoring     uses cpap, states not ROSSY    Unspecified hypothyroidism 8/25/2015     Past Surgical History:   Procedure Laterality Date    BILATERAL OOPHORECTOMY  2004    BREAST BIOPSY Left 2010    Benign    CHOLECYSTECTOMY  2010    COLONOSCOPY  2016    [place]    Epidural Steroid Injection      Pain managment, at another facility, cervical, thoracic    Epidural Steroid injection      Pain management, cervical    EPIDURAL STEROID INJECTION INTO CERVICAL SPINE N/A 11/11/2021    Procedure: Injection-steroid-epidural-cervical, C7/T1 interlaminer;  Surgeon: Floyd Coleman MD;  Location: [facility] OR;  Service: Pain Management;  Laterality: N/A;    EPIDURAL STEROID INJECTION INTO LUMBAR SPINE Right 1/16/2019    Procedure: Injection-steroid-epidural-lumbar L5/S1;  Surgeon: Floyd Coleman MD;  Location: [facility] OR;  Service: Pain Management;  Laterality: Right;  to right    EPIDURAL STEROID INJECTION INTO LUMBAR SPINE N/A 5/14/2019    Procedure: Injection-steroid-epidural-lumbar;  Surgeon: Floyd Coleman MD;  Location: [facility] OR;  Service: Pain Management;  Laterality: N/A;  L5/S1 interlaminar    EPIDURAL STEROID INJECTION INTO LUMBAR SPINE N/A 3/22/2021    Procedure: Injection-steroid-epidural-lumbar L5/S1 interlaminer;  Surgeon: Floyd Coleman MD;  Location: [facility] OR;  Service: Pain Management;  Laterality: N/A;    HYSTERECTOMY  2004    Complete     MOUTH SURGERY      Braces, with temporary metal implants in upper gum    OOPHORECTOMY  2004    w/ hysterectomy    RADIOFREQUENCY ABLATION  02/2016    thoracic nerve    RADIOFREQUENCY ABLATION Right 6/4/2019    Procedure: Radiofrequency Ablation T4,5,6,7;  Surgeon: Floyd Coleman MD;  Location: [facility] OR;  Service: Pain Management;  Laterality: Right;    RADIOFREQUENCY ABLATION Right 5/21/2021    Procedure: RADIOFREQUENCY ABLATION,THORACIC  T4,T5,T6, T7;  Surgeon: Floyd Coleman MD;  Location: [facility] OR;  Service: Pain Management;  Laterality: Right;    TONSILLECTOMY      VULVA SURGERY  03/2016          Physical Exam   Constitutional: She is oriented to person, place, and time. She appears distressed.   HENT:   Head: Normocephalic and atraumatic.   Eyes: Pupils are equal, round, and reactive to light. Right eye exhibits no discharge. Left eye exhibits no discharge.   Neck: No thyromegaly present.   Cardiovascular: Normal rate, regular rhythm and normal heart sounds. Exam reveals no gallop and no friction rub.   No murmur heard.  Pulmonary/Chest: Breath sounds normal. She has no wheezes. She has no rales. She exhibits no tenderness.   Abdominal: There is no abdominal tenderness. There is no rebound and no guarding.   Musculoskeletal:         General: Tenderness present.      Right shoulder: Tenderness present.      Left shoulder: Tenderness present.      Right elbow: Normal.      Left elbow: Tenderness present.      Right wrist: Tenderness present.      Left wrist: Tenderness present.      Cervical back: Neck supple.      Right knee: Effusion present. Tenderness present.      Left knee: Effusion present. Tenderness present.   Lymphadenopathy:     She has no cervical adenopathy.   Neurological: She is alert and oriented to person, place, and time. Gait normal.   Skin: Skin is dry. No rash noted. There is erythema. There is pallor.   Livido reticularis.   Psychiatric: Mood and affect normal.   Vitals  reviewed.      Right Side Rheumatological Exam     Examination finds the elbow normal.    The patient is tender to palpation of the shoulder, wrist, knee, 1st PIP, 1st MCP, 2nd PIP, 2nd MCP, 3rd PIP, 3rd MCP, 4th PIP, 4th MCP, 5th PIP and 5th MCP    She has swelling of the 1st PIP, 1st MCP, 2nd PIP, 2nd MCP, 3rd PIP, 3rd MCP, 4th PIP, 4th MCP, 5th PIP and 5th MCP    Shoulder Exam   Tenderness Location: no tenderness    Range of Motion   Active abduction: abnormal   Adduction: abnormal  Sensation: normal    Knee Exam   Patellofemoral Crepitus: positive  Effusion: positive  Sensation: normal    Hip Exam   Tenderness Location: posterior  Sensation: normal    Elbow/Wrist Exam   Tenderness Location: no tenderness  Sensation: normal    Left Side Rheumatological Exam     The patient is tender to palpation of the shoulder, elbow, wrist, knee, 1st PIP, 1st MCP, 2nd PIP, 2nd MCP, 3rd PIP, 3rd MCP, 4th PIP, 4th MCP, 5th PIP and 5th MCP.    She has swelling of the 1st PIP, 1st MCP, 2nd PIP, 2nd MCP, 3rd PIP, 3rd MCP, 4th PIP, 4th MCP, 5th PIP and 5th MCP    Shoulder Exam   Tenderness Location: no tenderness    Range of Motion   Active abduction: abnormal   Sensation: normal    Knee Exam     Patellofemoral Crepitus: positive  Effusion: positive  Sensation: normal    Hip Exam   Tenderness Location: posterior  Sensation: normal    Elbow/Wrist Exam   Sensation: normal      Back/Neck Exam   General Inspection   Gait: normal             Results for orders placed or performed in visit on 02/08/22   FERNANDO Screen w/Reflex   Result Value Ref Range    FERNANDO Screen None Detected None Detected   Anti-DNA Ab, Double-Stranded   Result Value Ref Range    ds DNA Ab 2 0 - 24 IU   CBC Auto Differential   Result Value Ref Range    WBC 5.37 3.90 - 12.70 K/uL    RBC 4.46 4.00 - 5.40 M/uL    Hemoglobin 12.9 12.0 - 16.0 g/dL    Hematocrit 39.8 37.0 - 48.5 %    MCV 89 82 - 98 fL    MCH 28.9 27.0 - 31.0 pg    MCHC 32.4 32.0 - 36.0 g/dL    RDW 13.6 11.5 -  14.5 %    Platelets 231 150 - 450 K/uL    MPV 10.2 9.2 - 12.9 fL    Immature Granulocytes 0.2 0.0 - 0.5 %    Gran # (ANC) 3.2 1.8 - 7.7 K/uL    Immature Grans (Abs) 0.01 0.00 - 0.04 K/uL    Lymph # 1.6 1.0 - 4.8 K/uL    Mono # 0.4 0.3 - 1.0 K/uL    Eos # 0.1 0.0 - 0.5 K/uL    Baso # 0.02 0.00 - 0.20 K/uL    nRBC 0 0 /100 WBC    Gran % 60.1 38.0 - 73.0 %    Lymph % 29.1 18.0 - 48.0 %    Mono % 8.0 4.0 - 15.0 %    Eosinophil % 2.2 0.0 - 8.0 %    Basophil % 0.4 0.0 - 1.9 %    Differential Method Automated    C-Reactive Protein   Result Value Ref Range    CRP 0.60 0.00 - 0.90 mg/dL   Sedimentation rate   Result Value Ref Range    Sed Rate 8 0 - 29 mm/Hr   TSH   Result Value Ref Range    TSH 3.410 0.400 - 4.000 uIU/mL   T4, Free   Result Value Ref Range    Free T4 1.20 0.78 - 2.19 ng/dL   C3 Complement   Result Value Ref Range    Complement (C-3) 109 50 - 180 mg/dL   C4 Complement   Result Value Ref Range    Complement (C-4) 41 11 - 44 mg/dL     reviewed labs with patient during this visit          Assessment:       1. Primary osteoarthritis of both knees    2. Dizziness and giddiness    3. Intractable chronic cluster headache    4. Hashimoto's thyroiditis    5. Celiac syndrome    6. Livedo reticularis    7. Scoliosis of thoracic spine, unspecified scoliosis type    8. Primary insomnia    9. Reactive arthritis            Plan:       Primary osteoarthritis of both knees  -     Discontinue: ibuprofen-famotidine (DUEXIS) 800-26.6 mg Tab; Take 1 tablet by mouth 3 (three) times daily.  Dispense: 90 tablet; Refill: 12  -     CBC Auto Differential; Future; Expected date: 05/23/2022  -     CK; Future; Expected date: 05/23/2022  -     C-Reactive Protein; Future; Expected date: 05/23/2022  -     Sedimentation rate; Future; Expected date: 05/23/2022  -     TSH; Future; Expected date: 05/23/2022  -     T4, Free; Future; Expected date: 05/23/2022  -     Anti-Thyroglobulin Antibody; Future; Expected date: 05/23/2022    Dizziness and  giddiness  -     MRI Brain Without Contrast; Future; Expected date: 05/23/2022  -     Discontinue: ibuprofen-famotidine (DUEXIS) 800-26.6 mg Tab; Take 1 tablet by mouth 3 (three) times daily.  Dispense: 90 tablet; Refill: 12  -     CBC Auto Differential; Future; Expected date: 05/23/2022  -     CK; Future; Expected date: 05/23/2022  -     C-Reactive Protein; Future; Expected date: 05/23/2022  -     Sedimentation rate; Future; Expected date: 05/23/2022  -     TSH; Future; Expected date: 05/23/2022  -     T4, Free; Future; Expected date: 05/23/2022  -     Anti-Thyroglobulin Antibody; Future; Expected date: 05/23/2022    Intractable chronic cluster headache  -     CBC Auto Differential; Future; Expected date: 05/23/2022  -     CK; Future; Expected date: 05/23/2022  -     C-Reactive Protein; Future; Expected date: 05/23/2022  -     Sedimentation rate; Future; Expected date: 05/23/2022  -     TSH; Future; Expected date: 05/23/2022  -     T4, Free; Future; Expected date: 05/23/2022  -     Anti-Thyroglobulin Antibody; Future; Expected date: 05/23/2022    Hashimoto's thyroiditis  -     CBC Auto Differential; Future; Expected date: 05/23/2022  -     CK; Future; Expected date: 05/23/2022  -     C-Reactive Protein; Future; Expected date: 05/23/2022  -     Sedimentation rate; Future; Expected date: 05/23/2022  -     TSH; Future; Expected date: 05/23/2022  -     T4, Free; Future; Expected date: 05/23/2022  -     Anti-Thyroglobulin Antibody; Future; Expected date: 05/23/2022    Celiac syndrome  -     CBC Auto Differential; Future; Expected date: 05/23/2022  -     CK; Future; Expected date: 05/23/2022  -     C-Reactive Protein; Future; Expected date: 05/23/2022  -     Sedimentation rate; Future; Expected date: 05/23/2022  -     TSH; Future; Expected date: 05/23/2022  -     T4, Free; Future; Expected date: 05/23/2022  -     Anti-Thyroglobulin Antibody; Future; Expected date: 05/23/2022    Livedo reticularis  -     CBC Auto  Differential; Future; Expected date: 05/23/2022  -     CK; Future; Expected date: 05/23/2022  -     C-Reactive Protein; Future; Expected date: 05/23/2022  -     Sedimentation rate; Future; Expected date: 05/23/2022  -     TSH; Future; Expected date: 05/23/2022  -     T4, Free; Future; Expected date: 05/23/2022  -     Anti-Thyroglobulin Antibody; Future; Expected date: 05/23/2022    Scoliosis of thoracic spine, unspecified scoliosis type  -     CBC Auto Differential; Future; Expected date: 05/23/2022  -     CK; Future; Expected date: 05/23/2022  -     C-Reactive Protein; Future; Expected date: 05/23/2022  -     Sedimentation rate; Future; Expected date: 05/23/2022  -     TSH; Future; Expected date: 05/23/2022  -     T4, Free; Future; Expected date: 05/23/2022  -     Anti-Thyroglobulin Antibody; Future; Expected date: 05/23/2022    Primary insomnia  -     eszopiclone (LUNESTA) 3 mg Tab; Take 1 tablet (3 mg total) by mouth every evening.  Dispense: 30 tablet; Refill: 4  -     CBC Auto Differential; Future; Expected date: 05/23/2022  -     CK; Future; Expected date: 05/23/2022  -     C-Reactive Protein; Future; Expected date: 05/23/2022  -     Sedimentation rate; Future; Expected date: 05/23/2022  -     TSH; Future; Expected date: 05/23/2022  -     T4, Free; Future; Expected date: 05/23/2022  -     Anti-Thyroglobulin Antibody; Future; Expected date: 05/23/2022    Reactive arthritis  -     predniSONE (DELTASONE) 2.5 MG tablet; Take 3 tablets (7.5 mg total) by mouth daily as needed (arthritis flares).  Dispense: 90 tablet; Refill: 2  -     CBC Auto Differential; Future; Expected date: 05/23/2022  -     CK; Future; Expected date: 05/23/2022  -     C-Reactive Protein; Future; Expected date: 05/23/2022  -     Sedimentation rate; Future; Expected date: 05/23/2022  -     TSH; Future; Expected date: 05/23/2022  -     T4, Free; Future; Expected date: 05/23/2022  -     Anti-Thyroglobulin Antibody; Future; Expected date:  05/23/2022          Assessment:  61 year old female with  Reactive arthritis, hashimoto's thyroiditis (+thyroperoxidase antibody), history of FERNANDO positivity (repeat testing negative), celiac syndrome    Plan:  1. Will try to get  duexis. But will try relafen and diclofenac  2. Prednisone 2.5 mg prn  3. Cont. tirosint  4. Increased her lunesta      1. Will  Try relafen 1000 mg and diclofenac 25mg

## 2022-05-24 ENCOUNTER — TELEPHONE (OUTPATIENT)
Dept: PHARMACY | Facility: CLINIC | Age: 62
End: 2022-05-24
Payer: COMMERCIAL

## 2022-05-24 DIAGNOSIS — M51.36 DDD (DEGENERATIVE DISC DISEASE), LUMBAR: ICD-10-CM

## 2022-05-24 DIAGNOSIS — M47.812 CERVICAL SPONDYLOSIS: Primary | ICD-10-CM

## 2022-05-24 DIAGNOSIS — M50.30 DDD (DEGENERATIVE DISC DISEASE), CERVICAL: ICD-10-CM

## 2022-05-24 RX ORDER — IBUPROFEN 800 MG/1
800 TABLET ORAL 3 TIMES DAILY
Qty: 90 TABLET | Refills: 3 | Status: SHIPPED | OUTPATIENT
Start: 2022-05-24 | End: 2024-02-27

## 2022-05-24 RX ORDER — FAMOTIDINE 20 MG/1
20 TABLET, FILM COATED ORAL 2 TIMES DAILY
Qty: 60 TABLET | Refills: 5 | Status: SHIPPED | OUTPATIENT
Start: 2022-05-24 | End: 2023-05-10 | Stop reason: SDUPTHER

## 2022-05-25 NOTE — ADDENDUM NOTE
Addended by: EMERSON LAWSON on: 5/24/2022 07:11 PM     Modules accepted: Orders    
Addended by: KIM FRENCH on: 5/24/2022 01:15 PM     Modules accepted: Orders    
[facility] ER/In house

## 2022-06-20 ENCOUNTER — OFFICE VISIT (OUTPATIENT)
Dept: PAIN MEDICINE | Facility: CLINIC | Age: 62
End: 2022-06-20
Payer: COMMERCIAL

## 2022-06-20 VITALS
HEIGHT: 61 IN | BODY MASS INDEX: 31.59 KG/M2 | DIASTOLIC BLOOD PRESSURE: 67 MMHG | SYSTOLIC BLOOD PRESSURE: 145 MMHG | WEIGHT: 167.31 LBS | HEART RATE: 64 BPM

## 2022-06-20 DIAGNOSIS — M47.814 THORACIC SPONDYLOSIS: Primary | ICD-10-CM

## 2022-06-20 DIAGNOSIS — M51.36 DDD (DEGENERATIVE DISC DISEASE), LUMBAR: ICD-10-CM

## 2022-06-20 DIAGNOSIS — Z79.891 OPIOID CONTRACT EXISTS: ICD-10-CM

## 2022-06-20 DIAGNOSIS — M50.30 DDD (DEGENERATIVE DISC DISEASE), CERVICAL: ICD-10-CM

## 2022-06-20 PROCEDURE — 99999 PR PBB SHADOW E&M-EST. PATIENT-LVL V: CPT | Mod: PBBFAC,,, | Performed by: PHYSICIAN ASSISTANT

## 2022-06-20 PROCEDURE — 1159F MED LIST DOCD IN RCRD: CPT | Mod: CPTII,S$GLB,, | Performed by: PHYSICIAN ASSISTANT

## 2022-06-20 PROCEDURE — 99214 PR OFFICE/OUTPT VISIT, EST, LEVL IV, 30-39 MIN: ICD-10-PCS | Mod: S$GLB,,, | Performed by: PHYSICIAN ASSISTANT

## 2022-06-20 PROCEDURE — 3078F PR MOST RECENT DIASTOLIC BLOOD PRESSURE < 80 MM HG: ICD-10-PCS | Mod: CPTII,S$GLB,, | Performed by: PHYSICIAN ASSISTANT

## 2022-06-20 PROCEDURE — 3077F PR MOST RECENT SYSTOLIC BLOOD PRESSURE >= 140 MM HG: ICD-10-PCS | Mod: CPTII,S$GLB,, | Performed by: PHYSICIAN ASSISTANT

## 2022-06-20 PROCEDURE — 3077F SYST BP >= 140 MM HG: CPT | Mod: CPTII,S$GLB,, | Performed by: PHYSICIAN ASSISTANT

## 2022-06-20 PROCEDURE — 3008F PR BODY MASS INDEX (BMI) DOCUMENTED: ICD-10-PCS | Mod: CPTII,S$GLB,, | Performed by: PHYSICIAN ASSISTANT

## 2022-06-20 PROCEDURE — 99999 PR PBB SHADOW E&M-EST. PATIENT-LVL V: ICD-10-PCS | Mod: PBBFAC,,, | Performed by: PHYSICIAN ASSISTANT

## 2022-06-20 PROCEDURE — 3008F BODY MASS INDEX DOCD: CPT | Mod: CPTII,S$GLB,, | Performed by: PHYSICIAN ASSISTANT

## 2022-06-20 PROCEDURE — 99214 OFFICE O/P EST MOD 30 MIN: CPT | Mod: S$GLB,,, | Performed by: PHYSICIAN ASSISTANT

## 2022-06-20 PROCEDURE — 1160F RVW MEDS BY RX/DR IN RCRD: CPT | Mod: CPTII,S$GLB,, | Performed by: PHYSICIAN ASSISTANT

## 2022-06-20 PROCEDURE — 1160F PR REVIEW ALL MEDS BY PRESCRIBER/CLIN PHARMACIST DOCUMENTED: ICD-10-PCS | Mod: CPTII,S$GLB,, | Performed by: PHYSICIAN ASSISTANT

## 2022-06-20 PROCEDURE — 3078F DIAST BP <80 MM HG: CPT | Mod: CPTII,S$GLB,, | Performed by: PHYSICIAN ASSISTANT

## 2022-06-20 PROCEDURE — 1159F PR MEDICATION LIST DOCUMENTED IN MEDICAL RECORD: ICD-10-PCS | Mod: CPTII,S$GLB,, | Performed by: PHYSICIAN ASSISTANT

## 2022-06-20 RX ORDER — SODIUM CHLORIDE, SODIUM LACTATE, POTASSIUM CHLORIDE, CALCIUM CHLORIDE 600; 310; 30; 20 MG/100ML; MG/100ML; MG/100ML; MG/100ML
INJECTION, SOLUTION INTRAVENOUS CONTINUOUS
Status: CANCELLED | OUTPATIENT
Start: 2022-06-30

## 2022-06-21 ENCOUNTER — PATIENT MESSAGE (OUTPATIENT)
Dept: PAIN MEDICINE | Facility: CLINIC | Age: 62
End: 2022-06-21
Payer: COMMERCIAL

## 2022-06-21 RX ORDER — HYDROCODONE BITARTRATE AND ACETAMINOPHEN 7.5; 325 MG/1; MG/1
1 TABLET ORAL EVERY 12 HOURS PRN
Qty: 60 TABLET | Refills: 0 | Status: SHIPPED | OUTPATIENT
Start: 2022-08-07 | End: 2022-09-06

## 2022-06-21 RX ORDER — HYDROCODONE BITARTRATE AND ACETAMINOPHEN 7.5; 325 MG/1; MG/1
1 TABLET ORAL EVERY 12 HOURS PRN
Qty: 60 TABLET | Refills: 0 | Status: SHIPPED | OUTPATIENT
Start: 2022-09-06 | End: 2022-09-08 | Stop reason: SDUPTHER

## 2022-06-21 RX ORDER — HYDROCODONE BITARTRATE AND ACETAMINOPHEN 7.5; 325 MG/1; MG/1
1 TABLET ORAL EVERY 12 HOURS PRN
Qty: 60 TABLET | Refills: 0 | Status: SHIPPED | OUTPATIENT
Start: 2022-07-08 | End: 2022-08-07

## 2022-06-21 NOTE — TELEPHONE ENCOUNTER
Patient seen in clinic.  Please send prescriptions to her pharmacy.    I have reviewed the [place] Board [employer] Pharm[employer] website and there are no abberancies.    
Statement Selected

## 2022-06-23 NOTE — H&P (VIEW-ONLY)
This note was completed with dictation software and grammatical errors may exist.    CC:Back pain, neck pain    HPI: The patient is a 61-year-old woman with history of hypothyroidism, otherwise fairly healthy but a long history of scoliosis causing back pain, self-referred for continued back pain.  She returns in follow-up today with multiple complaints.  She describes mid back pain greater than neck and low back pain.  She does feel that it is time to repeat interventional procedures.  She has been exercising since October.  Her worst pain is in the right scapular region.  This is constant and gradually getting worse over the past few months.  She had about a year of relief with her last radiofrequency ablation.  In regards to her neck and low back pain she is currently not having radicular pain but these are also slowly getting worse as well.  She continues to take medicine with relief.  She denies weakness or numbness.    Pain intervention history: She has been seeing Dr. Huffman for the last several years and has undergone epidural steroid injections and radiofrequency ablations with good relief.  According to her last pain management physician, she had undergone a right side T3, 4, 5, 6 medial branch radiofrequency ablation on 8/8/14 with good relief.  He had scheduled her for another one but did not complete this.  As far as medications she has been taking Hydrocodone 7.5/325 one to 2 times a day at most and gabapentin 300 mg at night. She is status post cervical epidural steroid injection on 10/19/15 with 50% relief of her neck pain. She is status post right L4 transforaminal epidural steroid injection on 11/23/15 with 100% relief.   She is status post right T3, 4, 5 and 6 medial branch radio frequency ablation on 2/5/16 with greater than 50% relief.  She is status post right L4 transforaminal epidural sterile injection on 6/14/16 with significant relief lasting only about a month.  She is status post  C7-T1 cervical interlaminar epidural steroid injection on 7/14/16 with 30-40% relief.   She is status post L5/S1 interlaminar epidural steroidal injection to the right on 8/12/16 with 100% relief of her right leg pain.  She is status post C7-T1 cervical interlaminar epidural steroid injection on 12/9/16 with almost complete relief.  She is status post right T4, 5, 6 and 7 medial branch radiofrequency ablation on 5/30/17 with 100% relief.  She is status post C7-T1 cervical interlaminar epidural steroid injection on 6/27/17 with 80% relief.  She is status post L5/S1 interlaminar epidural steroid injection on a/7/17 with excellent relief lasting 2 weeks, now reporting 0% relief.  She is status post right L3, 4 and 5 medial branch radiofrequency ablation on 10/19/17 with 80% relief.   She is status post right T4, 5, 6 and 7 medial branch radiofrequency ablation on 12/14/17 with 100% relief.  She is status post C7-T1 cervical interlaminar epidural steroid injection on 3/16/18 with at least 80% relief.  She is status post lumbar epidural steroid injection at L5/S1 on 5/15/18 with 90% relief of her bilateral low back and right leg pain.  The she is status post right T4, 5, 6 and 7 medial branch radiofrequency ablation on 10/12/2018 with 100% relief.  She is status post L5/S1 interlaminar epidural steroid injection on 01/16/2019 with almost 100% relief of her low back pain. She is status post right T4, 5, 6 and 7 medial branch radiofrequency ablation on 06/04/2019 with 80% relief.  She is status post L5/S1 interlaminar epidural steroid injection on 03/22/2021 with 85-90% relief.   She is status post right T4, 5, 6, 7 medial branch radiofrequency ablation on 05/21/2021 with 85% relief. She is status post cervical JINA C7/T1 on 11/11/2021 with greater than 70% relief.     Spine surgeries:  None    Antineuropathics:  Lidoderm 5%  NSAIDs:  Duexis 800 up to TID  Physical therapy:  She has consistently been in physical therapy,  reports exercise, dry needling with at least temporary relief  Antidepressants:  Muscle relaxers:  Opioids:  Hydrocodone 7.5/325 twice daily  Antiplatelets/Anticoagulants:    ROS: She reports fatigability, headaches, hypothyroidism, joint stiffness, back pain, difficulty sleeping and anxiety.  Balance of review of systems is negative.      Past Medical History:   Diagnosis Date    Anxiety 8/25/2015    Arthritis     Celiac disease     Cervical spondylosis     Chronic back pain     DDD (degenerative disc disease), lumbar     Difficult intubation 03/2016    anterior larynx, pt with metal dental appliance, unable to do glidescope, used LMA    Encounter for blood transfusion     Facet arthropathy, thoracic     Hormone replacement therapy (HRT)     Insomnia 8/25/2015    Neck pain     PONV (postoperative nausea and vomiting)     Right foot pain     Scoliosis     Sjogren's syndrome     Snoring     uses cpap, states not ROSSY    Unspecified hypothyroidism 8/25/2015     Past Surgical History:   Procedure Laterality Date    BILATERAL OOPHORECTOMY  2004    BREAST BIOPSY Left 2010    Benign    CHOLECYSTECTOMY  2010    [facility]OSCOPY  2016    [facility]    Epidural Steroid Injection      Pain managment, at another facility, cervical, thoracic    Epidural Steroid injection      Pain management, cervical    EPIDURAL STEROID INJECTION INTO CERVICAL SPINE N/A 11/11/2021    Procedure: Injection-steroid-epidural-cervical, C7/T1 interlaminer;  Surgeon: Floyd Coleman MD;  Location: [facility] OR;  Service: Pain Management;  Laterality: N/A;    EPIDURAL STEROID INJECTION INTO LUMBAR SPINE Right 1/16/2019    Procedure: Injection-steroid-epidural-lumbar L5/S1;  Surgeon: Floyd Coleman MD;  Location: [facility] OR;  Service: Pain Management;  Laterality: Right;  to right    EPIDURAL STEROID INJECTION INTO LUMBAR SPINE N/A 5/14/2019    Procedure: Injection-steroid-epidural-lumbar;  Surgeon: Floyd Coleman MD;  Location:  "[facility] OR;  Service: Pain Management;  Laterality: N/A;  L5/S1 interlaminar    EPIDURAL STEROID INJECTION INTO LUMBAR SPINE N/A 3/22/2021    Procedure: Injection-steroid-epidural-lumbar L5/S1 interlaminer;  Surgeon: Floyd Coleman MD;  Location: [facility] OR;  Service: Pain Management;  Laterality: N/A;    HYSTERECTOMY  2004    Complete    MOUTH SURGERY      Braces, with temporary metal implants in upper gum    OOPHORECTOMY  2004    w/ hysterectomy    RADIOFREQUENCY ABLATION  02/2016    thoracic nerve    RADIOFREQUENCY ABLATION Right 6/4/2019    Procedure: Radiofrequency Ablation T4,5,6,7;  Surgeon: Floyd Coleman MD;  Location: [facility] OR;  Service: Pain Management;  Laterality: Right;    RADIOFREQUENCY ABLATION Right 5/21/2021    Procedure: RADIOFREQUENCY ABLATION,THORACIC  T4,T5,T6, T7;  Surgeon: Floyd Coleman MD;  Location: [facility] OR;  Service: Pain Management;  Laterality: Right;    TONSILLECTOMY      VULVA SURGERY  03/2016     Social History     Socioeconomic History    Marital status:    Tobacco Use    Smoking status: Never Smoker    Smokeless tobacco: Never Used   Substance and Sexual Activity    Alcohol use: No    Drug use: No    Sexual activity: Yes     Partners: Male      She is a  at [employer].    Medications/Allergies: See med card    Vitals:    06/20/22 1348   BP: (!) 145/67   Pulse: 64   Weight: 75.9 kg (167 lb 5.3 oz)   Height: 5' 1.2" (1.554 m)   PainSc:   7   PainLoc: Back         Physical exam:  Gen: A and O x3, pleasant, well-groomed  Skin: No rashes or obvious lesions  HEENT: PERRLA, no obvious deformities on ears or in canals. Trachea midline.  CVS: Regular rate and rhythm, normal palpable pulses.  Resp:No increased work of breathing, symmetrical chest rise.  Abdomen: Soft, NT/ND.  Musculoskeletal:No antalgic gait.      Neuro:  Upper extremities: 5/5 strength bilaterally.  Lower extremities: 5/5 strength bilaterally.    Reflexes: Patellar 0+, Achilles " 0+ bilaterally.  Upper extremity reflexes 2+ bilaterally equal.  Sensory:  Intact and symmetrical to light touch and pinprick in L2-S1 dermatomes bilaterally.     Cervical spine exam: Range of motion is full with flexion, extension and lateral rotation without pain.  Myofascial exam:  Exquisite tenderness to palpation to the right mid thoracic paraspinous muscles.       Lumbar spine:  Lumbar spine: Range of motion is full with flexion and extension with mild increased right low back pain.  Brayden's test causes no increased pain on either side.    Supine straight leg raise causes right low back and buttock pain.  Internal and external rotation of the hip causes no increased pain on either side.  Myofascial exam:  Mild tenderness to palpation to the right lumbar paraspinous muscles.      Imagin/16/15 Xray Scoliosis survey: There is thoracic dextroscoliosis with mild lumbar compensatory levoscoliosis. No structural abnormalities are evident. Diffuse spondylosis noted in the cervical, thoracic and to lesser extent lumbar spine. No fractures are identified. There is slight increased kyphotic curvature of the thoracic spine with alignment being otherwise normal. Measurements and evaluation are limited due to underpenetration. Land angle measures approximately in the thoracic spine is of approximately 17.0 degrees and for the lumbar spine 17.4 degrees. Soft tissues are unremarkable. IMPRESSION: 1. S shaped scoliotic curvature of the thoracolumbar spine with Land angle of approximately 17 degrees. 2. Diffuse spondylosis.    MRI report 12: Report notes that there is dextroconvex rotary curvature of the thoracic spine.  Posterior alignment is maintained.  There are scattered vertebral body hemangiomas.  There is minimal posterior disc bulges noted at T5/6, T6/7, T7/8 and T8/9.  There is no central spinal stenosis or neural foraminal narrowing.    MRI right shoulder 11: Minor distal subscapularis tendinosis.   Negative for full-thickness or significant partial-thickness rotator cuff tendon tear.  There is a small amount of subacromial subdeltoid bursal fluid which can be seen with recent injection or mild bursitis.    Cervical spine MRI 6/29/16  At C2-C3, the minimal interstitial excision covers a very mild broad-based disc bulge most prominent centrally but does not deform the ventral cord.  There is no canal or foraminal stenosis.  At C3-C4, there is mildly decreased disc height with a broad-based disc bulge most prominent centrally.  This minimally contact the ventral cord without significant deformity.  The canal is widely patent.  There is uncovertebral hypertrophy and facet arthropathy, but the foramina are patent.  At C4-C5, there is decreased disc height with a broad disc bulge-marginal osteophyte complex that mildly lateralizes to the left and blends imperceptibly with right greater than left uncovertebral hypertrophy.  With ligamentum hypertrophy, there is no significant central canal stenosis.  Uncovertebral hypertrophy and facet arthropathy are causing mild left and moderate right foraminal stenoses.  At C5-C6, there is a broad-based disc bulge and osteophyte complex most prominent centrally.  There is also ligamentum flavum hypertrophy present, but the canal is patent.  There is mild left foraminal stenosis.  The right foramen is patent.  At C6-C7, there is disc desiccation with a minimal broad-based disc bulge most prominent in the right paracentral canal.  The canal is widely patent.  There is mild left foraminal stenosis.  At C7-T1, there is no significant disc bulge, protrusion, or herniation/extrusion.  The canal and foramina are widely patent.    Hip x-rays 9/22/17  AP view of the pelvis and frog leg views of both hips were obtained. No evidence of acute displaced fracture or dislocation is visualized.  No radiopaque foreign bodies are visualized. The bilateral superior joint spaces are grossly  maintained. Mild bilateral sacroiliac joint degenerative changes are seen including subchondral sclerosis and small marginal osteophytes. Mild to moderate symphyseal degenerative changes are seen. There is a slightly expansile cortically-based focus along the lateral proximal right femoral metadiaphysis. This could reflect a sessile osteochondroma, but correlation with MRI of the right hip is recommended.    8/18/19 MRI L-spine:  T12-L1: No disc herniation or significant posterior osteophytic ridging.  No significant spinal canal or foraminal stenosis.   L1-L2: No disc herniation or significant posterior osteophytic ridging.  Minimal left facet hypertrophy.  No significant spinal canal or foraminal stenosis.   L2-L3: No disc herniation or significant posterior osteophytic ridging.  Minimal left facet hypertrophy.  No significant spinal canal or foraminal stenosis.   L3-L4: No disc herniation or significant posterior osteophytic ridging.  Minimal bilateral facet hypertrophy.  No significant spinal canal or foraminal stenosis.   L4-L5: Mild disc bulge.  Mild bilateral facet hypertrophy.  Mild ligamentum flavum thickening.  Minimal narrowing of the bilateral lateral recesses. No significant overall spinal canal stenosis. Mild bilateral foraminal stenosis.   L5-S1: Minimal disc bulge contained in the ventral epidural fat.  Mild bilateral facet hypertrophy.  No significant spinal canal or foraminal stenosis.    Assessment:  The patient is a 61-year-old woman with history of hypothyroidism, otherwise fairly healthy but a long history of scoliosis causing back pain, self-referred for continued back pain.    1. Thoracic spondylosis  Vital signs    Place 18-22 gauage peripheral IV     Verify informed consent    Notify physician     Notify physician     Notify physician (specify)    Diet NPO    Case Request Operating Room: Radiofrequency Ablation Thoracic T4, T5, T6, T7    Place in Outpatient    lactated ringers infusion   2.  DDD (degenerative disc disease), lumbar     3. DDD (degenerative disc disease), cervical     4. Opioid contract exists         Plan:  1. I will schedule her to repeat right T4/5, C5/6 and C6/7 medial branch radiofrequency ablation.  She has done well with this in the past.  2. We discussed repeating cervical and lumbar epidural steroid injections in the future.  3. Dr. Coleman provided prescriptions for hydrocodone-acetaminophen 7.5/325 mg twice daily as needed for pain.  I have reviewed the [place] Board of Pharmacy website and there are no abberancies.    4. Follow-up in 4 weeks postprocedure or sooner as needed.

## 2022-06-23 NOTE — PROGRESS NOTES
This note was completed with dictation software and grammatical errors may exist.    CC:Back pain, neck pain    HPI: The patient is a 61-year-old woman with history of hypothyroidism, otherwise fairly healthy but a long history of scoliosis causing back pain, self-referred for continued back pain.  She returns in follow-up today with multiple complaints.  She describes mid back pain greater than neck and low back pain.  She does feel that it is time to repeat interventional procedures.  She has been exercising since October.  Her worst pain is in the right scapular region.  This is constant and gradually getting worse over the past few months.  She had about a year of relief with her last radiofrequency ablation.  In regards to her neck and low back pain she is currently not having radicular pain but these are also slowly getting worse as well.  She continues to take medicine with relief.  She denies weakness or numbness.    Pain intervention history: She has been seeing Dr. Huffman for the last several years and has undergone epidural steroid injections and radiofrequency ablations with good relief.  According to her last pain management physician, she had undergone a right side T3, 4, 5, 6 medial branch radiofrequency ablation on 8/8/14 with good relief.  He had scheduled her for another one but did not complete this.  As far as medications she has been taking Hydrocodone 7.5/325 one to 2 times a day at most and gabapentin 300 mg at night. She is status post cervical epidural steroid injection on 10/19/15 with 50% relief of her neck pain. She is status post right L4 transforaminal epidural steroid injection on 11/23/15 with 100% relief.   She is status post right T3, 4, 5 and 6 medial branch radio frequency ablation on 2/5/16 with greater than 50% relief.  She is status post right L4 transforaminal epidural sterile injection on 6/14/16 with significant relief lasting only about a month.  She is status post  C7-T1 cervical interlaminar epidural steroid injection on 7/14/16 with 30-40% relief.   She is status post L5/S1 interlaminar epidural steroidal injection to the right on 8/12/16 with 100% relief of her right leg pain.  She is status post C7-T1 cervical interlaminar epidural steroid injection on 12/9/16 with almost complete relief.  She is status post right T4, 5, 6 and 7 medial branch radiofrequency ablation on 5/30/17 with 100% relief.  She is status post C7-T1 cervical interlaminar epidural steroid injection on 6/27/17 with 80% relief.  She is status post L5/S1 interlaminar epidural steroid injection on a/7/17 with excellent relief lasting 2 weeks, now reporting 0% relief.  She is status post right L3, 4 and 5 medial branch radiofrequency ablation on 10/19/17 with 80% relief.   She is status post right T4, 5, 6 and 7 medial branch radiofrequency ablation on 12/14/17 with 100% relief.  She is status post C7-T1 cervical interlaminar epidural steroid injection on 3/16/18 with at least 80% relief.  She is status post lumbar epidural steroid injection at L5/S1 on 5/15/18 with 90% relief of her bilateral low back and right leg pain.  The she is status post right T4, 5, 6 and 7 medial branch radiofrequency ablation on 10/12/2018 with 100% relief.  She is status post L5/S1 interlaminar epidural steroid injection on 01/16/2019 with almost 100% relief of her low back pain. She is status post right T4, 5, 6 and 7 medial branch radiofrequency ablation on 06/04/2019 with 80% relief.  She is status post L5/S1 interlaminar epidural steroid injection on 03/22/2021 with 85-90% relief.   She is status post right T4, 5, 6, 7 medial branch radiofrequency ablation on 05/21/2021 with 85% relief. She is status post cervical JINA C7/T1 on 11/11/2021 with greater than 70% relief.     Spine surgeries:  None    Antineuropathics:  Lidoderm 5%  NSAIDs:  Duexis 800 up to TID  Physical therapy:  She has consistently been in physical therapy,  reports exercise, dry needling with at least temporary relief  Antidepressants:  Muscle relaxers:  Opioids:  Hydrocodone 7.5/325 twice daily  Antiplatelets/Anticoagulants:    ROS: She reports fatigability, headaches, hypothyroidism, joint stiffness, back pain, difficulty sleeping and anxiety.  Balance of review of systems is negative.      Past Medical History:   Diagnosis Date    Anxiety 8/25/2015    Arthritis     Celiac disease     Cervical spondylosis     Chronic back pain     DDD (degenerative disc disease), lumbar     Difficult intubation 03/2016    anterior larynx, pt with metal dental appliance, unable to do glidescope, used LMA    Encounter for blood transfusion     Facet arthropathy, thoracic     Hormone replacement therapy (HRT)     Insomnia 8/25/2015    Neck pain     PONV (postoperative nausea and vomiting)     Right foot pain     Scoliosis     Sjogren's syndrome     Snoring     uses cpap, states not ROSSY    Unspecified hypothyroidism 8/25/2015     Past Surgical History:   Procedure Laterality Date    BILATERAL OOPHORECTOMY  2004    BREAST BIOPSY Left 2010    Benign    CHOLECYSTECTOMY  2010    [place]ONOSCOPY  2016    [place]    Epidural Steroid Injection      Pain managment, at another facility, cervical, thoracic    Epidural Steroid injection      Pain management, cervical    EPIDURAL STEROID INJECTION INTO CERVICAL SPINE N/A 11/11/2021    Procedure: Injection-steroid-epidural-cervical, C7/T1 interlaminer;  Surgeon: Floyd Coleman MD;  Location: [facility] OR;  Service: Pain Management;  Laterality: N/A;    EPIDURAL STEROID INJECTION INTO LUMBAR SPINE Right 1/16/2019    Procedure: Injection-steroid-epidural-lumbar L5/S1;  Surgeon: Floyd Coleman MD;  Location: [facility] OR;  Service: Pain Management;  Laterality: Right;  to right    EPIDURAL STEROID INJECTION INTO LUMBAR SPINE N/A 5/14/2019    Procedure: Injection-steroid-epidural-lumbar;  Surgeon: Floyd Coleman MD;  Location:  "[facility] OR;  Service: Pain Management;  Laterality: N/A;  L5/S1 interlaminar    EPIDURAL STEROID INJECTION INTO LUMBAR SPINE N/A 3/22/2021    Procedure: Injection-steroid-epidural-lumbar L5/S1 interlaminer;  Surgeon: Floyd Coleman MD;  Location: [facility] OR;  Service: Pain Management;  Laterality: N/A;    HYSTERECTOMY  2004    Complete    MOUTH SURGERY      Braces, with temporary metal implants in upper gum    OOPHORECTOMY  2004    w/ hysterectomy    RADIOFREQUENCY ABLATION  02/2016    thoracic nerve    RADIOFREQUENCY ABLATION Right 6/4/2019    Procedure: Radiofrequency Ablation T4,5,6,7;  Surgeon: Floyd Coleman MD;  Location: [facility] OR;  Service: Pain Management;  Laterality: Right;    RADIOFREQUENCY ABLATION Right 5/21/2021    Procedure: RADIOFREQUENCY ABLATION,THORACIC  T4,T5,T6, T7;  Surgeon: Floyd Coleman MD;  Location: [facility] OR;  Service: Pain Management;  Laterality: Right;    TONSILLECTOMY      VULVA SURGERY  03/2016     Social History     Socioeconomic History    Marital status:    Tobacco Use    Smoking status: Never Smoker    Smokeless tobacco: Never Used   Substance and Sexual Activity    Alcohol use: No    Drug use: No    Sexual activity: Yes     Partners: Male      She is a  at [employer].    Medications/Allergies: See med card    Vitals:    06/20/22 1348   BP: (!) 145/67   Pulse: 64   Weight: 75.9 kg (167 lb 5.3 oz)   Height: 5' 1.2" (1.554 m)   PainSc:   7   PainLoc: Back         Physical exam:  Gen: A and O x3, pleasant, well-groomed  Skin: No rashes or obvious lesions  HEENT: PERRLA, no obvious deformities on ears or in canals. Trachea midline.  CVS: Regular rate and rhythm, normal palpable pulses.  Resp:No increased work of breathing, symmetrical chest rise.  Abdomen: Soft, NT/ND.  Musculoskeletal:No antalgic gait.      Neuro:  Upper extremities: 5/5 strength bilaterally.  Lower extremities: 5/5 strength bilaterally.    Reflexes: Patellar 0+, Achilles " 0+ bilaterally.  Upper extremity reflexes 2+ bilaterally equal.  Sensory:  Intact and symmetrical to light touch and pinprick in L2-S1 dermatomes bilaterally.     Cervical spine exam: Range of motion is full with flexion, extension and lateral rotation without pain.  Myofascial exam:  Exquisite tenderness to palpation to the right mid thoracic paraspinous muscles.       Lumbar spine:  Lumbar spine: Range of motion is full with flexion and extension with mild increased right low back pain.  Brayden's test causes no increased pain on either side.    Supine straight leg raise causes right low back and buttock pain.  Internal and external rotation of the hip causes no increased pain on either side.  Myofascial exam:  Mild tenderness to palpation to the right lumbar paraspinous muscles.      Imagin/16/15 Xray Scoliosis survey: There is thoracic dextroscoliosis with mild lumbar compensatory levoscoliosis. No structural abnormalities are evident. Diffuse spondylosis noted in the cervical, thoracic and to lesser extent lumbar spine. No fractures are identified. There is slight increased kyphotic curvature of the thoracic spine with alignment being otherwise normal. Measurements and evaluation are limited due to underpenetration. Land angle measures approximately in the thoracic spine is of approximately 17.0 degrees and for the lumbar spine 17.4 degrees. Soft tissues are unremarkable. IMPRESSION: 1. S shaped scoliotic curvature of the thoracolumbar spine with Land angle of approximately 17 degrees. 2. Diffuse spondylosis.    MRI report 12: Report notes that there is dextroconvex rotary curvature of the thoracic spine.  Posterior alignment is maintained.  There are scattered vertebral body hemangiomas.  There is minimal posterior disc bulges noted at T5/6, T6/7, T7/8 and T8/9.  There is no central spinal stenosis or neural foraminal narrowing.    MRI right shoulder 11: Minor distal subscapularis tendinosis.   Negative for full-thickness or significant partial-thickness rotator cuff tendon tear.  There is a small amount of subacromial subdeltoid bursal fluid which can be seen with recent injection or mild bursitis.    Cervical spine MRI 6/29/16  At C2-C3, the minimal interstitial excision covers a very mild broad-based disc bulge most prominent centrally but does not deform the ventral cord.  There is no canal or foraminal stenosis.  At C3-C4, there is mildly decreased disc height with a broad-based disc bulge most prominent centrally.  This minimally contact the ventral cord without significant deformity.  The canal is widely patent.  There is uncovertebral hypertrophy and facet arthropathy, but the foramina are patent.  At C4-C5, there is decreased disc height with a broad disc bulge-marginal osteophyte complex that mildly lateralizes to the left and blends imperceptibly with right greater than left uncovertebral hypertrophy.  With ligamentum hypertrophy, there is no significant central canal stenosis.  Uncovertebral hypertrophy and facet arthropathy are causing mild left and moderate right foraminal stenoses.  At C5-C6, there is a broad-based disc bulge and osteophyte complex most prominent centrally.  There is also ligamentum flavum hypertrophy present, but the canal is patent.  There is mild left foraminal stenosis.  The right foramen is patent.  At C6-C7, there is disc desiccation with a minimal broad-based disc bulge most prominent in the right paracentral canal.  The canal is widely patent.  There is mild left foraminal stenosis.  At C7-T1, there is no significant disc bulge, protrusion, or herniation/extrusion.  The canal and foramina are widely patent.    Hip x-rays 9/22/17  AP view of the pelvis and frog leg views of both hips were obtained. No evidence of acute displaced fracture or dislocation is visualized.  No radiopaque foreign bodies are visualized. The bilateral superior joint spaces are grossly  maintained. Mild bilateral sacroiliac joint degenerative changes are seen including subchondral sclerosis and small marginal osteophytes. Mild to moderate symphyseal degenerative changes are seen. There is a slightly expansile cortically-based focus along the lateral proximal right femoral metadiaphysis. This could reflect a sessile osteochondroma, but correlation with MRI of the right hip is recommended.    8/18/19 MRI L-spine:  T12-L1: No disc herniation or significant posterior osteophytic ridging.  No significant spinal canal or foraminal stenosis.   L1-L2: No disc herniation or significant posterior osteophytic ridging.  Minimal left facet hypertrophy.  No significant spinal canal or foraminal stenosis.   L2-L3: No disc herniation or significant posterior osteophytic ridging.  Minimal left facet hypertrophy.  No significant spinal canal or foraminal stenosis.   L3-L4: No disc herniation or significant posterior osteophytic ridging.  Minimal bilateral facet hypertrophy.  No significant spinal canal or foraminal stenosis.   L4-L5: Mild disc bulge.  Mild bilateral facet hypertrophy.  Mild ligamentum flavum thickening.  Minimal narrowing of the bilateral lateral recesses. No significant overall spinal canal stenosis. Mild bilateral foraminal stenosis.   L5-S1: Minimal disc bulge contained in the ventral epidural fat.  Mild bilateral facet hypertrophy.  No significant spinal canal or foraminal stenosis.    Assessment:  The patient is a 61-year-old woman with history of hypothyroidism, otherwise fairly healthy but a long history of scoliosis causing back pain, self-referred for continued back pain.    1. Thoracic spondylosis  Vital signs    Place 18-22 gauage peripheral IV     Verify informed consent    Notify physician     Notify physician     Notify physician (specify)    Diet NPO    Case Request Operating Room: Radiofrequency Ablation Thoracic T4, T5, T6, T7    Place in Outpatient    lactated ringers infusion   2.  DDD (degenerative disc disease), lumbar     3. DDD (degenerative disc disease), cervical     4. Opioid contract exists         Plan:  1. I will schedule her to repeat right T4/5, C5/6 and C6/7 medial branch radiofrequency ablation.  She has done well with this in the past.  2. We discussed repeating cervical and lumbar epidural steroid injections in the future.  3. Dr. Coleman provided prescriptions for hydrocodone-acetaminophen 7.5/325 mg twice daily as needed for pain.  I have reviewed the [place] Board of Pharmacy website and there are no abberancies.    4. Follow-up in 4 weeks postprocedure or sooner as needed.

## 2022-06-30 ENCOUNTER — TELEPHONE (OUTPATIENT)
Dept: PAIN MEDICINE | Facility: CLINIC | Age: 62
End: 2022-06-30
Payer: COMMERCIAL

## 2022-06-30 ENCOUNTER — HOSPITAL ENCOUNTER (OUTPATIENT)
Facility: HOSPITAL | Age: 62
Discharge: HOME | End: 2022-06-30
Attending: ANESTHESIOLOGY | Admitting: ANESTHESIOLOGY
Payer: COMMERCIAL

## 2022-06-30 ENCOUNTER — PATIENT MESSAGE (OUTPATIENT)
Dept: SURGERY | Facility: HOSPITAL | Age: 62
End: 2022-06-30
Payer: COMMERCIAL

## 2022-06-30 ENCOUNTER — HOSPITAL ENCOUNTER (OUTPATIENT)
Dept: RADIOLOGY | Facility: HOSPITAL | Age: 62
Discharge: HOME | End: 2022-06-30
Attending: ANESTHESIOLOGY
Payer: COMMERCIAL

## 2022-06-30 VITALS
RESPIRATION RATE: 116 BRPM | HEIGHT: 61 IN | OXYGEN SATURATION: 100 % | HEART RATE: 60 BPM | TEMPERATURE: 98 F | SYSTOLIC BLOOD PRESSURE: 125 MMHG | BODY MASS INDEX: 30.96 KG/M2 | WEIGHT: 164 LBS | DIASTOLIC BLOOD PRESSURE: 74 MMHG

## 2022-06-30 DIAGNOSIS — M47.814 THORACIC SPONDYLOSIS: ICD-10-CM

## 2022-06-30 DIAGNOSIS — M54.9 MID BACK PAIN: ICD-10-CM

## 2022-06-30 PROCEDURE — 64633 DESTROY CERV/THOR FACET JNT: CPT | Mod: PO | Performed by: ANESTHESIOLOGY

## 2022-06-30 PROCEDURE — 64634 PR DESTROY C/TH FACET JNT ADDL: ICD-10-PCS | Mod: RT,,, | Performed by: ANESTHESIOLOGY

## 2022-06-30 PROCEDURE — 25000003 PHARM REV CODE 250: Mod: PO | Performed by: ANESTHESIOLOGY

## 2022-06-30 PROCEDURE — 64633 DESTROY CERV/THOR FACET JNT: CPT | Mod: RT,,, | Performed by: ANESTHESIOLOGY

## 2022-06-30 PROCEDURE — 99152 MOD SED SAME PHYS/QHP 5/>YRS: CPT | Mod: ,,, | Performed by: ANESTHESIOLOGY

## 2022-06-30 PROCEDURE — 64633 PR DESTROY CERV/THOR FACET JNT: ICD-10-PCS | Mod: RT,,, | Performed by: ANESTHESIOLOGY

## 2022-06-30 PROCEDURE — 64634 DESTROY C/TH FACET JNT ADDL: CPT | Mod: RT,,, | Performed by: ANESTHESIOLOGY

## 2022-06-30 PROCEDURE — 76000 FLUOROSCOPY <1 HR PHYS/QHP: CPT | Mod: TC,PO

## 2022-06-30 PROCEDURE — 63600175 PHARM REV CODE 636 W HCPCS: Mod: PO | Performed by: ANESTHESIOLOGY

## 2022-06-30 PROCEDURE — 99152 PR MOD CONSCIOUS SEDATION, SAME PHYS, 5+ YRS, FIRST 15 MIN: ICD-10-PCS | Mod: ,,, | Performed by: ANESTHESIOLOGY

## 2022-06-30 PROCEDURE — A4216 STERILE WATER/SALINE, 10 ML: HCPCS | Mod: PO | Performed by: ANESTHESIOLOGY

## 2022-06-30 PROCEDURE — 64634 DESTROY C/TH FACET JNT ADDL: CPT | Mod: PO | Performed by: ANESTHESIOLOGY

## 2022-06-30 RX ORDER — FENTANYL CITRATE 50 UG/ML
INJECTION, SOLUTION INTRAMUSCULAR; INTRAVENOUS
Status: DISCONTINUED | OUTPATIENT
Start: 2022-06-30 | End: 2022-06-30 | Stop reason: HOSPADM

## 2022-06-30 RX ORDER — LIDOCAINE HYDROCHLORIDE 10 MG/ML
1 INJECTION, SOLUTION EPIDURAL; INFILTRATION; INTRACAUDAL; PERINEURAL ONCE
Status: COMPLETED | OUTPATIENT
Start: 2022-06-30 | End: 2022-06-30

## 2022-06-30 RX ORDER — LIDOCAINE HYDROCHLORIDE 10 MG/ML
INJECTION, SOLUTION EPIDURAL; INFILTRATION; INTRACAUDAL; PERINEURAL
Status: DISCONTINUED | OUTPATIENT
Start: 2022-06-30 | End: 2022-06-30 | Stop reason: HOSPADM

## 2022-06-30 RX ORDER — METHYLPREDNISOLONE ACETATE 40 MG/ML
INJECTION, SUSPENSION INTRA-ARTICULAR; INTRALESIONAL; INTRAMUSCULAR; SOFT TISSUE
Status: DISCONTINUED | OUTPATIENT
Start: 2022-06-30 | End: 2022-06-30 | Stop reason: HOSPADM

## 2022-06-30 RX ORDER — LIDOCAINE HYDROCHLORIDE 20 MG/ML
INJECTION, SOLUTION EPIDURAL; INFILTRATION; INTRACAUDAL; PERINEURAL
Status: DISCONTINUED | OUTPATIENT
Start: 2022-06-30 | End: 2022-06-30 | Stop reason: HOSPADM

## 2022-06-30 RX ORDER — SODIUM CHLORIDE, SODIUM LACTATE, POTASSIUM CHLORIDE, CALCIUM CHLORIDE 600; 310; 30; 20 MG/100ML; MG/100ML; MG/100ML; MG/100ML
INJECTION, SOLUTION INTRAVENOUS CONTINUOUS
Status: DISCONTINUED | OUTPATIENT
Start: 2022-06-30 | End: 2022-06-30 | Stop reason: HOSPADM

## 2022-06-30 RX ORDER — SODIUM CHLORIDE 9 MG/ML
INJECTION, SOLUTION INTRAMUSCULAR; INTRAVENOUS; SUBCUTANEOUS
Status: DISCONTINUED | OUTPATIENT
Start: 2022-06-30 | End: 2022-06-30 | Stop reason: HOSPADM

## 2022-06-30 RX ORDER — MIDAZOLAM HYDROCHLORIDE 2 MG/2ML
INJECTION, SOLUTION INTRAMUSCULAR; INTRAVENOUS
Status: DISCONTINUED | OUTPATIENT
Start: 2022-06-30 | End: 2022-06-30 | Stop reason: HOSPADM

## 2022-06-30 RX ADMIN — SODIUM CHLORIDE, SODIUM LACTATE, POTASSIUM CHLORIDE, AND CALCIUM CHLORIDE: .6; .31; .03; .02 INJECTION, SOLUTION INTRAVENOUS at 12:06

## 2022-06-30 RX ADMIN — LIDOCAINE HYDROCHLORIDE 10 MG: 10 INJECTION, SOLUTION EPIDURAL; INFILTRATION; INTRACAUDAL; PERINEURAL at 12:06

## 2022-06-30 NOTE — TELEPHONE ENCOUNTER
Pharmacy notified ok to fill gabapentin today. She is requesting if we can okay a fill for hydrocodone, however it is not due until 7/8. Please advise.

## 2022-06-30 NOTE — DISCHARGE INSTRUCTIONS

## 2022-06-30 NOTE — OP NOTE
PROCEDURE DATE: 6/30/2022    PROCEDURE:  Radiofrequency ablation of the T4,5,6,7 medial branch nerves on the right-side utilizing fluoroscopy    DIAGNOSIS: Thoracicspondylosis    Post op Diagnosis: Same    PHYSICIAN: Floyd Coleman MD    MEDICATIONS INJECTED:  From a mixture of 4ml of 2% lidocaine and 40mg of methylprednisone, 1ml of this solution was injected at each level.    LOCAL ANESTHETIC USED: Lidocaine 1%, 3 ml given at each site.    SEDATION MEDICATIONS: 4mg versed    ESTIMATED BLOOD LOSS:  none    COMPLICATIONS:  none    TECHNIQUE:  A time out was taken to identify patient and procedure side prior to starting the procedure. Laying in a prone position, the patient was prepped and draped in the usual sterile fashion using ChloraPrep and sterile towels.  The levels were determined under fluoroscopic guidance and then marked.  Local anesthetic was given by raising a wheal at the skin over each site and then infiltrated approximately 2cm deeper.  A 20-gauge  100 mm [place] RF needle was introduced to the anatomic location of the right T4,5,6,7 medial branch nerves.  Motor stimulation up to 2 Volts at each level confirmed no motor nerve involvement.  Impedance was less than 800 ohms at each level. The above noted medication was then injected slowly.  Ablation was performed per level utilizing [place] radiofrequency generator 80°C for 90 seconds. The patient tolerated the procedure well.     The patient was monitored after the procedure.  Patient was given post procedure and discharge instructions to follow at home.  The patient was discharged in a stable condition    Event Time In   In Facility 1121   In Pre-Procedure 1141   Physician Available    Anesthesia Available    Pre-Op: Bedside Procedure Start    Pre-Op: Bedside Procedure Stop    Pre-Procedure Complete 1218   Out of Pre-Procedure    Anesthesia Start    Anesthesia Start Data Collection    Setup Start    Setup Complete    In Room 1222   Prep Start     Procedure Prep Complete    Procedure Start 1231   Procedure Closing    Emergence    Procedure Finish 1244   Sedation Start 1221   Scope In    Extent Reached    Scope Out    Sedation End 1244   Out of Room 1246   Cleanup Start    Cleanup Complete    Cosmetic Start    Cosmetic Stop    Pain Mgmt In Room    Pain Mgmt Out Room    In Recovery    Anesthesia Finish    Bedside Procedure Start    Bedside Procedure Stop    Recovery Care Complete    Out of Recovery    To Phase II 1248   In Phase II    Pain Mgmt Recovery Start    Pain Mgmt Recovery Stop    Obs Rec Start    Obs Rec Stop    Phase II Care Complete    Out of Phase II    Procedural Care Complete    Discharge    Pain Follow Up Needed    Pain Follow Up Complete      Minimal sedation was achieved with midazolam 4mg and fentanyl 50mcg.  Continuous monitoring of EKG, blood pressure and pulse oximetry was provided by a registered nurse during the entire course of the procedure under my supervision and recorded in the patient's medical record.   Total time for sedation was 23 minutes.

## 2022-06-30 NOTE — TELEPHONE ENCOUNTER
----- Message from Floyd Coleman MD sent at 6/30/2022 12:52 PM CDT -----  Please call the patient's pharmacy and tell them to release the gabapentin prescription early since she is leaving town

## 2022-06-30 NOTE — DISCHARGE SUMMARY
[facility] - Surgery  Discharge Note  Short Stay    Procedure(s) (LRB):  Radiofrequency Ablation Thoracic T4, T5, T6, T7 (Right)    OUTCOME: Patient tolerated treatment/procedure well without complication and is now ready for discharge.    DISPOSITION: Home or Self Care    FINAL DIAGNOSIS:  Thoracic spondylosis    FOLLOWUP: In clinic    DISCHARGE INSTRUCTIONS:    Discharge Procedure Orders   Diet Adult Regular     No dressing needed     Notify your health care provider if you experience any of the following:  temperature >100.4     Activity as tolerated        
<<-----Click here for Discharge Medication Review

## 2022-07-21 ENCOUNTER — HOSPITAL ENCOUNTER (OUTPATIENT)
Dept: RADIOLOGY | Facility: HOSPITAL | Age: 62
Discharge: HOME | End: 2022-07-21
Attending: OBSTETRICS & GYNECOLOGY
Payer: COMMERCIAL

## 2022-07-21 ENCOUNTER — OFFICE VISIT (OUTPATIENT)
Dept: OBSTETRICS AND GYNECOLOGY | Facility: CLINIC | Age: 62
End: 2022-07-21
Payer: COMMERCIAL

## 2022-07-21 VITALS
HEIGHT: 61 IN | SYSTOLIC BLOOD PRESSURE: 118 MMHG | BODY MASS INDEX: 31.43 KG/M2 | DIASTOLIC BLOOD PRESSURE: 78 MMHG | WEIGHT: 166.44 LBS

## 2022-07-21 DIAGNOSIS — Z12.31 VISIT FOR SCREENING MAMMOGRAM: ICD-10-CM

## 2022-07-21 DIAGNOSIS — Z79.890 HORMONE REPLACEMENT THERAPY (HRT): ICD-10-CM

## 2022-07-21 DIAGNOSIS — Z12.31 ENCOUNTER FOR SCREENING MAMMOGRAM FOR BREAST CANCER: ICD-10-CM

## 2022-07-21 DIAGNOSIS — Z12.31 ENCOUNTER FOR SCREENING MAMMOGRAM FOR BREAST CANCER: Primary | ICD-10-CM

## 2022-07-21 PROCEDURE — 3078F PR MOST RECENT DIASTOLIC BLOOD PRESSURE < 80 MM HG: ICD-10-PCS | Mod: CPTII,S$GLB,, | Performed by: OBSTETRICS & GYNECOLOGY

## 2022-07-21 PROCEDURE — 3074F SYST BP LT 130 MM HG: CPT | Mod: CPTII,S$GLB,, | Performed by: OBSTETRICS & GYNECOLOGY

## 2022-07-21 PROCEDURE — 99999 PR PBB SHADOW E&M-EST. PATIENT-LVL IV: ICD-10-PCS | Mod: PBBFAC,,, | Performed by: OBSTETRICS & GYNECOLOGY

## 2022-07-21 PROCEDURE — 77063 BREAST TOMOSYNTHESIS BI: CPT | Mod: TC,PN

## 2022-07-21 PROCEDURE — 1159F PR MEDICATION LIST DOCUMENTED IN MEDICAL RECORD: ICD-10-PCS | Mod: CPTII,S$GLB,, | Performed by: OBSTETRICS & GYNECOLOGY

## 2022-07-21 PROCEDURE — 1159F MED LIST DOCD IN RCRD: CPT | Mod: CPTII,S$GLB,, | Performed by: OBSTETRICS & GYNECOLOGY

## 2022-07-21 PROCEDURE — 77063 BREAST TOMOSYNTHESIS BI: CPT | Mod: 26,,, | Performed by: RADIOLOGY

## 2022-07-21 PROCEDURE — 3074F PR MOST RECENT SYSTOLIC BLOOD PRESSURE < 130 MM HG: ICD-10-PCS | Mod: CPTII,S$GLB,, | Performed by: OBSTETRICS & GYNECOLOGY

## 2022-07-21 PROCEDURE — 77067 MAMMO DIGITAL SCREENING BILAT WITH TOMO: ICD-10-PCS | Mod: 26,,, | Performed by: RADIOLOGY

## 2022-07-21 PROCEDURE — 3008F PR BODY MASS INDEX (BMI) DOCUMENTED: ICD-10-PCS | Mod: CPTII,S$GLB,, | Performed by: OBSTETRICS & GYNECOLOGY

## 2022-07-21 PROCEDURE — 77063 MAMMO DIGITAL SCREENING BILAT WITH TOMO: ICD-10-PCS | Mod: 26,,, | Performed by: RADIOLOGY

## 2022-07-21 PROCEDURE — 99999 PR PBB SHADOW E&M-EST. PATIENT-LVL IV: CPT | Mod: PBBFAC,,, | Performed by: OBSTETRICS & GYNECOLOGY

## 2022-07-21 PROCEDURE — 99396 PR PREVENTIVE VISIT,EST,40-64: ICD-10-PCS | Mod: S$GLB,,, | Performed by: OBSTETRICS & GYNECOLOGY

## 2022-07-21 PROCEDURE — 99396 PREV VISIT EST AGE 40-64: CPT | Mod: S$GLB,,, | Performed by: OBSTETRICS & GYNECOLOGY

## 2022-07-21 PROCEDURE — 3078F DIAST BP <80 MM HG: CPT | Mod: CPTII,S$GLB,, | Performed by: OBSTETRICS & GYNECOLOGY

## 2022-07-21 PROCEDURE — 3008F BODY MASS INDEX DOCD: CPT | Mod: CPTII,S$GLB,, | Performed by: OBSTETRICS & GYNECOLOGY

## 2022-07-21 PROCEDURE — 77067 SCR MAMMO BI INCL CAD: CPT | Mod: 26,,, | Performed by: RADIOLOGY

## 2022-07-21 RX ORDER — ESTRADIOL 1 MG/G
GEL TOPICAL
Qty: 90 G | Refills: 3 | Status: SHIPPED | OUTPATIENT
Start: 2022-07-21 | End: 2023-03-15 | Stop reason: SDUPTHER

## 2022-07-21 RX ORDER — ESTRADIOL 0.1 MG/G
1 CREAM VAGINAL
Qty: 42.5 G | Refills: 1 | Status: SHIPPED | OUTPATIENT
Start: 2022-07-21 | End: 2023-03-15 | Stop reason: SDUPTHER

## 2022-07-21 NOTE — PROGRESS NOTES
Chief Complaint   Patient presents with    Well Woman     History of Present Illness: Vonnie Garces is a 61 y.o. female that presents today 7/21/2022 for well gyn visit.  She reports mood and vaginal health much better on divigel. She reports hot flashes persistent despite taking her divigel.     Past Medical History:   Diagnosis Date    Anxiety 8/25/2015    Arthritis     Celiac disease     Cervical spondylosis     Chronic back pain     DDD (degenerative disc disease), lumbar     Difficult intubation 03/2016    anterior larynx, pt with metal dental appliance, unable to do glidescope, used LMA    Encounter for blood transfusion     Facet arthropathy, thoracic     Hormone replacement therapy (HRT)     Insomnia 8/25/2015    Neck pain     PONV (postoperative nausea and vomiting)     Right foot pain     Scoliosis     Sjogren's syndrome     Snoring     uses cpap, states not ROSSY    Unspecified hypothyroidism 8/25/2015       Past Surgical History:   Procedure Laterality Date    BILATERAL OOPHORECTOMY  2004    BREAST BIOPSY Left 2010    Benign    CHOLECYSTECTOMY  2010    [place]  2016    [place]    Epidural Steroid Injection      Pain managment, at another facility, cervical, thoracic    Epidural Steroid injection      Pain management, cervical    EPIDURAL STEROID INJECTION INTO CERVICAL SPINE N/A 11/11/2021    Procedure: Injection-steroid-epidural-cervical, C7/T1 interlaminer;  Surgeon: Floyd Coleman MD;  Location: [facility] OR;  Service: Pain Management;  Laterality: N/A;    EPIDURAL STEROID INJECTION INTO LUMBAR SPINE Right 1/16/2019    Procedure: Injection-steroid-epidural-lumbar L5/S1;  Surgeon: Floyd Coleman MD;  Location: [facility] OR;  Service: Pain Management;  Laterality: Right;  to right    EPIDURAL STEROID INJECTION INTO LUMBAR SPINE N/A 5/14/2019    Procedure: Injection-steroid-epidural-lumbar;  Surgeon: Floyd Coleman MD;  Location: [facility] OR;  Service: Pain  Management;  Laterality: N/A;  L5/S1 interlaminar    EPIDURAL STEROID INJECTION INTO LUMBAR SPINE N/A 3/22/2021    Procedure: Injection-steroid-epidural-lumbar L5/S1 interlaminer;  Surgeon: Floyd Coleman MD;  Location: [facility] OR;  Service: Pain Management;  Laterality: N/A;    HYSTERECTOMY  2004    Complete    MOUTH SURGERY      Braces, with temporary metal implants in upper gum    OOPHORECTOMY  2004    w/ hysterectomy    RADIOFREQUENCY ABLATION  02/2016    thoracic nerve    RADIOFREQUENCY ABLATION Right 6/4/2019    Procedure: Radiofrequency Ablation T4,5,6,7;  Surgeon: Floyd Coleman MD;  Location: [facility] OR;  Service: Pain Management;  Laterality: Right;    RADIOFREQUENCY ABLATION Right 5/21/2021    Procedure: RADIOFREQUENCY ABLATION,THORACIC  T4,T5,T6, T7;  Surgeon: Floyd Coleman MD;  Location: [facility] OR;  Service: Pain Management;  Laterality: Right;    RADIOFREQUENCY ABLATION Right 6/30/2022    Procedure: Radiofrequency Ablation Thoracic T4, T5, T6, T7;  Surgeon: Floyd Coleman MD;  Location: [facility] OR;  Service: Pain Management;  Laterality: Right;    TONSILLECTOMY      VULVA SURGERY  03/2016       Current Outpatient Medications   Medication Sig Dispense Refill    ALPRAZolam (XANAX) 0.25 MG tablet TAKE 1 TABLET BY MOUTH TWICE DAILY AS NEEDED FOR ANXIETY 30 tablet 1    cycloSPORINE (RESTASIS) 0.05 % ophthalmic emulsion 1 drop twice daily for 30 days 60 each 11    gabapentin (NEURONTIN) 100 MG capsule TAKE 1 CAPSULE BY MOUTH IN THE MORNING and TAKE FOUR CAPSULES BY MOUTH at night 450 capsule 3    HYDROcodone-acetaminophen (NORCO) 7.5-325 mg per tablet Take 1 tablet by mouth every 12 (twelve) hours as needed for Pain. 60 tablet 0    [START ON 8/7/2022] HYDROcodone-acetaminophen (NORCO) 7.5-325 mg per tablet Take 1 tablet by mouth every 12 (twelve) hours as needed for Pain. 60 tablet 0    [START ON 9/6/2022] HYDROcodone-acetaminophen (NORCO) 7.5-325 mg per tablet Take 1 tablet by  mouth every 12 (twelve) hours as needed for Pain. 60 tablet 0    METHYL SALICYLATE/MENTH/CAMPH (PAIN RELIEVING CREAM TOP) Apply 1 application topically daily as needed.      predniSONE (DELTASONE) 2.5 MG tablet Take 3 tablets (7.5 mg total) by mouth daily as needed (arthritis flares). 90 tablet 2    TIROSINT 125 mcg Cap take 1 CAPSULE BY MOUTH once daily 30 capsule 5    estradioL (DIVIGEL) 1 mg/gram (0.1 %) topical gel apply 1 gram onto skin once daily 90 g 3    estradioL (ESTRACE) 0.01 % (0.1 mg/gram) vaginal cream Place 1 g vaginally every Monday and Thursday. 42.5 g 1    famotidine (PEPCID) 20 MG tablet Take 1 tablet (20 mg total) by mouth 2 (two) times daily. (Patient not taking: Reported on 2022) 60 tablet 5    ibuprofen (ADVIL,MOTRIN) 800 MG tablet Take 1 tablet (800 mg total) by mouth 3 (three) times daily. (Patient not taking: Reported on 2022) 90 tablet 3    LIDOcaine (LIDODERM) 5 % APPLY 1 PATCH TO AFFECTED AREA(S) DAILY AS DIRECTED LEAVE ON FOR 12 HOURS, LEAVE OFF FOR 12 HOURS (Patient not taking: Reported on 2022) 30 patch 5    lidocaine-prilocaine (EMLA) cream        No current facility-administered medications for this visit.       Review of patient's allergies indicates:   Allergen Reactions    Compazine [prochlorperazine edisylate] Anaphylaxis    Avelox [moxifloxacin] Rash       Family History   Problem Relation Age of Onset    Arthritis Mother     Celiac disease Mother     COPD Father        Social History     Socioeconomic History    Marital status:    Tobacco Use    Smoking status: Never Smoker    Smokeless tobacco: Never Used   Substance and Sexual Activity    Alcohol use: No    Drug use: No    Sexual activity: Yes     Partners: Male       OB History    Para Term  AB Living   3 3 3         SAB IAB Ectopic Multiple Live Births                  # Outcome Date GA Lbr Marco A/2nd Weight Sex Delivery Anes PTL Lv   3 Term            2 Term           "  1 Term                Review of Symptoms:  GENERAL: Denies weight gain or weight loss. Feeling well overall.   SKIN: Denies rash or lesions.   HEAD: Denies head injury or headache.   NODES: Denies enlarged lymph nodes.   CHEST: Denies chest pain or shortness of breath.   CARDIOVASCULAR: Denies palpitations or left sided chest pain.   ABDOMEN: No abdominal pain, constipation, diarrhea, nausea, vomiting or rectal bleeding.   URINARY: No frequency, dysuria, hematuria, or burning on urination.  HEMATOLOGIC: No easy bruisability or excessive bleeding.   MUSCULOSKELETAL: Denies joint pain or swelling.     /78   Ht 5' 1" (1.549 m)   Wt 75.5 kg (166 lb 7.2 oz)   LMP  (LMP Unknown)   Physical Exam:  APPEARANCE: Well nourished, well developed, in no acute distress.  SKIN: Normal skin turgor, no lesions.  NECK: Neck symmetric without masses   RESPIRATORY: Normal respiratory effort with no retractions or use of accessory muscles  CARDIOVASCULAR: Peripheral vascular system with no swelling no varicosities and palpation of pulses normal  LYMPHATIC: No enlargements of the lymph nodes noted in the neck, axillae, or groin  ABDOMEN: Soft. No tenderness or masses. No hepatosplenomegaly. No hernias.  BREASTS: Symmetrical, no skin changes or visible lesions. No palpable masses, nipple discharge or adenopathy bilaterally.  PELVIC: Normal external female genitalia without lesions. Normal hair distribution. Adequate perineal body, normal urethral meatus. Urethra with no masses.  Bladder nontender. Vagina moist and well rugated without lesions or discharge. No significant cystocele or rectocele.  Adnexa without masses or tenderness. Urethra and bladder normal.   EXTREMITIES: No clubbing cyanosis or edema.    ASSESSMENT/PLAN:  Encounter for screening mammogram for breast cancer    Hormone replacement therapy (HRT)  -     estradioL (DIVIGEL) 1 mg/gram (0.1 %) topical gel; apply 1 gram onto skin once daily  Dispense: 90 g; Refill: " 3  -     estradioL (ESTRACE) 0.01 % (0.1 mg/gram) vaginal cream; Place 1 g vaginally every Monday and Thursday.  Dispense: 42.5 g; Refill: 1    Visit for screening mammogram          Patient was counseled today on Pap guidelines. We discussed the discontinuing the pap smear after hysterectomy except in certain high risk cases.  We discussed the need for pelvic exams.   We discussed STD screening if at high risk for an STD.  We discussed breast cancer screening with mammograms every other year after the age of 40 and annually after the age of 50.    We discussed colon cancer screening.   Osteoporosis screening with the Dexa Bone Scan discussed when indicated.   She will see her PCP for other health maintenance.       FOLLOW-UP:prn

## 2022-08-02 ENCOUNTER — PATIENT MESSAGE (OUTPATIENT)
Dept: PAIN MEDICINE | Facility: CLINIC | Age: 62
End: 2022-08-02
Payer: COMMERCIAL

## 2022-08-04 ENCOUNTER — TELEPHONE (OUTPATIENT)
Dept: PAIN MEDICINE | Facility: CLINIC | Age: 62
End: 2022-08-04

## 2022-08-04 ENCOUNTER — OFFICE VISIT (OUTPATIENT)
Dept: PAIN MEDICINE | Facility: CLINIC | Age: 62
End: 2022-08-04
Payer: COMMERCIAL

## 2022-08-04 ENCOUNTER — PATIENT MESSAGE (OUTPATIENT)
Dept: RHEUMATOLOGY | Facility: CLINIC | Age: 62
End: 2022-08-04
Payer: COMMERCIAL

## 2022-08-04 DIAGNOSIS — M50.30 DDD (DEGENERATIVE DISC DISEASE), CERVICAL: ICD-10-CM

## 2022-08-04 DIAGNOSIS — Z79.891 OPIOID CONTRACT EXISTS: ICD-10-CM

## 2022-08-04 DIAGNOSIS — M54.16 LUMBAR RADICULOPATHY: ICD-10-CM

## 2022-08-04 DIAGNOSIS — M47.814 THORACIC SPONDYLOSIS: Primary | ICD-10-CM

## 2022-08-04 DIAGNOSIS — M51.36 DDD (DEGENERATIVE DISC DISEASE), LUMBAR: ICD-10-CM

## 2022-08-04 PROCEDURE — 1159F PR MEDICATION LIST DOCUMENTED IN MEDICAL RECORD: ICD-10-PCS | Mod: CPTII,95,, | Performed by: PHYSICIAN ASSISTANT

## 2022-08-04 PROCEDURE — 1160F PR REVIEW ALL MEDS BY PRESCRIBER/CLIN PHARMACIST DOCUMENTED: ICD-10-PCS | Mod: CPTII,95,, | Performed by: PHYSICIAN ASSISTANT

## 2022-08-04 PROCEDURE — 1160F RVW MEDS BY RX/DR IN RCRD: CPT | Mod: CPTII,95,, | Performed by: PHYSICIAN ASSISTANT

## 2022-08-04 PROCEDURE — 1159F MED LIST DOCD IN RCRD: CPT | Mod: CPTII,95,, | Performed by: PHYSICIAN ASSISTANT

## 2022-08-04 PROCEDURE — 99214 OFFICE O/P EST MOD 30 MIN: CPT | Mod: 95,,, | Performed by: PHYSICIAN ASSISTANT

## 2022-08-04 PROCEDURE — 99214 PR OFFICE/OUTPT VISIT, EST, LEVL IV, 30-39 MIN: ICD-10-PCS | Mod: 95,,, | Performed by: PHYSICIAN ASSISTANT

## 2022-08-04 NOTE — PROGRESS NOTES
This note was completed with dictation software and grammatical errors may exist.    The patient location is: [facility]  The chief complaint leading to consultation is: low back and right leg pain  Visit type: Virtual visit with synchronous audio and video  Total time spent with patient: 12 minutes  Each patient to whom he or she provides medical services by telemedicine is:  (1) informed of the relationship between the physician and patient and the respective role of any other health care provider with respect to management of the patient; and (2) notified that he or she may decline to receive medical services by telemedicine and may withdraw from such care at any time.    CC:Back pain, neck pain    HPI: The patient is a 61-year-old woman with history of hypothyroidism, otherwise fairly healthy but a long history of scoliosis causing back pain, self-referred for continued back pain.  She is status post right T4, 5, 6, 7 medial branch radiofrequency ablation on 06/30/2022 with 70% relief.  She states that she had gradual improvement in overall is now doing well in terms of her thoracic back pain.  She states there is some mild remaining pain but tolerable.  She also continues to have neck pain but her main concern is currently her bilateral low back pain with radiation to the right buttock, posterior thigh, calf and outside of her right foot to her right 5th digit.  She denies weakness or numbness but does report that the pain affects her mobility.    Pain intervention history: She has been seeing Dr. Huffman for the last several years and has undergone epidural steroid injections and radiofrequency ablations with good relief.  According to her last pain management physician, she had undergone a right side T3, 4, 5, 6 medial branch radiofrequency ablation on 8/8/14 with good relief.  He had scheduled her for another one but did not complete this.  As far as medications she has been taking Hydrocodone  7.5/325 one to 2 times a day at most and gabapentin 300 mg at night. She is status post cervical epidural steroid injection on 10/19/15 with 50% relief of her neck pain. She is status post right L4 transforaminal epidural steroid injection on 11/23/15 with 100% relief.   She is status post right T3, 4, 5 and 6 medial branch radio frequency ablation on 2/5/16 with greater than 50% relief.  She is status post right L4 transforaminal epidural sterile injection on 6/14/16 with significant relief lasting only about a month.  She is status post C7-T1 cervical interlaminar epidural steroid injection on 7/14/16 with 30-40% relief.   She is status post L5/S1 interlaminar epidural steroidal injection to the right on 8/12/16 with 100% relief of her right leg pain.  She is status post C7-T1 cervical interlaminar epidural steroid injection on 12/9/16 with almost complete relief.  She is status post right T4, 5, 6 and 7 medial branch radiofrequency ablation on 5/30/17 with 100% relief.  She is status post C7-T1 cervical interlaminar epidural steroid injection on 6/27/17 with 80% relief.  She is status post L5/S1 interlaminar epidural steroid injection on a/7/17 with excellent relief lasting 2 weeks, now reporting 0% relief.  She is status post right L3, 4 and 5 medial branch radiofrequency ablation on 10/19/17 with 80% relief.   She is status post right T4, 5, 6 and 7 medial branch radiofrequency ablation on 12/14/17 with 100% relief.  She is status post C7-T1 cervical interlaminar epidural steroid injection on 3/16/18 with at least 80% relief.  She is status post lumbar epidural steroid injection at L5/S1 on 5/15/18 with 90% relief of her bilateral low back and right leg pain.  The she is status post right T4, 5, 6 and 7 medial branch radiofrequency ablation on 10/12/2018 with 100% relief.  She is status post L5/S1 interlaminar epidural steroid injection on 01/16/2019 with almost 100% relief of her low back pain. She is status  post right T4, 5, 6 and 7 medial branch radiofrequency ablation on 06/04/2019 with 80% relief.  She is status post L5/S1 interlaminar epidural steroid injection on 03/22/2021 with 85-90% relief.   She is status post right T4, 5, 6, 7 medial branch radiofrequency ablation on 05/21/2021 with 85% relief. She is status post cervical JINA C7/T1 on 11/11/2021 with greater than 70% relief.     Spine surgeries:  None    Antineuropathics:  Lidoderm 5%  NSAIDs:  Duexis 800 up to TID  Physical therapy:  She has consistently been in physical therapy, reports exercise, dry needling with at least temporary relief  Antidepressants:  Muscle relaxers:  Opioids:  Hydrocodone 7.5/325 twice daily  Antiplatelets/Anticoagulants:    ROS: She reports fatigability, headaches, hypothyroidism, joint stiffness, back pain, difficulty sleeping and anxiety.  Balance of review of systems is negative.      Past Medical History:   Diagnosis Date    Anxiety 08/25/2015    Arthritis     BRCA1 negative     BRCA2 negative     Celiac disease     Cervical spondylosis     Chronic back pain     DDD (degenerative disc disease), lumbar     Difficult intubation 03/2016    anterior larynx, pt with metal dental appliance, unable to do glidescope, used LMA    Encounter for blood transfusion     Facet arthropathy, thoracic     Hormone replacement therapy (HRT)     Insomnia 08/25/2015    Neck pain     PONV (postoperative nausea and vomiting)     Right foot pain     Scoliosis     Sjogren's syndrome     Snoring     uses cpap, states not ROSSY    Unspecified hypothyroidism 08/25/2015     Past Surgical History:   Procedure Laterality Date    BILATERAL OOPHORECTOMY  2004    BREAST BIOPSY Left 2010    [facility]    CHOLECYSTECTOMY  2010    [place]OS[place]  [place]    Epidural Steroid Injection      Pain managment, at another facility, cervical, thoracic    Epidural Steroid injection      Pain management, cervical    EPIDURAL STEROID INJECTION INTO  CERVICAL SPINE N/A 11/11/2021    Procedure: Injection-steroid-epidural-cervical, C7/T1 interlaminer;  Surgeon: Floyd Coleman MD;  Location: [facility] OR;  Service: Pain Management;  Laterality: N/A;    EPIDURAL STEROID INJECTION INTO LUMBAR SPINE Right 1/16/2019    Procedure: Injection-steroid-epidural-lumbar L5/S1;  Surgeon: Floyd Coleman MD;  Location: [facility] OR;  Service: Pain Management;  Laterality: Right;  to right    EPIDURAL STEROID INJECTION INTO LUMBAR SPINE N/A 5/14/2019    Procedure: Injection-steroid-epidural-lumbar;  Surgeon: Floyd Coleman MD;  Location: [facility] OR;  Service: Pain Management;  Laterality: N/A;  L5/S1 interlaminar    EPIDURAL STEROID INJECTION INTO LUMBAR SPINE N/A 3/22/2021    Procedure: Injection-steroid-epidural-lumbar L5/S1 interlaminer;  Surgeon: Floyd Coleman MD;  Location: [facility] OR;  Service: Pain Management;  Laterality: N/A;    HYSTERECTOMY  2004    Complete    MOUTH SURGERY      Braces, with temporary metal implants in upper gum    OOPHORECTOMY  2004    w/ hysterectomy    RADIOFREQUENCY ABLATION  02/2016    thoracic nerve    RADIOFREQUENCY ABLATION Right 6/4/2019    Procedure: Radiofrequency Ablation T4,5,6,7;  Surgeon: Floyd Coleman MD;  Location: [facility] OR;  Service: Pain Management;  Laterality: Right;    RADIOFREQUENCY ABLATION Right 5/21/2021    Procedure: RADIOFREQUENCY ABLATION,THORACIC  T4,T5,T6, T7;  Surgeon: Floyd Coleman MD;  Location: [facility] OR;  Service: Pain Management;  Laterality: Right;    RADIOFREQUENCY ABLATION Right 6/30/2022    Procedure: Radiofrequency Ablation Thoracic T4, T5, T6, T7;  Surgeon: Floyd Coleman MD;  Location: [facility] OR;  Service: Pain Management;  Laterality: Right;    TONSILLECTOMY      VULVA SURGERY  03/2016     Social History     Socioeconomic History    Marital status:    Tobacco Use    Smoking status: Never Smoker    Smokeless tobacco: Never Used   Substance and Sexual Activity    Alcohol  use: No    Drug use: No    Sexual activity: Yes     Partners: Male      She is a  at hospitals.    Medications/Allergies: See med card    There were no vitals filed for this visit.      Physical exam:  Gen: A and O x3, pleasant, well-groomed    Imagin/16/15 Xray Scoliosis survey: There is thoracic dextroscoliosis with mild lumbar compensatory levoscoliosis. No structural abnormalities are evident. Diffuse spondylosis noted in the cervical, thoracic and to lesser extent lumbar spine. No fractures are identified. There is slight increased kyphotic curvature of the thoracic spine with alignment being otherwise normal. Measurements and evaluation are limited due to underpenetration. Land angle measures approximately in the thoracic spine is of approximately 17.0 degrees and for the lumbar spine 17.4 degrees. Soft tissues are unremarkable. IMPRESSION: 1. S shaped scoliotic curvature of the thoracolumbar spine with Land angle of approximately 17 degrees. 2. Diffuse spondylosis.    MRI report 12: Report notes that there is dextroconvex rotary curvature of the thoracic spine.  Posterior alignment is maintained.  There are scattered vertebral body hemangiomas.  There is minimal posterior disc bulges noted at T5/6, T6/7, T7/8 and T8/9.  There is no central spinal stenosis or neural foraminal narrowing.    MRI right shoulder 11: Minor distal subscapularis tendinosis.  Negative for full-thickness or significant partial-thickness rotator cuff tendon tear.  There is a small amount of subacromial subdeltoid bursal fluid which can be seen with recent injection or mild bursitis.    Cervical spine MRI 16  At C2-C3, the minimal interstitial excision covers a very mild broad-based disc bulge most prominent centrally but does not deform the ventral cord.  There is no canal or foraminal stenosis.  At C3-C4, there is mildly decreased disc height with a broad-based disc bulge most prominent centrally.   This minimally contact the ventral cord without significant deformity.  The canal is widely patent.  There is uncovertebral hypertrophy and facet arthropathy, but the foramina are patent.  At C4-C5, there is decreased disc height with a broad disc bulge-marginal osteophyte complex that mildly lateralizes to the left and blends imperceptibly with right greater than left uncovertebral hypertrophy.  With ligamentum hypertrophy, there is no significant central canal stenosis.  Uncovertebral hypertrophy and facet arthropathy are causing mild left and moderate right foraminal stenoses.  At C5-C6, there is a broad-based disc bulge and osteophyte complex most prominent centrally.  There is also ligamentum flavum hypertrophy present, but the canal is patent.  There is mild left foraminal stenosis.  The right foramen is patent.  At C6-C7, there is disc desiccation with a minimal broad-based disc bulge most prominent in the right paracentral canal.  The canal is widely patent.  There is mild left foraminal stenosis.  At C7-T1, there is no significant disc bulge, protrusion, or herniation/extrusion.  The canal and foramina are widely patent.    Hip x-rays 9/22/17  AP view of the pelvis and frog leg views of both hips were obtained. No evidence of acute displaced fracture or dislocation is visualized.  No radiopaque foreign bodies are visualized. The bilateral superior joint spaces are grossly maintained. Mild bilateral sacroiliac joint degenerative changes are seen including subchondral sclerosis and small marginal osteophytes. Mild to moderate symphyseal degenerative changes are seen. There is a slightly expansile cortically-based focus along the lateral proximal right femoral metadiaphysis. This could reflect a sessile osteochondroma, but correlation with MRI of the right hip is recommended.    8/18/19 MRI L-spine:  T12-L1: No disc herniation or significant posterior osteophytic ridging.  No significant spinal canal or  foraminal stenosis.   L1-L2: No disc herniation or significant posterior osteophytic ridging.  Minimal left facet hypertrophy.  No significant spinal canal or foraminal stenosis.   L2-L3: No disc herniation or significant posterior osteophytic ridging.  Minimal left facet hypertrophy.  No significant spinal canal or foraminal stenosis.   L3-L4: No disc herniation or significant posterior osteophytic ridging.  Minimal bilateral facet hypertrophy.  No significant spinal canal or foraminal stenosis.   L4-L5: Mild disc bulge.  Mild bilateral facet hypertrophy.  Mild ligamentum flavum thickening.  Minimal narrowing of the bilateral lateral recesses. No significant overall spinal canal stenosis. Mild bilateral foraminal stenosis.   L5-S1: Minimal disc bulge contained in the ventral epidural fat.  Mild bilateral facet hypertrophy.  No significant spinal canal or foraminal stenosis.    Assessment:  The patient is a 61-year-old woman with history of hypothyroidism, otherwise fairly healthy but a long history of scoliosis causing back pain, self-referred for continued back pain.    1. Thoracic spondylosis     2. Lumbar radiculopathy     3. DDD (degenerative disc disease), lumbar     4. DDD (degenerative disc disease), cervical     5. Opioid contract exists         Plan:  1. The patient had excellent relief following the right T4, 5, 6, 7 medial branch radiofrequency ablation.  This can be repeated in the future if necessary.  2. For her low back and radicular right leg pain I will schedule her to repeat an L5/S1 interlaminar epidural steroid injection to the right.  She has done well with this in the past.  3. We can consider repeating a cervical JINA in the future if needed.  4. She continues to take hydrocodone twice daily as needed but does not currently need prescriptions.  I have reviewed the [place] Board of Pharmacy website and there are no abberancies.    5. Follow-up in 4 weeks postprocedure or sooner as needed.

## 2022-08-04 NOTE — TELEPHONE ENCOUNTER
Virtual visit completed.  Please schedule L5/S1 interlaminar epidural steroid injection to the right with 4 week follow-up.    Type of Procedure/Injection - Lumbar Epidural  L5/S1           Laterality - Right []     Left []     Bilateral []     N/A [x]       Type of Sedation - Local [x]      RNIV []      MAC []      Need to hold medication - Yes[x]  or  No[]      NSAIDs for 2 days      Clearance needed - Yes[]  or  No[x]

## 2022-08-04 NOTE — TELEPHONE ENCOUNTER
Call place to Pt to Pt to schedule L5/S1 interlaminar epidural steroid injection to the right with 4 week follow-up.Pt will need to hold NSAIDS. No answer. V/M

## 2022-09-07 ENCOUNTER — PATIENT MESSAGE (OUTPATIENT)
Dept: CARDIOLOGY | Facility: CLINIC | Age: 62
End: 2022-09-07
Payer: COMMERCIAL

## 2022-09-08 RX ORDER — HYDROCODONE BITARTRATE AND ACETAMINOPHEN 7.5; 325 MG/1; MG/1
1 TABLET ORAL EVERY 12 HOURS PRN
Qty: 60 TABLET | Refills: 0 | Status: SHIPPED | OUTPATIENT
Start: 2022-09-08 | End: 2022-10-07

## 2022-09-08 NOTE — TELEPHONE ENCOUNTER
----- Message from Dileep Murillo sent at 9/8/2022  1:01 PM CDT -----  Type:  RX Refill Request    Who Called: Naveed with [facility]     Refill or New Rx:Refill     RX Name and Strength:HYDROcodone-acetaminophen (NORCO) 7.5-325 mg per tablet    How is the patient currently taking it? Sig - Route: Take 1 tablet by mouth every 12 (twelve) hours as needed for Pain. - Oral    Is this a 30 day or 90 day RX:    Preferred Pharmacy with phone number:[place] [email or web address] [address]    Local or Mail Order:Local     Ordering Provider:    Would the patient rather a call back or a response via [facility]? Call back     Best Call Back Number:[phone] (mobile)     Additional Information:

## 2022-09-08 NOTE — TELEPHONE ENCOUNTER
[facility] unable to fill [place] as the address on that prescription is listed as a PO Box and not a physical address. She now has her address listed correctly in her chart. They have cancelled Rx on file. Please advise.

## 2022-09-08 NOTE — TELEPHONE ENCOUNTER
Left message with staff at pharmacy for Naveed to return call to discuss refill. This was sent on 6/21 to start on 9/6.

## 2022-10-06 RX ORDER — HYDROCODONE BITARTRATE AND ACETAMINOPHEN 7.5; 325 MG/1; MG/1
1 TABLET ORAL EVERY 12 HOURS PRN
Qty: 60 TABLET | Refills: 0 | Status: CANCELLED | OUTPATIENT
Start: 2022-10-06 | End: 2022-11-05

## 2022-10-07 ENCOUNTER — PATIENT MESSAGE (OUTPATIENT)
Dept: PAIN MEDICINE | Facility: CLINIC | Age: 62
End: 2022-10-07
Payer: COMMERCIAL

## 2022-10-07 RX ORDER — HYDROCODONE BITARTRATE AND ACETAMINOPHEN 7.5; 325 MG/1; MG/1
1 TABLET ORAL EVERY 8 HOURS PRN
Qty: 60 TABLET | Refills: 0 | Status: SHIPPED | OUTPATIENT
Start: 2022-10-08 | End: 2022-11-10 | Stop reason: SDUPTHER

## 2022-10-11 ENCOUNTER — PATIENT MESSAGE (OUTPATIENT)
Dept: OPTOMETRY | Facility: CLINIC | Age: 62
End: 2022-10-11
Payer: COMMERCIAL

## 2022-10-11 DIAGNOSIS — H04.123 BILATERAL DRY EYES: Primary | ICD-10-CM

## 2022-10-11 RX ORDER — LOTEPREDNOL ETABONATE 5 MG/ML
1 SUSPENSION/ DROPS OPHTHALMIC 4 TIMES DAILY
Qty: 5 ML | Refills: 1 | Status: SHIPPED | OUTPATIENT
Start: 2022-10-11 | End: 2022-10-21

## 2022-10-11 NOTE — PROGRESS NOTES
Assessment /Plan     For exam results, see Encounter Report.    Bilateral dry eyes  -     loteprednol (LOTEMAX) 0.5 % ophthalmic suspension; Place 1 drop into both eyes 4 (four) times daily. for 10 days  Dispense: 5 mL; Refill: 1      Sent in rx for pt per request for dry eyes

## 2022-10-22 DIAGNOSIS — E06.3 HASHIMOTO'S THYROIDITIS: ICD-10-CM

## 2022-10-22 DIAGNOSIS — F51.01 PRIMARY INSOMNIA: ICD-10-CM

## 2022-10-27 ENCOUNTER — OFFICE VISIT (OUTPATIENT)
Dept: CARDIOLOGY | Facility: CLINIC | Age: 62
End: 2022-10-27
Payer: COMMERCIAL

## 2022-10-27 VITALS
WEIGHT: 174.63 LBS | HEART RATE: 67 BPM | DIASTOLIC BLOOD PRESSURE: 80 MMHG | SYSTOLIC BLOOD PRESSURE: 137 MMHG | BODY MASS INDEX: 32.97 KG/M2 | HEIGHT: 61 IN

## 2022-10-27 DIAGNOSIS — G90.1 DYSAUTONOMIA: Primary | ICD-10-CM

## 2022-10-27 DIAGNOSIS — I10 PRIMARY HYPERTENSION: ICD-10-CM

## 2022-10-27 PROCEDURE — 3079F DIAST BP 80-89 MM HG: CPT | Mod: CPTII,S$GLB,, | Performed by: INTERNAL MEDICINE

## 2022-10-27 PROCEDURE — 99999 PR PBB SHADOW E&M-EST. PATIENT-LVL III: CPT | Mod: PBBFAC,,, | Performed by: INTERNAL MEDICINE

## 2022-10-27 PROCEDURE — 3079F PR MOST RECENT DIASTOLIC BLOOD PRESSURE 80-89 MM HG: ICD-10-PCS | Mod: CPTII,S$GLB,, | Performed by: INTERNAL MEDICINE

## 2022-10-27 PROCEDURE — 1159F MED LIST DOCD IN RCRD: CPT | Mod: CPTII,S$GLB,, | Performed by: INTERNAL MEDICINE

## 2022-10-27 PROCEDURE — 99999 PR PBB SHADOW E&M-EST. PATIENT-LVL III: ICD-10-PCS | Mod: PBBFAC,,, | Performed by: INTERNAL MEDICINE

## 2022-10-27 PROCEDURE — 99204 PR OFFICE/OUTPT VISIT, NEW, LEVL IV, 45-59 MIN: ICD-10-PCS | Mod: S$GLB,,, | Performed by: INTERNAL MEDICINE

## 2022-10-27 PROCEDURE — 3075F SYST BP GE 130 - 139MM HG: CPT | Mod: CPTII,S$GLB,, | Performed by: INTERNAL MEDICINE

## 2022-10-27 PROCEDURE — 3075F PR MOST RECENT SYSTOLIC BLOOD PRESS GE 130-139MM HG: ICD-10-PCS | Mod: CPTII,S$GLB,, | Performed by: INTERNAL MEDICINE

## 2022-10-27 PROCEDURE — 1159F PR MEDICATION LIST DOCUMENTED IN MEDICAL RECORD: ICD-10-PCS | Mod: CPTII,S$GLB,, | Performed by: INTERNAL MEDICINE

## 2022-10-27 PROCEDURE — 99204 OFFICE O/P NEW MOD 45 MIN: CPT | Mod: S$GLB,,, | Performed by: INTERNAL MEDICINE

## 2022-10-27 RX ORDER — PROPRANOLOL HYDROCHLORIDE 60 MG/1
60 CAPSULE, EXTENDED RELEASE ORAL DAILY
Qty: 30 CAPSULE | Refills: 11 | Status: SHIPPED | OUTPATIENT
Start: 2022-10-27 | End: 2023-11-08

## 2022-10-27 NOTE — PROGRESS NOTES
Subjective:    Patient ID:  Vonnie Garces is a 61 y.o. female who presents for follow-up of Blood Pressure Check and heart rate      HPI  She comes with high BP lately  Long h/o dysautonomia  Recently started on hctz      Review of Systems   Constitutional: Negative for decreased appetite, malaise/fatigue, weight gain and weight loss.   Cardiovascular:  Negative for chest pain, dyspnea on exertion, leg swelling, palpitations and syncope.   Respiratory:  Negative for cough and shortness of breath.    Gastrointestinal: Negative.    Neurological:  Negative for weakness.   All other systems reviewed and are negative.     Objective:      Physical Exam  Vitals and nursing note reviewed.   Constitutional:       Appearance: Normal appearance. She is well-developed.   HENT:      Head: Normocephalic.   Eyes:      Pupils: Pupils are equal, round, and reactive to light.   Neck:      Thyroid: No thyromegaly.      Vascular: No carotid bruit or JVD.   Cardiovascular:      Rate and Rhythm: Normal rate and regular rhythm.      Chest Wall: PMI is not displaced.      Pulses: Normal pulses and intact distal pulses.      Heart sounds: Normal heart sounds. No murmur heard.    No gallop.   Pulmonary:      Effort: Pulmonary effort is normal.      Breath sounds: Normal breath sounds.   Abdominal:      Palpations: Abdomen is soft. There is no mass.      Tenderness: There is no abdominal tenderness.   Musculoskeletal:         General: Normal range of motion.      Cervical back: Normal range of motion and neck supple.   Skin:     General: Skin is warm.   Neurological:      Mental Status: She is alert and oriented to person, place, and time.      Sensory: No sensory deficit.      Deep Tendon Reflexes: Reflexes are normal and symmetric.         Assessment:       1. Dysautonomia    2. Primary hypertension         Plan:   Stop hctz  Propranolol LA 60 mg qd  Continue all cardiac medications  Regular exercise program  Weight loss  6 week  f/u

## 2022-10-28 ENCOUNTER — PATIENT MESSAGE (OUTPATIENT)
Dept: RHEUMATOLOGY | Facility: CLINIC | Age: 62
End: 2022-10-28
Payer: COMMERCIAL

## 2022-10-28 DIAGNOSIS — G47.00 INSOMNIA, UNSPECIFIED TYPE: Primary | ICD-10-CM

## 2022-10-28 DIAGNOSIS — E06.3 HASHIMOTO'S THYROIDITIS: ICD-10-CM

## 2022-10-28 RX ORDER — LEVOTHYROXINE SODIUM 125 UG/1
1 CAPSULE ORAL DAILY
Qty: 30 CAPSULE | Refills: 5 | Status: SHIPPED | OUTPATIENT
Start: 2022-10-28 | End: 2023-02-23 | Stop reason: DRUGHIGH

## 2022-10-28 RX ORDER — ESZOPICLONE 3 MG/1
TABLET, FILM COATED ORAL
Qty: 30 TABLET | Refills: 4 | OUTPATIENT
Start: 2022-10-28

## 2022-10-28 RX ORDER — ESZOPICLONE 3 MG/1
TABLET, FILM COATED ORAL
Status: CANCELLED | OUTPATIENT
Start: 2022-10-28

## 2022-10-28 RX ORDER — LEVOTHYROXINE SODIUM 125 UG/1
1 CAPSULE ORAL DAILY
Qty: 30 CAPSULE | Refills: 5 | OUTPATIENT
Start: 2022-10-28

## 2022-10-30 RX ORDER — ESZOPICLONE 3 MG/1
3 TABLET, FILM COATED ORAL NIGHTLY
Qty: 30 TABLET | Refills: 5 | Status: SHIPPED | OUTPATIENT
Start: 2022-10-30 | End: 2022-11-29

## 2022-11-06 RX ORDER — ESZOPICLONE 3 MG/1
TABLET, FILM COATED ORAL
Qty: 30 TABLET | Refills: 4 | Status: SHIPPED | OUTPATIENT
Start: 2022-11-06 | End: 2023-05-10

## 2022-11-09 ENCOUNTER — PATIENT MESSAGE (OUTPATIENT)
Dept: PAIN MEDICINE | Facility: CLINIC | Age: 62
End: 2022-11-09
Payer: COMMERCIAL

## 2022-11-10 ENCOUNTER — OFFICE VISIT (OUTPATIENT)
Dept: PAIN MEDICINE | Facility: CLINIC | Age: 62
End: 2022-11-10
Payer: COMMERCIAL

## 2022-11-10 VITALS
DIASTOLIC BLOOD PRESSURE: 64 MMHG | BODY MASS INDEX: 33.32 KG/M2 | WEIGHT: 176.5 LBS | HEART RATE: 58 BPM | SYSTOLIC BLOOD PRESSURE: 137 MMHG | HEIGHT: 61 IN | OXYGEN SATURATION: 99 %

## 2022-11-10 DIAGNOSIS — M51.36 DDD (DEGENERATIVE DISC DISEASE), LUMBAR: ICD-10-CM

## 2022-11-10 DIAGNOSIS — M50.30 DDD (DEGENERATIVE DISC DISEASE), CERVICAL: ICD-10-CM

## 2022-11-10 DIAGNOSIS — M51.36 DDD (DEGENERATIVE DISC DISEASE), LUMBAR: Primary | ICD-10-CM

## 2022-11-10 DIAGNOSIS — M47.814 THORACIC SPONDYLOSIS: ICD-10-CM

## 2022-11-10 DIAGNOSIS — Z79.891 OPIOID CONTRACT EXISTS: ICD-10-CM

## 2022-11-10 DIAGNOSIS — M54.16 LUMBAR RADICULOPATHY: Primary | ICD-10-CM

## 2022-11-10 PROCEDURE — 3075F SYST BP GE 130 - 139MM HG: CPT | Mod: CPTII,S$GLB,, | Performed by: PHYSICIAN ASSISTANT

## 2022-11-10 PROCEDURE — 1160F PR REVIEW ALL MEDS BY PRESCRIBER/CLIN PHARMACIST DOCUMENTED: ICD-10-PCS | Mod: CPTII,S$GLB,, | Performed by: PHYSICIAN ASSISTANT

## 2022-11-10 PROCEDURE — 3075F PR MOST RECENT SYSTOLIC BLOOD PRESS GE 130-139MM HG: ICD-10-PCS | Mod: CPTII,S$GLB,, | Performed by: PHYSICIAN ASSISTANT

## 2022-11-10 PROCEDURE — 3008F PR BODY MASS INDEX (BMI) DOCUMENTED: ICD-10-PCS | Mod: CPTII,S$GLB,, | Performed by: PHYSICIAN ASSISTANT

## 2022-11-10 PROCEDURE — 1159F PR MEDICATION LIST DOCUMENTED IN MEDICAL RECORD: ICD-10-PCS | Mod: CPTII,S$GLB,, | Performed by: PHYSICIAN ASSISTANT

## 2022-11-10 PROCEDURE — 3078F PR MOST RECENT DIASTOLIC BLOOD PRESSURE < 80 MM HG: ICD-10-PCS | Mod: CPTII,S$GLB,, | Performed by: PHYSICIAN ASSISTANT

## 2022-11-10 PROCEDURE — 3008F BODY MASS INDEX DOCD: CPT | Mod: CPTII,S$GLB,, | Performed by: PHYSICIAN ASSISTANT

## 2022-11-10 PROCEDURE — 99999 PR PBB SHADOW E&M-EST. PATIENT-LVL V: CPT | Mod: PBBFAC,,, | Performed by: PHYSICIAN ASSISTANT

## 2022-11-10 PROCEDURE — 99214 PR OFFICE/OUTPT VISIT, EST, LEVL IV, 30-39 MIN: ICD-10-PCS | Mod: S$GLB,,, | Performed by: PHYSICIAN ASSISTANT

## 2022-11-10 PROCEDURE — 3078F DIAST BP <80 MM HG: CPT | Mod: CPTII,S$GLB,, | Performed by: PHYSICIAN ASSISTANT

## 2022-11-10 PROCEDURE — 99214 OFFICE O/P EST MOD 30 MIN: CPT | Mod: S$GLB,,, | Performed by: PHYSICIAN ASSISTANT

## 2022-11-10 PROCEDURE — 1159F MED LIST DOCD IN RCRD: CPT | Mod: CPTII,S$GLB,, | Performed by: PHYSICIAN ASSISTANT

## 2022-11-10 PROCEDURE — 1160F RVW MEDS BY RX/DR IN RCRD: CPT | Mod: CPTII,S$GLB,, | Performed by: PHYSICIAN ASSISTANT

## 2022-11-10 PROCEDURE — 99999 PR PBB SHADOW E&M-EST. PATIENT-LVL V: ICD-10-PCS | Mod: PBBFAC,,, | Performed by: PHYSICIAN ASSISTANT

## 2022-11-10 RX ORDER — HYDROCODONE BITARTRATE AND ACETAMINOPHEN 7.5; 325 MG/1; MG/1
1 TABLET ORAL EVERY 8 HOURS PRN
Qty: 60 TABLET | Refills: 0 | Status: SHIPPED | OUTPATIENT
Start: 2023-01-09 | End: 2023-02-08

## 2022-11-10 RX ORDER — HYDROCODONE BITARTRATE AND ACETAMINOPHEN 7.5; 325 MG/1; MG/1
1 TABLET ORAL EVERY 8 HOURS PRN
Qty: 60 TABLET | Refills: 0 | Status: SHIPPED | OUTPATIENT
Start: 2022-11-10 | End: 2022-12-10

## 2022-11-10 RX ORDER — HYDROCODONE BITARTRATE AND ACETAMINOPHEN 7.5; 325 MG/1; MG/1
1 TABLET ORAL EVERY 8 HOURS PRN
Qty: 60 TABLET | Refills: 0 | Status: SHIPPED | OUTPATIENT
Start: 2022-12-10 | End: 2022-12-27 | Stop reason: SDUPTHER

## 2022-11-10 RX ORDER — ALPRAZOLAM 0.5 MG/1
1 TABLET, ORALLY DISINTEGRATING ORAL ONCE AS NEEDED
Status: CANCELLED | OUTPATIENT
Start: 2022-11-10 | End: 2034-04-08

## 2022-11-16 NOTE — H&P (VIEW-ONLY)
This note was completed with dictation software and grammatical errors may exist.    CC:Back pain, neck pain    HPI: The patient is a 61-year-old woman with history of hypothyroidism, otherwise fairly healthy but a long history of scoliosis causing back pain, self-referred for continued back pain.  She returns in follow-up today with worsening low back and right leg pain.  She describes pain across the low back radiating to the right buttock and posterior thigh.  She had planned on getting an epidural steroid injection for this but has had to postpone.  She continues to take pain medication with relief.  She denies weakness or numbness.    Pain intervention history: She has been seeing Dr. Huffman for the last several years and has undergone epidural steroid injections and radiofrequency ablations with good relief.  According to her last pain management physician, she had undergone a right side T3, 4, 5, 6 medial branch radiofrequency ablation on 8/8/14 with good relief.  He had scheduled her for another one but did not complete this.  As far as medications she has been taking Hydrocodone 7.5/325 one to 2 times a day at most and gabapentin 300 mg at night. She is status post cervical epidural steroid injection on 10/19/15 with 50% relief of her neck pain. She is status post right L4 transforaminal epidural steroid injection on 11/23/15 with 100% relief.   She is status post right T3, 4, 5 and 6 medial branch radio frequency ablation on 2/5/16 with greater than 50% relief.  She is status post right L4 transforaminal epidural sterile injection on 6/14/16 with significant relief lasting only about a month.  She is status post C7-T1 cervical interlaminar epidural steroid injection on 7/14/16 with 30-40% relief.   She is status post L5/S1 interlaminar epidural steroidal injection to the right on 8/12/16 with 100% relief of her right leg pain.  She is status post C7-T1 cervical interlaminar epidural steroid injection  on 12/9/16 with almost complete relief.  She is status post right T4, 5, 6 and 7 medial branch radiofrequency ablation on 5/30/17 with 100% relief.  She is status post C7-T1 cervical interlaminar epidural steroid injection on 6/27/17 with 80% relief.  She is status post L5/S1 interlaminar epidural steroid injection on a/7/17 with excellent relief lasting 2 weeks, now reporting 0% relief.  She is status post right L3, 4 and 5 medial branch radiofrequency ablation on 10/19/17 with 80% relief.   She is status post right T4, 5, 6 and 7 medial branch radiofrequency ablation on 12/14/17 with 100% relief.  She is status post C7-T1 cervical interlaminar epidural steroid injection on 3/16/18 with at least 80% relief.  She is status post lumbar epidural steroid injection at L5/S1 on 5/15/18 with 90% relief of her bilateral low back and right leg pain.  The she is status post right T4, 5, 6 and 7 medial branch radiofrequency ablation on 10/12/2018 with 100% relief.  She is status post L5/S1 interlaminar epidural steroid injection on 01/16/2019 with almost 100% relief of her low back pain. She is status post right T4, 5, 6 and 7 medial branch radiofrequency ablation on 06/04/2019 with 80% relief.  She is status post L5/S1 interlaminar epidural steroid injection on 03/22/2021 with 85-90% relief.   She is status post right T4, 5, 6, 7 medial branch radiofrequency ablation on 05/21/2021 with 85% relief. She is status post cervical JINA C7/T1 on 11/11/2021 with greater than 70% relief.     Spine surgeries:  None    Antineuropathics:  Lidoderm 5%  NSAIDs:  Duexis 800 up to TID  Physical therapy:  She has consistently been in physical therapy, reports exercise, dry needling with at least temporary relief  Antidepressants:  Muscle relaxers:  Opioids:  Hydrocodone 7.5/325 twice daily  Antiplatelets/Anticoagulants:    ROS: She reports fatigability, headaches, hypothyroidism, joint stiffness, back pain, difficulty sleeping and anxiety.   Balance of review of systems is negative.      Past Medical History:   Diagnosis Date    Anxiety 08/25/2015    Arthritis     BRCA1 negative     BRCA2 negative     Celiac disease     Cervical spondylosis     Chronic back pain     DDD (degenerative disc disease), lumbar     Difficult intubation 03/2016    anterior larynx, pt with metal dental appliance, unable to do glidescope, used LMA    Encounter for blood transfusion     Facet arthropathy, thoracic     Hormone replacement therapy (HRT)     Insomnia 08/25/2015    Neck pain     PONV (postoperative nausea and vomiting)     Right foot pain     Scoliosis     Sjogren's syndrome     Snoring     uses cpap, states not ROSSY    Unspecified hypothyroidism 08/25/2015     Past Surgical History:   Procedure Laterality Date    BILATERAL OOPHORECTOMY  2004    BREAST BIOPSY Left 2010    Benign    CHOLECYSTECTOMY  2010    COLONOSCOPY  2016    Poca    Epidural Steroid Injection      Pain managment, at another facility, cervical, thoracic    Epidural Steroid injection      Pain management, cervical    EPIDURAL STEROID INJECTION INTO CERVICAL SPINE N/A 11/11/2021    Procedure: Injection-steroid-epidural-cervical, C7/T1 interlaminer;  Surgeon: Floyd Coleman MD;  Location: [facility] OR;  Service: Pain Management;  Laterality: N/A;    EPIDURAL STEROID INJECTION INTO LUMBAR SPINE Right 1/16/2019    Procedure: Injection-steroid-epidural-lumbar L5/S1;  Surgeon: Floyd Coleman MD;  Location: [facility] OR;  Service: Pain Management;  Laterality: Right;  to right    EPIDURAL STEROID INJECTION INTO LUMBAR SPINE N/A 5/14/2019    Procedure: Injection-steroid-epidural-lumbar;  Surgeon: Floyd Coleman MD;  Location: [facility] OR;  Service: Pain Management;  Laterality: N/A;  L5/S1 interlaminar    EPIDURAL STEROID INJECTION INTO LUMBAR SPINE N/A 3/22/2021    Procedure: Injection-steroid-epidural-lumbar L5/S1 interlaminer;  Surgeon: Floyd Coleman MD;  Location: [facility] OR;  Service: Pain  "Management;  Laterality: N/A;    HYSTERECTOMY  2004    Complete    MOUTH SURGERY      Braces, with temporary metal implants in upper gum    OOPHORECTOMY  2004    w/ hysterectomy    RADIOFREQUENCY ABLATION  02/2016    thoracic nerve    RADIOFREQUENCY ABLATION Right 6/4/2019    Procedure: Radiofrequency Ablation T4,5,6,7;  Surgeon: Floyd Coleman MD;  Location: [facility] OR;  Service: Pain Management;  Laterality: Right;    RADIOFREQUENCY ABLATION Right 5/21/2021    Procedure: RADIOFREQUENCY ABLATION,THORACIC  T4,T5,T6, T7;  Surgeon: Floyd Coleman MD;  Location: [facility] OR;  Service: Pain Management;  Laterality: Right;    RADIOFREQUENCY ABLATION Right 6/30/2022    Procedure: Radiofrequency Ablation Thoracic T4, T5, T6, T7;  Surgeon: Floyd Coleman MD;  Location: [facility] OR;  Service: Pain Management;  Laterality: Right;    TONSILLECTOMY      VULVA SURGERY  03/2016     Social History     Socioeconomic History    Marital status:    Tobacco Use    Smoking status: Never    Smokeless tobacco: Never   Substance and Sexual Activity    Alcohol use: No    Drug use: No    Sexual activity: Yes     Partners: Male      She is a  at [employer].    Medications/Allergies: See med card    Vitals:    11/10/22 1326   BP: 137/64   Pulse: (!) 58   SpO2: 99%   Weight: 80 kg (176 lb 7.7 oz)   Height: 5' 1" (1.549 m)   PainSc:   4   PainLoc: Back         Physical exam:  Gen: A and O x3, pleasant, well-groomed  Skin: No rashes or obvious lesions  HEENT: PERRLA, no obvious deformities on ears or in canals. Trachea midline.  CVS: Regular rate and rhythm, normal palpable pulses.  Resp:No increased work of breathing, symmetrical chest rise.  Abdomen: Soft, NT/ND.  Musculoskeletal:No antalgic gait.      Neuro:  Upper extremities: 5/5 strength bilaterally.  Lower extremities: 5/5 strength bilaterally.    Reflexes: Patellar 0+, Achilles 0+ bilaterally.  Upper extremity reflexes 2+ bilaterally equal.  Sensory:  Intact and " symmetrical to light touch and pinprick in L2-S1 dermatomes bilaterally.     Cervical spine exam: Range of motion is full with flexion, extension and lateral rotation without pain.  Myofascial exam:  Exquisite tenderness to palpation to the right mid thoracic paraspinous muscles.       Lumbar spine:  Lumbar spine: Range of motion is full with flexion and extension with mild increased right low back pain.  Brayden's test causes no increased pain on either side.    Supine straight leg raise causes right low back and buttock pain.  Internal and external rotation of the hip causes no increased pain on either side.  Myofascial exam:  Mild tenderness to palpation to the right lumbar paraspinous muscles.    Imagin/16/15 Xray Scoliosis survey: There is thoracic dextroscoliosis with mild lumbar compensatory levoscoliosis. No structural abnormalities are evident. Diffuse spondylosis noted in the cervical, thoracic and to lesser extent lumbar spine. No fractures are identified. There is slight increased kyphotic curvature of the thoracic spine with alignment being otherwise normal. Measurements and evaluation are limited due to underpenetration. Land angle measures approximately in the thoracic spine is of approximately 17.0 degrees and for the lumbar spine 17.4 degrees. Soft tissues are unremarkable. IMPRESSION: 1. S shaped scoliotic curvature of the thoracolumbar spine with Land angle of approximately 17 degrees. 2. Diffuse spondylosis.    MRI report 12: Report notes that there is dextroconvex rotary curvature of the thoracic spine.  Posterior alignment is maintained.  There are scattered vertebral body hemangiomas.  There is minimal posterior disc bulges noted at T5/6, T6/7, T7/8 and T8/9.  There is no central spinal stenosis or neural foraminal narrowing.    MRI right shoulder 11: Minor distal subscapularis tendinosis.  Negative for full-thickness or significant partial-thickness rotator cuff tendon tear.   There is a small amount of subacromial subdeltoid bursal fluid which can be seen with recent injection or mild bursitis.    Cervical spine MRI 6/29/16  At C2-C3, the minimal interstitial excision covers a very mild broad-based disc bulge most prominent centrally but does not deform the ventral cord.  There is no canal or foraminal stenosis.  At C3-C4, there is mildly decreased disc height with a broad-based disc bulge most prominent centrally.  This minimally contact the ventral cord without significant deformity.  The canal is widely patent.  There is uncovertebral hypertrophy and facet arthropathy, but the foramina are patent.  At C4-C5, there is decreased disc height with a broad disc bulge-marginal osteophyte complex that mildly lateralizes to the left and blends imperceptibly with right greater than left uncovertebral hypertrophy.  With ligamentum hypertrophy, there is no significant central canal stenosis.  Uncovertebral hypertrophy and facet arthropathy are causing mild left and moderate right foraminal stenoses.  At C5-C6, there is a broad-based disc bulge and osteophyte complex most prominent centrally.  There is also ligamentum flavum hypertrophy present, but the canal is patent.  There is mild left foraminal stenosis.  The right foramen is patent.  At C6-C7, there is disc desiccation with a minimal broad-based disc bulge most prominent in the right paracentral canal.  The canal is widely patent.  There is mild left foraminal stenosis.  At C7-T1, there is no significant disc bulge, protrusion, or herniation/extrusion.  The canal and foramina are widely patent.    Hip x-rays 9/22/17  AP view of the pelvis and frog leg views of both hips were obtained. No evidence of acute displaced fracture or dislocation is visualized.  No radiopaque foreign bodies are visualized. The bilateral superior joint spaces are grossly maintained. Mild bilateral sacroiliac joint degenerative changes are seen including subchondral  sclerosis and small marginal osteophytes. Mild to moderate symphyseal degenerative changes are seen. There is a slightly expansile cortically-based focus along the lateral proximal right femoral metadiaphysis. This could reflect a sessile osteochondroma, but correlation with MRI of the right hip is recommended.    8/18/19 MRI L-spine:  T12-L1: No disc herniation or significant posterior osteophytic ridging.  No significant spinal canal or foraminal stenosis.   L1-L2: No disc herniation or significant posterior osteophytic ridging.  Minimal left facet hypertrophy.  No significant spinal canal or foraminal stenosis.   L2-L3: No disc herniation or significant posterior osteophytic ridging.  Minimal left facet hypertrophy.  No significant spinal canal or foraminal stenosis.   L3-L4: No disc herniation or significant posterior osteophytic ridging.  Minimal bilateral facet hypertrophy.  No significant spinal canal or foraminal stenosis.   L4-L5: Mild disc bulge.  Mild bilateral facet hypertrophy.  Mild ligamentum flavum thickening.  Minimal narrowing of the bilateral lateral recesses. No significant overall spinal canal stenosis. Mild bilateral foraminal stenosis.   L5-S1: Minimal disc bulge contained in the ventral epidural fat.  Mild bilateral facet hypertrophy.  No significant spinal canal or foraminal stenosis.    Assessment:  The patient is a 61-year-old woman with history of hypothyroidism, otherwise fairly healthy but a long history of scoliosis causing back pain, self-referred for continued back pain.    1. Lumbar radiculopathy  Vital signs    Verify informed consent    Notify physician     Notify physician     Notify physician (specify)    Diet NPO    Case Request Operating Room: Injection-steroid-epidural-lumbar L5/S1 to right    Place in Outpatient    alprazolam ODT dissolvable tablet 1 mg      2. DDD (degenerative disc disease), lumbar        3. DDD (degenerative disc disease), cervical        4. Thoracic  spondylosis        5. Opioid contract exists            Plan:  1. I will schedule her to repeat an L5/S1 interlaminar epidural steroid injection to the right.  She has done well with this in the past.  2. Dr. Coleman provided prescriptions for hydrocodone-acetaminophen 7.5/325 mg twice daily as needed for pain.  I have reviewed the [place] Board of Pharmacy website and there are no abberancies.    3. Follow-up in 4 weeks postprocedure sooner as needed.

## 2022-11-16 NOTE — PROGRESS NOTES
This note was completed with dictation software and grammatical errors may exist.    CC:Back pain, neck pain    HPI: The patient is a 61-year-old woman with history of hypothyroidism, otherwise fairly healthy but a long history of scoliosis causing back pain, self-referred for continued back pain.  She returns in follow-up today with worsening low back and right leg pain.  She describes pain across the low back radiating to the right buttock and posterior thigh.  She had planned on getting an epidural steroid injection for this but has had to postpone.  She continues to take pain medication with relief.  She denies weakness or numbness.    Pain intervention history: She has been seeing Dr. Huffman for the last several years and has undergone epidural steroid injections and radiofrequency ablations with good relief.  According to her last pain management physician, she had undergone a right side T3, 4, 5, 6 medial branch radiofrequency ablation on 8/8/14 with good relief.  He had scheduled her for another one but did not complete this.  As far as medications she has been taking Hydrocodone 7.5/325 one to 2 times a day at most and gabapentin 300 mg at night. She is status post cervical epidural steroid injection on 10/19/15 with 50% relief of her neck pain. She is status post right L4 transforaminal epidural steroid injection on 11/23/15 with 100% relief.   She is status post right T3, 4, 5 and 6 medial branch radio frequency ablation on 2/5/16 with greater than 50% relief.  She is status post right L4 transforaminal epidural sterile injection on 6/14/16 with significant relief lasting only about a month.  She is status post C7-T1 cervical interlaminar epidural steroid injection on 7/14/16 with 30-40% relief.   She is status post L5/S1 interlaminar epidural steroidal injection to the right on 8/12/16 with 100% relief of her right leg pain.  She is status post C7-T1 cervical interlaminar epidural steroid injection  on 12/9/16 with almost complete relief.  She is status post right T4, 5, 6 and 7 medial branch radiofrequency ablation on 5/30/17 with 100% relief.  She is status post C7-T1 cervical interlaminar epidural steroid injection on 6/27/17 with 80% relief.  She is status post L5/S1 interlaminar epidural steroid injection on a/7/17 with excellent relief lasting 2 weeks, now reporting 0% relief.  She is status post right L3, 4 and 5 medial branch radiofrequency ablation on 10/19/17 with 80% relief.   She is status post right T4, 5, 6 and 7 medial branch radiofrequency ablation on 12/14/17 with 100% relief.  She is status post C7-T1 cervical interlaminar epidural steroid injection on 3/16/18 with at least 80% relief.  She is status post lumbar epidural steroid injection at L5/S1 on 5/15/18 with 90% relief of her bilateral low back and right leg pain.  The she is status post right T4, 5, 6 and 7 medial branch radiofrequency ablation on 10/12/2018 with 100% relief.  She is status post L5/S1 interlaminar epidural steroid injection on 01/16/2019 with almost 100% relief of her low back pain. She is status post right T4, 5, 6 and 7 medial branch radiofrequency ablation on 06/04/2019 with 80% relief.  She is status post L5/S1 interlaminar epidural steroid injection on 03/22/2021 with 85-90% relief.   She is status post right T4, 5, 6, 7 medial branch radiofrequency ablation on 05/21/2021 with 85% relief. She is status post cervical JINA C7/T1 on 11/11/2021 with greater than 70% relief.     Spine surgeries:  None    Antineuropathics:  Lidoderm 5%  NSAIDs:  Duexis 800 up to TID  Physical therapy:  She has consistently been in physical therapy, reports exercise, dry needling with at least temporary relief  Antidepressants:  Muscle relaxers:  Opioids:  Hydrocodone 7.5/325 twice daily  Antiplatelets/Anticoagulants:    ROS: She reports fatigability, headaches, hypothyroidism, joint stiffness, back pain, difficulty sleeping and anxiety.   Balance of review of systems is negative.      Past Medical History:   Diagnosis Date    Anxiety 08/25/2015    Arthritis     BRCA1 negative     BRCA2 negative     Celiac disease     Cervical spondylosis     Chronic back pain     DDD (degenerative disc disease), lumbar     Difficult intubation 03/2016    anterior larynx, pt with metal dental appliance, unable to do glidescope, used LMA    Encounter for blood transfusion     Facet arthropathy, thoracic     Hormone replacement therapy (HRT)     Insomnia 08/25/2015    Neck pain     PONV (postoperative nausea and vomiting)     Right foot pain     Scoliosis     Sjogren's syndrome     Snoring     uses cpap, states not ROSSY    Unspecified hypothyroidism 08/25/2015     Past Surgical History:   Procedure Laterality Date    BILATERAL OOPHORECTOMY  2004    BREAST BIOPSY Left 2010    Benign    CHOLECYSTECTOMY  2010    COLONOSCOPY  2016    [place]    Epidural Steroid Injection      Pain managment, at another facility, cervical, thoracic    Epidural Steroid injection      Pain management, cervical    EPIDURAL STEROID INJECTION INTO CERVICAL SPINE N/A 11/11/2021    Procedure: Injection-steroid-epidural-cervical, C7/T1 interlaminer;  Surgeon: Floyd Coleman MD;  Location: [facility] OR;  Service: Pain Management;  Laterality: N/A;    EPIDURAL STEROID INJECTION INTO LUMBAR SPINE Right 1/16/2019    Procedure: Injection-steroid-epidural-lumbar L5/S1;  Surgeon: Floyd Coleman MD;  Location: [facility] OR;  Service: Pain Management;  Laterality: Right;  to right    EPIDURAL STEROID INJECTION INTO LUMBAR SPINE N/A 5/14/2019    Procedure: Injection-steroid-epidural-lumbar;  Surgeon: Floyd Coleman MD;  Location: [facility] OR;  Service: Pain Management;  Laterality: N/A;  L5/S1 interlaminar    EPIDURAL STEROID INJECTION INTO LUMBAR SPINE N/A 3/22/2021    Procedure: Injection-steroid-epidural-lumbar L5/S1 interlaminer;  Surgeon: Floyd Coleman MD;  Location: [facility] OR;  Service: Pain  "Management;  Laterality: N/A;    HYSTERECTOMY  2004    Complete    MOUTH SURGERY      Braces, with temporary metal implants in upper gum    OOPHORECTOMY  2004    w/ hysterectomy    RADIOFREQUENCY ABLATION  02/2016    thoracic nerve    RADIOFREQUENCY ABLATION Right 6/4/2019    Procedure: Radiofrequency Ablation T4,5,6,7;  Surgeon: Floyd Coleman MD;  Location: [facility] OR;  Service: Pain Management;  Laterality: Right;    RADIOFREQUENCY ABLATION Right 5/21/2021    Procedure: RADIOFREQUENCY ABLATION,THORACIC  T4,T5,T6, T7;  Surgeon: Floyd Coleman MD;  Location: [facility] OR;  Service: Pain Management;  Laterality: Right;    RADIOFREQUENCY ABLATION Right 6/30/2022    Procedure: Radiofrequency Ablation Thoracic T4, T5, T6, T7;  Surgeon: Floyd Coleman MD;  Location: [facility] OR;  Service: Pain Management;  Laterality: Right;    TONSILLECTOMY      VULVA SURGERY  03/2016     Social History     Socioeconomic History    Marital status:    Tobacco Use    Smoking status: Never    Smokeless tobacco: Never   Substance and Sexual Activity    Alcohol use: No    Drug use: No    Sexual activity: Yes     Partners: Male      She is a  at [employer].    Medications/Allergies: See med card    Vitals:    11/10/22 1326   BP: 137/64   Pulse: (!) 58   SpO2: 99%   Weight: 80 kg (176 lb 7.7 oz)   Height: 5' 1" (1.549 m)   PainSc:   4   PainLoc: Back         Physical exam:  Gen: A and O x3, pleasant, well-groomed  Skin: No rashes or obvious lesions  HEENT: PERRLA, no obvious deformities on ears or in canals. Trachea midline.  CVS: Regular rate and rhythm, normal palpable pulses.  Resp:No increased work of breathing, symmetrical chest rise.  Abdomen: Soft, NT/ND.  Musculoskeletal:No antalgic gait.      Neuro:  Upper extremities: 5/5 strength bilaterally.  Lower extremities: 5/5 strength bilaterally.    Reflexes: Patellar 0+, Achilles 0+ bilaterally.  Upper extremity reflexes 2+ bilaterally equal.  Sensory:  Intact and " symmetrical to light touch and pinprick in L2-S1 dermatomes bilaterally.     Cervical spine exam: Range of motion is full with flexion, extension and lateral rotation without pain.  Myofascial exam:  Exquisite tenderness to palpation to the right mid thoracic paraspinous muscles.       Lumbar spine:  Lumbar spine: Range of motion is full with flexion and extension with mild increased right low back pain.  Brayden's test causes no increased pain on either side.    Supine straight leg raise causes right low back and buttock pain.  Internal and external rotation of the hip causes no increased pain on either side.  Myofascial exam:  Mild tenderness to palpation to the right lumbar paraspinous muscles.    Imagin/16/15 Xray Scoliosis survey: There is thoracic dextroscoliosis with mild lumbar compensatory levoscoliosis. No structural abnormalities are evident. Diffuse spondylosis noted in the cervical, thoracic and to lesser extent lumbar spine. No fractures are identified. There is slight increased kyphotic curvature of the thoracic spine with alignment being otherwise normal. Measurements and evaluation are limited due to underpenetration. Land angle measures approximately in the thoracic spine is of approximately 17.0 degrees and for the lumbar spine 17.4 degrees. Soft tissues are unremarkable. IMPRESSION: 1. S shaped scoliotic curvature of the thoracolumbar spine with Land angle of approximately 17 degrees. 2. Diffuse spondylosis.    MRI report 12: Report notes that there is dextroconvex rotary curvature of the thoracic spine.  Posterior alignment is maintained.  There are scattered vertebral body hemangiomas.  There is minimal posterior disc bulges noted at T5/6, T6/7, T7/8 and T8/9.  There is no central spinal stenosis or neural foraminal narrowing.    MRI right shoulder 11: Minor distal subscapularis tendinosis.  Negative for full-thickness or significant partial-thickness rotator cuff tendon tear.   There is a small amount of subacromial subdeltoid bursal fluid which can be seen with recent injection or mild bursitis.    Cervical spine MRI 6/29/16  At C2-C3, the minimal interstitial excision covers a very mild broad-based disc bulge most prominent centrally but does not deform the ventral cord.  There is no canal or foraminal stenosis.  At C3-C4, there is mildly decreased disc height with a broad-based disc bulge most prominent centrally.  This minimally contact the ventral cord without significant deformity.  The canal is widely patent.  There is uncovertebral hypertrophy and facet arthropathy, but the foramina are patent.  At C4-C5, there is decreased disc height with a broad disc bulge-marginal osteophyte complex that mildly lateralizes to the left and blends imperceptibly with right greater than left uncovertebral hypertrophy.  With ligamentum hypertrophy, there is no significant central canal stenosis.  Uncovertebral hypertrophy and facet arthropathy are causing mild left and moderate right foraminal stenoses.  At C5-C6, there is a broad-based disc bulge and osteophyte complex most prominent centrally.  There is also ligamentum flavum hypertrophy present, but the canal is patent.  There is mild left foraminal stenosis.  The right foramen is patent.  At C6-C7, there is disc desiccation with a minimal broad-based disc bulge most prominent in the right paracentral canal.  The canal is widely patent.  There is mild left foraminal stenosis.  At C7-T1, there is no significant disc bulge, protrusion, or herniation/extrusion.  The canal and foramina are widely patent.    Hip x-rays 9/22/17  AP view of the pelvis and frog leg views of both hips were obtained. No evidence of acute displaced fracture or dislocation is visualized.  No radiopaque foreign bodies are visualized. The bilateral superior joint spaces are grossly maintained. Mild bilateral sacroiliac joint degenerative changes are seen including subchondral  sclerosis and small marginal osteophytes. Mild to moderate symphyseal degenerative changes are seen. There is a slightly expansile cortically-based focus along the lateral proximal right femoral metadiaphysis. This could reflect a sessile osteochondroma, but correlation with MRI of the right hip is recommended.    8/18/19 MRI L-spine:  T12-L1: No disc herniation or significant posterior osteophytic ridging.  No significant spinal canal or foraminal stenosis.   L1-L2: No disc herniation or significant posterior osteophytic ridging.  Minimal left facet hypertrophy.  No significant spinal canal or foraminal stenosis.   L2-L3: No disc herniation or significant posterior osteophytic ridging.  Minimal left facet hypertrophy.  No significant spinal canal or foraminal stenosis.   L3-L4: No disc herniation or significant posterior osteophytic ridging.  Minimal bilateral facet hypertrophy.  No significant spinal canal or foraminal stenosis.   L4-L5: Mild disc bulge.  Mild bilateral facet hypertrophy.  Mild ligamentum flavum thickening.  Minimal narrowing of the bilateral lateral recesses. No significant overall spinal canal stenosis. Mild bilateral foraminal stenosis.   L5-S1: Minimal disc bulge contained in the ventral epidural fat.  Mild bilateral facet hypertrophy.  No significant spinal canal or foraminal stenosis.    Assessment:  The patient is a 61-year-old woman with history of hypothyroidism, otherwise fairly healthy but a long history of scoliosis causing back pain, self-referred for continued back pain.    1. Lumbar radiculopathy  Vital signs    Verify informed consent    Notify physician     Notify physician     Notify physician (specify)    Diet NPO    Case Request Operating Room: Injection-steroid-epidural-lumbar L5/S1 to right    Place in Outpatient    alprazolam ODT dissolvable tablet 1 mg      2. DDD (degenerative disc disease), lumbar        3. DDD (degenerative disc disease), cervical        4. Thoracic  spondylosis        5. Opioid contract exists            Plan:  1. I will schedule her to repeat an L5/S1 interlaminar epidural steroid injection to the right.  She has done well with this in the past.  2. Dr. Coleman provided prescriptions for hydrocodone-acetaminophen 7.5/325 mg twice daily as needed for pain.  I have reviewed the [place] Board of Pharmacy website and there are no abberancies.    3. Follow-up in 4 weeks postprocedure sooner as needed.

## 2022-11-18 ENCOUNTER — HOSPITAL ENCOUNTER (OUTPATIENT)
Facility: HOSPITAL | Age: 62
Discharge: HOME | End: 2022-11-18
Attending: ANESTHESIOLOGY | Admitting: ANESTHESIOLOGY
Payer: COMMERCIAL

## 2022-11-18 ENCOUNTER — HOSPITAL ENCOUNTER (OUTPATIENT)
Dept: RADIOLOGY | Facility: HOSPITAL | Age: 62
Discharge: HOME | End: 2022-11-18
Attending: ANESTHESIOLOGY | Admitting: ANESTHESIOLOGY
Payer: COMMERCIAL

## 2022-11-18 VITALS
RESPIRATION RATE: 16 BRPM | SYSTOLIC BLOOD PRESSURE: 110 MMHG | DIASTOLIC BLOOD PRESSURE: 53 MMHG | WEIGHT: 169 LBS | HEART RATE: 55 BPM | HEIGHT: 61 IN | BODY MASS INDEX: 31.91 KG/M2 | OXYGEN SATURATION: 100 % | TEMPERATURE: 98 F

## 2022-11-18 DIAGNOSIS — M54.16 LUMBAR RADICULOPATHY: ICD-10-CM

## 2022-11-18 DIAGNOSIS — M54.50 LOWER BACK PAIN: ICD-10-CM

## 2022-11-18 PROCEDURE — 62323 NJX INTERLAMINAR LMBR/SAC: CPT | Mod: ,,, | Performed by: ANESTHESIOLOGY

## 2022-11-18 PROCEDURE — 62323 PR INJ LUMBAR/SACRAL, W/IMAGING GUIDANCE: ICD-10-PCS | Mod: ,,, | Performed by: ANESTHESIOLOGY

## 2022-11-18 PROCEDURE — 25500020 PHARM REV CODE 255: Mod: PO | Performed by: ANESTHESIOLOGY

## 2022-11-18 PROCEDURE — 62323 NJX INTERLAMINAR LMBR/SAC: CPT | Mod: PO | Performed by: ANESTHESIOLOGY

## 2022-11-18 PROCEDURE — 63600175 PHARM REV CODE 636 W HCPCS: Mod: PO | Performed by: ANESTHESIOLOGY

## 2022-11-18 PROCEDURE — 76000 FLUOROSCOPY <1 HR PHYS/QHP: CPT | Mod: TC,PO

## 2022-11-18 PROCEDURE — A4216 STERILE WATER/SALINE, 10 ML: HCPCS | Mod: PO | Performed by: ANESTHESIOLOGY

## 2022-11-18 PROCEDURE — 25000003 PHARM REV CODE 250: Mod: PO | Performed by: ANESTHESIOLOGY

## 2022-11-18 RX ORDER — SODIUM CHLORIDE, SODIUM LACTATE, POTASSIUM CHLORIDE, CALCIUM CHLORIDE 600; 310; 30; 20 MG/100ML; MG/100ML; MG/100ML; MG/100ML
INJECTION, SOLUTION INTRAVENOUS CONTINUOUS
Status: DISCONTINUED | OUTPATIENT
Start: 2022-11-18 | End: 2022-11-18 | Stop reason: HOSPADM

## 2022-11-18 RX ORDER — LIDOCAINE HYDROCHLORIDE 10 MG/ML
INJECTION, SOLUTION EPIDURAL; INFILTRATION; INTRACAUDAL; PERINEURAL
Status: DISCONTINUED | OUTPATIENT
Start: 2022-11-18 | End: 2022-11-18 | Stop reason: HOSPADM

## 2022-11-18 RX ORDER — METHYLPREDNISOLONE ACETATE 80 MG/ML
INJECTION, SUSPENSION INTRA-ARTICULAR; INTRALESIONAL; INTRAMUSCULAR; SOFT TISSUE
Status: DISCONTINUED | OUTPATIENT
Start: 2022-11-18 | End: 2022-11-18 | Stop reason: HOSPADM

## 2022-11-18 RX ORDER — SODIUM CHLORIDE 9 MG/ML
INJECTION, SOLUTION INTRAMUSCULAR; INTRAVENOUS; SUBCUTANEOUS
Status: DISCONTINUED | OUTPATIENT
Start: 2022-11-18 | End: 2022-11-18 | Stop reason: HOSPADM

## 2022-11-18 RX ORDER — ALPRAZOLAM 0.5 MG/1
1 TABLET, ORALLY DISINTEGRATING ORAL ONCE AS NEEDED
Status: DISCONTINUED | OUTPATIENT
Start: 2022-11-18 | End: 2022-11-18

## 2022-11-18 RX ORDER — MIDAZOLAM HYDROCHLORIDE 2 MG/2ML
INJECTION, SOLUTION INTRAMUSCULAR; INTRAVENOUS
Status: DISCONTINUED | OUTPATIENT
Start: 2022-11-18 | End: 2022-11-18 | Stop reason: HOSPADM

## 2022-11-18 RX ADMIN — SODIUM CHLORIDE, SODIUM LACTATE, POTASSIUM CHLORIDE, AND CALCIUM CHLORIDE: .6; .31; .03; .02 INJECTION, SOLUTION INTRAVENOUS at 09:11

## 2022-11-18 NOTE — DISCHARGE SUMMARY
[facility] - Surgery  Discharge Note  Short Stay    Procedure(s) (LRB):  Injection-steroid-epidural-lumbar L5/S1 to right (N/A)      OUTCOME: Patient tolerated treatment/procedure well without complication and is now ready for discharge.    DISPOSITION: Home or Self Care    FINAL DIAGNOSIS:  Lumbar radiculopathy    FOLLOWUP: In clinic    DISCHARGE INSTRUCTIONS:    Discharge Procedure Orders   Diet Adult Regular     No dressing needed     Notify your health care provider if you experience any of the following:  temperature >100.4     Activity as tolerated

## 2022-11-18 NOTE — DISCHARGE INSTRUCTIONS
PAIN MANAGEMENT    HOME CARE INSTRUCTIONS   Do not use heat (such as a heating pad) for 24 hours.  You may apply an ice pack to the injection site for 20 minutes at a time for the first 24 hours for soreness/discomfort at injection site   Keep site clean and dry for 24 hours. If bandaid is present, remove when desired.   Do not drive until tomorrow.  Take care when walking after a lumbar injection.   Resume home medication as prescribed today.  Resume Aspirin, Plavix, or Coumadin the day after the procedure unless other wise instructed.    STEROIDS    May take 10-14 days for full effects.  Avoid strenuous exercises for 2 days.    CALL PHYSICIAN FOR:  Severe increase in your usual pain or the appearance of new pain.  Prolonged or increasing weakness or numbness in the legs or arms.  Fever greater than 100 degrees F.  Drainage, redness, active bleeding, or increased swelling at the injection site.  Headache that increases when your head is upright and decreases when you lie flat.    FOR EMERGENCIES:   Go directly to the emergency department for any shortness of breath, chest pain, or problems breathing.

## 2022-11-18 NOTE — OP NOTE
PROCEDURE DATE: 11/18/2022    Lumbar Interlaminar Epidural Steroid Injection under Fluoroscopic Guidance, AP Approach.    Procedure:   Interlaminar epidural steroid injection at L5/S1 under fluoroscopic guidance.    Diagnosis: lUMBAR Radiculopathy    pOSTOP DIAGNOSIS: sAME    Physician: lFoyd Coleman M.D.    Medications injected:80 mg methylprednisone with 4 ml of preservative free NaCl    Local anesthetic injected:    Lidocaine 1% 4 ml total    Sedation Medications: 4mg versed    Estimated blood loss:  none    Complications:  none    Technique:  Time-out taken to identify patient and procedure prior to starting the procedure.  With the patient laying in a prone position, the area was prepped and draped in the usual sterile fashion using ChloraPrep and a fenestrated drape.  After determining the target level with an AP fluoroscopic view, local anesthetic was given using a 25-gauge 1.5 inch needle by raising a wheal and then infiltrating toward the interlaminar entry space.  A 3.5inch 22-gauge Touhy needle was introduced under AP fluoroscopic guidance to the interlaminar space of L5/S1. Once the trajectory was established, the needle was visualized in the lateral view and advanced using loss of resistance technique. Once in the desired position, omnipaque contrast was injected to confirm placement and there was no vascular uptake nor intrathecal spread.  The medication was then injected slowly. The patient tolerated the procedure well.      The patient was monitored after the procedure.   They were given post-procedure and discharge instructions to follow at home.  The patient was discharged in a stable condition.    Event Time In   In Facility 0836   In Pre-Procedure 0847   Physician Available    Anesthesia Available    Pre-Op: Bedside Procedure Start    Pre-Op: Bedside Procedure Stop    Pre-Procedure Complete 0983   Out of Pre-Procedure    Anesthesia Start    Anesthesia Start Data Collection    Setup Start     Setup Complete    In Room 0942   Prep Start    Procedure Prep Complete    Procedure Start 1006   Procedure Closing    Emergence    Procedure Finish 1009   Sedation Start 0941   Scope In    Extent Reached    Scope Out    Sedation End 1012   Out of Room 1012   Cleanup Start    Cleanup Complete    Cosmetic Start    Cosmetic Stop    Pain Mgmt In Room    Pain Mgmt Out Room    In Recovery    Anesthesia Finish    Bedside Procedure Start    Bedside Procedure Stop    Recovery Care Complete    Out of Recovery    To Phase II    In Phase II    Pain Mgmt Recovery Start    Pain Mgmt Recovery Stop    Obs Rec Start    Obs Rec Stop    Phase II Care Complete    Out of Phase II    Procedural Care Complete    Discharge    Pain Follow Up Needed    Pain Follow Up Complete      Moderate sedation was achieved with midazolam 4 mg .  Continuous monitoring of EKG, blood pressure and pulse oximetry was provided by a registered nurse during the entire course of the procedure under my supervision and recorded in the patient's medical record.   Total time for sedation was 21 minutes.

## 2022-12-15 ENCOUNTER — OFFICE VISIT (OUTPATIENT)
Dept: CARDIOLOGY | Facility: CLINIC | Age: 62
End: 2022-12-15
Payer: COMMERCIAL

## 2022-12-15 VITALS
WEIGHT: 175.06 LBS | HEART RATE: 66 BPM | BODY MASS INDEX: 33.05 KG/M2 | SYSTOLIC BLOOD PRESSURE: 140 MMHG | DIASTOLIC BLOOD PRESSURE: 84 MMHG | HEIGHT: 61 IN

## 2022-12-15 DIAGNOSIS — G90.1 DYSAUTONOMIA: ICD-10-CM

## 2022-12-15 DIAGNOSIS — I10 PRIMARY HYPERTENSION: Primary | ICD-10-CM

## 2022-12-15 PROCEDURE — 3044F HG A1C LEVEL LT 7.0%: CPT | Mod: CPTII,S$GLB,, | Performed by: INTERNAL MEDICINE

## 2022-12-15 PROCEDURE — 3044F PR MOST RECENT HEMOGLOBIN A1C LEVEL <7.0%: ICD-10-PCS | Mod: CPTII,S$GLB,, | Performed by: INTERNAL MEDICINE

## 2022-12-15 PROCEDURE — 1159F PR MEDICATION LIST DOCUMENTED IN MEDICAL RECORD: ICD-10-PCS | Mod: CPTII,S$GLB,, | Performed by: INTERNAL MEDICINE

## 2022-12-15 PROCEDURE — 99999 PR PBB SHADOW E&M-EST. PATIENT-LVL III: CPT | Mod: PBBFAC,,, | Performed by: INTERNAL MEDICINE

## 2022-12-15 PROCEDURE — 1160F PR REVIEW ALL MEDS BY PRESCRIBER/CLIN PHARMACIST DOCUMENTED: ICD-10-PCS | Mod: CPTII,S$GLB,, | Performed by: INTERNAL MEDICINE

## 2022-12-15 PROCEDURE — 1159F MED LIST DOCD IN RCRD: CPT | Mod: CPTII,S$GLB,, | Performed by: INTERNAL MEDICINE

## 2022-12-15 PROCEDURE — 3079F DIAST BP 80-89 MM HG: CPT | Mod: CPTII,S$GLB,, | Performed by: INTERNAL MEDICINE

## 2022-12-15 PROCEDURE — 3008F PR BODY MASS INDEX (BMI) DOCUMENTED: ICD-10-PCS | Mod: CPTII,S$GLB,, | Performed by: INTERNAL MEDICINE

## 2022-12-15 PROCEDURE — 99213 OFFICE O/P EST LOW 20 MIN: CPT | Mod: S$GLB,,, | Performed by: INTERNAL MEDICINE

## 2022-12-15 PROCEDURE — 99213 PR OFFICE/OUTPT VISIT, EST, LEVL III, 20-29 MIN: ICD-10-PCS | Mod: S$GLB,,, | Performed by: INTERNAL MEDICINE

## 2022-12-15 PROCEDURE — 3077F PR MOST RECENT SYSTOLIC BLOOD PRESSURE >= 140 MM HG: ICD-10-PCS | Mod: CPTII,S$GLB,, | Performed by: INTERNAL MEDICINE

## 2022-12-15 PROCEDURE — 99999 PR PBB SHADOW E&M-EST. PATIENT-LVL III: ICD-10-PCS | Mod: PBBFAC,,, | Performed by: INTERNAL MEDICINE

## 2022-12-15 PROCEDURE — 1160F RVW MEDS BY RX/DR IN RCRD: CPT | Mod: CPTII,S$GLB,, | Performed by: INTERNAL MEDICINE

## 2022-12-15 PROCEDURE — 3079F PR MOST RECENT DIASTOLIC BLOOD PRESSURE 80-89 MM HG: ICD-10-PCS | Mod: CPTII,S$GLB,, | Performed by: INTERNAL MEDICINE

## 2022-12-15 PROCEDURE — 3008F BODY MASS INDEX DOCD: CPT | Mod: CPTII,S$GLB,, | Performed by: INTERNAL MEDICINE

## 2022-12-15 PROCEDURE — 3077F SYST BP >= 140 MM HG: CPT | Mod: CPTII,S$GLB,, | Performed by: INTERNAL MEDICINE

## 2022-12-15 NOTE — PROGRESS NOTES
Subjective:    Patient ID:  Vonnie Garces is a 62 y.o. female who presents for follow-up of Dysautonomia (6 wk f/u )      HPI  She comes with no complaints, no chest pain, no shortness of breath  BP better controlled    Review of Systems   Constitutional: Negative for decreased appetite, malaise/fatigue, weight gain and weight loss.   Cardiovascular:  Negative for chest pain, dyspnea on exertion, leg swelling, palpitations and syncope.   Respiratory:  Negative for cough and shortness of breath.    Gastrointestinal: Negative.    Neurological:  Negative for weakness.   All other systems reviewed and are negative.     Objective:      Physical Exam  Vitals and nursing note reviewed.   Constitutional:       Appearance: Normal appearance. She is well-developed.   HENT:      Head: Normocephalic.   Eyes:      Pupils: Pupils are equal, round, and reactive to light.   Neck:      Thyroid: No thyromegaly.      Vascular: No carotid bruit or JVD.   Cardiovascular:      Rate and Rhythm: Normal rate and regular rhythm.      Chest Wall: PMI is not displaced.      Pulses: Normal pulses and intact distal pulses.      Heart sounds: Normal heart sounds. No murmur heard.    No gallop.   Pulmonary:      Effort: Pulmonary effort is normal.      Breath sounds: Normal breath sounds.   Abdominal:      Palpations: Abdomen is soft. There is no mass.      Tenderness: There is no abdominal tenderness.   Musculoskeletal:         General: Normal range of motion.      Cervical back: Normal range of motion and neck supple.   Skin:     General: Skin is warm.   Neurological:      Mental Status: She is alert and oriented to person, place, and time.      Sensory: No sensory deficit.      Deep Tendon Reflexes: Reflexes are normal and symmetric.         Assessment:       1. Primary hypertension    2. Dysautonomia         Plan:     Continue all cardiac medications  Regular exercise program  Weight loss  1 yr f/u

## 2022-12-26 ENCOUNTER — PATIENT MESSAGE (OUTPATIENT)
Dept: PAIN MEDICINE | Facility: CLINIC | Age: 62
End: 2022-12-26
Payer: COMMERCIAL

## 2022-12-27 ENCOUNTER — OFFICE VISIT (OUTPATIENT)
Dept: PAIN MEDICINE | Facility: CLINIC | Age: 62
End: 2022-12-27
Payer: COMMERCIAL

## 2022-12-27 ENCOUNTER — TELEPHONE (OUTPATIENT)
Dept: PAIN MEDICINE | Facility: CLINIC | Age: 62
End: 2022-12-27

## 2022-12-27 DIAGNOSIS — M50.30 DDD (DEGENERATIVE DISC DISEASE), CERVICAL: ICD-10-CM

## 2022-12-27 DIAGNOSIS — Z79.891 OPIOID CONTRACT EXISTS: ICD-10-CM

## 2022-12-27 DIAGNOSIS — M51.36 DDD (DEGENERATIVE DISC DISEASE), LUMBAR: ICD-10-CM

## 2022-12-27 DIAGNOSIS — M47.814 THORACIC SPONDYLOSIS: Primary | ICD-10-CM

## 2022-12-27 DIAGNOSIS — M54.16 LUMBAR RADICULOPATHY: ICD-10-CM

## 2022-12-27 PROCEDURE — 1160F PR REVIEW ALL MEDS BY PRESCRIBER/CLIN PHARMACIST DOCUMENTED: ICD-10-PCS | Mod: CPTII,95,, | Performed by: PHYSICIAN ASSISTANT

## 2022-12-27 PROCEDURE — 1159F PR MEDICATION LIST DOCUMENTED IN MEDICAL RECORD: ICD-10-PCS | Mod: CPTII,95,, | Performed by: PHYSICIAN ASSISTANT

## 2022-12-27 PROCEDURE — 3044F PR MOST RECENT HEMOGLOBIN A1C LEVEL <7.0%: ICD-10-PCS | Mod: CPTII,95,, | Performed by: PHYSICIAN ASSISTANT

## 2022-12-27 PROCEDURE — 99214 OFFICE O/P EST MOD 30 MIN: CPT | Mod: 95,,, | Performed by: PHYSICIAN ASSISTANT

## 2022-12-27 PROCEDURE — 3044F HG A1C LEVEL LT 7.0%: CPT | Mod: CPTII,95,, | Performed by: PHYSICIAN ASSISTANT

## 2022-12-27 PROCEDURE — 1160F RVW MEDS BY RX/DR IN RCRD: CPT | Mod: CPTII,95,, | Performed by: PHYSICIAN ASSISTANT

## 2022-12-27 PROCEDURE — 1159F MED LIST DOCD IN RCRD: CPT | Mod: CPTII,95,, | Performed by: PHYSICIAN ASSISTANT

## 2022-12-27 PROCEDURE — 99214 PR OFFICE/OUTPT VISIT, EST, LEVL IV, 30-39 MIN: ICD-10-PCS | Mod: 95,,, | Performed by: PHYSICIAN ASSISTANT

## 2022-12-27 RX ORDER — HYDROCODONE BITARTRATE AND ACETAMINOPHEN 7.5; 325 MG/1; MG/1
1 TABLET ORAL EVERY 8 HOURS PRN
Qty: 60 TABLET | Refills: 0 | Status: SHIPPED | OUTPATIENT
Start: 2023-02-08 | End: 2023-03-10

## 2022-12-27 RX ORDER — HYDROCODONE BITARTRATE AND ACETAMINOPHEN 7.5; 325 MG/1; MG/1
1 TABLET ORAL EVERY 8 HOURS PRN
Qty: 60 TABLET | Refills: 0 | Status: SHIPPED | OUTPATIENT
Start: 2023-03-10 | End: 2023-04-12

## 2022-12-27 NOTE — PROGRESS NOTES
This note was completed with dictation software and grammatical errors may exist.    The patient location is: [facility]  The chief complaint leading to consultation is: thoracic back pain  Visit type: Virtual visit with synchronous audio and video  Total time spent with patient: 20 mintues  Each patient to whom he or she provides medical services by telemedicine is:  (1) informed of the relationship between the physician and patient and the respective role of any other health care provider with respect to management of the patient; and (2) notified that he or she may decline to receive medical services by telemedicine and may withdraw from such care at any time.      CC:Back pain, neck pain    HPI: The patient is a 62-year-old woman with history of hypothyroidism, otherwise fairly healthy but a long history of scoliosis causing back pain, self-referred for continued back pain.  She is status post L5/S1 interlaminar epidural steroid injection to the right on 11/18/2022 with 75% relief.  She does have mild remaining low back pain that is tolerable with normal activity.  She states that her leg symptoms are completely gone.  However, she has been having some worsening thoracic back pain and neck pain.  Her thoracic back pain is located on the right side and she feels that this has returned faster than usual following her radiofrequency ablation procedure earlier this year.  She would like to schedule this again because it typically gives her significant pain relief.    Pain intervention history: She has been seeing Dr. Huffman for the last several years and has undergone epidural steroid injections and radiofrequency ablations with good relief.  According to her last pain management physician, she had undergone a right side T3, 4, 5, 6 medial branch radiofrequency ablation on 8/8/14 with good relief.  He had scheduled her for another one but did not complete this.  As far as medications she has been taking  Hydrocodone 7.5/325 one to 2 times a day at most and gabapentin 300 mg at night. She is status post cervical epidural steroid injection on 10/19/15 with 50% relief of her neck pain. She is status post right L4 transforaminal epidural steroid injection on 11/23/15 with 100% relief.   She is status post right T3, 4, 5 and 6 medial branch radio frequency ablation on 2/5/16 with greater than 50% relief.  She is status post right L4 transforaminal epidural sterile injection on 6/14/16 with significant relief lasting only about a month.  She is status post C7-T1 cervical interlaminar epidural steroid injection on 7/14/16 with 30-40% relief.   She is status post L5/S1 interlaminar epidural steroidal injection to the right on 8/12/16 with 100% relief of her right leg pain.  She is status post C7-T1 cervical interlaminar epidural steroid injection on 12/9/16 with almost complete relief.  She is status post right T4, 5, 6 and 7 medial branch radiofrequency ablation on 5/30/17 with 100% relief.  She is status post C7-T1 cervical interlaminar epidural steroid injection on 6/27/17 with 80% relief.  She is status post L5/S1 interlaminar epidural steroid injection on a/7/17 with excellent relief lasting 2 weeks, now reporting 0% relief.  She is status post right L3, 4 and 5 medial branch radiofrequency ablation on 10/19/17 with 80% relief.   She is status post right T4, 5, 6 and 7 medial branch radiofrequency ablation on 12/14/17 with 100% relief.  She is status post C7-T1 cervical interlaminar epidural steroid injection on 3/16/18 with at least 80% relief.  She is status post lumbar epidural steroid injection at L5/S1 on 5/15/18 with 90% relief of her bilateral low back and right leg pain.  The she is status post right T4, 5, 6 and 7 medial branch radiofrequency ablation on 10/12/2018 with 100% relief.  She is status post L5/S1 interlaminar epidural steroid injection on 01/16/2019 with almost 100% relief of her low back pain. She  is status post right T4, 5, 6 and 7 medial branch radiofrequency ablation on 06/04/2019 with 80% relief.  She is status post L5/S1 interlaminar epidural steroid injection on 03/22/2021 with 85-90% relief.   She is status post right T4, 5, 6, 7 medial branch radiofrequency ablation on 05/21/2021 with 85% relief. She is status post cervical JINA C7/T1 on 11/11/2021 with greater than 70% relief.  She is status post L5/S1 interlaminar epidural steroid injection to the right on 11/18/2022 with 75% relief.     Spine surgeries:  None    Antineuropathics:  Lidoderm 5%  NSAIDs:  Duexis 800 up to TID  Physical therapy:  She has consistently been in physical therapy, reports exercise, dry needling with at least temporary relief  Antidepressants:  Muscle relaxers:  Opioids:  Hydrocodone 7.5/325 twice daily  Antiplatelets/Anticoagulants:    ROS: She reports fatigability, headaches, hypothyroidism, joint stiffness, back pain, difficulty sleeping and anxiety.  Balance of review of systems is negative.      Past Medical History:   Diagnosis Date    Anxiety 08/25/2015    Arthritis     BRCA1 negative     BRCA2 negative     Celiac disease     Cervical spondylosis     Chronic back pain     DDD (degenerative disc disease), lumbar     Difficult intubation 03/2016    anterior larynx, pt with metal dental appliance, unable to do glidescope, used LMA    Encounter for blood transfusion     Facet arthropathy, thoracic     Hormone replacement therapy (HRT)     Insomnia 08/25/2015    Neck pain     PONV (postoperative nausea and vomiting)     Right foot pain     Scoliosis     Sjogren's syndrome     Snoring     uses cpap, states not ROSSY    Unspecified hypothyroidism 08/25/2015     Past Surgical History:   Procedure Laterality Date    BILATERAL OOPHORECTOMY  2004    BREAST BIOPSY Left 2010    Benign    CHOLECYSTECTOMY  2010    COLONOSCOPY  2016    [facility]    Epidural Steroid Injection      Pain managment, at another facility, cervical, thoracic     Epidural Steroid injection      Pain management, cervical    EPIDURAL STEROID INJECTION INTO CERVICAL SPINE N/A 11/11/2021    Procedure: Injection-steroid-epidural-cervical, C7/T1 interlaminer;  Surgeon: Floyd Coleman MD;  Location: [facility] OR;  Service: Pain Management;  Laterality: N/A;    EPIDURAL STEROID INJECTION INTO LUMBAR SPINE Right 1/16/2019    Procedure: Injection-steroid-epidural-lumbar L5/S1;  Surgeon: Floyd Coleman MD;  Location: [facility] OR;  Service: Pain Management;  Laterality: Right;  to right    EPIDURAL STEROID INJECTION INTO LUMBAR SPINE N/A 5/14/2019    Procedure: Injection-steroid-epidural-lumbar;  Surgeon: Floyd Coleman MD;  Location: [facility] OR;  Service: Pain Management;  Laterality: N/A;  L5/S1 interlaminar    EPIDURAL STEROID INJECTION INTO LUMBAR SPINE N/A 3/22/2021    Procedure: Injection-steroid-epidural-lumbar L5/S1 interlaminer;  Surgeon: Floyd Coleman MD;  Location: [facility] OR;  Service: Pain Management;  Laterality: N/A;    EPIDURAL STEROID INJECTION INTO LUMBAR SPINE N/A 11/18/2022    Procedure: Injection-steroid-epidural-lumbar L5/S1 to right;  Surgeon: Floyd Coleman MD;  Location: [facility] OR;  Service: Pain Management;  Laterality: N/A;    HYSTERECTOMY  2004    Complete    MOUTH SURGERY      Braces, with temporary metal implants in upper gum    OOPHORECTOMY  2004    w/ hysterectomy    RADIOFREQUENCY ABLATION  02/2016    thoracic nerve    RADIOFREQUENCY ABLATION Right 6/4/2019    Procedure: Radiofrequency Ablation T4,5,6,7;  Surgeon: Floyd Coleman MD;  Location: [facility] OR;  Service: Pain Management;  Laterality: Right;    RADIOFREQUENCY ABLATION Right 5/21/2021    Procedure: RADIOFREQUENCY ABLATION,THORACIC  T4,T5,T6, T7;  Surgeon: Floyd Coleman MD;  Location: [facility] OR;  Service: Pain Management;  Laterality: Right;    RADIOFREQUENCY ABLATION Right 6/30/2022    Procedure: Radiofrequency Ablation Thoracic T4, T5, T6, T7;  Surgeon: Floyd Coleman MD;   Location: [facility] OR;  Service: Pain Management;  Laterality: Right;    TONSILLECTOMY      VULVA SURGERY  2016     Social History     Socioeconomic History    Marital status:    Tobacco Use    Smoking status: Never    Smokeless tobacco: Never   Substance and Sexual Activity    Alcohol use: No    Drug use: No    Sexual activity: Yes     Partners: Male      She is a  at hospitals.    Medications/Allergies: See med card    There were no vitals filed for this visit.    Physical exam:  Gen: A and O x3, pleasant, well-groomed    Imagin/16/15 Xray Scoliosis survey: There is thoracic dextroscoliosis with mild lumbar compensatory levoscoliosis. No structural abnormalities are evident. Diffuse spondylosis noted in the cervical, thoracic and to lesser extent lumbar spine. No fractures are identified. There is slight increased kyphotic curvature of the thoracic spine with alignment being otherwise normal. Measurements and evaluation are limited due to underpenetration. Land angle measures approximately in the thoracic spine is of approximately 17.0 degrees and for the lumbar spine 17.4 degrees. Soft tissues are unremarkable. IMPRESSION: 1. S shaped scoliotic curvature of the thoracolumbar spine with Land angle of approximately 17 degrees. 2. Diffuse spondylosis.    MRI report 12: Report notes that there is dextroconvex rotary curvature of the thoracic spine.  Posterior alignment is maintained.  There are scattered vertebral body hemangiomas.  There is minimal posterior disc bulges noted at T5/6, T6/7, T7/8 and T8/9.  There is no central spinal stenosis or neural foraminal narrowing.    MRI right shoulder 11: Minor distal subscapularis tendinosis.  Negative for full-thickness or significant partial-thickness rotator cuff tendon tear.  There is a small amount of subacromial subdeltoid bursal fluid which can be seen with recent injection or mild bursitis.    Cervical spine MRI 16  At  C2-C3, the minimal interstitial excision covers a very mild broad-based disc bulge most prominent centrally but does not deform the ventral cord.  There is no canal or foraminal stenosis.  At C3-C4, there is mildly decreased disc height with a broad-based disc bulge most prominent centrally.  This minimally contact the ventral cord without significant deformity.  The canal is widely patent.  There is uncovertebral hypertrophy and facet arthropathy, but the foramina are patent.  At C4-C5, there is decreased disc height with a broad disc bulge-marginal osteophyte complex that mildly lateralizes to the left and blends imperceptibly with right greater than left uncovertebral hypertrophy.  With ligamentum hypertrophy, there is no significant central canal stenosis.  Uncovertebral hypertrophy and facet arthropathy are causing mild left and moderate right foraminal stenoses.  At C5-C6, there is a broad-based disc bulge and osteophyte complex most prominent centrally.  There is also ligamentum flavum hypertrophy present, but the canal is patent.  There is mild left foraminal stenosis.  The right foramen is patent.  At C6-C7, there is disc desiccation with a minimal broad-based disc bulge most prominent in the right paracentral canal.  The canal is widely patent.  There is mild left foraminal stenosis.  At C7-T1, there is no significant disc bulge, protrusion, or herniation/extrusion.  The canal and foramina are widely patent.    Hip x-rays 9/22/17  AP view of the pelvis and frog leg views of both hips were obtained. No evidence of acute displaced fracture or dislocation is visualized.  No radiopaque foreign bodies are visualized. The bilateral superior joint spaces are grossly maintained. Mild bilateral sacroiliac joint degenerative changes are seen including subchondral sclerosis and small marginal osteophytes. Mild to moderate symphyseal degenerative changes are seen. There is a slightly expansile cortically-based focus  along the lateral proximal right femoral metadiaphysis. This could reflect a sessile osteochondroma, but correlation with MRI of the right hip is recommended.    8/18/19 MRI L-spine:  T12-L1: No disc herniation or significant posterior osteophytic ridging.  No significant spinal canal or foraminal stenosis.   L1-L2: No disc herniation or significant posterior osteophytic ridging.  Minimal left facet hypertrophy.  No significant spinal canal or foraminal stenosis.   L2-L3: No disc herniation or significant posterior osteophytic ridging.  Minimal left facet hypertrophy.  No significant spinal canal or foraminal stenosis.   L3-L4: No disc herniation or significant posterior osteophytic ridging.  Minimal bilateral facet hypertrophy.  No significant spinal canal or foraminal stenosis.   L4-L5: Mild disc bulge.  Mild bilateral facet hypertrophy.  Mild ligamentum flavum thickening.  Minimal narrowing of the bilateral lateral recesses. No significant overall spinal canal stenosis. Mild bilateral foraminal stenosis.   L5-S1: Minimal disc bulge contained in the ventral epidural fat.  Mild bilateral facet hypertrophy.  No significant spinal canal or foraminal stenosis.    Assessment:  The patient is a 62-year-old woman with history of hypothyroidism, otherwise fairly healthy but a long history of scoliosis causing back pain, self-referred for continued back pain.    1. Thoracic spondylosis        2. DDD (degenerative disc disease), cervical        3. DDD (degenerative disc disease), lumbar        4. Lumbar radiculopathy        5. Opioid contract exists            Plan:  1. The patient did very well following the L5/S1 interlaminar epidural steroid injection to the right.  This can be repeated in the future if necessary.    2. We discussed that her medial branch nerves are regenerating and her thoracic back pain is worsening.  She would like to repeat the ablation so I will schedule her to repeat right T4, 5, 6, 7 medial branch  radiofrequency ablation.  3.  Dr. Coleman provided prescriptions for hydrocodone-acetaminophen 7.5/325 mg twice daily as needed for pain.  I have reviewed the [place] [employer] website and there are no abberancies.    3. Follow-up in 4 weeks postprocedure sooner as needed.

## 2022-12-27 NOTE — TELEPHONE ENCOUNTER
Virtual visit completed.  Please send prescriptions to pharmacy.    I have reviewed the [place] [employer] website and there are no abberancies.

## 2022-12-27 NOTE — TELEPHONE ENCOUNTER
Virtual visit completed.  Please schedule right T4, 5, 6, 7 medial branch radiofrequency ablation on either 1/31, 2/2 or 2/3.  Ok to have done at [facility].    Physician - Dr Coleman    Type of Procedure/Injection - Thoracic Radiofrequency Ablation T4, 5, 6, 7          Laterality - Right      Type of Sedation - RNIV      Need to hold medication - yes      NSAIDs for 2 days      Clearance needed - no      Follow up - 4 week

## 2022-12-28 NOTE — TELEPHONE ENCOUNTER
Virtual ok, I may be running late because it is not in a virtual slot, please ask her to get on at appt time and stay on.   
24 hours postop

## 2023-01-30 ENCOUNTER — OFFICE VISIT (OUTPATIENT)
Dept: OPTOMETRY | Facility: CLINIC | Age: 63
End: 2023-01-30
Payer: COMMERCIAL

## 2023-01-30 ENCOUNTER — PATIENT MESSAGE (OUTPATIENT)
Dept: OPTOMETRY | Facility: CLINIC | Age: 63
End: 2023-01-30

## 2023-01-30 DIAGNOSIS — H00.029 MEIBOMITIS, UNSPECIFIED LATERALITY: ICD-10-CM

## 2023-01-30 DIAGNOSIS — H52.7 REFRACTIVE ERROR: ICD-10-CM

## 2023-01-30 DIAGNOSIS — H04.123 BILATERAL DRY EYES: Primary | ICD-10-CM

## 2023-01-30 PROCEDURE — 99213 OFFICE O/P EST LOW 20 MIN: CPT | Mod: S$GLB,,, | Performed by: OPTOMETRIST

## 2023-01-30 PROCEDURE — 1160F PR REVIEW ALL MEDS BY PRESCRIBER/CLIN PHARMACIST DOCUMENTED: ICD-10-PCS | Mod: CPTII,S$GLB,, | Performed by: OPTOMETRIST

## 2023-01-30 PROCEDURE — 1160F RVW MEDS BY RX/DR IN RCRD: CPT | Mod: CPTII,S$GLB,, | Performed by: OPTOMETRIST

## 2023-01-30 PROCEDURE — 1159F MED LIST DOCD IN RCRD: CPT | Mod: CPTII,S$GLB,, | Performed by: OPTOMETRIST

## 2023-01-30 PROCEDURE — 1159F PR MEDICATION LIST DOCUMENTED IN MEDICAL RECORD: ICD-10-PCS | Mod: CPTII,S$GLB,, | Performed by: OPTOMETRIST

## 2023-01-30 PROCEDURE — 99999 PR PBB SHADOW E&M-EST. PATIENT-LVL III: CPT | Mod: PBBFAC,,, | Performed by: OPTOMETRIST

## 2023-01-30 PROCEDURE — 99999 PR PBB SHADOW E&M-EST. PATIENT-LVL III: ICD-10-PCS | Mod: PBBFAC,,, | Performed by: OPTOMETRIST

## 2023-01-30 PROCEDURE — 99213 PR OFFICE/OUTPT VISIT, EST, LEVL III, 20-29 MIN: ICD-10-PCS | Mod: S$GLB,,, | Performed by: OPTOMETRIST

## 2023-01-30 RX ORDER — LOTEPREDNOL ETABONATE 5 MG/ML
1 SUSPENSION/ DROPS OPHTHALMIC 4 TIMES DAILY
Qty: 5 ML | Refills: 2 | Status: SHIPPED | OUTPATIENT
Start: 2023-01-30 | End: 2023-02-09

## 2023-01-30 NOTE — PROGRESS NOTES
HPI     Eye Problem     Additional comments: Red swollen eyelids           Comments    DLS: 10/22/20    Pt states has been having extreme dry eyes. Eyelids get red and crusty.   Has trouble wearing contacts some days. Doing better today. Using Restasis   BID, Systane Complete x 5-6 times daily. Doing warm compresses and   sleeping with eye mask.           Last edited by Vicente Jackson, OD on 1/30/2023 10:31 AM.            Assessment /Plan     For exam results, see Encounter Report.    Bilateral dry eyes  -     loteprednol (LOTEMAX) 0.5 % ophthalmic suspension; Place 1 drop into both eyes 4 (four) times daily. for 10 days  Dispense: 5 mL; Refill: 2    Meibomitis, unspecified laterality    Refractive error      Cont with Restasis ok to increase to 3x/day , cont Lotemax for flare ups, can call for stronger steroid if needed.  Cont warm compress and lid wipes, clogged gland today LLL  Pt will call with [facility] so I can order trial of dailies    Addend 1/30/23 added rx for trial contacts to order from optical     Addend 2/22/23 added monthly contact to rx per pt request, poor vision with dailies

## 2023-02-06 ENCOUNTER — TELEPHONE (OUTPATIENT)
Dept: RHEUMATOLOGY | Facility: CLINIC | Age: 63
End: 2023-02-06
Payer: COMMERCIAL

## 2023-02-06 ENCOUNTER — PATIENT MESSAGE (OUTPATIENT)
Dept: RHEUMATOLOGY | Facility: CLINIC | Age: 63
End: 2023-02-06
Payer: COMMERCIAL

## 2023-02-06 NOTE — TELEPHONE ENCOUNTER
----- Message from Landen Garcia MD sent at 1/28/2023  9:21 PM CST -----  TSH shows that your slightly hypothyroid we may continue at TSH shows her slightly hypothyroid we will continue the present dose of medication however use feeling fatigue weight gain dry skin constipation forgetfulness we can increase the dose

## 2023-02-20 ENCOUNTER — PATIENT MESSAGE (OUTPATIENT)
Dept: RHEUMATOLOGY | Facility: CLINIC | Age: 63
End: 2023-02-20
Payer: COMMERCIAL

## 2023-02-20 ENCOUNTER — OFFICE VISIT (OUTPATIENT)
Dept: RHEUMATOLOGY | Facility: CLINIC | Age: 63
End: 2023-02-20
Payer: COMMERCIAL

## 2023-02-20 VITALS
DIASTOLIC BLOOD PRESSURE: 81 MMHG | HEART RATE: 68 BPM | BODY MASS INDEX: 32.1 KG/M2 | SYSTOLIC BLOOD PRESSURE: 150 MMHG | WEIGHT: 170 LBS | HEIGHT: 61 IN

## 2023-02-20 DIAGNOSIS — M02.39 REACTIVE ARTHRITIS OF MULTIPLE SITES: ICD-10-CM

## 2023-02-20 DIAGNOSIS — E06.3 HASHIMOTO'S THYROIDITIS: Primary | ICD-10-CM

## 2023-02-20 DIAGNOSIS — E55.9 VITAMIN D DEFICIENCY: ICD-10-CM

## 2023-02-20 PROCEDURE — 3077F PR MOST RECENT SYSTOLIC BLOOD PRESSURE >= 140 MM HG: ICD-10-PCS | Mod: CPTII,S$GLB,, | Performed by: PHYSICIAN ASSISTANT

## 2023-02-20 PROCEDURE — 1160F PR REVIEW ALL MEDS BY PRESCRIBER/CLIN PHARMACIST DOCUMENTED: ICD-10-PCS | Mod: CPTII,S$GLB,, | Performed by: PHYSICIAN ASSISTANT

## 2023-02-20 PROCEDURE — 99999 PR PBB SHADOW E&M-EST. PATIENT-LVL V: CPT | Mod: PBBFAC,,, | Performed by: PHYSICIAN ASSISTANT

## 2023-02-20 PROCEDURE — 96372 PR INJECTION,THERAP/PROPH/DIAG2ST, IM OR SUBCUT: ICD-10-PCS | Mod: S$GLB,,, | Performed by: PHYSICIAN ASSISTANT

## 2023-02-20 PROCEDURE — 3008F BODY MASS INDEX DOCD: CPT | Mod: CPTII,S$GLB,, | Performed by: PHYSICIAN ASSISTANT

## 2023-02-20 PROCEDURE — 99214 OFFICE O/P EST MOD 30 MIN: CPT | Mod: 25,S$GLB,, | Performed by: PHYSICIAN ASSISTANT

## 2023-02-20 PROCEDURE — 96372 THER/PROPH/DIAG INJ SC/IM: CPT | Mod: S$GLB,,, | Performed by: PHYSICIAN ASSISTANT

## 2023-02-20 PROCEDURE — 1159F PR MEDICATION LIST DOCUMENTED IN MEDICAL RECORD: ICD-10-PCS | Mod: CPTII,S$GLB,, | Performed by: PHYSICIAN ASSISTANT

## 2023-02-20 PROCEDURE — 99214 PR OFFICE/OUTPT VISIT, EST, LEVL IV, 30-39 MIN: ICD-10-PCS | Mod: 25,S$GLB,, | Performed by: PHYSICIAN ASSISTANT

## 2023-02-20 PROCEDURE — 3079F DIAST BP 80-89 MM HG: CPT | Mod: CPTII,S$GLB,, | Performed by: PHYSICIAN ASSISTANT

## 2023-02-20 PROCEDURE — 99999 PR PBB SHADOW E&M-EST. PATIENT-LVL V: ICD-10-PCS | Mod: PBBFAC,,, | Performed by: PHYSICIAN ASSISTANT

## 2023-02-20 PROCEDURE — 3077F SYST BP >= 140 MM HG: CPT | Mod: CPTII,S$GLB,, | Performed by: PHYSICIAN ASSISTANT

## 2023-02-20 PROCEDURE — 1160F RVW MEDS BY RX/DR IN RCRD: CPT | Mod: CPTII,S$GLB,, | Performed by: PHYSICIAN ASSISTANT

## 2023-02-20 PROCEDURE — 3008F PR BODY MASS INDEX (BMI) DOCUMENTED: ICD-10-PCS | Mod: CPTII,S$GLB,, | Performed by: PHYSICIAN ASSISTANT

## 2023-02-20 PROCEDURE — 1159F MED LIST DOCD IN RCRD: CPT | Mod: CPTII,S$GLB,, | Performed by: PHYSICIAN ASSISTANT

## 2023-02-20 PROCEDURE — 3079F PR MOST RECENT DIASTOLIC BLOOD PRESSURE 80-89 MM HG: ICD-10-PCS | Mod: CPTII,S$GLB,, | Performed by: PHYSICIAN ASSISTANT

## 2023-02-20 RX ORDER — HYDROXYCHLOROQUINE SULFATE 200 MG/1
200 TABLET, FILM COATED ORAL 2 TIMES DAILY
Qty: 60 TABLET | Refills: 6 | Status: SHIPPED | OUTPATIENT
Start: 2023-02-20 | End: 2023-10-09 | Stop reason: SDUPTHER

## 2023-02-20 RX ORDER — METHYLPREDNISOLONE ACETATE 80 MG/ML
160 INJECTION, SUSPENSION INTRA-ARTICULAR; INTRALESIONAL; INTRAMUSCULAR; SOFT TISSUE
Status: COMPLETED | OUTPATIENT
Start: 2023-02-20 | End: 2023-02-20

## 2023-02-20 RX ADMIN — METHYLPREDNISOLONE ACETATE 160 MG: 80 INJECTION, SUSPENSION INTRA-ARTICULAR; INTRALESIONAL; INTRAMUSCULAR; SOFT TISSUE at 04:02

## 2023-02-20 NOTE — PROGRESS NOTES
Subjective:       Patient ID: Vonnie Garces is a 62 y.o. female.    Chief Complaint: Disease Management    Mrs. Garces is a 61 year old female who presents to clinic follow up on reactive arthritis. She has history of reactive arthritis and hashimoto's thyroiditis currently on duexis and occasional use of prednisone for flares. She is doing poorly. She complains of pain in her R hip, hands, shoulders, R shoulder blade, bilateral knees, and L ankle. Pain is described as burning sensation. Prior to onset of pain she reports increased fatigue. Arthritis flares are occurring more frequently typically every 8 weeks. She also complains of difficulty with temperature regulation, brain fog, constipation, and hair loss. Other sx include dry eyes, which have progressed since her last visit. She has worsening livedo rash today in clinic.       Current treatment:  1. Prednisone prn    Review of Systems   Constitutional:  Negative for activity change, appetite change, chills, fatigue, fever and unexpected weight change.   HENT:  Negative for mouth sores and trouble swallowing.    Eyes:  Negative for redness and visual disturbance.   Respiratory:  Negative for cough and shortness of breath.    Cardiovascular:  Negative for chest pain, palpitations and leg swelling.   Gastrointestinal:  Negative for abdominal pain, constipation, diarrhea, nausea and vomiting.   Genitourinary:  Negative for dysuria and genital sores.   Musculoskeletal:  Positive for arthralgias, back pain, myalgias and neck pain. Negative for gait problem.   Skin:  Positive for rash.   Neurological:  Negative for dizziness, weakness, light-headedness and headaches.   Hematological:  Does not bruise/bleed easily.   Psychiatric/Behavioral:  The patient is nervous/anxious.        Objective:     Vitals:    02/20/23 1603   BP: (!) 150/81   Pulse: 68       Past Medical History:   Diagnosis Date    Anxiety 08/25/2015    Arthritis     BRCA1 negative     BRCA2  negative     Celiac disease     Cervical spondylosis     Chronic back pain     DDD (degenerative disc disease), lumbar     Difficult intubation 03/2016    anterior larynx, pt with metal dental appliance, unable to do glidescope, used LMA    Encounter for blood transfusion     Facet arthropathy, thoracic     Hormone replacement therapy (HRT)     Insomnia 08/25/2015    Neck pain     PONV (postoperative nausea and vomiting)     Right foot pain     Scoliosis     Sjogren's syndrome     Snoring     uses cpap, states not ROSSY    Unspecified hypothyroidism 08/25/2015     Past Surgical History:   Procedure Laterality Date    BILATERAL OOPHORECTOMY  2004    BREAST BIOPSY Left 2010    Benign    CHOLECYSTECTOMY  2010    COLONOSCOPY  2016    [place]    Epidural Steroid Injection      Pain managment, at another facility, cervical, thoracic    Epidural Steroid injection      Pain management, cervical    EPIDURAL STEROID INJECTION INTO CERVICAL SPINE N/A 11/11/2021    Procedure: Injection-steroid-epidural-cervical, C7/T1 interlaminer;  Surgeon: Floyd Coleman MD;  Location: [facility] OR;  Service: Pain Management;  Laterality: N/A;    EPIDURAL STEROID INJECTION INTO LUMBAR SPINE Right 1/16/2019    Procedure: Injection-steroid-epidural-lumbar L5/S1;  Surgeon: Floyd Coleman MD;  Location: [facility] OR;  Service: Pain Management;  Laterality: Right;  to right    EPIDURAL STEROID INJECTION INTO LUMBAR SPINE N/A 5/14/2019    Procedure: Injection-steroid-epidural-lumbar;  Surgeon: Floyd Coleman MD;  Location: [facility] OR;  Service: Pain Management;  Laterality: N/A;  L5/S1 interlaminar    EPIDURAL STEROID INJECTION INTO LUMBAR SPINE N/A 3/22/2021    Procedure: Injection-steroid-epidural-lumbar L5/S1 interlaminer;  Surgeon: Floyd Coleman MD;  Location: [facility] OR;  Service: Pain Management;  Laterality: N/A;    EPIDURAL STEROID INJECTION INTO LUMBAR SPINE N/A 11/18/2022    Procedure: Injection-steroid-epidural-lumbar L5/S1 to right;   Surgeon: Floyd Coleman MD;  Location: [facility];  Service: Pain Management;  Laterality: N/A;    HYSTERECTOMY  2004    Complete    MOUTH SURGERY      Braces, with temporary metal implants in upper gum    OOPHORECTOMY  2004    w/ hysterectomy    RADIOFREQUENCY ABLATION  02/2016    thoracic nerve    RADIOFREQUENCY ABLATION Right 6/4/2019    Procedure: Radiofrequency Ablation T4,5,6,7;  Surgeon: Floyd Coleman MD;  Location: [facility] OR;  Service: Pain Management;  Laterality: Right;    RADIOFREQUENCY ABLATION Right 5/21/2021    Procedure: RADIOFREQUENCY ABLATION,THORACIC  T4,T5,T6, T7;  Surgeon: Floyd Coleman MD;  Location: [facility] OR;  Service: Pain Management;  Laterality: Right;    RADIOFREQUENCY ABLATION Right 6/30/2022    Procedure: Radiofrequency Ablation Thoracic T4, T5, T6, T7;  Surgeon: Floyd Coleman MD;  Location: [facility] OR;  Service: Pain Management;  Laterality: Right;    TONSILLECTOMY      VULVA SURGERY  03/2016          Physical Exam   Constitutional: She is oriented to person, place, and time.   Eyes: Right conjunctiva is not injected. Left conjunctiva is not injected.   Neck: No JVD present. No thyromegaly present.   Cardiovascular: Normal rate.   Pulmonary/Chest: Effort normal.   Musculoskeletal:      Right shoulder: Normal.      Left shoulder: Normal.      Right elbow: Normal.      Left elbow: Normal.      Right wrist: Normal.      Left wrist: Normal.      Right knee: Normal.      Left knee: Normal.   Lymphadenopathy:     She has no cervical adenopathy.   Neurological: She is alert and oriented to person, place, and time. Gait normal.   Skin: No rash noted.   Psychiatric: Mood and affect normal.       Right Side Rheumatological Exam     Examination finds the shoulder, elbow, wrist, knee, 1st PIP, 1st MCP, 2nd PIP, 2nd MCP, 3rd PIP, 3rd MCP, 4th PIP, 4th MCP, 5th PIP and 5th MCP normal.    Left Side Rheumatological Exam     Examination finds the shoulder, elbow, wrist, knee, 1st PIP, 1st  MCP, 2nd PIP, 2nd MCP, 3rd PIP, 3rd MCP, 4th PIP, 4th MCP, 5th PIP and 5th MCP normal.                  Labs:  Component      Latest Ref Rng & Units 12/6/2022   WBC      3.90 - 12.70 K/uL 6.64   RBC      4.00 - 5.40 M/uL 4.47   Hemoglobin      12.0 - 16.0 g/dL 13.4   Hematocrit      37.0 - 48.5 % 40.1   MCV      82 - 98 fL 90   MCH      27.0 - 31.0 pg 30.0   MCHC      32.0 - 36.0 g/dL 33.4   RDW      11.5 - 14.5 % 13.2   Platelets      150 - 450 K/uL 246   MPV      9.2 - 12.9 fL 9.7   Immature Granulocytes      0.0 - 0.5 % 0.3   Gran # (ANC)      1.8 - 7.7 K/uL 4.1   Immature Grans (Abs)      0.00 - 0.04 K/uL 0.02   Lymph #      1.0 - 4.8 K/uL 1.8   Mono #      0.3 - 1.0 K/uL 0.5   Eos #      0.0 - 0.5 K/uL 0.2   Baso #      0.00 - 0.20 K/uL 0.03   nRBC      0 /100 WBC 0   Gran %      38.0 - 73.0 % 62.2   Lymph %      18.0 - 48.0 % 27.4   Mono %      4.0 - 15.0 % 7.2   Eosinophil %      0.0 - 8.0 % 2.4   Basophil %      0.0 - 1.9 % 0.5   Differential Method       Automated   Sodium      136 - 145 mmol/L 139   Potassium      3.5 - 5.1 mmol/L 4.1   Chloride      95 - 110 mmol/L 104   CO2      22 - 31 mmol/L 31   Glucose      70 - 110 mg/dL 87   BUN      7 - 18 mg/dL 17   Creatinine      0.50 - 1.40 mg/dL 0.60   Calcium      8.4 - 10.2 mg/dL 9.7   PROTEIN TOTAL      6.0 - 8.4 g/dL 7.4   Albumin      3.5 - 5.2 g/dL 4.5   BILIRUBIN TOTAL      0.2 - 1.3 mg/dL 1.0   Alkaline Phosphatase      38 - 145 U/L 78   AST      14 - 36 U/L 21   ALT      0 - 35 U/L 23   Anion Gap      8 - 16 mmol/L 4 (L)   eGFR      >60 mL/min/1.73 m:2 >60   Cholesterol      120 - 199 mg/dL 199   Triglycerides      30 - 150 mg/dL 70   HDL      40 - 75 mg/dL 76 (H)   LDL Cholesterol External      63.0 - 159.0 mg/dL 109.0   HDL/Cholesterol Ratio      20.0 - 50.0 % 38.2   Total Cholesterol/HDL Ratio      2.0 - 5.0 2.6   Non-HDL Cholesterol      mg/dL 123   Hemoglobin A1C External      0.0 - 5.6 % 5.2   Estimated Avg Glucose      68 - 131 mg/dL 103    CPK      55 - 170 U/L 42 (L)   CRP      0.00 - 0.90 mg/dL <0.50   Sed Rate      0 - 29 mm/Hr 12   TSH      0.400 - 4.000 uIU/mL 4.060 (H)   Free T4      0.78 - 2.19 ng/dL 1.38   Thyroglobulin Ab Screen      0.0 - 4.0 IU/mL <0.9      Assessment:       1. Hashimoto's thyroiditis    2. Reactive arthritis of multiple sites    3. Vitamin D deficiency              Plan:       Hashimoto's thyroiditis  -     TSH; Future; Expected date: 02/20/2023  -     T4, Free; Future; Expected date: 02/20/2023  -     Ambulatory referral/consult to Endocrinology; Future; Expected date: 02/27/2023    Reactive arthritis of multiple sites  -     CBC Auto Differential; Future; Expected date: 02/20/2023  -     Comprehensive Metabolic Panel; Future; Expected date: 02/20/2023  -     C-Reactive Protein; Future; Expected date: 02/20/2023  -     Sedimentation rate; Future; Expected date: 02/20/2023  -     methylPREDNISolone acetate injection 160 mg  -     hydrOXYchloroQUINE (PLAQUENIL) 200 mg tablet; Take 1 tablet (200 mg total) by mouth 2 (two) times daily.  Dispense: 60 tablet; Refill: 6    Vitamin D deficiency  -     Vitamin D; Future; Expected date: 02/20/2023            Assessment:  61 year old female with  Reactive arthritis, hashimoto's thyroiditis (+thyroperoxidase antibody), history of FERNANDO positivity (repeat testing negative), celiac syndrome  --hypothyroidism    Plan:  Check labs if TSH elevated then consider increased levothyroxine  Referral to Endocrinology  Depo 160 mg given today for arthritis flare  Start plaquenil 200 mg twice daily       Follow up:   4 mo w/labs prior

## 2023-02-20 NOTE — PROGRESS NOTES
Administered 2 cc ( 80 mg/ml ) of depomedrol to the right upper outer gluteal. Informed of s/s to report verbalized understanding. No adverse reactions noted.      Answers submitted by the patient for this visit:  Rheumatology Questionnaire (Submitted on 2/20/2023)  fever: No  eye redness: No  mouth sores: No  headaches: No  shortness of breath: No  chest pain: No  trouble swallowing: No  diarrhea: No  constipation: No  unexpected weight change: Yes  genital sore: No  dysuria: No  During the last 3 days, have you had a skin rash?: Yes  Bruises or bleeds easily: No  cough: No

## 2023-02-21 ENCOUNTER — PATIENT MESSAGE (OUTPATIENT)
Dept: OPTOMETRY | Facility: CLINIC | Age: 63
End: 2023-02-21
Payer: COMMERCIAL

## 2023-02-21 ENCOUNTER — PATIENT MESSAGE (OUTPATIENT)
Dept: RHEUMATOLOGY | Facility: CLINIC | Age: 63
End: 2023-02-21
Payer: COMMERCIAL

## 2023-02-22 ENCOUNTER — PATIENT MESSAGE (OUTPATIENT)
Dept: RHEUMATOLOGY | Facility: CLINIC | Age: 63
End: 2023-02-22
Payer: COMMERCIAL

## 2023-02-23 ENCOUNTER — TELEPHONE (OUTPATIENT)
Dept: OBSTETRICS AND GYNECOLOGY | Facility: CLINIC | Age: 63
End: 2023-02-23
Payer: COMMERCIAL

## 2023-02-23 PROBLEM — R61 DIAPHORESIS: Status: ACTIVE | Noted: 2023-02-23

## 2023-02-23 NOTE — TELEPHONE ENCOUNTER
----- Message from Cyndi Chin sent at 2/23/2023  4:30 PM CST -----  Contact: Self  Type:  Sooner Appointment Request    Caller is requesting a sooner appointment.  Caller declined first available appointment listed below.  Caller will not accept being placed on the waitlist and is requesting a message be sent to doctor.    Name of Caller:  Patient  When is the first available appointment?  04/18 - scheduled  Symptoms:  Hormone issues  Best Call Back Number:  [phone]  Additional Information:  Pt is having a lot of issues and her primary doctor is wanting her to be seen sooner than this, can we please call pt back to discuss. Thank you.

## 2023-03-02 ENCOUNTER — OFFICE VISIT (OUTPATIENT)
Dept: ENDOCRINOLOGY | Facility: CLINIC | Age: 63
End: 2023-03-02
Payer: COMMERCIAL

## 2023-03-02 VITALS
DIASTOLIC BLOOD PRESSURE: 62 MMHG | HEIGHT: 61 IN | HEART RATE: 56 BPM | BODY MASS INDEX: 33.23 KG/M2 | SYSTOLIC BLOOD PRESSURE: 118 MMHG | OXYGEN SATURATION: 99 % | WEIGHT: 176 LBS

## 2023-03-02 DIAGNOSIS — G47.00 INSOMNIA, UNSPECIFIED TYPE: Primary | ICD-10-CM

## 2023-03-02 DIAGNOSIS — E03.9 HYPOTHYROIDISM, UNSPECIFIED TYPE: ICD-10-CM

## 2023-03-02 DIAGNOSIS — R53.83 FATIGUE, UNSPECIFIED TYPE: ICD-10-CM

## 2023-03-02 DIAGNOSIS — Z87.39: ICD-10-CM

## 2023-03-02 PROCEDURE — 1159F MED LIST DOCD IN RCRD: CPT | Mod: CPTII,S$GLB,, | Performed by: INTERNAL MEDICINE

## 2023-03-02 PROCEDURE — 3078F DIAST BP <80 MM HG: CPT | Mod: CPTII,S$GLB,, | Performed by: INTERNAL MEDICINE

## 2023-03-02 PROCEDURE — 1159F PR MEDICATION LIST DOCUMENTED IN MEDICAL RECORD: ICD-10-PCS | Mod: CPTII,S$GLB,, | Performed by: INTERNAL MEDICINE

## 2023-03-02 PROCEDURE — 99204 PR OFFICE/OUTPT VISIT, NEW, LEVL IV, 45-59 MIN: ICD-10-PCS | Mod: S$GLB,,, | Performed by: INTERNAL MEDICINE

## 2023-03-02 PROCEDURE — 99204 OFFICE O/P NEW MOD 45 MIN: CPT | Mod: S$GLB,,, | Performed by: INTERNAL MEDICINE

## 2023-03-02 PROCEDURE — 3074F PR MOST RECENT SYSTOLIC BLOOD PRESSURE < 130 MM HG: ICD-10-PCS | Mod: CPTII,S$GLB,, | Performed by: INTERNAL MEDICINE

## 2023-03-02 PROCEDURE — 3008F PR BODY MASS INDEX (BMI) DOCUMENTED: ICD-10-PCS | Mod: CPTII,S$GLB,, | Performed by: INTERNAL MEDICINE

## 2023-03-02 PROCEDURE — 3008F BODY MASS INDEX DOCD: CPT | Mod: CPTII,S$GLB,, | Performed by: INTERNAL MEDICINE

## 2023-03-02 PROCEDURE — 1160F RVW MEDS BY RX/DR IN RCRD: CPT | Mod: CPTII,S$GLB,, | Performed by: INTERNAL MEDICINE

## 2023-03-02 PROCEDURE — 1160F PR REVIEW ALL MEDS BY PRESCRIBER/CLIN PHARMACIST DOCUMENTED: ICD-10-PCS | Mod: CPTII,S$GLB,, | Performed by: INTERNAL MEDICINE

## 2023-03-02 PROCEDURE — 99999 PR PBB SHADOW E&M-EST. PATIENT-LVL V: ICD-10-PCS | Mod: PBBFAC,,, | Performed by: INTERNAL MEDICINE

## 2023-03-02 PROCEDURE — 3078F PR MOST RECENT DIASTOLIC BLOOD PRESSURE < 80 MM HG: ICD-10-PCS | Mod: CPTII,S$GLB,, | Performed by: INTERNAL MEDICINE

## 2023-03-02 PROCEDURE — 3074F SYST BP LT 130 MM HG: CPT | Mod: CPTII,S$GLB,, | Performed by: INTERNAL MEDICINE

## 2023-03-02 PROCEDURE — 99999 PR PBB SHADOW E&M-EST. PATIENT-LVL V: CPT | Mod: PBBFAC,,, | Performed by: INTERNAL MEDICINE

## 2023-03-02 NOTE — PROGRESS NOTES
CHIEF COMPLAINT: Hypothyroidism  62 y.o. old being seen as a new patient.  Hypothyroid since her late 20's. Currently on Tirosint 137 mcg once daily. Started a few days a ago.  States has difficulty keeping her TSH levels controlled.  She does have a history of celiac disease. States having several symptoms for years. See picture below. She is a . Impacting ability to function and work. States has significant insomnia. Has difficulty going to sleep and staying asleep. Gets 5 hours of sleep a night with medication. During the day can get so fatigued that she needs to get a quick nap. Sometimes may happen in a middle of a meeting. Has brain fog. States that can go temperature variation within minutes. Happens on a daily basis. 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 She walks nightly for 60 minutes and monitors diet closely and still gains weight. She has a  and States when TSh is lower some symptoms improve. Takes LT4 by itself.                 PAST MEDICAL HISTORY/PAST SURGICAL HISTORY:  Reviewed in [facility]    SOCIAL HISTORY: Reviewed in [facility]    FAMILY HISTORY:  Father with hypothyroidism. MGM with DM 2.     MEDICATIONS/ALLERGIES: The patient's MedCard has been updated and reviewed.            PE:    GENERAL: Well developed, well nourished.  NECK: Supple, trachea midline, no palpable thyroid nodules  CHEST: Resp even and unlabored, CTA bilateral.  CARDIAC: RRR, S1, S2 heard, no murmurs, rubs, S3, or S4  SKIN: no acanthosis nigracans.    LABS/Radiology   Latest Reference Range & Units 11/23/21 08:50 02/08/22 07:58 12/06/22 07:56 02/20/23 17:07   TSH 0.400 - 4.000 uIU/mL 6.220 (H) 3.410 4.060 (H) 4.100 (H)   T3, Free 2.77 - 5.27 pg/mL 3.05      Free T4 0.78 - 2.19 ng/dL 1.18 1.20 1.38 1.52   (H): Data is abnormally high   Latest Reference Range & Units 11/23/21 08:50   Cortisol ug/dL 8.90      Latest Reference Range & Units 02/20/23 17:07   WBC 3.90 - 12.70 K/uL 6.75   RBC 4.00 - 5.40 M/uL 4.25    Hemoglobin 12.0 - 16.0 g/dL 12.6   Hematocrit 37.0 - 48.5 % 37.7   MCV 82 - 98 fL 89   MCH 27.0 - 31.0 pg 29.6   MCHC 32.0 - 36.0 g/dL 33.4   RDW 11.5 - 14.5 % 13.2   Platelets 150 - 450 K/uL 270        Latest Reference Range & Units 12/06/22 07:56   Hemoglobin A1C External 0.0 - 5.6 % 5.2       ASSESSMENT/PLAN:  1.  Hypothyroidism-currently on Tirosint.  Appears to be taking appropriately.  Has not missed any doses.  Has multiple symptoms as seen above.  Discussed unsure if although symptoms are necessarily thyroid related.  Can try to get TSH on the lower end of normal and less than 2.5 to see if helps with some of the symptoms.  Advise keeping track of symptoms to see what symptoms get better and which symptoms do not.    2.  Insomnia-currently on Lunesta.  Advised addressing sleep which could be contributing to symptoms.  Discussed it is usually a suppressed TSH which causes insomnia.    3.  Fatigue-could be insomnia related.  Also on a beta-blocker which can contribute to fatigue.  Also has dysautonomia.  Can see if any improvement with normalization of TFTs.  Has had a normal cortisol.    4.  Migratory arthritis-seeing Rheumatology.  Discussed less likely thyroid related.      FOLLOWUP  5 weeks TSH  F/u 6 months with TSH

## 2023-03-07 ENCOUNTER — PATIENT MESSAGE (OUTPATIENT)
Dept: RHEUMATOLOGY | Facility: CLINIC | Age: 63
End: 2023-03-07
Payer: COMMERCIAL

## 2023-03-09 ENCOUNTER — PATIENT MESSAGE (OUTPATIENT)
Dept: PAIN MEDICINE | Facility: CLINIC | Age: 63
End: 2023-03-09
Payer: COMMERCIAL

## 2023-03-09 NOTE — TELEPHONE ENCOUNTER
LOV 12/27/23  Plan:  1. The patient did very well following the L5/S1 interlaminar epidural steroid injection to the right.  This can be repeated in the future if necessary.    2. We discussed that her medial branch nerves are regenerating and her thoracic back pain is worsening.  She would like to repeat the ablation so I will schedule her to repeat right T4, 5, 6, 7 medial branch radiofrequency ablation.  3.  Dr. Coleman provided prescriptions for hydrocodone-acetaminophen 7.5/325 mg twice daily as needed for pain.  I have reviewed the [place] Board of Pharmacy website and there are no abberancies.    3. Follow-up in 4 weeks postprocedure sooner as needed.    F/U - not scheduled at this time.       RECENT  AS OF 3/9/23

## 2023-03-10 ENCOUNTER — TELEPHONE (OUTPATIENT)
Dept: PAIN MEDICINE | Facility: CLINIC | Age: 63
End: 2023-03-10
Payer: COMMERCIAL

## 2023-03-10 DIAGNOSIS — H04.123 DRY EYE SYNDROME OF BILATERAL LACRIMAL GLANDS: ICD-10-CM

## 2023-03-10 RX ORDER — CYCLOSPORINE 0.5 MG/ML
EMULSION OPHTHALMIC
Qty: 60 EACH | Refills: 11 | Status: SHIPPED | OUTPATIENT
Start: 2023-03-10

## 2023-03-10 NOTE — TELEPHONE ENCOUNTER
----- Message from Alicia Rutherford sent at 3/10/2023  2:16 PM CST -----  Contact: Nikki Lynn  Type:  Needs Medical Advice    Who Called: Naveed with [facility]    Would the patient rather a call back or a response via [facility]? call  Best Call Back Number: [phone]  Additional Information: phar states they have a drug interacting with hydrocodone and need dr to override it. Please advise and thank you.

## 2023-03-15 ENCOUNTER — OFFICE VISIT (OUTPATIENT)
Dept: OBSTETRICS AND GYNECOLOGY | Facility: CLINIC | Age: 63
End: 2023-03-15
Payer: COMMERCIAL

## 2023-03-15 VITALS
DIASTOLIC BLOOD PRESSURE: 80 MMHG | SYSTOLIC BLOOD PRESSURE: 120 MMHG | WEIGHT: 178.13 LBS | HEIGHT: 60 IN | BODY MASS INDEX: 34.97 KG/M2

## 2023-03-15 DIAGNOSIS — E03.9 HYPOTHYROIDISM, UNSPECIFIED TYPE: ICD-10-CM

## 2023-03-15 DIAGNOSIS — Z79.890 HORMONE REPLACEMENT THERAPY (HRT): ICD-10-CM

## 2023-03-15 DIAGNOSIS — K90.0 CELIAC DISEASE: ICD-10-CM

## 2023-03-15 DIAGNOSIS — E66.9 CLASS 1 OBESITY WITH BODY MASS INDEX (BMI) OF 34.0 TO 34.9 IN ADULT, UNSPECIFIED OBESITY TYPE, UNSPECIFIED WHETHER SERIOUS COMORBIDITY PRESENT: ICD-10-CM

## 2023-03-15 DIAGNOSIS — R23.2 HOT FLASHES: Primary | ICD-10-CM

## 2023-03-15 DIAGNOSIS — G90.1 DYSAUTONOMIA: ICD-10-CM

## 2023-03-15 DIAGNOSIS — M19.90 ARTHRITIS: ICD-10-CM

## 2023-03-15 DIAGNOSIS — M35.00 SJOGREN'S SYNDROME, WITH UNSPECIFIED ORGAN INVOLVEMENT: ICD-10-CM

## 2023-03-15 PROCEDURE — 1159F MED LIST DOCD IN RCRD: CPT | Mod: CPTII,S$GLB,, | Performed by: OBSTETRICS & GYNECOLOGY

## 2023-03-15 PROCEDURE — 3079F DIAST BP 80-89 MM HG: CPT | Mod: CPTII,S$GLB,, | Performed by: OBSTETRICS & GYNECOLOGY

## 2023-03-15 PROCEDURE — 99999 PR PBB SHADOW E&M-EST. PATIENT-LVL III: CPT | Mod: PBBFAC,,, | Performed by: OBSTETRICS & GYNECOLOGY

## 2023-03-15 PROCEDURE — 3074F PR MOST RECENT SYSTOLIC BLOOD PRESSURE < 130 MM HG: ICD-10-PCS | Mod: CPTII,S$GLB,, | Performed by: OBSTETRICS & GYNECOLOGY

## 2023-03-15 PROCEDURE — 3079F PR MOST RECENT DIASTOLIC BLOOD PRESSURE 80-89 MM HG: ICD-10-PCS | Mod: CPTII,S$GLB,, | Performed by: OBSTETRICS & GYNECOLOGY

## 2023-03-15 PROCEDURE — 3008F PR BODY MASS INDEX (BMI) DOCUMENTED: ICD-10-PCS | Mod: CPTII,S$GLB,, | Performed by: OBSTETRICS & GYNECOLOGY

## 2023-03-15 PROCEDURE — 3008F BODY MASS INDEX DOCD: CPT | Mod: CPTII,S$GLB,, | Performed by: OBSTETRICS & GYNECOLOGY

## 2023-03-15 PROCEDURE — 99214 OFFICE O/P EST MOD 30 MIN: CPT | Mod: S$GLB,,, | Performed by: OBSTETRICS & GYNECOLOGY

## 2023-03-15 PROCEDURE — 99214 PR OFFICE/OUTPT VISIT, EST, LEVL IV, 30-39 MIN: ICD-10-PCS | Mod: S$GLB,,, | Performed by: OBSTETRICS & GYNECOLOGY

## 2023-03-15 PROCEDURE — 99999 PR PBB SHADOW E&M-EST. PATIENT-LVL III: ICD-10-PCS | Mod: PBBFAC,,, | Performed by: OBSTETRICS & GYNECOLOGY

## 2023-03-15 PROCEDURE — 1160F PR REVIEW ALL MEDS BY PRESCRIBER/CLIN PHARMACIST DOCUMENTED: ICD-10-PCS | Mod: CPTII,S$GLB,, | Performed by: OBSTETRICS & GYNECOLOGY

## 2023-03-15 PROCEDURE — 3074F SYST BP LT 130 MM HG: CPT | Mod: CPTII,S$GLB,, | Performed by: OBSTETRICS & GYNECOLOGY

## 2023-03-15 PROCEDURE — 1160F RVW MEDS BY RX/DR IN RCRD: CPT | Mod: CPTII,S$GLB,, | Performed by: OBSTETRICS & GYNECOLOGY

## 2023-03-15 PROCEDURE — 1159F PR MEDICATION LIST DOCUMENTED IN MEDICAL RECORD: ICD-10-PCS | Mod: CPTII,S$GLB,, | Performed by: OBSTETRICS & GYNECOLOGY

## 2023-03-15 RX ORDER — ESTRADIOL 1 MG/G
GEL TOPICAL
Qty: 90 G | Refills: 3 | Status: SHIPPED | OUTPATIENT
Start: 2023-03-15

## 2023-03-15 RX ORDER — ESTRADIOL 0.1 MG/G
1 CREAM VAGINAL
Qty: 42.5 G | Refills: 1 | Status: SHIPPED | OUTPATIENT
Start: 2023-03-16 | End: 2024-03-15

## 2023-03-15 NOTE — PROGRESS NOTES
"Chief Complaint   Patient presents with    hormone consult      Hormone consult        History of Present Illness: Vonnie Garces is a 62 y.o. female that presents today 3/15/2023 for   Chief Complaint   Patient presents with    hormone consult      Hormone consult      She reports having pain and dealing with Dr. Garcia and Dr. Coleman.     She is dealing with celiac disease and changed her diet.     She reports to here due to her severe heat intolerance.      She is having maybe the  hot flashes despite her daily divigel and estrace.     She is taking multiple baths daily.  She reports flushing and rash when this happens.     Happening 6-8 times daily.     She notices this when lying down to go to bed.  She reports dizziness with these events.   She reports distracted during these events and can't think clearly.  She reports that these episode are "ADHD" like.  She reports post-migraine feeling.     She reports walking nightly and has a .   She is having trouble losing weight.     Past Medical History:   Diagnosis Date    Anxiety 08/25/2015    Arthritis     BRCA1 negative     BRCA2 negative     Celiac disease     Cervical spondylosis     Chronic back pain     DDD (degenerative disc disease), lumbar     Difficult intubation 03/2016    anterior larynx, pt with metal dental appliance, unable to do glidescope, used LMA    Dysautonomia     Encounter for blood transfusion     Facet arthropathy, thoracic     Hormone replacement therapy (HRT)     Insomnia 08/25/2015    Neck pain     PONV (postoperative nausea and vomiting)     Right foot pain     Scoliosis     Sjogren's syndrome     Snoring     uses cpap, states not ROSSY    Unspecified hypothyroidism 08/25/2015       Past Surgical History:   Procedure Laterality Date    BILATERAL OOPHORECTOMY  2004    BREAST BIOPSY Left 2010    Benign    CHOLECYSTECTOMY  2010    COLONOSCOPY  2016    [place]    Epidural Steroid Injection      Pain managment, at another " facility, cervical, thoracic    Epidural Steroid injection      Pain management, cervical    EPIDURAL STEROID INJECTION INTO CERVICAL SPINE N/A 11/11/2021    Procedure: Injection-steroid-epidural-cervical, C7/T1 interlaminer;  Surgeon: Floyd Coleman MD;  Location: [facility] OR;  Service: Pain Management;  Laterality: N/A;    EPIDURAL STEROID INJECTION INTO LUMBAR SPINE Right 1/16/2019    Procedure: Injection-steroid-epidural-lumbar L5/S1;  Surgeon: Floyd Coleman MD;  Location: [facility] OR;  Service: Pain Management;  Laterality: Right;  to right    EPIDURAL STEROID INJECTION INTO LUMBAR SPINE N/A 5/14/2019    Procedure: Injection-steroid-epidural-lumbar;  Surgeon: Floyd Coleman MD;  Location: [facility] OR;  Service: Pain Management;  Laterality: N/A;  L5/S1 interlaminar    EPIDURAL STEROID INJECTION INTO LUMBAR SPINE N/A 3/22/2021    Procedure: Injection-steroid-epidural-lumbar L5/S1 interlaminer;  Surgeon: Floyd Coleman MD;  Location: [facility] OR;  Service: Pain Management;  Laterality: N/A;    EPIDURAL STEROID INJECTION INTO LUMBAR SPINE N/A 11/18/2022    Procedure: Injection-steroid-epidural-lumbar L5/S1 to right;  Surgeon: Floyd Coleman MD;  Location: [facility] OR;  Service: Pain Management;  Laterality: N/A;    HYSTERECTOMY  2004    Complete    MOUTH SURGERY      Braces, with temporary metal implants in upper gum    OOPHORECTOMY  2004    w/ hysterectomy    RADIOFREQUENCY ABLATION  02/2016    thoracic nerve    RADIOFREQUENCY ABLATION Right 6/4/2019    Procedure: Radiofrequency Ablation T4,5,6,7;  Surgeon: Floyd Coleman MD;  Location: [facility] OR;  Service: Pain Management;  Laterality: Right;    RADIOFREQUENCY ABLATION Right 5/21/2021    Procedure: RADIOFREQUENCY ABLATION,THORACIC  T4,T5,T6, T7;  Surgeon: Floyd Coleman MD;  Location: [facility] OR;  Service: Pain Management;  Laterality: Right;    RADIOFREQUENCY ABLATION Right 6/30/2022    Procedure: Radiofrequency Ablation Thoracic T4, T5, T6, T7;   Surgeon: Floyd Coleman MD;  Location: [facility] OR;  Service: Pain Management;  Laterality: Right;    TONSILLECTOMY      VULVA SURGERY  2016       Current Outpatient Medications   Medication Sig Dispense Refill    ALPRAZolam (XANAX) 0.25 MG tablet TAKE 1 TABLET BY MOUTH TWICE DAILY AS NEEDED FOR ANXIETY 30 tablet 1    cycloSPORINE (RESTASIS) 0.05 % ophthalmic emulsion 1 drop twice daily for 30 days 60 each 11    eszopiclone (LUNESTA) 3 mg Tab SMARTSI Tablet(s) By Mouth Every Evening 30 tablet 4    famotidine (PEPCID) 20 MG tablet Take 1 tablet (20 mg total) by mouth 2 (two) times daily. 60 tablet 5    gabapentin (NEURONTIN) 100 MG capsule TAKE 1 CAPSULE BY MOUTH IN THE MORNING and TAKE FOUR CAPSULES BY MOUTH at night 450 capsule 3    HYDROcodone-acetaminophen (NORCO) 7.5-325 mg per tablet Take 1 tablet by mouth every 8 (eight) hours as needed for Pain. 60 tablet 0    hydrOXYchloroQUINE (PLAQUENIL) 200 mg tablet Take 1 tablet (200 mg total) by mouth 2 (two) times daily. 60 tablet 6    ibuprofen (ADVIL,MOTRIN) 800 MG tablet Take 1 tablet (800 mg total) by mouth 3 (three) times daily. 90 tablet 3    levothyroxine (TIROSINT) 137 mcg Cap Take 1 capsule by mouth once daily. 90 capsule 3    lidocaine-prilocaine (EMLA) cream       METHYL SALICYLATE/MENTH/CAMPH (PAIN RELIEVING CREAM TOP) Apply 1 application topically daily as needed.      propranoloL (INDERAL LA) 60 MG 24 hr capsule Take 1 capsule (60 mg total) by mouth once daily. 30 capsule 11    estradioL (DIVIGEL) 1 mg/gram (0.1 %) topical gel apply 1 gram onto skin once daily 90 g 3    [START ON 3/16/2023] estradioL (ESTRACE) 0.01 % (0.1 mg/gram) vaginal cream Place 1 g vaginally every Monday and Thursday. 42.5 g 1     No current facility-administered medications for this visit.       Review of patient's allergies indicates:   Allergen Reactions    Compazine [prochlorperazine edisylate] Anaphylaxis    Avelox [moxifloxacin] Rash       Family History   Problem  Relation Age of Onset    Macular degeneration Mother     Arthritis Mother     Celiac disease Mother     COPD Father     Glaucoma Neg Hx        Social History     Tobacco Use    Smoking status: Never    Smokeless tobacco: Never   Substance Use Topics    Alcohol use: No    Drug use: No       OB History    Para Term  AB Living   3 3 3         SAB IAB Ectopic Multiple Live Births                  # Outcome Date GA Lbr Marco A/2nd Weight Sex Delivery Anes PTL Lv   3 Term            2 Term            1 Term                  /80 (BP Location: Right arm, Patient Position: Sitting)   Ht 5' (1.524 m)   Wt 80.8 kg (178 lb 2.1 oz)   LMP  (LMP Unknown)       ASSESSMENT/PLAN:  Hot flashes  -     ESTRADIOL; Future; Expected date: 03/15/2023  -     FOLLICLE STIMULATING HORMONE; Future; Expected date: 03/15/2023    Hormone replacement therapy (HRT)  Comments:  treated with higher dose estrogen for vasomotor flushes  could change route of drug  Orders:  -     estradioL (DIVIGEL) 1 mg/gram (0.1 %) topical gel; apply 1 gram onto skin once daily  Dispense: 90 g; Refill: 3  -     estradioL (ESTRACE) 0.01 % (0.1 mg/gram) vaginal cream; Place 1 g vaginally every Monday and Thursday.  Dispense: 42.5 g; Refill: 1    Celiac disease    Sjogren's syndrome, with unspecified organ involvement    Arthritis    Dysautonomia  Comments:  some of these episode could be related to this     Hypothyroidism, unspecified type  Comments:  med increased recently and due to repeat labs soon  thyroid can cause come heat intolerance    Class 1 obesity with body mass index (BMI) of 34.0 to 34.9 in adult, unspecified obesity type, unspecified whether serious comorbidity present        20 minutes spent today preparing reviewing previous external notes, reviewing previous results, performing medical examination, orders tests or medications, counseling and documenting.

## 2023-03-22 ENCOUNTER — OFFICE VISIT (OUTPATIENT)
Dept: OPTOMETRY | Facility: CLINIC | Age: 63
End: 2023-03-22
Payer: COMMERCIAL

## 2023-03-22 DIAGNOSIS — Z79.899 LONG-TERM USE OF PLAQUENIL: ICD-10-CM

## 2023-03-22 DIAGNOSIS — M35.01 SJOGREN'S SYNDROME WITH KERATOCONJUNCTIVITIS SICCA: Primary | ICD-10-CM

## 2023-03-22 PROCEDURE — 99214 OFFICE O/P EST MOD 30 MIN: CPT | Mod: S$GLB,,, | Performed by: OPTOMETRIST

## 2023-03-22 PROCEDURE — 99999 PR PBB SHADOW E&M-EST. PATIENT-LVL III: CPT | Mod: PBBFAC,,, | Performed by: OPTOMETRIST

## 2023-03-22 PROCEDURE — 99214 PR OFFICE/OUTPT VISIT, EST, LEVL IV, 30-39 MIN: ICD-10-PCS | Mod: S$GLB,,, | Performed by: OPTOMETRIST

## 2023-03-22 PROCEDURE — 99999 PR PBB SHADOW E&M-EST. PATIENT-LVL III: ICD-10-PCS | Mod: PBBFAC,,, | Performed by: OPTOMETRIST

## 2023-03-22 PROCEDURE — 1160F RVW MEDS BY RX/DR IN RCRD: CPT | Mod: CPTII,S$GLB,, | Performed by: OPTOMETRIST

## 2023-03-22 PROCEDURE — 92134 POSTERIOR SEGMENT OCT RETINA (OCULAR COHERENCE TOMOGRAPHY)-BOTH EYES: ICD-10-PCS | Mod: S$GLB,,, | Performed by: OPTOMETRIST

## 2023-03-22 PROCEDURE — 1160F PR REVIEW ALL MEDS BY PRESCRIBER/CLIN PHARMACIST DOCUMENTED: ICD-10-PCS | Mod: CPTII,S$GLB,, | Performed by: OPTOMETRIST

## 2023-03-22 PROCEDURE — 1159F MED LIST DOCD IN RCRD: CPT | Mod: CPTII,S$GLB,, | Performed by: OPTOMETRIST

## 2023-03-22 PROCEDURE — 92134 CPTRZ OPH DX IMG PST SGM RTA: CPT | Mod: S$GLB,,, | Performed by: OPTOMETRIST

## 2023-03-22 PROCEDURE — 1159F PR MEDICATION LIST DOCUMENTED IN MEDICAL RECORD: ICD-10-PCS | Mod: CPTII,S$GLB,, | Performed by: OPTOMETRIST

## 2023-03-22 NOTE — PROGRESS NOTES
"HPI    Pt here for urgent exam for sudden blurry vision OS. Pt sts woke up   Saturday with OS being very blurry, pt sts va in OS has improved but not   back to normal. Pt denies getting anything in eye. Gtts: restasis TID OU,   and otc AT's TID OU. Pt sts when putting the restasis in on Saturday her   eye felt "raw". Pt denies sleeping in contacts. Denies   flashes/floaters/eye pain.   Last edited by Ching Nguyễn MA on 3/22/2023  9:06 AM.        ROS    Positive for: Eyes  Negative for: Constitutional, Gastrointestinal, Neurological, Skin,   Genitourinary, Musculoskeletal, HENT, Endocrine, Cardiovascular,   Respiratory, Psychiatric, Allergic/Imm, Heme/Lymph  Last edited by MONA Georges, KALYN on 3/22/2023  9:26 AM.        Assessment /Plan     For exam results, see Encounter Report.    Sjogren's syndrome with keratoconjunctivitis sicca  -     Posterior Segment OCT Retina-Both eyes    Long-term use of Plaquenil  -     Posterior Segment OCT Retina-Both eyes      1,2.   Recent transient blurred vision OS, presumed dry eye / exposure / air from cpap   Long hours on computer daily w/ work   Good tear lake today, no gross spk OU     Recent started 1-2 months on low dose plaquenil  Current 2.5 mg/ kg/ day   Cumulative ~ 12 gram     OCT 3/2023  Normal macula / fovea     Will order 10-2 at next annual exam     Pt reports definite improved comfort using Restasis tid ---ok continue   ATs qid + and prefer using NP tears     Message if issues, or RTC 1 year for full exam with DFE  and repeat OCT / fields                  "

## 2023-04-10 DIAGNOSIS — M51.36 DDD (DEGENERATIVE DISC DISEASE), LUMBAR: ICD-10-CM

## 2023-04-11 ENCOUNTER — PATIENT MESSAGE (OUTPATIENT)
Dept: PAIN MEDICINE | Facility: CLINIC | Age: 63
End: 2023-04-11
Payer: COMMERCIAL

## 2023-04-11 DIAGNOSIS — M51.36 DDD (DEGENERATIVE DISC DISEASE), LUMBAR: ICD-10-CM

## 2023-04-11 RX ORDER — HYDROCODONE BITARTRATE AND ACETAMINOPHEN 7.5; 325 MG/1; MG/1
1 TABLET ORAL EVERY 8 HOURS PRN
Qty: 60 TABLET | Refills: 0 | Status: CANCELLED | OUTPATIENT
Start: 2023-04-11 | End: 2023-05-11

## 2023-04-12 RX ORDER — HYDROCODONE BITARTRATE AND ACETAMINOPHEN 7.5; 325 MG/1; MG/1
TABLET ORAL
Qty: 60 TABLET | Refills: 0 | Status: SHIPPED | OUTPATIENT
Start: 2023-04-12 | End: 2023-04-19 | Stop reason: SDUPTHER

## 2023-04-19 ENCOUNTER — PATIENT MESSAGE (OUTPATIENT)
Dept: PAIN MEDICINE | Facility: CLINIC | Age: 63
End: 2023-04-19

## 2023-04-19 ENCOUNTER — OFFICE VISIT (OUTPATIENT)
Dept: PAIN MEDICINE | Facility: CLINIC | Age: 63
End: 2023-04-19
Payer: COMMERCIAL

## 2023-04-19 VITALS
HEIGHT: 60 IN | DIASTOLIC BLOOD PRESSURE: 69 MMHG | HEART RATE: 58 BPM | BODY MASS INDEX: 34.6 KG/M2 | SYSTOLIC BLOOD PRESSURE: 152 MMHG | WEIGHT: 176.25 LBS

## 2023-04-19 DIAGNOSIS — M51.36 DDD (DEGENERATIVE DISC DISEASE), LUMBAR: ICD-10-CM

## 2023-04-19 DIAGNOSIS — M47.814 THORACIC SPONDYLOSIS: Primary | ICD-10-CM

## 2023-04-19 DIAGNOSIS — Z79.891 OPIOID CONTRACT EXISTS: ICD-10-CM

## 2023-04-19 DIAGNOSIS — M50.30 DDD (DEGENERATIVE DISC DISEASE), CERVICAL: ICD-10-CM

## 2023-04-19 PROCEDURE — 3077F PR MOST RECENT SYSTOLIC BLOOD PRESSURE >= 140 MM HG: ICD-10-PCS | Mod: CPTII,S$GLB,, | Performed by: PHYSICIAN ASSISTANT

## 2023-04-19 PROCEDURE — 3078F PR MOST RECENT DIASTOLIC BLOOD PRESSURE < 80 MM HG: ICD-10-PCS | Mod: CPTII,S$GLB,, | Performed by: PHYSICIAN ASSISTANT

## 2023-04-19 PROCEDURE — 3008F BODY MASS INDEX DOCD: CPT | Mod: CPTII,S$GLB,, | Performed by: PHYSICIAN ASSISTANT

## 2023-04-19 PROCEDURE — 3077F SYST BP >= 140 MM HG: CPT | Mod: CPTII,S$GLB,, | Performed by: PHYSICIAN ASSISTANT

## 2023-04-19 PROCEDURE — 99214 OFFICE O/P EST MOD 30 MIN: CPT | Mod: S$GLB,,, | Performed by: PHYSICIAN ASSISTANT

## 2023-04-19 PROCEDURE — 3078F DIAST BP <80 MM HG: CPT | Mod: CPTII,S$GLB,, | Performed by: PHYSICIAN ASSISTANT

## 2023-04-19 PROCEDURE — 1159F MED LIST DOCD IN RCRD: CPT | Mod: CPTII,S$GLB,, | Performed by: PHYSICIAN ASSISTANT

## 2023-04-19 PROCEDURE — 99999 PR PBB SHADOW E&M-EST. PATIENT-LVL IV: ICD-10-PCS | Mod: PBBFAC,,, | Performed by: PHYSICIAN ASSISTANT

## 2023-04-19 PROCEDURE — 1160F PR REVIEW ALL MEDS BY PRESCRIBER/CLIN PHARMACIST DOCUMENTED: ICD-10-PCS | Mod: CPTII,S$GLB,, | Performed by: PHYSICIAN ASSISTANT

## 2023-04-19 PROCEDURE — 1160F RVW MEDS BY RX/DR IN RCRD: CPT | Mod: CPTII,S$GLB,, | Performed by: PHYSICIAN ASSISTANT

## 2023-04-19 PROCEDURE — 99214 PR OFFICE/OUTPT VISIT, EST, LEVL IV, 30-39 MIN: ICD-10-PCS | Mod: S$GLB,,, | Performed by: PHYSICIAN ASSISTANT

## 2023-04-19 PROCEDURE — 99999 PR PBB SHADOW E&M-EST. PATIENT-LVL IV: CPT | Mod: PBBFAC,,, | Performed by: PHYSICIAN ASSISTANT

## 2023-04-19 PROCEDURE — 3008F PR BODY MASS INDEX (BMI) DOCUMENTED: ICD-10-PCS | Mod: CPTII,S$GLB,, | Performed by: PHYSICIAN ASSISTANT

## 2023-04-19 PROCEDURE — 1159F PR MEDICATION LIST DOCUMENTED IN MEDICAL RECORD: ICD-10-PCS | Mod: CPTII,S$GLB,, | Performed by: PHYSICIAN ASSISTANT

## 2023-04-19 RX ORDER — HYDROCODONE BITARTRATE AND ACETAMINOPHEN 7.5; 325 MG/1; MG/1
1 TABLET ORAL EVERY 8 HOURS PRN
Qty: 60 TABLET | Refills: 0 | Status: SHIPPED | OUTPATIENT
Start: 2023-05-12 | End: 2023-05-09 | Stop reason: SDUPTHER

## 2023-04-19 RX ORDER — DULOXETIN HYDROCHLORIDE 20 MG/1
20 CAPSULE, DELAYED RELEASE ORAL DAILY
Qty: 30 CAPSULE | Refills: 1 | Status: ON HOLD | OUTPATIENT
Start: 2023-04-19 | End: 2023-07-21 | Stop reason: HOSPADM

## 2023-04-19 RX ORDER — DULOXETIN HYDROCHLORIDE 30 MG/1
30 CAPSULE, DELAYED RELEASE ORAL DAILY
Qty: 30 CAPSULE | Refills: 2 | Status: ON HOLD | OUTPATIENT
Start: 2023-04-19 | End: 2023-07-21 | Stop reason: HOSPADM

## 2023-04-19 RX ORDER — HYDROCODONE BITARTRATE AND ACETAMINOPHEN 7.5; 325 MG/1; MG/1
1 TABLET ORAL EVERY 8 HOURS PRN
Qty: 60 TABLET | Refills: 0 | Status: SHIPPED | OUTPATIENT
Start: 2023-06-11 | End: 2023-07-11

## 2023-04-19 RX ORDER — HYDROCODONE BITARTRATE AND ACETAMINOPHEN 7.5; 325 MG/1; MG/1
1 TABLET ORAL EVERY 8 HOURS PRN
Qty: 60 TABLET | Refills: 0 | Status: SHIPPED | OUTPATIENT
Start: 2023-07-11 | End: 2023-07-12 | Stop reason: SDUPTHER

## 2023-04-19 NOTE — TELEPHONE ENCOUNTER
Patient seen in clinic.  Please send Rx to her pharmacy.    I have reviewed the [place] Board of [employer] website and there are no abberancies.

## 2023-04-26 NOTE — PROGRESS NOTES
This note was completed with dictation software and grammatical errors may exist.    CC:Back pain, neck pain    HPI: The patient is a 62-year-old woman with history of hypothyroidism, otherwise fairly healthy but a long history of scoliosis causing back pain, self-referred for continued back pain.  She returns in follow-up today with neck pain and back pain.  She feels like her right thoracic back pain is getting worse.  She does need a repeat procedures but can not due to her current work schedule.  She is requesting to try duloxetine again but at a lower dose to get started.  She continues to take pain medicine with some relief.    Pain intervention history: She has been seeing Dr. Huffman for the last several years and has undergone epidural steroid injections and radiofrequency ablations with good relief.  According to her last pain management physician, she had undergone a right side T3, 4, 5, 6 medial branch radiofrequency ablation on 8/8/14 with good relief.  He had scheduled her for another one but did not complete this.  As far as medications she has been taking Hydrocodone 7.5/325 one to 2 times a day at most and gabapentin 300 mg at night. She is status post cervical epidural steroid injection on 10/19/15 with 50% relief of her neck pain. She is status post right L4 transforaminal epidural steroid injection on 11/23/15 with 100% relief.   She is status post right T3, 4, 5 and 6 medial branch radio frequency ablation on 2/5/16 with greater than 50% relief.  She is status post right L4 transforaminal epidural sterile injection on 6/14/16 with significant relief lasting only about a month.  She is status post C7-T1 cervical interlaminar epidural steroid injection on 7/14/16 with 30-40% relief.   She is status post L5/S1 interlaminar epidural steroidal injection to the right on 8/12/16 with 100% relief of her right leg pain.  She is status post C7-T1 cervical interlaminar epidural steroid injection on  12/9/16 with almost complete relief.  She is status post right T4, 5, 6 and 7 medial branch radiofrequency ablation on 5/30/17 with 100% relief.  She is status post C7-T1 cervical interlaminar epidural steroid injection on 6/27/17 with 80% relief.  She is status post L5/S1 interlaminar epidural steroid injection on a/7/17 with excellent relief lasting 2 weeks, now reporting 0% relief.  She is status post right L3, 4 and 5 medial branch radiofrequency ablation on 10/19/17 with 80% relief.   She is status post right T4, 5, 6 and 7 medial branch radiofrequency ablation on 12/14/17 with 100% relief.  She is status post C7-T1 cervical interlaminar epidural steroid injection on 3/16/18 with at least 80% relief.  She is status post lumbar epidural steroid injection at L5/S1 on 5/15/18 with 90% relief of her bilateral low back and right leg pain.  The she is status post right T4, 5, 6 and 7 medial branch radiofrequency ablation on 10/12/2018 with 100% relief.  She is status post L5/S1 interlaminar epidural steroid injection on 01/16/2019 with almost 100% relief of her low back pain. She is status post right T4, 5, 6 and 7 medial branch radiofrequency ablation on 06/04/2019 with 80% relief.  She is status post L5/S1 interlaminar epidural steroid injection on 03/22/2021 with 85-90% relief.   She is status post right T4, 5, 6, 7 medial branch radiofrequency ablation on 05/21/2021 with 85% relief. She is status post cervical JINA C7/T1 on 11/11/2021 with greater than 70% relief.  She is status post L5/S1 interlaminar epidural steroid injection to the right on 11/18/2022 with 75% relief.     Spine surgeries:  None    Antineuropathics:  Lidoderm 5%  NSAIDs:  Duexis 800 up to TID  Physical therapy:  She has consistently been in physical therapy, reports exercise, dry needling with at least temporary relief  Antidepressants:  Muscle relaxers:  Opioids:  Hydrocodone 7.5/325 twice daily  Antiplatelets/Anticoagulants:    ROS: She  reports fatigability, headaches, hypothyroidism, joint stiffness, back pain, difficulty sleeping and anxiety.  Balance of review of systems is negative.      Past Medical History:   Diagnosis Date    Anxiety 08/25/2015    Arthritis     BRCA1 negative     BRCA2 negative     Celiac disease     Cervical spondylosis     Chronic back pain     DDD (degenerative disc disease), lumbar     Difficult intubation 03/2016    anterior larynx, pt with metal dental appliance, unable to do glidescope, used LMA    Dysautonomia     Encounter for blood transfusion     Facet arthropathy, thoracic     Hormone replacement therapy (HRT)     Insomnia 08/25/2015    Neck pain     PONV (postoperative nausea and vomiting)     Right foot pain     Scoliosis     Sjogren's syndrome     Snoring     uses cpap, states not ROSSY    Unspecified hypothyroidism 08/25/2015     Past Surgical History:   Procedure Laterality Date    BILATERAL OOPHORECTOMY  2004    BREAST BIOPSY Left 2010    Benign    CHOLECYSTECTOMY  2010    COLONOSCOPY  2016    [place]    Epidural Steroid Injection      Pain managment, at another facility, cervical, thoracic    Epidural Steroid injection      Pain management, cervical    EPIDURAL STEROID INJECTION INTO CERVICAL SPINE N/A 11/11/2021    Procedure: Injection-steroid-epidural-cervical, C7/T1 interlaminer;  Surgeon: Floyd Coleman MD;  Location: [facility] OR;  Service: Pain Management;  Laterality: N/A;    EPIDURAL STEROID INJECTION INTO LUMBAR SPINE Right 1/16/2019    Procedure: Injection-steroid-epidural-lumbar L5/S1;  Surgeon: Floyd Coleman MD;  Location: [facility] OR;  Service: Pain Management;  Laterality: Right;  to right    EPIDURAL STEROID INJECTION INTO LUMBAR SPINE N/A 5/14/2019    Procedure: Injection-steroid-epidural-lumbar;  Surgeon: Floyd Coleman MD;  Location: [facility] OR;  Service: Pain Management;  Laterality: N/A;  L5/S1 interlaminar    EPIDURAL STEROID INJECTION INTO LUMBAR SPINE N/A 3/22/2021    Procedure:  Injection-steroid-epidural-lumbar L5/S1 interlaminer;  Surgeon: Floyd Coleman MD;  Location: [facility] OR;  Service: Pain Management;  Laterality: N/A;    EPIDURAL STEROID INJECTION INTO LUMBAR SPINE N/A 2022    Procedure: Injection-steroid-epidural-lumbar L5/S1 to right;  Surgeon: Floyd Coleman MD;  Location: [facility] OR;  Service: Pain Management;  Laterality: N/A;    HYSTERECTOMY      Complete    MOUTH SURGERY      Braces, with temporary metal implants in upper gum    OOPHORECTOMY      w/ hysterectomy    RADIOFREQUENCY ABLATION  2016    thoracic nerve    RADIOFREQUENCY ABLATION Right 2019    Procedure: Radiofrequency Ablation T4,5,6,7;  Surgeon: Floyd Coleman MD;  Location: [facility] OR;  Service: Pain Management;  Laterality: Right;    RADIOFREQUENCY ABLATION Right 2021    Procedure: RADIOFREQUENCY ABLATION,THORACIC  T4,T5,T6, T7;  Surgeon: Floyd Coleman MD;  Location: [facility] OR;  Service: Pain Management;  Laterality: Right;    RADIOFREQUENCY ABLATION Right 2022    Procedure: Radiofrequency Ablation Thoracic T4, T5, T6, T7;  Surgeon: Floyd Coleman MD;  Location: [facility] OR;  Service: Pain Management;  Laterality: Right;    TONSILLECTOMY      VULVA SURGERY  2016     Social History     Socioeconomic History    Marital status:    Tobacco Use    Smoking status: Never    Smokeless tobacco: Never   Substance and Sexual Activity    Alcohol use: No    Drug use: No    Sexual activity: Yes     Partners: Male      She is a  at [employer].    Medications/Allergies: See med card    Vitals:    23 1204   BP: (!) 152/69   Pulse: (!) 58   Weight: 79.9 kg (176 lb 4.1 oz)   Height: 5' (1.524 m)   PainSc:   5   PainLoc: Back       Physical exam:  Gen: A and O x3, pleasant, well-groomed    Imagin/16/15 Xray Scoliosis survey: There is thoracic dextroscoliosis with mild lumbar compensatory levoscoliosis. No structural abnormalities are evident. Diffuse  spondylosis noted in the cervical, thoracic and to lesser extent lumbar spine. No fractures are identified. There is slight increased kyphotic curvature of the thoracic spine with alignment being otherwise normal. Measurements and evaluation are limited due to underpenetration. Land angle measures approximately in the thoracic spine is of approximately 17.0 degrees and for the lumbar spine 17.4 degrees. Soft tissues are unremarkable. IMPRESSION: 1. S shaped scoliotic curvature of the thoracolumbar spine with Land angle of approximately 17 degrees. 2. Diffuse spondylosis.    MRI report 4/12/12: Report notes that there is dextroconvex rotary curvature of the thoracic spine.  Posterior alignment is maintained.  There are scattered vertebral body hemangiomas.  There is minimal posterior disc bulges noted at T5/6, T6/7, T7/8 and T8/9.  There is no central spinal stenosis or neural foraminal narrowing.    MRI right shoulder 6/24/11: Minor distal subscapularis tendinosis.  Negative for full-thickness or significant partial-thickness rotator cuff tendon tear.  There is a small amount of subacromial subdeltoid bursal fluid which can be seen with recent injection or mild bursitis.    Cervical spine MRI 6/29/16  At C2-C3, the minimal interstitial excision covers a very mild broad-based disc bulge most prominent centrally but does not deform the ventral cord.  There is no canal or foraminal stenosis.  At C3-C4, there is mildly decreased disc height with a broad-based disc bulge most prominent centrally.  This minimally contact the ventral cord without significant deformity.  The canal is widely patent.  There is uncovertebral hypertrophy and facet arthropathy, but the foramina are patent.  At C4-C5, there is decreased disc height with a broad disc bulge-marginal osteophyte complex that mildly lateralizes to the left and blends imperceptibly with right greater than left uncovertebral hypertrophy.  With ligamentum hypertrophy,  there is no significant central canal stenosis.  Uncovertebral hypertrophy and facet arthropathy are causing mild left and moderate right foraminal stenoses.  At C5-C6, there is a broad-based disc bulge and osteophyte complex most prominent centrally.  There is also ligamentum flavum hypertrophy present, but the canal is patent.  There is mild left foraminal stenosis.  The right foramen is patent.  At C6-C7, there is disc desiccation with a minimal broad-based disc bulge most prominent in the right paracentral canal.  The canal is widely patent.  There is mild left foraminal stenosis.  At C7-T1, there is no significant disc bulge, protrusion, or herniation/extrusion.  The canal and foramina are widely patent.    Hip x-rays 9/22/17  AP view of the pelvis and frog leg views of both hips were obtained. No evidence of acute displaced fracture or dislocation is visualized.  No radiopaque foreign bodies are visualized. The bilateral superior joint spaces are grossly maintained. Mild bilateral sacroiliac joint degenerative changes are seen including subchondral sclerosis and small marginal osteophytes. Mild to moderate symphyseal degenerative changes are seen. There is a slightly expansile cortically-based focus along the lateral proximal right femoral metadiaphysis. This could reflect a sessile osteochondroma, but correlation with MRI of the right hip is recommended.    8/18/19 MRI L-spine:  T12-L1: No disc herniation or significant posterior osteophytic ridging.  No significant spinal canal or foraminal stenosis.   L1-L2: No disc herniation or significant posterior osteophytic ridging.  Minimal left facet hypertrophy.  No significant spinal canal or foraminal stenosis.   L2-L3: No disc herniation or significant posterior osteophytic ridging.  Minimal left facet hypertrophy.  No significant spinal canal or foraminal stenosis.   L3-L4: No disc herniation or significant posterior osteophytic ridging.  Minimal bilateral  facet hypertrophy.  No significant spinal canal or foraminal stenosis.   L4-L5: Mild disc bulge.  Mild bilateral facet hypertrophy.  Mild ligamentum flavum thickening.  Minimal narrowing of the bilateral lateral recesses. No significant overall spinal canal stenosis. Mild bilateral foraminal stenosis.   L5-S1: Minimal disc bulge contained in the ventral epidural fat.  Mild bilateral facet hypertrophy.  No significant spinal canal or foraminal stenosis.    Assessment:  The patient is a 62-year-old woman with history of hypothyroidism, otherwise fairly healthy but a long history of scoliosis causing back pain, self-referred for continued back pain.    1. Thoracic spondylosis        2. DDD (degenerative disc disease), cervical        3. DDD (degenerative disc disease), lumbar        4. Opioid contract exists            Plan:  1. She will contact us in the future to repeat right T4, 5, 6, 7 medial branch radiofrequency ablation or cervical or lumbar epidural steroid injections.  Her work schedule is too busy at this time to repeat a procedure but she will likely do this before her next visit.  2. We discussed duloxetine and I provided a prescription for 20 mg every other day for 30 days.  If tolerated she will then start taking this daily for 15 days and then increase to 30 mg daily thereafter.  We discussed considering getting her to 60 mg in the future but very slowly and only if tolerated.  If she develops side effects from the medication we discussed considering amitriptyline.  3. Dr. Coleman provided prescriptions for hydrocodone-acetaminophen 7.5/325 mg twice daily as needed for pain.  I have reviewed the [place] Board of Pharmacy website and there are no abberancies.    4. Follow-up in 3 months or sooner as needed.

## 2023-05-09 ENCOUNTER — PATIENT MESSAGE (OUTPATIENT)
Dept: PAIN MEDICINE | Facility: CLINIC | Age: 63
End: 2023-05-09
Payer: COMMERCIAL

## 2023-05-09 DIAGNOSIS — M51.36 DDD (DEGENERATIVE DISC DISEASE), LUMBAR: ICD-10-CM

## 2023-05-09 DIAGNOSIS — G47.00 INSOMNIA, UNSPECIFIED TYPE: ICD-10-CM

## 2023-05-09 DIAGNOSIS — M47.812 CERVICAL SPONDYLOSIS: ICD-10-CM

## 2023-05-09 DIAGNOSIS — M50.30 DDD (DEGENERATIVE DISC DISEASE), CERVICAL: ICD-10-CM

## 2023-05-09 RX ORDER — HYDROCODONE BITARTRATE AND ACETAMINOPHEN 7.5; 325 MG/1; MG/1
1 TABLET ORAL EVERY 8 HOURS PRN
Qty: 60 TABLET | Refills: 0 | Status: SHIPPED | OUTPATIENT
Start: 2023-05-11 | End: 2023-06-10

## 2023-05-10 DIAGNOSIS — G47.00 INSOMNIA, UNSPECIFIED TYPE: ICD-10-CM

## 2023-05-10 RX ORDER — GABAPENTIN 100 MG/1
CAPSULE ORAL
Qty: 450 CAPSULE | Refills: 3 | Status: SHIPPED | OUTPATIENT
Start: 2023-05-10

## 2023-05-10 RX ORDER — ESZOPICLONE 3 MG/1
TABLET, FILM COATED ORAL
Qty: 30 TABLET | Refills: 4 | Status: CANCELLED | OUTPATIENT
Start: 2023-05-10

## 2023-05-10 RX ORDER — ESZOPICLONE 3 MG/1
TABLET, FILM COATED ORAL
Qty: 30 TABLET | Refills: 5 | Status: SHIPPED | OUTPATIENT
Start: 2023-05-10 | End: 2023-11-11

## 2023-05-10 RX ORDER — FAMOTIDINE 20 MG/1
20 TABLET, FILM COATED ORAL 2 TIMES DAILY
Qty: 60 TABLET | Refills: 5 | Status: SHIPPED | OUTPATIENT
Start: 2023-05-10 | End: 2023-11-01

## 2023-05-18 ENCOUNTER — PATIENT MESSAGE (OUTPATIENT)
Dept: RHEUMATOLOGY | Facility: CLINIC | Age: 63
End: 2023-05-18

## 2023-05-18 ENCOUNTER — OFFICE VISIT (OUTPATIENT)
Dept: RHEUMATOLOGY | Facility: CLINIC | Age: 63
End: 2023-05-18
Payer: COMMERCIAL

## 2023-05-18 VITALS
DIASTOLIC BLOOD PRESSURE: 64 MMHG | SYSTOLIC BLOOD PRESSURE: 134 MMHG | BODY MASS INDEX: 34.55 KG/M2 | HEIGHT: 60 IN | HEART RATE: 64 BPM | WEIGHT: 176 LBS

## 2023-05-18 DIAGNOSIS — G62.9 NEUROPATHY: ICD-10-CM

## 2023-05-18 DIAGNOSIS — R42 DIZZINESS AND GIDDINESS: Primary | ICD-10-CM

## 2023-05-18 DIAGNOSIS — K90.0 CELIAC SYNDROME: ICD-10-CM

## 2023-05-18 DIAGNOSIS — G44.021 INTRACTABLE CHRONIC CLUSTER HEADACHE: ICD-10-CM

## 2023-05-18 DIAGNOSIS — M02.30 REACTIVE ARTHRITIS, UNSPECIFIED SITE: ICD-10-CM

## 2023-05-18 DIAGNOSIS — E06.3 HASHIMOTO'S THYROIDITIS: ICD-10-CM

## 2023-05-18 DIAGNOSIS — R51.9 SEVERE FRONTAL HEADACHES: ICD-10-CM

## 2023-05-18 PROCEDURE — 99215 OFFICE O/P EST HI 40 MIN: CPT | Mod: S$GLB,,, | Performed by: INTERNAL MEDICINE

## 2023-05-18 PROCEDURE — 1160F PR REVIEW ALL MEDS BY PRESCRIBER/CLIN PHARMACIST DOCUMENTED: ICD-10-PCS | Mod: CPTII,S$GLB,, | Performed by: INTERNAL MEDICINE

## 2023-05-18 PROCEDURE — 3008F PR BODY MASS INDEX (BMI) DOCUMENTED: ICD-10-PCS | Mod: CPTII,S$GLB,, | Performed by: INTERNAL MEDICINE

## 2023-05-18 PROCEDURE — 3078F DIAST BP <80 MM HG: CPT | Mod: CPTII,S$GLB,, | Performed by: INTERNAL MEDICINE

## 2023-05-18 PROCEDURE — 1160F RVW MEDS BY RX/DR IN RCRD: CPT | Mod: CPTII,S$GLB,, | Performed by: INTERNAL MEDICINE

## 2023-05-18 PROCEDURE — 1159F PR MEDICATION LIST DOCUMENTED IN MEDICAL RECORD: ICD-10-PCS | Mod: CPTII,S$GLB,, | Performed by: INTERNAL MEDICINE

## 2023-05-18 PROCEDURE — 99999 PR PBB SHADOW E&M-EST. PATIENT-LVL III: CPT | Mod: PBBFAC,,, | Performed by: INTERNAL MEDICINE

## 2023-05-18 PROCEDURE — 99215 PR OFFICE/OUTPT VISIT, EST, LEVL V, 40-54 MIN: ICD-10-PCS | Mod: S$GLB,,, | Performed by: INTERNAL MEDICINE

## 2023-05-18 PROCEDURE — 99999 PR PBB SHADOW E&M-EST. PATIENT-LVL III: ICD-10-PCS | Mod: PBBFAC,,, | Performed by: INTERNAL MEDICINE

## 2023-05-18 PROCEDURE — 3008F BODY MASS INDEX DOCD: CPT | Mod: CPTII,S$GLB,, | Performed by: INTERNAL MEDICINE

## 2023-05-18 PROCEDURE — 3078F PR MOST RECENT DIASTOLIC BLOOD PRESSURE < 80 MM HG: ICD-10-PCS | Mod: CPTII,S$GLB,, | Performed by: INTERNAL MEDICINE

## 2023-05-18 PROCEDURE — 1159F MED LIST DOCD IN RCRD: CPT | Mod: CPTII,S$GLB,, | Performed by: INTERNAL MEDICINE

## 2023-05-18 PROCEDURE — 3075F PR MOST RECENT SYSTOLIC BLOOD PRESS GE 130-139MM HG: ICD-10-PCS | Mod: CPTII,S$GLB,, | Performed by: INTERNAL MEDICINE

## 2023-05-18 PROCEDURE — 3075F SYST BP GE 130 - 139MM HG: CPT | Mod: CPTII,S$GLB,, | Performed by: INTERNAL MEDICINE

## 2023-05-18 RX ORDER — IBUPROFEN 800 MG/1
800 TABLET ORAL 3 TIMES DAILY
Qty: 90 TABLET | Refills: 5 | Status: SHIPPED | OUTPATIENT
Start: 2023-05-18 | End: 2023-11-14

## 2023-05-18 RX ORDER — FAMOTIDINE 20 MG/1
20 TABLET, FILM COATED ORAL 2 TIMES DAILY
Qty: 60 TABLET | Refills: 11 | Status: SHIPPED | OUTPATIENT
Start: 2023-05-18 | End: 2023-11-01

## 2023-05-18 RX ORDER — THYROID 15 MG/1
15 TABLET ORAL
Qty: 30 TABLET | Refills: 11 | Status: SHIPPED | OUTPATIENT
Start: 2023-05-18 | End: 2023-08-31

## 2023-05-18 RX ORDER — THYROID 15 MG/1
15 TABLET ORAL
Qty: 30 TABLET | Refills: 11 | Status: SHIPPED | OUTPATIENT
Start: 2023-05-18 | End: 2023-05-18 | Stop reason: SDUPTHER

## 2023-05-18 NOTE — PROGRESS NOTES
Subjective:       Patient ID: Vonnie Garces is a 62 y.o. female.    Chief Complaint: Disease Management    Follow up: 62 year old female who presents to clinic follow up on reactive arthritis. She is having a severe heat intolerance and  has no trigger  with sweating. She has history of reactive arthritis and hashimoto's thyroiditis currently on duexis and occasional use of prednisone for flares. She is doing poorly. She complains of pain in her R hip, hands, shoulders, R shoulder blade, bilateral knees, and L ankle. Pain is described as burning sensation. Prior to onset of pain she reports increased fatigue. Arthritis flares are occurring more frequently typically every 8 weeks. She also complains of difficulty with temperature regulation, brain fog, constipation, and hair loss. Other sx include dry eyes, which have progressed since her last visit. She has worsening livedo rash today in clinic.       Current treatment:  1. Prednisone prn  2. Plaquenil 200 mg     Review of Systems   Constitutional:  Negative for activity change, appetite change, chills, fever and unexpected weight change.   HENT:  Negative for mouth sores and trouble swallowing.    Eyes:  Negative for redness and visual disturbance.   Respiratory:  Negative for cough and shortness of breath.    Cardiovascular:  Negative for chest pain, palpitations and leg swelling.   Gastrointestinal:  Negative for abdominal pain, constipation, diarrhea, nausea and vomiting.   Genitourinary:  Negative for dysuria and genital sores.   Musculoskeletal:  Positive for arthralgias, back pain and neck pain. Negative for gait problem.   Skin:  Positive for rash.   Neurological:  Positive for headaches. Negative for dizziness, weakness and light-headedness.   Hematological:  Does not bruise/bleed easily.   Psychiatric/Behavioral:  The patient is nervous/anxious.        Objective:     Vitals:    05/18/23 1119   BP: 134/64   Pulse: 64       Past Medical History:    Diagnosis Date    Anxiety 08/25/2015    Arthritis     BRCA1 negative     BRCA2 negative     Celiac disease     Cervical spondylosis     Chronic back pain     DDD (degenerative disc disease), lumbar     Difficult intubation 03/2016    anterior larynx, pt with metal dental appliance, unable to do glidescope, used LMA    Dysautonomia     Encounter for blood transfusion     Facet arthropathy, thoracic     Hormone replacement therapy (HRT)     Insomnia 08/25/2015    Neck pain     PONV (postoperative nausea and vomiting)     Right foot pain     Scoliosis     Sjogren's syndrome     Snoring     uses cpap, states not ROSSY    Unspecified hypothyroidism 08/25/2015     Past Surgical History:   Procedure Laterality Date    BILATERAL OOPHORECTOMY  2004    BREAST BIOPSY Left 2010    Benign    CHOLECYSTECTOMY  2010    COLONOSCOPY  2016    [place]    Epidural Steroid Injection      Pain managment, at another facility, cervical, thoracic    Epidural Steroid injection      Pain management, cervical    EPIDURAL STEROID INJECTION INTO CERVICAL SPINE N/A 11/11/2021    Procedure: Injection-steroid-epidural-cervical, C7/T1 interlaminer;  Surgeon: Floyd Coleman MD;  Location: [facility] OR;  Service: Pain Management;  Laterality: N/A;    EPIDURAL STEROID INJECTION INTO LUMBAR SPINE Right 1/16/2019    Procedure: Injection-steroid-epidural-lumbar L5/S1;  Surgeon: Floyd Coleman MD;  Location: [facility] OR;  Service: Pain Management;  Laterality: Right;  to right    EPIDURAL STEROID INJECTION INTO LUMBAR SPINE N/A 5/14/2019    Procedure: Injection-steroid-epidural-lumbar;  Surgeon: Floyd Coleman MD;  Location: [facility] OR;  Service: Pain Management;  Laterality: N/A;  L5/S1 interlaminar    EPIDURAL STEROID INJECTION INTO LUMBAR SPINE N/A 3/22/2021    Procedure: Injection-steroid-epidural-lumbar L5/S1 interlaminer;  Surgeon: Floyd Coleman MD;  Location: [facility] OR;  Service: Pain Management;  Laterality: N/A;    EPIDURAL STEROID INJECTION  INTO LUMBAR SPINE N/A 11/18/2022    Procedure: Injection-steroid-epidural-lumbar L5/S1 to right;  Surgeon: Floyd Coleman MD;  Location: [facility] OR;  Service: Pain Management;  Laterality: N/A;    HYSTERECTOMY  2004    Complete    MOUTH SURGERY      Braces, with temporary metal implants in upper gum    OOPHORECTOMY  2004    w/ hysterectomy    RADIOFREQUENCY ABLATION  02/2016    thoracic nerve    RADIOFREQUENCY ABLATION Right 6/4/2019    Procedure: Radiofrequency Ablation T4,5,6,7;  Surgeon: Floyd Coleman MD;  Location: [facility] OR;  Service: Pain Management;  Laterality: Right;    RADIOFREQUENCY ABLATION Right 5/21/2021    Procedure: RADIOFREQUENCY ABLATION,THORACIC  T4,T5,T6, T7;  Surgeon: Floyd Coleman MD;  Location: [facility] OR;  Service: Pain Management;  Laterality: Right;    RADIOFREQUENCY ABLATION Right 6/30/2022    Procedure: Radiofrequency Ablation Thoracic T4, T5, T6, T7;  Surgeon: Floyd Coleman MD;  Location: [facility] OR;  Service: Pain Management;  Laterality: Right;    TONSILLECTOMY      VULVA SURGERY  03/2016          Physical Exam   Constitutional: She is oriented to person, place, and time.   Eyes: Right conjunctiva is not injected. Left conjunctiva is not injected.   Neck: No JVD present. No thyromegaly present.   Cardiovascular: Normal rate.   Pulmonary/Chest: Effort normal.   Musculoskeletal:      Right shoulder: Normal.      Left shoulder: Normal.      Right elbow: Normal.      Left elbow: Normal.      Right wrist: Normal.      Left wrist: Normal.      Right knee: Normal.      Left knee: Normal.   Lymphadenopathy:     She has no cervical adenopathy.   Neurological: She is alert and oriented to person, place, and time. Gait normal.   Skin: No rash noted.   Psychiatric: Mood and affect normal.       Right Side Rheumatological Exam     Examination finds the shoulder, elbow, wrist, knee, 1st PIP, 1st MCP, 2nd PIP, 2nd MCP, 3rd PIP, 3rd MCP, 4th PIP, 4th MCP, 5th PIP and 5th MCP  normal.    Left Side Rheumatological Exam     Examination finds the shoulder, elbow, wrist, knee, 1st PIP, 1st MCP, 2nd PIP, 2nd MCP, 3rd PIP, 3rd MCP, 4th PIP, 4th MCP, 5th PIP and 5th MCP normal.                  Labs:  Component      Latest Ref Rng & Units 12/6/2022   WBC      3.90 - 12.70 K/uL 6.64   RBC      4.00 - 5.40 M/uL 4.47   Hemoglobin      12.0 - 16.0 g/dL 13.4   Hematocrit      37.0 - 48.5 % 40.1   MCV      82 - 98 fL 90   MCH      27.0 - 31.0 pg 30.0   MCHC      32.0 - 36.0 g/dL 33.4   RDW      11.5 - 14.5 % 13.2   Platelets      150 - 450 K/uL 246   MPV      9.2 - 12.9 fL 9.7   Immature Granulocytes      0.0 - 0.5 % 0.3   Gran # (ANC)      1.8 - 7.7 K/uL 4.1   Immature Grans (Abs)      0.00 - 0.04 K/uL 0.02   Lymph #      1.0 - 4.8 K/uL 1.8   Mono #      0.3 - 1.0 K/uL 0.5   Eos #      0.0 - 0.5 K/uL 0.2   Baso #      0.00 - 0.20 K/uL 0.03   nRBC      0 /100 WBC 0   Gran %      38.0 - 73.0 % 62.2   Lymph %      18.0 - 48.0 % 27.4   Mono %      4.0 - 15.0 % 7.2   Eosinophil %      0.0 - 8.0 % 2.4   Basophil %      0.0 - 1.9 % 0.5   Differential Method       Automated   Sodium      136 - 145 mmol/L 139   Potassium      3.5 - 5.1 mmol/L 4.1   Chloride      95 - 110 mmol/L 104   CO2      22 - 31 mmol/L 31   Glucose      70 - 110 mg/dL 87   BUN      7 - 18 mg/dL 17   Creatinine      0.50 - 1.40 mg/dL 0.60   Calcium      8.4 - 10.2 mg/dL 9.7   PROTEIN TOTAL      6.0 - 8.4 g/dL 7.4   Albumin      3.5 - 5.2 g/dL 4.5   BILIRUBIN TOTAL      0.2 - 1.3 mg/dL 1.0   Alkaline Phosphatase      38 - 145 U/L 78   AST      14 - 36 U/L 21   ALT      0 - 35 U/L 23   Anion Gap      8 - 16 mmol/L 4 (L)   eGFR      >60 mL/min/1.73 m:2 >60   Cholesterol      120 - 199 mg/dL 199   Triglycerides      30 - 150 mg/dL 70   HDL      40 - 75 mg/dL 76 (H)   LDL Cholesterol External      63.0 - 159.0 mg/dL 109.0   HDL/Cholesterol Ratio      20.0 - 50.0 % 38.2   Total Cholesterol/HDL Ratio      2.0 - 5.0 2.6   Non-HDL Cholesterol       mg/dL 123   Hemoglobin A1C External      0.0 - 5.6 % 5.2   Estimated Avg Glucose      68 - 131 mg/dL 103   CPK      55 - 170 U/L 42 (L)   CRP      0.00 - 0.90 mg/dL <0.50   Sed Rate      0 - 29 mm/Hr 12   TSH      0.400 - 4.000 uIU/mL 4.060 (H)   Free T4      0.78 - 2.19 ng/dL 1.38   Thyroglobulin Ab Screen      0.0 - 4.0 IU/mL <0.9      Assessment:       1. Dizziness and giddiness    2. Intractable chronic cluster headache    3. Neuropathy    4. Severe frontal headaches    5. Hashimoto's thyroiditis    6. Celiac syndrome    7. Reactive arthritis, unspecified site                Plan:       Dizziness and giddiness  -     MRI Brain W WO Contrast; Future; Expected date: 05/18/2023  -     IgM; Future; Expected date: 05/18/2023  -     IgG 1, 2, 3, and 4; Future; Expected date: 05/18/2023  -     IgG; Future; Expected date: 05/18/2023  -     IgA; Future; Expected date: 05/18/2023  -     Hepatitis C Antibody; Future; Expected date: 05/18/2023  -     Quantiferon Gold TB; Future; Expected date: 05/18/2023  -     Hepatitis B Surface Antigen; Future; Expected date: 05/18/2023  -     Hepatitis B Surface Antibody, Qual/Quant; Future; Expected date: 05/18/2023  -     Hepatitis B Core Antibody, Total; Future; Expected date: 05/18/2023  -     Hepatitis B Surface Ab, Qualitative; Future; Expected date: 05/18/2023  -     ibuprofen (ADVIL,MOTRIN) 800 MG tablet; Take 1 tablet (800 mg total) by mouth 3 (three) times daily.  Dispense: 90 tablet; Refill: 5  -     famotidine (PEPCID) 20 MG tablet; Take 1 tablet (20 mg total) by mouth 2 (two) times daily.  Dispense: 60 tablet; Refill: 11  -     Discontinue: thyroid, pork, (NP THYROID) 15 mg Tab; Take 1 tablet (15 mg total) by mouth before breakfast.  Dispense: 30 tablet; Refill: 11  -     CRYOGLOBULIN; Future; Expected date: 05/18/2023  -     thyroid, pork, (NP THYROID) 15 mg Tab; Take 1 tablet (15 mg total) by mouth before breakfast.  Dispense: 30 tablet; Refill: 11 - FERRITIN;  Future; Expected date: 05/18/2023  -     X-Ray Thoracic Spine AP Lateral; Future; Expected date: 05/18/2023  -     X-Ray Cervical Spine AP And Lateral; Future; Expected date: 05/18/2023    Intractable chronic cluster headache  -     MRI Brain W WO Contrast; Future; Expected date: 05/18/2023  -     IgM; Future; Expected date: 05/18/2023  -     IgG 1, 2, 3, and 4; Future; Expected date: 05/18/2023  -     IgG; Future; Expected date: 05/18/2023  -     IgA; Future; Expected date: 05/18/2023  -     Hepatitis C Antibody; Future; Expected date: 05/18/2023  -     Quantiferon Gold TB; Future; Expected date: 05/18/2023  -     Hepatitis B Surface Antigen; Future; Expected date: 05/18/2023  -     Hepatitis B Surface Antibody, Qual/Quant; Future; Expected date: 05/18/2023  -     Hepatitis B Core Antibody, Total; Future; Expected date: 05/18/2023  -     Hepatitis B Surface Ab, Qualitative; Future; Expected date: 05/18/2023  -     ibuprofen (ADVIL,MOTRIN) 800 MG tablet; Take 1 tablet (800 mg total) by mouth 3 (three) times daily.  Dispense: 90 tablet; Refill: 5  -     famotidine (PEPCID) 20 MG tablet; Take 1 tablet (20 mg total) by mouth 2 (two) times daily.  Dispense: 60 tablet; Refill: 11  -     Discontinue: thyroid, pork, (NP THYROID) 15 mg Tab; Take 1 tablet (15 mg total) by mouth before breakfast.  Dispense: 30 tablet; Refill: 11  -     CRYOGLOBULIN; Future; Expected date: 05/18/2023  -     thyroid, pork, (NP THYROID) 15 mg Tab; Take 1 tablet (15 mg total) by mouth before breakfast.  Dispense: 30 tablet; Refill: 11  -     FERRITIN; Future; Expected date: 05/18/2023  -     X-Ray Thoracic Spine AP Lateral; Future; Expected date: 05/18/2023  -     X-Ray Cervical Spine AP And Lateral; Future; Expected date: 05/18/2023    Neuropathy  -     MRI Brain W WO Contrast; Future; Expected date: 05/18/2023  -     IgM; Future; Expected date: 05/18/2023  -     IgG 1, 2, 3, and 4; Future; Expected date: 05/18/2023  -     IgG; Future; Expected  date: 05/18/2023  -     IgA; Future; Expected date: 05/18/2023  -     Hepatitis C Antibody; Future; Expected date: 05/18/2023  -     Quantiferon Gold TB; Future; Expected date: 05/18/2023  -     Hepatitis B Surface Antigen; Future; Expected date: 05/18/2023  -     Hepatitis B Surface Antibody, Qual/Quant; Future; Expected date: 05/18/2023  -     Hepatitis B Core Antibody, Total; Future; Expected date: 05/18/2023  -     Hepatitis B Surface Ab, Qualitative; Future; Expected date: 05/18/2023  -     ibuprofen (ADVIL,MOTRIN) 800 MG tablet; Take 1 tablet (800 mg total) by mouth 3 (three) times daily.  Dispense: 90 tablet; Refill: 5  -     famotidine (PEPCID) 20 MG tablet; Take 1 tablet (20 mg total) by mouth 2 (two) times daily.  Dispense: 60 tablet; Refill: 11  -     Discontinue: thyroid, pork, (NP THYROID) 15 mg Tab; Take 1 tablet (15 mg total) by mouth before breakfast.  Dispense: 30 tablet; Refill: 11  -     CRYOGLOBULIN; Future; Expected date: 05/18/2023  -     thyroid, pork, (NP THYROID) 15 mg Tab; Take 1 tablet (15 mg total) by mouth before breakfast.  Dispense: 30 tablet; Refill: 11  -     FERRITIN; Future; Expected date: 05/18/2023  -     X-Ray Thoracic Spine AP Lateral; Future; Expected date: 05/18/2023  -     X-Ray Cervical Spine AP And Lateral; Future; Expected date: 05/18/2023    Severe frontal headaches  -     MRI Brain W WO Contrast; Future; Expected date: 05/18/2023  -     IgM; Future; Expected date: 05/18/2023  -     IgG 1, 2, 3, and 4; Future; Expected date: 05/18/2023  -     IgG; Future; Expected date: 05/18/2023  -     IgA; Future; Expected date: 05/18/2023  -     Hepatitis C Antibody; Future; Expected date: 05/18/2023  -     Quantiferon Gold TB; Future; Expected date: 05/18/2023  -     Hepatitis B Surface Antigen; Future; Expected date: 05/18/2023  -     Hepatitis B Surface Antibody, Qual/Quant; Future; Expected date: 05/18/2023  -     Hepatitis B Core Antibody, Total; Future; Expected date:  05/18/2023  -     Hepatitis B Surface Ab, Qualitative; Future; Expected date: 05/18/2023  -     ibuprofen (ADVIL,MOTRIN) 800 MG tablet; Take 1 tablet (800 mg total) by mouth 3 (three) times daily.  Dispense: 90 tablet; Refill: 5  -     famotidine (PEPCID) 20 MG tablet; Take 1 tablet (20 mg total) by mouth 2 (two) times daily.  Dispense: 60 tablet; Refill: 11  -     Discontinue: thyroid, pork, (NP THYROID) 15 mg Tab; Take 1 tablet (15 mg total) by mouth before breakfast.  Dispense: 30 tablet; Refill: 11  -     thyroid, pork, (NP THYROID) 15 mg Tab; Take 1 tablet (15 mg total) by mouth before breakfast.  Dispense: 30 tablet; Refill: 11  -     FERRITIN; Future; Expected date: 05/18/2023  -     X-Ray Thoracic Spine AP Lateral; Future; Expected date: 05/18/2023  -     X-Ray Cervical Spine AP And Lateral; Future; Expected date: 05/18/2023    Hashimoto's thyroiditis  -     IgM; Future; Expected date: 05/18/2023  -     IgG 1, 2, 3, and 4; Future; Expected date: 05/18/2023  -     IgG; Future; Expected date: 05/18/2023  -     IgA; Future; Expected date: 05/18/2023  -     Hepatitis C Antibody; Future; Expected date: 05/18/2023  -     Quantiferon Gold TB; Future; Expected date: 05/18/2023  -     Hepatitis B Surface Antigen; Future; Expected date: 05/18/2023  -     Hepatitis B Surface Antibody, Qual/Quant; Future; Expected date: 05/18/2023  -     Hepatitis B Core Antibody, Total; Future; Expected date: 05/18/2023  -     Hepatitis B Surface Ab, Qualitative; Future; Expected date: 05/18/2023  -     ibuprofen (ADVIL,MOTRIN) 800 MG tablet; Take 1 tablet (800 mg total) by mouth 3 (three) times daily.  Dispense: 90 tablet; Refill: 5  -     famotidine (PEPCID) 20 MG tablet; Take 1 tablet (20 mg total) by mouth 2 (two) times daily.  Dispense: 60 tablet; Refill: 11  -     Discontinue: thyroid, pork, (NP THYROID) 15 mg Tab; Take 1 tablet (15 mg total) by mouth before breakfast.  Dispense: 30 tablet; Refill: 11  -     thyroid, pork, (NP  THYROID) 15 mg Tab; Take 1 tablet (15 mg total) by mouth before breakfast.  Dispense: 30 tablet; Refill: 11  -     FERRITIN; Future; Expected date: 05/18/2023  -     X-Ray Thoracic Spine AP Lateral; Future; Expected date: 05/18/2023  -     X-Ray Cervical Spine AP And Lateral; Future; Expected date: 05/18/2023    Celiac syndrome  -     IgM; Future; Expected date: 05/18/2023  -     IgG 1, 2, 3, and 4; Future; Expected date: 05/18/2023  -     IgG; Future; Expected date: 05/18/2023  -     IgA; Future; Expected date: 05/18/2023  -     Hepatitis C Antibody; Future; Expected date: 05/18/2023  -     Quantiferon Gold TB; Future; Expected date: 05/18/2023  -     Hepatitis B Surface Antigen; Future; Expected date: 05/18/2023  -     Hepatitis B Surface Antibody, Qual/Quant; Future; Expected date: 05/18/2023  -     Hepatitis B Core Antibody, Total; Future; Expected date: 05/18/2023  -     Hepatitis B Surface Ab, Qualitative; Future; Expected date: 05/18/2023  -     ibuprofen (ADVIL,MOTRIN) 800 MG tablet; Take 1 tablet (800 mg total) by mouth 3 (three) times daily.  Dispense: 90 tablet; Refill: 5  -     famotidine (PEPCID) 20 MG tablet; Take 1 tablet (20 mg total) by mouth 2 (two) times daily.  Dispense: 60 tablet; Refill: 11  -     Discontinue: thyroid, pork, (NP THYROID) 15 mg Tab; Take 1 tablet (15 mg total) by mouth before breakfast.  Dispense: 30 tablet; Refill: 11  -     thyroid, pork, (NP THYROID) 15 mg Tab; Take 1 tablet (15 mg total) by mouth before breakfast.  Dispense: 30 tablet; Refill: 11  -     FERRITIN; Future; Expected date: 05/18/2023  -     X-Ray Thoracic Spine AP Lateral; Future; Expected date: 05/18/2023  -     X-Ray Cervical Spine AP And Lateral; Future; Expected date: 05/18/2023    Reactive arthritis, unspecified site  -     IgM; Future; Expected date: 05/18/2023  -     IgG 1, 2, 3, and 4; Future; Expected date: 05/18/2023  -     IgG; Future; Expected date: 05/18/2023  -     IgA; Future; Expected date:  05/18/2023  -     Hepatitis C Antibody; Future; Expected date: 05/18/2023  -     Quantiferon Gold TB; Future; Expected date: 05/18/2023  -     Hepatitis B Surface Antigen; Future; Expected date: 05/18/2023  -     Hepatitis B Surface Antibody, Qual/Quant; Future; Expected date: 05/18/2023  -     Hepatitis B Core Antibody, Total; Future; Expected date: 05/18/2023  -     Hepatitis B Surface Ab, Qualitative; Future; Expected date: 05/18/2023  -     ibuprofen (ADVIL,MOTRIN) 800 MG tablet; Take 1 tablet (800 mg total) by mouth 3 (three) times daily.  Dispense: 90 tablet; Refill: 5  -     famotidine (PEPCID) 20 MG tablet; Take 1 tablet (20 mg total) by mouth 2 (two) times daily.  Dispense: 60 tablet; Refill: 11  -     Discontinue: thyroid, pork, (NP THYROID) 15 mg Tab; Take 1 tablet (15 mg total) by mouth before breakfast.  Dispense: 30 tablet; Refill: 11  -     CRYOGLOBULIN; Future; Expected date: 05/18/2023  -     thyroid, pork, (NP THYROID) 15 mg Tab; Take 1 tablet (15 mg total) by mouth before breakfast.  Dispense: 30 tablet; Refill: 11  -     FERRITIN; Future; Expected date: 05/18/2023  -     X-Ray Thoracic Spine AP Lateral; Future; Expected date: 05/18/2023  -     X-Ray Cervical Spine AP And Lateral; Future; Expected date: 05/18/2023              Assessment:  61 year old female with  Reactive arthritis, hashimoto's thyroiditis (+thyroperoxidase antibody), history of FERNANDO positivity (repeat testing negative), celiac syndrome  --hypothyroidism    Plan:  Check labs if TSH elevated then add np 15 midday   Mri  brain   Increase plaquenil bid  Consider Ilaris/     Follow up:   4 mo w/labs prior    More than 50% of the  58  minute encounter was spent face to face counseling the patient regarding current status and future plan of care as well as side effects  of the medications. All questions were answered to patient's satisfaction also includes  non-face to face time preparing to see the patient (eg, review of tests),  Obtaining and/or reviewing separately obtained history, Documenting clinical information in the electronic or other health record, Independently interpreting results

## 2023-06-27 ENCOUNTER — OFFICE VISIT (OUTPATIENT)
Dept: PAIN MEDICINE | Facility: CLINIC | Age: 63
End: 2023-06-27
Payer: COMMERCIAL

## 2023-06-27 VITALS
WEIGHT: 178.38 LBS | SYSTOLIC BLOOD PRESSURE: 119 MMHG | DIASTOLIC BLOOD PRESSURE: 57 MMHG | HEART RATE: 56 BPM | BODY MASS INDEX: 33.68 KG/M2 | HEIGHT: 61 IN

## 2023-06-27 DIAGNOSIS — M50.30 DDD (DEGENERATIVE DISC DISEASE), CERVICAL: ICD-10-CM

## 2023-06-27 DIAGNOSIS — M51.36 DDD (DEGENERATIVE DISC DISEASE), LUMBAR: ICD-10-CM

## 2023-06-27 DIAGNOSIS — M47.814 THORACIC SPONDYLOSIS: ICD-10-CM

## 2023-06-27 DIAGNOSIS — Z79.891 OPIOID CONTRACT EXISTS: ICD-10-CM

## 2023-06-27 DIAGNOSIS — M54.16 LUMBAR RADICULOPATHY: Primary | ICD-10-CM

## 2023-06-27 PROCEDURE — 99214 PR OFFICE/OUTPT VISIT, EST, LEVL IV, 30-39 MIN: ICD-10-PCS | Mod: S$GLB,,, | Performed by: PHYSICIAN ASSISTANT

## 2023-06-27 PROCEDURE — 3074F PR MOST RECENT SYSTOLIC BLOOD PRESSURE < 130 MM HG: ICD-10-PCS | Mod: CPTII,S$GLB,, | Performed by: PHYSICIAN ASSISTANT

## 2023-06-27 PROCEDURE — 1160F RVW MEDS BY RX/DR IN RCRD: CPT | Mod: CPTII,S$GLB,, | Performed by: PHYSICIAN ASSISTANT

## 2023-06-27 PROCEDURE — 99214 OFFICE O/P EST MOD 30 MIN: CPT | Mod: S$GLB,,, | Performed by: PHYSICIAN ASSISTANT

## 2023-06-27 PROCEDURE — 3008F PR BODY MASS INDEX (BMI) DOCUMENTED: ICD-10-PCS | Mod: CPTII,S$GLB,, | Performed by: PHYSICIAN ASSISTANT

## 2023-06-27 PROCEDURE — 99999 PR PBB SHADOW E&M-EST. PATIENT-LVL V: CPT | Mod: PBBFAC,,, | Performed by: PHYSICIAN ASSISTANT

## 2023-06-27 PROCEDURE — 3074F SYST BP LT 130 MM HG: CPT | Mod: CPTII,S$GLB,, | Performed by: PHYSICIAN ASSISTANT

## 2023-06-27 PROCEDURE — 1159F PR MEDICATION LIST DOCUMENTED IN MEDICAL RECORD: ICD-10-PCS | Mod: CPTII,S$GLB,, | Performed by: PHYSICIAN ASSISTANT

## 2023-06-27 PROCEDURE — 3008F BODY MASS INDEX DOCD: CPT | Mod: CPTII,S$GLB,, | Performed by: PHYSICIAN ASSISTANT

## 2023-06-27 PROCEDURE — 1159F MED LIST DOCD IN RCRD: CPT | Mod: CPTII,S$GLB,, | Performed by: PHYSICIAN ASSISTANT

## 2023-06-27 PROCEDURE — 3078F PR MOST RECENT DIASTOLIC BLOOD PRESSURE < 80 MM HG: ICD-10-PCS | Mod: CPTII,S$GLB,, | Performed by: PHYSICIAN ASSISTANT

## 2023-06-27 PROCEDURE — 1160F PR REVIEW ALL MEDS BY PRESCRIBER/CLIN PHARMACIST DOCUMENTED: ICD-10-PCS | Mod: CPTII,S$GLB,, | Performed by: PHYSICIAN ASSISTANT

## 2023-06-27 PROCEDURE — 3078F DIAST BP <80 MM HG: CPT | Mod: CPTII,S$GLB,, | Performed by: PHYSICIAN ASSISTANT

## 2023-06-27 PROCEDURE — 99999 PR PBB SHADOW E&M-EST. PATIENT-LVL V: ICD-10-PCS | Mod: PBBFAC,,, | Performed by: PHYSICIAN ASSISTANT

## 2023-06-27 RX ORDER — ALPRAZOLAM 0.5 MG/1
1 TABLET, ORALLY DISINTEGRATING ORAL ONCE AS NEEDED
Status: CANCELLED | OUTPATIENT
Start: 2023-06-27 | End: 2034-11-23

## 2023-07-04 NOTE — H&P (VIEW-ONLY)
This note was completed with dictation software and grammatical errors may exist.    CC:Back pain, neck pain    HPI: The patient is a 62-year-old woman with history of hypothyroidism, otherwise fairly healthy but a long history of scoliosis causing back pain, self-referred for continued back pain.  She returns in follow-up today with neck pain, midback pain and low back pain.  Currently her worst pain is in the right low back radiating to the right buttock, posterior thigh and calf.  However, she is also been experiencing random pains in different locations that may last about 2 weeks at a time.  She gets very hot when this occurs but it is not hot flashes from menopause.  She has discussed this with Dr. Garcia.  She also continues to take pain medication that allows her to function day to day.      Pain intervention history: She has been seeing Dr. Huffman for the last several years and has undergone epidural steroid injections and radiofrequency ablations with good relief.  According to her last pain management physician, she had undergone a right side T3, 4, 5, 6 medial branch radiofrequency ablation on 8/8/14 with good relief.  He had scheduled her for another one but did not complete this.  As far as medications she has been taking Hydrocodone 7.5/325 one to 2 times a day at most and gabapentin 300 mg at night. She is status post cervical epidural steroid injection on 10/19/15 with 50% relief of her neck pain. She is status post right L4 transforaminal epidural steroid injection on 11/23/15 with 100% relief.   She is status post right T3, 4, 5 and 6 medial branch radio frequency ablation on 2/5/16 with greater than 50% relief.  She is status post right L4 transforaminal epidural sterile injection on 6/14/16 with significant relief lasting only about a month.  She is status post C7-T1 cervical interlaminar epidural steroid injection on 7/14/16 with 30-40% relief.   She is status post L5/S1 interlaminar  epidural steroidal injection to the right on 8/12/16 with 100% relief of her right leg pain.  She is status post C7-T1 cervical interlaminar epidural steroid injection on 12/9/16 with almost complete relief.  She is status post right T4, 5, 6 and 7 medial branch radiofrequency ablation on 5/30/17 with 100% relief.  She is status post C7-T1 cervical interlaminar epidural steroid injection on 6/27/17 with 80% relief.  She is status post L5/S1 interlaminar epidural steroid injection on a/7/17 with excellent relief lasting 2 weeks, now reporting 0% relief.  She is status post right L3, 4 and 5 medial branch radiofrequency ablation on 10/19/17 with 80% relief.   She is status post right T4, 5, 6 and 7 medial branch radiofrequency ablation on 12/14/17 with 100% relief.  She is status post C7-T1 cervical interlaminar epidural steroid injection on 3/16/18 with at least 80% relief.  She is status post lumbar epidural steroid injection at L5/S1 on 5/15/18 with 90% relief of her bilateral low back and right leg pain.  The she is status post right T4, 5, 6 and 7 medial branch radiofrequency ablation on 10/12/2018 with 100% relief.  She is status post L5/S1 interlaminar epidural steroid injection on 01/16/2019 with almost 100% relief of her low back pain. She is status post right T4, 5, 6 and 7 medial branch radiofrequency ablation on 06/04/2019 with 80% relief.  She is status post L5/S1 interlaminar epidural steroid injection on 03/22/2021 with 85-90% relief.   She is status post right T4, 5, 6, 7 medial branch radiofrequency ablation on 05/21/2021 with 85% relief. She is status post cervical JINA C7/T1 on 11/11/2021 with greater than 70% relief.  She is status post L5/S1 interlaminar epidural steroid injection to the right on 11/18/2022 with 75% relief.     Spine surgeries:  None    Antineuropathics:  Lidoderm 5%  NSAIDs:  Duexis 800 up to TID  Physical therapy:  She has consistently been in physical therapy, reports exercise,  dry needling with at least temporary relief  Antidepressants:  Muscle relaxers:  Opioids:  Hydrocodone 7.5/325 twice daily  Antiplatelets/Anticoagulants:    ROS: She reports fatigability, headaches, hypothyroidism, joint stiffness, back pain, difficulty sleeping and anxiety.  Balance of review of systems is negative.      Past Medical History:   Diagnosis Date    Anxiety 08/25/2015    Arthritis     BRCA1 negative     BRCA2 negative     Celiac disease     Cervical spondylosis     Chronic back pain     DDD (degenerative disc disease), lumbar     Difficult intubation 03/2016    anterior larynx, pt with metal dental appliance, unable to do glidescope, used LMA    Dysautonomia     Encounter for blood transfusion     Facet arthropathy, thoracic     Hormone replacement therapy (HRT)     Insomnia 08/25/2015    Neck pain     PONV (postoperative nausea and vomiting)     Right foot pain     Scoliosis     Sjogren's syndrome     Snoring     uses cpap, states not ROSSY    Unspecified hypothyroidism 08/25/2015     Past Surgical History:   Procedure Laterality Date    BILATERAL OOPHORECTOMY  2004    BREAST BIOPSY Left 2010    Benign    CHOLECYSTECTOMY  2010    COLONOSCOPY  2016    [place]    Epidural Steroid Injection      Pain managment, at another facility, cervical, thoracic    Epidural Steroid injection      Pain management, cervical    EPIDURAL STEROID INJECTION INTO CERVICAL SPINE N/A 11/11/2021    Procedure: Injection-steroid-epidural-cervical, C7/T1 interlaminer;  Surgeon: Floyd Coleman MD;  Location: [facility] OR;  Service: Pain Management;  Laterality: N/A;    EPIDURAL STEROID INJECTION INTO LUMBAR SPINE Right 1/16/2019    Procedure: Injection-steroid-epidural-lumbar L5/S1;  Surgeon: Floyd Coleman MD;  Location: [facility] OR;  Service: Pain Management;  Laterality: Right;  to right    EPIDURAL STEROID INJECTION INTO LUMBAR SPINE N/A 5/14/2019    Procedure: Injection-steroid-epidural-lumbar;  Surgeon: Floyd Coleman  "MD;  Location: [facility] OR;  Service: Pain Management;  Laterality: N/A;  L5/S1 interlaminar    EPIDURAL STEROID INJECTION INTO LUMBAR SPINE N/A 3/22/2021    Procedure: Injection-steroid-epidural-lumbar L5/S1 interlaminer;  Surgeon: Floyd Coleman MD;  Location: [facility] OR;  Service: Pain Management;  Laterality: N/A;    EPIDURAL STEROID INJECTION INTO LUMBAR SPINE N/A 11/18/2022    Procedure: Injection-steroid-epidural-lumbar L5/S1 to right;  Surgeon: Floyd Coleman MD;  Location: [facility] OR;  Service: Pain Management;  Laterality: N/A;    HYSTERECTOMY  2004    Complete    MOUTH SURGERY      Braces, with temporary metal implants in upper gum    OOPHORECTOMY  2004    w/ hysterectomy    RADIOFREQUENCY ABLATION  02/2016    thoracic nerve    RADIOFREQUENCY ABLATION Right 6/4/2019    Procedure: Radiofrequency Ablation T4,5,6,7;  Surgeon: Floyd Colmean MD;  Location: [facility] OR;  Service: Pain Management;  Laterality: Right;    RADIOFREQUENCY ABLATION Right 5/21/2021    Procedure: RADIOFREQUENCY ABLATION,THORACIC  T4,T5,T6, T7;  Surgeon: Floyd Coleman MD;  Location: [facility] OR;  Service: Pain Management;  Laterality: Right;    RADIOFREQUENCY ABLATION Right 6/30/2022    Procedure: Radiofrequency Ablation Thoracic T4, T5, T6, T7;  Surgeon: Floyd Coleman MD;  Location: [facility] OR;  Service: Pain Management;  Laterality: Right;    TONSILLECTOMY      VULVA SURGERY  03/2016     Social History     Socioeconomic History    Marital status:    Tobacco Use    Smoking status: Never    Smokeless tobacco: Never   Substance and Sexual Activity    Alcohol use: No    Drug use: No    Sexual activity: Yes     Partners: Male      She is a  at [employer].    Medications/Allergies: See med card    Vitals:    06/27/23 1547   BP: (!) 119/57   Pulse: (!) 56   Weight: 80.9 kg (178 lb 5.6 oz)   Height: 5' 1" (1.549 m)   PainSc:   6   PainLoc: Back       Physical exam:  Gen: A and O x3, pleasant, well-groomed  Skin: " No rashes or obvious lesions  HEENT: PERRLA, no obvious deformities on ears or in canals. Trachea midline.  CVS: Regular rate and rhythm, normal palpable pulses.  Resp:No increased work of breathing, symmetrical chest rise.  Abdomen: Soft, NT/ND.  Musculoskeletal:No antalgic gait.      Neuro:  Upper extremities: 5/5 strength bilaterally.  Lower extremities: 5/5 strength bilaterally.    Reflexes: Patellar 0+, Achilles 0+ bilaterally.  Upper extremity reflexes 2+ bilaterally equal.  Sensory:  Intact and symmetrical to light touch and pinprick in L2-S1 dermatomes bilaterally.     Cervical spine exam: Range of motion is full with flexion, extension and lateral rotation without pain.  Myofascial exam:  Exquisite tenderness to palpation to the right mid thoracic paraspinous muscles.       Lumbar spine:  Lumbar spine: Range of motion is full with flexion and extension with mild increased right low back pain.  Brayden's test causes no increased pain on either side.    Supine straight leg raise causes right low back and buttock pain.  Internal and external rotation of the hip causes no increased pain on either side.  Myofascial exam:  Mild tenderness to palpation to the right lumbar paraspinous muscles.    Imagin/16/15 Xray Scoliosis survey: There is thoracic dextroscoliosis with mild lumbar compensatory levoscoliosis. No structural abnormalities are evident. Diffuse spondylosis noted in the cervical, thoracic and to lesser extent lumbar spine. No fractures are identified. There is slight increased kyphotic curvature of the thoracic spine with alignment being otherwise normal. Measurements and evaluation are limited due to underpenetration. Land angle measures approximately in the thoracic spine is of approximately 17.0 degrees and for the lumbar spine 17.4 degrees. Soft tissues are unremarkable. IMPRESSION: 1. S shaped scoliotic curvature of the thoracolumbar spine with Land angle of approximately 17 degrees. 2.  Diffuse spondylosis.    MRI report 4/12/12: Report notes that there is dextroconvex rotary curvature of the thoracic spine.  Posterior alignment is maintained.  There are scattered vertebral body hemangiomas.  There is minimal posterior disc bulges noted at T5/6, T6/7, T7/8 and T8/9.  There is no central spinal stenosis or neural foraminal narrowing.    MRI right shoulder 6/24/11: Minor distal subscapularis tendinosis.  Negative for full-thickness or significant partial-thickness rotator cuff tendon tear.  There is a small amount of subacromial subdeltoid bursal fluid which can be seen with recent injection or mild bursitis.    Cervical spine MRI 6/29/16  At C2-C3, the minimal interstitial excision covers a very mild broad-based disc bulge most prominent centrally but does not deform the ventral cord.  There is no canal or foraminal stenosis.  At C3-C4, there is mildly decreased disc height with a broad-based disc bulge most prominent centrally.  This minimally contact the ventral cord without significant deformity.  The canal is widely patent.  There is uncovertebral hypertrophy and facet arthropathy, but the foramina are patent.  At C4-C5, there is decreased disc height with a broad disc bulge-marginal osteophyte complex that mildly lateralizes to the left and blends imperceptibly with right greater than left uncovertebral hypertrophy.  With ligamentum hypertrophy, there is no significant central canal stenosis.  Uncovertebral hypertrophy and facet arthropathy are causing mild left and moderate right foraminal stenoses.  At C5-C6, there is a broad-based disc bulge and osteophyte complex most prominent centrally.  There is also ligamentum flavum hypertrophy present, but the canal is patent.  There is mild left foraminal stenosis.  The right foramen is patent.  At C6-C7, there is disc desiccation with a minimal broad-based disc bulge most prominent in the right paracentral canal.  The canal is widely patent.  There  is mild left foraminal stenosis.  At C7-T1, there is no significant disc bulge, protrusion, or herniation/extrusion.  The canal and foramina are widely patent.    Hip x-rays 9/22/17  AP view of the pelvis and frog leg views of both hips were obtained. No evidence of acute displaced fracture or dislocation is visualized.  No radiopaque foreign bodies are visualized. The bilateral superior joint spaces are grossly maintained. Mild bilateral sacroiliac joint degenerative changes are seen including subchondral sclerosis and small marginal osteophytes. Mild to moderate symphyseal degenerative changes are seen. There is a slightly expansile cortically-based focus along the lateral proximal right femoral metadiaphysis. This could reflect a sessile osteochondroma, but correlation with MRI of the right hip is recommended.    8/18/19 MRI L-spine:  T12-L1: No disc herniation or significant posterior osteophytic ridging.  No significant spinal canal or foraminal stenosis.   L1-L2: No disc herniation or significant posterior osteophytic ridging.  Minimal left facet hypertrophy.  No significant spinal canal or foraminal stenosis.   L2-L3: No disc herniation or significant posterior osteophytic ridging.  Minimal left facet hypertrophy.  No significant spinal canal or foraminal stenosis.   L3-L4: No disc herniation or significant posterior osteophytic ridging.  Minimal bilateral facet hypertrophy.  No significant spinal canal or foraminal stenosis.   L4-L5: Mild disc bulge.  Mild bilateral facet hypertrophy.  Mild ligamentum flavum thickening.  Minimal narrowing of the bilateral lateral recesses. No significant overall spinal canal stenosis. Mild bilateral foraminal stenosis.   L5-S1: Minimal disc bulge contained in the ventral epidural fat.  Mild bilateral facet hypertrophy.  No significant spinal canal or foraminal stenosis.    Assessment:  The patient is a 62-year-old woman with history of hypothyroidism, otherwise fairly  healthy but a long history of scoliosis causing back pain, self-referred for continued back pain.    1. Lumbar radiculopathy  Place in Outpatient    Case Request Operating Room: Injection-steroid-epidural-lumbar Interlaminar L5/S1 to the right    Vital signs    Verify informed consent    Notify physician     Notify physician     Notify physician (specify)    Diet NPO    alprazolam ODT dissolvable tablet 1 mg      2. DDD (degenerative disc disease), lumbar        3. Thoracic spondylosis        4. DDD (degenerative disc disease), cervical        5. Opioid contract exists            Plan:  1. I will schedule the patient to repeat and L5/S1 interlaminar epidural steroid injection to the right for her radicular right leg pain.  She has done well with this in the past.    2. We discussed repeating right T4, 5, 6, 7 medial branch radiofrequency ablation in the future.    3. Dr. Coleman provided prescriptions for hydrocodone-acetaminophen 7.5/325 mg twice daily as needed for pain.  I have reviewed the [place] Board of Pharmacy website and there are no abberancies.   4. Follow-up in 4 weeks postprocedure or sooner as needed.

## 2023-07-04 NOTE — PROGRESS NOTES
This note was completed with dictation software and grammatical errors may exist.    CC:Back pain, neck pain    HPI: The patient is a 62-year-old woman with history of hypothyroidism, otherwise fairly healthy but a long history of scoliosis causing back pain, self-referred for continued back pain.  She returns in follow-up today with neck pain, midback pain and low back pain.  Currently her worst pain is in the right low back radiating to the right buttock, posterior thigh and calf.  However, she is also been experiencing random pains in different locations that may last about 2 weeks at a time.  She gets very hot when this occurs but it is not hot flashes from menopause.  She has discussed this with Dr. Garcia.  She also continues to take pain medication that allows her to function day to day.      Pain intervention history: She has been seeing Dr. Huffman for the last several years and has undergone epidural steroid injections and radiofrequency ablations with good relief.  According to her last pain management physician, she had undergone a right side T3, 4, 5, 6 medial branch radiofrequency ablation on 8/8/14 with good relief.  He had scheduled her for another one but did not complete this.  As far as medications she has been taking Hydrocodone 7.5/325 one to 2 times a day at most and gabapentin 300 mg at night. She is status post cervical epidural steroid injection on 10/19/15 with 50% relief of her neck pain. She is status post right L4 transforaminal epidural steroid injection on 11/23/15 with 100% relief.   She is status post right T3, 4, 5 and 6 medial branch radio frequency ablation on 2/5/16 with greater than 50% relief.  She is status post right L4 transforaminal epidural sterile injection on 6/14/16 with significant relief lasting only about a month.  She is status post C7-T1 cervical interlaminar epidural steroid injection on 7/14/16 with 30-40% relief.   She is status post L5/S1 interlaminar  epidural steroidal injection to the right on 8/12/16 with 100% relief of her right leg pain.  She is status post C7-T1 cervical interlaminar epidural steroid injection on 12/9/16 with almost complete relief.  She is status post right T4, 5, 6 and 7 medial branch radiofrequency ablation on 5/30/17 with 100% relief.  She is status post C7-T1 cervical interlaminar epidural steroid injection on 6/27/17 with 80% relief.  She is status post L5/S1 interlaminar epidural steroid injection on a/7/17 with excellent relief lasting 2 weeks, now reporting 0% relief.  She is status post right L3, 4 and 5 medial branch radiofrequency ablation on 10/19/17 with 80% relief.   She is status post right T4, 5, 6 and 7 medial branch radiofrequency ablation on 12/14/17 with 100% relief.  She is status post C7-T1 cervical interlaminar epidural steroid injection on 3/16/18 with at least 80% relief.  She is status post lumbar epidural steroid injection at L5/S1 on 5/15/18 with 90% relief of her bilateral low back and right leg pain.  The she is status post right T4, 5, 6 and 7 medial branch radiofrequency ablation on 10/12/2018 with 100% relief.  She is status post L5/S1 interlaminar epidural steroid injection on 01/16/2019 with almost 100% relief of her low back pain. She is status post right T4, 5, 6 and 7 medial branch radiofrequency ablation on 06/04/2019 with 80% relief.  She is status post L5/S1 interlaminar epidural steroid injection on 03/22/2021 with 85-90% relief.   She is status post right T4, 5, 6, 7 medial branch radiofrequency ablation on 05/21/2021 with 85% relief. She is status post cervical JINA C7/T1 on 11/11/2021 with greater than 70% relief.  She is status post L5/S1 interlaminar epidural steroid injection to the right on 11/18/2022 with 75% relief.     Spine surgeries:  None    Antineuropathics:  Lidoderm 5%  NSAIDs:  Duexis 800 up to TID  Physical therapy:  She has consistently been in physical therapy, reports exercise,  dry needling with at least temporary relief  Antidepressants:  Muscle relaxers:  Opioids:  Hydrocodone 7.5/325 twice daily  Antiplatelets/Anticoagulants:    ROS: She reports fatigability, headaches, hypothyroidism, joint stiffness, back pain, difficulty sleeping and anxiety.  Balance of review of systems is negative.      Past Medical History:   Diagnosis Date    Anxiety 08/25/2015    Arthritis     BRCA1 negative     BRCA2 negative     Celiac disease     Cervical spondylosis     Chronic back pain     DDD (degenerative disc disease), lumbar     Difficult intubation 03/2016    anterior larynx, pt with metal dental appliance, unable to do glidescope, used LMA    Dysautonomia     Encounter for blood transfusion     Facet arthropathy, thoracic     Hormone replacement therapy (HRT)     Insomnia 08/25/2015    Neck pain     PONV (postoperative nausea and vomiting)     Right foot pain     Scoliosis     Sjogren's syndrome     Snoring     uses cpap, states not ROSSY    Unspecified hypothyroidism 08/25/2015     Past Surgical History:   Procedure Laterality Date    BILATERAL OOPHORECTOMY  2004    BREAST BIOPSY Left 2010    Benign    CHOLECYSTECTOMY  2010    COLONOSCOPY  2016    [place]    Epidural Steroid Injection      Pain managment, at another facility, cervical, thoracic    Epidural Steroid injection      Pain management, cervical    EPIDURAL STEROID INJECTION INTO CERVICAL SPINE N/A 11/11/2021    Procedure: Injection-steroid-epidural-cervical, C7/T1 interlaminer;  Surgeon: Floyd Coleman MD;  Location: [facility] OR;  Service: Pain Management;  Laterality: N/A;    EPIDURAL STEROID INJECTION INTO LUMBAR SPINE Right 1/16/2019    Procedure: Injection-steroid-epidural-lumbar L5/S1;  Surgeon: Floyd Coleman MD;  Location: [facility] OR;  Service: Pain Management;  Laterality: Right;  to right    EPIDURAL STEROID INJECTION INTO LUMBAR SPINE N/A 5/14/2019    Procedure: Injection-steroid-epidural-lumbar;  Surgeon: Floyd Coleman  "MD;  Location: [facility] OR;  Service: Pain Management;  Laterality: N/A;  L5/S1 interlaminar    EPIDURAL STEROID INJECTION INTO LUMBAR SPINE N/A 3/22/2021    Procedure: Injection-steroid-epidural-lumbar L5/S1 interlaminer;  Surgeon: Floyd Coleman MD;  Location: [facility] OR;  Service: Pain Management;  Laterality: N/A;    EPIDURAL STEROID INJECTION INTO LUMBAR SPINE N/A 11/18/2022    Procedure: Injection-steroid-epidural-lumbar L5/S1 to right;  Surgeon: Floyd Coleman MD;  Location: [facility] OR;  Service: Pain Management;  Laterality: N/A;    HYSTERECTOMY  2004    Complete    MOUTH SURGERY      Braces, with temporary metal implants in upper gum    OOPHORECTOMY  2004    w/ hysterectomy    RADIOFREQUENCY ABLATION  02/2016    thoracic nerve    RADIOFREQUENCY ABLATION Right 6/4/2019    Procedure: Radiofrequency Ablation T4,5,6,7;  Surgeon: Floyd Coleman MD;  Location: [facility] OR;  Service: Pain Management;  Laterality: Right;    RADIOFREQUENCY ABLATION Right 5/21/2021    Procedure: RADIOFREQUENCY ABLATION,THORACIC  T4,T5,T6, T7;  Surgeon: Floyd Coleman MD;  Location: [facility] OR;  Service: Pain Management;  Laterality: Right;    RADIOFREQUENCY ABLATION Right 6/30/2022    Procedure: Radiofrequency Ablation Thoracic T4, T5, T6, T7;  Surgeon: Floyd Coleman MD;  Location: [facility] OR;  Service: Pain Management;  Laterality: Right;    TONSILLECTOMY      VULVA SURGERY  03/2016     Social History     Socioeconomic History    Marital status:    Tobacco Use    Smoking status: Never    Smokeless tobacco: Never   Substance and Sexual Activity    Alcohol use: No    Drug use: No    Sexual activity: Yes     Partners: Male      She is a  at [employer].    Medications/Allergies: See med card    Vitals:    06/27/23 1547   BP: (!) 119/57   Pulse: (!) 56   Weight: 80.9 kg (178 lb 5.6 oz)   Height: 5' 1" (1.549 m)   PainSc:   6   PainLoc: Back       Physical exam:  Gen: A and O x3, pleasant, well-groomed  Skin: " No rashes or obvious lesions  HEENT: PERRLA, no obvious deformities on ears or in canals. Trachea midline.  CVS: Regular rate and rhythm, normal palpable pulses.  Resp:No increased work of breathing, symmetrical chest rise.  Abdomen: Soft, NT/ND.  Musculoskeletal:No antalgic gait.      Neuro:  Upper extremities: 5/5 strength bilaterally.  Lower extremities: 5/5 strength bilaterally.    Reflexes: Patellar 0+, Achilles 0+ bilaterally.  Upper extremity reflexes 2+ bilaterally equal.  Sensory:  Intact and symmetrical to light touch and pinprick in L2-S1 dermatomes bilaterally.     Cervical spine exam: Range of motion is full with flexion, extension and lateral rotation without pain.  Myofascial exam:  Exquisite tenderness to palpation to the right mid thoracic paraspinous muscles.       Lumbar spine:  Lumbar spine: Range of motion is full with flexion and extension with mild increased right low back pain.  Brayden's test causes no increased pain on either side.    Supine straight leg raise causes right low back and buttock pain.  Internal and external rotation of the hip causes no increased pain on either side.  Myofascial exam:  Mild tenderness to palpation to the right lumbar paraspinous muscles.    Imagin/16/15 Xray Scoliosis survey: There is thoracic dextroscoliosis with mild lumbar compensatory levoscoliosis. No structural abnormalities are evident. Diffuse spondylosis noted in the cervical, thoracic and to lesser extent lumbar spine. No fractures are identified. There is slight increased kyphotic curvature of the thoracic spine with alignment being otherwise normal. Measurements and evaluation are limited due to underpenetration. Land angle measures approximately in the thoracic spine is of approximately 17.0 degrees and for the lumbar spine 17.4 degrees. Soft tissues are unremarkable. IMPRESSION: 1. S shaped scoliotic curvature of the thoracolumbar spine with Land angle of approximately 17 degrees. 2.  Diffuse spondylosis.    MRI report 4/12/12: Report notes that there is dextroconvex rotary curvature of the thoracic spine.  Posterior alignment is maintained.  There are scattered vertebral body hemangiomas.  There is minimal posterior disc bulges noted at T5/6, T6/7, T7/8 and T8/9.  There is no central spinal stenosis or neural foraminal narrowing.    MRI right shoulder 6/24/11: Minor distal subscapularis tendinosis.  Negative for full-thickness or significant partial-thickness rotator cuff tendon tear.  There is a small amount of subacromial subdeltoid bursal fluid which can be seen with recent injection or mild bursitis.    Cervical spine MRI 6/29/16  At C2-C3, the minimal interstitial excision covers a very mild broad-based disc bulge most prominent centrally but does not deform the ventral cord.  There is no canal or foraminal stenosis.  At C3-C4, there is mildly decreased disc height with a broad-based disc bulge most prominent centrally.  This minimally contact the ventral cord without significant deformity.  The canal is widely patent.  There is uncovertebral hypertrophy and facet arthropathy, but the foramina are patent.  At C4-C5, there is decreased disc height with a broad disc bulge-marginal osteophyte complex that mildly lateralizes to the left and blends imperceptibly with right greater than left uncovertebral hypertrophy.  With ligamentum hypertrophy, there is no significant central canal stenosis.  Uncovertebral hypertrophy and facet arthropathy are causing mild left and moderate right foraminal stenoses.  At C5-C6, there is a broad-based disc bulge and osteophyte complex most prominent centrally.  There is also ligamentum flavum hypertrophy present, but the canal is patent.  There is mild left foraminal stenosis.  The right foramen is patent.  At C6-C7, there is disc desiccation with a minimal broad-based disc bulge most prominent in the right paracentral canal.  The canal is widely patent.  There  is mild left foraminal stenosis.  At C7-T1, there is no significant disc bulge, protrusion, or herniation/extrusion.  The canal and foramina are widely patent.    Hip x-rays 9/22/17  AP view of the pelvis and frog leg views of both hips were obtained. No evidence of acute displaced fracture or dislocation is visualized.  No radiopaque foreign bodies are visualized. The bilateral superior joint spaces are grossly maintained. Mild bilateral sacroiliac joint degenerative changes are seen including subchondral sclerosis and small marginal osteophytes. Mild to moderate symphyseal degenerative changes are seen. There is a slightly expansile cortically-based focus along the lateral proximal right femoral metadiaphysis. This could reflect a sessile osteochondroma, but correlation with MRI of the right hip is recommended.    8/18/19 MRI L-spine:  T12-L1: No disc herniation or significant posterior osteophytic ridging.  No significant spinal canal or foraminal stenosis.   L1-L2: No disc herniation or significant posterior osteophytic ridging.  Minimal left facet hypertrophy.  No significant spinal canal or foraminal stenosis.   L2-L3: No disc herniation or significant posterior osteophytic ridging.  Minimal left facet hypertrophy.  No significant spinal canal or foraminal stenosis.   L3-L4: No disc herniation or significant posterior osteophytic ridging.  Minimal bilateral facet hypertrophy.  No significant spinal canal or foraminal stenosis.   L4-L5: Mild disc bulge.  Mild bilateral facet hypertrophy.  Mild ligamentum flavum thickening.  Minimal narrowing of the bilateral lateral recesses. No significant overall spinal canal stenosis. Mild bilateral foraminal stenosis.   L5-S1: Minimal disc bulge contained in the ventral epidural fat.  Mild bilateral facet hypertrophy.  No significant spinal canal or foraminal stenosis.    Assessment:  The patient is a 62-year-old woman with history of hypothyroidism, otherwise fairly  healthy but a long history of scoliosis causing back pain, self-referred for continued back pain.    1. Lumbar radiculopathy  Place in Outpatient    Case Request Operating Room: Injection-steroid-epidural-lumbar Interlaminar L5/S1 to the right    Vital signs    Verify informed consent    Notify physician     Notify physician     Notify physician (specify)    Diet NPO    alprazolam ODT dissolvable tablet 1 mg      2. DDD (degenerative disc disease), lumbar        3. Thoracic spondylosis        4. DDD (degenerative disc disease), cervical        5. Opioid contract exists            Plan:  1. I will schedule the patient to repeat and L5/S1 interlaminar epidural steroid injection to the right for her radicular right leg pain.  She has done well with this in the past.    2. We discussed repeating right T4, 5, 6, 7 medial branch radiofrequency ablation in the future.    3. Dr. Coleman provided prescriptions for hydrocodone-acetaminophen 7.5/325 mg twice daily as needed for pain.  I have reviewed the [place] Board of Pharmacy website and there are no abberancies.   4. Follow-up in 4 weeks postprocedure or sooner as needed.

## 2023-07-11 ENCOUNTER — PATIENT MESSAGE (OUTPATIENT)
Dept: PAIN MEDICINE | Facility: CLINIC | Age: 63
End: 2023-07-11
Payer: COMMERCIAL

## 2023-07-11 ENCOUNTER — PATIENT MESSAGE (OUTPATIENT)
Dept: SURGERY | Facility: HOSPITAL | Age: 63
End: 2023-07-11
Payer: COMMERCIAL

## 2023-07-11 DIAGNOSIS — M51.36 DDD (DEGENERATIVE DISC DISEASE), LUMBAR: ICD-10-CM

## 2023-07-11 NOTE — TELEPHONE ENCOUNTER
Her medication list states that she has a prescription for the hydrocodone electronically sent to [facility].  Prescription is dated to start today, 07/11/2023.  I am unsure why she is having issues with his being filled as it was accepted by the pharmacy.

## 2023-07-12 RX ORDER — HYDROCODONE BITARTRATE AND ACETAMINOPHEN 7.5; 325 MG/1; MG/1
1 TABLET ORAL EVERY 8 HOURS PRN
Qty: 60 TABLET | Refills: 0 | Status: SHIPPED | OUTPATIENT
Start: 2023-09-09 | End: 2023-10-10 | Stop reason: SDUPTHER

## 2023-07-12 RX ORDER — HYDROCODONE BITARTRATE AND ACETAMINOPHEN 7.5; 325 MG/1; MG/1
1 TABLET ORAL EVERY 8 HOURS PRN
Qty: 60 TABLET | Refills: 0 | Status: SHIPPED | OUTPATIENT
Start: 2023-10-09 | End: 2023-11-08

## 2023-07-12 RX ORDER — HYDROCODONE BITARTRATE AND ACETAMINOPHEN 7.5; 325 MG/1; MG/1
1 TABLET ORAL EVERY 8 HOURS PRN
Qty: 60 TABLET | Refills: 0 | Status: SHIPPED | OUTPATIENT
Start: 2023-08-10 | End: 2023-09-09

## 2023-07-12 NOTE — TELEPHONE ENCOUNTER
Please send Rx. I have reviewed the [place] Board of Pharmacy website and there are no abberancies.

## 2023-07-20 ENCOUNTER — TELEPHONE (OUTPATIENT)
Dept: SURGERY | Facility: HOSPITAL | Age: 63
End: 2023-07-20
Payer: COMMERCIAL

## 2023-07-20 NOTE — TELEPHONE ENCOUNTER
Spoke with patient for procedure tomorrow, 7/21. She is booked as local, but states she cannot tolerate xanax as it makes her angry and anxious. She meant to ask during her office visit to be IV sedation instead of local. Can this be switched for her? Thank you!

## 2023-07-21 ENCOUNTER — HOSPITAL ENCOUNTER (OUTPATIENT)
Facility: HOSPITAL | Age: 63
Discharge: HOME | End: 2023-07-21
Attending: ANESTHESIOLOGY | Admitting: ANESTHESIOLOGY
Payer: COMMERCIAL

## 2023-07-21 ENCOUNTER — HOSPITAL ENCOUNTER (OUTPATIENT)
Dept: RADIOLOGY | Facility: HOSPITAL | Age: 63
Discharge: HOME | End: 2023-07-21
Attending: ANESTHESIOLOGY | Admitting: ANESTHESIOLOGY
Payer: COMMERCIAL

## 2023-07-21 VITALS
RESPIRATION RATE: 16 BRPM | BODY MASS INDEX: 33.04 KG/M2 | HEIGHT: 61 IN | TEMPERATURE: 97 F | OXYGEN SATURATION: 97 % | DIASTOLIC BLOOD PRESSURE: 80 MMHG | WEIGHT: 175 LBS | HEART RATE: 60 BPM | SYSTOLIC BLOOD PRESSURE: 150 MMHG

## 2023-07-21 DIAGNOSIS — M54.50 LOWER BACK PAIN: ICD-10-CM

## 2023-07-21 DIAGNOSIS — M54.16 LUMBAR RADICULOPATHY: ICD-10-CM

## 2023-07-21 PROCEDURE — 62323 NJX INTERLAMINAR LMBR/SAC: CPT | Mod: ,,, | Performed by: ANESTHESIOLOGY

## 2023-07-21 PROCEDURE — 25500020 PHARM REV CODE 255: Mod: PO | Performed by: ANESTHESIOLOGY

## 2023-07-21 PROCEDURE — 63600175 PHARM REV CODE 636 W HCPCS: Mod: PO | Performed by: ANESTHESIOLOGY

## 2023-07-21 PROCEDURE — 76000 FLUOROSCOPY <1 HR PHYS/QHP: CPT | Mod: TC,PO

## 2023-07-21 PROCEDURE — 62323 NJX INTERLAMINAR LMBR/SAC: CPT | Mod: PO | Performed by: ANESTHESIOLOGY

## 2023-07-21 PROCEDURE — 25000003 PHARM REV CODE 250: Mod: PO | Performed by: ANESTHESIOLOGY

## 2023-07-21 PROCEDURE — 62323 PR INJ LUMBAR/SACRAL, W/IMAGING GUIDANCE: ICD-10-PCS | Mod: ,,, | Performed by: ANESTHESIOLOGY

## 2023-07-21 RX ORDER — SODIUM CHLORIDE, SODIUM LACTATE, POTASSIUM CHLORIDE, CALCIUM CHLORIDE 600; 310; 30; 20 MG/100ML; MG/100ML; MG/100ML; MG/100ML
INJECTION, SOLUTION INTRAVENOUS CONTINUOUS
Status: DISCONTINUED | OUTPATIENT
Start: 2023-07-21 | End: 2023-07-21 | Stop reason: HOSPADM

## 2023-07-21 RX ORDER — LIDOCAINE HYDROCHLORIDE 10 MG/ML
INJECTION, SOLUTION EPIDURAL; INFILTRATION; INTRACAUDAL; PERINEURAL
Status: DISCONTINUED | OUTPATIENT
Start: 2023-07-21 | End: 2023-07-21 | Stop reason: HOSPADM

## 2023-07-21 RX ORDER — ALPRAZOLAM 0.5 MG/1
1 TABLET, ORALLY DISINTEGRATING ORAL ONCE AS NEEDED
Status: DISCONTINUED | OUTPATIENT
Start: 2023-07-21 | End: 2023-07-21

## 2023-07-21 RX ORDER — METHYLPREDNISOLONE ACETATE 80 MG/ML
INJECTION, SUSPENSION INTRA-ARTICULAR; INTRALESIONAL; INTRAMUSCULAR; SOFT TISSUE
Status: DISCONTINUED | OUTPATIENT
Start: 2023-07-21 | End: 2023-07-21 | Stop reason: HOSPADM

## 2023-07-21 RX ORDER — MIDAZOLAM HYDROCHLORIDE 1 MG/ML
INJECTION INTRAMUSCULAR; INTRAVENOUS
Status: DISCONTINUED | OUTPATIENT
Start: 2023-07-21 | End: 2023-07-21 | Stop reason: HOSPADM

## 2023-07-21 RX ADMIN — SODIUM CHLORIDE, POTASSIUM CHLORIDE, SODIUM LACTATE AND CALCIUM CHLORIDE: 600; 310; 30; 20 INJECTION, SOLUTION INTRAVENOUS at 01:07

## 2023-07-21 NOTE — DISCHARGE INSTRUCTIONS
Home Care Instructions    Apply ice pack to injection site for 20 minute periods for the first 24 hours for soreness/discomfort at injection site  DO NOT USE HEAT FOR 24 HOURS  Keep site clean and dry for 24 hours. If Band-Aid present remove when desired.  Do not drive until tomorrow  Take care when walking after a lumbar injection    STEROIDS or RADIOFREQUENCY  Make take 10-14 days for full affects  Avoid strenuous exercises for 2 days      Resume home medications as prescribes today  Resume Aspirin, Plavix or Coumadin the day after the procedure unless otherwise intructed    SEE IMMEDIATE MEDICAL HELP FOR:  Severe increase in your usual pain or appearance of new pain  Prolonged or increasing weakness or numbness in the legs or arms  Drainage, redness, active bleeding, or increased swelling at the injection site  Temperature over 100.0 degrees F.  Headache that increases when your head is upright and decrease when you lie flat    CALL 911 OR GO DIRECTLY TO EMERGENCY DEPARTMENT FOR:  Shortness of breath, chest pain, or problems breathing

## 2023-07-21 NOTE — DISCHARGE SUMMARY
[facility] - Surgery  Discharge Note  Short Stay    Procedure(s) (LRB):  Injection-steroid-epidural-lumbar Interlaminar L5/S1 to the right (N/A)      OUTCOME: Patient tolerated treatment/procedure well without complication and is now ready for discharge.    DISPOSITION: Home or Self Care    FINAL DIAGNOSIS:  Lumbar radiculopathy    FOLLOWUP: In clinic    DISCHARGE INSTRUCTIONS:    Discharge Procedure Orders   Diet Adult Regular     No dressing needed     Notify your health care provider if you experience any of the following:  temperature >100.4     Activity as tolerated

## 2023-07-21 NOTE — OP NOTE
PROCEDURE DATE: 7/21/2023    Lumbar Interlaminar Epidural Steroid Injection under Fluoroscopic Guidance, AP Approach.    Procedure:   Interlaminar epidural steroid injection at L5/S1 under fluoroscopic guidance.    Diagnosis: lUMBAR Radiculopathy    pOSTOP DIAGNOSIS: sAME    Physician: Floyd Coleman M.D.    Medications injected:80 mg methylprednisone with 4 ml of preservative free NaCl    Local anesthetic injected:    Lidocaine 1% 4 ml total    Sedation Medications: 4mg versed    Estimated blood loss:  none    Complications:  none    Technique:  Time-out taken to identify patient and procedure prior to starting the procedure.  With the patient laying in a prone position, the area was prepped and draped in the usual sterile fashion using ChloraPrep and a fenestrated drape.  After determining the target level with an AP fluoroscopic view, local anesthetic was given using a 25-gauge 1.5 inch needle by raising a wheal and then infiltrating toward the interlaminar entry space.  A 3.5inch 20-gauge Touhy needle was introduced under AP fluoroscopic guidance to the interlaminar space of L5/S1. Once the trajectory was established, the needle was visualized in the lateral view and advanced using loss of resistance technique. Once in the desired position, omnipaque contrast was injected to confirm placement and there was no vascular uptake nor intrathecal spread.  The medication was then injected slowly. The patient tolerated the procedure well.      The patient was monitored after the procedure.   They were given post-procedure and discharge instructions to follow at home.  The patient was discharged in a stable condition.    Event Time In   In Facility 1243   In Pre-Procedure 1259   Physician Available    Anesthesia Available    Pre-Op: Bedside Procedure Start    Pre-Op: Bedside Procedure Stop    Pre-Procedure Complete 1340   Out of Pre-Procedure    Anesthesia Start    Anesthesia Start Data Collection    Setup Start     Setup Complete    In Room 1451   Prep Start    Procedure Prep Complete    Procedure Start 1456   Procedure Closing    Emergence    Procedure Finish 1459   Sedation Start 1450   Scope In    Extent Reached    Scope Out    Sedation End 1501   Out of Room 1501   Cleanup Start    Cleanup Complete    Cosmetic Start    Cosmetic Stop    Pain Mgmt In Room    Pain Mgmt Out Room    In Recovery    Anesthesia Finish    Bedside Procedure Start    Bedside Procedure Stop    Recovery Care Complete    Out of Recovery    To Phase II    In Phase II    Pain Mgmt Recovery Start 1501   Pain Mgmt Recovery Stop    Obs Rec Start    Obs Rec Stop    Phase II Care Complete    Out of Phase II    Procedural Care Complete    Discharge    Pain Follow Up Needed    Pain Follow Up Complete      Moderate sedation was achieved with midazolam 4 mg.  Continuous monitoring of EKG, blood pressure and pulse oximetry was provided by a registered nurse during the entire course of the procedure under my supervision and recorded in the patient's medical record.   Total time for sedation was 11 minutes.

## 2023-08-09 DIAGNOSIS — M02.30 REACTIVE ARTHRITIS, UNSPECIFIED SITE: ICD-10-CM

## 2023-08-12 RX ORDER — PREDNISONE 2.5 MG/1
TABLET ORAL
Qty: 90 TABLET | Refills: 2 | Status: SHIPPED | OUTPATIENT
Start: 2023-08-12 | End: 2024-02-27

## 2023-08-21 ENCOUNTER — PATIENT MESSAGE (OUTPATIENT)
Dept: ADMINISTRATIVE | Facility: OTHER | Age: 63
End: 2023-08-21
Payer: COMMERCIAL

## 2023-08-27 ENCOUNTER — PATIENT MESSAGE (OUTPATIENT)
Dept: ENDOCRINOLOGY | Facility: CLINIC | Age: 63
End: 2023-08-27
Payer: COMMERCIAL

## 2023-08-27 ENCOUNTER — PATIENT MESSAGE (OUTPATIENT)
Dept: PAIN MEDICINE | Facility: CLINIC | Age: 63
End: 2023-08-27
Payer: COMMERCIAL

## 2023-08-30 ENCOUNTER — OFFICE VISIT (OUTPATIENT)
Dept: PAIN MEDICINE | Facility: CLINIC | Age: 63
End: 2023-08-30
Payer: COMMERCIAL

## 2023-08-30 VITALS
HEART RATE: 60 BPM | DIASTOLIC BLOOD PRESSURE: 63 MMHG | SYSTOLIC BLOOD PRESSURE: 135 MMHG | WEIGHT: 182.44 LBS | BODY MASS INDEX: 34.44 KG/M2 | HEIGHT: 61 IN

## 2023-08-30 DIAGNOSIS — M54.16 LUMBAR RADICULOPATHY, CHRONIC: ICD-10-CM

## 2023-08-30 DIAGNOSIS — Z79.891 OPIOID CONTRACT EXISTS: ICD-10-CM

## 2023-08-30 DIAGNOSIS — M54.12 CERVICAL RADICULOPATHY: ICD-10-CM

## 2023-08-30 DIAGNOSIS — M47.814 THORACIC SPONDYLOSIS: Primary | ICD-10-CM

## 2023-08-30 DIAGNOSIS — M41.9 SCOLIOSIS OF LUMBAR SPINE, UNSPECIFIED SCOLIOSIS TYPE: ICD-10-CM

## 2023-08-30 DIAGNOSIS — M54.9 DORSALGIA, UNSPECIFIED: ICD-10-CM

## 2023-08-30 DIAGNOSIS — M54.16 LUMBAR RADICULOPATHY: ICD-10-CM

## 2023-08-30 PROCEDURE — 99214 OFFICE O/P EST MOD 30 MIN: CPT | Mod: S$GLB,,,

## 2023-08-30 PROCEDURE — 3008F BODY MASS INDEX DOCD: CPT | Mod: CPTII,S$GLB,,

## 2023-08-30 PROCEDURE — 99999 PR PBB SHADOW E&M-EST. PATIENT-LVL IV: ICD-10-PCS | Mod: PBBFAC,,,

## 2023-08-30 PROCEDURE — 1159F PR MEDICATION LIST DOCUMENTED IN MEDICAL RECORD: ICD-10-PCS | Mod: CPTII,S$GLB,,

## 2023-08-30 PROCEDURE — 99214 PR OFFICE/OUTPT VISIT, EST, LEVL IV, 30-39 MIN: ICD-10-PCS | Mod: S$GLB,,,

## 2023-08-30 PROCEDURE — 3075F PR MOST RECENT SYSTOLIC BLOOD PRESS GE 130-139MM HG: ICD-10-PCS | Mod: CPTII,S$GLB,,

## 2023-08-30 PROCEDURE — 3008F PR BODY MASS INDEX (BMI) DOCUMENTED: ICD-10-PCS | Mod: CPTII,S$GLB,,

## 2023-08-30 PROCEDURE — 1159F MED LIST DOCD IN RCRD: CPT | Mod: CPTII,S$GLB,,

## 2023-08-30 PROCEDURE — 3078F PR MOST RECENT DIASTOLIC BLOOD PRESSURE < 80 MM HG: ICD-10-PCS | Mod: CPTII,S$GLB,,

## 2023-08-30 PROCEDURE — 3075F SYST BP GE 130 - 139MM HG: CPT | Mod: CPTII,S$GLB,,

## 2023-08-30 PROCEDURE — 99999 PR PBB SHADOW E&M-EST. PATIENT-LVL IV: CPT | Mod: PBBFAC,,,

## 2023-08-30 PROCEDURE — 3078F DIAST BP <80 MM HG: CPT | Mod: CPTII,S$GLB,,

## 2023-08-30 RX ORDER — SODIUM CHLORIDE, SODIUM LACTATE, POTASSIUM CHLORIDE, CALCIUM CHLORIDE 600; 310; 30; 20 MG/100ML; MG/100ML; MG/100ML; MG/100ML
INJECTION, SOLUTION INTRAVENOUS CONTINUOUS
Status: CANCELLED | OUTPATIENT
Start: 2023-08-30

## 2023-08-30 NOTE — PROGRESS NOTES
This note was completed with dictation software and grammatical errors may exist.    CC:Back pain, neck pain    HPI: The patient is a 62-year-old woman with history of hypothyroidism, otherwise fairly healthy but a long history of scoliosis causing back pain, self-referred for continued back pain.  She is status post L5/S1 JINA with spread to the right on 07/21/2023 with greater than 50% relief.  She found relief of her lower back pain with the epidural however not complete, she continues to have some pain in her right lower back with radiation into right buttock down right leg into foot.  At this time her thoracic back pain is most problematic for.  She is also having neck pain with radiation down her right arm but her midback pain is most severe.  She reports some intermittent feelings of coldness in her right arm but denies any numbness or tingling in her upper extremities.  She continues have some intermittent numbness in her right lateral foot but denies any weakness.  She denies any new changes to her bowel or bladder function.      Pain intervention history: She has been seeing Dr. Huffman for the last several years and has undergone epidural steroid injections and radiofrequency ablations with good relief.  According to her last pain management physician, she had undergone a right side T3, 4, 5, 6 medial branch radiofrequency ablation on 8/8/14 with good relief.  He had scheduled her for another one but did not complete this.  As far as medications she has been taking Hydrocodone 7.5/325 one to 2 times a day at most and gabapentin 300 mg at night. She is status post cervical epidural steroid injection on 10/19/15 with 50% relief of her neck pain. She is status post right L4 transforaminal epidural steroid injection on 11/23/15 with 100% relief.   She is status post right T3, 4, 5 and 6 medial branch radio frequency ablation on 2/5/16 with greater than 50% relief.  She is status post right L4 transforaminal  epidural sterile injection on 6/14/16 with significant relief lasting only about a month.  She is status post C7-T1 cervical interlaminar epidural steroid injection on 7/14/16 with 30-40% relief.   She is status post L5/S1 interlaminar epidural steroidal injection to the right on 8/12/16 with 100% relief of her right leg pain.  She is status post C7-T1 cervical interlaminar epidural steroid injection on 12/9/16 with almost complete relief.  She is status post right T4, 5, 6 and 7 medial branch radiofrequency ablation on 5/30/17 with 100% relief.  She is status post C7-T1 cervical interlaminar epidural steroid injection on 6/27/17 with 80% relief.  She is status post L5/S1 interlaminar epidural steroid injection on a/7/17 with excellent relief lasting 2 weeks, now reporting 0% relief.  She is status post right L3, 4 and 5 medial branch radiofrequency ablation on 10/19/17 with 80% relief.   She is status post right T4, 5, 6 and 7 medial branch radiofrequency ablation on 12/14/17 with 100% relief.  She is status post C7-T1 cervical interlaminar epidural steroid injection on 3/16/18 with at least 80% relief.  She is status post lumbar epidural steroid injection at L5/S1 on 5/15/18 with 90% relief of her bilateral low back and right leg pain.  The she is status post right T4, 5, 6 and 7 medial branch radiofrequency ablation on 10/12/2018 with 100% relief.  She is status post L5/S1 interlaminar epidural steroid injection on 01/16/2019 with almost 100% relief of her low back pain. She is status post right T4, 5, 6 and 7 medial branch radiofrequency ablation on 06/04/2019 with 80% relief.  She is status post L5/S1 interlaminar epidural steroid injection on 03/22/2021 with 85-90% relief.   She is status post right T4, 5, 6, 7 medial branch radiofrequency ablation on 05/21/2021 with 85% relief. She is status post cervical JINA C7/T1 on 11/11/2021 with greater than 70% relief. She is status post right T4, 5, 6, 7 medial branch  radiofrequency ablation on 06/30/2022 with 85% relief. She is status post L5/S1 interlaminar epidural steroid injection to the right on 11/18/2022 with 75% relief. She is status post L5/S1 JINA with spread to the right on 07/21/2023 with greater than 50% relief.    Spine surgeries:  None    Antineuropathics:  Lidoderm 5%  NSAIDs:  Duexis 800 up to TID  Physical therapy:  She has consistently been in physical therapy, reports exercise, dry needling with at least temporary relief  Antidepressants:  Muscle relaxers:  Opioids:  Hydrocodone 7.5/325 twice daily  Antiplatelets/Anticoagulants:    ROS: She reports fatigability, headaches, hypothyroidism, joint stiffness, back pain, difficulty sleeping and anxiety.  Balance of review of systems is negative.      Past Medical History:   Diagnosis Date    Anxiety 08/25/2015    Arthritis     BRCA1 negative     BRCA2 negative     Celiac disease     Cervical spondylosis     Chronic back pain     DDD (degenerative disc disease), lumbar     Difficult intubation 03/2016    anterior larynx, pt with metal dental appliance, unable to do glidescope, used LMA    Dysautonomia     Encounter for blood transfusion     Facet arthropathy, thoracic     Hormone replacement therapy (HRT)     Insomnia 08/25/2015    Neck pain     PONV (postoperative nausea and vomiting)     Right foot pain     Scoliosis     Sjogren's syndrome     Snoring     uses cpap, states not ROSSY    Unspecified hypothyroidism 08/25/2015     Past Surgical History:   Procedure Laterality Date    BILATERAL OOPHORECTOMY  2004    BREAST BIOPSY Left 2010    Benign    CHOLECYSTECTOMY  2010    COLONOSCOPY  2016    [facility]    Epidural Steroid Injection      Pain managment, at another facility, cervical, thoracic    Epidural Steroid injection      Pain management, cervical    EPIDURAL STEROID INJECTION INTO CERVICAL SPINE N/A 11/11/2021    Procedure: Injection-steroid-epidural-cervical, C7/T1 interlaminer;  Surgeon: Floyd Coleman MD;   Location: [facility] OR;  Service: Pain Management;  Laterality: N/A;    EPIDURAL STEROID INJECTION INTO LUMBAR SPINE Right 1/16/2019    Procedure: Injection-steroid-epidural-lumbar L5/S1;  Surgeon: Floyd Coleman MD;  Location: [facility] OR;  Service: Pain Management;  Laterality: Right;  to right    EPIDURAL STEROID INJECTION INTO LUMBAR SPINE N/A 5/14/2019    Procedure: Injection-steroid-epidural-lumbar;  Surgeon: Floyd Coleman MD;  Location: [facility] OR;  Service: Pain Management;  Laterality: N/A;  L5/S1 interlaminar    EPIDURAL STEROID INJECTION INTO LUMBAR SPINE N/A 3/22/2021    Procedure: Injection-steroid-epidural-lumbar L5/S1 interlaminer;  Surgeon: Floyd Coleman MD;  Location: [facility] OR;  Service: Pain Management;  Laterality: N/A;    EPIDURAL STEROID INJECTION INTO LUMBAR SPINE N/A 11/18/2022    Procedure: Injection-steroid-epidural-lumbar L5/S1 to right;  Surgeon: Floyd Coleman MD;  Location: [facility] OR;  Service: Pain Management;  Laterality: N/A;    EPIDURAL STEROID INJECTION INTO LUMBAR SPINE N/A 7/21/2023    Procedure: Injection-steroid-epidural-lumbar Interlaminar L5/S1 to the right;  Surgeon: Floyd Coleman MD;  Location: [facility] OR;  Service: Pain Management;  Laterality: N/A;    HYSTERECTOMY  2004    Complete    MOUTH SURGERY      Braces, with temporary metal implants in upper gum    OOPHORECTOMY  2004    w/ hysterectomy    RADIOFREQUENCY ABLATION  02/2016    thoracic nerve    RADIOFREQUENCY ABLATION Right 6/4/2019    Procedure: Radiofrequency Ablation T4,5,6,7;  Surgeon: Floyd Coleman MD;  Location: [facility] OR;  Service: Pain Management;  Laterality: Right;    RADIOFREQUENCY ABLATION Right 5/21/2021    Procedure: RADIOFREQUENCY ABLATION,THORACIC  T4,T5,T6, T7;  Surgeon: Floyd Coleman MD;  Location: [facility] OR;  Service: Pain Management;  Laterality: Right;    RADIOFREQUENCY ABLATION Right 6/30/2022    Procedure: Radiofrequency Ablation Thoracic T4, T5, T6, T7;  Surgeon: Floyd DUNN  "MD Jared;  Location: [facility] OR;  Service: Pain Management;  Laterality: Right;    TONSILLECTOMY      VULVA SURGERY  2016     Social History     Socioeconomic History    Marital status:    Tobacco Use    Smoking status: Never    Smokeless tobacco: Never   Substance and Sexual Activity    Alcohol use: No    Drug use: No    Sexual activity: Yes     Partners: Male      She is a  at [employer].    Medications/Allergies: See med card    Vitals:    23 0937   BP: 135/63   Pulse: 60   Weight: 82.7 kg (182 lb 6.9 oz)   Height: 5' 1" (1.549 m)   PainSc:   5   PainLoc: Back       Physical exam:  Gen: A and O x3, pleasant, well-groomed  Skin: No rashes or obvious lesions  HEENT: PERRLA, no obvious deformities on ears or in canals. Trachea midline.  CVS: Regular rate and rhythm, normal palpable pulses.  Resp:No increased work of breathing, symmetrical chest rise.  Abdomen: Soft, NT/ND.  Musculoskeletal:No antalgic gait.      Neuro:  Upper extremities: 5/5 strength bilaterally.  Lower extremities: 5/5 strength bilaterally.    Reflexes: Patellar 0+, Achilles 0+ bilaterally.  Upper extremity reflexes 2+ bilaterally equal.  Sensory:  Intact and symmetrical to light touch and pinprick in L2-S1 dermatomes bilaterally.     Cervical spine exam: Range of motion is full with flexion, extension and lateral rotation without pain.  Myofascial exam:  Exquisite tenderness to palpation to the right mid thoracic paraspinous muscles.       Lumbar spine:  Lumbar spine: Range of motion is full with flexion and extension with mild increased right low back pain.  Brayden's test causes no increased pain on either side.    Supine straight leg raise causes right low back and buttock pain.  Internal and external rotation of the hip causes no increased pain on either side.  Myofascial exam:  Mild tenderness to palpation to the right lumbar paraspinous muscles.    Imagin/16/15 Xray Scoliosis survey: There is thoracic " dextroscoliosis with mild lumbar compensatory levoscoliosis. No structural abnormalities are evident. Diffuse spondylosis noted in the cervical, thoracic and to lesser extent lumbar spine. No fractures are identified. There is slight increased kyphotic curvature of the thoracic spine with alignment being otherwise normal. Measurements and evaluation are limited due to underpenetration. Land angle measures approximately in the thoracic spine is of approximately 17.0 degrees and for the lumbar spine 17.4 degrees. Soft tissues are unremarkable. IMPRESSION: 1. S shaped scoliotic curvature of the thoracolumbar spine with Land angle of approximately 17 degrees. 2. Diffuse spondylosis.    MRI report 4/12/12: Report notes that there is dextroconvex rotary curvature of the thoracic spine.  Posterior alignment is maintained.  There are scattered vertebral body hemangiomas.  There is minimal posterior disc bulges noted at T5/6, T6/7, T7/8 and T8/9.  There is no central spinal stenosis or neural foraminal narrowing.    MRI right shoulder 6/24/11: Minor distal subscapularis tendinosis.  Negative for full-thickness or significant partial-thickness rotator cuff tendon tear.  There is a small amount of subacromial subdeltoid bursal fluid which can be seen with recent injection or mild bursitis.    Cervical spine MRI 6/29/16  At C2-C3, the minimal interstitial excision covers a very mild broad-based disc bulge most prominent centrally but does not deform the ventral cord.  There is no canal or foraminal stenosis.  At C3-C4, there is mildly decreased disc height with a broad-based disc bulge most prominent centrally.  This minimally contact the ventral cord without significant deformity.  The canal is widely patent.  There is uncovertebral hypertrophy and facet arthropathy, but the foramina are patent.  At C4-C5, there is decreased disc height with a broad disc bulge-marginal osteophyte complex that mildly lateralizes to the left and  blends imperceptibly with right greater than left uncovertebral hypertrophy.  With ligamentum hypertrophy, there is no significant central canal stenosis.  Uncovertebral hypertrophy and facet arthropathy are causing mild left and moderate right foraminal stenoses.  At C5-C6, there is a broad-based disc bulge and osteophyte complex most prominent centrally.  There is also ligamentum flavum hypertrophy present, but the canal is patent.  There is mild left foraminal stenosis.  The right foramen is patent.  At C6-C7, there is disc desiccation with a minimal broad-based disc bulge most prominent in the right paracentral canal.  The canal is widely patent.  There is mild left foraminal stenosis.  At C7-T1, there is no significant disc bulge, protrusion, or herniation/extrusion.  The canal and foramina are widely patent.    Hip x-rays 9/22/17  AP view of the pelvis and frog leg views of both hips were obtained. No evidence of acute displaced fracture or dislocation is visualized.  No radiopaque foreign bodies are visualized. The bilateral superior joint spaces are grossly maintained. Mild bilateral sacroiliac joint degenerative changes are seen including subchondral sclerosis and small marginal osteophytes. Mild to moderate symphyseal degenerative changes are seen. There is a slightly expansile cortically-based focus along the lateral proximal right femoral metadiaphysis. This could reflect a sessile osteochondroma, but correlation with MRI of the right hip is recommended.    8/18/19 MRI L-spine:  T12-L1: No disc herniation or significant posterior osteophytic ridging.  No significant spinal canal or foraminal stenosis.   L1-L2: No disc herniation or significant posterior osteophytic ridging.  Minimal left facet hypertrophy.  No significant spinal canal or foraminal stenosis.   L2-L3: No disc herniation or significant posterior osteophytic ridging.  Minimal left facet hypertrophy.  No significant spinal canal or foraminal  stenosis.   L3-L4: No disc herniation or significant posterior osteophytic ridging.  Minimal bilateral facet hypertrophy.  No significant spinal canal or foraminal stenosis.   L4-L5: Mild disc bulge.  Mild bilateral facet hypertrophy.  Mild ligamentum flavum thickening.  Minimal narrowing of the bilateral lateral recesses. No significant overall spinal canal stenosis. Mild bilateral foraminal stenosis.   L5-S1: Minimal disc bulge contained in the ventral epidural fat.  Mild bilateral facet hypertrophy.  No significant spinal canal or foraminal stenosis.    Assessment:  The patient is a 62-year-old woman with history of hypothyroidism, otherwise fairly healthy but a long history of scoliosis causing back pain, self-referred for continued back pain.    1. Thoracic spondylosis        2. Dorsalgia, unspecified  MRI Lumbar Spine Without Contrast      3. Lumbar radiculopathy, chronic  MRI Lumbar Spine Without Contrast    X-Ray Lumbar Complete Including Flex And Ext      4. Opioid contract exists        5. Scoliosis of lumbar spine, unspecified scoliosis type        6. Cervical radiculopathy        7. Lumbar radiculopathy              Plan:  She found relief with the lumbar epidural however not complete.  At this time for her continued pain I would like to order an updated lumbar MRI for further evaluation.  Previous MRI is from 2018.  For her thoracic back pain I would like to schedule her for a repeat right T4, T5, T6, T7 radiofrequency ablation.  She has done well with these in the past providing her 85% relief lasting over a year.  Follow up 4 weeks post procedure or sooner if needed.  Following this if her neck pain is still problematic to her can consider repeat cervical JINA.

## 2023-08-31 ENCOUNTER — OFFICE VISIT (OUTPATIENT)
Dept: ENDOCRINOLOGY | Facility: CLINIC | Age: 63
End: 2023-08-31
Payer: COMMERCIAL

## 2023-08-31 VITALS
WEIGHT: 182.44 LBS | HEART RATE: 57 BPM | HEIGHT: 61 IN | BODY MASS INDEX: 34.44 KG/M2 | DIASTOLIC BLOOD PRESSURE: 78 MMHG | SYSTOLIC BLOOD PRESSURE: 122 MMHG | OXYGEN SATURATION: 98 %

## 2023-08-31 DIAGNOSIS — R53.83 FATIGUE, UNSPECIFIED TYPE: ICD-10-CM

## 2023-08-31 DIAGNOSIS — G47.00 INSOMNIA, UNSPECIFIED TYPE: ICD-10-CM

## 2023-08-31 DIAGNOSIS — K90.0 CELIAC DISEASE: ICD-10-CM

## 2023-08-31 DIAGNOSIS — E03.9 HYPOTHYROIDISM, UNSPECIFIED TYPE: Primary | ICD-10-CM

## 2023-08-31 PROCEDURE — 3008F BODY MASS INDEX DOCD: CPT | Mod: CPTII,S$GLB,, | Performed by: INTERNAL MEDICINE

## 2023-08-31 PROCEDURE — 3074F PR MOST RECENT SYSTOLIC BLOOD PRESSURE < 130 MM HG: ICD-10-PCS | Mod: CPTII,S$GLB,, | Performed by: INTERNAL MEDICINE

## 2023-08-31 PROCEDURE — 3074F SYST BP LT 130 MM HG: CPT | Mod: CPTII,S$GLB,, | Performed by: INTERNAL MEDICINE

## 2023-08-31 PROCEDURE — 3078F DIAST BP <80 MM HG: CPT | Mod: CPTII,S$GLB,, | Performed by: INTERNAL MEDICINE

## 2023-08-31 PROCEDURE — 3078F PR MOST RECENT DIASTOLIC BLOOD PRESSURE < 80 MM HG: ICD-10-PCS | Mod: CPTII,S$GLB,, | Performed by: INTERNAL MEDICINE

## 2023-08-31 PROCEDURE — 1159F MED LIST DOCD IN RCRD: CPT | Mod: CPTII,S$GLB,, | Performed by: INTERNAL MEDICINE

## 2023-08-31 PROCEDURE — 99214 OFFICE O/P EST MOD 30 MIN: CPT | Mod: S$GLB,,, | Performed by: INTERNAL MEDICINE

## 2023-08-31 PROCEDURE — 3008F PR BODY MASS INDEX (BMI) DOCUMENTED: ICD-10-PCS | Mod: CPTII,S$GLB,, | Performed by: INTERNAL MEDICINE

## 2023-08-31 PROCEDURE — 1160F PR REVIEW ALL MEDS BY PRESCRIBER/CLIN PHARMACIST DOCUMENTED: ICD-10-PCS | Mod: CPTII,S$GLB,, | Performed by: INTERNAL MEDICINE

## 2023-08-31 PROCEDURE — 99214 PR OFFICE/OUTPT VISIT, EST, LEVL IV, 30-39 MIN: ICD-10-PCS | Mod: S$GLB,,, | Performed by: INTERNAL MEDICINE

## 2023-08-31 PROCEDURE — 99999 PR PBB SHADOW E&M-EST. PATIENT-LVL IV: ICD-10-PCS | Mod: PBBFAC,,, | Performed by: INTERNAL MEDICINE

## 2023-08-31 PROCEDURE — 1160F RVW MEDS BY RX/DR IN RCRD: CPT | Mod: CPTII,S$GLB,, | Performed by: INTERNAL MEDICINE

## 2023-08-31 PROCEDURE — 1159F PR MEDICATION LIST DOCUMENTED IN MEDICAL RECORD: ICD-10-PCS | Mod: CPTII,S$GLB,, | Performed by: INTERNAL MEDICINE

## 2023-08-31 PROCEDURE — 99999 PR PBB SHADOW E&M-EST. PATIENT-LVL IV: CPT | Mod: PBBFAC,,, | Performed by: INTERNAL MEDICINE

## 2023-08-31 RX ORDER — LEVOTHYROXINE SODIUM 125 UG/1
125 CAPSULE ORAL DAILY
Qty: 30 CAPSULE | Refills: 6 | Status: SHIPPED | OUTPATIENT
Start: 2023-08-31 | End: 2024-03-08

## 2023-08-31 NOTE — PROGRESS NOTES
CHIEF COMPLAINT: Hypothyroidism  62 y.o. old being seen as a f/u.  Hypothyroid since her late 20's. Currently on Tirosint 137 mcg once daily. Appears that rheum started armour on top of Tirosint, but patient did not start.   Current symptoms- states that getting very hot/heat intolerance. States has been losing hair. Has been going on a few months. States that she is fatigued. Feels like has to take a nap at work. Has some insomnia. Taking Lunesta. States with medication getting approx 5 hours. NO palpitations. NO tremors. Sometimes feeling anxious. Takes medication by itself. Has not missed doses. NO coffee with it.               PAST MEDICAL HISTORY/PAST SURGICAL HISTORY:  Reviewed in [facility]    SOCIAL HISTORY: Reviewed in [facility]    FAMILY HISTORY:  Father with hypothyroidism. MGM with DM 2.     MEDICATIONS/ALLERGIES: The patient's MedCard has been updated and reviewed.            PE:    GENERAL: Well developed, well nourished.  NECK: Supple, trachea midline, no palpable thyroid nodules  CHEST: Resp even and unlabored, CTA bilateral.  CARDIAC: RRR, S1, S2 heard, no murmurs, rubs, S3, or S4    LABS/Radiology         Latest Reference Range & Units 03/17/23 15:13 08/30/23 08:20   TSH 0.400 - 4.000 uIU/mL 2.310 0.115 (L)   T3, Free 2.77 - 5.27 pg/mL 3.06    Free T4 0.78 - 2.19 ng/dL 1.43    (L): Data is abnormally low      ASSESSMENT/PLAN:  1.  Hypothyroidism-currently on Tirosint.  Does not appear that she started desiccated thyroid hormone started by Rheumatology.  Currently TSH suppressed.  Symptoms could be due to hyperthyroidism.  Reduce Tirosint to 125 mcg daily.  She does appear to be taking it appropriately.  Discussed various reasons for fluctuation in TFTs.  Discussed keeping track of symptoms to see if symptoms get better worse as TSH goes up or down.  Discussed that can help determine what symptoms are thyroid related.  Discussed hair loss due to thyroid can take several months to resolve.    2.   Insomnia-currently on Lunesta.  A suppressed TSH can worsen insomnia    3.  Fatigue-could be insomnia related.  Also on a beta-blocker which can contribute to fatigue.  Also has dysautonomia.   Has had a normal cortisol.    4.  Migratory arthritis-seeing Rheumatology.  Discussed less likely thyroid related.    5. Celiac disease- follows clean free diet closely.    FOLLOWUP  6 weeks TSH, FT4  F/u 6 months with TSH, FT4- arsalan lacey

## 2023-09-13 ENCOUNTER — TELEPHONE (OUTPATIENT)
Dept: ENDOSCOPY | Facility: HOSPITAL | Age: 63
End: 2023-09-13
Payer: COMMERCIAL

## 2023-09-13 NOTE — TELEPHONE ENCOUNTER
Patient needs to reschedule procedure for tomorrow due to  being ill and having surgery. Please contact patient to reschedule. Thank you!

## 2023-09-18 NOTE — TELEPHONE ENCOUNTER
Spoke with patient, rescheduled procedure and follow up appointment. Reviewed pre-op instructions. Patient verbalized understanding.

## 2023-10-07 ENCOUNTER — PATIENT MESSAGE (OUTPATIENT)
Dept: SPORTS MEDICINE | Facility: CLINIC | Age: 63
End: 2023-10-07
Payer: COMMERCIAL

## 2023-10-09 DIAGNOSIS — M02.39 REACTIVE ARTHRITIS OF MULTIPLE SITES: ICD-10-CM

## 2023-10-09 NOTE — TELEPHONE ENCOUNTER
----- Message from Trinity Alvarez sent at 10/9/2023  3:43 PM CDT -----  Type:  RX Refill Request    Who Called:  Naveed  Reftashi or New Rx:  REFILL  RX Name and Strength:  hydrOXYchloroQUINE (PLAQUENIL) 200 mg tablet  How is the patient currently taking it? (ex. 1XDay):  as directed  Is this a 30 day or 90 day RX:  90  Preferred Pharmacy with phone number:    [facility]. [address] [address]  Phone: [phone] Fax: [phone]  Local or Mail Order:  local  Ordering Provider:  Guanaco Harmon Call Back Number:  [phone]  Additional Information:  pt is completely out and needs to have this refill sent in asap. Please call back to advise Thanks!

## 2023-10-10 ENCOUNTER — TELEPHONE (OUTPATIENT)
Dept: PAIN MEDICINE | Facility: CLINIC | Age: 63
End: 2023-10-10
Payer: COMMERCIAL

## 2023-10-10 ENCOUNTER — OFFICE VISIT (OUTPATIENT)
Dept: PAIN MEDICINE | Facility: CLINIC | Age: 63
End: 2023-10-10
Payer: COMMERCIAL

## 2023-10-10 VITALS
BODY MASS INDEX: 34.13 KG/M2 | SYSTOLIC BLOOD PRESSURE: 134 MMHG | WEIGHT: 180.75 LBS | DIASTOLIC BLOOD PRESSURE: 72 MMHG | HEIGHT: 61 IN | HEART RATE: 55 BPM

## 2023-10-10 DIAGNOSIS — Z79.891 OPIOID CONTRACT EXISTS: ICD-10-CM

## 2023-10-10 DIAGNOSIS — M47.814 THORACIC SPONDYLOSIS: ICD-10-CM

## 2023-10-10 DIAGNOSIS — M50.30 DDD (DEGENERATIVE DISC DISEASE), CERVICAL: ICD-10-CM

## 2023-10-10 DIAGNOSIS — M51.36 DDD (DEGENERATIVE DISC DISEASE), LUMBAR: ICD-10-CM

## 2023-10-10 DIAGNOSIS — M54.12 CERVICAL RADICULOPATHY: Primary | ICD-10-CM

## 2023-10-10 PROCEDURE — 3075F PR MOST RECENT SYSTOLIC BLOOD PRESS GE 130-139MM HG: ICD-10-PCS | Mod: CPTII,S$GLB,, | Performed by: PHYSICIAN ASSISTANT

## 2023-10-10 PROCEDURE — 99999 PR PBB SHADOW E&M-EST. PATIENT-LVL V: CPT | Mod: PBBFAC,,, | Performed by: PHYSICIAN ASSISTANT

## 2023-10-10 PROCEDURE — 1160F PR REVIEW ALL MEDS BY PRESCRIBER/CLIN PHARMACIST DOCUMENTED: ICD-10-PCS | Mod: CPTII,S$GLB,, | Performed by: PHYSICIAN ASSISTANT

## 2023-10-10 PROCEDURE — 3008F BODY MASS INDEX DOCD: CPT | Mod: CPTII,S$GLB,, | Performed by: PHYSICIAN ASSISTANT

## 2023-10-10 PROCEDURE — 3078F PR MOST RECENT DIASTOLIC BLOOD PRESSURE < 80 MM HG: ICD-10-PCS | Mod: CPTII,S$GLB,, | Performed by: PHYSICIAN ASSISTANT

## 2023-10-10 PROCEDURE — 1159F PR MEDICATION LIST DOCUMENTED IN MEDICAL RECORD: ICD-10-PCS | Mod: CPTII,S$GLB,, | Performed by: PHYSICIAN ASSISTANT

## 2023-10-10 PROCEDURE — 3075F SYST BP GE 130 - 139MM HG: CPT | Mod: CPTII,S$GLB,, | Performed by: PHYSICIAN ASSISTANT

## 2023-10-10 PROCEDURE — 1159F MED LIST DOCD IN RCRD: CPT | Mod: CPTII,S$GLB,, | Performed by: PHYSICIAN ASSISTANT

## 2023-10-10 PROCEDURE — 99214 PR OFFICE/OUTPT VISIT, EST, LEVL IV, 30-39 MIN: ICD-10-PCS | Mod: S$GLB,,, | Performed by: PHYSICIAN ASSISTANT

## 2023-10-10 PROCEDURE — 99214 OFFICE O/P EST MOD 30 MIN: CPT | Mod: S$GLB,,, | Performed by: PHYSICIAN ASSISTANT

## 2023-10-10 PROCEDURE — 99999 PR PBB SHADOW E&M-EST. PATIENT-LVL V: ICD-10-PCS | Mod: PBBFAC,,, | Performed by: PHYSICIAN ASSISTANT

## 2023-10-10 PROCEDURE — 1160F RVW MEDS BY RX/DR IN RCRD: CPT | Mod: CPTII,S$GLB,, | Performed by: PHYSICIAN ASSISTANT

## 2023-10-10 PROCEDURE — 3008F PR BODY MASS INDEX (BMI) DOCUMENTED: ICD-10-PCS | Mod: CPTII,S$GLB,, | Performed by: PHYSICIAN ASSISTANT

## 2023-10-10 PROCEDURE — 3078F DIAST BP <80 MM HG: CPT | Mod: CPTII,S$GLB,, | Performed by: PHYSICIAN ASSISTANT

## 2023-10-10 RX ORDER — HYDROXYCHLOROQUINE SULFATE 200 MG/1
200 TABLET, FILM COATED ORAL 2 TIMES DAILY
Qty: 60 TABLET | Refills: 6 | Status: SHIPPED | OUTPATIENT
Start: 2023-10-10 | End: 2024-01-19 | Stop reason: SDUPTHER

## 2023-10-10 RX ORDER — SODIUM CHLORIDE, SODIUM LACTATE, POTASSIUM CHLORIDE, CALCIUM CHLORIDE 600; 310; 30; 20 MG/100ML; MG/100ML; MG/100ML; MG/100ML
INJECTION, SOLUTION INTRAVENOUS CONTINUOUS
Status: CANCELLED | OUTPATIENT
Start: 2023-11-09

## 2023-10-10 NOTE — PROGRESS NOTES
Subjective:          Chief Complaint: Vonnie Garces is a 62 y.o. female who had concerns including Pain of the Left Knee.    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 The pain started last several weeks ago after unknown mechanism of injury and is becoming progressively worse. Pain is located over (points to) patellar. She reports that the pain is a 8 /10 sharp, stabbing, and intermittent pain today. She reports none previous treatments. Pain is affecting ADLs and limiting desired level of activity. Denies numbness, tingling, radiation, and inability to bear weight.  Pain is 8 /10 at its worst    Mechanical symptoms:giving way, pseudo-giving way, and swelling  Subjective instability: (+)   Worse with activity, climbing stairs, descending stairs, and walking  Better with Hydrocodone as needed (Normally takes for lumbar pain) and Ibuprofen  Nocturnal symptoms: (--)    No previous surgeries or trauma on none.  She did have a left hamstring tear that occurred one year ago after fall over speed bump; treated conservatively by Dr. Garner               Review of Systems   Constitutional: Negative for fever and night sweats.   HENT:  Negative for hearing loss.    Eyes:  Negative for blurred vision and visual disturbance.   Cardiovascular:  Negative for chest pain and leg swelling.   Respiratory:  Negative for shortness of breath.    Endocrine: Negative for polyuria.   Hematologic/Lymphatic: Negative for bleeding problem.   Skin:  Negative for rash.   Musculoskeletal:  Negative for back pain, joint pain, joint swelling, muscle cramps and muscle weakness.   Gastrointestinal:  Negative for melena.   Genitourinary:  Negative for hematuria.   Neurological:  Negative for loss of balance, numbness and paresthesias.   Psychiatric/Behavioral:  Negative for altered mental status.        Pain Related Questions  Over the  past 3 days, what was your average pain during activity? (I.e. running, jogging, walking, climbing stairs, getting dressed, ect.): 7  Over the past 3 days, what was your highest pain level?: 9  Over the past 3 days, what was your lowest pain level? : 3    Other  How many nights a week are you awakened by your affected body part?: 7      Objective:        General: Vonnie is well-developed, well-nourished, appears stated age, in no acute distress, alert and oriented to time, place and person.     General    Vitals reviewed.  Constitutional: She is oriented to person, place, and time. She appears well-developed and well-nourished. No distress.   HENT:   Mouth/Throat: No oropharyngeal exudate.   Eyes: Right eye exhibits no discharge. Left eye exhibits no discharge.   Pulmonary/Chest: Effort normal and breath sounds normal. No respiratory distress.   Neurological: She is alert and oriented to person, place, and time. She has normal reflexes. No cranial nerve deficit. Coordination normal.   Psychiatric: She has a normal mood and affect. Her behavior is normal. Judgment and thought content normal.     General Musculoskeletal Exam   Gait: abnormal and antalgic       Right Knee Exam     Inspection   Erythema: absent  Scars: absent  Swelling: absent  Effusion: absent  Deformity: absent  Bruising: absent    Tenderness   The patient is experiencing no tenderness.     Range of Motion   Extension:  0   Flexion:  130     Tests   Meniscus   Tawana:  Medial - negative Lateral - negative  Ligament Examination   Lachman: normal (-1 to 2mm)   PCL-Posterior Drawer: normal (0 to 2mm)     MCL - Valgus: normal (0 to 2mm)  LCL - Varus: normal  Pivot Shift: normal (Equal)  Reverse Pivot Shift: normal (Equal)  Dial Test at 30 degrees: normal (< 5 degrees)  Dial Test at 90 degrees: normal (< 5 degrees)  Posterior Sag Test: negative  Posterolateral Corner: stable  Patella   Patellar apprehension: negative  Passive Patellar Tilt:  neutral  Patellar Tracking: normal  Patellar Glide (quadrants): Lateral - 1   Medial - 2  Q-Angle at 90 degrees: normal  Patellar Grind: negative  J-Sign: none    Other   Meniscal Cyst: absent  Popliteal (Baker's) Cyst: absent  Sensation: normal    Left Knee Exam     Inspection   Erythema: absent  Scars: absent  Swelling: absent  Effusion: absent  Deformity: absent  Bruising: absent    Tenderness   The patient tender to palpation of the medial joint line, medial retinaculum, patella and patellar tendon.    Range of Motion   Extension:  5 abnormal   Flexion:  130     Tests   Meniscus   Tawana:  Medial - negative Lateral - negative  Stability   Lachman: normal (-1 to 2mm)   PCL-Posterior Drawer: normal (0 to 2mm)  MCL - Valgus: normal (0 to 2mm)  LCL - Varus: normal (0 to 2mm)  Pivot Shift: normal (Equal)  Reverse Pivot Shift: normal (Equal)  Dial Test at 30 degrees: normal (< 5 degrees)  Dial Test at 90 degrees: normal (< 5 degrees)  Posterior Sag Test: negative  Posterolateral Corner: stable  Patella   Patellar apprehension: negative  Passive Patellar Tilt: neutral  Patellar Tracking: normal  Patellar Glide (Quadrants): Lateral - 1 Medial - 2  Q-Angle at 90 degrees: normal  Patellar Grind: negative  J-Sign: J sign absent    Other   Meniscal Cyst: absent  Popliteal (Baker's) Cyst: absent  Sensation: normal    Right Hip Exam     Tests   Marta: negative  Left Hip Exam     Tests   Marta: negative          Muscle Strength   Right Lower Extremity   Hip Abduction: 5/5   Quadriceps:  5/5   Hamstrin/5   Left Lower Extremity   Hip Abduction: 5/5   Quadriceps:  5/5   Hamstrin/5     Reflexes     Left Side  Achilles:  2+  Quadriceps:  2+    Right Side   Achilles:  2+  Quadriceps:  2+    Vascular Exam     Right Pulses  Dorsalis Pedis:      2+  Posterior Tibial:      2+        Left Pulses  Dorsalis Pedis:      2+  Posterior Tibial:      2+          Radiographs ordered and reviewed today in clinic of the bilateral knee  demonstrates no fracture, dislocation, swelling or degenerative changes noted. Kellgren Marquise 1-2 or greater noted.            Assessment:       Encounter Diagnoses   Name Primary?    Knee pain, unspecified chronicity, unspecified laterality     Left knee pain, unspecified chronicity Yes    Obesity (BMI 30.0-34.9)           Plan:       1. RTC in 2 weeks with Dr. Nathan Patiño MRI result review; IKDC, SF-12 and KOOS was filled out today in clinic. Patient will fill out IKDC, SF-12 and KOOS on return.    2. Medications: Refills of the following Rx were sent to patients preferred Pharmacy:  celecoxib (CELEBREX) 200 MG capsule    3. Physical Therapy: Continue/Begin: Begin at [facility]   [facility]ya[facility]    4. HEP: N/A    5. Procedures/Procedural Planning:   N/A    6. DME: N/A    7. Work/Sport Status: will hold off of walking at this time but can use exercycle and ellliptical    8. Visit Summary: Above plan with MRI left knee to assess the patellar region.                          [facility] patient questionnaires have been collected today.

## 2023-10-10 NOTE — TELEPHONE ENCOUNTER
Pt seen in clinic. Please send Rx. I have reviewed the [place] Board of Pharmacy website and there are no abberancies.

## 2023-10-11 ENCOUNTER — OFFICE VISIT (OUTPATIENT)
Dept: SPORTS MEDICINE | Facility: CLINIC | Age: 63
End: 2023-10-11
Payer: COMMERCIAL

## 2023-10-11 ENCOUNTER — HOSPITAL ENCOUNTER (OUTPATIENT)
Dept: RADIOLOGY | Facility: HOSPITAL | Age: 63
Discharge: HOME | End: 2023-10-11
Attending: ORTHOPAEDIC SURGERY
Payer: COMMERCIAL

## 2023-10-11 VITALS
SYSTOLIC BLOOD PRESSURE: 137 MMHG | HEIGHT: 61 IN | BODY MASS INDEX: 33.99 KG/M2 | WEIGHT: 180 LBS | DIASTOLIC BLOOD PRESSURE: 75 MMHG | HEART RATE: 61 BPM

## 2023-10-11 DIAGNOSIS — M22.42 PATELLA, CHONDROMALACIA, LEFT: ICD-10-CM

## 2023-10-11 DIAGNOSIS — E66.9 OBESITY (BMI 30.0-34.9): ICD-10-CM

## 2023-10-11 DIAGNOSIS — M25.569 KNEE PAIN, UNSPECIFIED CHRONICITY, UNSPECIFIED LATERALITY: ICD-10-CM

## 2023-10-11 DIAGNOSIS — M25.562 LEFT KNEE PAIN, UNSPECIFIED CHRONICITY: Primary | ICD-10-CM

## 2023-10-11 DIAGNOSIS — M25.562 LEFT KNEE PAIN, UNSPECIFIED CHRONICITY: ICD-10-CM

## 2023-10-11 PROCEDURE — 1159F PR MEDICATION LIST DOCUMENTED IN MEDICAL RECORD: ICD-10-PCS | Mod: CPTII,S$GLB,, | Performed by: ORTHOPAEDIC SURGERY

## 2023-10-11 PROCEDURE — 99999 PR PBB SHADOW E&M-EST. PATIENT-LVL V: ICD-10-PCS | Mod: PBBFAC,,, | Performed by: ORTHOPAEDIC SURGERY

## 2023-10-11 PROCEDURE — 99214 PR OFFICE/OUTPT VISIT, EST, LEVL IV, 30-39 MIN: ICD-10-PCS | Mod: S$GLB,,, | Performed by: ORTHOPAEDIC SURGERY

## 2023-10-11 PROCEDURE — 3078F DIAST BP <80 MM HG: CPT | Mod: CPTII,S$GLB,, | Performed by: ORTHOPAEDIC SURGERY

## 2023-10-11 PROCEDURE — 1159F MED LIST DOCD IN RCRD: CPT | Mod: CPTII,S$GLB,, | Performed by: ORTHOPAEDIC SURGERY

## 2023-10-11 PROCEDURE — 3008F BODY MASS INDEX DOCD: CPT | Mod: CPTII,S$GLB,, | Performed by: ORTHOPAEDIC SURGERY

## 2023-10-11 PROCEDURE — 99214 OFFICE O/P EST MOD 30 MIN: CPT | Mod: S$GLB,,, | Performed by: ORTHOPAEDIC SURGERY

## 2023-10-11 PROCEDURE — 3078F PR MOST RECENT DIASTOLIC BLOOD PRESSURE < 80 MM HG: ICD-10-PCS | Mod: CPTII,S$GLB,, | Performed by: ORTHOPAEDIC SURGERY

## 2023-10-11 PROCEDURE — 3075F SYST BP GE 130 - 139MM HG: CPT | Mod: CPTII,S$GLB,, | Performed by: ORTHOPAEDIC SURGERY

## 2023-10-11 PROCEDURE — 3075F PR MOST RECENT SYSTOLIC BLOOD PRESS GE 130-139MM HG: ICD-10-PCS | Mod: CPTII,S$GLB,, | Performed by: ORTHOPAEDIC SURGERY

## 2023-10-11 PROCEDURE — 73564 XR KNEE ORTHO BILAT WITH FLEXION: ICD-10-PCS | Mod: 26,50,, | Performed by: RADIOLOGY

## 2023-10-11 PROCEDURE — 73564 X-RAY EXAM KNEE 4 OR MORE: CPT | Mod: 26,50,, | Performed by: RADIOLOGY

## 2023-10-11 PROCEDURE — 73564 X-RAY EXAM KNEE 4 OR MORE: CPT | Mod: TC,50

## 2023-10-11 PROCEDURE — 3008F PR BODY MASS INDEX (BMI) DOCUMENTED: ICD-10-PCS | Mod: CPTII,S$GLB,, | Performed by: ORTHOPAEDIC SURGERY

## 2023-10-11 PROCEDURE — 99999 PR PBB SHADOW E&M-EST. PATIENT-LVL V: CPT | Mod: PBBFAC,,, | Performed by: ORTHOPAEDIC SURGERY

## 2023-10-11 RX ORDER — HYDROCODONE BITARTRATE AND ACETAMINOPHEN 7.5; 325 MG/1; MG/1
1 TABLET ORAL EVERY 8 HOURS PRN
Qty: 60 TABLET | Refills: 0 | Status: SHIPPED | OUTPATIENT
Start: 2023-11-09 | End: 2023-12-09

## 2023-10-11 RX ORDER — HYDROCODONE BITARTRATE AND ACETAMINOPHEN 7.5; 325 MG/1; MG/1
1 TABLET ORAL EVERY 8 HOURS PRN
Qty: 60 TABLET | Refills: 0 | Status: SHIPPED | OUTPATIENT
Start: 2024-01-08 | End: 2024-02-06 | Stop reason: SDUPTHER

## 2023-10-11 RX ORDER — CELECOXIB 200 MG/1
200 CAPSULE ORAL 2 TIMES DAILY
Qty: 60 CAPSULE | Refills: 2 | Status: SHIPPED | OUTPATIENT
Start: 2023-10-11 | End: 2023-11-17

## 2023-10-11 RX ORDER — HYDROCODONE BITARTRATE AND ACETAMINOPHEN 7.5; 325 MG/1; MG/1
1 TABLET ORAL EVERY 8 HOURS PRN
Qty: 60 TABLET | Refills: 0 | Status: SHIPPED | OUTPATIENT
Start: 2023-12-09 | End: 2024-01-08

## 2023-10-12 ENCOUNTER — HOSPITAL ENCOUNTER (OUTPATIENT)
Dept: RADIOLOGY | Facility: HOSPITAL | Age: 63
Discharge: HOME | End: 2023-10-12
Attending: ANESTHESIOLOGY | Admitting: ANESTHESIOLOGY
Payer: COMMERCIAL

## 2023-10-12 ENCOUNTER — HOSPITAL ENCOUNTER (OUTPATIENT)
Facility: HOSPITAL | Age: 63
Discharge: HOME | End: 2023-10-12
Attending: ANESTHESIOLOGY | Admitting: ANESTHESIOLOGY
Payer: COMMERCIAL

## 2023-10-12 VITALS
BODY MASS INDEX: 32.1 KG/M2 | RESPIRATION RATE: 16 BRPM | WEIGHT: 170 LBS | DIASTOLIC BLOOD PRESSURE: 59 MMHG | SYSTOLIC BLOOD PRESSURE: 118 MMHG | HEART RATE: 50 BPM | HEIGHT: 61 IN | OXYGEN SATURATION: 98 % | TEMPERATURE: 97 F

## 2023-10-12 DIAGNOSIS — M47.814 THORACIC SPONDYLOSIS: ICD-10-CM

## 2023-10-12 DIAGNOSIS — M54.9 MID BACK PAIN: ICD-10-CM

## 2023-10-12 PROCEDURE — 76000 FLUOROSCOPY <1 HR PHYS/QHP: CPT | Mod: TC,PO

## 2023-10-12 PROCEDURE — 64634 PR DESTROY C/TH FACET JNT ADDL: ICD-10-PCS | Mod: RT,,, | Performed by: ANESTHESIOLOGY

## 2023-10-12 PROCEDURE — 64633 DESTROY CERV/THOR FACET JNT: CPT | Mod: RT,,, | Performed by: ANESTHESIOLOGY

## 2023-10-12 PROCEDURE — 64634 DESTROY C/TH FACET JNT ADDL: CPT | Mod: RT,,, | Performed by: ANESTHESIOLOGY

## 2023-10-12 PROCEDURE — 64634 DESTROY C/TH FACET JNT ADDL: CPT | Mod: PO,RT | Performed by: ANESTHESIOLOGY

## 2023-10-12 PROCEDURE — 63600175 PHARM REV CODE 636 W HCPCS: Mod: PO | Performed by: ANESTHESIOLOGY

## 2023-10-12 PROCEDURE — 25000003 PHARM REV CODE 250: Mod: PO | Performed by: ANESTHESIOLOGY

## 2023-10-12 PROCEDURE — 64633 DESTROY CERV/THOR FACET JNT: CPT | Mod: PO,RT | Performed by: ANESTHESIOLOGY

## 2023-10-12 PROCEDURE — 64633 PR DESTROY CERV/THOR FACET JNT: ICD-10-PCS | Mod: RT,,, | Performed by: ANESTHESIOLOGY

## 2023-10-12 RX ORDER — SODIUM CHLORIDE, SODIUM LACTATE, POTASSIUM CHLORIDE, CALCIUM CHLORIDE 600; 310; 30; 20 MG/100ML; MG/100ML; MG/100ML; MG/100ML
INJECTION, SOLUTION INTRAVENOUS CONTINUOUS
Status: DISCONTINUED | OUTPATIENT
Start: 2023-10-12 | End: 2023-10-12 | Stop reason: HOSPADM

## 2023-10-12 RX ORDER — MIDAZOLAM HYDROCHLORIDE 2 MG/2ML
INJECTION, SOLUTION INTRAMUSCULAR; INTRAVENOUS
Status: DISCONTINUED | OUTPATIENT
Start: 2023-10-12 | End: 2023-10-12 | Stop reason: HOSPADM

## 2023-10-12 RX ORDER — LIDOCAINE HYDROCHLORIDE 10 MG/ML
INJECTION INFILTRATION; PERINEURAL
Status: DISCONTINUED | OUTPATIENT
Start: 2023-10-12 | End: 2023-10-12 | Stop reason: HOSPADM

## 2023-10-12 RX ORDER — METHYLPREDNISOLONE ACETATE 40 MG/ML
INJECTION, SUSPENSION INTRA-ARTICULAR; INTRALESIONAL; INTRAMUSCULAR; SOFT TISSUE
Status: DISCONTINUED | OUTPATIENT
Start: 2023-10-12 | End: 2023-10-12 | Stop reason: HOSPADM

## 2023-10-12 RX ORDER — FENTANYL CITRATE 50 UG/ML
INJECTION, SOLUTION INTRAMUSCULAR; INTRAVENOUS
Status: DISCONTINUED | OUTPATIENT
Start: 2023-10-12 | End: 2023-10-12 | Stop reason: HOSPADM

## 2023-10-12 RX ORDER — LIDOCAINE HYDROCHLORIDE 20 MG/ML
INJECTION, SOLUTION EPIDURAL; INFILTRATION; INTRACAUDAL; PERINEURAL
Status: DISCONTINUED | OUTPATIENT
Start: 2023-10-12 | End: 2023-10-12 | Stop reason: HOSPADM

## 2023-10-12 RX ADMIN — SODIUM CHLORIDE, POTASSIUM CHLORIDE, SODIUM LACTATE AND CALCIUM CHLORIDE: 600; 310; 30; 20 INJECTION, SOLUTION INTRAVENOUS at 11:10

## 2023-10-12 NOTE — OP NOTE
PROCEDURE DATE: 10/12/2023    PROCEDURE:  Radiofrequency ablation of the right T4,5,6,7 medial branch nerves on the right-side utilizing fluoroscopy    DIAGNOSIS:  Lumbar spondylosis    Post op Diagnosis: Same    PHYSICIAN: Floyd Coleman MD    MEDICATIONS INJECTED:  From a mixture of 4ml of 2% lidocaine and 40mg of methylprednisone, 1ml of this solution was injected at each level.    LOCAL ANESTHETIC USED: Lidocaine 1%, 4 ml given at each site.    SEDATION MEDICATIONS: 4mg versed, 50mcg fentanyl    ESTIMATED BLOOD LOSS:  none    COMPLICATIONS:  none    TECHNIQUE:  A time out was taken to identify patient and procedure side prior to starting the procedure. Laying in a prone position, the patient was prepped and draped in the usual sterile fashion using ChloraPrep and sterile towels.  The levels were determined under fluoroscopic guidance and then marked.  Local anesthetic was given by raising a wheal at the skin over each site and then infiltrated approximately 2cm deeper.  A 20-gauge  100 mm [employer] RF needle was introduced to the anatomic location of the right T4,5,6,7 medial branch nerves.  Motor stimulation up to 2 Volts at each level confirmed no motor nerve involvement.  Impedance was less than 800 ohms at each level. The above noted medication was then injected slowly.  Ablation was performed per level utilizing Nataliya radiofrequency generator 80°C for 90 seconds. The patient tolerated the procedure well.     The patient was monitored after the procedure.  Patient was given post procedure and discharge instructions to follow at home.  The patient was discharged in a stable condition

## 2023-10-12 NOTE — H&P
CC: Back pain    HPI: The patient is a 63yo woman with a history of thoracic spondylosis here for right T4,5,6,7 RFA. There are no major changes in history and physical from 8/30/23.    Past Medical History:   Diagnosis Date    Anxiety 08/25/2015    Arthritis     BRCA1 negative     BRCA2 negative     Celiac disease     Cervical spondylosis     Chronic back pain     DDD (degenerative disc disease), lumbar     Difficult intubation 03/2016    anterior larynx, pt with metal dental appliance, unable to do glidescope, used LMA    Dysautonomia     Encounter for blood transfusion     Facet arthropathy, thoracic     Hormone replacement therapy (HRT)     Insomnia 08/25/2015    Neck pain     PONV (postoperative nausea and vomiting)     Right foot pain     Scoliosis     Sjogren's syndrome     Snoring     uses cpap, states not ROSSY    Unspecified hypothyroidism 08/25/2015       Past Surgical History:   Procedure Laterality Date    BILATERAL OOPHORECTOMY  2004    BREAST BIOPSY Left 2010    Benign    CHOLECYSTECTOMY  2010    COLONOSCOPY  2016    [place]    Epidural Steroid Injection      Pain managment, at another facility, cervical, thoracic    Epidural Steroid injection      Pain management, cervical    EPIDURAL STEROID INJECTION INTO CERVICAL SPINE N/A 11/11/2021    Procedure: Injection-steroid-epidural-cervical, C7/T1 interlaminer;  Surgeon: Floyd Coleman MD;  Location: [facility] OR;  Service: Pain Management;  Laterality: N/A;    EPIDURAL STEROID INJECTION INTO LUMBAR SPINE Right 1/16/2019    Procedure: Injection-steroid-epidural-lumbar L5/S1;  Surgeon: Floyd Coleman MD;  Location: [facility] OR;  Service: Pain Management;  Laterality: Right;  to right    EPIDURAL STEROID INJECTION INTO LUMBAR SPINE N/A 5/14/2019    Procedure: Injection-steroid-epidural-lumbar;  Surgeon: Floyd Coleman MD;  Location: [facility] OR;  Service: Pain Management;  Laterality: N/A;  L5/S1 interlaminar    EPIDURAL STEROID INJECTION INTO LUMBAR SPINE  N/A 3/22/2021    Procedure: Injection-steroid-epidural-lumbar L5/S1 interlaminer;  Surgeon: Floyd Coleman MD;  Location: [facility] OR;  Service: Pain Management;  Laterality: N/A;    EPIDURAL STEROID INJECTION INTO LUMBAR SPINE N/A 11/18/2022    Procedure: Injection-steroid-epidural-lumbar L5/S1 to right;  Surgeon: Floyd Coleman MD;  Location: [facility] OR;  Service: Pain Management;  Laterality: N/A;    EPIDURAL STEROID INJECTION INTO LUMBAR SPINE N/A 7/21/2023    Procedure: Injection-steroid-epidural-lumbar Interlaminar L5/S1 to the right;  Surgeon: Floyd Coleman MD;  Location: [facility] OR;  Service: Pain Management;  Laterality: N/A;    HYSTERECTOMY  2004    Complete    MOUTH SURGERY      Braces, with temporary metal implants in upper gum    OOPHORECTOMY  2004    w/ hysterectomy    RADIOFREQUENCY ABLATION  02/2016    thoracic nerve    RADIOFREQUENCY ABLATION Right 6/4/2019    Procedure: Radiofrequency Ablation T4,5,6,7;  Surgeon: Floyd Coleman MD;  Location: [facility] OR;  Service: Pain Management;  Laterality: Right;    RADIOFREQUENCY ABLATION Right 5/21/2021    Procedure: RADIOFREQUENCY ABLATION,THORACIC  T4,T5,T6, T7;  Surgeon: Floyd Coleman MD;  Location: [facility] OR;  Service: Pain Management;  Laterality: Right;    RADIOFREQUENCY ABLATION Right 6/30/2022    Procedure: Radiofrequency Ablation Thoracic T4, T5, T6, T7;  Surgeon: Floyd Coleman MD;  Location: [facility] OR;  Service: Pain Management;  Laterality: Right;    TONSILLECTOMY      VULVA SURGERY  03/2016       Family History   Problem Relation Age of Onset    Macular degeneration Mother     Arthritis Mother     Celiac disease Mother     COPD Father     Glaucoma Neg Hx        Social History     Socioeconomic History    Marital status:    Tobacco Use    Smoking status: Never    Smokeless tobacco: Never   Substance and Sexual Activity    Alcohol use: No    Drug use: No    Sexual activity: Yes     Partners: Male       Current  "Facility-Administered Medications   Medication Dose Route Frequency Provider Last Rate Last Admin    lactated ringers infusion   Intravenous Continuous Floyd Coleman MD 25 mL/hr at 10/12/23 1142 New Bag at 10/12/23 1142       Review of patient's allergies indicates:   Allergen Reactions    Compazine [prochlorperazine edisylate] Anaphylaxis    Avelox [moxifloxacin] Rash       Vitals:    10/09/23 1245 10/12/23 1123 10/12/23 1139   BP:   (!) 157/70   Pulse:   (!) 53   Resp:   16   Temp:  97.5 °F (36.4 °C)    TempSrc:  Skin    SpO2:   97%   Weight: 77.1 kg (170 lb)     Height: 5' 1" (1.549 m)         ASA 2, Mallampati 2    REVIEW OF SYSTEMS:     GENERAL: No weight loss, malaise or fevers.  HEENT:  No recent changes in vision or hearing  NECK: Negative for lumps, no difficulty with swallowing.  RESPIRATORY: Negative for cough, wheezing or shortness of breath, patient denies any recent URI.  CARDIOVASCULAR: Negative for chest pain, leg swelling or palpitations.  GI: Negative for abdominal discomfort, blood in stools or black stools or change in bowel habits.  MUSCULOSKELETAL: See HPI.  SKIN: Negative for lesions, rash, and itching.  PSYCH: No suicidal or homicidal ideations, no current mood disturbances.  HEMATOLOGY/LYMPHOLOGY: Negative for prolonged bleeding, bruising easily or swollen nodes. Patient is not currently taking any anti-coagulants  ENDO: No history of diabetes or thyroid dysfunction  NEURO: No history of syncope, paralysis, seizures or tremors.All other reviewed and negative other than HPI.    Physical exam:  Gen: A and O x3, pleasant, well-groomed  Skin: No rashes or obvious lesions  HEENT: PERRLA, no obvious deformities on ears or in canals. No thyroid masses, trachea midline, no palpable lymph nodes in neck, axilla.  CVS: Regular rate and rhythm, normal S1 and S2, no murmurs.  Resp: Clear to auscultation bilaterally.  Abdomen: Soft, NT/ND, normal bowel sounds present.  Musculoskeletal/Neuro: Moving " all extremities    Assessment:  Thoracic spondylosis  -     Case Request Operating Room: RADIOFREQUENCY ABLATION,THORACIC Right T4, T5, T6, T7  -     Place in Outpatient; Standing  -     Vital signs; Standing  -     Place 18-22 gauage peripheral IV ; Standing  -     Verify informed consent; Standing  -     Notify physician ; Standing  -     Notify physician ; Standing  -     Notify physician (specify); Standing  -     Diet NPO; Standing  -     lactated ringers infusion    Other orders  -     IP VTE LOW RISK PATIENT; Standing

## 2023-10-12 NOTE — DISCHARGE SUMMARY
[facility] - Surgery  Discharge Note  Short Stay    Procedure(s) (LRB):  RADIOFREQUENCY ABLATION,THORACIC Right T4, T5, T6, T7 (Right)      OUTCOME: Patient tolerated treatment/procedure well without complication and is now ready for discharge.    DISPOSITION: Home or Self Care    FINAL DIAGNOSIS:  Thoracic spondylosis    FOLLOWUP: In clinic    DISCHARGE INSTRUCTIONS:    Discharge Procedure Orders   Diet Adult Regular     No dressing needed     Notify your health care provider if you experience any of the following:  temperature >100.4     Activity as tolerated

## 2023-10-13 ENCOUNTER — PATIENT MESSAGE (OUTPATIENT)
Dept: SPORTS MEDICINE | Facility: CLINIC | Age: 63
End: 2023-10-13
Payer: COMMERCIAL

## 2023-10-13 DIAGNOSIS — M25.562 LEFT KNEE PAIN, UNSPECIFIED CHRONICITY: Primary | ICD-10-CM

## 2023-10-13 DIAGNOSIS — M25.569 KNEE PAIN, UNSPECIFIED CHRONICITY, UNSPECIFIED LATERALITY: ICD-10-CM

## 2023-10-18 NOTE — PROGRESS NOTES
This note was completed with dictation software and grammatical errors may exist.    CC:Back pain, neck pain    HPI: The patient is a 62-year-old woman with history of hypothyroidism, otherwise fairly healthy but a long history of scoliosis causing back pain, self-referred for continued back pain.  She returns in follow-up today with multiple pain complaints.  She is scheduled later this week for thoracic radiofrequency ablation.  However, she also has been having severe right arm pain for the last month.  This is the same pain that she has had prior to cervical epidural steroid injections in the past.  Her other concern is her left knee.  She feels that something happened while she was walking and she stops walking about 6 weeks ago because of the pain.  She feels that it is unstable and denies any relief with ice, rest or medication.  She has an appointment with orthopedics tomorrow.  She continues to take pain medicine with some relief.  Currently her low back pain is tolerable.    Pain intervention history: She has been seeing Dr. Huffman for the last several years and has undergone epidural steroid injections and radiofrequency ablations with good relief.  According to her last pain management physician, she had undergone a right side T3, 4, 5, 6 medial branch radiofrequency ablation on 8/8/14 with good relief.  He had scheduled her for another one but did not complete this.  As far as medications she has been taking Hydrocodone 7.5/325 one to 2 times a day at most and gabapentin 300 mg at night. She is status post cervical epidural steroid injection on 10/19/15 with 50% relief of her neck pain. She is status post right L4 transforaminal epidural steroid injection on 11/23/15 with 100% relief.   She is status post right T3, 4, 5 and 6 medial branch radio frequency ablation on 2/5/16 with greater than 50% relief.  She is status post right L4 transforaminal epidural sterile injection on 6/14/16 with  significant relief lasting only about a month.  She is status post C7-T1 cervical interlaminar epidural steroid injection on 7/14/16 with 30-40% relief.   She is status post L5/S1 interlaminar epidural steroidal injection to the right on 8/12/16 with 100% relief of her right leg pain.  She is status post C7-T1 cervical interlaminar epidural steroid injection on 12/9/16 with almost complete relief.  She is status post right T4, 5, 6 and 7 medial branch radiofrequency ablation on 5/30/17 with 100% relief.  She is status post C7-T1 cervical interlaminar epidural steroid injection on 6/27/17 with 80% relief.  She is status post L5/S1 interlaminar epidural steroid injection on a/7/17 with excellent relief lasting 2 weeks, now reporting 0% relief.  She is status post right L3, 4 and 5 medial branch radiofrequency ablation on 10/19/17 with 80% relief.   She is status post right T4, 5, 6 and 7 medial branch radiofrequency ablation on 12/14/17 with 100% relief.  She is status post C7-T1 cervical interlaminar epidural steroid injection on 3/16/18 with at least 80% relief.  She is status post lumbar epidural steroid injection at L5/S1 on 5/15/18 with 90% relief of her bilateral low back and right leg pain.  The she is status post right T4, 5, 6 and 7 medial branch radiofrequency ablation on 10/12/2018 with 100% relief.  She is status post L5/S1 interlaminar epidural steroid injection on 01/16/2019 with almost 100% relief of her low back pain. She is status post right T4, 5, 6 and 7 medial branch radiofrequency ablation on 06/04/2019 with 80% relief.  She is status post L5/S1 interlaminar epidural steroid injection on 03/22/2021 with 85-90% relief.   She is status post right T4, 5, 6, 7 medial branch radiofrequency ablation on 05/21/2021 with 85% relief. She is status post cervical JINA C7/T1 on 11/11/2021 with greater than 70% relief. She is status post right T4, 5, 6, 7 medial branch radiofrequency ablation on 06/30/2022 with  85% relief. She is status post L5/S1 interlaminar epidural steroid injection to the right on 11/18/2022 with 75% relief. She is status post L5/S1 JINA with spread to the right on 07/21/2023 with greater than 50% relief.    Spine surgeries:  None    Antineuropathics:  Lidoderm 5%  NSAIDs:  Duexis 800 up to TID  Physical therapy:  She has consistently been in physical therapy, reports exercise, dry needling with at least temporary relief  Antidepressants:  Muscle relaxers:  Opioids:  Hydrocodone 7.5/325 twice daily  Antiplatelets/Anticoagulants:    ROS: She reports fatigability, headaches, hypothyroidism, joint stiffness, back pain, difficulty sleeping and anxiety.  Balance of review of systems is negative.      Past Medical History:   Diagnosis Date    Anxiety 08/25/2015    Arthritis     BRCA1 negative     BRCA2 negative     Celiac disease     Cervical spondylosis     Chronic back pain     DDD (degenerative disc disease), lumbar     Difficult intubation 03/2016    anterior larynx, pt with metal dental appliance, unable to do glidescope, used LMA    Dysautonomia     Encounter for blood transfusion     Facet arthropathy, thoracic     Hormone replacement therapy (HRT)     Insomnia 08/25/2015    Neck pain     PONV (postoperative nausea and vomiting)     Right foot pain     Scoliosis     Sjogren's syndrome     Snoring     uses cpap, states not ROSSY    Unspecified hypothyroidism 08/25/2015     Past Surgical History:   Procedure Laterality Date    BILATERAL OOPHORECTOMY  2004    BREAST BIOPSY Left 2010    Benign    CHOLECYSTECTOMY  2010    COLONOSCOPY  2016    [facility]    Epidural Steroid Injection      Pain managment, at another facility, cervical, thoracic    Epidural Steroid injection      Pain management, cervical    EPIDURAL STEROID INJECTION INTO CERVICAL SPINE N/A 11/11/2021    Procedure: Injection-steroid-epidural-cervical, C7/T1 interlaminer;  Surgeon: Floyd Coleman MD;  Location: [facility] OR;  Service: Pain  Management;  Laterality: N/A;    EPIDURAL STEROID INJECTION INTO LUMBAR SPINE Right 1/16/2019    Procedure: Injection-steroid-epidural-lumbar L5/S1;  Surgeon: Floyd Coleman MD;  Location: [facility] OR;  Service: Pain Management;  Laterality: Right;  to right    EPIDURAL STEROID INJECTION INTO LUMBAR SPINE N/A 5/14/2019    Procedure: Injection-steroid-epidural-lumbar;  Surgeon: Floyd Coleman MD;  Location: [facility] OR;  Service: Pain Management;  Laterality: N/A;  L5/S1 interlaminar    EPIDURAL STEROID INJECTION INTO LUMBAR SPINE N/A 3/22/2021    Procedure: Injection-steroid-epidural-lumbar L5/S1 interlaminer;  Surgeon: Floyd Coleman MD;  Location: [facility] OR;  Service: Pain Management;  Laterality: N/A;    EPIDURAL STEROID INJECTION INTO LUMBAR SPINE N/A 11/18/2022    Procedure: Injection-steroid-epidural-lumbar L5/S1 to right;  Surgeon: Floyd Coleman MD;  Location: [facility] OR;  Service: Pain Management;  Laterality: N/A;    EPIDURAL STEROID INJECTION INTO LUMBAR SPINE N/A 7/21/2023    Procedure: Injection-steroid-epidural-lumbar Interlaminar L5/S1 to the right;  Surgeon: Floyd Coleman MD;  Location: [facility] OR;  Service: Pain Management;  Laterality: N/A;    HYSTERECTOMY  2004    Complete    MOUTH SURGERY      Braces, with temporary metal implants in upper gum    OOPHORECTOMY  2004    w/ hysterectomy    RADIOFREQUENCY ABLATION  02/2016    thoracic nerve    RADIOFREQUENCY ABLATION Right 6/4/2019    Procedure: Radiofrequency Ablation T4,5,6,7;  Surgeon: Floyd Coleman MD;  Location: [facility] OR;  Service: Pain Management;  Laterality: Right;    RADIOFREQUENCY ABLATION Right 5/21/2021    Procedure: RADIOFREQUENCY ABLATION,THORACIC  T4,T5,T6, T7;  Surgeon: Floyd Coleman MD;  Location: [facility] OR;  Service: Pain Management;  Laterality: Right;    RADIOFREQUENCY ABLATION Right 6/30/2022    Procedure: Radiofrequency Ablation Thoracic T4, T5, T6, T7;  Surgeon: Floyd Coleman MD;  Location: [facility] OR;   "Service: Pain Management;  Laterality: Right;    RADIOFREQUENCY ABLATION Right 10/12/2023    Procedure: RADIOFREQUENCY ABLATION,THORACIC Right T4, T5, T6, T7;  Surgeon: Floyd Coleman MD;  Location: [facility] OR;  Service: Pain Management;  Laterality: Right;  THORACIC Right T4, T5, T6, T7    TONSILLECTOMY      VULVA SURGERY  03/2016     Social History     Socioeconomic History    Marital status:    Tobacco Use    Smoking status: Never    Smokeless tobacco: Never   Substance and Sexual Activity    Alcohol use: No    Drug use: No    Sexual activity: Yes     Partners: Male      She is a  at [employer].    Medications/Allergies: See med card    Vitals:    10/10/23 1515   BP: 134/72   Pulse: (!) 55   Weight: 82 kg (180 lb 12.4 oz)   Height: 5' 1" (1.549 m)   PainSc:   6   PainLoc: Generalized       Physical exam:  Gen: A and O x3, pleasant, well-groomed  Skin: No rashes or obvious lesions  HEENT: PERRLA, no obvious deformities on ears or in canals. Trachea midline.  CVS: Regular rate and rhythm, normal palpable pulses.  Resp:No increased work of breathing, symmetrical chest rise.  Abdomen: Soft, NT/ND.  Musculoskeletal:No antalgic gait.      Neuro:  Upper extremities: 5/5 strength bilaterally.  Lower extremities: 5/5 strength bilaterally.    Reflexes: Patellar 0+, Achilles 0+ bilaterally.  Upper extremity reflexes 2+ bilaterally equal.  Sensory:  Intact and symmetrical to light touch and pinprick in L2-S1 dermatomes bilaterally.     Cervical spine exam: Range of motion is mildly reduced with flexion, extension and lateral rotation with increased right neck pain during right lateral rotation and extension.  Myofascial exam:  Exquisite tenderness to palpation to the right mid thoracic paraspinous muscles, right trapezius muscle.       Lumbar spine:  Lumbar spine: Range of motion is full with flexion and extension with mild increased right low back pain.  Brayden's test causes no increased pain on " either side.    Supine straight leg raise causes right low back and buttock pain.  Internal and external rotation of the hip causes no increased pain on either side.  Myofascial exam:  Mild tenderness to palpation to the right lumbar paraspinous muscles.    Imagin/16/15 Xray Scoliosis survey: There is thoracic dextroscoliosis with mild lumbar compensatory levoscoliosis. No structural abnormalities are evident. Diffuse spondylosis noted in the cervical, thoracic and to lesser extent lumbar spine. No fractures are identified. There is slight increased kyphotic curvature of the thoracic spine with alignment being otherwise normal. Measurements and evaluation are limited due to underpenetration. Land angle measures approximately in the thoracic spine is of approximately 17.0 degrees and for the lumbar spine 17.4 degrees. Soft tissues are unremarkable. IMPRESSION: 1. S shaped scoliotic curvature of the thoracolumbar spine with Land angle of approximately 17 degrees. 2. Diffuse spondylosis.    MRI report 12: Report notes that there is dextroconvex rotary curvature of the thoracic spine.  Posterior alignment is maintained.  There are scattered vertebral body hemangiomas.  There is minimal posterior disc bulges noted at T5/6, T6/7, T7/8 and T8/9.  There is no central spinal stenosis or neural foraminal narrowing.    MRI right shoulder 11: Minor distal subscapularis tendinosis.  Negative for full-thickness or significant partial-thickness rotator cuff tendon tear.  There is a small amount of subacromial subdeltoid bursal fluid which can be seen with recent injection or mild bursitis.    Cervical spine MRI 16  At C2-C3, the minimal interstitial excision covers a very mild broad-based disc bulge most prominent centrally but does not deform the ventral cord.  There is no canal or foraminal stenosis.  At C3-C4, there is mildly decreased disc height with a broad-based disc bulge most prominent centrally.  This  minimally contact the ventral cord without significant deformity.  The canal is widely patent.  There is uncovertebral hypertrophy and facet arthropathy, but the foramina are patent.  At C4-C5, there is decreased disc height with a broad disc bulge-marginal osteophyte complex that mildly lateralizes to the left and blends imperceptibly with right greater than left uncovertebral hypertrophy.  With ligamentum hypertrophy, there is no significant central canal stenosis.  Uncovertebral hypertrophy and facet arthropathy are causing mild left and moderate right foraminal stenoses.  At C5-C6, there is a broad-based disc bulge and osteophyte complex most prominent centrally.  There is also ligamentum flavum hypertrophy present, but the canal is patent.  There is mild left foraminal stenosis.  The right foramen is patent.  At C6-C7, there is disc desiccation with a minimal broad-based disc bulge most prominent in the right paracentral canal.  The canal is widely patent.  There is mild left foraminal stenosis.  At C7-T1, there is no significant disc bulge, protrusion, or herniation/extrusion.  The canal and foramina are widely patent.    Hip x-rays 9/22/17  AP view of the pelvis and frog leg views of both hips were obtained. No evidence of acute displaced fracture or dislocation is visualized.  No radiopaque foreign bodies are visualized. The bilateral superior joint spaces are grossly maintained. Mild bilateral sacroiliac joint degenerative changes are seen including subchondral sclerosis and small marginal osteophytes. Mild to moderate symphyseal degenerative changes are seen. There is a slightly expansile cortically-based focus along the lateral proximal right femoral metadiaphysis. This could reflect a sessile osteochondroma, but correlation with MRI of the right hip is recommended.    8/18/19 MRI L-spine:  T12-L1: No disc herniation or significant posterior osteophytic ridging.  No significant spinal canal or foraminal  stenosis.   L1-L2: No disc herniation or significant posterior osteophytic ridging.  Minimal left facet hypertrophy.  No significant spinal canal or foraminal stenosis.   L2-L3: No disc herniation or significant posterior osteophytic ridging.  Minimal left facet hypertrophy.  No significant spinal canal or foraminal stenosis.   L3-L4: No disc herniation or significant posterior osteophytic ridging.  Minimal bilateral facet hypertrophy.  No significant spinal canal or foraminal stenosis.   L4-L5: Mild disc bulge.  Mild bilateral facet hypertrophy.  Mild ligamentum flavum thickening.  Minimal narrowing of the bilateral lateral recesses. No significant overall spinal canal stenosis. Mild bilateral foraminal stenosis.   L5-S1: Minimal disc bulge contained in the ventral epidural fat.  Mild bilateral facet hypertrophy.  No significant spinal canal or foraminal stenosis.    Assessment:  The patient is a 62-year-old woman with history of hypothyroidism, otherwise fairly healthy but a long history of scoliosis causing back pain, self-referred for continued back pain.    1. Cervical radiculopathy  Case Request Operating Room: Injection-steroid-epidural-cervical C7-T1 to right    Vital signs    Place 18-22 gauage peripheral IV     Verify informed consent    Notify physician     Notify physician     Notify physician (specify)    Diet NPO    lactated ringers infusion    Place in Outpatient      2. DDD (degenerative disc disease), cervical        3. Thoracic spondylosis        4. DDD (degenerative disc disease), lumbar        5. Opioid contract exists              Plan:  1. We will proceed with right T4, 5, 6, 7 medial branch radiofrequency ablation later this week.    2. We will schedule her for her C7-T1 interlaminar epidural steroid injection about 1 month from now for her radicular right arm pain.    3. She will see orthopedics tomorrow for her new left knee pain.  4. Dr. Coleman provided prescriptions for  hydrocodone-acetaminophen 7.5/325 mg, 60 tablets per month.  I have reviewed the [place] Board of Pharmacy website and there are no abberancies.    5. Follow-up in 4 weeks postprocedure or sooner as needed.

## 2023-10-31 ENCOUNTER — HOSPITAL ENCOUNTER (OUTPATIENT)
Dept: RADIOLOGY | Facility: HOSPITAL | Age: 63
Discharge: HOME | End: 2023-10-31
Attending: ORTHOPAEDIC SURGERY
Payer: COMMERCIAL

## 2023-10-31 DIAGNOSIS — M22.42 PATELLA, CHONDROMALACIA, LEFT: ICD-10-CM

## 2023-10-31 PROCEDURE — 73721 MRI JNT OF LWR EXTRE W/O DYE: CPT | Mod: TC,LT

## 2023-10-31 PROCEDURE — 73721 MRI JNT OF LWR EXTRE W/O DYE: CPT | Mod: 26,LT,, | Performed by: RADIOLOGY

## 2023-10-31 PROCEDURE — 73721 MRI KNEE WITHOUT CONTRAST LEFT: ICD-10-PCS | Mod: 26,LT,, | Performed by: RADIOLOGY

## 2023-11-01 ENCOUNTER — OFFICE VISIT (OUTPATIENT)
Dept: SPORTS MEDICINE | Facility: CLINIC | Age: 63
End: 2023-11-01
Payer: COMMERCIAL

## 2023-11-01 DIAGNOSIS — G89.29 CHRONIC PAIN OF LEFT KNEE: Primary | ICD-10-CM

## 2023-11-01 DIAGNOSIS — M25.562 CHRONIC PAIN OF LEFT KNEE: Primary | ICD-10-CM

## 2023-11-01 PROCEDURE — 99214 PR OFFICE/OUTPT VISIT, EST, LEVL IV, 30-39 MIN: ICD-10-PCS | Mod: 95,,, | Performed by: ORTHOPAEDIC SURGERY

## 2023-11-01 PROCEDURE — 1160F RVW MEDS BY RX/DR IN RCRD: CPT | Mod: CPTII,95,, | Performed by: ORTHOPAEDIC SURGERY

## 2023-11-01 PROCEDURE — 99214 OFFICE O/P EST MOD 30 MIN: CPT | Mod: 95,,, | Performed by: ORTHOPAEDIC SURGERY

## 2023-11-01 PROCEDURE — 1159F MED LIST DOCD IN RCRD: CPT | Mod: CPTII,95,, | Performed by: ORTHOPAEDIC SURGERY

## 2023-11-01 PROCEDURE — 1159F PR MEDICATION LIST DOCUMENTED IN MEDICAL RECORD: ICD-10-PCS | Mod: CPTII,95,, | Performed by: ORTHOPAEDIC SURGERY

## 2023-11-01 PROCEDURE — 1160F PR REVIEW ALL MEDS BY PRESCRIBER/CLIN PHARMACIST DOCUMENTED: ICD-10-PCS | Mod: CPTII,95,, | Performed by: ORTHOPAEDIC SURGERY

## 2023-11-01 NOTE — PROGRESS NOTES
Telemedicine/Virtual Visit Documentation:     The patient location is: home    The chief complaint leading to consultation is: see HPI    VISIT TYPE X   Virtual visit with synchronous audio and video    Telephone E/M service      Total time spent with patient: see X cristela on chart below.   More than half of the time was spent counseling or coordinating care including prognosis, differential diagnosis, risks and benefits of treatment, instructions, compliance risk reductions     EST MINUTES X   27626 5    [id number] 10    38719 15    80888 25 X   99215 40    NEW     22445 10    30520 20    67069 30    23689 45    42486 60    PHONE      5-10    86275 11-20    40351 21-30      H&P  Orthopaedics      SUBJECTIVE:     History of Present Illness:  Patient is a 62 y.o. female with Left knee meniscal path    Review of patient's allergies indicates:   Allergen Reactions    Compazine [prochlorperazine edisylate] Anaphylaxis    Avelox [moxifloxacin] Rash       Past Medical History:   Diagnosis Date    Anxiety 08/25/2015    Arthritis     BRCA1 negative     BRCA2 negative     Celiac disease     Cervical spondylosis     Chronic back pain     DDD (degenerative disc disease), lumbar     Difficult intubation 03/2016    anterior larynx, pt with metal dental appliance, unable to do glidescope, used LMA    Dysautonomia     Encounter for blood transfusion     Facet arthropathy, thoracic     Hormone replacement therapy (HRT)     Insomnia 08/25/2015    Neck pain     PONV (postoperative nausea and vomiting)     Right foot pain     Scoliosis     Sjogren's syndrome     Snoring     uses cpap, states not ROSSY    Unspecified hypothyroidism 08/25/2015     Past Surgical History:   Procedure Laterality Date    BILATERAL OOPHORECTOMY  2004    BREAST BIOPSY Left 2010    Benign    CHOLECYSTECTOMY  2010    COLONOSCOPY  2016    [facility]    Epidural Steroid Injection      Pain managment, at another facility, cervical, thoracic    Epidural Steroid injection       Pain management, cervical    EPIDURAL STEROID INJECTION INTO CERVICAL SPINE N/A 11/11/2021    Procedure: Injection-steroid-epidural-cervical, C7/T1 interlaminer;  Surgeon: Floyd Coleman MD;  Location: [facility] OR;  Service: Pain Management;  Laterality: N/A;    EPIDURAL STEROID INJECTION INTO LUMBAR SPINE Right 1/16/2019    Procedure: Injection-steroid-epidural-lumbar L5/S1;  Surgeon: Floyd Coleman MD;  Location: [facility] OR;  Service: Pain Management;  Laterality: Right;  to right    EPIDURAL STEROID INJECTION INTO LUMBAR SPINE N/A 5/14/2019    Procedure: Injection-steroid-epidural-lumbar;  Surgeon: Floyd Coleman MD;  Location: [facility] OR;  Service: Pain Management;  Laterality: N/A;  L5/S1 interlaminar    EPIDURAL STEROID INJECTION INTO LUMBAR SPINE N/A 3/22/2021    Procedure: Injection-steroid-epidural-lumbar L5/S1 interlaminer;  Surgeon: Floyd Coleman MD;  Location: [facility] OR;  Service: Pain Management;  Laterality: N/A;    EPIDURAL STEROID INJECTION INTO LUMBAR SPINE N/A 11/18/2022    Procedure: Injection-steroid-epidural-lumbar L5/S1 to right;  Surgeon: Floyd Coleman MD;  Location: [facility] OR;  Service: Pain Management;  Laterality: N/A;    EPIDURAL STEROID INJECTION INTO LUMBAR SPINE N/A 7/21/2023    Procedure: Injection-steroid-epidural-lumbar Interlaminar L5/S1 to the right;  Surgeon: Floyd Coleman MD;  Location: [facility] OR;  Service: Pain Management;  Laterality: N/A;    HYSTERECTOMY  2004    Complete    MOUTH SURGERY      Braces, with temporary metal implants in upper gum    OOPHORECTOMY  2004    w/ hysterectomy    RADIOFREQUENCY ABLATION  02/2016    thoracic nerve    RADIOFREQUENCY ABLATION Right 6/4/2019    Procedure: Radiofrequency Ablation T4,5,6,7;  Surgeon: Floyd Coleman MD;  Location: [facility] OR;  Service: Pain Management;  Laterality: Right;    RADIOFREQUENCY ABLATION Right 5/21/2021    Procedure: RADIOFREQUENCY ABLATION,THORACIC  T4,T5,T6, T7;  Surgeon: Floyd Coleman  MD;  Location: [facility] OR;  Service: Pain Management;  Laterality: Right;    RADIOFREQUENCY ABLATION Right 6/30/2022    Procedure: Radiofrequency Ablation Thoracic T4, T5, T6, T7;  Surgeon: Floyd Coleman MD;  Location: [facility] OR;  Service: Pain Management;  Laterality: Right;    RADIOFREQUENCY ABLATION Right 10/12/2023    Procedure: RADIOFREQUENCY ABLATION,THORACIC Right T4, T5, T6, T7;  Surgeon: Floyd Coleman MD;  Location: [facility] OR;  Service: Pain Management;  Laterality: Right;  THORACIC Right T4, T5, T6, T7    TONSILLECTOMY      VULVA SURGERY  03/2016     Family History   Problem Relation Age of Onset    Macular degeneration Mother     Arthritis Mother     Celiac disease Mother     COPD Father     Glaucoma Neg Hx      Social History     Tobacco Use    Smoking status: Never    Smokeless tobacco: Never   Substance Use Topics    Alcohol use: No    Drug use: No        Review of Systems:  Patient denies constitutional symptoms, cardiac symptoms, respiratory symptoms, GI symptoms.  The remainder of the musculoskeletal ROS is included in the HPI.      OBJECTIVE:     Physical Exam:  Gen:  No acute distress  CV:  Peripherally well-perfused.  Pulses 2+ bilaterally.  Lungs:  Normal respiratory effort.  Abdomen:  Soft, non-tender, non-distended  Head/Neck:  Normocephalic.  Atraumatic. No TTP, AROM and PROM intact without pain  Neuro:  CN intact without deficit, SILT throughout B/L Upper & Lower Extremities    MSK:  No interval change    MRI Knee Without Contrast Left  Narrative: EXAMINATION:  MRI KNEE WITHOUT CONTRAST LEFT    CLINICAL HISTORY:  Knee pain, chronic, negative xray (Age >= 5y);T2 mapping and cartilage specfic sequences to assess the patellar region;Chondromalacia patellae, left knee    TECHNIQUE:  Multiplanar, multisequence MRI of the left knee performed per routine protocol without contrast.    COMPARISON:  Radiographs 10/11/2023    FINDINGS:  Menisci:  Lateral meniscus is intact.  Suspected tear of  medial meniscus anterior horn with adjacent synovitis and marrow edema.    Ligaments:  ACL, PCL, MCL, and LCL complex are intact.    Tendons:  Extensor mechanism is maintained.    Cartilage:    Patellofemoral: Full-thickness chondral fissuring noted over the median ridge and lateral facet of the patella with subchondral edema.    Medial tibiofemoral: Full-thickness fissuring over the anterior weight-bearing femoral condyle.    Lateral tibiofemoral: Articular cartilage is maintained.    Bone: No fracture or marrow replacing process.    Miscellaneous: Small joint effusion.  Impression: 1. Suspected tear of anterior horn medial meniscus with adjacent synovitis and marrow edema.  2. Patellofemoral and medial tibiofemoral cartilage fissuring.    Electronically signed by: Rey Thompson MD  Date:    10/31/2023  Time:    12:56        ASSESSMENT/PLAN:     A/P: Vonnie Garces is a 62 y.o. Left knee meniscal pathology.    Plan:  1. RTC in 6 weeks with [facility] preop. H&P; IKDC, SF-12 and KOOS was not filled out today in clinic. Patient will fill out IKDC, SF-12 and KOOS on return.    2. Medications: Refills of the following Rx were sent to patients preferred Pharmacy:  No Refills Needed Today    3. Physical Therapy: Continue/Begin: Pre-Rehabilitation at [place], [facility]       4. HEP: N/A    5. Procedures/Procedural Planning:   We reviewed with Vonnie today, the pathology and natural history of her diagnosis. We have discussed a variety of treatment options including medications, physical therapy and other alternative treatments. I also explained the indications, risks and benefits of surgery. After discussion, Vonnie decided to proceed with surgery. The decision was made to go forward with:   Begin  PT at [facility]    Left Knee:  1. 42578 Arthroscopy, with meniscectomy (medial OR lateral)   2. 35397 Arthroscopy, debridement/shaving of articular cartilage (Chondroplasty)  3. 34546 Arthroscopy,  knee, synovectomy, limited   Clearance Medically Necessary: X Yes   - PCP     Pre-Op Center Clearance Medically Necessary: X Yes   \    The details of the surgical procedure were explained, including the location of probable incisions and a description of likely hardware and/or grafts to be used.  The patient understands the likely convalescence after surgery.  Also, we have thoroughly discussed the risks, benefits and alternatives to surgery, including, but not limited to, the risk of infection, joint stiffness, blood clot (including DVT and/or pulmonary embolus), neurologic and vascular injury.  It was explained that, if tissue has been repaired or reconstructed, there is a chance of failure, which may require further management.      All of the patient's questions were answered and informed consent was obtained. The patient will contact us if they have any questions or concerns in the interim.    6. DME: N/A    7. Work/Sport Status: No interval change    8. Visit Summary: Above plan and we will schedule for 12/15/23 but if improving she will cancel.

## 2023-11-03 ENCOUNTER — PATIENT MESSAGE (OUTPATIENT)
Dept: SPORTS MEDICINE | Facility: CLINIC | Age: 63
End: 2023-11-03
Payer: COMMERCIAL

## 2023-11-07 ENCOUNTER — TELEPHONE (OUTPATIENT)
Dept: SPORTS MEDICINE | Facility: CLINIC | Age: 63
End: 2023-11-07
Payer: COMMERCIAL

## 2023-11-07 ENCOUNTER — CLINICAL SUPPORT (OUTPATIENT)
Dept: REHABILITATION | Facility: HOSPITAL | Age: 63
End: 2023-11-07
Attending: ORTHOPAEDIC SURGERY
Payer: COMMERCIAL

## 2023-11-07 DIAGNOSIS — R29.898 WEAKNESS OF BOTH HIPS: ICD-10-CM

## 2023-11-07 DIAGNOSIS — R26.9 GAIT ABNORMALITY: ICD-10-CM

## 2023-11-07 DIAGNOSIS — M25.562 CHRONIC PAIN OF LEFT KNEE: ICD-10-CM

## 2023-11-07 DIAGNOSIS — G89.29 CHRONIC PAIN OF LEFT KNEE: ICD-10-CM

## 2023-11-07 PROCEDURE — 97162 PT EVAL MOD COMPLEX 30 MIN: CPT | Mod: PO

## 2023-11-07 PROCEDURE — 97110 THERAPEUTIC EXERCISES: CPT | Mod: PO

## 2023-11-07 NOTE — TELEPHONE ENCOUNTER
LVM for patient to give our office a call to schedule preoperative appointment for KS on 12/15.     Jaelyn Cintron   Clinical Assistant to Dr. Nathan Patiño

## 2023-11-07 NOTE — PLAN OF CARE
[facility]PAT[facility]   Physical Therapy Initial Evaluation      Name: Vonnie Garces  Clinic Number: [id number]    Therapy Diagnosis:   Encounter Diagnoses   Name Primary?    Chronic pain of left knee     Weakness of both hips     Gait abnormality         Physician: Nathan Patiño MD    Physician Orders: PT Eval and Treat   Medical Diagnosis from Referral: Chronic pain of left knee [M25.562, G89.29]  Evaluation Date: 11/7/2023  Authorization Period Expiration: 12/31/23  Plan of Care Expiration: 1/30/24  Progress Note Due: 1/30/24  Date of Surgery: na  Visit # / Visits authorized: 1/ 1   FOTO: 1/ 3    Precautions: Standard     Time In: 8:00 am  Time Out: 9:00 am  Total Billable Time: 60 minutes    Subjective     Date of onset: 2-3 months ago    History of current condition - Vonnie reports: she has seen Dr. Patiño and she was diagnosed with having an anterior meniscus tear. Her knee is painful and feels unstable. She has to be very careful about foot placement and how she turns her legs. She doesn't have an exact time when this started, but she had a fall a year ago where she tore her hamstring. She didn't notice her knee at the time because the hamstring took precedence. 2-3 months ago, she walks every night for 65 minutes and she was at the end of her walk and started having pain in the knee and started to stop and stretch but then decided to finish it out. It was miserable. She kept thinking it was going to get better but hasn't. Dr. Patiño is hopeful that PT will help and not require surgery. Treated by Dr. Coleman for back pain, ablations 3x/year- had one a month ago, and has one scheduled in November; knee felt better after last injection.     Falls: 0    Imaging: MRI studies: 1. Suspected tear of anterior horn medial meniscus with adjacent synovitis and marrow edema.  2. Patellofemoral and medial tibiofemoral cartilage fissuring.    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; [employer], retired from [employer]  Prior Level of Function: independent, active  Current Level of Function: difficulty with recreation; housework    Pain:  Current 0/10, worst 6/10, best 0/10   Location: left anteromedial knee, runs straight across  Description: constant burning, aching  Aggravating Factors: laying on right side, MAK position,   Easing Factors:  ibuprofen, stretching out leg, icing, cheap brace    Patients goals: not have to do surgery, be able to walk for exercise/recreation     Medical History:   Past Medical History:   Diagnosis Date    Anxiety 08/25/2015    Arthritis     BRCA1 negative     BRCA2 negative     Celiac disease     Cervical spondylosis     Chronic back pain     DDD (degenerative disc disease), lumbar     Difficult intubation 03/2016    anterior larynx, pt with metal dental appliance, unable to do glidescope, used LMA    Dysautonomia     Encounter for blood transfusion     Facet arthropathy, thoracic     Hormone replacement therapy (HRT)     Insomnia 08/25/2015    Neck pain     PONV (postoperative nausea and vomiting)     Right foot pain     Scoliosis     Sjogren's syndrome     Snoring     uses cpap, states not ROSSY    Unspecified hypothyroidism 08/25/2015       Surgical History:   Vonnie Garces  has a past surgical history that includes Tonsillectomy; Mouth surgery; Epidural Steroid Injection; Colonoscopy (2016); Epidural Steroid injection; Bilateral oophorectomy (2004); Cholecystectomy (2010); Vulva surgery (03/2016); Radiofrequency ablation (02/2016); Epidural steroid injection into lumbar spine (Right, 1/16/2019); Epidural steroid injection into lumbar spine (N/A, 5/14/2019); Radiofrequency ablation (Right, 6/4/2019); Hysterectomy (2004); Oophorectomy (2004); Breast biopsy (Left, 2010); Epidural steroid injection into lumbar spine (N/A, 3/22/2021); Radiofrequency  ablation (Right, 2021); Epidural steroid injection into cervical spine (N/A, 2021); Radiofrequency ablation (Right, 2022); Epidural steroid injection into lumbar spine (N/A, 2022); Epidural steroid injection into lumbar spine (N/A, 2023); and Radiofrequency ablation (Right, 10/12/2023).    Medications:   Vonnie has a current medication list which includes the following prescription(s): alprazolam, celecoxib, cyclosporine, estradiol, estradiol, eszopiclone, gabapentin, hydrocodone-acetaminophen, [START ON 2023] hydrocodone-acetaminophen, [START ON 2023] hydrocodone-acetaminophen, [START ON 2024] hydrocodone-acetaminophen, hydroxychloroquine, ibuprofen, ibuprofen, lidocaine-prilocaine, methyl salicylate/menth/camph, prednisone, propranolol, and tirosint.    Allergies:   Review of patient's allergies indicates:   Allergen Reactions    Compazine [prochlorperazine edisylate] Anaphylaxis    Avelox [moxifloxacin] Rash        Objective      Functional Tests:  Gait: mild left trunk lean  OH Squat: increased lumbar lordosis  SLS EO: R 20 sec , L 5 sec    Knee Passive Range of Motion:   Right  Left    Flexion 135 135   Extension 2 0*       Hip Passive Range of Motion:   Right  Left    Flexion 120 120   Abduction 35 35   Extension 8 8   Ext. Rotation 45 45   Int. Rotation 35 35       Ankle Passive Range of Motion:   Right  Left    Dorsiflexion 8 8   1st MTP Extension 70 70       Lower Extremity Strength:   Right  Left    Quadriceps: /5 4+/5   Hamstring at 90 de/5 4+/5   Hamstring at 15 de/5 4+/5   Iliopsoas (sitting): 4+/5 4/5   Hip extension:  2/5 2/5   PGM: 2/5 2/5       Special Tests:   Right Left   Valgus Stress  - -   Varus Stress  - -   Lachman's  - -   Pivot Shift - -   External Rotation Recurvatum  - -   Posterior Sag Sign - -   Posterior Drawer - -      Right Left   Thessaly's Test - -   McMurrays  - -   Apleys Compression/  Distraction - -      Right Left   Patella  "Grind - -   Patella Apprehension - -   Plica Stutter Test - -   Q- Angle - +     MSI Right Left   Step Test - +   SLS - +     Joint Mobility: decreased fat pad mobility, increased lateral tibial glide left     Palpation: tender to palpation at fat pad     Neural provocation: + femoral nerve, + SLR    Flexibility:    Ely's test: R + ; L +    Hamstrings: R - ; L  -   Yordan Test Right  Left    Iliopsoas - -   Rectus Femoris  - -       Intake Outcome Measure for FOTO Knee Survey    Therapist reviewed FOTO scores for Vonnie Garces on 11/7/2023.   FOTO report - see Media section or FOTO account episode details.    Intake Score: 48%         Treatment     Total Treatment time (time-based codes) separate from Evaluation: 15 minutes     Vonnie received the treatments listed below:      therapeutic exercises to develop strength, endurance, ROM, and flexibility for 15 minutes including:  Femoral nerve glide x15   Tibial nerve glide x15   Brace march x10 ea   SL clamshell 5x10" ea     Patient Education and Home Exercises     Education provided:   - pathophysiology, expectations    Written Home Exercises Provided: yes. Exercises were reviewed and Vonnie was able to demonstrate them prior to the end of the session.  Vonnie demonstrated good  understanding of the education provided. See EMR under Patient Instructions for exercises provided during therapy sessions.    Assessment     Vonnie is a 62 y.o. female referred to outpatient Physical Therapy with a medical diagnosis of Chronic pain of left knee [M25.562, G89.29]. Patient presents with neural provocation, altered knee mechanics, decreased lumbopelvic control, and decreased tolerance for functional activities. She would benefit from skilled PT services to normalize kinetic chain mobility, strength, and function to safely return to her prior level of activity.     Patient prognosis is Good.   Patient will benefit from skilled outpatient Physical Therapy to " address the deficits stated above and in the chart below, provide patient /family education, and to maximize patientt's level of independence.     Plan of care discussed with patient: Yes  Patient's spiritual, cultural and educational needs considered and patient is agreeable to the plan of care and goals as stated below:     Anticipated Barriers for therapy: comorbidities    Medical Necessity is demonstrated by the following  History  Co-morbidities and personal factors that may impact the plan of care [] LOW: no personal factors / co-morbidities  [x] MODERATE: 1-2 personal factors / co-morbidities  [] HIGH: 3+ personal factors / co-morbidities    Moderate / High Support Documentation:   Co-morbidities affecting plan of care: lumbar radiculopathy    Personal Factors:   no deficits     Examination  Body Structures and Functions, activity limitations and participation restrictions that may impact the plan of care [] LOW: addressing 1-2 elements  [x] MODERATE: 3+ elements  [] HIGH: 4+ elements (please support below)    Moderate / High Support Documentation: joint, muscular, neural     Clinical Presentation [] LOW: stable  [x] MODERATE: Evolving  [] HIGH: Unstable     Decision Making/ Complexity Score: moderate       Goals:  Short Term Goals: 6 weeks   - demonstrate negative leg extension test to offload lumbar spine  - demonstrates appropriate squat and deadlift mechanics without pain for return to recreational activities  - demonstrate lumbar AROM without pain for decreased tissue irritability  - demonstrate negative neural provocation to decrease neural involvement    Long Term Goals: 12 weeks   - pt will demonstrate appropriate core endurance testing for decreased reinjury risk  - pt will be able to perform 30+ min walking without pain for return to prior level of activity  - pt will tolerate sport-specific activities with appropriate form without pain for improved quality of life    Plan     Plan of care  Certification: 11/7/2023 to 1/30/24.    Outpatient Physical Therapy 1-2 times weekly for 12 weeks to include the following interventions: Gait Training, Manual Therapy, Moist Heat/ Ice, Neuromuscular Re-ed, Patient Education, Therapeutic Activities, and Therapeutic Exercise.     Shaunna Davis, PT        Physician's Signature: _________________________________________ Date: ________________

## 2023-11-08 ENCOUNTER — TELEPHONE (OUTPATIENT)
Dept: SURGERY | Facility: HOSPITAL | Age: 63
End: 2023-11-08
Payer: COMMERCIAL

## 2023-11-08 ENCOUNTER — PATIENT MESSAGE (OUTPATIENT)
Dept: SURGERY | Facility: HOSPITAL | Age: 63
End: 2023-11-08
Payer: COMMERCIAL

## 2023-11-08 DIAGNOSIS — G47.00 INSOMNIA, UNSPECIFIED TYPE: ICD-10-CM

## 2023-11-08 RX ORDER — PROPRANOLOL HYDROCHLORIDE 60 MG/1
60 CAPSULE, EXTENDED RELEASE ORAL
Qty: 90 CAPSULE | Refills: 3 | Status: SHIPPED | OUTPATIENT
Start: 2023-11-08

## 2023-11-08 NOTE — TELEPHONE ENCOUNTER
Rescheduled procedure, scheduled virtual visit to discuss concerns. Recently seen  r/t knee pain, possibly sx indicated. Been attending PT, Pt states may be experiencing nerve pain from back. Was having a cervical injection, wanted to discuss other location of pain before have cervical injection. If considered by  or Manfred nerve pain is from back and changes injection site can avoid knee surgery.

## 2023-11-08 NOTE — TELEPHONE ENCOUNTER
Patient canceled her procedure scheduled for tomorrow due to change in location/intensity of pain. Pt called office yesterday & sent message this morning. Please reach out to her to schedule her for an in-office visit. Thank you!

## 2023-11-10 ENCOUNTER — TELEPHONE (OUTPATIENT)
Dept: SPORTS MEDICINE | Facility: CLINIC | Age: 63
End: 2023-11-10
Payer: COMMERCIAL

## 2023-11-10 ENCOUNTER — CLINICAL SUPPORT (OUTPATIENT)
Dept: REHABILITATION | Facility: HOSPITAL | Age: 63
End: 2023-11-10
Payer: COMMERCIAL

## 2023-11-10 DIAGNOSIS — R29.898 WEAKNESS OF BOTH HIPS: Primary | ICD-10-CM

## 2023-11-10 DIAGNOSIS — G47.00 INSOMNIA, UNSPECIFIED TYPE: ICD-10-CM

## 2023-11-10 DIAGNOSIS — R26.9 GAIT ABNORMALITY: ICD-10-CM

## 2023-11-10 PROCEDURE — 97140 MANUAL THERAPY 1/> REGIONS: CPT | Mod: PO

## 2023-11-10 PROCEDURE — 97112 NEUROMUSCULAR REEDUCATION: CPT | Mod: PO

## 2023-11-10 PROCEDURE — 97110 THERAPEUTIC EXERCISES: CPT | Mod: PO

## 2023-11-10 NOTE — TELEPHONE ENCOUNTER
----- Message from Berta Vallejo sent at 11/10/2023  8:10 AM CST -----  Regarding: RX Refill Request  Contact: [facility] at [phone]  Type:  RX Refill Request    Who Called[facility]    [facility]. - [address] [address]  Phone: [phone] Fax: [phone]      Additional Information:  Pharmacy requesting a refill for patient. Please call and advise. Thank you    eszopiclone (LUNESTA) 3 mg Tab 30 tablet 5 5/10/2023 -   Sig: TAKE 1 TABLET BY MOUTH EVERY EVENING   Sent to pharmacy as: eszopiclone (LUNESTA) 3 mg Tab   E-Prescribing Status: Receipt confirmed by pharmacy (5/10/2023 10:41 PM CDT)

## 2023-11-10 NOTE — TELEPHONE ENCOUNTER
LVM for patient to contact our office to confirm surgery date and schedule preop. No answer. Third attempt at scheduling patient     Jaelyn   Clinical & Surgical Assistant to Dr. Nathan Patiño

## 2023-11-10 NOTE — PROGRESS NOTES
[facility]   Physical Therapy Treatment Note     Name: Vonnie Garces  Clinic Number: [id number]    Therapy Diagnosis:   Encounter Diagnoses   Name Primary?    Weakness of both hips Yes    Gait abnormality      Physician: Nathan Patiño MD    Visit Date: 11/10/2023    Physician Orders: PT Eval and Treat   Medical Diagnosis from Referral: Chronic pain of left knee [M25.562, G89.29]  Evaluation Date: 11/7/2023  Authorization Period Expiration: 12/31/23  Plan of Care Expiration: 1/30/24  Progress Note Due: 1/30/24  Date of Surgery: na  Visit # / Visits authorized: 1/ 20   FOTO: 1/ 3    PTA Visit #: 0/5       Precautions: Standard     Time In: 1:00 pm  Time Out: 2:00 pm  Total Billable Time: 60 minutes      SUBJECTIVE     Pt reports: she feels better after doing her exercises in the morning, but was stiff before that..  She was compliant with home exercise program.  Response to previous treatment: no adverse effects  Functional change: decreased stiffness in the am    Pain: 0/10  Location: left knee      OBJECTIVE     Objective Measures updated at progress report unless specified.     Treatment     Vonnie received the treatments listed below:      therapeutic exercises to develop strength, endurance, ROM, and flexibility for 10 minutes including:  Tibial nerve glide x15   Femoral nerve glide x15    manual therapy techniques: Joint mobilizations were applied to the: left knee for 15 minutes, including:  Patellar, fat pad mobilizations  Tibial medial glide     neuromuscular re-education activities to improve: Balance, Coordination, Kinesthetic, Sense, and Proprioception for 35 minutes. The following activities were included:  Brace march 2x10   Hip hinge retraining 2x10   Hip hinge lat pulldown 3x8 3#  Bench hip thrust 3x8  Palloff press red theraband 3x10 ea     therapeutic activities to improve functional performance for 00  minutes, including:        Patient Education and Home  Exercises     Home Exercises Provided and Patient Education Provided     Education provided:   - pathophysiology, expectations    Written Home Exercises Provided: yes. Exercises were reviewed and Vonnie was able to demonstrate them prior to the end of the session.  Vonnie demonstrated good  understanding of the education provided. See EMR under Patient Instructions for exercises provided during therapy sessions    ASSESSMENT     Good tolerance for treatment session. Does have some difficulty with bracing technique initially, but improves following motor programming activities. Training effect achieved at hips and core with minimal knee or back soreness. Will continue to work to normalize tibiofemoral and patellofemoral joint function as well as offload neural structures.     Vonnie Is progressing well towards her goals.   Pt prognosis is Good.     Pt will continue to benefit from skilled outpatient physical therapy to address the deficits listed in the problem list box on initial evaluation, provide pt/family education and to maximize pt's level of independence in the home and community environment.     Pt's spiritual, cultural and educational needs considered and pt agreeable to plan of care and goals.     Anticipated barriers to physical therapy: comorbidities    Goals:   Short Term Goals: 6 weeks   - demonstrate negative leg extension test to offload lumbar spine  - demonstrates appropriate squat and deadlift mechanics without pain for return to recreational activities  - demonstrate lumbar AROM without pain for decreased tissue irritability  - demonstrate negative neural provocation to decrease neural involvement     Long Term Goals: 12 weeks   - pt will demonstrate appropriate core endurance testing for decreased reinjury risk  - pt will be able to perform 30+ min walking without pain for return to prior level of activity  - pt will tolerate sport-specific activities with appropriate form without pain  for improved quality of life    PLAN     Continue per POC, addressing strength and mobility deficits as tolerated.     Shaunna Davis, PT

## 2023-11-11 RX ORDER — ESZOPICLONE 3 MG/1
TABLET, FILM COATED ORAL
Qty: 30 TABLET | Refills: 5 | Status: SHIPPED | OUTPATIENT
Start: 2023-11-11 | End: 2024-01-19 | Stop reason: SDUPTHER

## 2023-11-11 RX ORDER — ESZOPICLONE 3 MG/1
TABLET, FILM COATED ORAL
Qty: 30 TABLET | Refills: 5 | OUTPATIENT
Start: 2023-11-11

## 2023-11-14 ENCOUNTER — CLINICAL SUPPORT (OUTPATIENT)
Dept: REHABILITATION | Facility: HOSPITAL | Age: 63
End: 2023-11-14
Payer: COMMERCIAL

## 2023-11-14 DIAGNOSIS — R26.9 GAIT ABNORMALITY: ICD-10-CM

## 2023-11-14 DIAGNOSIS — R29.898 WEAKNESS OF BOTH HIPS: Primary | ICD-10-CM

## 2023-11-14 PROCEDURE — 97112 NEUROMUSCULAR REEDUCATION: CPT | Mod: PO

## 2023-11-14 PROCEDURE — 97110 THERAPEUTIC EXERCISES: CPT | Mod: PO

## 2023-11-14 PROCEDURE — 97140 MANUAL THERAPY 1/> REGIONS: CPT | Mod: PO

## 2023-11-14 NOTE — PROGRESS NOTES
[facility]   Physical Therapy Treatment Note     Name: Vonnie Garces  Clinic Number: [id number]    Therapy Diagnosis:   Encounter Diagnoses   Name Primary?    Weakness of both hips Yes    Gait abnormality      Physician: Nathan Patiño MD    Visit Date: 11/14/2023    Physician Orders: PT Eval and Treat   Medical Diagnosis from Referral: Chronic pain of left knee [M25.562, G89.29]  Evaluation Date: 11/7/2023  Authorization Period Expiration: 12/31/23  Plan of Care Expiration: 1/30/24  Progress Note Due: 1/30/24  Date of Surgery: na  Visit # / Visits authorized: 2/ 20   FOTO: 1/ 3    PTA Visit #: 0/5       Precautions: Standard     Time In: 2:00 pm  Time Out: 3:00 pm  Total Billable Time: 35 minutes      SUBJECTIVE     Pt reports: first morning that she feels better with less morning pain. First time in awhile where her lower back was not painful as well.   She was compliant with home exercise program.  Response to previous treatment: no adverse effects  Functional change: decreased stiffness in the am    Pain: 0/10  Location: left knee      OBJECTIVE     Objective Measures updated at progress report unless specified.     Treatment     Vonnie received the treatments listed below:      therapeutic exercises to develop strength, endurance, ROM, and flexibility for 15 minutes including:  Tibial nerve glide x15   Femoral nerve glide x15  Shuttle DL squat 75# 3x15     manual therapy techniques: Joint mobilizations were applied to the: left knee for 15 minutes, including:  Patellar, fat pad mobilizations  Tibial medial glide     neuromuscular re-education activities to improve: Balance, Coordination, Kinesthetic, Sense, and Proprioception for 30 minutes. The following activities were included:  Brace march 2x10   Brace heel slide 2x10   Heel tap 3x5 ea   Hip hinge retraining with band resistance 3x8   Lateral band walk x3 laps yellow theraband     therapeutic activities to improve  functional performance for 00  minutes, including:        Patient Education and Home Exercises     Home Exercises Provided and Patient Education Provided     Education provided:   - pathophysiology, expectations    Written Home Exercises Provided: yes. Exercises were reviewed and Vonnie was able to demonstrate them prior to the end of the session.  Vonnie demonstrated good  understanding of the education provided. See EMR under Patient Instructions for exercises provided during therapy sessions    ASSESSMENT     Heavy focus on normalizing knee arthrokinematics with continued subjective improvement following manual therapy. Progressed functional strengthening with good tolerance and training effect achieved at hips and core without reproduction of pain.     Vonnie Is progressing well towards her goals.   Pt prognosis is Good.     Pt will continue to benefit from skilled outpatient physical therapy to address the deficits listed in the problem list box on initial evaluation, provide pt/family education and to maximize pt's level of independence in the home and community environment.     Pt's spiritual, cultural and educational needs considered and pt agreeable to plan of care and goals.     Anticipated barriers to physical therapy: comorbidities    Goals:   Short Term Goals: 6 weeks   - demonstrate negative leg extension test to offload lumbar spine  - demonstrates appropriate squat and deadlift mechanics without pain for return to recreational activities  - demonstrate lumbar AROM without pain for decreased tissue irritability  - demonstrate negative neural provocation to decrease neural involvement     Long Term Goals: 12 weeks   - pt will demonstrate appropriate core endurance testing for decreased reinjury risk  - pt will be able to perform 30+ min walking without pain for return to prior level of activity  - pt will tolerate sport-specific activities with appropriate form without pain for improved  quality of life    PLAN     Continue per POC, addressing strength and mobility deficits as tolerated.     Shaunna Davis, PT

## 2023-11-15 ENCOUNTER — PATIENT MESSAGE (OUTPATIENT)
Dept: SPORTS MEDICINE | Facility: CLINIC | Age: 63
End: 2023-11-15
Payer: COMMERCIAL

## 2023-11-17 ENCOUNTER — CLINICAL SUPPORT (OUTPATIENT)
Dept: REHABILITATION | Facility: HOSPITAL | Age: 63
End: 2023-11-17
Attending: ORTHOPAEDIC SURGERY
Payer: COMMERCIAL

## 2023-11-17 ENCOUNTER — OFFICE VISIT (OUTPATIENT)
Dept: PAIN MEDICINE | Facility: CLINIC | Age: 63
End: 2023-11-17
Payer: COMMERCIAL

## 2023-11-17 ENCOUNTER — TELEPHONE (OUTPATIENT)
Dept: PAIN MEDICINE | Facility: CLINIC | Age: 63
End: 2023-11-17

## 2023-11-17 DIAGNOSIS — M50.30 DDD (DEGENERATIVE DISC DISEASE), CERVICAL: ICD-10-CM

## 2023-11-17 DIAGNOSIS — M51.36 DDD (DEGENERATIVE DISC DISEASE), LUMBAR: ICD-10-CM

## 2023-11-17 DIAGNOSIS — M47.814 SPONDYLOSIS OF THORACIC REGION WITHOUT MYELOPATHY OR RADICULOPATHY: ICD-10-CM

## 2023-11-17 DIAGNOSIS — M47.814 THORACIC SPONDYLOSIS: ICD-10-CM

## 2023-11-17 DIAGNOSIS — Z79.891 OPIOID CONTRACT EXISTS: ICD-10-CM

## 2023-11-17 DIAGNOSIS — M54.16 LUMBAR RADICULOPATHY, CHRONIC: Primary | ICD-10-CM

## 2023-11-17 DIAGNOSIS — R26.9 GAIT ABNORMALITY: ICD-10-CM

## 2023-11-17 DIAGNOSIS — M54.16 LUMBAR RADICULOPATHY: ICD-10-CM

## 2023-11-17 DIAGNOSIS — R29.898 WEAKNESS OF BOTH HIPS: Primary | ICD-10-CM

## 2023-11-17 DIAGNOSIS — M54.12 CERVICAL RADICULOPATHY: ICD-10-CM

## 2023-11-17 PROCEDURE — 1160F RVW MEDS BY RX/DR IN RCRD: CPT | Mod: CPTII,95,, | Performed by: PHYSICIAN ASSISTANT

## 2023-11-17 PROCEDURE — 1159F MED LIST DOCD IN RCRD: CPT | Mod: CPTII,95,, | Performed by: PHYSICIAN ASSISTANT

## 2023-11-17 PROCEDURE — 97112 NEUROMUSCULAR REEDUCATION: CPT | Mod: PO

## 2023-11-17 PROCEDURE — 99214 OFFICE O/P EST MOD 30 MIN: CPT | Mod: 95,,, | Performed by: PHYSICIAN ASSISTANT

## 2023-11-17 PROCEDURE — 97110 THERAPEUTIC EXERCISES: CPT | Mod: PO

## 2023-11-17 PROCEDURE — 1160F PR REVIEW ALL MEDS BY PRESCRIBER/CLIN PHARMACIST DOCUMENTED: ICD-10-PCS | Mod: CPTII,95,, | Performed by: PHYSICIAN ASSISTANT

## 2023-11-17 PROCEDURE — 97140 MANUAL THERAPY 1/> REGIONS: CPT | Mod: PO

## 2023-11-17 PROCEDURE — 99214 PR OFFICE/OUTPT VISIT, EST, LEVL IV, 30-39 MIN: ICD-10-PCS | Mod: 95,,, | Performed by: PHYSICIAN ASSISTANT

## 2023-11-17 PROCEDURE — 1159F PR MEDICATION LIST DOCUMENTED IN MEDICAL RECORD: ICD-10-PCS | Mod: CPTII,95,, | Performed by: PHYSICIAN ASSISTANT

## 2023-11-17 NOTE — PROGRESS NOTES
[facility]   Physical Therapy Treatment Note     Name: Vonnie Garces  Clinic Number: [id number]    Therapy Diagnosis:   Encounter Diagnoses   Name Primary?    Weakness of both hips Yes    Gait abnormality      Physician: Nathan Patiño MD    Visit Date: 11/17/2023    Physician Orders: PT Eval and Treat   Medical Diagnosis from Referral: Chronic pain of left knee [M25.562, G89.29]  Evaluation Date: 11/7/2023  Authorization Period Expiration: 12/31/23  Plan of Care Expiration: 1/30/24  Progress Note Due: 1/30/24  Date of Surgery: na  Visit # / Visits authorized: 3/ 20   FOTO: 1/ 3    PTA Visit #: 0/5       Precautions: Standard     Time In: 3:00 pm  Time Out: 4:00 pm  Total Billable Time: 30 minutes      SUBJECTIVE     Pt reports: hamstring is bothering her since Wednesday. She is getting a repeat MRI of her back and will get another lumbar spine injection.   She was compliant with home exercise program.  Response to previous treatment: no adverse effects  Functional change: decreased stiffness in the am    Pain: 0/10  Location: left knee      OBJECTIVE     Objective Measures updated at progress report unless specified.     Treatment     Vonnie received the treatments listed below:      therapeutic exercises to develop strength, endurance, ROM, and flexibility for 15 minutes including:  Tibial nerve glide x15   Femoral nerve glide x15  Hamtring bridge isometrics 3x30 sec     manual therapy techniques: Joint mobilizations were applied to the: left knee for 15 minutes, including:  Patellar, fat pad mobilizations  Tibial medial glide     neuromuscular re-education activities to improve: Balance, Coordination, Kinesthetic, Sense, and Proprioception for 20 minutes. The following activities were included:  Brace march 2x10   Brace heel slide 2x10   Tall kneeling hip thrust 3x8 purple sportband  Palloff press 3x10 green theraband     therapeutic activities to improve functional  performance for 10  minutes, including:  DARREL RDL 15# 3x10      Patient Education and Home Exercises     Home Exercises Provided and Patient Education Provided     Education provided:   - pathophysiology, expectations    Written Home Exercises Provided: yes. Exercises were reviewed and Vonnie was able to demonstrate them prior to the end of the session.  Vonnie demonstrated good  understanding of the education provided. See EMR under Patient Instructions for exercises provided during therapy sessions    ASSESSMENT     Good tolerance for treatment session with improvement in posterior thigh and knee soreness following session. Does report some mild back tightness at last repetitions of RDLs once fatigued. Continued to progress functional movement pattern retraining and core stabilization activities with training effect achieved.     Vonnie Is progressing well towards her goals.   Pt prognosis is Good.     Pt will continue to benefit from skilled outpatient physical therapy to address the deficits listed in the problem list box on initial evaluation, provide pt/family education and to maximize pt's level of independence in the home and community environment.     Pt's spiritual, cultural and educational needs considered and pt agreeable to plan of care and goals.     Anticipated barriers to physical therapy: comorbidities    Goals:   Short Term Goals: 6 weeks   - demonstrate negative leg extension test to offload lumbar spine  - demonstrates appropriate squat and deadlift mechanics without pain for return to recreational activities  - demonstrate lumbar AROM without pain for decreased tissue irritability  - demonstrate negative neural provocation to decrease neural involvement     Long Term Goals: 12 weeks   - pt will demonstrate appropriate core endurance testing for decreased reinjury risk  - pt will be able to perform 30+ min walking without pain for return to prior level of activity  - pt will tolerate  sport-specific activities with appropriate form without pain for improved quality of life    PLAN     Continue per POC, addressing strength and mobility deficits as tolerated.     Shaunna Davis, PT

## 2023-11-17 NOTE — H&P (VIEW-ONLY)
The patient location is: [facility]  The chief complaint leading to consultation is: back, leg and knee pain  Visit type: Virtual visit with synchronous audio and video  Total time spent with patient: 13 minutes  Each patient to whom he or she provides medical services by telemedicine is:  (1) informed of the relationship between the physician and patient and the respective role of any other health care provider with respect to management of the patient; and (2) notified that he or she may decline to receive medical services by telemedicine and may withdraw from such care at any time.      This note was completed with dictation software and grammatical errors may exist.    CC:Back pain, neck pain    HPI: The patient is a 63-year-old woman with history of hypothyroidism, otherwise fairly healthy but a long history of scoliosis causing back pain, self-referred for continued back pain.  She is status post right T4, 5, 6, 7 medial branch radiofrequency ablation on 10/12/2023 with 80-85% relief.  She was scheduled for a cervical epidural steroid injection and continues to have neck pain and severe right arm pain.  However, she is scheduled for left knee surgery.  She has been going to physical therapy and her physical therapist feels that this pain is originating from the lumbar spine.  She reports bilateral low back pain and worsening left knee pain with sitting and standing, she has pain along the inside of her left thigh that crosses over to the front of her knee.  She describes burning pain in her left knee, describes her usual right buttock pain radiating down the posterior right thigh and calf.  She denies any new weakness, denies numbness.    Pain intervention history: She has been seeing Dr. Huffman for the last several years and has undergone epidural steroid injections and radiofrequency ablations with good relief.  According to her last pain management physician, she had undergone a right side T3, 4,  5, 6 medial branch radiofrequency ablation on 8/8/14 with good relief.  He had scheduled her for another one but did not complete this.  As far as medications she has been taking Hydrocodone 7.5/325 one to 2 times a day at most and gabapentin 300 mg at night. She is status post cervical epidural steroid injection on 10/19/15 with 50% relief of her neck pain. She is status post right L4 transforaminal epidural steroid injection on 11/23/15 with 100% relief.   She is status post right T3, 4, 5 and 6 medial branch radio frequency ablation on 2/5/16 with greater than 50% relief.  She is status post right L4 transforaminal epidural sterile injection on 6/14/16 with significant relief lasting only about a month.  She is status post C7-T1 cervical interlaminar epidural steroid injection on 7/14/16 with 30-40% relief.   She is status post L5/S1 interlaminar epidural steroidal injection to the right on 8/12/16 with 100% relief of her right leg pain.  She is status post C7-T1 cervical interlaminar epidural steroid injection on 12/9/16 with almost complete relief.  She is status post right T4, 5, 6 and 7 medial branch radiofrequency ablation on 5/30/17 with 100% relief.  She is status post C7-T1 cervical interlaminar epidural steroid injection on 6/27/17 with 80% relief.  She is status post L5/S1 interlaminar epidural steroid injection on a/7/17 with excellent relief lasting 2 weeks, now reporting 0% relief.  She is status post right L3, 4 and 5 medial branch radiofrequency ablation on 10/19/17 with 80% relief.   She is status post right T4, 5, 6 and 7 medial branch radiofrequency ablation on 12/14/17 with 100% relief.  She is status post C7-T1 cervical interlaminar epidural steroid injection on 3/16/18 with at least 80% relief.  She is status post lumbar epidural steroid injection at L5/S1 on 5/15/18 with 90% relief of her bilateral low back and right leg pain.  The she is status post right T4, 5, 6 and 7 medial branch  radiofrequency ablation on 10/12/2018 with 100% relief.  She is status post L5/S1 interlaminar epidural steroid injection on 01/16/2019 with almost 100% relief of her low back pain. She is status post right T4, 5, 6 and 7 medial branch radiofrequency ablation on 06/04/2019 with 80% relief.  She is status post L5/S1 interlaminar epidural steroid injection on 03/22/2021 with 85-90% relief.   She is status post right T4, 5, 6, 7 medial branch radiofrequency ablation on 05/21/2021 with 85% relief. She is status post cervical JINA C7/T1 on 11/11/2021 with greater than 70% relief. She is status post right T4, 5, 6, 7 medial branch radiofrequency ablation on 06/30/2022 with 85% relief. She is status post L5/S1 interlaminar epidural steroid injection to the right on 11/18/2022 with 75% relief. She is status post L5/S1 JINA with spread to the right on 07/21/2023 with greater than 50% relief. She is status post right T4, 5, 6, 7 medial branch radiofrequency ablation on 10/12/2023 with 80-85% relief.      Spine surgeries:  None    Antineuropathics:  Lidoderm 5%  NSAIDs:  Duexis 800 up to TID  Physical therapy:  She has consistently been in physical therapy, reports exercise, dry needling with at least temporary relief  Antidepressants:  Muscle relaxers:  Opioids:  Hydrocodone 7.5/325 twice daily  Antiplatelets/Anticoagulants:    ROS: She reports fatigability, headaches, hypothyroidism, joint stiffness, back pain, difficulty sleeping and anxiety.  Balance of review of systems is negative.      Past Medical History:   Diagnosis Date    Anxiety 08/25/2015    Arthritis     BRCA1 negative     BRCA2 negative     Celiac disease     Cervical spondylosis     Chronic back pain     DDD (degenerative disc disease), lumbar     Difficult intubation 03/2016    anterior larynx, pt with metal dental appliance, unable to do glidescope, used LMA    Dysautonomia     Encounter for blood transfusion     Facet arthropathy, thoracic     Hormone  replacement therapy (HRT)     Insomnia 08/25/2015    Neck pain     PONV (postoperative nausea and vomiting)     Right foot pain     Scoliosis     Sjogren's syndrome     Snoring     uses cpap, states not ROSSY    Unspecified hypothyroidism 08/25/2015     Past Surgical History:   Procedure Laterality Date    BILATERAL OOPHORECTOMY  2004    BREAST BIOPSY Left 2010    Benign    CHOLECYSTECTOMY  2010    COLONOSCOPY  2016    [facility]    Epidural Steroid Injection      Pain managment, at another facility, cervical, thoracic    Epidural Steroid injection      Pain management, cervical    EPIDURAL STEROID INJECTION INTO CERVICAL SPINE N/A 11/11/2021    Procedure: Injection-steroid-epidural-cervical, C7/T1 interlaminer;  Surgeon: Floyd Coleman MD;  Location: [facility] OR;  Service: Pain Management;  Laterality: N/A;    EPIDURAL STEROID INJECTION INTO LUMBAR SPINE Right 1/16/2019    Procedure: Injection-steroid-epidural-lumbar L5/S1;  Surgeon: Floyd Coleman MD;  Location: [facility] OR;  Service: Pain Management;  Laterality: Right;  to right    EPIDURAL STEROID INJECTION INTO LUMBAR SPINE N/A 5/14/2019    Procedure: Injection-steroid-epidural-lumbar;  Surgeon: Floyd Coleman MD;  Location: [facility] OR;  Service: Pain Management;  Laterality: N/A;  L5/S1 interlaminar    EPIDURAL STEROID INJECTION INTO LUMBAR SPINE N/A 3/22/2021    Procedure: Injection-steroid-epidural-lumbar L5/S1 interlaminer;  Surgeon: Floyd Coleman MD;  Location: [facility] OR;  Service: Pain Management;  Laterality: N/A;    EPIDURAL STEROID INJECTION INTO LUMBAR SPINE N/A 11/18/2022    Procedure: Injection-steroid-epidural-lumbar L5/S1 to right;  Surgeon: Floyd Coleman MD;  Location: [facility] OR;  Service: Pain Management;  Laterality: N/A;    EPIDURAL STEROID INJECTION INTO LUMBAR SPINE N/A 7/21/2023    Procedure: Injection-steroid-epidural-lumbar Interlaminar L5/S1 to the right;  Surgeon: Floyd Coleman MD;  Location: [facility] OR;  Service: Pain  Management;  Laterality: N/A;    HYSTERECTOMY      Complete    MOUTH SURGERY      Braces, with temporary metal implants in upper gum    OOPHORECTOMY      w/ hysterectomy    RADIOFREQUENCY ABLATION  2016    thoracic nerve    RADIOFREQUENCY ABLATION Right 2019    Procedure: Radiofrequency Ablation T4,5,6,7;  Surgeon: Floyd Coleman MD;  Location: [facility] OR;  Service: Pain Management;  Laterality: Right;    RADIOFREQUENCY ABLATION Right 2021    Procedure: RADIOFREQUENCY ABLATION,THORACIC  T4,T5,T6, T7;  Surgeon: Floyd Coleman MD;  Location: [facility] OR;  Service: Pain Management;  Laterality: Right;    RADIOFREQUENCY ABLATION Right 2022    Procedure: Radiofrequency Ablation Thoracic T4, T5, T6, T7;  Surgeon: Floyd Coleman MD;  Location: [facility] OR;  Service: Pain Management;  Laterality: Right;    RADIOFREQUENCY ABLATION Right 10/12/2023    Procedure: RADIOFREQUENCY ABLATION,THORACIC Right T4, T5, T6, T7;  Surgeon: Floyd Coleman MD;  Location: [facility] OR;  Service: Pain Management;  Laterality: Right;  THORACIC Right T4, T5, T6, T7    TONSILLECTOMY      VULVA SURGERY  2016     Social History     Socioeconomic History    Marital status:    Tobacco Use    Smoking status: Never    Smokeless tobacco: Never   Substance and Sexual Activity    Alcohol use: No    Drug use: No    Sexual activity: Yes     Partners: Male      She is a  at [employer].    Medications/Allergies: See med card    There were no vitals filed for this visit.      Physical exam:  Gen: A and O x3, pleasant, well-groomed      Imagin/16/15 Xray Scoliosis survey: There is thoracic dextroscoliosis with mild lumbar compensatory levoscoliosis. No structural abnormalities are evident. Diffuse spondylosis noted in the cervical, thoracic and to lesser extent lumbar spine. No fractures are identified. There is slight increased kyphotic curvature of the thoracic spine with alignment being otherwise  normal. Measurements and evaluation are limited due to underpenetration. Land angle measures approximately in the thoracic spine is of approximately 17.0 degrees and for the lumbar spine 17.4 degrees. Soft tissues are unremarkable. IMPRESSION: 1. S shaped scoliotic curvature of the thoracolumbar spine with Land angle of approximately 17 degrees. 2. Diffuse spondylosis.    MRI report 4/12/12: Report notes that there is dextroconvex rotary curvature of the thoracic spine.  Posterior alignment is maintained.  There are scattered vertebral body hemangiomas.  There is minimal posterior disc bulges noted at T5/6, T6/7, T7/8 and T8/9.  There is no central spinal stenosis or neural foraminal narrowing.    MRI right shoulder 6/24/11: Minor distal subscapularis tendinosis.  Negative for full-thickness or significant partial-thickness rotator cuff tendon tear.  There is a small amount of subacromial subdeltoid bursal fluid which can be seen with recent injection or mild bursitis.    Cervical spine MRI 6/29/16  At C2-C3, the minimal interstitial excision covers a very mild broad-based disc bulge most prominent centrally but does not deform the ventral cord.  There is no canal or foraminal stenosis.  At C3-C4, there is mildly decreased disc height with a broad-based disc bulge most prominent centrally.  This minimally contact the ventral cord without significant deformity.  The canal is widely patent.  There is uncovertebral hypertrophy and facet arthropathy, but the foramina are patent.  At C4-C5, there is decreased disc height with a broad disc bulge-marginal osteophyte complex that mildly lateralizes to the left and blends imperceptibly with right greater than left uncovertebral hypertrophy.  With ligamentum hypertrophy, there is no significant central canal stenosis.  Uncovertebral hypertrophy and facet arthropathy are causing mild left and moderate right foraminal stenoses.  At C5-C6, there is a broad-based disc bulge and  osteophyte complex most prominent centrally.  There is also ligamentum flavum hypertrophy present, but the canal is patent.  There is mild left foraminal stenosis.  The right foramen is patent.  At C6-C7, there is disc desiccation with a minimal broad-based disc bulge most prominent in the right paracentral canal.  The canal is widely patent.  There is mild left foraminal stenosis.  At C7-T1, there is no significant disc bulge, protrusion, or herniation/extrusion.  The canal and foramina are widely patent.    Hip x-rays 9/22/17  AP view of the pelvis and frog leg views of both hips were obtained. No evidence of acute displaced fracture or dislocation is visualized.  No radiopaque foreign bodies are visualized. The bilateral superior joint spaces are grossly maintained. Mild bilateral sacroiliac joint degenerative changes are seen including subchondral sclerosis and small marginal osteophytes. Mild to moderate symphyseal degenerative changes are seen. There is a slightly expansile cortically-based focus along the lateral proximal right femoral metadiaphysis. This could reflect a sessile osteochondroma, but correlation with MRI of the right hip is recommended.    8/18/19 MRI L-spine:  T12-L1: No disc herniation or significant posterior osteophytic ridging.  No significant spinal canal or foraminal stenosis.   L1-L2: No disc herniation or significant posterior osteophytic ridging.  Minimal left facet hypertrophy.  No significant spinal canal or foraminal stenosis.   L2-L3: No disc herniation or significant posterior osteophytic ridging.  Minimal left facet hypertrophy.  No significant spinal canal or foraminal stenosis.   L3-L4: No disc herniation or significant posterior osteophytic ridging.  Minimal bilateral facet hypertrophy.  No significant spinal canal or foraminal stenosis.   L4-L5: Mild disc bulge.  Mild bilateral facet hypertrophy.  Mild ligamentum flavum thickening.  Minimal narrowing of the bilateral lateral  recesses. No significant overall spinal canal stenosis. Mild bilateral foraminal stenosis.   L5-S1: Minimal disc bulge contained in the ventral epidural fat.  Mild bilateral facet hypertrophy.  No significant spinal canal or foraminal stenosis.    Assessment:  The patient is a 63-year-old woman with history of hypothyroidism, otherwise fairly healthy but a long history of scoliosis causing back pain, self-referred for continued back pain.    1. Lumbar radiculopathy, chronic  MRI Lumbar Spine Without Contrast      2. Lumbar radiculopathy        3. Cervical radiculopathy        4. DDD (degenerative disc disease), cervical        5. Thoracic spondylosis        6. DDD (degenerative disc disease), lumbar        7. Spondylosis of thoracic region without myelopathy or radiculopathy        8. Opioid contract exists              Plan:  1. She canceled her cervical epidural steroid injection and we will reschedule this for her in the future for her neck and right arm pain.    2. She had excellent relief following the right thoracic radiofrequency ablation.  We can repeat this in the future.    3. She is scheduled for left knee surgery but there is a question of lumbar radicular pain as a component of this.  She does have some foraminal stenosis on the left at L4/5 on her prior lumbar spine MRI.  She is also experiencing her usual right leg radicular pain.  I am going to schedule her for an L5/S1 interlaminar epidural steroid injection to cover both sides but also update her lumbar spine MRI to further evaluate.  2. She continues to take hydrocodone and does not currently need a prescription.    3. Follow-up in 4 weeks postprocedure or sooner as needed.

## 2023-11-17 NOTE — PROGRESS NOTES
The patient location is: [facility]  The chief complaint leading to consultation is: back, leg and knee pain  Visit type: Virtual visit with synchronous audio and video  Total time spent with patient: 13 minutes  Each patient to whom he or she provides medical services by telemedicine is:  (1) informed of the relationship between the physician and patient and the respective role of any other health care provider with respect to management of the patient; and (2) notified that he or she may decline to receive medical services by telemedicine and may withdraw from such care at any time.      This note was completed with dictation software and grammatical errors may exist.    CC:Back pain, neck pain    HPI: The patient is a 63-year-old woman with history of hypothyroidism, otherwise fairly healthy but a long history of scoliosis causing back pain, self-referred for continued back pain.  She is status post right T4, 5, 6, 7 medial branch radiofrequency ablation on 10/12/2023 with 80-85% relief.  She was scheduled for a cervical epidural steroid injection and continues to have neck pain and severe right arm pain.  However, she is scheduled for left knee surgery.  She has been going to physical therapy and her physical therapist feels that this pain is originating from the lumbar spine.  She reports bilateral low back pain and worsening left knee pain with sitting and standing, she has pain along the inside of her left thigh that crosses over to the front of her knee.  She describes burning pain in her left knee, describes her usual right buttock pain radiating down the posterior right thigh and calf.  She denies any new weakness, denies numbness.    Pain intervention history: She has been seeing Dr. Huffman for the last several years and has undergone epidural steroid injections and radiofrequency ablations with good relief.  According to her last pain management physician, she had undergone a right side T3, 4,  5, 6 medial branch radiofrequency ablation on 8/8/14 with good relief.  He had scheduled her for another one but did not complete this.  As far as medications she has been taking Hydrocodone 7.5/325 one to 2 times a day at most and gabapentin 300 mg at night. She is status post cervical epidural steroid injection on 10/19/15 with 50% relief of her neck pain. She is status post right L4 transforaminal epidural steroid injection on 11/23/15 with 100% relief.   She is status post right T3, 4, 5 and 6 medial branch radio frequency ablation on 2/5/16 with greater than 50% relief.  She is status post right L4 transforaminal epidural sterile injection on 6/14/16 with significant relief lasting only about a month.  She is status post C7-T1 cervical interlaminar epidural steroid injection on 7/14/16 with 30-40% relief.   She is status post L5/S1 interlaminar epidural steroidal injection to the right on 8/12/16 with 100% relief of her right leg pain.  She is status post C7-T1 cervical interlaminar epidural steroid injection on 12/9/16 with almost complete relief.  She is status post right T4, 5, 6 and 7 medial branch radiofrequency ablation on 5/30/17 with 100% relief.  She is status post C7-T1 cervical interlaminar epidural steroid injection on 6/27/17 with 80% relief.  She is status post L5/S1 interlaminar epidural steroid injection on a/7/17 with excellent relief lasting 2 weeks, now reporting 0% relief.  She is status post right L3, 4 and 5 medial branch radiofrequency ablation on 10/19/17 with 80% relief.   She is status post right T4, 5, 6 and 7 medial branch radiofrequency ablation on 12/14/17 with 100% relief.  She is status post C7-T1 cervical interlaminar epidural steroid injection on 3/16/18 with at least 80% relief.  She is status post lumbar epidural steroid injection at L5/S1 on 5/15/18 with 90% relief of her bilateral low back and right leg pain.  The she is status post right T4, 5, 6 and 7 medial branch  radiofrequency ablation on 10/12/2018 with 100% relief.  She is status post L5/S1 interlaminar epidural steroid injection on 01/16/2019 with almost 100% relief of her low back pain. She is status post right T4, 5, 6 and 7 medial branch radiofrequency ablation on 06/04/2019 with 80% relief.  She is status post L5/S1 interlaminar epidural steroid injection on 03/22/2021 with 85-90% relief.   She is status post right T4, 5, 6, 7 medial branch radiofrequency ablation on 05/21/2021 with 85% relief. She is status post cervical JINA C7/T1 on 11/11/2021 with greater than 70% relief. She is status post right T4, 5, 6, 7 medial branch radiofrequency ablation on 06/30/2022 with 85% relief. She is status post L5/S1 interlaminar epidural steroid injection to the right on 11/18/2022 with 75% relief. She is status post L5/S1 JINA with spread to the right on 07/21/2023 with greater than 50% relief. She is status post right T4, 5, 6, 7 medial branch radiofrequency ablation on 10/12/2023 with 80-85% relief.      Spine surgeries:  None    Antineuropathics:  Lidoderm 5%  NSAIDs:  Duexis 800 up to TID  Physical therapy:  She has consistently been in physical therapy, reports exercise, dry needling with at least temporary relief  Antidepressants:  Muscle relaxers:  Opioids:  Hydrocodone 7.5/325 twice daily  Antiplatelets/Anticoagulants:    ROS: She reports fatigability, headaches, hypothyroidism, joint stiffness, back pain, difficulty sleeping and anxiety.  Balance of review of systems is negative.      Past Medical History:   Diagnosis Date    Anxiety 08/25/2015    Arthritis     BRCA1 negative     BRCA2 negative     Celiac disease     Cervical spondylosis     Chronic back pain     DDD (degenerative disc disease), lumbar     Difficult intubation 03/2016    anterior larynx, pt with metal dental appliance, unable to do glidescope, used LMA    Dysautonomia     Encounter for blood transfusion     Facet arthropathy, thoracic     Hormone  replacement therapy (HRT)     Insomnia 08/25/2015    Neck pain     PONV (postoperative nausea and vomiting)     Right foot pain     Scoliosis     Sjogren's syndrome     Snoring     uses cpap, states not ROSSY    Unspecified hypothyroidism 08/25/2015     Past Surgical History:   Procedure Laterality Date    BILATERAL OOPHORECTOMY  2004    BREAST BIOPSY Left 2010    Benign    CHOLECYSTECTOMY  2010    COLONOSCOPY  2016    [facility]    Epidural Steroid Injection      Pain managment, at another facility, cervical, thoracic    Epidural Steroid injection      Pain management, cervical    EPIDURAL STEROID INJECTION INTO CERVICAL SPINE N/A 11/11/2021    Procedure: Injection-steroid-epidural-cervical, C7/T1 interlaminer;  Surgeon: Floyd Coleman MD;  Location: [facility] OR;  Service: Pain Management;  Laterality: N/A;    EPIDURAL STEROID INJECTION INTO LUMBAR SPINE Right 1/16/2019    Procedure: Injection-steroid-epidural-lumbar L5/S1;  Surgeon: Floyd Coleman MD;  Location: [facility] OR;  Service: Pain Management;  Laterality: Right;  to right    EPIDURAL STEROID INJECTION INTO LUMBAR SPINE N/A 5/14/2019    Procedure: Injection-steroid-epidural-lumbar;  Surgeon: Floyd Coleman MD;  Location: [facility] OR;  Service: Pain Management;  Laterality: N/A;  L5/S1 interlaminar    EPIDURAL STEROID INJECTION INTO LUMBAR SPINE N/A 3/22/2021    Procedure: Injection-steroid-epidural-lumbar L5/S1 interlaminer;  Surgeon: Floyd Coleman MD;  Location: [facility] OR;  Service: Pain Management;  Laterality: N/A;    EPIDURAL STEROID INJECTION INTO LUMBAR SPINE N/A 11/18/2022    Procedure: Injection-steroid-epidural-lumbar L5/S1 to right;  Surgeon: Floyd Coleman MD;  Location: [facility] OR;  Service: Pain Management;  Laterality: N/A;    EPIDURAL STEROID INJECTION INTO LUMBAR SPINE N/A 7/21/2023    Procedure: Injection-steroid-epidural-lumbar Interlaminar L5/S1 to the right;  Surgeon: Floyd Coleman MD;  Location: [facility] OR;  Service: Pain  Management;  Laterality: N/A;    HYSTERECTOMY      Complete    MOUTH SURGERY      Braces, with temporary metal implants in upper gum    OOPHORECTOMY      w/ hysterectomy    RADIOFREQUENCY ABLATION  2016    thoracic nerve    RADIOFREQUENCY ABLATION Right 2019    Procedure: Radiofrequency Ablation T4,5,6,7;  Surgeon: Floyd Coleman MD;  Location: [facility] OR;  Service: Pain Management;  Laterality: Right;    RADIOFREQUENCY ABLATION Right 2021    Procedure: RADIOFREQUENCY ABLATION,THORACIC  T4,T5,T6, T7;  Surgeon: Floyd Coleman MD;  Location: [facility] OR;  Service: Pain Management;  Laterality: Right;    RADIOFREQUENCY ABLATION Right 2022    Procedure: Radiofrequency Ablation Thoracic T4, T5, T6, T7;  Surgeon: Floyd Coleman MD;  Location: [facility] OR;  Service: Pain Management;  Laterality: Right;    RADIOFREQUENCY ABLATION Right 10/12/2023    Procedure: RADIOFREQUENCY ABLATION,THORACIC Right T4, T5, T6, T7;  Surgeon: Floyd Coleman MD;  Location: [facility] OR;  Service: Pain Management;  Laterality: Right;  THORACIC Right T4, T5, T6, T7    TONSILLECTOMY      VULVA SURGERY  2016     Social History     Socioeconomic History    Marital status:    Tobacco Use    Smoking status: Never    Smokeless tobacco: Never   Substance and Sexual Activity    Alcohol use: No    Drug use: No    Sexual activity: Yes     Partners: Male      She is a  at [employer].    Medications/Allergies: See med card    There were no vitals filed for this visit.      Physical exam:  Gen: A and O x3, pleasant, well-groomed      Imagin/16/15 Xray Scoliosis survey: There is thoracic dextroscoliosis with mild lumbar compensatory levoscoliosis. No structural abnormalities are evident. Diffuse spondylosis noted in the cervical, thoracic and to lesser extent lumbar spine. No fractures are identified. There is slight increased kyphotic curvature of the thoracic spine with alignment being otherwise  normal. Measurements and evaluation are limited due to underpenetration. Land angle measures approximately in the thoracic spine is of approximately 17.0 degrees and for the lumbar spine 17.4 degrees. Soft tissues are unremarkable. IMPRESSION: 1. S shaped scoliotic curvature of the thoracolumbar spine with Land angle of approximately 17 degrees. 2. Diffuse spondylosis.    MRI report 4/12/12: Report notes that there is dextroconvex rotary curvature of the thoracic spine.  Posterior alignment is maintained.  There are scattered vertebral body hemangiomas.  There is minimal posterior disc bulges noted at T5/6, T6/7, T7/8 and T8/9.  There is no central spinal stenosis or neural foraminal narrowing.    MRI right shoulder 6/24/11: Minor distal subscapularis tendinosis.  Negative for full-thickness or significant partial-thickness rotator cuff tendon tear.  There is a small amount of subacromial subdeltoid bursal fluid which can be seen with recent injection or mild bursitis.    Cervical spine MRI 6/29/16  At C2-C3, the minimal interstitial excision covers a very mild broad-based disc bulge most prominent centrally but does not deform the ventral cord.  There is no canal or foraminal stenosis.  At C3-C4, there is mildly decreased disc height with a broad-based disc bulge most prominent centrally.  This minimally contact the ventral cord without significant deformity.  The canal is widely patent.  There is uncovertebral hypertrophy and facet arthropathy, but the foramina are patent.  At C4-C5, there is decreased disc height with a broad disc bulge-marginal osteophyte complex that mildly lateralizes to the left and blends imperceptibly with right greater than left uncovertebral hypertrophy.  With ligamentum hypertrophy, there is no significant central canal stenosis.  Uncovertebral hypertrophy and facet arthropathy are causing mild left and moderate right foraminal stenoses.  At C5-C6, there is a broad-based disc bulge and  osteophyte complex most prominent centrally.  There is also ligamentum flavum hypertrophy present, but the canal is patent.  There is mild left foraminal stenosis.  The right foramen is patent.  At C6-C7, there is disc desiccation with a minimal broad-based disc bulge most prominent in the right paracentral canal.  The canal is widely patent.  There is mild left foraminal stenosis.  At C7-T1, there is no significant disc bulge, protrusion, or herniation/extrusion.  The canal and foramina are widely patent.    Hip x-rays 9/22/17  AP view of the pelvis and frog leg views of both hips were obtained. No evidence of acute displaced fracture or dislocation is visualized.  No radiopaque foreign bodies are visualized. The bilateral superior joint spaces are grossly maintained. Mild bilateral sacroiliac joint degenerative changes are seen including subchondral sclerosis and small marginal osteophytes. Mild to moderate symphyseal degenerative changes are seen. There is a slightly expansile cortically-based focus along the lateral proximal right femoral metadiaphysis. This could reflect a sessile osteochondroma, but correlation with MRI of the right hip is recommended.    8/18/19 MRI L-spine:  T12-L1: No disc herniation or significant posterior osteophytic ridging.  No significant spinal canal or foraminal stenosis.   L1-L2: No disc herniation or significant posterior osteophytic ridging.  Minimal left facet hypertrophy.  No significant spinal canal or foraminal stenosis.   L2-L3: No disc herniation or significant posterior osteophytic ridging.  Minimal left facet hypertrophy.  No significant spinal canal or foraminal stenosis.   L3-L4: No disc herniation or significant posterior osteophytic ridging.  Minimal bilateral facet hypertrophy.  No significant spinal canal or foraminal stenosis.   L4-L5: Mild disc bulge.  Mild bilateral facet hypertrophy.  Mild ligamentum flavum thickening.  Minimal narrowing of the bilateral lateral  recesses. No significant overall spinal canal stenosis. Mild bilateral foraminal stenosis.   L5-S1: Minimal disc bulge contained in the ventral epidural fat.  Mild bilateral facet hypertrophy.  No significant spinal canal or foraminal stenosis.    Assessment:  The patient is a 63-year-old woman with history of hypothyroidism, otherwise fairly healthy but a long history of scoliosis causing back pain, self-referred for continued back pain.    1. Lumbar radiculopathy, chronic  MRI Lumbar Spine Without Contrast      2. Lumbar radiculopathy        3. Cervical radiculopathy        4. DDD (degenerative disc disease), cervical        5. Thoracic spondylosis        6. DDD (degenerative disc disease), lumbar        7. Spondylosis of thoracic region without myelopathy or radiculopathy        8. Opioid contract exists              Plan:  1. She canceled her cervical epidural steroid injection and we will reschedule this for her in the future for her neck and right arm pain.    2. She had excellent relief following the right thoracic radiofrequency ablation.  We can repeat this in the future.    3. She is scheduled for left knee surgery but there is a question of lumbar radicular pain as a component of this.  She does have some foraminal stenosis on the left at L4/5 on her prior lumbar spine MRI.  She is also experiencing her usual right leg radicular pain.  I am going to schedule her for an L5/S1 interlaminar epidural steroid injection to cover both sides but also update her lumbar spine MRI to further evaluate.  2. She continues to take hydrocodone and does not currently need a prescription.    3. Follow-up in 4 weeks postprocedure or sooner as needed.

## 2023-11-17 NOTE — TELEPHONE ENCOUNTER
Physician - Dr Coleman    Type of Procedure/Injection - Lumbar Epidural  L5/S1           Laterality - NA      Anxiolysis- Local      Need to hold medication - Yes      NSAIDs for 2 days      Clearance needed - No      Follow up - 4 week

## 2023-11-20 ENCOUNTER — PATIENT MESSAGE (OUTPATIENT)
Dept: REHABILITATION | Facility: HOSPITAL | Age: 63
End: 2023-11-20

## 2023-11-20 ENCOUNTER — PATIENT MESSAGE (OUTPATIENT)
Dept: PAIN MEDICINE | Facility: CLINIC | Age: 63
End: 2023-11-20
Payer: COMMERCIAL

## 2023-11-20 DIAGNOSIS — M94.262 CHONDROMALACIA OF LEFT KNEE: ICD-10-CM

## 2023-11-20 DIAGNOSIS — M22.42 CHONDROMALACIA OF LEFT PATELLA: Primary | ICD-10-CM

## 2023-11-20 DIAGNOSIS — M54.16 LUMBAR RADICULOPATHY: ICD-10-CM

## 2023-11-20 DIAGNOSIS — S83.242A ACUTE MEDIAL MENISCUS TEAR OF LEFT KNEE, INITIAL ENCOUNTER: ICD-10-CM

## 2023-11-20 RX ORDER — ALPRAZOLAM 0.5 MG/1
1 TABLET, ORALLY DISINTEGRATING ORAL ONCE AS NEEDED
Status: CANCELLED | OUTPATIENT
Start: 2023-11-20 | End: 2035-04-18

## 2023-11-20 NOTE — TELEPHONE ENCOUNTER
Spoke with patient and scheduled. Advised to hold NSAIDS x 2 days prior. Pre op information given and follow up appointment scheduled.

## 2023-11-20 NOTE — TELEPHONE ENCOUNTER
It looks like she has been scheduled for 12/6 for the injection.  Yes, we would not want her to have surgery within 4 weeks of an epidural steroid injection.

## 2023-11-20 NOTE — TELEPHONE ENCOUNTER
Attempted to reach patient. No answer. Left voicemail to return call to office.   *Patient's case request has been placed and patient would need to choose a date.

## 2023-11-24 ENCOUNTER — HOSPITAL ENCOUNTER (OUTPATIENT)
Dept: RADIOLOGY | Facility: HOSPITAL | Age: 63
Discharge: HOME | End: 2023-11-24
Attending: PHYSICIAN ASSISTANT
Payer: COMMERCIAL

## 2023-11-24 DIAGNOSIS — M54.16 LUMBAR RADICULOPATHY, CHRONIC: ICD-10-CM

## 2023-11-24 PROCEDURE — 72148 MRI LUMBAR SPINE WITHOUT CONTRAST: ICD-10-PCS | Mod: 26,,, | Performed by: RADIOLOGY

## 2023-11-24 PROCEDURE — 72148 MRI LUMBAR SPINE W/O DYE: CPT | Mod: 26,,, | Performed by: RADIOLOGY

## 2023-11-24 PROCEDURE — 72148 MRI LUMBAR SPINE W/O DYE: CPT | Mod: TC,PO

## 2023-11-27 ENCOUNTER — TELEPHONE (OUTPATIENT)
Dept: PAIN MEDICINE | Facility: CLINIC | Age: 63
End: 2023-11-27
Payer: COMMERCIAL

## 2023-11-28 ENCOUNTER — CLINICAL SUPPORT (OUTPATIENT)
Dept: REHABILITATION | Facility: HOSPITAL | Age: 63
End: 2023-11-28
Payer: COMMERCIAL

## 2023-11-28 DIAGNOSIS — R29.898 WEAKNESS OF BOTH HIPS: Primary | ICD-10-CM

## 2023-11-28 DIAGNOSIS — R26.9 GAIT ABNORMALITY: ICD-10-CM

## 2023-11-28 PROCEDURE — 97110 THERAPEUTIC EXERCISES: CPT | Mod: PO

## 2023-11-28 PROCEDURE — 97140 MANUAL THERAPY 1/> REGIONS: CPT | Mod: PO

## 2023-11-28 PROCEDURE — 97530 THERAPEUTIC ACTIVITIES: CPT | Mod: PO

## 2023-11-28 PROCEDURE — 97112 NEUROMUSCULAR REEDUCATION: CPT | Mod: PO

## 2023-11-28 NOTE — PROGRESS NOTES
[facility]   Physical Therapy Treatment Note     Name: Vonnie Garces  Clinic Number: [id number]    Therapy Diagnosis:   Encounter Diagnoses   Name Primary?    Weakness of both hips Yes    Gait abnormality      Physician: Nathan Patiño MD    Visit Date: 11/28/2023    Physician Orders: PT Eval and Treat   Medical Diagnosis from Referral: Chronic pain of left knee [M25.562, G89.29]  Evaluation Date: 11/7/2023  Authorization Period Expiration: 12/31/23  Plan of Care Expiration: 1/30/24  Progress Note Due: 1/30/24  Date of Surgery: na  Visit # / Visits authorized: 4/ 20   FOTO: 1/ 3    PTA Visit #: 0/5       Precautions: Standard     Time In: 1:00 pm  Time Out: 2:00 pm  Total Billable Time: 60 minutes      SUBJECTIVE     Pt reports: back has been bothering her, and so has the hamstring. The knee is overall feeling better with some early morning stiffness that resolves with moving it for a couple of seconds.   She was compliant with home exercise program.  Response to previous treatment: no adverse effects  Functional change: decreased stiffness in the am    Pain: 0/10  Location: left knee      OBJECTIVE     Objective Measures updated at progress report unless specified.     Treatment     Vonnie received the treatments listed below:      therapeutic exercises to develop strength, endurance, ROM, and flexibility for 10 minutes including:  Tibial nerve glide x15   Femoral nerve glide x15    manual therapy techniques: Joint mobilizations were applied to the: left knee for 10 minutes, including:  Patellar, fat pad mobilizations  Tibial medial glide     neuromuscular re-education activities to improve: Balance, Coordination, Kinesthetic, Sense, and Proprioception for 20 minutes. The following activities were included:  Brace march x10   Lat hold with brace march 3x10 ea green theraband   Lat hold with bridge 3x30 sec green theraband   Hip hinge lat pulldown 3x8 3#     therapeutic  "activities to improve functional performance for 20 minutes, including:  Lateral band walk x3 laps yellow theraband   KB carry with march x3 laps 10#   Step up 6" with cues for lumbopelvic control 3x8 ea       Patient Education and Home Exercises     Home Exercises Provided and Patient Education Provided     Education provided:   - pathophysiology, expectations    Written Home Exercises Provided: yes. Exercises were reviewed and Vonnie was able to demonstrate them prior to the end of the session.  Vonnie demonstrated good  understanding of the education provided. See EMR under Patient Instructions for exercises provided during therapy sessions    ASSESSMENT     Good tolerance for treatment session with increased focus on lumbopelvic control with functional tasks. Improved stability noted with repetitions with decreased sway. Requires min cueing to decrease knee valgus with step ups but improves with tactile feedback. Will work on anterior step downs next session to improve stair descent.   Extender héctor Burns, used throughout treatment session.     Vonnie Is progressing well towards her goals.   Pt prognosis is Good.     Pt will continue to benefit from skilled outpatient physical therapy to address the deficits listed in the problem list box on initial evaluation, provide pt/family education and to maximize pt's level of independence in the home and community environment.     Pt's spiritual, cultural and educational needs considered and pt agreeable to plan of care and goals.     Anticipated barriers to physical therapy: comorbidities    Goals:   Short Term Goals: 6 weeks   - demonstrate negative leg extension test to offload lumbar spine  - demonstrates appropriate squat and deadlift mechanics without pain for return to recreational activities  - demonstrate lumbar AROM without pain for decreased tissue irritability  - demonstrate negative neural provocation to decrease neural involvement     Long " Term Goals: 12 weeks   - pt will demonstrate appropriate core endurance testing for decreased reinjury risk  - pt will be able to perform 30+ min walking without pain for return to prior level of activity  - pt will tolerate sport-specific activities with appropriate form without pain for improved quality of life    PLAN     Continue per POC, addressing strength and mobility deficits as tolerated.     Shaunna Davis, PT

## 2023-11-29 ENCOUNTER — TELEPHONE (OUTPATIENT)
Dept: PAIN MEDICINE | Facility: CLINIC | Age: 63
End: 2023-11-29
Payer: COMMERCIAL

## 2023-11-29 ENCOUNTER — PATIENT MESSAGE (OUTPATIENT)
Dept: REHABILITATION | Facility: HOSPITAL | Age: 63
End: 2023-11-29
Payer: COMMERCIAL

## 2023-11-29 NOTE — TELEPHONE ENCOUNTER
Please let the patient know I reviewed her new lumbar spine MRI and she does have slight worsening at L4/5.  We will proceed with the injection as planned.

## 2023-11-30 ENCOUNTER — CLINICAL SUPPORT (OUTPATIENT)
Dept: REHABILITATION | Facility: HOSPITAL | Age: 63
End: 2023-11-30
Attending: ORTHOPAEDIC SURGERY
Payer: COMMERCIAL

## 2023-11-30 DIAGNOSIS — R26.9 GAIT ABNORMALITY: ICD-10-CM

## 2023-11-30 DIAGNOSIS — R29.898 WEAKNESS OF BOTH HIPS: Primary | ICD-10-CM

## 2023-11-30 PROCEDURE — 97112 NEUROMUSCULAR REEDUCATION: CPT | Mod: PO

## 2023-11-30 PROCEDURE — 97140 MANUAL THERAPY 1/> REGIONS: CPT | Mod: PO

## 2023-11-30 PROCEDURE — 97110 THERAPEUTIC EXERCISES: CPT | Mod: PO

## 2023-11-30 NOTE — PROGRESS NOTES
[facility]   Physical Therapy Treatment Note     Name: Vonnie Garces  Clinic Number: [id number]    Therapy Diagnosis:   Encounter Diagnoses   Name Primary?    Weakness of both hips Yes    Gait abnormality      Physician: Nathan Patiño MD    Visit Date: 11/30/2023    Physician Orders: PT Eval and Treat   Medical Diagnosis from Referral: Chronic pain of left knee [M25.562, G89.29]  Evaluation Date: 11/7/2023  Authorization Period Expiration: 12/31/23  Plan of Care Expiration: 1/30/24  Progress Note Due: 1/30/24  Date of Surgery: na  Visit # / Visits authorized: 5/ 20   FOTO: 1/ 3    PTA Visit #: 0/5       Precautions: Standard     Time In: 10:00 am  Time Out: 11:00 am  Total Billable Time: 60 minutes      SUBJECTIVE     Pt reports: yesterday morning woke up with increased pain. The knee was a little more swollen and her back was painful. She kept doing her nerve glides and bridges throughout the day and took some pain meds. It calmed down late in the night. Today, she was stiff when she woke up but pain levels are relatively low. Knee is overall still feeling better than prior to PT onset.   She was compliant with home exercise program.  Response to previous treatment: no adverse effects  Functional change: decreased stiffness in the am    Pain: 0/10  Location: left knee      OBJECTIVE     Objective Measures updated at progress report unless specified.     Treatment     Vonnie received the treatments listed below:      therapeutic exercises to develop strength, endurance, ROM, and flexibility for 10 minutes including:  Tibial nerve glide x15   Femoral nerve glide x15     manual therapy techniques: Joint mobilizations were applied to the: left knee for 15 minutes, including:  Patellar, fat pad mobilizations  Tibial medial glide     neuromuscular re-education activities to improve: Balance, Coordination, Kinesthetic, Sense, and Proprioception for 35 minutes. The following  "activities were included:  Brace march x10, heel slide 2x10  Plank hold 4x15"   Hip hinge lat pulldown 3x8 3#   Standing donkey kick 2x10 ea 12.5#  Red powerband TKE 2x15     therapeutic activities to improve functional performance for 00 minutes, including:      Patient Education and Home Exercises     Home Exercises Provided and Patient Education Provided     Education provided:   - pathophysiology, expectations    Written Home Exercises Provided: yes. Exercises were reviewed and Vonnie was able to demonstrate them prior to the end of the session.  Vonnie demonstrated good  understanding of the education provided. See EMR under Patient Instructions for exercises provided during therapy sessions    ASSESSMENT     Good tolerance for treatment session with initial focus on fat pad mobility to decrease pain and stiffness sensation. Reported relief of "bubble sensation" at anterior knee. Continued to work on core bracing techniques and lumbopelvic stability with no reproduction of symptoms.     Vonnie Is progressing well towards her goals.   Pt prognosis is Good.     Pt will continue to benefit from skilled outpatient physical therapy to address the deficits listed in the problem list box on initial evaluation, provide pt/family education and to maximize pt's level of independence in the home and community environment.     Pt's spiritual, cultural and educational needs considered and pt agreeable to plan of care and goals.     Anticipated barriers to physical therapy: comorbidities    Goals:   Short Term Goals: 6 weeks   - demonstrate negative leg extension test to offload lumbar spine  - demonstrates appropriate squat and deadlift mechanics without pain for return to recreational activities  - demonstrate lumbar AROM without pain for decreased tissue irritability  - demonstrate negative neural provocation to decrease neural involvement     Long Term Goals: 12 weeks   - pt will demonstrate appropriate core " endurance testing for decreased reinjury risk  - pt will be able to perform 30+ min walking without pain for return to prior level of activity  - pt will tolerate sport-specific activities with appropriate form without pain for improved quality of life    PLAN     Continue per POC, addressing strength and mobility deficits as tolerated.     Shaunna Davis, PT

## 2023-12-01 ENCOUNTER — TELEPHONE (OUTPATIENT)
Dept: SURGERY | Facility: HOSPITAL | Age: 63
End: 2023-12-01
Payer: COMMERCIAL

## 2023-12-01 NOTE — TELEPHONE ENCOUNTER
This pt is scheduled for a Lumbar JINA with Dr. Coleman on Wednesday 12/6, she is booked as a local but wishes to be an RN IV sedation.

## 2023-12-06 ENCOUNTER — HOSPITAL ENCOUNTER (OUTPATIENT)
Facility: HOSPITAL | Age: 63
Discharge: HOME | End: 2023-12-06
Attending: ANESTHESIOLOGY | Admitting: ANESTHESIOLOGY
Payer: COMMERCIAL

## 2023-12-06 ENCOUNTER — TELEPHONE (OUTPATIENT)
Dept: SPORTS MEDICINE | Facility: CLINIC | Age: 63
End: 2023-12-06
Payer: COMMERCIAL

## 2023-12-06 ENCOUNTER — HOSPITAL ENCOUNTER (OUTPATIENT)
Dept: RADIOLOGY | Facility: HOSPITAL | Age: 63
Discharge: HOME | End: 2023-12-06
Attending: ANESTHESIOLOGY | Admitting: ANESTHESIOLOGY
Payer: COMMERCIAL

## 2023-12-06 VITALS
OXYGEN SATURATION: 98 % | RESPIRATION RATE: 17 BRPM | DIASTOLIC BLOOD PRESSURE: 74 MMHG | BODY MASS INDEX: 33.99 KG/M2 | TEMPERATURE: 98 F | HEIGHT: 61 IN | HEART RATE: 58 BPM | WEIGHT: 180 LBS | SYSTOLIC BLOOD PRESSURE: 162 MMHG

## 2023-12-06 DIAGNOSIS — M54.16 LUMBAR RADICULOPATHY: ICD-10-CM

## 2023-12-06 DIAGNOSIS — M54.50 LOWER BACK PAIN: ICD-10-CM

## 2023-12-06 PROCEDURE — 25500020 PHARM REV CODE 255: Mod: PO | Performed by: ANESTHESIOLOGY

## 2023-12-06 PROCEDURE — 76000 FLUOROSCOPY <1 HR PHYS/QHP: CPT | Mod: TC,PO

## 2023-12-06 PROCEDURE — 62323 NJX INTERLAMINAR LMBR/SAC: CPT | Mod: ,,, | Performed by: ANESTHESIOLOGY

## 2023-12-06 PROCEDURE — 63600175 PHARM REV CODE 636 W HCPCS: Mod: PO | Performed by: ANESTHESIOLOGY

## 2023-12-06 PROCEDURE — 62323 NJX INTERLAMINAR LMBR/SAC: CPT | Mod: PO | Performed by: ANESTHESIOLOGY

## 2023-12-06 PROCEDURE — 25000003 PHARM REV CODE 250: Mod: PO | Performed by: ANESTHESIOLOGY

## 2023-12-06 PROCEDURE — A4216 STERILE WATER/SALINE, 10 ML: HCPCS | Mod: PO | Performed by: ANESTHESIOLOGY

## 2023-12-06 PROCEDURE — 62323 PR INJ LUMBAR/SACRAL, W/IMAGING GUIDANCE: ICD-10-PCS | Mod: ,,, | Performed by: ANESTHESIOLOGY

## 2023-12-06 RX ORDER — SODIUM CHLORIDE 9 MG/ML
INJECTION, SOLUTION INTRAMUSCULAR; INTRAVENOUS; SUBCUTANEOUS
Status: DISCONTINUED | OUTPATIENT
Start: 2023-12-06 | End: 2023-12-06 | Stop reason: HOSPADM

## 2023-12-06 RX ORDER — SODIUM CHLORIDE, SODIUM LACTATE, POTASSIUM CHLORIDE, CALCIUM CHLORIDE 600; 310; 30; 20 MG/100ML; MG/100ML; MG/100ML; MG/100ML
INJECTION, SOLUTION INTRAVENOUS CONTINUOUS
Status: DISCONTINUED | OUTPATIENT
Start: 2023-12-06 | End: 2023-12-06 | Stop reason: HOSPADM

## 2023-12-06 RX ORDER — MIDAZOLAM HYDROCHLORIDE 2 MG/2ML
INJECTION, SOLUTION INTRAMUSCULAR; INTRAVENOUS
Status: DISCONTINUED | OUTPATIENT
Start: 2023-12-06 | End: 2023-12-06 | Stop reason: HOSPADM

## 2023-12-06 RX ORDER — LIDOCAINE HYDROCHLORIDE 10 MG/ML
INJECTION, SOLUTION EPIDURAL; INFILTRATION; INTRACAUDAL; PERINEURAL
Status: DISCONTINUED | OUTPATIENT
Start: 2023-12-06 | End: 2023-12-06 | Stop reason: HOSPADM

## 2023-12-06 RX ORDER — METHYLPREDNISOLONE ACETATE 80 MG/ML
INJECTION, SUSPENSION INTRA-ARTICULAR; INTRALESIONAL; INTRAMUSCULAR; SOFT TISSUE
Status: DISCONTINUED | OUTPATIENT
Start: 2023-12-06 | End: 2023-12-06 | Stop reason: HOSPADM

## 2023-12-06 RX ORDER — ALPRAZOLAM 0.5 MG/1
1 TABLET, ORALLY DISINTEGRATING ORAL ONCE AS NEEDED
Status: DISCONTINUED | OUTPATIENT
Start: 2023-12-06 | End: 2023-12-06

## 2023-12-06 RX ADMIN — SODIUM CHLORIDE, POTASSIUM CHLORIDE, SODIUM LACTATE AND CALCIUM CHLORIDE: 600; 310; 30; 20 INJECTION, SOLUTION INTRAVENOUS at 01:12

## 2023-12-06 NOTE — OP NOTE

## 2023-12-07 ENCOUNTER — PATIENT MESSAGE (OUTPATIENT)
Dept: PAIN MEDICINE | Facility: CLINIC | Age: 63
End: 2023-12-07
Payer: COMMERCIAL

## 2023-12-14 ENCOUNTER — PATIENT MESSAGE (OUTPATIENT)
Dept: REHABILITATION | Facility: HOSPITAL | Age: 63
End: 2023-12-14
Payer: COMMERCIAL

## 2024-01-12 ENCOUNTER — TELEPHONE (OUTPATIENT)
Dept: SPORTS MEDICINE | Facility: CLINIC | Age: 64
End: 2024-01-12
Payer: COMMERCIAL

## 2024-01-12 NOTE — TELEPHONE ENCOUNTER
Spoke with patient and she asked to cancel her surgery for 1/25 as her knee has improved with physical therapy and lumbar injection. Canceled preoperative appointments and surgery date. She will call back in the future if she wishes to reschedule    Jaelyn   Clinical & Surgical Assistant to Dr. Nathan Patiño

## 2024-01-19 ENCOUNTER — OFFICE VISIT (OUTPATIENT)
Dept: RHEUMATOLOGY | Facility: CLINIC | Age: 64
End: 2024-01-19
Payer: COMMERCIAL

## 2024-01-19 VITALS
SYSTOLIC BLOOD PRESSURE: 129 MMHG | WEIGHT: 184.31 LBS | DIASTOLIC BLOOD PRESSURE: 82 MMHG | HEART RATE: 65 BPM | BODY MASS INDEX: 34.8 KG/M2 | HEIGHT: 61 IN

## 2024-01-19 DIAGNOSIS — F41.9 ANXIETY: ICD-10-CM

## 2024-01-19 DIAGNOSIS — E06.3 HASHIMOTO'S THYROIDITIS: ICD-10-CM

## 2024-01-19 DIAGNOSIS — M41.129 SCOLIOSIS, ADOLESCENT ACQUIRED: ICD-10-CM

## 2024-01-19 DIAGNOSIS — M02.39 REACTIVE ARTHRITIS OF MULTIPLE SITES: ICD-10-CM

## 2024-01-19 DIAGNOSIS — M54.16 LUMBAR RADICULOPATHY: ICD-10-CM

## 2024-01-19 DIAGNOSIS — M17.0 PRIMARY OSTEOARTHRITIS OF BOTH KNEES: Primary | ICD-10-CM

## 2024-01-19 DIAGNOSIS — G47.00 INSOMNIA, UNSPECIFIED TYPE: ICD-10-CM

## 2024-01-19 DIAGNOSIS — A18.01 POTT DISEASE: ICD-10-CM

## 2024-01-19 PROCEDURE — 1159F MED LIST DOCD IN RCRD: CPT | Mod: CPTII,S$GLB,, | Performed by: INTERNAL MEDICINE

## 2024-01-19 PROCEDURE — 99215 OFFICE O/P EST HI 40 MIN: CPT | Mod: S$GLB,,, | Performed by: INTERNAL MEDICINE

## 2024-01-19 PROCEDURE — 3079F DIAST BP 80-89 MM HG: CPT | Mod: CPTII,S$GLB,, | Performed by: INTERNAL MEDICINE

## 2024-01-19 PROCEDURE — 99999 PR PBB SHADOW E&M-EST. PATIENT-LVL IV: CPT | Mod: PBBFAC,,, | Performed by: INTERNAL MEDICINE

## 2024-01-19 PROCEDURE — 1160F RVW MEDS BY RX/DR IN RCRD: CPT | Mod: CPTII,S$GLB,, | Performed by: INTERNAL MEDICINE

## 2024-01-19 PROCEDURE — 3008F BODY MASS INDEX DOCD: CPT | Mod: CPTII,S$GLB,, | Performed by: INTERNAL MEDICINE

## 2024-01-19 PROCEDURE — 3074F SYST BP LT 130 MM HG: CPT | Mod: CPTII,S$GLB,, | Performed by: INTERNAL MEDICINE

## 2024-01-19 RX ORDER — IBUPROFEN AND FAMOTIDINE 26.6; 8 MG/1; MG/1
1 TABLET ORAL 3 TIMES DAILY
Qty: 90 TABLET | Refills: 12 | Status: SHIPPED | OUTPATIENT
Start: 2024-01-19 | End: 2024-02-27

## 2024-01-19 RX ORDER — HYDROXYCHLOROQUINE SULFATE 200 MG/1
200 TABLET, FILM COATED ORAL 2 TIMES DAILY
Qty: 180 TABLET | Refills: 3 | Status: SHIPPED | OUTPATIENT
Start: 2024-01-19 | End: 2025-01-18

## 2024-01-19 RX ORDER — LOTEPREDNOL ETABONATE 5 MG/ML
1 SUSPENSION/ DROPS OPHTHALMIC 2 TIMES DAILY PRN
COMMUNITY
Start: 2023-12-13 | End: 2024-06-04 | Stop reason: SDUPTHER

## 2024-01-19 RX ORDER — NORTRIPTYLINE HYDROCHLORIDE 10 MG/1
20 CAPSULE ORAL NIGHTLY
Qty: 60 CAPSULE | Refills: 11 | Status: SHIPPED | OUTPATIENT
Start: 2024-01-19 | End: 2024-02-27

## 2024-01-19 RX ORDER — ESZOPICLONE 3 MG/1
3 TABLET, FILM COATED ORAL NIGHTLY
Qty: 30 TABLET | Refills: 5 | Status: SHIPPED | OUTPATIENT
Start: 2024-01-19 | End: 2024-06-11

## 2024-01-19 ASSESSMENT — ROUTINE ASSESSMENT OF PATIENT INDEX DATA (RAPID3)
TOTAL RAPID3 SCORE: 3.67
PSYCHOLOGICAL DISTRESS SCORE: 2.2
FATIGUE SCORE: 2.2
MDHAQ FUNCTION SCORE: 0.6
PATIENT GLOBAL ASSESSMENT SCORE: 4.5
PAIN SCORE: 4.5

## 2024-01-19 NOTE — PROGRESS NOTES
Subjective:       Patient ID: Vonnie Garces is a 63 y.o. female.    Chief Complaint: Disease Management    Follow up: 63 year old female who presents to clinic follow up on reactive arthritis. She has been dx with Pott's dz She is having a severe heat intolerance and  has no trigger  with sweating. She has history of reactive arthritis and hashimoto's thyroiditis currently on duexis and occasional use of prednisone for flares. She is doing poorly. She complains of pain in her R hip, hands, shoulders, R shoulder blade, bilateral knees, and L ankle. Pain is described as burning sensation. Prior to onset of pain she reports increased fatigue. Arthritis flares are occurring more frequently typically every 8 weeks. She also complains of difficulty with temperature regulation, brain fog, constipation, and hair loss.     Current treatment:  1. Prednisone prn  2. Plaquenil 200 mg       Review of Systems   Constitutional:  Positive for activity change. Negative for appetite change, chills and unexpected weight change.   HENT:  Negative for mouth sores.    Eyes:  Negative for redness and visual disturbance.   Respiratory:  Negative for cough and shortness of breath.    Cardiovascular:  Negative for chest pain, palpitations and leg swelling.   Gastrointestinal:  Negative for abdominal pain, constipation, diarrhea, nausea and vomiting.   Genitourinary:  Negative for genital sores.   Musculoskeletal:  Positive for arthralgias, back pain and neck pain. Negative for gait problem.   Skin:  Positive for rash.   Neurological:  Negative for dizziness, weakness and light-headedness.   Hematological:  Does not bruise/bleed easily.   Psychiatric/Behavioral:  The patient is nervous/anxious.          Objective:     Vitals:    01/19/24 1106   BP: 129/82   Pulse: 65       Past Medical History:   Diagnosis Date    Anxiety 08/25/2015    Arthritis     BRCA1 negative     BRCA2 negative     Celiac disease     Cervical spondylosis      Chronic back pain     DDD (degenerative disc disease), lumbar     Difficult intubation 03/2016    anterior larynx, pt with metal dental appliance, unable to do glidescope, used LMA    Dysautonomia     Encounter for blood transfusion     Facet arthropathy, thoracic     Hormone replacement therapy (HRT)     Insomnia 08/25/2015    Neck pain     PONV (postoperative nausea and vomiting)     Right foot pain     Scoliosis     Sjogren's syndrome     Snoring     uses cpap, states not ROSSY    Unspecified hypothyroidism 08/25/2015     Past Surgical History:   Procedure Laterality Date    BILATERAL OOPHORECTOMY  2004    BREAST BIOPSY Left 2010    Benign    CHOLECYSTECTOMY  2010    COLONOSCOPY  2016    [facility]    Epidural Steroid Injection      Pain managment, at another facility, cervical, thoracic    Epidural Steroid injection      Pain management, cervical    EPIDURAL STEROID INJECTION INTO CERVICAL SPINE N/A 11/11/2021    Procedure: Injection-steroid-epidural-cervical, C7/T1 interlaminer;  Surgeon: Floyd Coleman MD;  Location: [facility] OR;  Service: Pain Management;  Laterality: N/A;    EPIDURAL STEROID INJECTION INTO LUMBAR SPINE Right 1/16/2019    Procedure: Injection-steroid-epidural-lumbar L5/S1;  Surgeon: Floyd Coleman MD;  Location: [facility] OR;  Service: Pain Management;  Laterality: Right;  to right    EPIDURAL STEROID INJECTION INTO LUMBAR SPINE N/A 5/14/2019    Procedure: Injection-steroid-epidural-lumbar;  Surgeon: Floyd Coleman MD;  Location: [facility];  Service: Pain Management;  Laterality: N/A;  L5/S1 interlaminar    EPIDURAL STEROID INJECTION INTO LUMBAR SPINE N/A 3/22/2021    Procedure: Injection-steroid-epidural-lumbar L5/S1 interlaminer;  Surgeon: Floyd Coleman MD;  Location: [facility];  Service: Pain Management;  Laterality: N/A;    EPIDURAL STEROID INJECTION INTO LUMBAR SPINE N/A 11/18/2022    Procedure: Injection-steroid-epidural-lumbar L5/S1 to right;  Surgeon: Floyd Coleman MD;   Location: [facility];  Service: Pain Management;  Laterality: N/A;    EPIDURAL STEROID INJECTION INTO LUMBAR SPINE N/A 7/21/2023    Procedure: Injection-steroid-epidural-lumbar Interlaminar L5/S1 to the right;  Surgeon: Floyd Coleman MD;  Location: [facility];  Service: Pain Management;  Laterality: N/A;    EPIDURAL STEROID INJECTION INTO LUMBAR SPINE N/A 12/6/2023    Procedure: Injection-steroid-epidural-lumbar     L5/S1;  Surgeon: Floyd Coleman MD;  Location: [facility];  Service: Pain Management;  Laterality: N/A;    HYSTERECTOMY  2004    Complete    MOUTH SURGERY      Braces, with temporary metal implants in upper gum    OOPHORECTOMY  2004    w/ hysterectomy    RADIOFREQUENCY ABLATION  02/2016    thoracic nerve    RADIOFREQUENCY ABLATION Right 6/4/2019    Procedure: Radiofrequency Ablation T4,5,6,7;  Surgeon: Floyd Coleman MD;  Location: [facility];  Service: Pain Management;  Laterality: Right;    RADIOFREQUENCY ABLATION Right 5/21/2021    Procedure: RADIOFREQUENCY ABLATION,THORACIC  T4,T5,T6, T7;  Surgeon: Floyd Coleman MD;  Location: [facility];  Service: Pain Management;  Laterality: Right;    RADIOFREQUENCY ABLATION Right 6/30/2022    Procedure: Radiofrequency Ablation Thoracic T4, T5, T6, T7;  Surgeon: Floyd Coleman MD;  Location: [facility];  Service: Pain Management;  Laterality: Right;    RADIOFREQUENCY ABLATION Right 10/12/2023    Procedure: RADIOFREQUENCY ABLATION,THORACIC Right T4, T5, T6, T7;  Surgeon: Floyd Coleman MD;  Location: [facility];  Service: Pain Management;  Laterality: Right;  THORACIC Right T4, T5, T6, T7    TONSILLECTOMY      VULVA SURGERY  03/2016          Physical Exam   Constitutional: She is oriented to person, place, and time. No distress.   HENT:   Head: Normocephalic and atraumatic.   Eyes: Pupils are equal, round, and reactive to light. Right eye exhibits no discharge. Left eye exhibits no discharge. Right conjunctiva is not injected. Left conjunctiva is not  injected.   Neck: No JVD present. No thyromegaly present.   Cardiovascular: Normal rate, regular rhythm and normal heart sounds. Exam reveals no gallop and no friction rub.   No murmur heard.  Pulmonary/Chest: Effort normal and breath sounds normal. She has no wheezes. She has no rales. She exhibits no tenderness.   Abdominal: There is no abdominal tenderness. There is no rebound and no guarding.   Musculoskeletal:         General: Tenderness present.      Right shoulder: Normal.      Left shoulder: Normal.      Right elbow: Normal.      Left elbow: Normal.      Right wrist: Normal.      Left wrist: Normal.      Cervical back: Neck supple.      Right knee: Normal.      Left knee: Normal.   Lymphadenopathy:     She has no cervical adenopathy.   Neurological: She is alert and oriented to person, place, and time. Gait normal.   Skin: Skin is dry. No rash noted. No erythema. There is pallor.   Psychiatric: Mood and affect normal.   Vitals reviewed.      Right Side Rheumatological Exam     Examination finds the shoulder, elbow, wrist, knee, 1st PIP, 1st MCP, 2nd PIP, 2nd MCP, 3rd PIP, 3rd MCP, 4th PIP, 4th MCP, 5th PIP and 5th MCP normal.    The patient is tender to palpation of the 1st PIP, 1st MCP, 2nd PIP, 2nd MCP, 3rd PIP, 3rd MCP, 4th PIP, 4th MCP, 5th PIP and 5th MCP    She has swelling of the 1st PIP, 1st MCP, 2nd PIP, 2nd MCP, 3rd PIP, 3rd MCP, 4th PIP, 4th MCP, 5th PIP and 5th MCP    Shoulder Exam   Tenderness Location: no tenderness    Range of Motion   Active abduction:  abnormal   Adduction: abnormal  Sensation: normal    Knee Exam   Patellofemoral Crepitus: positive  Sensation: normal    Hip Exam   Tenderness Location: posterior  Sensation: normal    Elbow/Wrist Exam   Tenderness Location: no tenderness  Sensation: normal    Left Side Rheumatological Exam     Examination finds the shoulder, elbow, wrist, knee, 1st PIP, 1st MCP, 2nd PIP, 2nd MCP, 3rd PIP, 3rd MCP, 4th PIP, 4th MCP, 5th PIP and 5th MCP  normal.    The patient is tender to palpation of the 1st PIP, 1st MCP, 2nd PIP, 2nd MCP, 3rd PIP, 3rd MCP, 4th PIP, 4th MCP, 5th PIP and 5th MCP.    She has swelling of the 1st PIP, 1st MCP, 2nd PIP, 2nd MCP, 3rd PIP, 3rd MCP, 4th PIP, 4th MCP, 5th PIP and 5th MCP    Shoulder Exam   Tenderness Location: no tenderness    Range of Motion   Active abduction:  abnormal   Sensation: normal    Knee Exam     Patellofemoral Crepitus: positive  Sensation: normal    Hip Exam   Tenderness Location: posterior  Sensation: normal    Elbow/Wrist Exam   Sensation: normal      Back/Neck Exam   General Inspection   Gait: normal             Collected Updated Procedure    12/06/2022 0756 12/06/2022 1343 Comprehensive Metabolic Panel [[id number]]   (Abnormal)   Blood    Component Value Units   Sodium 139 mmol/L   Potassium 4.1 mmol/L   Chloride 104 mmol/L   CO2 31 mmol/L   Glucose 87  mg/dL   BUN 17 mg/dL   Creatinine 0.60 mg/dL   Calcium 9.7 mg/dL   Total Protein 7.4 g/dL   Albumin 4.5 g/dL   Total Bilirubin 1.0 mg/dL   Alkaline Phosphatase 78 U/L   AST 21 U/L   ALT 23 U/L   Anion Gap 4 Low  mmol/L   eGFR >60 mL/min/1.73 m^2          08/30/2023 0820 08/31/2023 1716 Hepatitis B surface antibody [[id number]]   Blood    Component Value Units   Hep B S Ab <3.10  IU/L          08/30/2023 0820 09/01/2023 1142 T-SPOT TB Screening Test [[id number]]    Blood    Component Value   T-SPOT TB Screening Test See result image under hyperlink          08/30/2023 0820 08/30/2023 1819 FERRITIN [[id number]]    Blood    Component Value Units   Ferritin 52  ng/mL          08/30/2023 0820 09/04/2023 2138 CRYOGLOBULIN [[id number]]   Blood    Component Value   Cryoglobulin NEG 72Hour           08/30/2023 0820 08/31/2023 1717 Hepatitis B Surface Ab, Qualitative [[id number]]   Blood    Component Value Units   Hep B S Ab <3.10  IU/L          08/30/2023 0820 08/31/2023 1803 Hepatitis B Core Antibody, Total [[id number]]   Blood    Component Value   Hepatitis B Core Ab  Total Negative           08/30/2023 0820 08/30/2023 1425 Hepatitis B Surface Antigen [[id number]]    Blood    Component Value   Hepatitis B Surface Ag Negative          08/30/2023 0820 08/30/2023 1442 Hepatitis C Antibody [[id number]]    Blood    Component Value   Hepatitis C Ab Negative          08/30/2023 0820 08/30/2023 2333 IgA [[id number]]    Blood    Component Value Units   IgA 175  mg/dL          08/30/2023 0820 08/30/2023 2333 IgG [[id number]]    Blood    Component Value Units   IgG 894  mg/dL          08/30/2023 0820 09/01/2023 0157 IgG 1, 2, 3, and 4 [[id number]]   Blood    Component Value Units   IgG 1 541  mg/dL   IgG 2 256  mg/dL   IgG 3 38  mg/dL   IgG 4 31  mg/dL          08/30/2023 0820 08/30/2023 2333 IgM [[id number]]    Blood    Component Value Units   IgM 52  mg/dL          08/30/2023 0820 08/30/2023 1731 TSH [[id number]]    (Abnormal)   Blood    Component Value Units   TSH 0.115 Low   uIU/mL            reviewed labs with patient during this visit          Assessment:       1. Primary osteoarthritis of both knees    2. Hashimoto's thyroiditis    3. Pott disease    4. Scoliosis, adolescent acquired    5. Lumbar radiculopathy    6. Reactive arthritis of multiple sites    7. Insomnia, unspecified type    8. Anxiety                  Plan:       Primary osteoarthritis of both knees  -     ibuprofen-famotidine (DUEXIS) 800-26.6 mg Tab; Take 1 tablet by mouth 3 (three) times daily.  Dispense: 90 tablet; Refill: 12  -     Sedimentation rate; Future; Expected date: 01/19/2024  -     C-Reactive Protein; Future; Expected date: 01/19/2024  -     Comprehensive Metabolic Panel; Future; Expected date: 01/19/2024  -     CBC Auto Differential; Future; Expected date: 01/19/2024    Hashimoto's thyroiditis  -     Sedimentation rate; Future; Expected date: 01/19/2024  -     C-Reactive Protein; Future; Expected date: 01/19/2024  -     Comprehensive Metabolic Panel; Future; Expected date: 01/19/2024  -     CBC Auto  Differential; Future; Expected date: 01/19/2024    Pott disease  -     Sedimentation rate; Future; Expected date: 01/19/2024  -     C-Reactive Protein; Future; Expected date: 01/19/2024  -     Comprehensive Metabolic Panel; Future; Expected date: 01/19/2024  -     CBC Auto Differential; Future; Expected date: 01/19/2024    Scoliosis, adolescent acquired  -     Sedimentation rate; Future; Expected date: 01/19/2024  -     C-Reactive Protein; Future; Expected date: 01/19/2024  -     Comprehensive Metabolic Panel; Future; Expected date: 01/19/2024  -     CBC Auto Differential; Future; Expected date: 01/19/2024    Lumbar radiculopathy  -     Sedimentation rate; Future; Expected date: 01/19/2024  -     C-Reactive Protein; Future; Expected date: 01/19/2024  -     Comprehensive Metabolic Panel; Future; Expected date: 01/19/2024  -     CBC Auto Differential; Future; Expected date: 01/19/2024  -     nortriptyline (PAMELOR) 10 MG capsule; Take 2 capsules (20 mg total) by mouth every evening.  Dispense: 60 capsule; Refill: 11    Reactive arthritis of multiple sites  -     hydroxychloroquine (PLAQUENIL) 200 mg tablet; Take 1 tablet (200 mg total) by mouth 2 (two) times daily.  Dispense: 180 tablet; Refill: 3    Insomnia, unspecified type  -     eszopiclone (LUNESTA) 3 mg Tab; Take 1 tablet (3 mg total) by mouth every evening.  Dispense: 30 tablet; Refill: 5  -     PHARMACOGENOMICS PANEL; Future; Expected date: 01/19/2024  -     nortriptyline (PAMELOR) 10 MG capsule; Take 2 capsules (20 mg total) by mouth every evening.  Dispense: 60 capsule; Refill: 11    Anxiety  -     PHARMACOGENOMICS PANEL; Future; Expected date: 01/19/2024                Assessment:  63 year old female with  Reactive arthritis, hashimoto's thyroiditis (+thyroperoxidase antibody), history of FERNANDO positivity (repeat testing negative), celiac syndrome  --hypothyroidism    More than 50% of the  40 minute encounter was spent face to face counseling the patient  regarding current status and future plan of care as well as side effects  of the medications. All questions were answered to patient's satisfaction also includes  non-face to face time preparing to see the patient (eg, review of tests), Obtaining and/or reviewing separately obtained history, Documenting clinical information in the electronic or other health record, Independently interpreting results

## 2024-01-23 ENCOUNTER — PATIENT MESSAGE (OUTPATIENT)
Dept: PAIN MEDICINE | Facility: CLINIC | Age: 64
End: 2024-01-23
Payer: COMMERCIAL

## 2024-01-26 NOTE — TELEPHONE ENCOUNTER
Spoke to pt on phone. S/p L5/S1 interlaminar JINA on 12/6/23 with 90% relief lasting 6 weeks.  She complains of a return of her severe bilateral low back pain radiating to the right buttock, posterior thigh, calf, left anterolateral thigh into her left knee.  She does feel that this pain is now worse than prior to the injection.  She had stopped gabapentin after the injection and has resumed taking this.  She continues to take hydrocodone with mild relief as well.  We will schedule her to repeat and L5/S1 interlaminar epidural steroid injection with 4 week follow-up.  Please keep her current follow-ups on the schedule and ask pre service to submit this encounter for the procedure.    Physician - Dr Coleman    Type of Procedure/Injection - Lumbar Epidural  L5/S1           Laterality - NA      Anxiolysis- Local      Need to hold medication - Yes      NSAIDs for 2 days      Clearance needed - No      Follow up - 4 week

## 2024-01-30 ENCOUNTER — TELEPHONE (OUTPATIENT)
Dept: PAIN MEDICINE | Facility: CLINIC | Age: 64
End: 2024-01-30
Payer: COMMERCIAL

## 2024-01-31 DIAGNOSIS — M54.16 LUMBAR RADICULOPATHY: Primary | ICD-10-CM

## 2024-01-31 RX ORDER — ALPRAZOLAM 1 MG/1
1 TABLET, ORALLY DISINTEGRATING ORAL ONCE AS NEEDED
Status: CANCELLED | OUTPATIENT
Start: 2024-01-31 | End: 2035-06-29

## 2024-02-06 ENCOUNTER — OFFICE VISIT (OUTPATIENT)
Dept: PAIN MEDICINE | Facility: CLINIC | Age: 64
End: 2024-02-06
Payer: COMMERCIAL

## 2024-02-06 ENCOUNTER — TELEPHONE (OUTPATIENT)
Dept: PAIN MEDICINE | Facility: CLINIC | Age: 64
End: 2024-02-06
Payer: COMMERCIAL

## 2024-02-06 VITALS
HEIGHT: 61 IN | HEART RATE: 59 BPM | BODY MASS INDEX: 35.53 KG/M2 | SYSTOLIC BLOOD PRESSURE: 178 MMHG | DIASTOLIC BLOOD PRESSURE: 79 MMHG | WEIGHT: 188.19 LBS

## 2024-02-06 DIAGNOSIS — M54.16 LUMBAR RADICULOPATHY: Primary | ICD-10-CM

## 2024-02-06 DIAGNOSIS — M47.814 THORACIC SPONDYLOSIS: ICD-10-CM

## 2024-02-06 DIAGNOSIS — M54.12 CERVICAL RADICULOPATHY: ICD-10-CM

## 2024-02-06 DIAGNOSIS — M51.36 DDD (DEGENERATIVE DISC DISEASE), LUMBAR: ICD-10-CM

## 2024-02-06 DIAGNOSIS — M50.30 DDD (DEGENERATIVE DISC DISEASE), CERVICAL: ICD-10-CM

## 2024-02-06 PROCEDURE — 99214 OFFICE O/P EST MOD 30 MIN: CPT | Mod: S$GLB,,, | Performed by: PHYSICIAN ASSISTANT

## 2024-02-06 PROCEDURE — 1159F MED LIST DOCD IN RCRD: CPT | Mod: CPTII,S$GLB,, | Performed by: PHYSICIAN ASSISTANT

## 2024-02-06 PROCEDURE — 3078F DIAST BP <80 MM HG: CPT | Mod: CPTII,S$GLB,, | Performed by: PHYSICIAN ASSISTANT

## 2024-02-06 PROCEDURE — 3077F SYST BP >= 140 MM HG: CPT | Mod: CPTII,S$GLB,, | Performed by: PHYSICIAN ASSISTANT

## 2024-02-06 PROCEDURE — 1160F RVW MEDS BY RX/DR IN RCRD: CPT | Mod: CPTII,S$GLB,, | Performed by: PHYSICIAN ASSISTANT

## 2024-02-06 PROCEDURE — 99999 PR PBB SHADOW E&M-EST. PATIENT-LVL IV: CPT | Mod: PBBFAC,,, | Performed by: PHYSICIAN ASSISTANT

## 2024-02-06 PROCEDURE — 3008F BODY MASS INDEX DOCD: CPT | Mod: CPTII,S$GLB,, | Performed by: PHYSICIAN ASSISTANT

## 2024-02-06 RX ORDER — HYDROCODONE BITARTRATE AND ACETAMINOPHEN 7.5; 325 MG/1; MG/1
1 TABLET ORAL EVERY 8 HOURS PRN
Qty: 60 TABLET | Refills: 0 | Status: SHIPPED | OUTPATIENT
Start: 2024-04-07 | End: 2024-05-07

## 2024-02-06 RX ORDER — HYDROCODONE BITARTRATE AND ACETAMINOPHEN 7.5; 325 MG/1; MG/1
1 TABLET ORAL EVERY 8 HOURS PRN
Qty: 60 TABLET | Refills: 0 | Status: SHIPPED | OUTPATIENT
Start: 2024-02-07 | End: 2024-03-09 | Stop reason: SDUPTHER

## 2024-02-06 RX ORDER — HYDROCODONE BITARTRATE AND ACETAMINOPHEN 7.5; 325 MG/1; MG/1
1 TABLET ORAL EVERY 8 HOURS PRN
Qty: 60 TABLET | Refills: 0 | Status: SHIPPED | OUTPATIENT
Start: 2024-03-08 | End: 2024-04-07

## 2024-02-06 NOTE — TELEPHONE ENCOUNTER
MRN [id number]- Pt is asking for IV sedation for her procedure with Dr. Coleman on 2/8. Please verify with Dr. Coleman whether this is ok. Please let Kenyatta know. Thank you.

## 2024-02-07 ENCOUNTER — TELEPHONE (OUTPATIENT)
Dept: CARDIOLOGY | Facility: CLINIC | Age: 64
End: 2024-02-07
Payer: COMMERCIAL

## 2024-02-07 NOTE — H&P (VIEW-ONLY)
This note was completed with dictation software and grammatical errors may exist.    CC:Back pain, neck pain    HPI: The patient is a 63-year-old woman with history of hypothyroidism, otherwise fairly healthy but a long history of scoliosis causing back pain, self-referred for continued back pain.  She is status post L5/S1 interlaminar epidural steroid injection on 12/06/2023 with 90% relief lasting 6 weeks.  She is scheduled to repeat the injection on 02/08.  She complains of continued bilateral low back pain radiating to the right buttock, posterior thigh, calf as well as severe pain in the left anterolateral thigh and left knee.  The pain is much worse with prolonged sitting and riding in a car.  It also changes of the way she is able to walk.  She is back to taking gabapentin 400 mg daily with some relief.  She continues to take pain medicine with some relief as well.     Pain intervention history: She has been seeing Dr. Huffman for the last several years and has undergone epidural steroid injections and radiofrequency ablations with good relief.  According to her last pain management physician, she had undergone a right side T3, 4, 5, 6 medial branch radiofrequency ablation on 8/8/14 with good relief.  He had scheduled her for another one but did not complete this.  As far as medications she has been taking Hydrocodone 7.5/325 one to 2 times a day at most and gabapentin 300 mg at night. She is status post cervical epidural steroid injection on 10/19/15 with 50% relief of her neck pain. She is status post right L4 transforaminal epidural steroid injection on 11/23/15 with 100% relief.   She is status post right T3, 4, 5 and 6 medial branch radio frequency ablation on 2/5/16 with greater than 50% relief.  She is status post right L4 transforaminal epidural sterile injection on 6/14/16 with significant relief lasting only about a month.  She is status post C7-T1 cervical interlaminar epidural steroid injection  on 7/14/16 with 30-40% relief.   She is status post L5/S1 interlaminar epidural steroidal injection to the right on 8/12/16 with 100% relief of her right leg pain.  She is status post C7-T1 cervical interlaminar epidural steroid injection on 12/9/16 with almost complete relief.  She is status post right T4, 5, 6 and 7 medial branch radiofrequency ablation on 5/30/17 with 100% relief.  She is status post C7-T1 cervical interlaminar epidural steroid injection on 6/27/17 with 80% relief.  She is status post L5/S1 interlaminar epidural steroid injection on a/7/17 with excellent relief lasting 2 weeks, now reporting 0% relief.  She is status post right L3, 4 and 5 medial branch radiofrequency ablation on 10/19/17 with 80% relief.   She is status post right T4, 5, 6 and 7 medial branch radiofrequency ablation on 12/14/17 with 100% relief.  She is status post C7-T1 cervical interlaminar epidural steroid injection on 3/16/18 with at least 80% relief.  She is status post lumbar epidural steroid injection at L5/S1 on 5/15/18 with 90% relief of her bilateral low back and right leg pain.  The she is status post right T4, 5, 6 and 7 medial branch radiofrequency ablation on 10/12/2018 with 100% relief.  She is status post L5/S1 interlaminar epidural steroid injection on 01/16/2019 with almost 100% relief of her low back pain. She is status post right T4, 5, 6 and 7 medial branch radiofrequency ablation on 06/04/2019 with 80% relief.  She is status post L5/S1 interlaminar epidural steroid injection on 03/22/2021 with 85-90% relief.   She is status post right T4, 5, 6, 7 medial branch radiofrequency ablation on 05/21/2021 with 85% relief. She is status post cervical JINA C7/T1 on 11/11/2021 with greater than 70% relief. She is status post right T4, 5, 6, 7 medial branch radiofrequency ablation on 06/30/2022 with 85% relief. She is status post L5/S1 interlaminar epidural steroid injection to the right on 11/18/2022 with 75% relief.  She is status post L5/S1 JINA with spread to the right on 07/21/2023 with greater than 50% relief. She is status post right T4, 5, 6, 7 medial branch radiofrequency ablation on 10/12/2023 with 80-85% relief.    She is status post L5/S1 interlaminar epidural steroid injection on 12/06/2023 with 90% relief lasting 6 weeks.     Spine surgeries:  None    Antineuropathics:  Lidoderm 5%  NSAIDs:  Duexis 800 up to TID  Physical therapy:  She has consistently been in physical therapy, reports exercise, dry needling with at least temporary relief  Antidepressants:  Muscle relaxers:  Opioids:  Hydrocodone 7.5/325 twice daily  Antiplatelets/Anticoagulants:    ROS: She reports fatigability, headaches, hypothyroidism, joint stiffness, back pain, difficulty sleeping and anxiety.  Balance of review of systems is negative.      Past Medical History:   Diagnosis Date    Anxiety 08/25/2015    Arthritis     BRCA1 negative     BRCA2 negative     Celiac disease     Cervical spondylosis     Chronic back pain     DDD (degenerative disc disease), lumbar     Difficult intubation 03/2016    anterior larynx, pt with metal dental appliance, unable to do glidescope, used LMA    Dysautonomia     Encounter for blood transfusion     Facet arthropathy, thoracic     Hormone replacement therapy (HRT)     Insomnia 08/25/2015    Neck pain     PONV (postoperative nausea and vomiting)     Right foot pain     Scoliosis     Sjogren's syndrome     Snoring     uses cpap, states not ROSSY    Unspecified hypothyroidism 08/25/2015     Past Surgical History:   Procedure Laterality Date    BILATERAL OOPHORECTOMY  2004    BREAST BIOPSY Left 2010    Benign    CHOLECYSTECTOMY  2010    COLONOSCOPY  2016    [facility]    Epidural Steroid Injection      Pain managment, at another facility, cervical, thoracic    Epidural Steroid injection      Pain management, cervical    EPIDURAL STEROID INJECTION INTO CERVICAL SPINE N/A 11/11/2021    Procedure:  Injection-steroid-epidural-cervical, C7/T1 interlaminer;  Surgeon: Floyd Coleman MD;  Location: [facility] OR;  Service: Pain Management;  Laterality: N/A;    EPIDURAL STEROID INJECTION INTO LUMBAR SPINE Right 1/16/2019    Procedure: Injection-steroid-epidural-lumbar L5/S1;  Surgeon: Floyd Coleman MD;  Location: [facility] OR;  Service: Pain Management;  Laterality: Right;  to right    EPIDURAL STEROID INJECTION INTO LUMBAR SPINE N/A 5/14/2019    Procedure: Injection-steroid-epidural-lumbar;  Surgeon: Floyd Coleman MD;  Location: [facility] OR;  Service: Pain Management;  Laterality: N/A;  L5/S1 interlaminar    EPIDURAL STEROID INJECTION INTO LUMBAR SPINE N/A 3/22/2021    Procedure: Injection-steroid-epidural-lumbar L5/S1 interlaminer;  Surgeon: Floyd Coleman MD;  Location: [facility] OR;  Service: Pain Management;  Laterality: N/A;    EPIDURAL STEROID INJECTION INTO LUMBAR SPINE N/A 11/18/2022    Procedure: Injection-steroid-epidural-lumbar L5/S1 to right;  Surgeon: Floyd Coleman MD;  Location: [facility] OR;  Service: Pain Management;  Laterality: N/A;    EPIDURAL STEROID INJECTION INTO LUMBAR SPINE N/A 7/21/2023    Procedure: Injection-steroid-epidural-lumbar Interlaminar L5/S1 to the right;  Surgeon: Floyd Coleman MD;  Location: [facility] OR;  Service: Pain Management;  Laterality: N/A;    EPIDURAL STEROID INJECTION INTO LUMBAR SPINE N/A 12/6/2023    Procedure: Injection-steroid-epidural-lumbar     L5/S1;  Surgeon: Floyd Coleman MD;  Location: [facility] OR;  Service: Pain Management;  Laterality: N/A;    HYSTERECTOMY  2004    Complete    MOUTH SURGERY      Braces, with temporary metal implants in upper gum    OOPHORECTOMY  2004    w/ hysterectomy    RADIOFREQUENCY ABLATION  02/2016    thoracic nerve    RADIOFREQUENCY ABLATION Right 6/4/2019    Procedure: Radiofrequency Ablation T4,5,6,7;  Surgeon: Floyd Coleman MD;  Location: [facility] OR;  Service: Pain Management;  Laterality: Right;    RADIOFREQUENCY  "ABLATION Right 2021    Procedure: RADIOFREQUENCY ABLATION,THORACIC  T4,T5,T6, T7;  Surgeon: Floyd Coleman MD;  Location: [facility] OR;  Service: Pain Management;  Laterality: Right;    RADIOFREQUENCY ABLATION Right 2022    Procedure: Radiofrequency Ablation Thoracic T4, T5, T6, T7;  Surgeon: Floyd Coleman MD;  Location: [facility] OR;  Service: Pain Management;  Laterality: Right;    RADIOFREQUENCY ABLATION Right 10/12/2023    Procedure: RADIOFREQUENCY ABLATION,THORACIC Right T4, T5, T6, T7;  Surgeon: Flody Coleman MD;  Location: [facility] OR;  Service: Pain Management;  Laterality: Right;  THORACIC Right T4, T5, T6, T7    TONSILLECTOMY      VULVA SURGERY  2016     Social History     Socioeconomic History    Marital status:    Tobacco Use    Smoking status: Never    Smokeless tobacco: Never   Substance and Sexual Activity    Alcohol use: No    Drug use: No    Sexual activity: Yes     Partners: Male      She is a  at [employer].    Medications/Allergies: See med card    Vitals:    24 1026   BP: (!) 178/79   Pulse: (!) 59   Weight: 85.3 kg (188 lb 2.6 oz)   Height: 5' 1" (1.549 m)   PainSc:   6   PainLoc: Back         Physical exam:  Gen: A and O x3, pleasant, well-groomed      Imagin/16/15 Xray Scoliosis survey: There is thoracic dextroscoliosis with mild lumbar compensatory levoscoliosis. No structural abnormalities are evident. Diffuse spondylosis noted in the cervical, thoracic and to lesser extent lumbar spine. No fractures are identified. There is slight increased kyphotic curvature of the thoracic spine with alignment being otherwise normal. Measurements and evaluation are limited due to underpenetration. Land angle measures approximately in the thoracic spine is of approximately 17.0 degrees and for the lumbar spine 17.4 degrees. Soft tissues are unremarkable. IMPRESSION: 1. S shaped scoliotic curvature of the thoracolumbar spine with Land angle of approximately " 17 degrees. 2. Diffuse spondylosis.    MRI report 4/12/12: Report notes that there is dextroconvex rotary curvature of the thoracic spine.  Posterior alignment is maintained.  There are scattered vertebral body hemangiomas.  There is minimal posterior disc bulges noted at T5/6, T6/7, T7/8 and T8/9.  There is no central spinal stenosis or neural foraminal narrowing.    MRI right shoulder 6/24/11: Minor distal subscapularis tendinosis.  Negative for full-thickness or significant partial-thickness rotator cuff tendon tear.  There is a small amount of subacromial subdeltoid bursal fluid which can be seen with recent injection or mild bursitis.    Cervical spine MRI 6/29/16  At C2-C3, the minimal interstitial excision covers a very mild broad-based disc bulge most prominent centrally but does not deform the ventral cord.  There is no canal or foraminal stenosis.  At C3-C4, there is mildly decreased disc height with a broad-based disc bulge most prominent centrally.  This minimally contact the ventral cord without significant deformity.  The canal is widely patent.  There is uncovertebral hypertrophy and facet arthropathy, but the foramina are patent.  At C4-C5, there is decreased disc height with a broad disc bulge-marginal osteophyte complex that mildly lateralizes to the left and blends imperceptibly with right greater than left uncovertebral hypertrophy.  With ligamentum hypertrophy, there is no significant central canal stenosis.  Uncovertebral hypertrophy and facet arthropathy are causing mild left and moderate right foraminal stenoses.  At C5-C6, there is a broad-based disc bulge and osteophyte complex most prominent centrally.  There is also ligamentum flavum hypertrophy present, but the canal is patent.  There is mild left foraminal stenosis.  The right foramen is patent.  At C6-C7, there is disc desiccation with a minimal broad-based disc bulge most prominent in the right paracentral canal.  The canal is widely  patent.  There is mild left foraminal stenosis.  At C7-T1, there is no significant disc bulge, protrusion, or herniation/extrusion.  The canal and foramina are widely patent.    Hip x-rays 9/22/17  AP view of the pelvis and frog leg views of both hips were obtained. No evidence of acute displaced fracture or dislocation is visualized.  No radiopaque foreign bodies are visualized. The bilateral superior joint spaces are grossly maintained. Mild bilateral sacroiliac joint degenerative changes are seen including subchondral sclerosis and small marginal osteophytes. Mild to moderate symphyseal degenerative changes are seen. There is a slightly expansile cortically-based focus along the lateral proximal right femoral metadiaphysis. This could reflect a sessile osteochondroma, but correlation with MRI of the right hip is recommended.    8/18/19 MRI L-spine:  T12-L1: No disc herniation or significant posterior osteophytic ridging.  No significant spinal canal or foraminal stenosis.   L1-L2: No disc herniation or significant posterior osteophytic ridging.  Minimal left facet hypertrophy.  No significant spinal canal or foraminal stenosis.   L2-L3: No disc herniation or significant posterior osteophytic ridging.  Minimal left facet hypertrophy.  No significant spinal canal or foraminal stenosis.   L3-L4: No disc herniation or significant posterior osteophytic ridging.  Minimal bilateral facet hypertrophy.  No significant spinal canal or foraminal stenosis.   L4-L5: Mild disc bulge.  Mild bilateral facet hypertrophy.  Mild ligamentum flavum thickening.  Minimal narrowing of the bilateral lateral recesses. No significant overall spinal canal stenosis. Mild bilateral foraminal stenosis.   L5-S1: Minimal disc bulge contained in the ventral epidural fat.  Mild bilateral facet hypertrophy.  No significant spinal canal or foraminal stenosis.    Assessment:  The patient is a 63-year-old woman with history of hypothyroidism,  otherwise fairly healthy but a long history of scoliosis causing back pain, self-referred for continued back pain.    1. Lumbar radiculopathy        2. DDD (degenerative disc disease), lumbar        3. Cervical radiculopathy        4. DDD (degenerative disc disease), cervical        5. Thoracic spondylosis              Plan:   1. She is scheduled to repeat and L5/S1 interlaminar epidural steroid injection on 2/8.  We discussed that although she does have suspected meniscus tear, her knee pain was almost completely gone after the last injection for 6 weeks and it returned when her low back pain worsened.    2.  We discussed potentially having and accommodation in the future so that she does not have to drive a long distance for meetings outside of her normal job location.    3.  Dr. Coleman provided prescriptions for hydrocodone -acetaminophen 7.5/325 mg up to 3 times daily as needed for pain.  I have reviewed the [place] Board of Pharmacy website and there are no abberancies.    4. Follow-up in 4 weeks postprocedure or sooner as needed.

## 2024-02-07 NOTE — PROGRESS NOTES
This note was completed with dictation software and grammatical errors may exist.    CC:Back pain, neck pain    HPI: The patient is a 63-year-old woman with history of hypothyroidism, otherwise fairly healthy but a long history of scoliosis causing back pain, self-referred for continued back pain.  She is status post L5/S1 interlaminar epidural steroid injection on 12/06/2023 with 90% relief lasting 6 weeks.  She is scheduled to repeat the injection on 02/08.  She complains of continued bilateral low back pain radiating to the right buttock, posterior thigh, calf as well as severe pain in the left anterolateral thigh and left knee.  The pain is much worse with prolonged sitting and riding in a car.  It also changes of the way she is able to walk.  She is back to taking gabapentin 400 mg daily with some relief.  She continues to take pain medicine with some relief as well.     Pain intervention history: She has been seeing Dr. uHffman for the last several years and has undergone epidural steroid injections and radiofrequency ablations with good relief.  According to her last pain management physician, she had undergone a right side T3, 4, 5, 6 medial branch radiofrequency ablation on 8/8/14 with good relief.  He had scheduled her for another one but did not complete this.  As far as medications she has been taking Hydrocodone 7.5/325 one to 2 times a day at most and gabapentin 300 mg at night. She is status post cervical epidural steroid injection on 10/19/15 with 50% relief of her neck pain. She is status post right L4 transforaminal epidural steroid injection on 11/23/15 with 100% relief.   She is status post right T3, 4, 5 and 6 medial branch radio frequency ablation on 2/5/16 with greater than 50% relief.  She is status post right L4 transforaminal epidural sterile injection on 6/14/16 with significant relief lasting only about a month.  She is status post C7-T1 cervical interlaminar epidural steroid injection  on 7/14/16 with 30-40% relief.   She is status post L5/S1 interlaminar epidural steroidal injection to the right on 8/12/16 with 100% relief of her right leg pain.  She is status post C7-T1 cervical interlaminar epidural steroid injection on 12/9/16 with almost complete relief.  She is status post right T4, 5, 6 and 7 medial branch radiofrequency ablation on 5/30/17 with 100% relief.  She is status post C7-T1 cervical interlaminar epidural steroid injection on 6/27/17 with 80% relief.  She is status post L5/S1 interlaminar epidural steroid injection on a/7/17 with excellent relief lasting 2 weeks, now reporting 0% relief.  She is status post right L3, 4 and 5 medial branch radiofrequency ablation on 10/19/17 with 80% relief.   She is status post right T4, 5, 6 and 7 medial branch radiofrequency ablation on 12/14/17 with 100% relief.  She is status post C7-T1 cervical interlaminar epidural steroid injection on 3/16/18 with at least 80% relief.  She is status post lumbar epidural steroid injection at L5/S1 on 5/15/18 with 90% relief of her bilateral low back and right leg pain.  The she is status post right T4, 5, 6 and 7 medial branch radiofrequency ablation on 10/12/2018 with 100% relief.  She is status post L5/S1 interlaminar epidural steroid injection on 01/16/2019 with almost 100% relief of her low back pain. She is status post right T4, 5, 6 and 7 medial branch radiofrequency ablation on 06/04/2019 with 80% relief.  She is status post L5/S1 interlaminar epidural steroid injection on 03/22/2021 with 85-90% relief.   She is status post right T4, 5, 6, 7 medial branch radiofrequency ablation on 05/21/2021 with 85% relief. She is status post cervical JINA C7/T1 on 11/11/2021 with greater than 70% relief. She is status post right T4, 5, 6, 7 medial branch radiofrequency ablation on 06/30/2022 with 85% relief. She is status post L5/S1 interlaminar epidural steroid injection to the right on 11/18/2022 with 75% relief.  She is status post L5/S1 JINA with spread to the right on 07/21/2023 with greater than 50% relief. She is status post right T4, 5, 6, 7 medial branch radiofrequency ablation on 10/12/2023 with 80-85% relief.    She is status post L5/S1 interlaminar epidural steroid injection on 12/06/2023 with 90% relief lasting 6 weeks.     Spine surgeries:  None    Antineuropathics:  Lidoderm 5%  NSAIDs:  Duexis 800 up to TID  Physical therapy:  She has consistently been in physical therapy, reports exercise, dry needling with at least temporary relief  Antidepressants:  Muscle relaxers:  Opioids:  Hydrocodone 7.5/325 twice daily  Antiplatelets/Anticoagulants:    ROS: She reports fatigability, headaches, hypothyroidism, joint stiffness, back pain, difficulty sleeping and anxiety.  Balance of review of systems is negative.      Past Medical History:   Diagnosis Date    Anxiety 08/25/2015    Arthritis     BRCA1 negative     BRCA2 negative     Celiac disease     Cervical spondylosis     Chronic back pain     DDD (degenerative disc disease), lumbar     Difficult intubation 03/2016    anterior larynx, pt with metal dental appliance, unable to do glidescope, used LMA    Dysautonomia     Encounter for blood transfusion     Facet arthropathy, thoracic     Hormone replacement therapy (HRT)     Insomnia 08/25/2015    Neck pain     PONV (postoperative nausea and vomiting)     Right foot pain     Scoliosis     Sjogren's syndrome     Snoring     uses cpap, states not ROSSY    Unspecified hypothyroidism 08/25/2015     Past Surgical History:   Procedure Laterality Date    BILATERAL OOPHORECTOMY  2004    BREAST BIOPSY Left 2010    Benign    CHOLECYSTECTOMY  2010    COLONOSCOPY  2016    [facility]    Epidural Steroid Injection      Pain managment, at another facility, cervical, thoracic    Epidural Steroid injection      Pain management, cervical    EPIDURAL STEROID INJECTION INTO CERVICAL SPINE N/A 11/11/2021    Procedure:  Injection-steroid-epidural-cervical, C7/T1 interlaminer;  Surgeon: Floyd Coleman MD;  Location: [facility] OR;  Service: Pain Management;  Laterality: N/A;    EPIDURAL STEROID INJECTION INTO LUMBAR SPINE Right 1/16/2019    Procedure: Injection-steroid-epidural-lumbar L5/S1;  Surgeon: Floyd Coleman MD;  Location: [facility] OR;  Service: Pain Management;  Laterality: Right;  to right    EPIDURAL STEROID INJECTION INTO LUMBAR SPINE N/A 5/14/2019    Procedure: Injection-steroid-epidural-lumbar;  Surgeon: Floyd Coleman MD;  Location: [facility] OR;  Service: Pain Management;  Laterality: N/A;  L5/S1 interlaminar    EPIDURAL STEROID INJECTION INTO LUMBAR SPINE N/A 3/22/2021    Procedure: Injection-steroid-epidural-lumbar L5/S1 interlaminer;  Surgeon: Floyd Coleman MD;  Location: [facility] OR;  Service: Pain Management;  Laterality: N/A;    EPIDURAL STEROID INJECTION INTO LUMBAR SPINE N/A 11/18/2022    Procedure: Injection-steroid-epidural-lumbar L5/S1 to right;  Surgeon: Floyd Coleman MD;  Location: [facility];  Service: Pain Management;  Laterality: N/A;    EPIDURAL STEROID INJECTION INTO LUMBAR SPINE N/A 7/21/2023    Procedure: Injection-steroid-epidural-lumbar Interlaminar L5/S1 to the right;  Surgeon: Floyd Coleman MD;  Location: [facility];  Service: Pain Management;  Laterality: N/A;    EPIDURAL STEROID INJECTION INTO LUMBAR SPINE N/A 12/6/2023    Procedure: Injection-steroid-epidural-lumbar     L5/S1;  Surgeon: Floyd Coleman MD;  Location: [facility];  Service: Pain Management;  Laterality: N/A;    HYSTERECTOMY  2004    Complete    MOUTH SURGERY      Braces, with temporary metal implants in upper gum    OOPHORECTOMY  2004    w/ hysterectomy    RADIOFREQUENCY ABLATION  02/2016    thoracic nerve    RADIOFREQUENCY ABLATION Right 6/4/2019    Procedure: Radiofrequency Ablation T4,5,6,7;  Surgeon: Floyd Coleman MD;  Location: [facility];  Service: Pain Management;  Laterality: Right;    RADIOFREQUENCY  "ABLATION Right 2021    Procedure: RADIOFREQUENCY ABLATION,THORACIC  T4,T5,T6, T7;  Surgeon: Floyd Coleman MD;  Location: [facility] OR;  Service: Pain Management;  Laterality: Right;    RADIOFREQUENCY ABLATION Right 2022    Procedure: Radiofrequency Ablation Thoracic T4, T5, T6, T7;  Surgeon: Floyd Coleman MD;  Location: [facility] OR;  Service: Pain Management;  Laterality: Right;    RADIOFREQUENCY ABLATION Right 10/12/2023    Procedure: RADIOFREQUENCY ABLATION,THORACIC Right T4, T5, T6, T7;  Surgeon: Floyd Coleman MD;  Location: [facility] OR;  Service: Pain Management;  Laterality: Right;  THORACIC Right T4, T5, T6, T7    TONSILLECTOMY      VULVA SURGERY  2016     Social History     Socioeconomic History    Marital status:    Tobacco Use    Smoking status: Never    Smokeless tobacco: Never   Substance and Sexual Activity    Alcohol use: No    Drug use: No    Sexual activity: Yes     Partners: Male      She is a  at [employer].    Medications/Allergies: See med card    Vitals:    24 1026   BP: (!) 178/79   Pulse: (!) 59   Weight: 85.3 kg (188 lb 2.6 oz)   Height: 5' 1" (1.549 m)   PainSc:   6   PainLoc: Back         Physical exam:  Gen: A and O x3, pleasant, well-groomed      Imagin/16/15 Xray Scoliosis survey: There is thoracic dextroscoliosis with mild lumbar compensatory levoscoliosis. No structural abnormalities are evident. Diffuse spondylosis noted in the cervical, thoracic and to lesser extent lumbar spine. No fractures are identified. There is slight increased kyphotic curvature of the thoracic spine with alignment being otherwise normal. Measurements and evaluation are limited due to underpenetration. Land angle measures approximately in the thoracic spine is of approximately 17.0 degrees and for the lumbar spine 17.4 degrees. Soft tissues are unremarkable. IMPRESSION: 1. S shaped scoliotic curvature of the thoracolumbar spine with Land angle of approximately " 17 degrees. 2. Diffuse spondylosis.    MRI report 4/12/12: Report notes that there is dextroconvex rotary curvature of the thoracic spine.  Posterior alignment is maintained.  There are scattered vertebral body hemangiomas.  There is minimal posterior disc bulges noted at T5/6, T6/7, T7/8 and T8/9.  There is no central spinal stenosis or neural foraminal narrowing.    MRI right shoulder 6/24/11: Minor distal subscapularis tendinosis.  Negative for full-thickness or significant partial-thickness rotator cuff tendon tear.  There is a small amount of subacromial subdeltoid bursal fluid which can be seen with recent injection or mild bursitis.    Cervical spine MRI 6/29/16  At C2-C3, the minimal interstitial excision covers a very mild broad-based disc bulge most prominent centrally but does not deform the ventral cord.  There is no canal or foraminal stenosis.  At C3-C4, there is mildly decreased disc height with a broad-based disc bulge most prominent centrally.  This minimally contact the ventral cord without significant deformity.  The canal is widely patent.  There is uncovertebral hypertrophy and facet arthropathy, but the foramina are patent.  At C4-C5, there is decreased disc height with a broad disc bulge-marginal osteophyte complex that mildly lateralizes to the left and blends imperceptibly with right greater than left uncovertebral hypertrophy.  With ligamentum hypertrophy, there is no significant central canal stenosis.  Uncovertebral hypertrophy and facet arthropathy are causing mild left and moderate right foraminal stenoses.  At C5-C6, there is a broad-based disc bulge and osteophyte complex most prominent centrally.  There is also ligamentum flavum hypertrophy present, but the canal is patent.  There is mild left foraminal stenosis.  The right foramen is patent.  At C6-C7, there is disc desiccation with a minimal broad-based disc bulge most prominent in the right paracentral canal.  The canal is widely  patent.  There is mild left foraminal stenosis.  At C7-T1, there is no significant disc bulge, protrusion, or herniation/extrusion.  The canal and foramina are widely patent.    Hip x-rays 9/22/17  AP view of the pelvis and frog leg views of both hips were obtained. No evidence of acute displaced fracture or dislocation is visualized.  No radiopaque foreign bodies are visualized. The bilateral superior joint spaces are grossly maintained. Mild bilateral sacroiliac joint degenerative changes are seen including subchondral sclerosis and small marginal osteophytes. Mild to moderate symphyseal degenerative changes are seen. There is a slightly expansile cortically-based focus along the lateral proximal right femoral metadiaphysis. This could reflect a sessile osteochondroma, but correlation with MRI of the right hip is recommended.    8/18/19 MRI L-spine:  T12-L1: No disc herniation or significant posterior osteophytic ridging.  No significant spinal canal or foraminal stenosis.   L1-L2: No disc herniation or significant posterior osteophytic ridging.  Minimal left facet hypertrophy.  No significant spinal canal or foraminal stenosis.   L2-L3: No disc herniation or significant posterior osteophytic ridging.  Minimal left facet hypertrophy.  No significant spinal canal or foraminal stenosis.   L3-L4: No disc herniation or significant posterior osteophytic ridging.  Minimal bilateral facet hypertrophy.  No significant spinal canal or foraminal stenosis.   L4-L5: Mild disc bulge.  Mild bilateral facet hypertrophy.  Mild ligamentum flavum thickening.  Minimal narrowing of the bilateral lateral recesses. No significant overall spinal canal stenosis. Mild bilateral foraminal stenosis.   L5-S1: Minimal disc bulge contained in the ventral epidural fat.  Mild bilateral facet hypertrophy.  No significant spinal canal or foraminal stenosis.    Assessment:  The patient is a 63-year-old woman with history of hypothyroidism,  otherwise fairly healthy but a long history of scoliosis causing back pain, self-referred for continued back pain.    1. Lumbar radiculopathy        2. DDD (degenerative disc disease), lumbar        3. Cervical radiculopathy        4. DDD (degenerative disc disease), cervical        5. Thoracic spondylosis              Plan:   1. She is scheduled to repeat and L5/S1 interlaminar epidural steroid injection on 2/8.  We discussed that although she does have suspected meniscus tear, her knee pain was almost completely gone after the last injection for 6 weeks and it returned when her low back pain worsened.    2.  We discussed potentially having and accommodation in the future so that she does not have to drive a long distance for meetings outside of her normal job location.    3.  Dr. Coleman provided prescriptions for hydrocodone -acetaminophen 7.5/325 mg up to 3 times daily as needed for pain.  I have reviewed the [place] Board of Pharmacy website and there are no abberancies.    4. Follow-up in 4 weeks postprocedure or sooner as needed.

## 2024-02-07 NOTE — TELEPHONE ENCOUNTER
Message received from pt: Im struggling with increased blood pressure over the last few weeks.  It has been running around  164/88.   Pt taking propranolol 60mg daily; last seen 12/2022; has appt scheduled for 3/5. Do you want to change anything for for me to move her appt up?

## 2024-02-08 ENCOUNTER — HOSPITAL ENCOUNTER (OUTPATIENT)
Facility: HOSPITAL | Age: 64
Discharge: HOME | End: 2024-02-08
Attending: ANESTHESIOLOGY | Admitting: ANESTHESIOLOGY
Payer: COMMERCIAL

## 2024-02-08 ENCOUNTER — HOSPITAL ENCOUNTER (OUTPATIENT)
Dept: RADIOLOGY | Facility: HOSPITAL | Age: 64
Discharge: HOME | End: 2024-02-08
Attending: ANESTHESIOLOGY | Admitting: ANESTHESIOLOGY
Payer: COMMERCIAL

## 2024-02-08 VITALS
HEIGHT: 61 IN | HEART RATE: 53 BPM | WEIGHT: 184 LBS | OXYGEN SATURATION: 100 % | RESPIRATION RATE: 16 BRPM | BODY MASS INDEX: 34.74 KG/M2 | DIASTOLIC BLOOD PRESSURE: 67 MMHG | SYSTOLIC BLOOD PRESSURE: 130 MMHG | TEMPERATURE: 97 F

## 2024-02-08 DIAGNOSIS — M54.50 LOWER BACK PAIN: ICD-10-CM

## 2024-02-08 DIAGNOSIS — M54.16 LUMBAR RADICULOPATHY: ICD-10-CM

## 2024-02-08 DIAGNOSIS — I10 PRIMARY HYPERTENSION: Primary | ICD-10-CM

## 2024-02-08 PROCEDURE — 63600175 PHARM REV CODE 636 W HCPCS: Mod: PO | Performed by: ANESTHESIOLOGY

## 2024-02-08 PROCEDURE — 25500020 PHARM REV CODE 255: Mod: PO | Performed by: ANESTHESIOLOGY

## 2024-02-08 PROCEDURE — A4216 STERILE WATER/SALINE, 10 ML: HCPCS | Mod: PO | Performed by: ANESTHESIOLOGY

## 2024-02-08 PROCEDURE — 62323 NJX INTERLAMINAR LMBR/SAC: CPT | Mod: ,,, | Performed by: ANESTHESIOLOGY

## 2024-02-08 PROCEDURE — 25000003 PHARM REV CODE 250: Mod: PO | Performed by: ANESTHESIOLOGY

## 2024-02-08 PROCEDURE — 62323 NJX INTERLAMINAR LMBR/SAC: CPT | Mod: PO | Performed by: ANESTHESIOLOGY

## 2024-02-08 PROCEDURE — 76000 FLUOROSCOPY <1 HR PHYS/QHP: CPT | Mod: TC,PO

## 2024-02-08 RX ORDER — SODIUM CHLORIDE, SODIUM LACTATE, POTASSIUM CHLORIDE, CALCIUM CHLORIDE 600; 310; 30; 20 MG/100ML; MG/100ML; MG/100ML; MG/100ML
INJECTION, SOLUTION INTRAVENOUS CONTINUOUS
Status: DISCONTINUED | OUTPATIENT
Start: 2024-02-08 | End: 2024-02-08 | Stop reason: HOSPADM

## 2024-02-08 RX ORDER — LIDOCAINE HYDROCHLORIDE 10 MG/ML
INJECTION, SOLUTION EPIDURAL; INFILTRATION; INTRACAUDAL; PERINEURAL
Status: DISCONTINUED | OUTPATIENT
Start: 2024-02-08 | End: 2024-02-08 | Stop reason: HOSPADM

## 2024-02-08 RX ORDER — MIDAZOLAM HYDROCHLORIDE 2 MG/2ML
INJECTION, SOLUTION INTRAMUSCULAR; INTRAVENOUS
Status: DISCONTINUED | OUTPATIENT
Start: 2024-02-08 | End: 2024-02-08 | Stop reason: HOSPADM

## 2024-02-08 RX ORDER — SODIUM CHLORIDE 9 MG/ML
INJECTION, SOLUTION INTRAMUSCULAR; INTRAVENOUS; SUBCUTANEOUS
Status: DISCONTINUED | OUTPATIENT
Start: 2024-02-08 | End: 2024-02-08 | Stop reason: HOSPADM

## 2024-02-08 RX ORDER — AMLODIPINE BESYLATE 2.5 MG/1
2.5 TABLET ORAL DAILY
Qty: 90 TABLET | Refills: 3 | Status: CANCELLED | OUTPATIENT
Start: 2024-02-08 | End: 2025-02-02

## 2024-02-08 RX ORDER — METHYLPREDNISOLONE ACETATE 80 MG/ML
INJECTION, SUSPENSION INTRA-ARTICULAR; INTRALESIONAL; INTRAMUSCULAR; SOFT TISSUE
Status: DISCONTINUED | OUTPATIENT
Start: 2024-02-08 | End: 2024-02-08 | Stop reason: HOSPADM

## 2024-02-08 RX ADMIN — SODIUM CHLORIDE, POTASSIUM CHLORIDE, SODIUM LACTATE AND CALCIUM CHLORIDE: 600; 310; 30; 20 INJECTION, SOLUTION INTRAVENOUS at 03:02

## 2024-02-08 NOTE — TELEPHONE ENCOUNTER
Spoke with pt, verbalized understanding about starting new medication of Amlodipine 2.5 mg P.O. daily.

## 2024-02-08 NOTE — OP NOTE

## 2024-02-08 NOTE — DISCHARGE SUMMARY
[facility] - Surgery  Discharge Note  Short Stay    Procedure(s) (LRB):  Injection-steroid-epidural-lumbar     L5/S1 (N/A)      OUTCOME: Patient tolerated treatment/procedure well without complication and is now ready for discharge.    DISPOSITION: Home or Self Care    FINAL DIAGNOSIS:  Lumbar radiculopathy    FOLLOWUP: In clinic    DISCHARGE INSTRUCTIONS:    Discharge Procedure Orders   Diet Adult Regular     No dressing needed     Notify your health care provider if you experience any of the following:  temperature >100.4     Activity as tolerated

## 2024-02-09 ENCOUNTER — PATIENT MESSAGE (OUTPATIENT)
Dept: CARDIOLOGY | Facility: CLINIC | Age: 64
End: 2024-02-09
Payer: COMMERCIAL

## 2024-02-09 DIAGNOSIS — I10 PRIMARY HYPERTENSION: Primary | ICD-10-CM

## 2024-02-09 RX ORDER — AMLODIPINE BESYLATE 2.5 MG/1
2.5 TABLET ORAL DAILY
Qty: 30 TABLET | Refills: 11 | Status: SHIPPED | OUTPATIENT
Start: 2024-02-09 | End: 2025-02-08

## 2024-02-16 ENCOUNTER — TELEPHONE (OUTPATIENT)
Dept: PAIN MEDICINE | Facility: CLINIC | Age: 64
End: 2024-02-16
Payer: COMMERCIAL

## 2024-02-16 NOTE — TELEPHONE ENCOUNTER
----- Message from Giovanna Tuttle sent at 2/16/2024 11:39 AM CST -----  Type: Needs Medical Advice  Who Called:  pt  Symptoms (please be specific):  pt said she need to speak to the nurse--said she need a work excuse, asap, please--please call and advise  Best Call Back Number: [phone] (home)     Additional Information: thank you

## 2024-02-27 ENCOUNTER — OFFICE VISIT (OUTPATIENT)
Dept: PAIN MEDICINE | Facility: CLINIC | Age: 64
End: 2024-02-27
Payer: COMMERCIAL

## 2024-02-27 VITALS
HEIGHT: 61 IN | BODY MASS INDEX: 35.07 KG/M2 | HEART RATE: 59 BPM | SYSTOLIC BLOOD PRESSURE: 125 MMHG | DIASTOLIC BLOOD PRESSURE: 60 MMHG | WEIGHT: 185.75 LBS

## 2024-02-27 DIAGNOSIS — M47.814 SPONDYLOSIS OF THORACIC REGION WITHOUT MYELOPATHY OR RADICULOPATHY: ICD-10-CM

## 2024-02-27 DIAGNOSIS — Z79.891 OPIOID CONTRACT EXISTS: ICD-10-CM

## 2024-02-27 DIAGNOSIS — M47.814 THORACIC SPONDYLOSIS: ICD-10-CM

## 2024-02-27 DIAGNOSIS — M54.16 LUMBAR RADICULOPATHY: Primary | ICD-10-CM

## 2024-02-27 DIAGNOSIS — M51.36 DDD (DEGENERATIVE DISC DISEASE), LUMBAR: ICD-10-CM

## 2024-02-27 DIAGNOSIS — M54.12 CERVICAL RADICULOPATHY: ICD-10-CM

## 2024-02-27 DIAGNOSIS — M50.30 DDD (DEGENERATIVE DISC DISEASE), CERVICAL: ICD-10-CM

## 2024-02-27 PROCEDURE — 3044F HG A1C LEVEL LT 7.0%: CPT | Mod: CPTII,S$GLB,, | Performed by: PHYSICIAN ASSISTANT

## 2024-02-27 PROCEDURE — 99999 PR PBB SHADOW E&M-EST. PATIENT-LVL IV: CPT | Mod: PBBFAC,,, | Performed by: PHYSICIAN ASSISTANT

## 2024-02-27 PROCEDURE — 3074F SYST BP LT 130 MM HG: CPT | Mod: CPTII,S$GLB,, | Performed by: PHYSICIAN ASSISTANT

## 2024-02-27 PROCEDURE — 99214 OFFICE O/P EST MOD 30 MIN: CPT | Mod: S$GLB,,, | Performed by: PHYSICIAN ASSISTANT

## 2024-02-27 PROCEDURE — 3008F BODY MASS INDEX DOCD: CPT | Mod: CPTII,S$GLB,, | Performed by: PHYSICIAN ASSISTANT

## 2024-02-27 PROCEDURE — 3078F DIAST BP <80 MM HG: CPT | Mod: CPTII,S$GLB,, | Performed by: PHYSICIAN ASSISTANT

## 2024-02-28 ENCOUNTER — OFFICE VISIT (OUTPATIENT)
Dept: ENDOCRINOLOGY | Facility: CLINIC | Age: 64
End: 2024-02-28
Payer: COMMERCIAL

## 2024-02-28 DIAGNOSIS — K90.0 CELIAC DISEASE: ICD-10-CM

## 2024-02-28 DIAGNOSIS — E03.9 HYPOTHYROIDISM, UNSPECIFIED TYPE: Primary | ICD-10-CM

## 2024-02-28 PROCEDURE — 1159F MED LIST DOCD IN RCRD: CPT | Mod: CPTII,95,, | Performed by: INTERNAL MEDICINE

## 2024-02-28 PROCEDURE — 1160F RVW MEDS BY RX/DR IN RCRD: CPT | Mod: CPTII,95,, | Performed by: INTERNAL MEDICINE

## 2024-02-28 PROCEDURE — 99213 OFFICE O/P EST LOW 20 MIN: CPT | Mod: 95,,, | Performed by: INTERNAL MEDICINE

## 2024-02-28 NOTE — PROGRESS NOTES
The patient location is: [place]    Visit type: audiovisual    Face to Face time with patient: 8  11 minutes of total time spent on the encounter, which includes face to face time and non-face to face time preparing to see the patient (eg, review of tests), Obtaining and/or reviewing separately obtained history, Documenting clinical information in the electronic or other health record, Independently interpreting results (not separately reported) and communicating results to the patient/family/caregiver, or Care coordination (not separately reported).         Each patient to whom he or she provides medical services by telemedicine is:  (1) informed of the relationship between the physician and patient and the respective role of any other health care provider with respect to management of the patient; and (2) notified that he or she may decline to receive medical services by telemedicine and may withdraw from such care at any time.    Notes:     CHIEF COMPLAINT: Hypothyroidism  63 y.o. old being seen as a f/u.  Hypothyroid since her late 20's. Currently on Tirosint 125 mcg once daily. Feeling well. States that does tend to be sensitive to heat. No Palpitations. No tremors. States was having issues with BP, but better now. Sleeping well at night. Brain fog she was having before has improved. States has some occasional anxiety but may be explained by what's going on. Hair has improved.             PAST MEDICAL HISTORY/PAST SURGICAL HISTORY:  Reviewed in [facility]    SOCIAL HISTORY: Reviewed in [facility]    FAMILY HISTORY:  Father with hypothyroidism. MGM with DM 2.     MEDICATIONS/ALLERGIES: The patient's MedCard has been updated and reviewed.            PE:        LABS/Radiology       Latest Reference Range & Units 02/27/24 16:32   TSH 0.400 - 4.000 uIU/mL 1.440   Free T4 0.78 - 2.19 ng/dL 1.39         ASSESSMENT/PLAN:  1.  Hypothyroidism-currently on Tirosint.  Doing much better now that TSH is improved.  Many of the symptoms that  she was having before have resolved.  She does appear to be taking her thyroid medication appropriately.  Since TSH stable and symptoms well controlled, okay to follow up with PCP.  Can see us p.r.n. or re-consult if difficulties in controlling levels again.    5. Celiac disease- follows gluten free diet closely.  Uncontrolled celiac disease can impact absorption of L T4.    FOLLOWUP  AVS- ok to f/u PCP

## 2024-03-05 ENCOUNTER — OFFICE VISIT (OUTPATIENT)
Dept: CARDIOLOGY | Facility: CLINIC | Age: 64
End: 2024-03-05
Payer: COMMERCIAL

## 2024-03-05 VITALS — WEIGHT: 184.94 LBS | BODY MASS INDEX: 34.95 KG/M2

## 2024-03-05 DIAGNOSIS — I10 PRIMARY HYPERTENSION: Primary | ICD-10-CM

## 2024-03-05 PROCEDURE — 99999 PR PBB SHADOW E&M-EST. PATIENT-LVL III: CPT | Mod: PBBFAC,,, | Performed by: INTERNAL MEDICINE

## 2024-03-05 PROCEDURE — 1159F MED LIST DOCD IN RCRD: CPT | Mod: CPTII,S$GLB,, | Performed by: INTERNAL MEDICINE

## 2024-03-05 PROCEDURE — 3008F BODY MASS INDEX DOCD: CPT | Mod: CPTII,S$GLB,, | Performed by: INTERNAL MEDICINE

## 2024-03-05 PROCEDURE — 99213 OFFICE O/P EST LOW 20 MIN: CPT | Mod: S$GLB,,, | Performed by: INTERNAL MEDICINE

## 2024-03-05 NOTE — PROGRESS NOTES
Subjective:    Patient ID:  Vonnie Garces is a 63 y.o. female who presents for follow-up of hypertension    HPI  She comes with no complaints, no chest pain, no shortness of breath  Blood pressure very well controlled at this time, after getting the back injection.    Review of Systems   Constitutional: Negative for decreased appetite, malaise/fatigue, weight gain and weight loss.   Cardiovascular:  Negative for chest pain, dyspnea on exertion, leg swelling, palpitations and syncope.   Respiratory:  Negative for cough and shortness of breath.    Gastrointestinal: Negative.    Neurological:  Negative for weakness.   All other systems reviewed and are negative.     Objective:      Physical Exam  Vitals and nursing note reviewed.   Constitutional:       Appearance: Normal appearance. She is well-developed.   HENT:      Head: Normocephalic.   Eyes:      Pupils: Pupils are equal, round, and reactive to light.   Neck:      Thyroid: No thyromegaly.      Vascular: No carotid bruit or JVD.   Cardiovascular:      Rate and Rhythm: Normal rate and regular rhythm.      Chest Wall: PMI is not displaced.      Pulses: Normal pulses and intact distal pulses.      Heart sounds: Normal heart sounds. No murmur heard.     No gallop.   Pulmonary:      Effort: Pulmonary effort is normal.      Breath sounds: Normal breath sounds.   Abdominal:      Palpations: Abdomen is soft. There is no mass.      Tenderness: There is no abdominal tenderness.   Musculoskeletal:         General: Normal range of motion.      Cervical back: Normal range of motion and neck supple.   Skin:     General: Skin is warm.   Neurological:      Mental Status: She is alert and oriented to person, place, and time.      Sensory: No sensory deficit.      Deep Tendon Reflexes: Reflexes are normal and symmetric.         Most Recent EKG Results  No results found. However, due to the size of the patient record, not all encounters were searched. Please check Results  Review for a complete set of results.    Most Recent Echocardiogram Results  Results for orders placed in visit on 06/27/18    Echocardiogram stress test with CFD (CUPID ONLY-[facility], [facility], [place], [place], [facility])    Interpretation Summary  · Left ventricle ejection fraction is normal at 60%  · Normal LV diastolic function.  · Stress echocardiogram negative for ischemia      Most Recent Nuclear Stress Test Results  No results found for this or any previous visit.      Most Recent Cardiac PET Stress Test Results  No results found for this or any previous visit.      Most Recent Cardiovascular Angiogram results  No results found for this or any previous visit.      Other Most Recent Cardiology Results  No results found for this or any previous visit.      Labs reviewed    Assessment:       1. Primary hypertension         Plan:     Continue:  Beta blocker and Calcium blocker  Regular exercise program  Weight loss  Low cholesterol diet    Six-month follow-up or sooner if necessary

## 2024-03-08 RX ORDER — LEVOTHYROXINE SODIUM 125 UG/1
1 CAPSULE ORAL
Qty: 90 CAPSULE | Refills: 3 | Status: SHIPPED | OUTPATIENT
Start: 2024-03-08

## 2024-03-09 DIAGNOSIS — M51.36 DDD (DEGENERATIVE DISC DISEASE), LUMBAR: ICD-10-CM

## 2024-03-09 NOTE — PROGRESS NOTES
This note was completed with dictation software and grammatical errors may exist.    CC:Back pain, neck pain    HPI: The patient is a 63-year-old woman with history of hypothyroidism, otherwise fairly healthy but a long history of scoliosis causing back pain, self-referred for continued back pain.  She is status post L5/S1 interlaminar epidural steroid injection on 02/08/2024 with 90% relief.  She does have some remaining pain with prolonged sitting, walking and driving.  She also continues to have neck and midback pain.  However, her leg and knee pain is much better following the injection.  She continues to take pain medicine with relief as well.  Overall she is doing well.  She denies having any current numbness.    Pain intervention history: She has been seeing Dr. Huffman for the last several years and has undergone epidural steroid injections and radiofrequency ablations with good relief.  According to her last pain management physician, she had undergone a right side T3, 4, 5, 6 medial branch radiofrequency ablation on 8/8/14 with good relief.  He had scheduled her for another one but did not complete this.  As far as medications she has been taking Hydrocodone 7.5/325 one to 2 times a day at most and gabapentin 300 mg at night. She is status post cervical epidural steroid injection on 10/19/15 with 50% relief of her neck pain. She is status post right L4 transforaminal epidural steroid injection on 11/23/15 with 100% relief.   She is status post right T3, 4, 5 and 6 medial branch radio frequency ablation on 2/5/16 with greater than 50% relief.  She is status post right L4 transforaminal epidural sterile injection on 6/14/16 with significant relief lasting only about a month.  She is status post C7-T1 cervical interlaminar epidural steroid injection on 7/14/16 with 30-40% relief.   She is status post L5/S1 interlaminar epidural steroidal injection to the right on 8/12/16 with 100% relief of her right leg  pain.  She is status post C7-T1 cervical interlaminar epidural steroid injection on 12/9/16 with almost complete relief.  She is status post right T4, 5, 6 and 7 medial branch radiofrequency ablation on 5/30/17 with 100% relief.  She is status post C7-T1 cervical interlaminar epidural steroid injection on 6/27/17 with 80% relief.  She is status post L5/S1 interlaminar epidural steroid injection on a/7/17 with excellent relief lasting 2 weeks, now reporting 0% relief.  She is status post right L3, 4 and 5 medial branch radiofrequency ablation on 10/19/17 with 80% relief.   She is status post right T4, 5, 6 and 7 medial branch radiofrequency ablation on 12/14/17 with 100% relief.  She is status post C7-T1 cervical interlaminar epidural steroid injection on 3/16/18 with at least 80% relief.  She is status post lumbar epidural steroid injection at L5/S1 on 5/15/18 with 90% relief of her bilateral low back and right leg pain.  The she is status post right T4, 5, 6 and 7 medial branch radiofrequency ablation on 10/12/2018 with 100% relief.  She is status post L5/S1 interlaminar epidural steroid injection on 01/16/2019 with almost 100% relief of her low back pain. She is status post right T4, 5, 6 and 7 medial branch radiofrequency ablation on 06/04/2019 with 80% relief.  She is status post L5/S1 interlaminar epidural steroid injection on 03/22/2021 with 85-90% relief.   She is status post right T4, 5, 6, 7 medial branch radiofrequency ablation on 05/21/2021 with 85% relief. She is status post cervical JINA C7/T1 on 11/11/2021 with greater than 70% relief. She is status post right T4, 5, 6, 7 medial branch radiofrequency ablation on 06/30/2022 with 85% relief. She is status post L5/S1 interlaminar epidural steroid injection to the right on 11/18/2022 with 75% relief. She is status post L5/S1 JINA with spread to the right on 07/21/2023 with greater than 50% relief. She is status post right T4, 5, 6, 7 medial branch  radiofrequency ablation on 10/12/2023 with 80-85% relief.    She is status post L5/S1 interlaminar epidural steroid injection on 12/06/2023 with 90% relief lasting 6 weeks.   She is status post L5/S1 interlaminar epidural steroid injection on 02/08/2024 with 90% relief.      Spine surgeries:  None    Antineuropathics:  Lidoderm 5%  NSAIDs:  Duexis 800 up to TID  Physical therapy:  She has consistently been in physical therapy, reports exercise, dry needling with at least temporary relief  Antidepressants:  Muscle relaxers:  Opioids:  Hydrocodone 7.5/325 twice daily  Antiplatelets/Anticoagulants:    ROS: She reports fatigability, headaches, hypothyroidism, joint stiffness, back pain, difficulty sleeping and anxiety.  Balance of review of systems is negative.      Past Medical History:   Diagnosis Date    Anxiety 08/25/2015    Arthritis     BRCA1 negative     BRCA2 negative     Celiac disease     Cervical spondylosis     Chronic back pain     DDD (degenerative disc disease), lumbar     Difficult intubation 03/2016    anterior larynx, pt with metal dental appliance, unable to do glidescope, used LMA    Dysautonomia     Encounter for blood transfusion     Facet arthropathy, thoracic     Hormone replacement therapy (HRT)     Insomnia 08/25/2015    Neck pain     PONV (postoperative nausea and vomiting)     Right foot pain     Scoliosis     Sjogren's syndrome     Snoring     uses cpap, states not ROSSY    Unspecified hypothyroidism 08/25/2015     Past Surgical History:   Procedure Laterality Date    BILATERAL OOPHORECTOMY  2004    BREAST BIOPSY Left 2010    Benign    CHOLECYSTECTOMY  2010    COLONOSCOPY  2016    [facility]    Epidural Steroid Injection      Pain managment, at another facility, cervical, thoracic    Epidural Steroid injection      Pain management, cervical    EPIDURAL STEROID INJECTION INTO CERVICAL SPINE N/A 11/11/2021    Procedure: Injection-steroid-epidural-cervical, C7/T1 interlaminer;  Surgeon: Floyd DUNN  MD Jared;  Location: [facility] OR;  Service: Pain Management;  Laterality: N/A;    EPIDURAL STEROID INJECTION INTO LUMBAR SPINE Right 1/16/2019    Procedure: Injection-steroid-epidural-lumbar L5/S1;  Surgeon: Floyd Coleman MD;  Location: [facility] OR;  Service: Pain Management;  Laterality: Right;  to right    EPIDURAL STEROID INJECTION INTO LUMBAR SPINE N/A 5/14/2019    Procedure: Injection-steroid-epidural-lumbar;  Surgeon: Floyd Coleman MD;  Location: [facility] OR;  Service: Pain Management;  Laterality: N/A;  L5/S1 interlaminar    EPIDURAL STEROID INJECTION INTO LUMBAR SPINE N/A 3/22/2021    Procedure: Injection-steroid-epidural-lumbar L5/S1 interlaminer;  Surgeon: Floyd Coleman MD;  Location: [facility] OR;  Service: Pain Management;  Laterality: N/A;    EPIDURAL STEROID INJECTION INTO LUMBAR SPINE N/A 11/18/2022    Procedure: Injection-steroid-epidural-lumbar L5/S1 to right;  Surgeon: Floyd Coleman MD;  Location: [facility] OR;  Service: Pain Management;  Laterality: N/A;    EPIDURAL STEROID INJECTION INTO LUMBAR SPINE N/A 7/21/2023    Procedure: Injection-steroid-epidural-lumbar Interlaminar L5/S1 to the right;  Surgeon: Floyd Coleman MD;  Location: [facility] OR;  Service: Pain Management;  Laterality: N/A;    EPIDURAL STEROID INJECTION INTO LUMBAR SPINE N/A 12/6/2023    Procedure: Injection-steroid-epidural-lumbar     L5/S1;  Surgeon: Floyd Coleman MD;  Location: [facility] OR;  Service: Pain Management;  Laterality: N/A;    EPIDURAL STEROID INJECTION INTO LUMBAR SPINE N/A 2/8/2024    Procedure: Injection-steroid-epidural-lumbar     L5/S1;  Surgeon: Floyd Coleman MD;  Location: [facility] OR;  Service: Pain Management;  Laterality: N/A;    HYSTERECTOMY  2004    Complete    MOUTH SURGERY      Braces, with temporary metal implants in upper gum    OOPHORECTOMY  2004    w/ hysterectomy    RADIOFREQUENCY ABLATION  02/2016    thoracic nerve    RADIOFREQUENCY ABLATION Right 6/4/2019    Procedure: Radiofrequency  Ablation T4,5,6,7;  Surgeon: Floyd Coleman MD;  Location: [facility] OR;  Service: Pain Management;  Laterality: Right;    RADIOFREQUENCY ABLATION Right 5/21/2021    Procedure: RADIOFREQUENCY ABLATION,THORACIC  T4,T5,T6, T7;  Surgeon: Floyd Coleman MD;  Location: [facility] OR;  Service: Pain Management;  Laterality: Right;    RADIOFREQUENCY ABLATION Right 6/30/2022    Procedure: Radiofrequency Ablation Thoracic T4, T5, T6, T7;  Surgeon: Floyd Coleman MD;  Location: [facility] OR;  Service: Pain Management;  Laterality: Right;    RADIOFREQUENCY ABLATION Right 10/12/2023    Procedure: RADIOFREQUENCY ABLATION,THORACIC Right T4, T5, T6, T7;  Surgeon: Floyd Coleman MD;  Location: [facility] OR;  Service: Pain Management;  Laterality: Right;  THORACIC Right T4, T5, T6, T7    TONSILLECTOMY      VULVA SURGERY  03/2016     Social History     Socioeconomic History    Marital status:    Tobacco Use    Smoking status: Never    Smokeless tobacco: Never   Substance and Sexual Activity    Alcohol use: No    Drug use: No    Sexual activity: Yes     Partners: Male     Social Determinants of Health     Financial Resource Strain: Low Risk  (2/27/2024)    Overall Financial Resource Strain (CARDIA)     Difficulty of Paying Living Expenses: Not hard at all   Food Insecurity: No Food Insecurity (2/27/2024)    Hunger Vital Sign     Worried About Running Out of Food in the Last Year: Never true     Ran Out of Food in the Last Year: Never true   Transportation Needs: No Transportation Needs (2/27/2024)    PRAPARE - Transportation     Lack of Transportation (Medical): No     Lack of Transportation (Non-Medical): No   Physical Activity: Insufficiently Active (2/27/2024)    Exercise Vital Sign     Days of Exercise per Week: 3 days     Minutes of Exercise per Session: 20 min   Stress: Stress Concern Present (2/27/2024)    [place] [employer] - Occupational Stress Questionnaire     Feeling of Stress : Rather much  "  Social Connections: Unknown (2024)    Social Connection and Isolation Panel [NHANES]     Frequency of Communication with Friends and Family: More than three times a week     Frequency of Social Gatherings with Friends and Family: Once a week     Active Member of Clubs or Organizations: Yes     Attends Club or Organization Meetings: More than 4 times per year     Marital Status:    Housing Stability: Unknown (2024)    Housing Stability Vital Sign     Unable to Pay for Housing in the Last Year: No     Unstable Housing in the Last Year: No      She is a  at [employer].    Medications/Allergies: See med card    Vitals:    24 1307   BP: 125/60   Pulse: (!) 59   Weight: 84.2 kg (185 lb 11.8 oz)   Height: 5' 1" (1.549 m)   PainSc:   3   PainLoc: Back         Physical exam:  Gen: A and O x3, pleasant, well-groomed      Imagin/16/15 Xray Scoliosis survey: There is thoracic dextroscoliosis with mild lumbar compensatory levoscoliosis. No structural abnormalities are evident. Diffuse spondylosis noted in the cervical, thoracic and to lesser extent lumbar spine. No fractures are identified. There is slight increased kyphotic curvature of the thoracic spine with alignment being otherwise normal. Measurements and evaluation are limited due to underpenetration. Land angle measures approximately in the thoracic spine is of approximately 17.0 degrees and for the lumbar spine 17.4 degrees. Soft tissues are unremarkable. IMPRESSION: 1. S shaped scoliotic curvature of the thoracolumbar spine with Land angle of approximately 17 degrees. 2. Diffuse spondylosis.    MRI report 12: Report notes that there is dextroconvex rotary curvature of the thoracic spine.  Posterior alignment is maintained.  There are scattered vertebral body hemangiomas.  There is minimal posterior disc bulges noted at T5/6, T6/7, T7/8 and T8/9.  There is no central spinal stenosis or neural foraminal narrowing.    MRI " right shoulder 6/24/11: Minor distal subscapularis tendinosis.  Negative for full-thickness or significant partial-thickness rotator cuff tendon tear.  There is a small amount of subacromial subdeltoid bursal fluid which can be seen with recent injection or mild bursitis.    Cervical spine MRI 6/29/16  At C2-C3, the minimal interstitial excision covers a very mild broad-based disc bulge most prominent centrally but does not deform the ventral cord.  There is no canal or foraminal stenosis.  At C3-C4, there is mildly decreased disc height with a broad-based disc bulge most prominent centrally.  This minimally contact the ventral cord without significant deformity.  The canal is widely patent.  There is uncovertebral hypertrophy and facet arthropathy, but the foramina are patent.  At C4-C5, there is decreased disc height with a broad disc bulge-marginal osteophyte complex that mildly lateralizes to the left and blends imperceptibly with right greater than left uncovertebral hypertrophy.  With ligamentum hypertrophy, there is no significant central canal stenosis.  Uncovertebral hypertrophy and facet arthropathy are causing mild left and moderate right foraminal stenoses.  At C5-C6, there is a broad-based disc bulge and osteophyte complex most prominent centrally.  There is also ligamentum flavum hypertrophy present, but the canal is patent.  There is mild left foraminal stenosis.  The right foramen is patent.  At C6-C7, there is disc desiccation with a minimal broad-based disc bulge most prominent in the right paracentral canal.  The canal is widely patent.  There is mild left foraminal stenosis.  At C7-T1, there is no significant disc bulge, protrusion, or herniation/extrusion.  The canal and foramina are widely patent.    Hip x-rays 9/22/17  AP view of the pelvis and frog leg views of both hips were obtained. No evidence of acute displaced fracture or dislocation is visualized.  No radiopaque foreign bodies are  visualized. The bilateral superior joint spaces are grossly maintained. Mild bilateral sacroiliac joint degenerative changes are seen including subchondral sclerosis and small marginal osteophytes. Mild to moderate symphyseal degenerative changes are seen. There is a slightly expansile cortically-based focus along the lateral proximal right femoral metadiaphysis. This could reflect a sessile osteochondroma, but correlation with MRI of the right hip is recommended.    8/18/19 MRI L-spine:  T12-L1: No disc herniation or significant posterior osteophytic ridging.  No significant spinal canal or foraminal stenosis.   L1-L2: No disc herniation or significant posterior osteophytic ridging.  Minimal left facet hypertrophy.  No significant spinal canal or foraminal stenosis.   L2-L3: No disc herniation or significant posterior osteophytic ridging.  Minimal left facet hypertrophy.  No significant spinal canal or foraminal stenosis.   L3-L4: No disc herniation or significant posterior osteophytic ridging.  Minimal bilateral facet hypertrophy.  No significant spinal canal or foraminal stenosis.   L4-L5: Mild disc bulge.  Mild bilateral facet hypertrophy.  Mild ligamentum flavum thickening.  Minimal narrowing of the bilateral lateral recesses. No significant overall spinal canal stenosis. Mild bilateral foraminal stenosis.   L5-S1: Minimal disc bulge contained in the ventral epidural fat.  Mild bilateral facet hypertrophy.  No significant spinal canal or foraminal stenosis.    Assessment:  The patient is a 63-year-old woman with history of hypothyroidism, otherwise fairly healthy but a long history of scoliosis causing back pain, self-referred for continued back pain.    1. Lumbar radiculopathy        2. DDD (degenerative disc disease), lumbar        3. Cervical radiculopathy        4. DDD (degenerative disc disease), cervical        5. Thoracic spondylosis        6. 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 Opioid contract exists              Plan:  1. The patient had excellent relief following the L5/S1 interlaminar epidural steroid injection.  This can be repeated in the future if necessary.    2. Dr. Coleman provided prescriptions for hydrocodone -acetaminophen 7.5/325 mg up to 3 times daily as needed for pain.  I have reviewed the [place] Board [employer] website and there are no abberancies.    3. Follow-up in 3 months or sooner as needed.

## 2024-03-09 NOTE — TELEPHONE ENCOUNTER
Patient seen in clinic.  Please send prescription to her pharmacy. I have reviewed the [place] Board of Pharmacy website and there are no abberancies.

## 2024-03-10 RX ORDER — HYDROCODONE BITARTRATE AND ACETAMINOPHEN 7.5; 325 MG/1; MG/1
1 TABLET ORAL EVERY 8 HOURS PRN
Qty: 60 TABLET | Refills: 0 | Status: SHIPPED | OUTPATIENT
Start: 2024-05-07 | End: 2024-05-31 | Stop reason: SDUPTHER

## 2024-04-01 NOTE — TELEPHONE ENCOUNTER
If she wants to come at 1:45 a.m. or 215 we will do our best to see her prior to the 230 appointment.  If she is feeling good, visit shouldnt take too long. Yes

## 2024-05-07 RX ORDER — GABAPENTIN 100 MG/1
CAPSULE ORAL
Qty: 450 CAPSULE | Refills: 3 | Status: SHIPPED | OUTPATIENT
Start: 2024-05-07

## 2024-05-31 ENCOUNTER — TELEPHONE (OUTPATIENT)
Dept: PAIN MEDICINE | Facility: CLINIC | Age: 64
End: 2024-05-31
Payer: COMMERCIAL

## 2024-05-31 ENCOUNTER — OFFICE VISIT (OUTPATIENT)
Dept: PAIN MEDICINE | Facility: CLINIC | Age: 64
End: 2024-05-31
Payer: COMMERCIAL

## 2024-05-31 VITALS
BODY MASS INDEX: 35.5 KG/M2 | SYSTOLIC BLOOD PRESSURE: 137 MMHG | HEART RATE: 67 BPM | DIASTOLIC BLOOD PRESSURE: 70 MMHG | WEIGHT: 188.06 LBS | HEIGHT: 61 IN

## 2024-05-31 DIAGNOSIS — M51.36 DDD (DEGENERATIVE DISC DISEASE), LUMBAR: ICD-10-CM

## 2024-05-31 DIAGNOSIS — M47.814 THORACIC SPONDYLOSIS: ICD-10-CM

## 2024-05-31 DIAGNOSIS — Z79.891 OPIOID CONTRACT EXISTS: ICD-10-CM

## 2024-05-31 DIAGNOSIS — M50.30 DDD (DEGENERATIVE DISC DISEASE), CERVICAL: ICD-10-CM

## 2024-05-31 DIAGNOSIS — M54.16 LUMBAR RADICULOPATHY: Primary | ICD-10-CM

## 2024-05-31 PROCEDURE — 3075F SYST BP GE 130 - 139MM HG: CPT | Mod: CPTII,S$GLB,, | Performed by: PHYSICIAN ASSISTANT

## 2024-05-31 PROCEDURE — 1160F RVW MEDS BY RX/DR IN RCRD: CPT | Mod: CPTII,S$GLB,, | Performed by: PHYSICIAN ASSISTANT

## 2024-05-31 PROCEDURE — 3044F HG A1C LEVEL LT 7.0%: CPT | Mod: CPTII,S$GLB,, | Performed by: PHYSICIAN ASSISTANT

## 2024-05-31 PROCEDURE — 3078F DIAST BP <80 MM HG: CPT | Mod: CPTII,S$GLB,, | Performed by: PHYSICIAN ASSISTANT

## 2024-05-31 PROCEDURE — 99215 OFFICE O/P EST HI 40 MIN: CPT | Mod: S$GLB,,, | Performed by: PHYSICIAN ASSISTANT

## 2024-05-31 PROCEDURE — 3008F BODY MASS INDEX DOCD: CPT | Mod: CPTII,S$GLB,, | Performed by: PHYSICIAN ASSISTANT

## 2024-05-31 PROCEDURE — 1159F MED LIST DOCD IN RCRD: CPT | Mod: CPTII,S$GLB,, | Performed by: PHYSICIAN ASSISTANT

## 2024-05-31 PROCEDURE — 99999 PR PBB SHADOW E&M-EST. PATIENT-LVL IV: CPT | Mod: PBBFAC,,, | Performed by: PHYSICIAN ASSISTANT

## 2024-05-31 RX ORDER — HYDROCODONE BITARTRATE AND ACETAMINOPHEN 7.5; 325 MG/1; MG/1
1 TABLET ORAL EVERY 6 HOURS PRN
Qty: 90 TABLET | Refills: 0 | Status: SHIPPED | OUTPATIENT
Start: 2024-05-31 | End: 2024-06-30

## 2024-05-31 NOTE — TELEPHONE ENCOUNTER
Please send Rx as discussed. I have reviewed the [place] Board [employer] Pharmacy website and there are no abberancies.

## 2024-06-03 DIAGNOSIS — M54.16 LUMBAR RADICULOPATHY: Primary | ICD-10-CM

## 2024-06-03 RX ORDER — ALPRAZOLAM 1 MG/1
1 TABLET, ORALLY DISINTEGRATING ORAL ONCE AS NEEDED
Status: CANCELLED | OUTPATIENT
Start: 2024-06-03 | End: 2035-10-31

## 2024-06-03 NOTE — TELEPHONE ENCOUNTER
Spoke with patietn and scheduled. Advised to hold NSAIDS x 2 days prior. Pre op information given and follow up appointment scheduled.

## 2024-06-04 ENCOUNTER — OFFICE VISIT (OUTPATIENT)
Dept: RHEUMATOLOGY | Facility: CLINIC | Age: 64
End: 2024-06-04
Payer: COMMERCIAL

## 2024-06-04 ENCOUNTER — OFFICE VISIT (OUTPATIENT)
Dept: OPTOMETRY | Facility: CLINIC | Age: 64
End: 2024-06-04
Payer: COMMERCIAL

## 2024-06-04 ENCOUNTER — LAB VISIT (OUTPATIENT)
Dept: LAB | Facility: HOSPITAL | Age: 64
End: 2024-06-04
Attending: INTERNAL MEDICINE
Payer: COMMERCIAL

## 2024-06-04 VITALS
BODY MASS INDEX: 35.18 KG/M2 | HEART RATE: 66 BPM | WEIGHT: 186.31 LBS | DIASTOLIC BLOOD PRESSURE: 81 MMHG | SYSTOLIC BLOOD PRESSURE: 144 MMHG | HEIGHT: 61 IN

## 2024-06-04 DIAGNOSIS — Z79.899 LONG-TERM USE OF PLAQUENIL: ICD-10-CM

## 2024-06-04 DIAGNOSIS — H52.13 MYOPIA WITH ASTIGMATISM AND PRESBYOPIA, BILATERAL: ICD-10-CM

## 2024-06-04 DIAGNOSIS — D84.821 IMMUNOCOMPROMISED STATE DUE TO DRUG THERAPY: ICD-10-CM

## 2024-06-04 DIAGNOSIS — H52.4 MYOPIA WITH ASTIGMATISM AND PRESBYOPIA, BILATERAL: ICD-10-CM

## 2024-06-04 DIAGNOSIS — Z46.0 CONTACT LENS/GLASSES FITTING: ICD-10-CM

## 2024-06-04 DIAGNOSIS — D69.0 HENOCH SCHONLEIN SYNDROME: ICD-10-CM

## 2024-06-04 DIAGNOSIS — H52.203 MYOPIA WITH ASTIGMATISM AND PRESBYOPIA, BILATERAL: ICD-10-CM

## 2024-06-04 DIAGNOSIS — Z46.0 CONTACT LENS/GLASSES FITTING: Primary | ICD-10-CM

## 2024-06-04 DIAGNOSIS — Z79.899 IMMUNOCOMPROMISED STATE DUE TO DRUG THERAPY: ICD-10-CM

## 2024-06-04 DIAGNOSIS — M35.01 SJOGREN'S SYNDROME WITH KERATOCONJUNCTIVITIS SICCA: Primary | ICD-10-CM

## 2024-06-04 DIAGNOSIS — Z13.5 GLAUCOMA SCREENING: ICD-10-CM

## 2024-06-04 DIAGNOSIS — Z79.899 HIGH RISK MEDICATION USE: ICD-10-CM

## 2024-06-04 DIAGNOSIS — H43.393 VITREOUS FLOATERS, BILATERAL: ICD-10-CM

## 2024-06-04 DIAGNOSIS — I77.6 AUTOIMMUNE VASCULITIS: ICD-10-CM

## 2024-06-04 DIAGNOSIS — M17.0 PRIMARY OSTEOARTHRITIS OF BOTH KNEES: ICD-10-CM

## 2024-06-04 DIAGNOSIS — M85.80 OSTEOPENIA, UNSPECIFIED LOCATION: Primary | ICD-10-CM

## 2024-06-04 PROCEDURE — 92015 DETERMINE REFRACTIVE STATE: CPT | Mod: S$GLB,,, | Performed by: OPTOMETRIST

## 2024-06-04 PROCEDURE — 1159F MED LIST DOCD IN RCRD: CPT | Mod: CPTII,S$GLB,, | Performed by: OPTOMETRIST

## 2024-06-04 PROCEDURE — 3008F BODY MASS INDEX DOCD: CPT | Mod: CPTII,S$GLB,, | Performed by: INTERNAL MEDICINE

## 2024-06-04 PROCEDURE — 92014 COMPRE OPH EXAM EST PT 1/>: CPT | Mod: S$GLB,,, | Performed by: OPTOMETRIST

## 2024-06-04 PROCEDURE — 3044F HG A1C LEVEL LT 7.0%: CPT | Mod: CPTII,S$GLB,, | Performed by: INTERNAL MEDICINE

## 2024-06-04 PROCEDURE — 36415 COLL VENOUS BLD VENIPUNCTURE: CPT | Mod: PO | Performed by: INTERNAL MEDICINE

## 2024-06-04 PROCEDURE — 99215 OFFICE O/P EST HI 40 MIN: CPT | Mod: 25,S$GLB,, | Performed by: INTERNAL MEDICINE

## 2024-06-04 PROCEDURE — 92134 CPTRZ OPH DX IMG PST SGM RTA: CPT | Mod: S$GLB,,, | Performed by: OPTOMETRIST

## 2024-06-04 PROCEDURE — 92310 CONTACT LENS FITTING OU: CPT | Mod: CSM,S$GLB,, | Performed by: OPTOMETRIST

## 2024-06-04 PROCEDURE — 99499 UNLISTED E&M SERVICE: CPT | Mod: ,,, | Performed by: OPTOMETRIST

## 2024-06-04 PROCEDURE — 99999 PR PBB SHADOW E&M-EST. PATIENT-LVL III: CPT | Mod: PBBFAC,,, | Performed by: OPTOMETRIST

## 2024-06-04 PROCEDURE — 3077F SYST BP >= 140 MM HG: CPT | Mod: CPTII,S$GLB,, | Performed by: INTERNAL MEDICINE

## 2024-06-04 PROCEDURE — 96372 THER/PROPH/DIAG INJ SC/IM: CPT | Mod: S$GLB,,, | Performed by: INTERNAL MEDICINE

## 2024-06-04 PROCEDURE — 99999 PR PBB SHADOW E&M-EST. PATIENT-LVL IV: CPT | Mod: PBBFAC,,, | Performed by: INTERNAL MEDICINE

## 2024-06-04 PROCEDURE — 3079F DIAST BP 80-89 MM HG: CPT | Mod: CPTII,S$GLB,, | Performed by: INTERNAL MEDICINE

## 2024-06-04 PROCEDURE — 3044F HG A1C LEVEL LT 7.0%: CPT | Mod: CPTII,S$GLB,, | Performed by: OPTOMETRIST

## 2024-06-04 RX ORDER — LOTEPREDNOL ETABONATE 5 MG/ML
1 SUSPENSION/ DROPS OPHTHALMIC 2 TIMES DAILY PRN
Qty: 5 ML | Refills: 3 | Status: SHIPPED | OUTPATIENT
Start: 2024-06-04 | End: 2025-06-04

## 2024-06-04 RX ORDER — REPOSITORY CORTICOTROPIN 80 [USP'U]/ML
40 INJECTION INTRAMUSCULAR; SUBCUTANEOUS
Qty: 10 ML | Refills: 6 | Status: SHIPPED | OUTPATIENT
Start: 2024-06-04

## 2024-06-04 RX ORDER — KETOROLAC TROMETHAMINE 30 MG/ML
60 INJECTION, SOLUTION INTRAMUSCULAR; INTRAVENOUS
Status: COMPLETED | OUTPATIENT
Start: 2024-06-04 | End: 2024-06-04

## 2024-06-04 RX ORDER — AZATHIOPRINE 50 MG/1
50 TABLET ORAL DAILY
Qty: 30 TABLET | Refills: 2 | Status: SHIPPED | OUTPATIENT
Start: 2024-06-04 | End: 2024-09-02

## 2024-06-04 RX ORDER — IBUPROFEN AND FAMOTIDINE 26.6; 8 MG/1; MG/1
1 TABLET ORAL 3 TIMES DAILY
Qty: 90 TABLET | Refills: 12 | Status: SHIPPED | OUTPATIENT
Start: 2024-06-04 | End: 2025-06-04

## 2024-06-04 RX ADMIN — KETOROLAC TROMETHAMINE 60 MG: 30 INJECTION, SOLUTION INTRAMUSCULAR; INTRAVENOUS at 03:06

## 2024-06-04 NOTE — PATIENT INSTRUCTIONS
"DRY EYES -- BURNING OR JL SYMPTOMS:  Use Over The Counter artificial tears as needed for dry eye symptoms.   Some common brands include:  Systane, Optive, Refresh, and Thera-Tears.  These drops can be used as frequently as desired, but may be most helpful use during long periods of concentrated work.  For example, reading / working at the computer. Start with 3-4x per day.     Nighttime Ophthalmic gel or ointments are available: Refresh PM, Genteal, and Lacrilube.    Avoid drops that "get redness out" (Visine, Murine, Clear Eyes), as these may contain medication that could further irritate the eyes, especially with chronic use.    ALLERGY EYES -- ITCHING SYMPTOMS:  Over the counter medications include--Pataday, Zaditor, and Alaway.  Use as directed 1-2 drops daily for symptoms of itching / watering eyes.  These drops will not help for dry eye or exposure symptoms.    REDNESS RELIEF:  Lumify---is a good redness reliever that will not cause irritation if used chronically.        FLASHES / FLOATERS / POSTERIOR VITREOUS DETACHMENT    Call the clinic if you have any further changes in symptoms.  Including:  Increased numbers of floaters or flashing lights, dimness or darkness that moves through or stays constant in your vision, or any pain in the eye (s).    You may sometimes see small specks or clouds moving in your field of vision.  They are called FLOATERS.  You can often see them when looking at a plain background, like a blank wall or blue regis.  Floaters are actually tiny clumps of gel or cells inside the VITREOUS, the clear jelly-like fluid that fills the inside of your eye.    While these objects look like they are in front of your eye, they are actually floating inside.  What you see are the shadows they cast on the RETINA, the nerve layer at the back of the eye that senses light and allows you to see.      POSTERIOR VITREOUS DETACHMENT    The appearance of new floaters may be alarming.  If you suddenly " develop new floaters, you should contact your eye care professional  right away.    The retina can tear if the shrinking vitreous pulls away from the wall of the eye.  This sometimes causes a small amount of bleeding in the eye that may appear as new floaters.    A torn retina is always a serious problem, since it can lead to a retinal detachment.  You should see your eye care professional as soon as possible if:    even one new floater appears suddenly;  you see sudden flashes of light;  you notice other symptoms, like the loss of side vision, or a curtain closes down in your vision        POSTERIOR VITREOUS DETACHMENT is more common for people who:    are nearsighted;  have had cataract surgery;  have had YAG laser surgery of the eye;  have had inflammation inside the eye;  are over age 60.      While some floaters may remain visible, many of them will fade over time and become less noticeable.  Even if you've had some floaters for years, you should have your eyes checked as soon as possible if you notice new ones.    FLASHING LIGHTS    When the vitreous gel rubs or pulls on the retina, you may see what look like flashing lights or lightning streaks.  These flashes can appear off and on for several weeks or months.      Some people experience flashes of light that appear as jagged lines or heat waves in both eyes, lasting 10-20 minutes.  These flashes are caused by a spasm of blood vessels in the brain, which is called a migraine.    If a headache follows these flashes, it's called a migraine headache.  If   no headache occurs, these flashes are called Ophthalmic or Ocular Migraine.           DAILY WEAR CONTACT LENSES  Continue with Daily Wear of contact lenses, up to all waking hours.  Continue with approved contact lens disinfection / rewetting drops as discussed.  Dispose of lenses monthly.  Stop wearing your lenses and call our office if redness, blurred vision, or pain persists more than 12 hours.  We  recommend an annual eye exam for contact lens patients.

## 2024-06-04 NOTE — PROGRESS NOTES
This note was completed with dictation software and grammatical errors may exist.    CC:Back pain, neck pain    HPI: The patient is a 63-year-old woman with history of hypothyroidism, otherwise fairly healthy but a long history of scoliosis causing back pain, self-referred for continued back pain.  She returns in follow-up today with severe worsening pain.  This has been present for the past couple of months but she has not come in she was helping care for her daughter who just had surgery.  She describes pain in the left low back radiating to the left lateral hip, lateral thigh into her left knee and left upper shin.  She also will occasionally experience some pain in the ball of her left foot.  She describes the pain as burning, vibrating, worse with sitting and only mildly improved with pain medication.  She denies but those that she may have weakness although pain limited.    Pain intervention history: She has been seeing Dr. Huffman for the last several years and has undergone epidural steroid injections and radiofrequency ablations with good relief.  According to her last pain management physician, she had undergone a right side T3, 4, 5, 6 medial branch radiofrequency ablation on 8/8/14 with good relief.  He had scheduled her for another one but did not complete this.  As far as medications she has been taking Hydrocodone 7.5/325 one to 2 times a day at most and gabapentin 300 mg at night. She is status post cervical epidural steroid injection on 10/19/15 with 50% relief of her neck pain. She is status post right L4 transforaminal epidural steroid injection on 11/23/15 with 100% relief.   She is status post right T3, 4, 5 and 6 medial branch radio frequency ablation on 2/5/16 with greater than 50% relief.  She is status post right L4 transforaminal epidural sterile injection on 6/14/16 with significant relief lasting only about a month.  She is status post C7-T1 cervical interlaminar epidural steroid  injection on 7/14/16 with 30-40% relief.   She is status post L5/S1 interlaminar epidural steroidal injection to the right on 8/12/16 with 100% relief of her right leg pain.  She is status post C7-T1 cervical interlaminar epidural steroid injection on 12/9/16 with almost complete relief.  She is status post right T4, 5, 6 and 7 medial branch radiofrequency ablation on 5/30/17 with 100% relief.  She is status post C7-T1 cervical interlaminar epidural steroid injection on 6/27/17 with 80% relief.  She is status post L5/S1 interlaminar epidural steroid injection on a/7/17 with excellent relief lasting 2 weeks, now reporting 0% relief.  She is status post right L3, 4 and 5 medial branch radiofrequency ablation on 10/19/17 with 80% relief.   She is status post right T4, 5, 6 and 7 medial branch radiofrequency ablation on 12/14/17 with 100% relief.  She is status post C7-T1 cervical interlaminar epidural steroid injection on 3/16/18 with at least 80% relief.  She is status post lumbar epidural steroid injection at L5/S1 on 5/15/18 with 90% relief of her bilateral low back and right leg pain.  The she is status post right T4, 5, 6 and 7 medial branch radiofrequency ablation on 10/12/2018 with 100% relief.  She is status post L5/S1 interlaminar epidural steroid injection on 01/16/2019 with almost 100% relief of her low back pain. She is status post right T4, 5, 6 and 7 medial branch radiofrequency ablation on 06/04/2019 with 80% relief.  She is status post L5/S1 interlaminar epidural steroid injection on 03/22/2021 with 85-90% relief.   She is status post right T4, 5, 6, 7 medial branch radiofrequency ablation on 05/21/2021 with 85% relief. She is status post cervical JINA C7/T1 on 11/11/2021 with greater than 70% relief. She is status post right T4, 5, 6, 7 medial branch radiofrequency ablation on 06/30/2022 with 85% relief. She is status post L5/S1 interlaminar epidural steroid injection to the right on 11/18/2022 with 75%  relief. She is status post L5/S1 JINA with spread to the right on 07/21/2023 with greater than 50% relief. She is status post right T4, 5, 6, 7 medial branch radiofrequency ablation on 10/12/2023 with 80-85% relief.    She is status post L5/S1 interlaminar epidural steroid injection on 12/06/2023 with 90% relief lasting 6 weeks.   She is status post L5/S1 interlaminar epidural steroid injection on 02/08/2024 with 90% relief.      Spine surgeries:  None    Antineuropathics:  Lidoderm 5%  NSAIDs:  Duexis 800 up to TID  Physical therapy:  She has consistently been in physical therapy, reports exercise, dry needling with at least temporary relief  Antidepressants:  Muscle relaxers:  Opioids:  Hydrocodone 7.5/325 twice daily  Antiplatelets/Anticoagulants:    ROS: She reports fatigability, headaches, hypothyroidism, joint stiffness, back pain, difficulty sleeping and anxiety.  Balance of review of systems is negative.      Past Medical History:   Diagnosis Date    Anxiety 08/25/2015    Arthritis     BRCA1 negative     BRCA2 negative     Celiac disease     Cervical spondylosis     Chronic back pain     DDD (degenerative disc disease), lumbar     Difficult intubation 03/2016    anterior larynx, pt with metal dental appliance, unable to do glidescope, used LMA    Dysautonomia     Encounter for blood transfusion     Facet arthropathy, thoracic     Hormone replacement therapy (HRT)     Insomnia 08/25/2015    Neck pain     PONV (postoperative nausea and vomiting)     Right foot pain     Scoliosis     Sjogren's syndrome     Snoring     uses cpap, states not ROSSY    Unspecified hypothyroidism 08/25/2015     Past Surgical History:   Procedure Laterality Date    BILATERAL OOPHORECTOMY  2004    BREAST BIOPSY Left 2010    Benign    CHOLECYSTECTOMY  2010    COLONOSCOPY  2016    [facility]    Epidural Steroid Injection      Pain managment, at another facility, cervical, thoracic    Epidural Steroid injection      Pain management, cervical     EPIDURAL STEROID INJECTION INTO CERVICAL SPINE N/A 11/11/2021    Procedure: Injection-steroid-epidural-cervical, C7/T1 interlaminer;  Surgeon: Floyd Coleman MD;  Location: [facility] OR;  Service: Pain Management;  Laterality: N/A;    EPIDURAL STEROID INJECTION INTO LUMBAR SPINE Right 1/16/2019    Procedure: Injection-steroid-epidural-lumbar L5/S1;  Surgeon: Floyd Coleman MD;  Location: [facility] OR;  Service: Pain Management;  Laterality: Right;  to right    EPIDURAL STEROID INJECTION INTO LUMBAR SPINE N/A 5/14/2019    Procedure: Injection-steroid-epidural-lumbar;  Surgeon: Floyd Coleman MD;  Location: [facility] OR;  Service: Pain Management;  Laterality: N/A;  L5/S1 interlaminar    EPIDURAL STEROID INJECTION INTO LUMBAR SPINE N/A 3/22/2021    Procedure: Injection-steroid-epidural-lumbar L5/S1 interlaminer;  Surgeon: Floyd Coleman MD;  Location: [facility] OR;  Service: Pain Management;  Laterality: N/A;    EPIDURAL STEROID INJECTION INTO LUMBAR SPINE N/A 11/18/2022    Procedure: Injection-steroid-epidural-lumbar L5/S1 to right;  Surgeon: Floyd Coleman MD;  Location: [facility] OR;  Service: Pain Management;  Laterality: N/A;    EPIDURAL STEROID INJECTION INTO LUMBAR SPINE N/A 7/21/2023    Procedure: Injection-steroid-epidural-lumbar Interlaminar L5/S1 to the right;  Surgeon: Floyd Coleman MD;  Location: [facility] OR;  Service: Pain Management;  Laterality: N/A;    EPIDURAL STEROID INJECTION INTO LUMBAR SPINE N/A 12/6/2023    Procedure: Injection-steroid-epidural-lumbar     L5/S1;  Surgeon: Floyd Coleman MD;  Location: [facility] OR;  Service: Pain Management;  Laterality: N/A;    EPIDURAL STEROID INJECTION INTO LUMBAR SPINE N/A 2/8/2024    Procedure: Injection-steroid-epidural-lumbar     L5/S1;  Surgeon: Floyd Coleman MD;  Location: [facility] OR;  Service: Pain Management;  Laterality: N/A;    HYSTERECTOMY  2004    Complete    MOUTH SURGERY      Braces, with temporary metal implants in upper gum     OOPHORECTOMY  2004    w/ hysterectomy    RADIOFREQUENCY ABLATION  02/2016    thoracic nerve    RADIOFREQUENCY ABLATION Right 6/4/2019    Procedure: Radiofrequency Ablation T4,5,6,7;  Surgeon: Floyd Coleman MD;  Location: [facility] OR;  Service: Pain Management;  Laterality: Right;    RADIOFREQUENCY ABLATION Right 5/21/2021    Procedure: RADIOFREQUENCY ABLATION,THORACIC  T4,T5,T6, T7;  Surgeon: Floyd Coleman MD;  Location: [facility] OR;  Service: Pain Management;  Laterality: Right;    RADIOFREQUENCY ABLATION Right 6/30/2022    Procedure: Radiofrequency Ablation Thoracic T4, T5, T6, T7;  Surgeon: Floyd Coleman MD;  Location: [facility] OR;  Service: Pain Management;  Laterality: Right;    RADIOFREQUENCY ABLATION Right 10/12/2023    Procedure: RADIOFREQUENCY ABLATION,THORACIC Right T4, T5, T6, T7;  Surgeon: Floyd Coleman MD;  Location: [facility] OR;  Service: Pain Management;  Laterality: Right;  THORACIC Right T4, T5, T6, T7    TONSILLECTOMY      VULVA SURGERY  03/2016     Social History     Socioeconomic History    Marital status:    Tobacco Use    Smoking status: Never    Smokeless tobacco: Never   Substance and Sexual Activity    Alcohol use: No    Drug use: No    Sexual activity: Yes     Partners: Male     Social Determinants of Health     Financial Resource Strain: Low Risk  (2/27/2024)    Overall Financial Resource Strain (CARDIA)     Difficulty of Paying Living Expenses: Not hard at all   Food Insecurity: No Food Insecurity (2/27/2024)    Hunger Vital Sign     Worried About Running Out of Food in the Last Year: Never true     Ran Out of Food in the Last Year: Never true   Transportation Needs: No Transportation Needs (2/27/2024)    PRAPARE - Transportation     Lack of Transportation (Medical): No     Lack of Transportation (Non-Medical): No   Physical Activity: Insufficiently Active (2/27/2024)    Exercise Vital Sign     Days of Exercise per Week: 3 days     Minutes of Exercise per Session: 20 min  "  Stress: Stress Concern Present (2/27/2024)    [employer] [place] [employer] - Occupational Stress Questionnaire     Feeling of Stress : Rather much   Housing Stability: Unknown (2/27/2024)    Housing Stability Vital Sign     Unable to Pay for Housing in the Last Year: No     Unstable Housing in the Last Year: No      She is a  at [employer].    Medications/Allergies: See med card    Vitals:    05/31/24 1421   BP: 137/70   Pulse: 67   Weight: 85.3 kg (188 lb 0.8 oz)   Height: 5' 1" (1.549 m)   PainSc:   5   PainLoc: Back         Physical exam:  Gen: A and O x3, pleasant, well-groomed  Skin: No rashes or obvious lesions  HEENT: PERRLA, no obvious deformities on ears or in canals. Trachea midline.  CVS: Regular rate and rhythm, normal palpable pulses.  Resp:No increased work of breathing, symmetrical chest rise.  Abdomen: Soft, NT/ND.  Musculoskeletal:No antalgic gait.      Neuro:  Upper extremities: 5/5 strength bilaterally.  Lower extremities: 5/5 strength bilaterally.    Reflexes: Patellar 0+, Achilles 0+ bilaterally.  Upper extremity reflexes 2+ bilaterally equal.  Sensory:  Intact and symmetrical to light touch and pinprick in L2-S1 dermatomes bilaterally.     Cervical spine exam: Range of motion is mildly reduced with flexion, extension and lateral rotation with increased right neck pain during right lateral rotation and extension.  Myofascial exam:  Exquisite tenderness to palpation to the right mid thoracic paraspinous muscles, right trapezius muscle.       Lumbar spine:  Lumbar spine: Range of motion is full with flexion and extension with increased left low back pain and lateral hip pain during each maneuver.  Brayden's test causes no increased pain on either side.    Supine straight leg raise causes increased left low back and left knee pain.  Internal and external rotation of the hip causes no increased pain on either side.  Myofascial exam:  Mild tenderness to palpation to the " left lower lumbar paraspinous muscles.    Imagin/16/15 Xray Scoliosis survey: There is thoracic dextroscoliosis with mild lumbar compensatory levoscoliosis. No structural abnormalities are evident. Diffuse spondylosis noted in the cervical, thoracic and to lesser extent lumbar spine. No fractures are identified. There is slight increased kyphotic curvature of the thoracic spine with alignment being otherwise normal. Measurements and evaluation are limited due to underpenetration. Land angle measures approximately in the thoracic spine is of approximately 17.0 degrees and for the lumbar spine 17.4 degrees. Soft tissues are unremarkable. IMPRESSION: 1. S shaped scoliotic curvature of the thoracolumbar spine with Land angle of approximately 17 degrees. 2. Diffuse spondylosis.    MRI report 12: Report notes that there is dextroconvex rotary curvature of the thoracic spine.  Posterior alignment is maintained.  There are scattered vertebral body hemangiomas.  There is minimal posterior disc bulges noted at T5/6, T6/7, T7/8 and T8/9.  There is no central spinal stenosis or neural foraminal narrowing.    MRI right shoulder 11: Minor distal subscapularis tendinosis.  Negative for full-thickness or significant partial-thickness rotator cuff tendon tear.  There is a small amount of subacromial subdeltoid bursal fluid which can be seen with recent injection or mild bursitis.    Cervical spine MRI 16  At C2-C3, the minimal interstitial excision covers a very mild broad-based disc bulge most prominent centrally but does not deform the ventral cord.  There is no canal or foraminal stenosis.  At C3-C4, there is mildly decreased disc height with a broad-based disc bulge most prominent centrally.  This minimally contact the ventral cord without significant deformity.  The canal is widely patent.  There is uncovertebral hypertrophy and facet arthropathy, but the foramina are patent.  At C4-C5, there is decreased  disc height with a broad disc bulge-marginal osteophyte complex that mildly lateralizes to the left and blends imperceptibly with right greater than left uncovertebral hypertrophy.  With ligamentum hypertrophy, there is no significant central canal stenosis.  Uncovertebral hypertrophy and facet arthropathy are causing mild left and moderate right foraminal stenoses.  At C5-C6, there is a broad-based disc bulge and osteophyte complex most prominent centrally.  There is also ligamentum flavum hypertrophy present, but the canal is patent.  There is mild left foraminal stenosis.  The right foramen is patent.  At C6-C7, there is disc desiccation with a minimal broad-based disc bulge most prominent in the right paracentral canal.  The canal is widely patent.  There is mild left foraminal stenosis.  At C7-T1, there is no significant disc bulge, protrusion, or herniation/extrusion.  The canal and foramina are widely patent.    Hip x-rays 9/22/17  AP view of the pelvis and frog leg views of both hips were obtained. No evidence of acute displaced fracture or dislocation is visualized.  No radiopaque foreign bodies are visualized. The bilateral superior joint spaces are grossly maintained. Mild bilateral sacroiliac joint degenerative changes are seen including subchondral sclerosis and small marginal osteophytes. Mild to moderate symphyseal degenerative changes are seen. There is a slightly expansile cortically-based focus along the lateral proximal right femoral metadiaphysis. This could reflect a sessile osteochondroma, but correlation with MRI of the right hip is recommended.    8/18/19 MRI L-spine:  T12-L1: No disc herniation or significant posterior osteophytic ridging.  No significant spinal canal or foraminal stenosis.   L1-L2: No disc herniation or significant posterior osteophytic ridging.  Minimal left facet hypertrophy.  No significant spinal canal or foraminal stenosis.   L2-L3: No disc herniation or significant  posterior osteophytic ridging.  Minimal left facet hypertrophy.  No significant spinal canal or foraminal stenosis.   L3-L4: No disc herniation or significant posterior osteophytic ridging.  Minimal bilateral facet hypertrophy.  No significant spinal canal or foraminal stenosis.   L4-L5: Mild disc bulge.  Mild bilateral facet hypertrophy.  Mild ligamentum flavum thickening.  Minimal narrowing of the bilateral lateral recesses. No significant overall spinal canal stenosis. Mild bilateral foraminal stenosis.   L5-S1: Minimal disc bulge contained in the ventral epidural fat.  Mild bilateral facet hypertrophy.  No significant spinal canal or foraminal stenosis.    Assessment:  The patient is a 63-year-old woman with history of hypothyroidism, otherwise fairly healthy but a long history of scoliosis causing back pain, self-referred for continued back pain.    1. Lumbar radiculopathy        2. DDD (degenerative disc disease), lumbar        3. DDD (degenerative disc disease), cervical        4. Thoracic spondylosis        5. Opioid contract exists              Plan:  1. I will schedule the patient for a left L4/5 transforaminal epidural steroid injection due to her severe lumbar radicular pain.    2. I have reviewed her case with Dr. Coleman who has provided prescriptions for hydrocodone -acetaminophen 7.5/325 mg up to 3 times daily as needed for pain.  He has sent 90 for this month due to her increased pain with the plan to prescribe 60 in the future.  I have reviewed the [place] Board of Pharmacy website and there are no abberancies.    3. Follow-up in 4 weeks postprocedure or sooner as needed.    The total time spent for evaluation and management today including reviewing separately obtained history, performing a medically appropriate exam and evaluation, documenting clinical information in the health record, independently interpreting results and communicating them to the patient/family/caregiver, and ordering  medications/tests/procedures was between 40-54 minutes.

## 2024-06-04 NOTE — PROGRESS NOTES
HPI    Routine-dle-3/23    Pt denies any changes since last visit. Needing updated glasses rx. Taking   plaquenil. No flashes or floaters. Defers dfe today. Using AT prn.  Last edited by Rozina Espana on 6/4/2024  9:46 AM.            Assessment /Plan     For exam results, see Encounter Report.    Sjogren's syndrome with keratoconjunctivitis sicca  -     loteprednol (LOTEMAX) 0.5 % ophthalmic suspension; Place 1 drop into both eyes 2 (two) times daily as needed (for dry eye inflammation).  Dispense: 5 mL; Refill: 3    Long-term use of Plaquenil    Vitreous floaters, bilateral    Glaucoma screening    Myopia with astigmatism and presbyopia, bilateral    Contact lens/glasses fitting           1,2.   Longstanding dry eye / exposure / air from cpap   Long hours on computer daily w/ work ---still continues full time wear cl's   Good tear lake today, no gross spk OU      Plaquenil > 1 year   Current 4.70 mg/ kg/ day   Cumulative ~ 150 gram     OCT & OPTOS 6/2024   Normal mac/ fovea       OCT 3/2023  Normal macula / fovea      Pt reports definite improved comfort using Restasis tid ---ok continue although still rec chronic use @ bid   Refilled lotemax for bid/ prn use   ATs qid + and prefer using NP tears     3.   RD precautions given and reviewed. Patient knows to call/ message if any further changes in symptoms occur.  4.   Not suspect   5.   Updated specs rx gave copy, discussed options // fill prn --ok continue with current   6.   Updated clrx, gave copy and trials if needed, advised reduced wear time 2/2 dry eye issues     DAILY WEAR CONTACT LENSES  Continue with Daily Wear of contact lenses, up to all waking hours.  Continue with approved contact lens disinfection / rewetting drops as discussed.  Dispose of lenses monthly.  Stop wearing your lenses and call our office if redness, blurred vision, or pain persists more than 12 hours.  We recommend an annual eye exam for contact lens patients.         1 year for  full exam with DFE  and repeat OCT / 10-2 fields

## 2024-06-04 NOTE — H&P (VIEW-ONLY)
This note was completed with dictation software and grammatical errors may exist.    CC:Back pain, neck pain    HPI: The patient is a 63-year-old woman with history of hypothyroidism, otherwise fairly healthy but a long history of scoliosis causing back pain, self-referred for continued back pain.  She returns in follow-up today with severe worsening pain.  This has been present for the past couple of months but she has not come in she was helping care for her daughter who just had surgery.  She describes pain in the left low back radiating to the left lateral hip, lateral thigh into her left knee and left upper shin.  She also will occasionally experience some pain in the ball of her left foot.  She describes the pain as burning, vibrating, worse with sitting and only mildly improved with pain medication.  She denies but those that she may have weakness although pain limited.    Pain intervention history: She has been seeing Dr. Huffman for the last several years and has undergone epidural steroid injections and radiofrequency ablations with good relief.  According to her last pain management physician, she had undergone a right side T3, 4, 5, 6 medial branch radiofrequency ablation on 8/8/14 with good relief.  He had scheduled her for another one but did not complete this.  As far as medications she has been taking Hydrocodone 7.5/325 one to 2 times a day at most and gabapentin 300 mg at night. She is status post cervical epidural steroid injection on 10/19/15 with 50% relief of her neck pain. She is status post right L4 transforaminal epidural steroid injection on 11/23/15 with 100% relief.   She is status post right T3, 4, 5 and 6 medial branch radio frequency ablation on 2/5/16 with greater than 50% relief.  She is status post right L4 transforaminal epidural sterile injection on 6/14/16 with significant relief lasting only about a month.  She is status post C7-T1 cervical interlaminar epidural steroid  injection on 7/14/16 with 30-40% relief.   She is status post L5/S1 interlaminar epidural steroidal injection to the right on 8/12/16 with 100% relief of her right leg pain.  She is status post C7-T1 cervical interlaminar epidural steroid injection on 12/9/16 with almost complete relief.  She is status post right T4, 5, 6 and 7 medial branch radiofrequency ablation on 5/30/17 with 100% relief.  She is status post C7-T1 cervical interlaminar epidural steroid injection on 6/27/17 with 80% relief.  She is status post L5/S1 interlaminar epidural steroid injection on a/7/17 with excellent relief lasting 2 weeks, now reporting 0% relief.  She is status post right L3, 4 and 5 medial branch radiofrequency ablation on 10/19/17 with 80% relief.   She is status post right T4, 5, 6 and 7 medial branch radiofrequency ablation on 12/14/17 with 100% relief.  She is status post C7-T1 cervical interlaminar epidural steroid injection on 3/16/18 with at least 80% relief.  She is status post lumbar epidural steroid injection at L5/S1 on 5/15/18 with 90% relief of her bilateral low back and right leg pain.  The she is status post right T4, 5, 6 and 7 medial branch radiofrequency ablation on 10/12/2018 with 100% relief.  She is status post L5/S1 interlaminar epidural steroid injection on 01/16/2019 with almost 100% relief of her low back pain. She is status post right T4, 5, 6 and 7 medial branch radiofrequency ablation on 06/04/2019 with 80% relief.  She is status post L5/S1 interlaminar epidural steroid injection on 03/22/2021 with 85-90% relief.   She is status post right T4, 5, 6, 7 medial branch radiofrequency ablation on 05/21/2021 with 85% relief. She is status post cervical JINA C7/T1 on 11/11/2021 with greater than 70% relief. She is status post right T4, 5, 6, 7 medial branch radiofrequency ablation on 06/30/2022 with 85% relief. She is status post L5/S1 interlaminar epidural steroid injection to the right on 11/18/2022 with 75%  relief. She is status post L5/S1 JINA with spread to the right on 07/21/2023 with greater than 50% relief. She is status post right T4, 5, 6, 7 medial branch radiofrequency ablation on 10/12/2023 with 80-85% relief.    She is status post L5/S1 interlaminar epidural steroid injection on 12/06/2023 with 90% relief lasting 6 weeks.   She is status post L5/S1 interlaminar epidural steroid injection on 02/08/2024 with 90% relief.      Spine surgeries:  None    Antineuropathics:  Lidoderm 5%  NSAIDs:  Duexis 800 up to TID  Physical therapy:  She has consistently been in physical therapy, reports exercise, dry needling with at least temporary relief  Antidepressants:  Muscle relaxers:  Opioids:  Hydrocodone 7.5/325 twice daily  Antiplatelets/Anticoagulants:    ROS: She reports fatigability, headaches, hypothyroidism, joint stiffness, back pain, difficulty sleeping and anxiety.  Balance of review of systems is negative.      Past Medical History:   Diagnosis Date    Anxiety 08/25/2015    Arthritis     BRCA1 negative     BRCA2 negative     Celiac disease     Cervical spondylosis     Chronic back pain     DDD (degenerative disc disease), lumbar     Difficult intubation 03/2016    anterior larynx, pt with metal dental appliance, unable to do glidescope, used LMA    Dysautonomia     Encounter for blood transfusion     Facet arthropathy, thoracic     Hormone replacement therapy (HRT)     Insomnia 08/25/2015    Neck pain     PONV (postoperative nausea and vomiting)     Right foot pain     Scoliosis     Sjogren's syndrome     Snoring     uses cpap, states not ROSSY    Unspecified hypothyroidism 08/25/2015     Past Surgical History:   Procedure Laterality Date    BILATERAL OOPHORECTOMY  2004    BREAST BIOPSY Left 2010    Benign    CHOLECYSTECTOMY  2010    COLONOSCOPY  2016    [facility]    Epidural Steroid Injection      Pain managment, at another facility, cervical, thoracic    Epidural Steroid injection      Pain management, cervical     EPIDURAL STEROID INJECTION INTO CERVICAL SPINE N/A 11/11/2021    Procedure: Injection-steroid-epidural-cervical, C7/T1 interlaminer;  Surgeon: Floyd Coleman MD;  Location: [facility] OR;  Service: Pain Management;  Laterality: N/A;    EPIDURAL STEROID INJECTION INTO LUMBAR SPINE Right 1/16/2019    Procedure: Injection-steroid-epidural-lumbar L5/S1;  Surgeon: Floyd Coleman MD;  Location: [facility] OR;  Service: Pain Management;  Laterality: Right;  to right    EPIDURAL STEROID INJECTION INTO LUMBAR SPINE N/A 5/14/2019    Procedure: Injection-steroid-epidural-lumbar;  Surgeon: Floyd Coleman MD;  Location: [facility] OR;  Service: Pain Management;  Laterality: N/A;  L5/S1 interlaminar    EPIDURAL STEROID INJECTION INTO LUMBAR SPINE N/A 3/22/2021    Procedure: Injection-steroid-epidural-lumbar L5/S1 interlaminer;  Surgeon: Floyd Coleman MD;  Location: [facility] OR;  Service: Pain Management;  Laterality: N/A;    EPIDURAL STEROID INJECTION INTO LUMBAR SPINE N/A 11/18/2022    Procedure: Injection-steroid-epidural-lumbar L5/S1 to right;  Surgeon: Floyd Coleman MD;  Location: [facility] OR;  Service: Pain Management;  Laterality: N/A;    EPIDURAL STEROID INJECTION INTO LUMBAR SPINE N/A 7/21/2023    Procedure: Injection-steroid-epidural-lumbar Interlaminar L5/S1 to the right;  Surgeon: Floyd Coleman MD;  Location: [facility] OR;  Service: Pain Management;  Laterality: N/A;    EPIDURAL STEROID INJECTION INTO LUMBAR SPINE N/A 12/6/2023    Procedure: Injection-steroid-epidural-lumbar     L5/S1;  Surgeon: Floyd Coleman MD;  Location: [facility] OR;  Service: Pain Management;  Laterality: N/A;    EPIDURAL STEROID INJECTION INTO LUMBAR SPINE N/A 2/8/2024    Procedure: Injection-steroid-epidural-lumbar     L5/S1;  Surgeon: Floyd Coleman MD;  Location: [facility] OR;  Service: Pain Management;  Laterality: N/A;    HYSTERECTOMY  2004    Complete    MOUTH SURGERY      Braces, with temporary metal implants in upper gum     OOPHORECTOMY  2004    w/ hysterectomy    RADIOFREQUENCY ABLATION  02/2016    thoracic nerve    RADIOFREQUENCY ABLATION Right 6/4/2019    Procedure: Radiofrequency Ablation T4,5,6,7;  Surgeon: Floyd Coleman MD;  Location: [facility] OR;  Service: Pain Management;  Laterality: Right;    RADIOFREQUENCY ABLATION Right 5/21/2021    Procedure: RADIOFREQUENCY ABLATION,THORACIC  T4,T5,T6, T7;  Surgeon: Floyd Coleman MD;  Location: [facility] OR;  Service: Pain Management;  Laterality: Right;    RADIOFREQUENCY ABLATION Right 6/30/2022    Procedure: Radiofrequency Ablation Thoracic T4, T5, T6, T7;  Surgeon: Floyd Coleman MD;  Location: [facility] OR;  Service: Pain Management;  Laterality: Right;    RADIOFREQUENCY ABLATION Right 10/12/2023    Procedure: RADIOFREQUENCY ABLATION,THORACIC Right T4, T5, T6, T7;  Surgeon: Floyd Coleman MD;  Location: [facility] OR;  Service: Pain Management;  Laterality: Right;  THORACIC Right T4, T5, T6, T7    TONSILLECTOMY      VULVA SURGERY  03/2016     Social History     Socioeconomic History    Marital status:    Tobacco Use    Smoking status: Never    Smokeless tobacco: Never   Substance and Sexual Activity    Alcohol use: No    Drug use: No    Sexual activity: Yes     Partners: Male     Social Determinants of Health     Financial Resource Strain: Low Risk  (2/27/2024)    Overall Financial Resource Strain (CARDIA)     Difficulty of Paying Living Expenses: Not hard at all   Food Insecurity: No Food Insecurity (2/27/2024)    Hunger Vital Sign     Worried About Running Out of Food in the Last Year: Never true     Ran Out of Food in the Last Year: Never true   Transportation Needs: No Transportation Needs (2/27/2024)    PRAPARE - Transportation     Lack of Transportation (Medical): No     Lack of Transportation (Non-Medical): No   Physical Activity: Insufficiently Active (2/27/2024)    Exercise Vital Sign     Days of Exercise per Week: 3 days     Minutes of Exercise per Session: 20 min  "  Stress: Stress Concern Present (2/27/2024)    [employer] [employer] - Occupational Stress Questionnaire     Feeling of Stress : Rather much   Housing Stability: Unknown (2/27/2024)    Housing Stability Vital Sign     Unable to Pay for Housing in the Last Year: No     Unstable Housing in the Last Year: No      She is a  at [employer].    Medications/Allergies: See med card    Vitals:    05/31/24 1421   BP: 137/70   Pulse: 67   Weight: 85.3 kg (188 lb 0.8 oz)   Height: 5' 1" (1.549 m)   PainSc:   5   PainLoc: Back         Physical exam:  Gen: A and O x3, pleasant, well-groomed  Skin: No rashes or obvious lesions  HEENT: PERRLA, no obvious deformities on ears or in canals. Trachea midline.  CVS: Regular rate and rhythm, normal palpable pulses.  Resp:No increased work of breathing, symmetrical chest rise.  Abdomen: Soft, NT/ND.  Musculoskeletal:No antalgic gait.      Neuro:  Upper extremities: 5/5 strength bilaterally.  Lower extremities: 5/5 strength bilaterally.    Reflexes: Patellar 0+, Achilles 0+ bilaterally.  Upper extremity reflexes 2+ bilaterally equal.  Sensory:  Intact and symmetrical to light touch and pinprick in L2-S1 dermatomes bilaterally.     Cervical spine exam: Range of motion is mildly reduced with flexion, extension and lateral rotation with increased right neck pain during right lateral rotation and extension.  Myofascial exam:  Exquisite tenderness to palpation to the right mid thoracic paraspinous muscles, right trapezius muscle.       Lumbar spine:  Lumbar spine: Range of motion is full with flexion and extension with increased left low back pain and lateral hip pain during each maneuver.  Brayden's test causes no increased pain on either side.    Supine straight leg raise causes increased left low back and left knee pain.  Internal and external rotation of the hip causes no increased pain on either side.  Myofascial exam:  Mild tenderness to palpation to the " left lower lumbar paraspinous muscles.    Imagin/16/15 Xray Scoliosis survey: There is thoracic dextroscoliosis with mild lumbar compensatory levoscoliosis. No structural abnormalities are evident. Diffuse spondylosis noted in the cervical, thoracic and to lesser extent lumbar spine. No fractures are identified. There is slight increased kyphotic curvature of the thoracic spine with alignment being otherwise normal. Measurements and evaluation are limited due to underpenetration. Land angle measures approximately in the thoracic spine is of approximately 17.0 degrees and for the lumbar spine 17.4 degrees. Soft tissues are unremarkable. IMPRESSION: 1. S shaped scoliotic curvature of the thoracolumbar spine with Land angle of approximately 17 degrees. 2. Diffuse spondylosis.    MRI report 12: Report notes that there is dextroconvex rotary curvature of the thoracic spine.  Posterior alignment is maintained.  There are scattered vertebral body hemangiomas.  There is minimal posterior disc bulges noted at T5/6, T6/7, T7/8 and T8/9.  There is no central spinal stenosis or neural foraminal narrowing.    MRI right shoulder 11: Minor distal subscapularis tendinosis.  Negative for full-thickness or significant partial-thickness rotator cuff tendon tear.  There is a small amount of subacromial subdeltoid bursal fluid which can be seen with recent injection or mild bursitis.    Cervical spine MRI 16  At C2-C3, the minimal interstitial excision covers a very mild broad-based disc bulge most prominent centrally but does not deform the ventral cord.  There is no canal or foraminal stenosis.  At C3-C4, there is mildly decreased disc height with a broad-based disc bulge most prominent centrally.  This minimally contact the ventral cord without significant deformity.  The canal is widely patent.  There is uncovertebral hypertrophy and facet arthropathy, but the foramina are patent.  At C4-C5, there is decreased  disc height with a broad disc bulge-marginal osteophyte complex that mildly lateralizes to the left and blends imperceptibly with right greater than left uncovertebral hypertrophy.  With ligamentum hypertrophy, there is no significant central canal stenosis.  Uncovertebral hypertrophy and facet arthropathy are causing mild left and moderate right foraminal stenoses.  At C5-C6, there is a broad-based disc bulge and osteophyte complex most prominent centrally.  There is also ligamentum flavum hypertrophy present, but the canal is patent.  There is mild left foraminal stenosis.  The right foramen is patent.  At C6-C7, there is disc desiccation with a minimal broad-based disc bulge most prominent in the right paracentral canal.  The canal is widely patent.  There is mild left foraminal stenosis.  At C7-T1, there is no significant disc bulge, protrusion, or herniation/extrusion.  The canal and foramina are widely patent.    Hip x-rays 9/22/17  AP view of the pelvis and frog leg views of both hips were obtained. No evidence of acute displaced fracture or dislocation is visualized.  No radiopaque foreign bodies are visualized. The bilateral superior joint spaces are grossly maintained. Mild bilateral sacroiliac joint degenerative changes are seen including subchondral sclerosis and small marginal osteophytes. Mild to moderate symphyseal degenerative changes are seen. There is a slightly expansile cortically-based focus along the lateral proximal right femoral metadiaphysis. This could reflect a sessile osteochondroma, but correlation with MRI of the right hip is recommended.    8/18/19 MRI L-spine:  T12-L1: No disc herniation or significant posterior osteophytic ridging.  No significant spinal canal or foraminal stenosis.   L1-L2: No disc herniation or significant posterior osteophytic ridging.  Minimal left facet hypertrophy.  No significant spinal canal or foraminal stenosis.   L2-L3: No disc herniation or significant  posterior osteophytic ridging.  Minimal left facet hypertrophy.  No significant spinal canal or foraminal stenosis.   L3-L4: No disc herniation or significant posterior osteophytic ridging.  Minimal bilateral facet hypertrophy.  No significant spinal canal or foraminal stenosis.   L4-L5: Mild disc bulge.  Mild bilateral facet hypertrophy.  Mild ligamentum flavum thickening.  Minimal narrowing of the bilateral lateral recesses. No significant overall spinal canal stenosis. Mild bilateral foraminal stenosis.   L5-S1: Minimal disc bulge contained in the ventral epidural fat.  Mild bilateral facet hypertrophy.  No significant spinal canal or foraminal stenosis.    Assessment:  The patient is a 63-year-old woman with history of hypothyroidism, otherwise fairly healthy but a long history of scoliosis causing back pain, self-referred for continued back pain.    1. Lumbar radiculopathy        2. DDD (degenerative disc disease), lumbar        3. DDD (degenerative disc disease), cervical        4. Thoracic spondylosis        5. Opioid contract exists              Plan:  1. I will schedule the patient for a left L4/5 transforaminal epidural steroid injection due to her severe lumbar radicular pain.    2. I have reviewed her case with Dr. Coleman who has provided prescriptions for hydrocodone -acetaminophen 7.5/325 mg up to 3 times daily as needed for pain.  He has sent 90 for this month due to her increased pain with the plan to prescribe 60 in the future.  I have reviewed the [place] Board of Pharmacy website and there are no abberancies.    3. Follow-up in 4 weeks postprocedure or sooner as needed.    The total time spent for evaluation and management today including reviewing separately obtained history, performing a medically appropriate exam and evaluation, documenting clinical information in the health record, independently interpreting results and communicating them to the patient/family/caregiver, and ordering  medications/tests/procedures was between 40-54 minutes.

## 2024-06-04 NOTE — PROGRESS NOTES
Subjective:     Patient ID:  Vonnie Garces    Chief Complaint:  Disease Management     History of Present Illness:  Pt is a 63 y.o. female  dx  w/reactive arthritis. She has been dx with Pott's dz She is having a severe heat intolerance and  has no trigger  with sweating. She has history of reactive arthritis and hashimoto's thyroiditis currently on duexis and occasional use of prednisone for flares. She is doing poorly. She complains of pain in her R hip, hands, shoulders, R shoulder blade, bilateral knees, and L ankle. Pain is described as burning sensation. Prior to onset of pain she reports increased fatigue. Arthritis flares are occurring more frequently typically every 8 weeks. She also complains of difficulty with temperature regulation, brain fog, constipation, and hair loss.      Current treatment:  1. Prednisone prn  2. Plaquenil 200 mg         Revi  Rheumatologic History:   - Diagnosis/es:  - Positive serologies:  - Infectious screening labs:  - Previous Treatments:  - Current Treatments:     Interval History:   Hospitalization since last office visit: No    Patient Active Problem List    Diagnosis Date Noted    Weakness of both hips 11/07/2023    Gait abnormality 11/07/2023    Left knee pain 10/11/2023    Diaphoresis 02/23/2023    Primary hypertension 10/27/2022    Spondylosis of thoracic joint 05/21/2021    Dysautonomia     Iliotibial band tendonitis of right side 01/14/2020    Obesity (BMI 30.0-34.9) 02/26/2019    Thoracic spondylosis 10/12/2018    Right foot pain 12/14/2017    Facet hypertrophy of lumbar region 10/17/2017    Facet arthropathy, cervical 06/27/2017    Cervical radiculopathy 07/14/2016    Lumbar radiculopathy 06/14/2016    Facet arthropathy, thoracic 02/05/2016    DDD (degenerative disc disease), cervical 10/19/2015    Scoliosis, adolescent acquired 09/27/2015    Scoliosis of lumbosacral spine 09/27/2015    Scoliosis of thoracic spine 09/27/2015    DDD (degenerative disc  disease), lumbar 09/27/2015    Cervical spondylosis 09/27/2015    Hypothyroidism 08/25/2015    Insomnia 08/25/2015    Anxiety 08/25/2015     Past Surgical History:   Procedure Laterality Date    BILATERAL OOPHORECTOMY  2004    BREAST BIOPSY Left 2010    Benign    CHOLECYSTECTOMY  2010    COLONOSCOPY  2016    [facility]    Epidural Steroid Injection      Pain managment, at another facility, cervical, thoracic    Epidural Steroid injection      Pain management, cervical    EPIDURAL STEROID INJECTION INTO CERVICAL SPINE N/A 11/11/2021    Procedure: Injection-steroid-epidural-cervical, C7/T1 interlaminer;  Surgeon: Floyd Coleman MD;  Location: [facility] OR;  Service: Pain Management;  Laterality: N/A;    EPIDURAL STEROID INJECTION INTO LUMBAR SPINE Right 1/16/2019    Procedure: Injection-steroid-epidural-lumbar L5/S1;  Surgeon: Floyd Coleman MD;  Location: [facility] OR;  Service: Pain Management;  Laterality: Right;  to right    EPIDURAL STEROID INJECTION INTO LUMBAR SPINE N/A 5/14/2019    Procedure: Injection-steroid-epidural-lumbar;  Surgeon: Floyd Coleman MD;  Location: [facility] OR;  Service: Pain Management;  Laterality: N/A;  L5/S1 interlaminar    EPIDURAL STEROID INJECTION INTO LUMBAR SPINE N/A 3/22/2021    Procedure: Injection-steroid-epidural-lumbar L5/S1 interlaminer;  Surgeon: Floyd Coleman MD;  Location: [facility] OR;  Service: Pain Management;  Laterality: N/A;    EPIDURAL STEROID INJECTION INTO LUMBAR SPINE N/A 11/18/2022    Procedure: Injection-steroid-epidural-lumbar L5/S1 to right;  Surgeon: Floyd Coleman MD;  Location: [facility] OR;  Service: Pain Management;  Laterality: N/A;    EPIDURAL STEROID INJECTION INTO LUMBAR SPINE N/A 7/21/2023    Procedure: Injection-steroid-epidural-lumbar Interlaminar L5/S1 to the right;  Surgeon: Floyd Coleman MD;  Location: [facility] OR;  Service: Pain Management;  Laterality: N/A;    EPIDURAL STEROID INJECTION INTO LUMBAR SPINE N/A 12/6/2023    Procedure:  Injection-steroid-epidural-lumbar     L5/S1;  Surgeon: Floyd Coleman MD;  Location: [facility] OR;  Service: Pain Management;  Laterality: N/A;    EPIDURAL STEROID INJECTION INTO LUMBAR SPINE N/A 2/8/2024    Procedure: Injection-steroid-epidural-lumbar     L5/S1;  Surgeon: Floyd Coleman MD;  Location: [facility] OR;  Service: Pain Management;  Laterality: N/A;    HYSTERECTOMY  2004    Complete    MOUTH SURGERY      Braces, with temporary metal implants in upper gum    OOPHORECTOMY  2004    w/ hysterectomy    RADIOFREQUENCY ABLATION  02/2016    thoracic nerve    RADIOFREQUENCY ABLATION Right 6/4/2019    Procedure: Radiofrequency Ablation T4,5,6,7;  Surgeon: Floyd Coleman MD;  Location: [facility] OR;  Service: Pain Management;  Laterality: Right;    RADIOFREQUENCY ABLATION Right 5/21/2021    Procedure: RADIOFREQUENCY ABLATION,THORACIC  T4,T5,T6, T7;  Surgeon: Floyd Coleman MD;  Location: [facility] OR;  Service: Pain Management;  Laterality: Right;    RADIOFREQUENCY ABLATION Right 6/30/2022    Procedure: Radiofrequency Ablation Thoracic T4, T5, T6, T7;  Surgeon: Floyd Coleman MD;  Location: [facility] OR;  Service: Pain Management;  Laterality: Right;    RADIOFREQUENCY ABLATION Right 10/12/2023    Procedure: RADIOFREQUENCY ABLATION,THORACIC Right T4, T5, T6, T7;  Surgeon: Floyd Coleman MD;  Location: [facility] OR;  Service: Pain Management;  Laterality: Right;  THORACIC Right T4, T5, T6, T7    TONSILLECTOMY      VULVA SURGERY  03/2016     Social History     Tobacco Use    Smoking status: Never    Smokeless tobacco: Never   Substance Use Topics    Alcohol use: No    Drug use: No     Family History   Problem Relation Name Age of Onset    Macular degeneration Mother      Arthritis Mother      Celiac disease Mother      COPD Father      Glaucoma Neg Hx       Review of patient's allergies indicates:   Allergen Reactions    Compazine [prochlorperazine edisylate] Anaphylaxis    Avelox [moxifloxacin] Rash       Review of  Systems   Review of Systems   Constitutional:  Positive for activity change. Negative for fever and unexpected weight change.   HENT:  Positive for mouth sores. Negative for trouble swallowing.    Eyes:  Negative for redness.   Respiratory:  Positive for shortness of breath. Negative for cough.    Cardiovascular:  Negative for chest pain.   Gastrointestinal:  Positive for constipation and diarrhea.   Genitourinary:  Negative for dysuria and genital sores.   Skin:  Positive for rash.   Neurological:  Positive for headaches.   Hematological:  Bruises/bleeds easily.        Current Medications:  Current Outpatient Medications   Medication Instructions    ALPRAZolam (XANAX) 0.25 MG tablet TAKE 1 TABLET BY MOUTH TWICE DAILY AS NEEDED FOR ANXIETY    amLODIPine (NORVASC) 2.5 mg, Oral, Daily    azaTHIOprine (IMURAN) 50 mg, Oral, Daily    corticotropin (ACTHAR H.P.) 40 Units, Subcutaneous, Every 72 hours    CORTROPHIN GEL 40 Units, Subcutaneous, Every 72 hours    cycloSPORINE (RESTASIS) 0.05 % ophthalmic emulsion 1 drop twice daily for 30 days    estradioL (DIVIGEL) 1 mg/gram (0.1 %) topical gel apply 1 gram onto skin once daily    eszopiclone (LUNESTA) 3 mg, Oral, Nightly    gabapentin (NEURONTIN) 100 MG capsule TAKE 1 CAPSULE BY MOUTH IN THE MORNING and TAKE FOUR CAPSULES BY MOUTH at night    HYDROcodone-acetaminophen (NORCO) 7.5-325 mg per tablet 1 tablet, Oral, Every 6 hours PRN    hydroxychloroquine (PLAQUENIL) 200 mg, Oral, 2 times daily    ibuprofen-famotidine (DUEXIS) 800-26.6 mg Tab 1 tablet, Oral, 3 times daily    lidocaine-prilocaine (EMLA) cream No dose, route, or frequency recorded.    loteprednol (LOTEMAX) 0.5 % ophthalmic suspension 1 drop, Both Eyes, 2 times daily PRN    METHYL SALICYLATE/MENTH/CAMPH (PAIN RELIEVING CREAM TOP) 1 application , Topical (Top), Daily PRN    propranoloL (INDERAL LA) 60 mg, Oral    TIROSINT 125 mcg Cap 1 capsule, Oral         Objective:     Vitals:    06/04/24 1331   BP: (!) 144/81  "  Pulse: 66   Weight: 84.5 kg (186 lb 4.6 oz)   Height: 5' 0.98" (1.549 m)   PainSc:   7   PainLoc: Generalized      Body mass index is 35.22 kg/m².     Physical Examinations:  Physical Exam   Constitutional: She is oriented to person, place, and time. No distress.   HENT:   Head: Normocephalic and atraumatic.   Eyes: Pupils are equal, round, and reactive to light. Right eye exhibits no discharge. Left eye exhibits no discharge.   Neck: No thyromegaly present.   Cardiovascular: Normal rate, regular rhythm and normal heart sounds. Exam reveals no gallop and no friction rub.   No murmur heard.  Pulmonary/Chest: Breath sounds normal. She has no wheezes. She has no rales. She exhibits no tenderness.   Abdominal: There is no abdominal tenderness. There is no rebound and no guarding.   Musculoskeletal:         General: Tenderness and deformity present.      Right shoulder: Tenderness present.      Left shoulder: Tenderness present.      Right elbow: Normal.      Left elbow: Tenderness present.      Right wrist: Tenderness present.      Left wrist: Tenderness present.      Cervical back: Neck supple.      Right knee: Effusion present. Tenderness present.      Left knee: Effusion present. Tenderness present.   Lymphadenopathy:     She has no cervical adenopathy.   Neurological: She is alert and oriented to person, place, and time. Gait normal.   Skin: Skin is dry. No rash noted. No erythema. There is pallor.   Psychiatric: Mood and affect normal.   Vitals reviewed.      Right Side Rheumatological Exam     Examination finds the elbow normal.    The patient is tender to palpation of the shoulder, wrist, knee, 1st PIP, 1st MCP, 2nd PIP, 2nd MCP, 3rd PIP, 3rd MCP, 4th PIP, 4th MCP, 5th PIP and 5th MCP    She has swelling of the 1st PIP, 1st MCP, 2nd PIP, 2nd MCP, 3rd PIP, 3rd MCP, 4th PIP, 4th MCP, 5th PIP and 5th MCP    Shoulder Exam   Tenderness Location: no tenderness    Range of Motion   Active abduction:  abnormal "   Adduction: abnormal  Sensation: normal    Knee Exam   Patellofemoral Crepitus: positive  Effusion: positive  Sensation: normal    Hip Exam   Tenderness Location: posterior  Sensation: normal    Elbow/Wrist Exam   Tenderness Location: no tenderness  Sensation: normal    Left Side Rheumatological Exam     The patient is tender to palpation of the shoulder, elbow, wrist, knee, 1st PIP, 1st MCP, 2nd PIP, 2nd MCP, 3rd PIP, 3rd MCP, 4th PIP, 4th MCP, 5th PIP and 5th MCP.    She has swelling of the 1st PIP, 1st MCP, 2nd PIP, 2nd MCP, 3rd PIP, 3rd MCP, 4th PIP, 4th MCP, 5th PIP and 5th MCP    Shoulder Exam   Tenderness Location: no tenderness    Range of Motion   Active abduction:  abnormal   Sensation: normal    Knee Exam     Patellofemoral Crepitus: positive  Effusion: positive  Sensation: normal    Hip Exam   Tenderness Location: posterior  Sensation: normal    Elbow/Wrist Exam   Sensation: normal      Back/Neck Exam   General Inspection   Gait: normal            Disease Assessment Scores:  Patient's Global Assessment of arthritis (0-10): 3  Physician's Global Assessment of arthritis (0-10): 3  Number of Tender Joints (0-28): 2  Number of Swollen Joints (0-28): 2        5/28/2024     8:02 AM   Rapid3 Question Responses and Scores   MDHAQ Score 1.1   Psychologic Score 5.5   Pain Score 7   When you awakened in the morning OVER THE LAST WEEK, did you feel stiff? Yes   If Yes, please indicate the number of hours until you are as limber as you will be for the day 3   Fatigue Score 7   Global Health Score 5.5   RAPID3 Score 5.39       Monitoring Lab Results:  Lab Results   Component Value Date    WBC 7.29 02/27/2024    RBC 4.51 02/27/2024    HGB 13.3 02/27/2024    HCT 39.7 02/27/2024    MCV 88 02/27/2024    MCH 29.5 02/27/2024    MCHC 33.5 02/27/2024    RDW 13.2 02/27/2024     02/27/2024        Lab Results   Component Value Date     02/27/2024    K 4.3 02/27/2024     02/27/2024    CO2 32 (H) 02/27/2024     "GLU 97 02/27/2024    BUN 12 02/27/2024    CREATININE 0.66 02/27/2024    CALCIUM 10.0 02/27/2024    PROT 7.4 02/27/2024    ALBUMIN 4.8 02/27/2024    BILITOT 0.7 02/27/2024    ALKPHOS 83 02/27/2024    AST 25 02/27/2024    ALT 29 02/27/2024    ANIONGAP 8 02/27/2024    EGFRNORACEVR >60 02/27/2024       Lab Results   Component Value Date    SEDRATE 9 02/27/2024    CRP <0.50 02/27/2024        Lab Results   Component Value Date    NMSSMNQY58ZP 27 (L) 02/20/2023        Lab Results   Component Value Date    CHOL 190 03/05/2024    HDL 70 03/05/2024    LDLCALC 103.8 03/05/2024    TRIG 81 03/05/2024       Lab Results   Component Value Date    RF <8.6 08/08/2018     Lab Results   Component Value Date    ANASCREEN None Detected 02/08/2022    DSDNA 2 02/08/2022    SMRNPAB 2 11/23/2021    SSAANTIBODY 1 11/23/2021    SSBANTIBODY 3 11/23/2021    GFK11KE 9 08/08/2018     No results found for: "HLABB27"    Infectious Disease Screening:  Lab Results   Component Value Date    HEPBSAG Negative 08/30/2023    HEPBSAB <3.10 08/30/2023    HEPBSAB <3.10 08/30/2023     Lab Results   Component Value Date    HEPCAB Negative 08/30/2023     No results found for: "TBGOLDPLUS", "QUANTTBGDPL"  No results found for: "QUANTIFERON", "SVCMT", "QUANTAGVALUE", "QUANTNILVALU", "QUANTMITOGEN", "QFTTBAG", "QINT"     Imaging: DEXA, Xrays, MRIs, CTs, etc    Old & Outside Medical Records:  Reviewed old and all outside medical records available in Care Everywhere     Assessment:     Encounter Diagnoses   Name Primary?    Osteopenia, unspecified location Yes    Autoimmune vasculitis     Immunocompromised state due to drug therapy     High risk medication use     Henoch Schonlein syndrome     Primary osteoarthritis of both knees        Plan:      Encounter Diagnoses   Name Primary?    Osteopenia, unspecified location Yes    Autoimmune vasculitis     Immunocompromised state due to drug therapy     High risk medication use     Henoch Schonlein syndrome     Primary " osteoarthritis of both knees      Vonnie was seen today for disease management.    Diagnoses and all orders for this visit:    Osteopenia, unspecified location  -     DXA Bone Density Axial Skeleton 1 or more sites; Future  -     ibuprofen-famotidine (DUEXIS) 800-26.6 mg Tab; Take 1 tablet by mouth 3 (three) times daily.    Autoimmune vasculitis  -     PHARMACOGENOMICS PANEL; Future  -     corticotropin (ACTHAR H.P.) 80 unit/mL injectable gel; Inject 0.5 mLs (40 Units total) into the skin every 72 hours.  -     azaTHIOprine (IMURAN) 50 mg Tab; Take 1 tablet (50 mg total) by mouth once daily.  -     corticotropin (CORTROPHIN GEL) 80 unit/mL injectable gel; Inject 0.5 mLs (40 Units total) into the skin every 72 hours.  -     ibuprofen-famotidine (DUEXIS) 800-26.6 mg Tab; Take 1 tablet by mouth 3 (three) times daily.  -     ketorolac injection 60 mg  -     Sedimentation rate; Future  -     C-Reactive Protein; Future  -     Comprehensive Metabolic Panel; Future  -     CBC Auto Differential; Future    Immunocompromised state due to drug therapy  -     azaTHIOprine (IMURAN) 50 mg Tab; Take 1 tablet (50 mg total) by mouth once daily.  -     corticotropin (CORTROPHIN GEL) 80 unit/mL injectable gel; Inject 0.5 mLs (40 Units total) into the skin every 72 hours.  -     ibuprofen-famotidine (DUEXIS) 800-26.6 mg Tab; Take 1 tablet by mouth 3 (three) times daily.  -     ketorolac injection 60 mg  -     Sedimentation rate; Future  -     C-Reactive Protein; Future  -     Comprehensive Metabolic Panel; Future  -     CBC Auto Differential; Future    High risk medication use  -     ketorolac injection 60 mg  -     Sedimentation rate; Future  -     C-Reactive Protein; Future  -     Comprehensive Metabolic Panel; Future  -     CBC Auto Differential; Future    Henoch Schonlein syndrome  -     PHARMACOGENOMICS PANEL; Future  -     corticotropin (ACTHAR H.P.) 80 unit/mL injectable gel; Inject 0.5 mLs (40 Units total) into the skin every  72 hours.  -     azaTHIOprine (IMURAN) 50 mg Tab; Take 1 tablet (50 mg total) by mouth once daily.  -     corticotropin (CORTROPHIN GEL) 80 unit/mL injectable gel; Inject 0.5 mLs (40 Units total) into the skin every 72 hours.  -     ketorolac injection 60 mg  -     Sedimentation rate; Future  -     C-Reactive Protein; Future  -     Comprehensive Metabolic Panel; Future  -     CBC Auto Differential; Future    Primary osteoarthritis of both knees  -     ibuprofen-famotidine (DUEXIS) 800-26.6 mg Tab; Take 1 tablet by mouth 3 (three) times daily.        1. Labs ordered  2. HSP start  [facility]  3. Duexis oa knees  4. Nurse injections     Follow-up 6 months    More than 50% of the  66 minute encounter was spent face to face counseling the patient regarding current status and future plan of care as well as side effects  of the medications. All questions were answered to patient's satisfaction also includes  non-face to face time preparing to see the patient (eg, review of tests), Obtaining and/or reviewing separately obtained history, Documenting clinical information in the electronic or other health record, Independently interpreting results

## 2024-06-07 ENCOUNTER — TELEPHONE (OUTPATIENT)
Dept: RHEUMATOLOGY | Facility: CLINIC | Age: 64
End: 2024-06-07
Payer: COMMERCIAL

## 2024-06-07 NOTE — TELEPHONE ENCOUNTER
Completed [facility] referral form, and included all needed information.   Once signed by Dr. Garcia, staff will fax to SP and corticotrophin hub

## 2024-06-10 ENCOUNTER — TELEPHONE (OUTPATIENT)
Dept: SURGERY | Facility: HOSPITAL | Age: 64
End: 2024-06-10
Payer: COMMERCIAL

## 2024-06-10 LAB
ONEOME COMMENT: NORMAL
ONEOME METHOD: NORMAL

## 2024-06-11 ENCOUNTER — TELEPHONE (OUTPATIENT)
Dept: RHEUMATOLOGY | Facility: CLINIC | Age: 64
End: 2024-06-11
Payer: COMMERCIAL

## 2024-06-11 ENCOUNTER — PATIENT MESSAGE (OUTPATIENT)
Dept: RHEUMATOLOGY | Facility: CLINIC | Age: 64
End: 2024-06-11
Payer: COMMERCIAL

## 2024-06-11 ENCOUNTER — PATIENT MESSAGE (OUTPATIENT)
Dept: OPTOMETRY | Facility: CLINIC | Age: 64
End: 2024-06-11
Payer: COMMERCIAL

## 2024-06-11 DIAGNOSIS — G47.00 INSOMNIA, UNSPECIFIED TYPE: ICD-10-CM

## 2024-06-11 RX ORDER — ESZOPICLONE 3 MG/1
3 TABLET, FILM COATED ORAL NIGHTLY
Qty: 30 TABLET | Refills: 5 | Status: CANCELLED | OUTPATIENT
Start: 2024-06-11

## 2024-06-12 RX ORDER — ESZOPICLONE 2 MG/1
2 TABLET, FILM COATED ORAL NIGHTLY
Qty: 30 TABLET | Refills: 5 | Status: SHIPPED | OUTPATIENT
Start: 2024-06-12 | End: 2024-12-09

## 2024-06-14 ENCOUNTER — HOSPITAL ENCOUNTER (OUTPATIENT)
Dept: RADIOLOGY | Facility: HOSPITAL | Age: 64
Discharge: HOME | End: 2024-06-14
Attending: ANESTHESIOLOGY | Admitting: ANESTHESIOLOGY
Payer: COMMERCIAL

## 2024-06-14 ENCOUNTER — HOSPITAL ENCOUNTER (OUTPATIENT)
Facility: HOSPITAL | Age: 64
Discharge: HOME | End: 2024-06-14
Attending: ANESTHESIOLOGY | Admitting: ANESTHESIOLOGY
Payer: COMMERCIAL

## 2024-06-14 VITALS
RESPIRATION RATE: 16 BRPM | OXYGEN SATURATION: 100 % | DIASTOLIC BLOOD PRESSURE: 61 MMHG | TEMPERATURE: 97 F | BODY MASS INDEX: 35.18 KG/M2 | HEIGHT: 61 IN | WEIGHT: 186.31 LBS | SYSTOLIC BLOOD PRESSURE: 132 MMHG | HEART RATE: 61 BPM

## 2024-06-14 DIAGNOSIS — M54.50 LOWER BACK PAIN: ICD-10-CM

## 2024-06-14 DIAGNOSIS — M54.16 LUMBAR RADICULOPATHY: ICD-10-CM

## 2024-06-14 PROCEDURE — 76000 FLUOROSCOPY <1 HR PHYS/QHP: CPT | Mod: TC,PO

## 2024-06-14 PROCEDURE — 25000003 PHARM REV CODE 250: Mod: PO | Performed by: ANESTHESIOLOGY

## 2024-06-14 PROCEDURE — 63600175 PHARM REV CODE 636 W HCPCS: Mod: PO | Performed by: ANESTHESIOLOGY

## 2024-06-14 PROCEDURE — 64483 NJX AA&/STRD TFRM EPI L/S 1: CPT | Mod: PO,LT | Performed by: ANESTHESIOLOGY

## 2024-06-14 PROCEDURE — 64483 NJX AA&/STRD TFRM EPI L/S 1: CPT | Mod: LT,,, | Performed by: ANESTHESIOLOGY

## 2024-06-14 RX ORDER — MIDAZOLAM HYDROCHLORIDE 1 MG/ML
INJECTION INTRAMUSCULAR; INTRAVENOUS
Status: DISCONTINUED | OUTPATIENT
Start: 2024-06-14 | End: 2024-06-14 | Stop reason: HOSPADM

## 2024-06-14 RX ORDER — METHYLPREDNISOLONE ACETATE 40 MG/ML
INJECTION, SUSPENSION INTRA-ARTICULAR; INTRALESIONAL; INTRAMUSCULAR; SOFT TISSUE
Status: DISCONTINUED | OUTPATIENT
Start: 2024-06-14 | End: 2024-06-14 | Stop reason: HOSPADM

## 2024-06-14 RX ORDER — LIDOCAINE HYDROCHLORIDE 10 MG/ML
INJECTION, SOLUTION EPIDURAL; INFILTRATION; INTRACAUDAL; PERINEURAL
Status: DISCONTINUED | OUTPATIENT
Start: 2024-06-14 | End: 2024-06-14 | Stop reason: HOSPADM

## 2024-06-14 RX ORDER — ALPRAZOLAM 0.5 MG/1
1 TABLET, ORALLY DISINTEGRATING ORAL ONCE AS NEEDED
Status: DISCONTINUED | OUTPATIENT
Start: 2024-06-14 | End: 2024-06-14 | Stop reason: HOSPADM

## 2024-06-14 RX ORDER — BUPIVACAINE HYDROCHLORIDE 2.5 MG/ML
INJECTION, SOLUTION EPIDURAL; INFILTRATION; INTRACAUDAL
Status: DISCONTINUED | OUTPATIENT
Start: 2024-06-14 | End: 2024-06-14 | Stop reason: HOSPADM

## 2024-06-14 NOTE — DISCHARGE SUMMARY
[facility] - Surgery  Discharge Note  Short Stay    Procedure(s) (LRB):  Injection,steroid,epidural,transforaminal approach    L4/5 (Left)      OUTCOME: Patient tolerated treatment/procedure well without complication and is now ready for discharge.    DISPOSITION: Home or Self Care    FINAL DIAGNOSIS:  Lumbar radiculopathy    FOLLOWUP: In clinic    DISCHARGE INSTRUCTIONS:    Discharge Procedure Orders   Diet Adult Regular     No dressing needed     Notify your health care provider if you experience any of the following:  temperature >100.4     Activity as tolerated

## 2024-06-14 NOTE — OP NOTE
PROCEDURE DATE: 6/14/2024    PROCEDURE: Left L4/5 transforaminal epidural steroid injection under fluoroscopy    DIAGNOSIS: Lumbar  Radiculopathy    Post op diagnosis: Same    PHYSICIAN: Floyd Coleman MD    MEDICATIONS INJECTED:  Methylprednisolone 40mg (1ml) and 1ml 0.25% bupivicaine at each nerve root.     LOCAL ANESTHETIC INJECTED:  Lidocaine 1%. 4 ml per site.    SEDATION MEDICATIONS: 4mg versed    ESTIMATED BLOOD LOSS:  none    COMPLICATIONS:  none    TECHNIQUE:   A time-out was taken to identify patient and procedure side prior to starting the procedure. The patient was placed in a prone position, prepped and draped in the usual sterile fashion using ChloraPrep and sterile towels.  The area to be injected was determined under fluoroscopic guidance in AP and oblique view.  Local anesthetic was given by raising a wheal and going down to the hub of a 25-gauge 1.5 inch needle.  In oblique view, a 5 inch 22-gauge bent-tip spinal needle was introduced towards 6 oclock position of the pedicle of each above named nerve root level.  The needle was walked medially then hinged into the neural foramen and position was confirmed in AP and lateral views.  Omnipaque contrast dye was injected to confirm appropriate placement and that there was no vascular uptake.  After negative aspiration for blood or CSF, the medication was then injected. This was performed at the left L4/5 level(s). The patient tolerated the procedure well.    The patient was monitored after the procedure.  Patient was given post procedure and discharge instructions to follow at home. The patient was discharged in a stable condition.

## 2024-06-23 ENCOUNTER — PATIENT MESSAGE (OUTPATIENT)
Dept: OPTOMETRY | Facility: CLINIC | Age: 64
End: 2024-06-23
Payer: COMMERCIAL

## 2024-06-27 ENCOUNTER — OFFICE VISIT (OUTPATIENT)
Dept: PAIN MEDICINE | Facility: CLINIC | Age: 64
End: 2024-06-27
Payer: COMMERCIAL

## 2024-06-27 VITALS
HEIGHT: 61 IN | DIASTOLIC BLOOD PRESSURE: 70 MMHG | WEIGHT: 186.31 LBS | BODY MASS INDEX: 35.18 KG/M2 | SYSTOLIC BLOOD PRESSURE: 135 MMHG | HEART RATE: 53 BPM

## 2024-06-27 DIAGNOSIS — M54.12 CERVICAL RADICULOPATHY: ICD-10-CM

## 2024-06-27 DIAGNOSIS — M54.16 LUMBAR RADICULOPATHY: Primary | ICD-10-CM

## 2024-06-27 DIAGNOSIS — M47.814 SPONDYLOSIS OF THORACIC REGION WITHOUT MYELOPATHY OR RADICULOPATHY: ICD-10-CM

## 2024-06-27 DIAGNOSIS — Z79.891 OPIOID CONTRACT EXISTS: ICD-10-CM

## 2024-06-27 DIAGNOSIS — M51.36 DDD (DEGENERATIVE DISC DISEASE), LUMBAR: ICD-10-CM

## 2024-06-27 DIAGNOSIS — M50.30 DDD (DEGENERATIVE DISC DISEASE), CERVICAL: ICD-10-CM

## 2024-06-27 DIAGNOSIS — M47.814 THORACIC SPONDYLOSIS: ICD-10-CM

## 2024-06-27 PROBLEM — H53.9 VISION CHANGES: Status: ACTIVE | Noted: 2024-06-27

## 2024-06-27 PROCEDURE — 99214 OFFICE O/P EST MOD 30 MIN: CPT | Mod: S$GLB,,, | Performed by: PHYSICIAN ASSISTANT

## 2024-06-27 PROCEDURE — 3008F BODY MASS INDEX DOCD: CPT | Mod: CPTII,S$GLB,, | Performed by: PHYSICIAN ASSISTANT

## 2024-06-27 PROCEDURE — 99999 PR PBB SHADOW E&M-EST. PATIENT-LVL IV: CPT | Mod: PBBFAC,,, | Performed by: PHYSICIAN ASSISTANT

## 2024-06-27 PROCEDURE — 3044F HG A1C LEVEL LT 7.0%: CPT | Mod: CPTII,S$GLB,, | Performed by: PHYSICIAN ASSISTANT

## 2024-06-27 PROCEDURE — 3075F SYST BP GE 130 - 139MM HG: CPT | Mod: CPTII,S$GLB,, | Performed by: PHYSICIAN ASSISTANT

## 2024-06-27 PROCEDURE — 1160F RVW MEDS BY RX/DR IN RCRD: CPT | Mod: CPTII,S$GLB,, | Performed by: PHYSICIAN ASSISTANT

## 2024-06-27 PROCEDURE — 1159F MED LIST DOCD IN RCRD: CPT | Mod: CPTII,S$GLB,, | Performed by: PHYSICIAN ASSISTANT

## 2024-06-27 PROCEDURE — 3078F DIAST BP <80 MM HG: CPT | Mod: CPTII,S$GLB,, | Performed by: PHYSICIAN ASSISTANT

## 2024-06-27 RX ORDER — HYDROCODONE BITARTRATE AND ACETAMINOPHEN 7.5; 325 MG/1; MG/1
1 TABLET ORAL EVERY 8 HOURS PRN
Qty: 60 TABLET | Refills: 0 | Status: SHIPPED | OUTPATIENT
Start: 2024-08-29 | End: 2024-09-28

## 2024-06-27 RX ORDER — HYDROCODONE BITARTRATE AND ACETAMINOPHEN 7.5; 325 MG/1; MG/1
1 TABLET ORAL EVERY 8 HOURS PRN
Qty: 60 TABLET | Refills: 0 | Status: SHIPPED | OUTPATIENT
Start: 2024-06-28 | End: 2024-07-28

## 2024-06-27 RX ORDER — HYDROCODONE BITARTRATE AND ACETAMINOPHEN 7.5; 325 MG/1; MG/1
1 TABLET ORAL EVERY 8 HOURS PRN
Qty: 60 TABLET | Refills: 0 | Status: SHIPPED | OUTPATIENT
Start: 2024-07-28 | End: 2024-08-27

## 2024-06-27 NOTE — PROGRESS NOTES
This note was completed with dictation software and grammatical errors may exist.    CC:Back pain, neck pain    HPI: The patient is a 63-year-old woman with history of hypothyroidism, otherwise fairly healthy but a long history of scoliosis causing back pain, self-referred for continued back pain.  She is status post left L4/5 transforaminal epidural steroid injection on 06/14/2024 with 90% relief.  She does have I return of her low back and right leg pain but this is currently tolerable.  She also reports some neck pain which is tolerable and her facet mediated thoracic back pain.  Overall she is doing much better and has reduced the amount of pain medication that she is taking from 3 times a day down to twice a day.    Pain intervention history: She has been seeing Dr. Huffman for the last several years and has undergone epidural steroid injections and radiofrequency ablations with good relief.  According to her last pain management physician, she had undergone a right side T3, 4, 5, 6 medial branch radiofrequency ablation on 8/8/14 with good relief.  He had scheduled her for another one but did not complete this.  As far as medications she has been taking Hydrocodone 7.5/325 one to 2 times a day at most and gabapentin 300 mg at night. She is status post cervical epidural steroid injection on 10/19/15 with 50% relief of her neck pain. She is status post right L4 transforaminal epidural steroid injection on 11/23/15 with 100% relief.   She is status post right T3, 4, 5 and 6 medial branch radio frequency ablation on 2/5/16 with greater than 50% relief.  She is status post right L4 transforaminal epidural sterile injection on 6/14/16 with significant relief lasting only about a month.  She is status post C7-T1 cervical interlaminar epidural steroid injection on 7/14/16 with 30-40% relief.   She is status post L5/S1 interlaminar epidural steroidal injection to the right on 8/12/16 with 100% relief of her right  leg pain.  She is status post C7-T1 cervical interlaminar epidural steroid injection on 12/9/16 with almost complete relief.  She is status post right T4, 5, 6 and 7 medial branch radiofrequency ablation on 5/30/17 with 100% relief.  She is status post C7-T1 cervical interlaminar epidural steroid injection on 6/27/17 with 80% relief.  She is status post L5/S1 interlaminar epidural steroid injection on a/7/17 with excellent relief lasting 2 weeks, now reporting 0% relief.  She is status post right L3, 4 and 5 medial branch radiofrequency ablation on 10/19/17 with 80% relief.   She is status post right T4, 5, 6 and 7 medial branch radiofrequency ablation on 12/14/17 with 100% relief.  She is status post C7-T1 cervical interlaminar epidural steroid injection on 3/16/18 with at least 80% relief.  She is status post lumbar epidural steroid injection at L5/S1 on 5/15/18 with 90% relief of her bilateral low back and right leg pain.  The she is status post right T4, 5, 6 and 7 medial branch radiofrequency ablation on 10/12/2018 with 100% relief.  She is status post L5/S1 interlaminar epidural steroid injection on 01/16/2019 with almost 100% relief of her low back pain. She is status post right T4, 5, 6 and 7 medial branch radiofrequency ablation on 06/04/2019 with 80% relief.  She is status post L5/S1 interlaminar epidural steroid injection on 03/22/2021 with 85-90% relief.   She is status post right T4, 5, 6, 7 medial branch radiofrequency ablation on 05/21/2021 with 85% relief. She is status post cervical JINA C7/T1 on 11/11/2021 with greater than 70% relief. She is status post right T4, 5, 6, 7 medial branch radiofrequency ablation on 06/30/2022 with 85% relief. She is status post L5/S1 interlaminar epidural steroid injection to the right on 11/18/2022 with 75% relief. She is status post L5/S1 JINA with spread to the right on 07/21/2023 with greater than 50% relief. She is status post right T4, 5, 6, 7 medial branch  radiofrequency ablation on 10/12/2023 with 80-85% relief.    She is status post L5/S1 interlaminar epidural steroid injection on 12/06/2023 with 90% relief lasting 6 weeks.   She is status post L5/S1 interlaminar epidural steroid injection on 02/08/2024 with 90% relief.    She is status post left L4/5 transforaminal epidural steroid injection on 06/14/2024 with 90% relief.    Spine surgeries:  None    Antineuropathics:  Lidoderm 5%  NSAIDs:  Duexis 800 up to TID  Physical therapy:  She has consistently been in physical therapy, reports exercise, dry needling with at least temporary relief  Antidepressants:  Muscle relaxers:  Opioids:  Hydrocodone 7.5/325 twice daily  Antiplatelets/Anticoagulants:    ROS: She reports fatigability, headaches, hypothyroidism, joint stiffness, back pain, difficulty sleeping and anxiety.  Balance of review of systems is negative.      Past Medical History:   Diagnosis Date    Anxiety 08/25/2015    Arthritis     BRCA1 negative     BRCA2 negative     Celiac disease     Cervical spondylosis     Chronic back pain     DDD (degenerative disc disease), lumbar     Difficult intubation 03/2016    anterior larynx, pt with metal dental appliance, unable to do glidescope, used LMA    Dysautonomia     Encounter for blood transfusion     Facet arthropathy, thoracic     Hormone replacement therapy (HRT)     Insomnia 08/25/2015    Neck pain     PONV (postoperative nausea and vomiting)     Right foot pain     Scoliosis     Sjogren's syndrome     Snoring     uses cpap, states not ROSSY    Unspecified hypothyroidism 08/25/2015     Past Surgical History:   Procedure Laterality Date    BILATERAL OOPHORECTOMY  2004    BREAST BIOPSY Left 2010    Benign    CHOLECYSTECTOMY  2010    COLONOSCOPY  2016    [facility]    Epidural Steroid Injection      Pain managment, at another facility, cervical, thoracic    Epidural Steroid injection      Pain management, cervical    EPIDURAL STEROID INJECTION INTO CERVICAL SPINE N/A  11/11/2021    Procedure: Injection-steroid-epidural-cervical, C7/T1 interlaminer;  Surgeon: Floyd Coleman MD;  Location: [facility] OR;  Service: Pain Management;  Laterality: N/A;    EPIDURAL STEROID INJECTION INTO LUMBAR SPINE Right 1/16/2019    Procedure: Injection-steroid-epidural-lumbar L5/S1;  Surgeon: Floyd Coleman MD;  Location: [facility] OR;  Service: Pain Management;  Laterality: Right;  to right    EPIDURAL STEROID INJECTION INTO LUMBAR SPINE N/A 5/14/2019    Procedure: Injection-steroid-epidural-lumbar;  Surgeon: Floyd Coleman MD;  Location: [facility] OR;  Service: Pain Management;  Laterality: N/A;  L5/S1 interlaminar    EPIDURAL STEROID INJECTION INTO LUMBAR SPINE N/A 3/22/2021    Procedure: Injection-steroid-epidural-lumbar L5/S1 interlaminer;  Surgeon: Floyd Coleman MD;  Location: [facility] OR;  Service: Pain Management;  Laterality: N/A;    EPIDURAL STEROID INJECTION INTO LUMBAR SPINE N/A 11/18/2022    Procedure: Injection-steroid-epidural-lumbar L5/S1 to right;  Surgeon: Floyd Coleman MD;  Location: [facility] OR;  Service: Pain Management;  Laterality: N/A;    EPIDURAL STEROID INJECTION INTO LUMBAR SPINE N/A 7/21/2023    Procedure: Injection-steroid-epidural-lumbar Interlaminar L5/S1 to the right;  Surgeon: Floyd Coleman MD;  Location: [facility] OR;  Service: Pain Management;  Laterality: N/A;    EPIDURAL STEROID INJECTION INTO LUMBAR SPINE N/A 12/6/2023    Procedure: Injection-steroid-epidural-lumbar     L5/S1;  Surgeon: Floyd Coleman MD;  Location: [facility] OR;  Service: Pain Management;  Laterality: N/A;    EPIDURAL STEROID INJECTION INTO LUMBAR SPINE N/A 2/8/2024    Procedure: Injection-steroid-epidural-lumbar     L5/S1;  Surgeon: Floyd Coleman MD;  Location: [facility] OR;  Service: Pain Management;  Laterality: N/A;    HYSTERECTOMY  2004    Complete    MOUTH SURGERY      Braces, with temporary metal implants in upper gum    OOPHORECTOMY  2004    w/ hysterectomy    RADIOFREQUENCY  ABLATION  02/2016    thoracic nerve    RADIOFREQUENCY ABLATION Right 6/4/2019    Procedure: Radiofrequency Ablation T4,5,6,7;  Surgeon: Floyd Colemna MD;  Location: [facility] OR;  Service: Pain Management;  Laterality: Right;    RADIOFREQUENCY ABLATION Right 5/21/2021    Procedure: RADIOFREQUENCY ABLATION,THORACIC  T4,T5,T6, T7;  Surgeon: Floyd Coleman MD;  Location: [facility] OR;  Service: Pain Management;  Laterality: Right;    RADIOFREQUENCY ABLATION Right 6/30/2022    Procedure: Radiofrequency Ablation Thoracic T4, T5, T6, T7;  Surgeon: Floyd Coleman MD;  Location: [facility] OR;  Service: Pain Management;  Laterality: Right;    RADIOFREQUENCY ABLATION Right 10/12/2023    Procedure: RADIOFREQUENCY ABLATION,THORACIC Right T4, T5, T6, T7;  Surgeon: Floyd Coleman MD;  Location: [facility] OR;  Service: Pain Management;  Laterality: Right;  THORACIC Right T4, T5, T6, T7    TONSILLECTOMY      TRANSFORAMINAL EPIDURAL INJECTION OF STEROID Left 6/14/2024    Procedure: Injection,steroid,epidural,transforaminal approach    L4/5;  Surgeon: Floyd Coleman MD;  Location: [facility] OR;  Service: Pain Management;  Laterality: Left;    VULVA SURGERY  03/2016     Social History     Socioeconomic History    Marital status:    Tobacco Use    Smoking status: Never    Smokeless tobacco: Never   Substance and Sexual Activity    Alcohol use: No    Drug use: No    Sexual activity: Yes     Partners: Male     Social Determinants of Health     Financial Resource Strain: Low Risk  (2/27/2024)    Overall Financial Resource Strain (CARDIA)     Difficulty of Paying Living Expenses: Not hard at all   Food Insecurity: No Food Insecurity (2/27/2024)    Hunger Vital Sign     Worried About Running Out of Food in the Last Year: Never true     Ran Out of Food in the Last Year: Never true   Transportation Needs: No Transportation Needs (2/27/2024)    PRAPARE - Transportation     Lack of Transportation (Medical): No     Lack of  "Transportation (Non-Medical): No   Physical Activity: Insufficiently Active (2/27/2024)    Exercise Vital Sign     Days of Exercise per Week: 3 days     Minutes of Exercise per Session: 20 min   Stress: Stress Concern Present (2/27/2024)    [employer] - Occupational Stress Questionnaire     Feeling of Stress : Rather much   Housing Stability: Unknown (2/27/2024)    Housing Stability Vital Sign     Unable to Pay for Housing in the Last Year: No     Unstable Housing in the Last Year: No      She is a  at [employer].    Medications/Allergies: See med card    Vitals:    06/27/24 1134   BP: 135/70   Pulse: (!) 53   Weight: 84.5 kg (186 lb 4.6 oz)   Height: 5' 1" (1.549 m)   PainSc:   4   PainLoc: Back         Physical exam:  Gen: A and O x3, pleasant, well-groomed  Skin: No rashes or obvious lesions  HEENT: PERRLA, no obvious deformities on ears or in canals. Trachea midline.  CVS: Regular rate and rhythm, normal palpable pulses.  Resp:No increased work of breathing, symmetrical chest rise.  Abdomen: Soft, NT/ND.  Musculoskeletal:No antalgic gait.      Neuro:  Upper extremities: 5/5 strength bilaterally.  Lower extremities: 5/5 strength bilaterally.    Reflexes: Patellar 0+, Achilles 0+ bilaterally.  Upper extremity reflexes 2+ bilaterally equal.  Sensory:  Intact and symmetrical to light touch and pinprick in L2-S1 dermatomes bilaterally.     Cervical spine exam: Range of motion is mildly reduced with flexion, extension and lateral rotation with increased right neck pain during right lateral rotation and extension.  Myofascial exam:  Exquisite tenderness to palpation to the right mid thoracic paraspinous muscles, right trapezius muscle.       Lumbar spine:  Lumbar spine: Range of motion is full with flexion and extension with increased left low back pain and lateral hip pain during each maneuver.  Brayden's test causes no increased pain on either side.    Supine straight leg " raise causes increased left low back and left knee pain.  Internal and external rotation of the hip causes no increased pain on either side.  Myofascial exam:  Mild tenderness to palpation to the left lower lumbar paraspinous muscles.    Imagin/16/15 Xray Scoliosis survey: There is thoracic dextroscoliosis with mild lumbar compensatory levoscoliosis. No structural abnormalities are evident. Diffuse spondylosis noted in the cervical, thoracic and to lesser extent lumbar spine. No fractures are identified. There is slight increased kyphotic curvature of the thoracic spine with alignment being otherwise normal. Measurements and evaluation are limited due to underpenetration. Land angle measures approximately in the thoracic spine is of approximately 17.0 degrees and for the lumbar spine 17.4 degrees. Soft tissues are unremarkable. IMPRESSION: 1. S shaped scoliotic curvature of the thoracolumbar spine with Land angle of approximately 17 degrees. 2. Diffuse spondylosis.    MRI report 12: Report notes that there is dextroconvex rotary curvature of the thoracic spine.  Posterior alignment is maintained.  There are scattered vertebral body hemangiomas.  There is minimal posterior disc bulges noted at T5/6, T6/7, T7/8 and T8/9.  There is no central spinal stenosis or neural foraminal narrowing.    MRI right shoulder 11: Minor distal subscapularis tendinosis.  Negative for full-thickness or significant partial-thickness rotator cuff tendon tear.  There is a small amount of subacromial subdeltoid bursal fluid which can be seen with recent injection or mild bursitis.    Cervical spine MRI 16  At C2-C3, the minimal interstitial excision covers a very mild broad-based disc bulge most prominent centrally but does not deform the ventral cord.  There is no canal or foraminal stenosis.  At C3-C4, there is mildly decreased disc height with a broad-based disc bulge most prominent centrally.  This minimally contact  the ventral cord without significant deformity.  The canal is widely patent.  There is uncovertebral hypertrophy and facet arthropathy, but the foramina are patent.  At C4-C5, there is decreased disc height with a broad disc bulge-marginal osteophyte complex that mildly lateralizes to the left and blends imperceptibly with right greater than left uncovertebral hypertrophy.  With ligamentum hypertrophy, there is no significant central canal stenosis.  Uncovertebral hypertrophy and facet arthropathy are causing mild left and moderate right foraminal stenoses.  At C5-C6, there is a broad-based disc bulge and osteophyte complex most prominent centrally.  There is also ligamentum flavum hypertrophy present, but the canal is patent.  There is mild left foraminal stenosis.  The right foramen is patent.  At C6-C7, there is disc desiccation with a minimal broad-based disc bulge most prominent in the right paracentral canal.  The canal is widely patent.  There is mild left foraminal stenosis.  At C7-T1, there is no significant disc bulge, protrusion, or herniation/extrusion.  The canal and foramina are widely patent.    Hip x-rays 9/22/17  AP view of the pelvis and frog leg views of both hips were obtained. No evidence of acute displaced fracture or dislocation is visualized.  No radiopaque foreign bodies are visualized. The bilateral superior joint spaces are grossly maintained. Mild bilateral sacroiliac joint degenerative changes are seen including subchondral sclerosis and small marginal osteophytes. Mild to moderate symphyseal degenerative changes are seen. There is a slightly expansile cortically-based focus along the lateral proximal right femoral metadiaphysis. This could reflect a sessile osteochondroma, but correlation with MRI of the right hip is recommended.    8/18/19 MRI L-spine:  T12-L1: No disc herniation or significant posterior osteophytic ridging.  No significant spinal canal or foraminal stenosis.   L1-L2:  No disc herniation or significant posterior osteophytic ridging.  Minimal left facet hypertrophy.  No significant spinal canal or foraminal stenosis.   L2-L3: No disc herniation or significant posterior osteophytic ridging.  Minimal left facet hypertrophy.  No significant spinal canal or foraminal stenosis.   L3-L4: No disc herniation or significant posterior osteophytic ridging.  Minimal bilateral facet hypertrophy.  No significant spinal canal or foraminal stenosis.   L4-L5: Mild disc bulge.  Mild bilateral facet hypertrophy.  Mild ligamentum flavum thickening.  Minimal narrowing of the bilateral lateral recesses. No significant overall spinal canal stenosis. Mild bilateral foraminal stenosis.   L5-S1: Minimal disc bulge contained in the ventral epidural fat.  Mild bilateral facet hypertrophy.  No significant spinal canal or foraminal stenosis.    Assessment:  The patient is a 63-year-old woman with history of hypothyroidism, otherwise fairly healthy but a long history of scoliosis causing back pain, self-referred for continued back pain.    1. Lumbar radiculopathy        2. DDD (degenerative disc disease), lumbar        3. DDD (degenerative disc disease), cervical        4. Thoracic spondylosis        5. Cervical radiculopathy        6. 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 Opioid contract exists              Plan:  1. She had almost complete relief following the left L4/5 transforaminal epidural steroid injection.  She can contact us to repeat this in the future if necessary.    2. She can also contact us to repeat and L5/S1 interlaminar epidural steroid injection for right leg pain or a C7-T1 interlaminar epidural steroid injection for neck pain.  We also discussed that she can contact us to repeat right T4, 5, 6, 7 medial branch radiofrequency ablation.  3. She has reduce the amount of medication she is taking since her last visit and Dr. Coleman provided prescriptions  for hydrocodone-acetaminophen 7.5/325 mg twice daily as needed for pain.  I have reviewed the [place] Board of Pharmacy website and there are no abberancies.    4. She will follow-up in 3 months or sooner as needed.

## 2024-06-28 ENCOUNTER — OFFICE VISIT (OUTPATIENT)
Dept: OPTOMETRY | Facility: CLINIC | Age: 64
End: 2024-06-28
Payer: COMMERCIAL

## 2024-06-28 DIAGNOSIS — H43.811 POSTERIOR VITREOUS DETACHMENT OF RIGHT EYE: Primary | ICD-10-CM

## 2024-06-28 PROCEDURE — 99999 PR PBB SHADOW E&M-EST. PATIENT-LVL III: CPT | Mod: PBBFAC,,, | Performed by: OPTOMETRIST

## 2024-06-28 NOTE — PROGRESS NOTES
HPI    Urgent care pt here for F/F dls- 06/04/24    Pt sts at 530 last night she started seeing lightening flashes and   floaters across vision OD.   Today pt is seeing FOL that look like camera flashes, floaters are in the   temporal corner of OD.   Today OD feels swollen/tight.   Denies trauma and vision seems to be okay.   Last edited by Fanta Hernandez on 6/28/2024 11:22 AM.            Assessment /Plan     For exam results, see Encounter Report.    Posterior vitreous detachment of right eye      1 day history of symptoms, No evidence of holes, tears or detachment of retina. Negative Shafers sign in vitreous. 90 diopter lens exam negative in all quadrants. Patient educated to signs and symptoms of retinal detachment and return to clinic immediately if signs or symptoms arise. RTC 6 weeks for dfe

## 2024-07-03 ENCOUNTER — PATIENT MESSAGE (OUTPATIENT)
Dept: OPTOMETRY | Facility: CLINIC | Age: 64
End: 2024-07-03
Payer: COMMERCIAL

## 2024-07-05 ENCOUNTER — PATIENT MESSAGE (OUTPATIENT)
Dept: PAIN MEDICINE | Facility: CLINIC | Age: 64
End: 2024-07-05
Payer: COMMERCIAL

## 2024-07-08 RX ORDER — LIDOCAINE AND PRILOCAINE 25; 25 MG/G; MG/G
CREAM TOPICAL 2 TIMES DAILY PRN
Qty: 30 G | Refills: 5 | Status: SHIPPED | OUTPATIENT
Start: 2024-07-08

## 2024-07-08 RX ORDER — GABAPENTIN 100 MG/1
CAPSULE ORAL
Qty: 450 CAPSULE | Refills: 3 | Status: SHIPPED | OUTPATIENT
Start: 2024-07-08

## 2024-07-08 NOTE — TELEPHONE ENCOUNTER
Physician - Dr Coleman    Type of Procedure/Injection - Thoracic Radiofrequency Ablation  T4, 5, 6, 7         Laterality - Right      Anxiolysis- RNIV      Need to hold medication - Yes      NSAIDs for 2 days      Clearance needed - No      Follow up - 4 week

## 2024-07-09 DIAGNOSIS — M47.814 THORACIC SPONDYLOSIS: Primary | ICD-10-CM

## 2024-07-09 RX ORDER — SODIUM CHLORIDE, SODIUM LACTATE, POTASSIUM CHLORIDE, CALCIUM CHLORIDE 600; 310; 30; 20 MG/100ML; MG/100ML; MG/100ML; MG/100ML
INJECTION, SOLUTION INTRAVENOUS CONTINUOUS
OUTPATIENT
Start: 2024-07-09

## 2024-07-12 ENCOUNTER — PATIENT MESSAGE (OUTPATIENT)
Dept: RHEUMATOLOGY | Facility: CLINIC | Age: 64
End: 2024-07-12
Payer: COMMERCIAL

## 2024-07-12 ENCOUNTER — TELEPHONE (OUTPATIENT)
Dept: RHEUMATOLOGY | Facility: CLINIC | Age: 64
End: 2024-07-12
Payer: COMMERCIAL

## 2024-07-15 ENCOUNTER — TELEPHONE (OUTPATIENT)
Dept: PAIN MEDICINE | Facility: CLINIC | Age: 64
End: 2024-07-15
Payer: COMMERCIAL

## 2024-07-16 ENCOUNTER — PATIENT MESSAGE (OUTPATIENT)
Dept: RHEUMATOLOGY | Facility: CLINIC | Age: 64
End: 2024-07-16
Payer: COMMERCIAL

## 2024-07-17 ENCOUNTER — TELEPHONE (OUTPATIENT)
Dept: PAIN MEDICINE | Facility: CLINIC | Age: 64
End: 2024-07-17
Payer: COMMERCIAL

## 2024-07-19 DIAGNOSIS — H04.123 DRY EYE SYNDROME OF BILATERAL LACRIMAL GLANDS: ICD-10-CM

## 2024-07-19 RX ORDER — CYCLOSPORINE 0.5 MG/ML
EMULSION OPHTHALMIC
Qty: 60 EACH | Refills: 11 | Status: SHIPPED | OUTPATIENT
Start: 2024-07-19

## 2024-07-23 ENCOUNTER — HOSPITAL ENCOUNTER (OUTPATIENT)
Dept: RADIOLOGY | Facility: HOSPITAL | Age: 64
Discharge: HOME | End: 2024-07-23
Attending: OBSTETRICS & GYNECOLOGY
Payer: COMMERCIAL

## 2024-07-23 ENCOUNTER — TELEPHONE (OUTPATIENT)
Dept: PAIN MEDICINE | Facility: CLINIC | Age: 64
End: 2024-07-23
Payer: COMMERCIAL

## 2024-07-23 ENCOUNTER — PATIENT MESSAGE (OUTPATIENT)
Dept: PAIN MEDICINE | Facility: CLINIC | Age: 64
End: 2024-07-23
Payer: COMMERCIAL

## 2024-07-23 ENCOUNTER — OFFICE VISIT (OUTPATIENT)
Dept: OBSTETRICS AND GYNECOLOGY | Facility: CLINIC | Age: 64
End: 2024-07-23
Payer: COMMERCIAL

## 2024-07-23 VITALS — DIASTOLIC BLOOD PRESSURE: 76 MMHG | SYSTOLIC BLOOD PRESSURE: 114 MMHG

## 2024-07-23 DIAGNOSIS — Z12.31 ENCOUNTER FOR SCREENING MAMMOGRAM FOR MALIGNANT NEOPLASM OF BREAST: ICD-10-CM

## 2024-07-23 DIAGNOSIS — Z11.3 SCREENING EXAMINATION FOR STD (SEXUALLY TRANSMITTED DISEASE): ICD-10-CM

## 2024-07-23 DIAGNOSIS — Z90.710 S/P HYSTERECTOMY WITH OOPHORECTOMY: ICD-10-CM

## 2024-07-23 DIAGNOSIS — Z01.419 ROUTINE GYNECOLOGICAL EXAMINATION: Primary | ICD-10-CM

## 2024-07-23 DIAGNOSIS — Z90.721 S/P HYSTERECTOMY WITH OOPHORECTOMY: ICD-10-CM

## 2024-07-23 DIAGNOSIS — Z79.890 HORMONE REPLACEMENT THERAPY (HRT): ICD-10-CM

## 2024-07-23 PROCEDURE — 99396 PREV VISIT EST AGE 40-64: CPT | Mod: S$GLB,,, | Performed by: OBSTETRICS & GYNECOLOGY

## 2024-07-23 PROCEDURE — 77063 BREAST TOMOSYNTHESIS BI: CPT | Mod: 26,,, | Performed by: RADIOLOGY

## 2024-07-23 PROCEDURE — 77067 SCR MAMMO BI INCL CAD: CPT | Mod: TC,PN

## 2024-07-23 PROCEDURE — 3044F HG A1C LEVEL LT 7.0%: CPT | Mod: CPTII,S$GLB,, | Performed by: OBSTETRICS & GYNECOLOGY

## 2024-07-23 PROCEDURE — 99999 PR PBB SHADOW E&M-EST. PATIENT-LVL II: CPT | Mod: PBBFAC,,, | Performed by: OBSTETRICS & GYNECOLOGY

## 2024-07-23 PROCEDURE — 88175 CYTOPATH C/V AUTO FLUID REDO: CPT | Performed by: OBSTETRICS & GYNECOLOGY

## 2024-07-23 PROCEDURE — 87624 HPV HI-RISK TYP POOLED RSLT: CPT | Performed by: OBSTETRICS & GYNECOLOGY

## 2024-07-23 PROCEDURE — 3074F SYST BP LT 130 MM HG: CPT | Mod: CPTII,S$GLB,, | Performed by: OBSTETRICS & GYNECOLOGY

## 2024-07-23 PROCEDURE — 3078F DIAST BP <80 MM HG: CPT | Mod: CPTII,S$GLB,, | Performed by: OBSTETRICS & GYNECOLOGY

## 2024-07-23 PROCEDURE — 77067 SCR MAMMO BI INCL CAD: CPT | Mod: 26,,, | Performed by: RADIOLOGY

## 2024-07-23 RX ORDER — ESTRADIOL 1 MG/G
GEL TOPICAL
Qty: 90 G | Refills: 3 | Status: SHIPPED | OUTPATIENT
Start: 2024-07-23

## 2024-07-23 RX ORDER — ESTRADIOL 0.1 MG/G
1 CREAM VAGINAL
Qty: 42.5 G | Refills: 1 | Status: SHIPPED | OUTPATIENT
Start: 2024-07-25 | End: 2025-07-25

## 2024-07-23 NOTE — PROGRESS NOTES
Chief Complaint   Patient presents with    Well Woman     History of Present Illness: Vonnie Garces is a 63 y.o. female that presents today 7/23/2024 for well gyn visit.    Past Medical History:   Diagnosis Date    Anxiety 08/25/2015    Arthritis     BRCA1 negative     BRCA2 negative     Celiac disease     Cervical spondylosis     Chronic back pain     DDD (degenerative disc disease), lumbar     Difficult intubation 03/2016    anterior larynx, pt with metal dental appliance, unable to do glidescope, used LMA    Dysautonomia     Encounter for blood transfusion     Facet arthropathy, thoracic     Hormone replacement therapy (HRT)     Insomnia 08/25/2015    Neck pain     PONV (postoperative nausea and vomiting)     Right foot pain     Scoliosis     Sjogren's syndrome     Snoring     uses cpap, states not ROSSY    Unspecified hypothyroidism 08/25/2015       Past Surgical History:   Procedure Laterality Date    BILATERAL OOPHORECTOMY  2004    BREAST BIOPSY Left 2010    Benign    CHOLECYSTECTOMY  2010    COLONOSCOPY  2016    [place]    Epidural Steroid Injection      Pain managment, at another facility, cervical, thoracic    Epidural Steroid injection      Pain management, cervical    EPIDURAL STEROID INJECTION INTO CERVICAL SPINE N/A 11/11/2021    Procedure: Injection-steroid-epidural-cervical, C7/T1 interlaminer;  Surgeon: Floyd Coleman MD;  Location: [facility] OR;  Service: Pain Management;  Laterality: N/A;    EPIDURAL STEROID INJECTION INTO LUMBAR SPINE Right 01/16/2019    Procedure: Injection-steroid-epidural-lumbar L5/S1;  Surgeon: Floyd Coleman MD;  Location: [facility] OR;  Service: Pain Management;  Laterality: Right;  to right    EPIDURAL STEROID INJECTION INTO LUMBAR SPINE N/A 05/14/2019    Procedure: Injection-steroid-epidural-lumbar;  Surgeon: Floyd Coleman MD;  Location: [facility] OR;  Service: Pain Management;  Laterality: N/A;  L5/S1 interlaminar    EPIDURAL STEROID INJECTION INTO LUMBAR SPINE  N/A 03/22/2021    Procedure: Injection-steroid-epidural-lumbar L5/S1 interlaminer;  Surgeon: Floyd Coleman MD;  Location: [facility] OR;  Service: Pain Management;  Laterality: N/A;    EPIDURAL STEROID INJECTION INTO LUMBAR SPINE N/A 11/18/2022    Procedure: Injection-steroid-epidural-lumbar L5/S1 to right;  Surgeon: Floyd Coleman MD;  Location: [facility] OR;  Service: Pain Management;  Laterality: N/A;    EPIDURAL STEROID INJECTION INTO LUMBAR SPINE N/A 07/21/2023    Procedure: Injection-steroid-epidural-lumbar Interlaminar L5/S1 to the right;  Surgeon: Floyd Coleman MD;  Location: [facility] OR;  Service: Pain Management;  Laterality: N/A;    EPIDURAL STEROID INJECTION INTO LUMBAR SPINE N/A 12/06/2023    Procedure: Injection-steroid-epidural-lumbar     L5/S1;  Surgeon: Floyd Coleman MD;  Location: [facility] OR;  Service: Pain Management;  Laterality: N/A;    EPIDURAL STEROID INJECTION INTO LUMBAR SPINE N/A 02/08/2024    Procedure: Injection-steroid-epidural-lumbar     L5/S1;  Surgeon: Floyd Coleman MD;  Location: [facility] OR;  Service: Pain Management;  Laterality: N/A;    HYSTERECTOMY  2004    benign    MOUTH SURGERY      Braces, with temporary metal implants in upper gum    OOPHORECTOMY  2004    w/ hysterectomy    RADIOFREQUENCY ABLATION  02/2016    thoracic nerve    RADIOFREQUENCY ABLATION Right 06/04/2019    Procedure: Radiofrequency Ablation T4,5,6,7;  Surgeon: Floyd Coleman MD;  Location: [facility] OR;  Service: Pain Management;  Laterality: Right;    RADIOFREQUENCY ABLATION Right 05/21/2021    Procedure: RADIOFREQUENCY ABLATION,THORACIC  T4,T5,T6, T7;  Surgeon: Floyd Coleman MD;  Location: [facility] OR;  Service: Pain Management;  Laterality: Right;    RADIOFREQUENCY ABLATION Right 06/30/2022    Procedure: Radiofrequency Ablation Thoracic T4, T5, T6, T7;  Surgeon: Floyd Coleman MD;  Location: [facility] OR;  Service: Pain Management;  Laterality: Right;    RADIOFREQUENCY ABLATION Right 10/12/2023     Procedure: RADIOFREQUENCY ABLATION,THORACIC Right T4, T5, T6, T7;  Surgeon: Floyd Coleman MD;  Location: [facility] OR;  Service: Pain Management;  Laterality: Right;  THORACIC Right T4, T5, T6, T7    TONSILLECTOMY      TRANSFORAMINAL EPIDURAL INJECTION OF STEROID Left 06/14/2024    Procedure: Injection,steroid,epidural,transforaminal approach    L4/5;  Surgeon: Floyd Coleman MD;  Location: [facility] OR;  Service: Pain Management;  Laterality: Left;    VULVA SURGERY  03/2016       Outpatient Medications Prior to Visit   Medication Sig Dispense Refill    ALPRAZolam (XANAX) 0.25 MG tablet TAKE 1 TABLET BY MOUTH TWICE DAILY AS NEEDED FOR ANXIETY 30 tablet 0    cycloSPORINE (RESTASIS) 0.05 % ophthalmic emulsion place 1 drop into affected eye(s) TWICE DAILY 60 each 11    eszopiclone (LUNESTA) 2 MG Tab Take 1 tablet (2 mg total) by mouth every evening. 30 tablet 5    gabapentin (NEURONTIN) 100 MG capsule Take one capsule (100mg) by mouth in the morning and four capsules (400mg) by mouth in the evening 450 capsule 3    HYDROcodone-acetaminophen (NORCO) 7.5-325 mg per tablet Take 1 tablet by mouth every 8 (eight) hours as needed for Pain. 60 tablet 0    hydroxychloroquine (PLAQUENIL) 200 mg tablet Take 1 tablet (200 mg total) by mouth 2 (two) times daily. 180 tablet 3    loteprednol (LOTEMAX) 0.5 % ophthalmic suspension Place 1 drop into both eyes 2 (two) times daily as needed (for dry eye inflammation). 5 mL 3    METHYL SALICYLATE/MENTH/CAMPH (PAIN RELIEVING CREAM TOP) Apply 1 application topically daily as needed.      propranoloL (INDERAL LA) 60 MG 24 hr capsule TAKE 1 CAPSULE BY MOUTH once daily 90 capsule 3    TIROSINT 125 mcg Cap TAKE 1 CAPSULE BY MOUTH once daily 90 capsule 3    estradioL (DIVIGEL) 1 mg/gram (0.1 %) topical gel apply 1 gram onto skin once daily 90 g 3    amLODIPine (NORVASC) 2.5 MG tablet Take 1 tablet (2.5 mg total) by mouth once daily. (Patient not taking: Reported on 7/23/2024) 30 tablet 11     azaTHIOprine (IMURAN) 50 mg Tab Take 1 tablet (50 mg total) by mouth once daily. (Patient not taking: Reported on 7/23/2024) 30 tablet 2    corticotropin (ACTHAR H.P.) 80 unit/mL injectable gel Inject 0.5 mLs (40 Units total) into the skin every 72 hours. (Patient not taking: Reported on 7/23/2024) 10 mL 2    corticotropin (CORTROPHIN GEL) 80 unit/mL injectable gel Inject 0.5 mLs (40 Units total) into the skin every 72 hours. (Patient not taking: Reported on 7/23/2024) 10 mL 6    [START ON 7/28/2024] HYDROcodone-acetaminophen (NORCO) 7.5-325 mg per tablet Take 1 tablet by mouth every 8 (eight) hours as needed for Pain. 60 tablet 0    [START ON 8/29/2024] HYDROcodone-acetaminophen (NORCO) 7.5-325 mg per tablet Take 1 tablet by mouth every 8 (eight) hours as needed for Pain. 60 tablet 0    ibuprofen-famotidine (DUEXIS) 800-26.6 mg Tab Take 1 tablet by mouth 3 (three) times daily. (Patient not taking: Reported on 7/23/2024) 90 tablet 12    LIDOcaine-prilocaine (EMLA) cream Apply topically 2 (two) times daily as needed (pain). 30 g 5     No facility-administered medications prior to visit.       Review of patient's allergies indicates:   Allergen Reactions    Compazine [prochlorperazine edisylate] Anaphylaxis    Avelox [moxifloxacin] Rash       Family History   Problem Relation Name Age of Onset    Macular degeneration Mother      Arthritis Mother      Celiac disease Mother      COPD Father      Glaucoma Neg Hx         Social History     Socioeconomic History    Marital status:    Tobacco Use    Smoking status: Never    Smokeless tobacco: Never   Substance and Sexual Activity    Alcohol use: No    Drug use: No    Sexual activity: Yes     Partners: Male     Social Determinants of Health     Financial Resource Strain: Low Risk  (2/27/2024)    Overall Financial Resource Strain (CARDIA)     Difficulty of Paying Living Expenses: Not hard at all   Food Insecurity: No Food Insecurity (2/27/2024)    Hunger Vital Sign      Worried About Running Out of Food in the Last Year: Never true     Ran Out of Food in the Last Year: Never true   Transportation Needs: No Transportation Needs (2024)    PRAPARE - Transportation     Lack of Transportation (Medical): No     Lack of Transportation (Non-Medical): No   Physical Activity: Insufficiently Active (2024)    Exercise Vital Sign     Days of Exercise per Week: 3 days     Minutes of Exercise per Session: 20 min   Stress: Stress Concern Present (2024)    [employer] - Occupational Stress Questionnaire     Feeling of Stress : Rather much   Housing Stability: Unknown (2024)    Housing Stability Vital Sign     Unable to Pay for Housing in the Last Year: No     Unstable Housing in the Last Year: No       OB History    Para Term  AB Living   3 3 3         SAB IAB Ectopic Multiple Live Births                  # Outcome Date GA Lbr Marco A/2nd Weight Sex Type Anes PTL Lv   3 Term            2 Term            1 Term                Review of Symptoms:  GENERAL: Denies weight gain or weight loss. Feeling well overall.   SKIN: Denies rash or lesions.   HEAD: Denies head injury or headache.   NODES: Denies enlarged lymph nodes.   CHEST: Denies chest pain or shortness of breath.   CARDIOVASCULAR: Denies palpitations or left sided chest pain.   ABDOMEN: No abdominal pain, constipation, diarrhea, nausea, vomiting or rectal bleeding.   URINARY: No frequency, dysuria, hematuria, or burning on urination.  HEMATOLOGIC: No easy bruisability or excessive bleeding.   MUSCULOSKELETAL: Denies joint pain or swelling.     /76   LMP  (LMP Unknown)   Physical Exam:  APPEARANCE: Well nourished, well developed, in no acute distress.  SKIN: Normal skin turgor, no lesions.  NECK: Neck symmetric without masses   RESPIRATORY: Normal respiratory effort with no retractions or use of accessory muscles  CARDIOVASCULAR: Peripheral vascular system with no swelling no  varicosities and palpation of pulses normal  LYMPHATIC: No enlargements of the lymph nodes noted in the neck, axillae, or groin  ABDOMEN: Soft. No tenderness or masses. No hepatosplenomegaly. No hernias.  BREASTS: Symmetrical, no skin changes or visible lesions. No palpable masses, nipple discharge or adenopathy bilaterally.  PELVIC: Normal external female genitalia without lesions. Normal hair distribution. Adequate perineal body, normal urethral meatus. Urethra with no masses.  Bladder nontender. Vagina moist and well rugated without lesions or discharge. No significant cystocele or rectocele.  Adnexa without masses or tenderness. Urethra and bladder normal.   EXTREMITIES: No clubbing cyanosis or edema.    ASSESSMENT/PLAN:  Routine gynecological examination    Hormone replacement therapy (HRT)  -     estradioL (DIVIGEL) 1 mg/gram (0.1 %) topical gel; apply 1 gram onto skin once daily  Dispense: 90 g; Refill: 3  -     estradioL (ESTRACE) 0.01 % (0.1 mg/gram) vaginal cream; Place 1 g vaginally every Monday and Thursday.  Dispense: 42.5 g; Refill: 1    Encounter for screening mammogram for malignant neoplasm of breast  -     Mammo Digital Screening Bilat w/ [place]; Future; Expected date: 07/23/2024    S/P hysterectomy with oophorectomy  -     Liquid-Based Pap Smear, Screening  -     HPV High Risk Genotypes, PCR    Screening examination for STD (sexually transmitted disease)  -     HIV 1/2 Ag/Ab (4th Gen); Future; Expected date: 07/23/2024          Patient was counseled today on Pap guidelines. We discussed the discontinuing the pap smear after hysterectomy except in certain high risk cases.  We discussed the need for pelvic exams.   We discussed STD screening if at high risk for an STD.  We discussed breast cancer screening with mammograms every other year after the age of 40 and annually after the age of 50.    We discussed colon cancer screening.   Osteoporosis screening with the Dexa Bone Scan discussed when  indicated.   She will see her PCP for other health maintenance.       FOLLOW-UP:prn

## 2024-07-23 NOTE — TELEPHONE ENCOUNTER
Please let the patient know that we received a denial for her procedure and cancel this.  Please start the appeal process.

## 2024-07-24 NOTE — TELEPHONE ENCOUNTER
I have checked with pre service. Patient is approved by her primary insurance but was denied by secondary ins. Patient is aware. Per pre service patient is cleared to proceed with scheduled thoracic ablation for 7/29.

## 2024-07-25 LAB
CLINICAL INFO: NORMAL
DATE OF PREVIOUS PAP: NORMAL
DATE PREVIOUS BX: NO
LMP START DATE: NORMAL
SPECIMEN SOURCE CVX/VAG CYTO: NORMAL

## 2024-07-26 ENCOUNTER — TELEPHONE (OUTPATIENT)
Dept: PAIN MEDICINE | Facility: CLINIC | Age: 64
End: 2024-07-26
Payer: COMMERCIAL

## 2024-07-26 NOTE — TELEPHONE ENCOUNTER
----- Message from Miryam Rubin sent at 7/26/2024 12:51 PM CDT -----  Contact: Pharmacy  METHYL SALICYLATE/MENTH/CAMPH (PAIN RELIEVING CREAM TOP)      Type:  Pharmacy Calling to Clarify an RX    Name of Caller:Pharmacy     Pharmacy Name:    [facility]- [place] - [address]  Counts include 234 beds at the [facility] [place] [address]  Phone: [phone] Fax: [phone]      Prescription Name:METHYL SALICYLATE/MENTH/CAMPH (PAIN RELIEVING CREAM TOP)    What do they need to clarify?:Pharmacy has been waiting for further instructions regarding this topical cream. Please call to advise     Best Call Back Number:[phone] (home)     Additional Information: Please call

## 2024-07-26 NOTE — TELEPHONE ENCOUNTER
Spoke Ad[place] at [facility]. She stated  if you would like the pt to have all 4 medications listed under number 1 please check all 4 choices. She stated if you want just want number 2 please select all 4 choices under number 2 because this includes Neurontin. Also she said  the medication Ketamine topical cream she will need directions, strength, and quantity sent to her because this is a controlled substance.

## 2024-07-29 ENCOUNTER — HOSPITAL ENCOUNTER (OUTPATIENT)
Facility: HOSPITAL | Age: 64
Discharge: HOME | End: 2024-07-29
Attending: ANESTHESIOLOGY | Admitting: ANESTHESIOLOGY
Payer: COMMERCIAL

## 2024-07-29 ENCOUNTER — HOSPITAL ENCOUNTER (OUTPATIENT)
Dept: RADIOLOGY | Facility: HOSPITAL | Age: 64
Discharge: HOME | End: 2024-07-29
Attending: ANESTHESIOLOGY | Admitting: ANESTHESIOLOGY
Payer: COMMERCIAL

## 2024-07-29 DIAGNOSIS — M54.9 BACK PAIN: ICD-10-CM

## 2024-07-29 DIAGNOSIS — M47.814 THORACIC SPONDYLOSIS: ICD-10-CM

## 2024-07-29 PROCEDURE — 64633 DESTROY CERV/THOR FACET JNT: CPT | Mod: RT,,, | Performed by: ANESTHESIOLOGY

## 2024-07-29 PROCEDURE — 64634 DESTROY C/TH FACET JNT ADDL: CPT | Mod: PO,RT | Performed by: ANESTHESIOLOGY

## 2024-07-29 PROCEDURE — 25000003 PHARM REV CODE 250: Mod: PO | Performed by: ANESTHESIOLOGY

## 2024-07-29 PROCEDURE — 64633 DESTROY CERV/THOR FACET JNT: CPT | Mod: PO,RT | Performed by: ANESTHESIOLOGY

## 2024-07-29 PROCEDURE — 64634 DESTROY C/TH FACET JNT ADDL: CPT | Mod: RT,,, | Performed by: ANESTHESIOLOGY

## 2024-07-29 PROCEDURE — 63600175 PHARM REV CODE 636 W HCPCS: Mod: PO | Performed by: ANESTHESIOLOGY

## 2024-07-29 RX ORDER — SODIUM CHLORIDE, SODIUM LACTATE, POTASSIUM CHLORIDE, CALCIUM CHLORIDE 600; 310; 30; 20 MG/100ML; MG/100ML; MG/100ML; MG/100ML
INJECTION, SOLUTION INTRAVENOUS CONTINUOUS
Status: DISCONTINUED | OUTPATIENT
Start: 2024-07-29 | End: 2024-07-29 | Stop reason: HOSPADM

## 2024-07-29 RX ORDER — LIDOCAINE HYDROCHLORIDE 10 MG/ML
INJECTION INFILTRATION; PERINEURAL
Status: DISCONTINUED | OUTPATIENT
Start: 2024-07-29 | End: 2024-07-29 | Stop reason: HOSPADM

## 2024-07-29 RX ORDER — LIDOCAINE HYDROCHLORIDE 20 MG/ML
INJECTION, SOLUTION INFILTRATION; PERINEURAL
Status: DISCONTINUED | OUTPATIENT
Start: 2024-07-29 | End: 2024-07-29 | Stop reason: HOSPADM

## 2024-07-29 RX ORDER — MIDAZOLAM HYDROCHLORIDE 1 MG/ML
INJECTION INTRAMUSCULAR; INTRAVENOUS
Status: DISCONTINUED | OUTPATIENT
Start: 2024-07-29 | End: 2024-07-29 | Stop reason: HOSPADM

## 2024-07-29 RX ORDER — FENTANYL CITRATE 50 UG/ML
INJECTION, SOLUTION INTRAMUSCULAR; INTRAVENOUS
Status: DISCONTINUED | OUTPATIENT
Start: 2024-07-29 | End: 2024-07-29 | Stop reason: HOSPADM

## 2024-07-29 RX ORDER — METHYLPREDNISOLONE ACETATE 40 MG/ML
INJECTION, SUSPENSION INTRA-ARTICULAR; INTRALESIONAL; INTRAMUSCULAR; SOFT TISSUE
Status: DISCONTINUED | OUTPATIENT
Start: 2024-07-29 | End: 2024-07-29 | Stop reason: HOSPADM

## 2024-07-29 RX ADMIN — SODIUM CHLORIDE, POTASSIUM CHLORIDE, SODIUM LACTATE AND CALCIUM CHLORIDE: 600; 310; 30; 20 INJECTION, SOLUTION INTRAVENOUS at 01:07

## 2024-07-29 NOTE — OP NOTE
PROCEDURE DATE: 7/29/2024    PROCEDURE:  Radiofrequency ablation of the T4,5,6,7 medial branch nerves on the right-side utilizing fluoroscopy    DIAGNOSIS:  Thoracic spondylosis    Post op Diagnosis: Same    PHYSICIAN: Floyd Coleman MD    MEDICATIONS INJECTED:  From a mixture of 4ml of 2% lidocaine and 40mg of methylprednisone, 1ml of this solution was injected at each level.    LOCAL ANESTHETIC USED: Lidocaine 1%, 3 ml given at each site.    SEDATION MEDICATIONS: 4mg versed, 50mcg fentanyl    ESTIMATED BLOOD LOSS:  none    COMPLICATIONS:  none    TECHNIQUE:  A time out was taken to identify patient and procedure side prior to starting the procedure. Laying in a prone position, the patient was prepped and draped in the usual sterile fashion using ChloraPrep and sterile towels.  The levels were determined under fluoroscopic guidance and then marked.  Local anesthetic was given by raising a wheal at the skin over each site and then infiltrated approximately 2cm deeper.  A 20-gauge  100 mm [employer] RF needle was introduced to the anatomic location of the right T4,5,6,7 medial branch nerves.  Motor stimulation up to 2 Volts at each level confirmed no motor nerve involvement.  Impedance was less than 800 ohms at each level. The above noted medication was then injected slowly.  Ablation was performed per level utilizing [place] radiofrequency generator 80°C for 90 seconds. The patient tolerated the procedure well.     The patient was monitored after the procedure.  Patient was given post procedure and discharge instructions to follow at home.  The patient was discharged in a stable condition

## 2024-07-29 NOTE — H&P
CC: Back pain    HPI: The patient is a 62yo woman with a history of thoracic spondylosis here for right T4,5,6,7 RFA. There are no major changes in history and physical from 6/27/24.    Past Medical History:   Diagnosis Date    Anxiety 08/25/2015    Arthritis     BRCA1 negative     BRCA2 negative     Celiac disease     Cervical spondylosis     Chronic back pain     DDD (degenerative disc disease), lumbar     Difficult intubation 03/2016    anterior larynx, pt with metal dental appliance, unable to do glidescope, used LMA    Dysautonomia     Encounter for blood transfusion     Facet arthropathy, thoracic     Hormone replacement therapy (HRT)     Insomnia 08/25/2015    Neck pain     PONV (postoperative nausea and vomiting)     Right foot pain     Scoliosis     Sjogren's syndrome     Snoring     uses cpap, states not ROSSY    Unspecified hypothyroidism 08/25/2015       Past Surgical History:   Procedure Laterality Date    BILATERAL OOPHORECTOMY  2004    BREAST BIOPSY Left 2010    Benign    CHOLECYSTECTOMY  2010    COLONOSCOPY  2016    [place]    Epidural Steroid Injection      Pain managment, at another facility, cervical, thoracic    Epidural Steroid injection      Pain management, cervical    EPIDURAL STEROID INJECTION INTO CERVICAL SPINE N/A 11/11/2021    Procedure: Injection-steroid-epidural-cervical, C7/T1 interlaminer;  Surgeon: Floyd Coleman MD;  Location: [facility] OR;  Service: Pain Management;  Laterality: N/A;    EPIDURAL STEROID INJECTION INTO LUMBAR SPINE Right 01/16/2019    Procedure: Injection-steroid-epidural-lumbar L5/S1;  Surgeon: Floyd Coleman MD;  Location: [facility] OR;  Service: Pain Management;  Laterality: Right;  to right    EPIDURAL STEROID INJECTION INTO LUMBAR SPINE N/A 05/14/2019    Procedure: Injection-steroid-epidural-lumbar;  Surgeon: Floyd Coleman MD;  Location: [facility] OR;  Service: Pain Management;  Laterality: N/A;  L5/S1 interlaminar    EPIDURAL STEROID INJECTION INTO LUMBAR  SPINE N/A 03/22/2021    Procedure: Injection-steroid-epidural-lumbar L5/S1 interlaminer;  Surgeon: Floyd Coleman MD;  Location: [facility] OR;  Service: Pain Management;  Laterality: N/A;    EPIDURAL STEROID INJECTION INTO LUMBAR SPINE N/A 11/18/2022    Procedure: Injection-steroid-epidural-lumbar L5/S1 to right;  Surgeon: Floyd Coleman MD;  Location: [facility] OR;  Service: Pain Management;  Laterality: N/A;    EPIDURAL STEROID INJECTION INTO LUMBAR SPINE N/A 07/21/2023    Procedure: Injection-steroid-epidural-lumbar Interlaminar L5/S1 to the right;  Surgeon: Floyd Coleman MD;  Location: [facility] OR;  Service: Pain Management;  Laterality: N/A;    EPIDURAL STEROID INJECTION INTO LUMBAR SPINE N/A 12/06/2023    Procedure: Injection-steroid-epidural-lumbar     L5/S1;  Surgeon: Floyd Coleman MD;  Location: [facility] OR;  Service: Pain Management;  Laterality: N/A;    EPIDURAL STEROID INJECTION INTO LUMBAR SPINE N/A 02/08/2024    Procedure: Injection-steroid-epidural-lumbar     L5/S1;  Surgeon: Floyd Coleman MD;  Location: [facility] OR;  Service: Pain Management;  Laterality: N/A;    HYSTERECTOMY  2004    benign    MOUTH SURGERY      Braces, with temporary metal implants in upper gum    OOPHORECTOMY  2004    w/ hysterectomy    RADIOFREQUENCY ABLATION  02/2016    thoracic nerve    RADIOFREQUENCY ABLATION Right 06/04/2019    Procedure: Radiofrequency Ablation T4,5,6,7;  Surgeon: Floyd Coleman MD;  Location: [facility] OR;  Service: Pain Management;  Laterality: Right;    RADIOFREQUENCY ABLATION Right 05/21/2021    Procedure: RADIOFREQUENCY ABLATION,THORACIC  T4,T5,T6, T7;  Surgeon: Floyd Coleman MD;  Location: [facility] OR;  Service: Pain Management;  Laterality: Right;    RADIOFREQUENCY ABLATION Right 06/30/2022    Procedure: Radiofrequency Ablation Thoracic T4, T5, T6, T7;  Surgeon: Floyd Coleman MD;  Location: [facility] OR;  Service: Pain Management;  Laterality: Right;    RADIOFREQUENCY ABLATION Right  10/12/2023    Procedure: RADIOFREQUENCY ABLATION,THORACIC Right T4, T5, T6, T7;  Surgeon: Floyd Coleman MD;  Location: [facility] OR;  Service: Pain Management;  Laterality: Right;  THORACIC Right T4, T5, T6, T7    TONSILLECTOMY      TRANSFORAMINAL EPIDURAL INJECTION OF STEROID Left 06/14/2024    Procedure: Injection,steroid,epidural,transforaminal approach    L4/5;  Surgeon: Floyd Coleman MD;  Location: [facility] OR;  Service: Pain Management;  Laterality: Left;    VULVA SURGERY  03/2016       Family History   Problem Relation Name Age of Onset    Macular degeneration Mother      Arthritis Mother      Celiac disease Mother      COPD Father      Glaucoma Neg Hx         Social History     Socioeconomic History    Marital status:    Tobacco Use    Smoking status: Never    Smokeless tobacco: Never   Substance and Sexual Activity    Alcohol use: No    Drug use: No    Sexual activity: Yes     Partners: Male     Social Determinants of Health     Financial Resource Strain: Low Risk  (2/27/2024)    Overall Financial Resource Strain (CARDIA)     Difficulty of Paying Living Expenses: Not hard at all   Food Insecurity: No Food Insecurity (2/27/2024)    Hunger Vital Sign     Worried About Running Out of Food in the Last Year: Never true     Ran Out of Food in the Last Year: Never true   Transportation Needs: No Transportation Needs (2/27/2024)    PRAPARE - Transportation     Lack of Transportation (Medical): No     Lack of Transportation (Non-Medical): No   Physical Activity: Insufficiently Active (2/27/2024)    Exercise Vital Sign     Days of Exercise per Week: 3 days     Minutes of Exercise per Session: 20 min   Stress: Stress Concern Present (2/27/2024)    [employer] - Occupational Stress Questionnaire     Feeling of Stress : Rather much   Housing Stability: Unknown (2/27/2024)    Housing Stability Vital Sign     Unable to Pay for Housing in the Last Year: No     Unstable Housing in  "the Last Year: No       Current Facility-Administered Medications   Medication Dose Route Frequency Provider Last Rate Last Admin    lactated ringers infusion   Intravenous Continuous Floyd Coleman MD           Review of patient's allergies indicates:   Allergen Reactions    Compazine [prochlorperazine edisylate] Anaphylaxis    Avelox [moxifloxacin] Rash       Vitals:    07/24/24 1024 07/29/24 1351   BP:  128/60   Pulse:  (!) 50   Resp:  16   Temp:  98 °F (36.7 °C)   SpO2:  97%   Weight: 88.1 kg (194 lb 3.6 oz)    Height: 5' 1" (1.549 m)        ASA 2, Mallampati 2    REVIEW OF SYSTEMS:     GENERAL: No weight loss, malaise or fevers.  HEENT:  No recent changes in vision or hearing  NECK: Negative for lumps, no difficulty with swallowing.  RESPIRATORY: Negative for cough, wheezing or shortness of breath, patient denies any recent URI.  CARDIOVASCULAR: Negative for chest pain, leg swelling or palpitations.  GI: Negative for abdominal discomfort, blood in stools or black stools or change in bowel habits.  MUSCULOSKELETAL: See HPI.  SKIN: Negative for lesions, rash, and itching.  PSYCH: No suicidal or homicidal ideations, no current mood disturbances.  HEMATOLOGY/LYMPHOLOGY: Negative for prolonged bleeding, bruising easily or swollen nodes. Patient is not currently taking any anti-coagulants  ENDO: No history of diabetes or thyroid dysfunction  NEURO: No history of syncope, paralysis, seizures or tremors.All other reviewed and negative other than HPI.    Physical exam:  Gen: A and O x3, pleasant, well-groomed  Skin: No rashes or obvious lesions  HEENT: PERRLA, no obvious deformities on ears or in canals. No thyroid masses, trachea midline, no palpable lymph nodes in neck, axilla.  CVS: Regular rate and rhythm, normal S1 and S2, no murmurs.  Resp: Clear to auscultation bilaterally.  Abdomen: Soft, NT/ND, normal bowel sounds present.  Musculoskeletal/Neuro: Moving all extremities    Assessment:  Thoracic " spondylosis  -     Place in Outpatient; Standing  -     Vital signs; Standing  -     Place 18-22 gauage peripheral IV ; Standing  -     Verify informed consent; Standing  -     Notify physician ; Standing  -     Notify physician ; Standing  -     Notify physician (specify); Standing  -     Diet NPO; Standing  -     lactated ringers infusion    Other orders  -     IP VTE LOW RISK PATIENT; Standing

## 2024-07-29 NOTE — DISCHARGE SUMMARY
[facility] - [facility]  Discharge Note  Short Stay    Procedure(s) (LRB):  RADIOFREQUENCY ABLATION,THORACIC     T4,5,6,7 (Right)      OUTCOME: Patient tolerated treatment/procedure well without complication and is now ready for discharge.    DISPOSITION: Home or Self Care    FINAL DIAGNOSIS:  Thoracic spondylosis    FOLLOWUP: In clinic    DISCHARGE INSTRUCTIONS:    Discharge Procedure Orders   Diet Adult Regular     No dressing needed     Notify your health care provider if you experience any of the following:  temperature >100.4     Activity as tolerated

## 2024-07-30 VITALS
WEIGHT: 194.25 LBS | TEMPERATURE: 98 F | SYSTOLIC BLOOD PRESSURE: 128 MMHG | BODY MASS INDEX: 36.67 KG/M2 | HEART RATE: 55 BPM | RESPIRATION RATE: 16 BRPM | HEIGHT: 61 IN | OXYGEN SATURATION: 99 % | DIASTOLIC BLOOD PRESSURE: 62 MMHG

## 2024-08-07 ENCOUNTER — PATIENT MESSAGE (OUTPATIENT)
Dept: PAIN MEDICINE | Facility: CLINIC | Age: 64
End: 2024-08-07
Payer: COMMERCIAL

## 2024-08-09 ENCOUNTER — OFFICE VISIT (OUTPATIENT)
Dept: OPTOMETRY | Facility: CLINIC | Age: 64
End: 2024-08-09
Payer: COMMERCIAL

## 2024-08-09 DIAGNOSIS — M35.01 SJOGREN'S SYNDROME WITH KERATOCONJUNCTIVITIS SICCA: ICD-10-CM

## 2024-08-09 DIAGNOSIS — H43.811 POSTERIOR VITREOUS DETACHMENT OF RIGHT EYE: Primary | ICD-10-CM

## 2024-08-09 PROCEDURE — 99999 PR PBB SHADOW E&M-EST. PATIENT-LVL III: CPT | Mod: PBBFAC,,, | Performed by: OPTOMETRIST

## 2024-08-11 ENCOUNTER — PATIENT MESSAGE (OUTPATIENT)
Dept: OPTOMETRY | Facility: CLINIC | Age: 64
End: 2024-08-11
Payer: COMMERCIAL

## 2024-08-22 ENCOUNTER — OFFICE VISIT (OUTPATIENT)
Dept: PAIN MEDICINE | Facility: CLINIC | Age: 64
End: 2024-08-22
Payer: COMMERCIAL

## 2024-08-22 ENCOUNTER — TELEPHONE (OUTPATIENT)
Dept: RHEUMATOLOGY | Facility: CLINIC | Age: 64
End: 2024-08-22

## 2024-08-22 VITALS
SYSTOLIC BLOOD PRESSURE: 120 MMHG | WEIGHT: 186.31 LBS | HEIGHT: 61 IN | BODY MASS INDEX: 35.18 KG/M2 | HEART RATE: 56 BPM | DIASTOLIC BLOOD PRESSURE: 57 MMHG

## 2024-08-22 DIAGNOSIS — M50.30 DDD (DEGENERATIVE DISC DISEASE), CERVICAL: ICD-10-CM

## 2024-08-22 DIAGNOSIS — M47.812 CERVICAL SPONDYLOSIS: ICD-10-CM

## 2024-08-22 DIAGNOSIS — M41.84 OTHER FORM OF SCOLIOSIS OF THORACIC SPINE: ICD-10-CM

## 2024-08-22 DIAGNOSIS — M47.814 THORACIC SPONDYLOSIS: ICD-10-CM

## 2024-08-22 DIAGNOSIS — M51.36 DDD (DEGENERATIVE DISC DISEASE), LUMBAR: ICD-10-CM

## 2024-08-22 DIAGNOSIS — M54.12 CERVICAL RADICULOPATHY: Primary | ICD-10-CM

## 2024-08-22 DIAGNOSIS — M54.16 LUMBAR RADICULOPATHY: ICD-10-CM

## 2024-08-22 PROCEDURE — 99214 OFFICE O/P EST MOD 30 MIN: CPT | Mod: S$GLB,,, | Performed by: ANESTHESIOLOGY

## 2024-08-22 PROCEDURE — 99999 PR PBB SHADOW E&M-EST. PATIENT-LVL IV: CPT | Mod: PBBFAC,,, | Performed by: ANESTHESIOLOGY

## 2024-08-22 PROCEDURE — 3044F HG A1C LEVEL LT 7.0%: CPT | Mod: CPTII,S$GLB,, | Performed by: ANESTHESIOLOGY

## 2024-08-22 PROCEDURE — 3074F SYST BP LT 130 MM HG: CPT | Mod: CPTII,S$GLB,, | Performed by: ANESTHESIOLOGY

## 2024-08-22 PROCEDURE — 3008F BODY MASS INDEX DOCD: CPT | Mod: CPTII,S$GLB,, | Performed by: ANESTHESIOLOGY

## 2024-08-22 PROCEDURE — 1159F MED LIST DOCD IN RCRD: CPT | Mod: CPTII,S$GLB,, | Performed by: ANESTHESIOLOGY

## 2024-08-22 PROCEDURE — 3078F DIAST BP <80 MM HG: CPT | Mod: CPTII,S$GLB,, | Performed by: ANESTHESIOLOGY

## 2024-08-22 NOTE — TELEPHONE ENCOUNTER
----- Message from Kristen Isaacs sent at 8/22/2024  8:04 AM CDT -----  Contact: Dr Be  Type:  Needs Medical Advice    Who Called: Dr Be    Would the patient rather a call back or a response via [facility]?  Call back    Best Call Back Number:     Additional Information:  calling to set up peer to peer about pt    Please call to advise    Thanks

## 2024-08-22 NOTE — PROGRESS NOTES
This note was completed with dictation software and grammatical errors may exist.    CC:Back pain, neck pain    HPI: The patient is a 63-year-old woman with history of hypothyroidism, otherwise fairly healthy but a long history of scoliosis causing back pain, self-referred for continued back pain.  She is status post T4, 5, 6, 7 RFA on 07/29/2024 with  75% improvement in the pain.  However she is having what seems like a different pain in the right scapula and into the right arm.  She has pain in her right neck especially with turning her head to the side.  She does report having frequent headaches especially in the morning, radiating into the occipital region.  She denies any weakness in the right arm, denies dropping anything, denies any balance issues.        Pain intervention history: She has been seeing Dr. Huffman for the last several years and has undergone epidural steroid injections and radiofrequency ablations with good relief.  According to her last pain management physician, she had undergone a right side T3, 4, 5, 6 medial branch radiofrequency ablation on 8/8/14 with good relief.  He had scheduled her for another one but did not complete this.  As far as medications she has been taking Hydrocodone 7.5/325 one to 2 times a day at most and gabapentin 300 mg at night. She is status post cervical epidural steroid injection on 10/19/15 with 50% relief of her neck pain. She is status post right L4 transforaminal epidural steroid injection on 11/23/15 with 100% relief.   She is status post right T3, 4, 5 and 6 medial branch radio frequency ablation on 2/5/16 with greater than 50% relief.  She is status post right L4 transforaminal epidural sterile injection on 6/14/16 with significant relief lasting only about a month.  She is status post C7-T1 cervical interlaminar epidural steroid injection on 7/14/16 with 30-40% relief.   She is status post L5/S1 interlaminar epidural steroidal injection to the right  on 8/12/16 with 100% relief of her right leg pain.  She is status post C7-T1 cervical interlaminar epidural steroid injection on 12/9/16 with almost complete relief.  She is status post right T4, 5, 6 and 7 medial branch radiofrequency ablation on 5/30/17 with 100% relief.  She is status post C7-T1 cervical interlaminar epidural steroid injection on 6/27/17 with 80% relief.  She is status post L5/S1 interlaminar epidural steroid injection on a/7/17 with excellent relief lasting 2 weeks, now reporting 0% relief.  She is status post right L3, 4 and 5 medial branch radiofrequency ablation on 10/19/17 with 80% relief.   She is status post right T4, 5, 6 and 7 medial branch radiofrequency ablation on 12/14/17 with 100% relief.  She is status post C7-T1 cervical interlaminar epidural steroid injection on 3/16/18 with at least 80% relief.  She is status post lumbar epidural steroid injection at L5/S1 on 5/15/18 with 90% relief of her bilateral low back and right leg pain.  The she is status post right T4, 5, 6 and 7 medial branch radiofrequency ablation on 10/12/2018 with 100% relief.  She is status post L5/S1 interlaminar epidural steroid injection on 01/16/2019 with almost 100% relief of her low back pain. She is status post right T4, 5, 6 and 7 medial branch radiofrequency ablation on 06/04/2019 with 80% relief.  She is status post L5/S1 interlaminar epidural steroid injection on 03/22/2021 with 85-90% relief.   She is status post right T4, 5, 6, 7 medial branch radiofrequency ablation on 05/21/2021 with 85% relief. She is status post cervical JINA C7/T1 on 11/11/2021 with greater than 70% relief. She is status post right T4, 5, 6, 7 medial branch radiofrequency ablation on 06/30/2022 with 85% relief. She is status post L5/S1 interlaminar epidural steroid injection to the right on 11/18/2022 with 75% relief. She is status post L5/S1 JINA with spread to the right on 07/21/2023 with greater than 50% relief. She is status  post right T4, 5, 6, 7 medial branch radiofrequency ablation on 10/12/2023 with 80-85% relief.    She is status post L5/S1 interlaminar epidural steroid injection on 12/06/2023 with 90% relief lasting 6 weeks.   She is status post L5/S1 interlaminar epidural steroid injection on 02/08/2024 with 90% relief.    She is status post left L4/5 transforaminal epidural steroid injection on 06/14/2024 with 90% relief.    Spine surgeries:  None    Antineuropathics:  Lidoderm 5%  NSAIDs:  Duexis 800 up to TID  Physical therapy:  She has consistently been in physical therapy, reports exercise, dry needling with at least temporary relief  Antidepressants:  Muscle relaxers:  Opioids:  Hydrocodone 7.5/325 twice daily  Antiplatelets/Anticoagulants:    ROS: She reports fatigability, headaches, hypothyroidism, joint stiffness, back pain, difficulty sleeping and anxiety.  Balance of review of systems is negative.      Past Medical History:   Diagnosis Date    Anxiety 08/25/2015    Arthritis     BRCA1 negative     BRCA2 negative     Celiac disease     Cervical spondylosis     Chronic back pain     DDD (degenerative disc disease), lumbar     Difficult intubation 03/2016    anterior larynx, pt with metal dental appliance, unable to do glidescope, used LMA    Dysautonomia     Encounter for blood transfusion     Facet arthropathy, thoracic     Hormone replacement therapy (HRT)     Insomnia 08/25/2015    Neck pain     PONV (postoperative nausea and vomiting)     Right foot pain     Scoliosis     Sjogren's syndrome     Snoring     uses cpap, states not ROSSY    Unspecified hypothyroidism 08/25/2015     Past Surgical History:   Procedure Laterality Date    BILATERAL OOPHORECTOMY  2004    BREAST BIOPSY Left 2010    Benign    CHOLECYSTECTOMY  2010    COLONOSCOPY  2016    [facility]    Epidural Steroid Injection      Pain managment, at another facility, cervical, thoracic    Epidural Steroid injection      Pain management, cervical    EPIDURAL  STEROID INJECTION INTO CERVICAL SPINE N/A 11/11/2021    Procedure: Injection-steroid-epidural-cervical, C7/T1 interlaminer;  Surgeon: Floyd Coleman MD;  Location: [facility] OR;  Service: Pain Management;  Laterality: N/A;    EPIDURAL STEROID INJECTION INTO LUMBAR SPINE Right 01/16/2019    Procedure: Injection-steroid-epidural-lumbar L5/S1;  Surgeon: Floyd Coleman MD;  Location: [facility] OR;  Service: Pain Management;  Laterality: Right;  to right    EPIDURAL STEROID INJECTION INTO LUMBAR SPINE N/A 05/14/2019    Procedure: Injection-steroid-epidural-lumbar;  Surgeon: Floyd Coleman MD;  Location: [facility] OR;  Service: Pain Management;  Laterality: N/A;  L5/S1 interlaminar    EPIDURAL STEROID INJECTION INTO LUMBAR SPINE N/A 03/22/2021    Procedure: Injection-steroid-epidural-lumbar L5/S1 interlaminer;  Surgeon: Floyd Coleman MD;  Location: [facility] OR;  Service: Pain Management;  Laterality: N/A;    EPIDURAL STEROID INJECTION INTO LUMBAR SPINE N/A 11/18/2022    Procedure: Injection-steroid-epidural-lumbar L5/S1 to right;  Surgeon: Floyd Coleman MD;  Location: [facility] OR;  Service: Pain Management;  Laterality: N/A;    EPIDURAL STEROID INJECTION INTO LUMBAR SPINE N/A 07/21/2023    Procedure: Injection-steroid-epidural-lumbar Interlaminar L5/S1 to the right;  Surgeon: Floyd Coleman MD;  Location: [facility] OR;  Service: Pain Management;  Laterality: N/A;    EPIDURAL STEROID INJECTION INTO LUMBAR SPINE N/A 12/06/2023    Procedure: Injection-steroid-epidural-lumbar     L5/S1;  Surgeon: Floyd Coleman MD;  Location: [facility] OR;  Service: Pain Management;  Laterality: N/A;    EPIDURAL STEROID INJECTION INTO LUMBAR SPINE N/A 02/08/2024    Procedure: Injection-steroid-epidural-lumbar     L5/S1;  Surgeon: Floyd Coleman MD;  Location: [facility] OR;  Service: Pain Management;  Laterality: N/A;    HYSTERECTOMY  2004    benign    MOUTH SURGERY      Braces, with temporary metal implants in upper gum    OOPHORECTOMY   2004    w/ hysterectomy    RADIOFREQUENCY ABLATION  02/2016    thoracic nerve    RADIOFREQUENCY ABLATION Right 06/04/2019    Procedure: Radiofrequency Ablation T4,5,6,7;  Surgeon: Floyd Coleman MD;  Location: [facility] OR;  Service: Pain Management;  Laterality: Right;    RADIOFREQUENCY ABLATION Right 05/21/2021    Procedure: RADIOFREQUENCY ABLATION,THORACIC  T4,T5,T6, T7;  Surgeon: Floyd Coleman MD;  Location: [facility] OR;  Service: Pain Management;  Laterality: Right;    RADIOFREQUENCY ABLATION Right 06/30/2022    Procedure: Radiofrequency Ablation Thoracic T4, T5, T6, T7;  Surgeon: Floyd Coleman MD;  Location: [facility] OR;  Service: Pain Management;  Laterality: Right;    RADIOFREQUENCY ABLATION Right 10/12/2023    Procedure: RADIOFREQUENCY ABLATION,THORACIC Right T4, T5, T6, T7;  Surgeon: Floyd Coleman MD;  Location: [facility] OR;  Service: Pain Management;  Laterality: Right;  THORACIC Right T4, T5, T6, T7    RADIOFREQUENCY ABLATION Right 7/29/2024    Procedure: RADIOFREQUENCY ABLATION,THORACIC     T4,5,6,7;  Surgeon: Floyd Coleman MD;  Location: [facility] OR;  Service: Pain Management;  Laterality: Right;    TONSILLECTOMY      TRANSFORAMINAL EPIDURAL INJECTION OF STEROID Left 06/14/2024    Procedure: Injection,steroid,epidural,transforaminal approach    L4/5;  Surgeon: Floyd Coleman MD;  Location: [facility] OR;  Service: Pain Management;  Laterality: Left;    VULVA SURGERY  03/2016     Social History     Socioeconomic History    Marital status:    Tobacco Use    Smoking status: Never    Smokeless tobacco: Never   Substance and Sexual Activity    Alcohol use: No    Drug use: No    Sexual activity: Yes     Partners: Male     Social Determinants of Health     Financial Resource Strain: Low Risk  (2/27/2024)    Overall Financial Resource Strain (CARDIA)     Difficulty of Paying Living Expenses: Not hard at all   Food Insecurity: No Food Insecurity (2/27/2024)    Hunger Vital Sign     Worried  "About Running Out of Food in the Last Year: Never true     Ran Out of Food in the Last Year: Never true   Transportation Needs: No Transportation Needs (2/27/2024)    PRAPARE - Transportation     Lack of Transportation (Medical): No     Lack of Transportation (Non-Medical): No   Physical Activity: Insufficiently Active (2/27/2024)    Exercise Vital Sign     Days of Exercise per Week: 3 days     Minutes of Exercise per Session: 20 min   Stress: Stress Concern Present (2/27/2024)    [employer] - Occupational Stress Questionnaire     Feeling of Stress : Rather much   Housing Stability: Unknown (2/27/2024)    Housing Stability Vital Sign     Unable to Pay for Housing in the Last Year: No     Unstable Housing in the Last Year: No      She is a  at [employer].    Medications/Allergies: See med card    Vitals:    08/22/24 0943   BP: (!) 120/57   Pulse: (!) 56   Weight: 84.5 kg (186 lb 4.6 oz)   Height: 5' 1" (1.549 m)   PainSc:   4   PainLoc: Back         8/22/2024     9:42 AM 6/27/2024    11:35 AM 5/31/2024     2:18 PM   Last 3 PDI Scores   Pain Disability Index (PDI) 31 24 45     Body mass index is 35.2 kg/m².        Physical exam:  Gen: A and O x3, pleasant, well-groomed  Skin: No rashes or obvious lesions  HEENT: PERRLA, no obvious deformities on ears or in canals. Trachea midline.  CVS: Regular rate and rhythm, normal palpable pulses.  Resp:No increased work of breathing, symmetrical chest rise.  Abdomen: Soft, NT/ND.  Musculoskeletal:No antalgic gait.      Neuro:  Upper extremities: 5/5 strength bilaterally.  Lower extremities: 5/5 strength bilaterally.    Reflexes: Patellar 0+, Achilles 0+ bilaterally.  Upper extremity reflexes 2+ bilaterally equal.  Sensory:  Intact and symmetrical to light touch and pinprick in L2-S1 dermatomes bilaterally.     Cervical spine exam: Range of motion is mildly reduced with flexion, extension and lateral rotation with increased right neck " pain during right lateral rotation and extension.  Myofascial exam:  Exquisite tenderness to palpation to the right   Cervical paraspinous, trapezius and scapular muscles.    Imagin/16/15 Xray Scoliosis survey: There is thoracic dextroscoliosis with mild lumbar compensatory levoscoliosis. No structural abnormalities are evident. Diffuse spondylosis noted in the cervical, thoracic and to lesser extent lumbar spine. No fractures are identified. There is slight increased kyphotic curvature of the thoracic spine with alignment being otherwise normal. Measurements and evaluation are limited due to underpenetration. Land angle measures approximately in the thoracic spine is of approximately 17.0 degrees and for the lumbar spine 17.4 degrees. Soft tissues are unremarkable. IMPRESSION: 1. S shaped scoliotic curvature of the thoracolumbar spine with Land angle of approximately 17 degrees. 2. Diffuse spondylosis.    MRI report 12: Report notes that there is dextroconvex rotary curvature of the thoracic spine.  Posterior alignment is maintained.  There are scattered vertebral body hemangiomas.  There is minimal posterior disc bulges noted at T5/6, T6/7, T7/8 and T8/9.  There is no central spinal stenosis or neural foraminal narrowing.    MRI right shoulder 11: Minor distal subscapularis tendinosis.  Negative for full-thickness or significant partial-thickness rotator cuff tendon tear.  There is a small amount of subacromial subdeltoid bursal fluid which can be seen with recent injection or mild bursitis.    Cervical spine MRI 16  At C2-C3, the minimal interstitial excision covers a very mild broad-based disc bulge most prominent centrally but does not deform the ventral cord.  There is no canal or foraminal stenosis.  At C3-C4, there is mildly decreased disc height with a broad-based disc bulge most prominent centrally.  This minimally contact the ventral cord without significant deformity.  The canal is  widely patent.  There is uncovertebral hypertrophy and facet arthropathy, but the foramina are patent.  At C4-C5, there is decreased disc height with a broad disc bulge-marginal osteophyte complex that mildly lateralizes to the left and blends imperceptibly with right greater than left uncovertebral hypertrophy.  With ligamentum hypertrophy, there is no significant central canal stenosis.  Uncovertebral hypertrophy and facet arthropathy are causing mild left and moderate right foraminal stenoses.  At C5-C6, there is a broad-based disc bulge and osteophyte complex most prominent centrally.  There is also ligamentum flavum hypertrophy present, but the canal is patent.  There is mild left foraminal stenosis.  The right foramen is patent.  At C6-C7, there is disc desiccation with a minimal broad-based disc bulge most prominent in the right paracentral canal.  The canal is widely patent.  There is mild left foraminal stenosis.  At C7-T1, there is no significant disc bulge, protrusion, or herniation/extrusion.  The canal and foramina are widely patent.    Hip x-rays 9/22/17  AP view of the pelvis and frog leg views of both hips were obtained. No evidence of acute displaced fracture or dislocation is visualized.  No radiopaque foreign bodies are visualized. The bilateral superior joint spaces are grossly maintained. Mild bilateral sacroiliac joint degenerative changes are seen including subchondral sclerosis and small marginal osteophytes. Mild to moderate symphyseal degenerative changes are seen. There is a slightly expansile cortically-based focus along the lateral proximal right femoral metadiaphysis. This could reflect a sessile osteochondroma, but correlation with MRI of the right hip is recommended.    8/18/19 MRI L-spine:  T12-L1: No disc herniation or significant posterior osteophytic ridging.  No significant spinal canal or foraminal stenosis.   L1-L2: No disc herniation or significant posterior osteophytic  ridging.  Minimal left facet hypertrophy.  No significant spinal canal or foraminal stenosis.   L2-L3: No disc herniation or significant posterior osteophytic ridging.  Minimal left facet hypertrophy.  No significant spinal canal or foraminal stenosis.   L3-L4: No disc herniation or significant posterior osteophytic ridging.  Minimal bilateral facet hypertrophy.  No significant spinal canal or foraminal stenosis.   L4-L5: Mild disc bulge.  Mild bilateral facet hypertrophy.  Mild ligamentum flavum thickening.  Minimal narrowing of the bilateral lateral recesses. No significant overall spinal canal stenosis. Mild bilateral foraminal stenosis.   L5-S1: Minimal disc bulge contained in the ventral epidural fat.  Mild bilateral facet hypertrophy.  No significant spinal canal or foraminal stenosis.    Assessment:  The patient is a 63-year-old woman with history of hypothyroidism, otherwise fairly healthy but a long history of scoliosis causing back pain, self-referred for continued back pain.    1. Cervical radiculopathy  MRI Cervical Spine Without Contrast      2. Other form of scoliosis of thoracic spine        3. Thoracic spondylosis        4. DDD (degenerative disc disease), lumbar        5. Lumbar radiculopathy        6. DDD (degenerative disc disease), cervical        7. Cervical spondylosis  MRI Cervical Spine Without Contrast          Plan:    1.  We discussed that she seems to be having right arm radicular symptoms, has an MRI but it is 8 years old.  I am going to get a new 1 and call her with the results.

## 2024-08-22 NOTE — TELEPHONE ENCOUNTER
----- Message from Linda Singh sent at 8/22/2024  3:10 PM CDT -----  Type: Needs Medical Advice  Who Called:  jay galindo  \  Best Call Back Number: [phone]  Additional Information: jay galindo is wanting top do a peer to peer please advise

## 2024-08-23 NOTE — TELEPHONE ENCOUNTER
----- Message from Carrol Yoon sent at 8/23/2024  9:38 AM CDT -----  Contact: Dr. Be  Type: Needs Medical Advice    Who Called Dr. Indiana Harmon Call Back Number: [phone]  Additional  Information: Requesting a peer to peer physician with Dr. Garcia, regarding pt  Please Advise- Thank you

## 2024-08-23 NOTE — TELEPHONE ENCOUNTER
----- Message from Cassie Hubbard sent at 8/23/2024 12:15 PM CDT -----  Type:  Patient Returning Call    Who Called:  Dr. Be   Who Left Message for Patient:  Radha  Does the patient know what this is regarding?:  yes  Best Call Back Number:  [phone]  Additional Information:  Dr. Be returning Dr. Llanos call please ensure to call back to advise asap thanks!

## 2024-09-12 ENCOUNTER — HOSPITAL ENCOUNTER (OUTPATIENT)
Dept: RADIOLOGY | Facility: HOSPITAL | Age: 64
Discharge: HOME | End: 2024-09-12
Attending: ANESTHESIOLOGY
Payer: COMMERCIAL

## 2024-09-12 DIAGNOSIS — M54.12 CERVICAL RADICULOPATHY: ICD-10-CM

## 2024-09-12 DIAGNOSIS — M47.812 CERVICAL SPONDYLOSIS: ICD-10-CM

## 2024-09-12 PROCEDURE — 72141 MRI NECK SPINE W/O DYE: CPT | Mod: 26,,, | Performed by: RADIOLOGY

## 2024-09-12 PROCEDURE — 72141 MRI NECK SPINE W/O DYE: CPT | Mod: TC,PO

## 2024-09-18 ENCOUNTER — PATIENT MESSAGE (OUTPATIENT)
Dept: PAIN MEDICINE | Facility: CLINIC | Age: 64
End: 2024-09-18
Payer: COMMERCIAL

## 2024-09-19 NOTE — TELEPHONE ENCOUNTER
Physician - Dr Coleman    Type of Procedure/Injection - Cervical Epidural  C7/T1           Laterality - NA      Anxiolysis- RNIV      Need to hold medication - Yes      NSAIDs for 2 days      Clearance needed - No      Follow up - 4 week

## 2024-09-20 DIAGNOSIS — M54.12 CERVICAL RADICULOPATHY: Primary | ICD-10-CM

## 2024-09-20 RX ORDER — SODIUM CHLORIDE, SODIUM LACTATE, POTASSIUM CHLORIDE, CALCIUM CHLORIDE 600; 310; 30; 20 MG/100ML; MG/100ML; MG/100ML; MG/100ML
INJECTION, SOLUTION INTRAVENOUS CONTINUOUS
OUTPATIENT
Start: 2024-09-20

## 2024-09-20 NOTE — TELEPHONE ENCOUNTER
Spoke with patient and scheduled for cervical JINA with Dr. Coleman . Advised to hold NSAIDS x 2 days prior. Pre op information given and follow up appointment scheduled.

## 2024-09-23 ENCOUNTER — TELEPHONE (OUTPATIENT)
Dept: PAIN MEDICINE | Facility: CLINIC | Age: 64
End: 2024-09-23
Payer: COMMERCIAL

## 2024-09-23 ENCOUNTER — PATIENT MESSAGE (OUTPATIENT)
Dept: PAIN MEDICINE | Facility: CLINIC | Age: 64
End: 2024-09-23
Payer: COMMERCIAL

## 2024-09-23 NOTE — TELEPHONE ENCOUNTER
----- Message from Jazmin Diehl sent at 9/23/2024  9:40 AM CDT -----  Pt is wanting to know if her procedure on Friday will be in the morning or afternoon  Please call to advise [phone]

## 2024-09-27 ENCOUNTER — HOSPITAL ENCOUNTER (OUTPATIENT)
Dept: RADIOLOGY | Facility: HOSPITAL | Age: 64
Discharge: HOME | End: 2024-09-27
Attending: ANESTHESIOLOGY | Admitting: ANESTHESIOLOGY
Payer: COMMERCIAL

## 2024-09-27 ENCOUNTER — HOSPITAL ENCOUNTER (OUTPATIENT)
Facility: HOSPITAL | Age: 64
Discharge: HOME | End: 2024-09-27
Attending: ANESTHESIOLOGY | Admitting: ANESTHESIOLOGY
Payer: COMMERCIAL

## 2024-09-27 ENCOUNTER — PATIENT MESSAGE (OUTPATIENT)
Dept: SURGERY | Facility: HOSPITAL | Age: 64
End: 2024-09-27
Payer: COMMERCIAL

## 2024-09-27 DIAGNOSIS — M54.2 NECK PAIN: ICD-10-CM

## 2024-09-27 DIAGNOSIS — M54.12 CERVICAL RADICULOPATHY: ICD-10-CM

## 2024-09-27 PROCEDURE — 25500020 PHARM REV CODE 255: Mod: PO | Performed by: ANESTHESIOLOGY

## 2024-09-27 PROCEDURE — 62321 NJX INTERLAMINAR CRV/THRC: CPT | Mod: PO | Performed by: ANESTHESIOLOGY

## 2024-09-27 PROCEDURE — 63600175 PHARM REV CODE 636 W HCPCS: Mod: PO | Performed by: ANESTHESIOLOGY

## 2024-09-27 PROCEDURE — 25000003 PHARM REV CODE 250: Mod: PO | Performed by: ANESTHESIOLOGY

## 2024-09-27 PROCEDURE — 62321 NJX INTERLAMINAR CRV/THRC: CPT | Mod: ,,, | Performed by: ANESTHESIOLOGY

## 2024-09-27 PROCEDURE — A4216 STERILE WATER/SALINE, 10 ML: HCPCS | Mod: PO | Performed by: ANESTHESIOLOGY

## 2024-09-27 RX ORDER — LIDOCAINE HYDROCHLORIDE 10 MG/ML
INJECTION, SOLUTION EPIDURAL; INFILTRATION; INTRACAUDAL; PERINEURAL
Status: DISCONTINUED | OUTPATIENT
Start: 2024-09-27 | End: 2024-09-27 | Stop reason: HOSPADM

## 2024-09-27 RX ORDER — MIDAZOLAM HYDROCHLORIDE 1 MG/ML
INJECTION INTRAMUSCULAR; INTRAVENOUS
Status: DISCONTINUED | OUTPATIENT
Start: 2024-09-27 | End: 2024-09-27 | Stop reason: HOSPADM

## 2024-09-27 RX ORDER — METHYLPREDNISOLONE ACETATE 80 MG/ML
INJECTION, SUSPENSION INTRA-ARTICULAR; INTRALESIONAL; INTRAMUSCULAR; SOFT TISSUE
Status: DISCONTINUED | OUTPATIENT
Start: 2024-09-27 | End: 2024-09-27 | Stop reason: HOSPADM

## 2024-09-27 RX ORDER — SODIUM CHLORIDE, SODIUM LACTATE, POTASSIUM CHLORIDE, CALCIUM CHLORIDE 600; 310; 30; 20 MG/100ML; MG/100ML; MG/100ML; MG/100ML
INJECTION, SOLUTION INTRAVENOUS CONTINUOUS
Status: DISCONTINUED | OUTPATIENT
Start: 2024-09-27 | End: 2024-09-27 | Stop reason: HOSPADM

## 2024-09-27 RX ORDER — SODIUM CHLORIDE 9 MG/ML
INJECTION, SOLUTION INTRAMUSCULAR; INTRAVENOUS; SUBCUTANEOUS
Status: DISCONTINUED | OUTPATIENT
Start: 2024-09-27 | End: 2024-09-27 | Stop reason: HOSPADM

## 2024-09-27 RX ADMIN — SODIUM CHLORIDE, POTASSIUM CHLORIDE, SODIUM LACTATE AND CALCIUM CHLORIDE: 600; 310; 30; 20 INJECTION, SOLUTION INTRAVENOUS at 10:09

## 2024-09-27 NOTE — H&P
CC: Neck pain    HPI: The patient is a 62yo woman with a history of cervical radiculopathy here for cervical JINA. There are no major changes in history and physical from 8/22/24.    Past Medical History:   Diagnosis Date    Anxiety 08/25/2015    Arthritis     BRCA1 negative     BRCA2 negative     Celiac disease     Cervical spondylosis     Chronic back pain     DDD (degenerative disc disease), lumbar     Difficult intubation 03/2016    anterior larynx, pt with metal dental appliance, unable to do glidescope, used LMA    Dysautonomia     Encounter for blood transfusion     Facet arthropathy, thoracic     Hormone replacement therapy (HRT)     Insomnia 08/25/2015    Neck pain     PONV (postoperative nausea and vomiting)     Right foot pain     Scoliosis     Sjogren's syndrome     Snoring     uses cpap, states not ROSSY    Unspecified hypothyroidism 08/25/2015       Past Surgical History:   Procedure Laterality Date    BILATERAL OOPHORECTOMY  2004    BREAST BIOPSY Left 2010    Benign    CHOLECYSTECTOMY  2010    COLONOSCOPY  2016    [place]    Epidural Steroid Injection      Pain managment, at another facility, cervical, thoracic    Epidural Steroid injection      Pain management, cervical    EPIDURAL STEROID INJECTION INTO CERVICAL SPINE N/A 11/11/2021    Procedure: Injection-steroid-epidural-cervical, C7/T1 interlaminer;  Surgeon: Floyd Coleman MD;  Location: [facility] OR;  Service: Pain Management;  Laterality: N/A;    EPIDURAL STEROID INJECTION INTO LUMBAR SPINE Right 01/16/2019    Procedure: Injection-steroid-epidural-lumbar L5/S1;  Surgeon: Floyd Coleman MD;  Location: [facility] OR;  Service: Pain Management;  Laterality: Right;  to right    EPIDURAL STEROID INJECTION INTO LUMBAR SPINE N/A 05/14/2019    Procedure: Injection-steroid-epidural-lumbar;  Surgeon: Floyd Coleman MD;  Location: [facility] OR;  Service: Pain Management;  Laterality: N/A;  L5/S1 interlaminar    EPIDURAL STEROID INJECTION INTO LUMBAR SPINE  N/A 03/22/2021    Procedure: Injection-steroid-epidural-lumbar L5/S1 interlaminer;  Surgeon: Floyd Coleman MD;  Location: [facility] OR;  Service: Pain Management;  Laterality: N/A;    EPIDURAL STEROID INJECTION INTO LUMBAR SPINE N/A 11/18/2022    Procedure: Injection-steroid-epidural-lumbar L5/S1 to right;  Surgeon: Floyd Coleman MD;  Location: [facility] OR;  Service: Pain Management;  Laterality: N/A;    EPIDURAL STEROID INJECTION INTO LUMBAR SPINE N/A 07/21/2023    Procedure: Injection-steroid-epidural-lumbar Interlaminar L5/S1 to the right;  Surgeon: Floyd Coleman MD;  Location: [facility] OR;  Service: Pain Management;  Laterality: N/A;    EPIDURAL STEROID INJECTION INTO LUMBAR SPINE N/A 12/06/2023    Procedure: Injection-steroid-epidural-lumbar     L5/S1;  Surgeon: Floyd Coleman MD;  Location: [facility] OR;  Service: Pain Management;  Laterality: N/A;    EPIDURAL STEROID INJECTION INTO LUMBAR SPINE N/A 02/08/2024    Procedure: Injection-steroid-epidural-lumbar     L5/S1;  Surgeon: Floyd Coleman MD;  Location: [facility] OR;  Service: Pain Management;  Laterality: N/A;    HYSTERECTOMY  2004    benign    MOUTH SURGERY      Braces, with temporary metal implants in upper gum    OOPHORECTOMY  2004    w/ hysterectomy    RADIOFREQUENCY ABLATION  02/2016    thoracic nerve    RADIOFREQUENCY ABLATION Right 06/04/2019    Procedure: Radiofrequency Ablation T4,5,6,7;  Surgeon: Floyd Coleman MD;  Location: [facility] OR;  Service: Pain Management;  Laterality: Right;    RADIOFREQUENCY ABLATION Right 05/21/2021    Procedure: RADIOFREQUENCY ABLATION,THORACIC  T4,T5,T6, T7;  Surgeon: Floyd Coleman MD;  Location: [facility] OR;  Service: Pain Management;  Laterality: Right;    RADIOFREQUENCY ABLATION Right 06/30/2022    Procedure: Radiofrequency Ablation Thoracic T4, T5, T6, T7;  Surgeon: Floyd Coleman MD;  Location: [facility] OR;  Service: Pain Management;  Laterality: Right;    RADIOFREQUENCY ABLATION Right 10/12/2023     Procedure: RADIOFREQUENCY ABLATION,THORACIC Right T4, T5, T6, T7;  Surgeon: Floyd Coleman MD;  Location: [facility] OR;  Service: Pain Management;  Laterality: Right;  THORACIC Right T4, T5, T6, T7    RADIOFREQUENCY ABLATION Right 7/29/2024    Procedure: RADIOFREQUENCY ABLATION,THORACIC     T4,5,6,7;  Surgeon: Floyd Coleman MD;  Location: [facility] OR;  Service: Pain Management;  Laterality: Right;    TONSILLECTOMY      TRANSFORAMINAL EPIDURAL INJECTION OF STEROID Left 06/14/2024    Procedure: Injection,steroid,epidural,transforaminal approach    L4/5;  Surgeon: Floyd Coleman MD;  Location: [facility] OR;  Service: Pain Management;  Laterality: Left;    VULVA SURGERY  03/2016       Family History   Problem Relation Name Age of Onset    Macular degeneration Mother      Arthritis Mother      Celiac disease Mother      COPD Father      Glaucoma Neg Hx         Social History     Socioeconomic History    Marital status:    Tobacco Use    Smoking status: Never    Smokeless tobacco: Never   Substance and Sexual Activity    Alcohol use: No    Drug use: No    Sexual activity: Yes     Partners: Male     Social Determinants of Health     Financial Resource Strain: Low Risk  (2/27/2024)    Overall Financial Resource Strain (CARDIA)     Difficulty of Paying Living Expenses: Not hard at all   Food Insecurity: No Food Insecurity (2/27/2024)    Hunger Vital Sign     Worried About Running Out of Food in the Last Year: Never true     Ran Out of Food in the Last Year: Never true   Transportation Needs: No Transportation Needs (2/27/2024)    PRAPARE - Transportation     Lack of Transportation (Medical): No     Lack of Transportation (Non-Medical): No   Physical Activity: Insufficiently Active (2/27/2024)    Exercise Vital Sign     Days of Exercise per Week: 3 days     Minutes of Exercise per Session: 20 min   Stress: Stress Concern Present (2/27/2024)    [employer] [place] [employer] - Occupational Stress  "Questionnaire     Feeling of Stress : Rather much   Housing Stability: Unknown (2/27/2024)    Housing Stability Vital Sign     Unable to Pay for Housing in the Last Year: No     Unstable Housing in the Last Year: No       No current facility-administered medications for this encounter.       Review of patient's allergies indicates:   Allergen Reactions    Compazine [prochlorperazine edisylate] Anaphylaxis    Avelox [moxifloxacin] Rash       Vitals:    09/24/24 1345 09/27/24 1034   BP:  (!) 176/84   Pulse:  (!) 57   Resp:  17   Temp:  97.6 °F (36.4 °C)   TempSrc:  Skin   SpO2:  98%   Weight: 84.4 kg (186 lb) 84.4 kg (186 lb)   Height: 5' 1" (1.549 m) 5' 1" (1.549 m)       ASA 2, Mallampati 2    REVIEW OF SYSTEMS:     GENERAL: No weight loss, malaise or fevers.  HEENT:  No recent changes in vision or hearing  NECK: Negative for lumps, no difficulty with swallowing.  RESPIRATORY: Negative for cough, wheezing or shortness of breath, patient denies any recent URI.  CARDIOVASCULAR: Negative for chest pain, leg swelling or palpitations.  GI: Negative for abdominal discomfort, blood in stools or black stools or change in bowel habits.  MUSCULOSKELETAL: See HPI.  SKIN: Negative for lesions, rash, and itching.  PSYCH: No suicidal or homicidal ideations, no current mood disturbances.  HEMATOLOGY/LYMPHOLOGY: Negative for prolonged bleeding, bruising easily or swollen nodes. Patient is not currently taking any anti-coagulants  ENDO: No history of diabetes or thyroid dysfunction  NEURO: No history of syncope, paralysis, seizures or tremors.All other reviewed and negative other than HPI.    Physical exam:  Gen: A and O x3, pleasant, well-groomed  Skin: No rashes or obvious lesions  HEENT: PERRLA, no obvious deformities on ears or in canals. No thyroid masses, trachea midline, no palpable lymph nodes in neck, axilla.  CVS: Regular rate and rhythm, normal S1 and S2, no murmurs.  Resp: Clear to auscultation bilaterally.  Abdomen: " Soft, NT/ND, normal bowel sounds present.  Musculoskeletal/Neuro: Moving all extremities    Assessment:  Cervical radiculopathy  -     Place in Outpatient; Standing  -     Vital signs; Standing  -     Place 18-22 gauage peripheral IV ; Standing  -     Verify informed consent; Standing  -     Notify physician ; Standing  -     Notify physician ; Standing  -     Notify physician (specify); Standing  -     Diet NPO; Standing  -     lactated ringers infusion    Other orders  -     IP VTE LOW RISK PATIENT; Standing

## 2024-09-27 NOTE — OP NOTE
PROCEDURE DATE: 9/27/2024    Procedure: C7-T1 cervical interlaminar epidural steroid injection under utilizing fluoroscopy.    Diagnosis: Cervical Radiculopathy    POSTOP DIAGNOSIS: SAME    Physician: Floyd Coleman MD    Medications injected:  Methylprednisone 80mg followed by a slow injection of 4 mL sterile, preservative-free normal saline.    Local anesthetic used: Lidocaine 1%, 4 ml.    Sedation Medications: 4mg versed    Complications:  none    Estimated blood loss: none    Technique:  A time-out was taken to identify patient and procedure prior to starting the procedure.  With the patient laying in a prone position with the neck in a mid-flexed forward position, the area was prepped and draped in the usual sterile fashion using ChloraPrep and a fenestrated drape.  The area was determined under AP fluoroscopic guidance.  Local anesthetic was given using a 25-gauge 1.5 inch needle by raising a wheal and then infiltrating ventrally.  A 3.5 inch 20-gauge Touhy needle was introduced under fluoroscopic guidance to meet the lamina of C7.  The needle was then hinged under the lamina then advanced using loss of resistance technique.  Once the tip of the needle was in the desired position, the contrast dye Omnipaque was injected to determine placement and no uptake.  The steroid was then injected slowly followed by a slow injection of 4 mL of the sterile preservative-free normal saline.  The patient tolerated the procedure well.    The patient was monitored after the procedure and was given post-procedure and discharge instructions to follow at home. The patient was discharged in a stable condition.

## 2024-09-27 NOTE — DISCHARGE SUMMARY
[facility] - Surgery  Discharge Note  Short Stay    Procedure(s) (LRB):  Injection-steroid-epidural-cervical    C7/T1 (N/A)      OUTCOME: Patient tolerated treatment/procedure well without complication and is now ready for discharge.    DISPOSITION: Home or Self Care    FINAL DIAGNOSIS:  Cervical radiculopathy    FOLLOWUP: In clinic    DISCHARGE INSTRUCTIONS:    Discharge Procedure Orders   Diet Adult Regular     No dressing needed     Notify your health care provider if you experience any of the following:  temperature >100.4     Activity as tolerated

## 2024-09-29 ENCOUNTER — PATIENT MESSAGE (OUTPATIENT)
Dept: PAIN MEDICINE | Facility: CLINIC | Age: 64
End: 2024-09-29
Payer: COMMERCIAL

## 2024-09-30 ENCOUNTER — OFFICE VISIT (OUTPATIENT)
Dept: PAIN MEDICINE | Facility: CLINIC | Age: 64
End: 2024-09-30
Payer: COMMERCIAL

## 2024-09-30 VITALS
SYSTOLIC BLOOD PRESSURE: 143 MMHG | WEIGHT: 184.88 LBS | HEIGHT: 61 IN | HEART RATE: 55 BPM | BODY MASS INDEX: 34.91 KG/M2 | DIASTOLIC BLOOD PRESSURE: 67 MMHG

## 2024-09-30 VITALS
HEART RATE: 98 BPM | RESPIRATION RATE: 20 BRPM | DIASTOLIC BLOOD PRESSURE: 64 MMHG | OXYGEN SATURATION: 98 % | WEIGHT: 186 LBS | SYSTOLIC BLOOD PRESSURE: 131 MMHG | HEIGHT: 61 IN | BODY MASS INDEX: 35.12 KG/M2 | TEMPERATURE: 98 F

## 2024-09-30 DIAGNOSIS — M47.814 THORACIC SPONDYLOSIS: ICD-10-CM

## 2024-09-30 DIAGNOSIS — M51.369 DDD (DEGENERATIVE DISC DISEASE), LUMBAR: ICD-10-CM

## 2024-09-30 DIAGNOSIS — Z79.891 OPIOID CONTRACT EXISTS: ICD-10-CM

## 2024-09-30 DIAGNOSIS — M54.12 CERVICAL RADICULOPATHY: Primary | ICD-10-CM

## 2024-09-30 DIAGNOSIS — M54.16 LUMBAR RADICULOPATHY: ICD-10-CM

## 2024-09-30 PROCEDURE — 1160F RVW MEDS BY RX/DR IN RCRD: CPT | Mod: CPTII,S$GLB,, | Performed by: PHYSICIAN ASSISTANT

## 2024-09-30 PROCEDURE — 99214 OFFICE O/P EST MOD 30 MIN: CPT | Mod: S$GLB,,, | Performed by: PHYSICIAN ASSISTANT

## 2024-09-30 PROCEDURE — 3044F HG A1C LEVEL LT 7.0%: CPT | Mod: CPTII,S$GLB,, | Performed by: PHYSICIAN ASSISTANT

## 2024-09-30 PROCEDURE — 3078F DIAST BP <80 MM HG: CPT | Mod: CPTII,S$GLB,, | Performed by: PHYSICIAN ASSISTANT

## 2024-09-30 PROCEDURE — 3008F BODY MASS INDEX DOCD: CPT | Mod: CPTII,S$GLB,, | Performed by: PHYSICIAN ASSISTANT

## 2024-09-30 PROCEDURE — 1159F MED LIST DOCD IN RCRD: CPT | Mod: CPTII,S$GLB,, | Performed by: PHYSICIAN ASSISTANT

## 2024-09-30 PROCEDURE — 99999 PR PBB SHADOW E&M-EST. PATIENT-LVL IV: CPT | Mod: PBBFAC,,, | Performed by: PHYSICIAN ASSISTANT

## 2024-09-30 PROCEDURE — 3077F SYST BP >= 140 MM HG: CPT | Mod: CPTII,S$GLB,, | Performed by: PHYSICIAN ASSISTANT

## 2024-09-30 RX ORDER — HYDROCODONE BITARTRATE AND ACETAMINOPHEN 7.5; 325 MG/1; MG/1
1 TABLET ORAL EVERY 8 HOURS PRN
Qty: 60 TABLET | Refills: 0 | Status: SHIPPED | OUTPATIENT
Start: 2024-11-29 | End: 2024-12-29

## 2024-09-30 RX ORDER — HYDROCODONE BITARTRATE AND ACETAMINOPHEN 7.5; 325 MG/1; MG/1
1 TABLET ORAL EVERY 6 HOURS PRN
Qty: 90 TABLET | Refills: 0 | Status: SHIPPED | OUTPATIENT
Start: 2024-09-30 | End: 2024-10-30

## 2024-09-30 RX ORDER — HYDROCODONE BITARTRATE AND ACETAMINOPHEN 7.5; 325 MG/1; MG/1
1 TABLET ORAL EVERY 8 HOURS PRN
Qty: 60 TABLET | Refills: 0 | Status: SHIPPED | OUTPATIENT
Start: 2024-10-30 | End: 2024-11-29

## 2024-09-30 NOTE — TELEPHONE ENCOUNTER
Pt seen in clinic. Please send Rx. I have reviewed the [place] [employer] website and there are no abberancies.  Requested one month of #90 like what she had in May followed by two regular Rxs.

## 2024-10-03 NOTE — PROGRESS NOTES
This note was completed with dictation software and grammatical errors may exist.    CC:Back pain, neck pain    HPI: The patient is a 63-year-old woman with history of hypothyroidism, otherwise fairly healthy but a long history of scoliosis causing back pain, self-referred for continued back pain.   She is status post C7-T1 interlaminar epidural steroid injection on 09/27/2024 with 90% relief.  She is pleased with the results.  She does report some worsening left low back pain radiating to the left lateral buttock but this is not as severe as it was prior to her lumbar epidural steroid injection earlier this year.  She continues to take pain medication and is requesting her next prescription to be for 90 leg earlier in the year and then she would like to go back to 60 tablets.      Pain intervention history: She has been seeing Dr. Huffman for the last several years and has undergone epidural steroid injections and radiofrequency ablations with good relief.  According to her last pain management physician, she had undergone a right side T3, 4, 5, 6 medial branch radiofrequency ablation on 8/8/14 with good relief.  He had scheduled her for another one but did not complete this.  As far as medications she has been taking Hydrocodone 7.5/325 one to 2 times a day at most and gabapentin 300 mg at night. She is status post cervical epidural steroid injection on 10/19/15 with 50% relief of her neck pain. She is status post right L4 transforaminal epidural steroid injection on 11/23/15 with 100% relief.   She is status post right T3, 4, 5 and 6 medial branch radio frequency ablation on 2/5/16 with greater than 50% relief.  She is status post right L4 transforaminal epidural sterile injection on 6/14/16 with significant relief lasting only about a month.  She is status post C7-T1 cervical interlaminar epidural steroid injection on 7/14/16 with 30-40% relief.   She is status post L5/S1 interlaminar epidural steroidal  injection to the right on 8/12/16 with 100% relief of her right leg pain.  She is status post C7-T1 cervical interlaminar epidural steroid injection on 12/9/16 with almost complete relief.  She is status post right T4, 5, 6 and 7 medial branch radiofrequency ablation on 5/30/17 with 100% relief.  She is status post C7-T1 cervical interlaminar epidural steroid injection on 6/27/17 with 80% relief.  She is status post L5/S1 interlaminar epidural steroid injection on a/7/17 with excellent relief lasting 2 weeks, now reporting 0% relief.  She is status post right L3, 4 and 5 medial branch radiofrequency ablation on 10/19/17 with 80% relief.   She is status post right T4, 5, 6 and 7 medial branch radiofrequency ablation on 12/14/17 with 100% relief.  She is status post C7-T1 cervical interlaminar epidural steroid injection on 3/16/18 with at least 80% relief.  She is status post lumbar epidural steroid injection at L5/S1 on 5/15/18 with 90% relief of her bilateral low back and right leg pain.  The she is status post right T4, 5, 6 and 7 medial branch radiofrequency ablation on 10/12/2018 with 100% relief.  She is status post L5/S1 interlaminar epidural steroid injection on 01/16/2019 with almost 100% relief of her low back pain. She is status post right T4, 5, 6 and 7 medial branch radiofrequency ablation on 06/04/2019 with 80% relief.  She is status post L5/S1 interlaminar epidural steroid injection on 03/22/2021 with 85-90% relief.   She is status post right T4, 5, 6, 7 medial branch radiofrequency ablation on 05/21/2021 with 85% relief. She is status post cervical JINA C7/T1 on 11/11/2021 with greater than 70% relief. She is status post right T4, 5, 6, 7 medial branch radiofrequency ablation on 06/30/2022 with 85% relief. She is status post L5/S1 interlaminar epidural steroid injection to the right on 11/18/2022 with 75% relief. She is status post L5/S1 JINA with spread to the right on 07/21/2023 with greater than 50%  relief. She is status post right T4, 5, 6, 7 medial branch radiofrequency ablation on 10/12/2023 with 80-85% relief.    She is status post L5/S1 interlaminar epidural steroid injection on 12/06/2023 with 90% relief lasting 6 weeks.   She is status post L5/S1 interlaminar epidural steroid injection on 02/08/2024 with 90% relief.    She is status post left L4/5 transforaminal epidural steroid injection on 06/14/2024 with 90% relief.She is status post C7-T1 interlaminar epidural steroid injection on 09/27/2024 with 90% relief.     Spine surgeries:  None    Antineuropathics:  Lidoderm 5%  NSAIDs:  Duexis 800 up to TID  Physical therapy:  She has consistently been in physical therapy, reports exercise, dry needling with at least temporary relief  Antidepressants:  Muscle relaxers:  Opioids:  Hydrocodone 7.5/325 twice daily  Antiplatelets/Anticoagulants:    ROS: She reports fatigability, headaches, hypothyroidism, joint stiffness, back pain, difficulty sleeping and anxiety.  Balance of review of systems is negative.      Past Medical History:   Diagnosis Date    Anxiety 08/25/2015    Arthritis     BRCA1 negative     BRCA2 negative     Celiac disease     Cervical spondylosis     Chronic back pain     DDD (degenerative disc disease), lumbar     Difficult intubation 03/2016    anterior larynx, pt with metal dental appliance, unable to do glidescope, used LMA    Dysautonomia     Encounter for blood transfusion     Facet arthropathy, thoracic     Hormone replacement therapy (HRT)     Insomnia 08/25/2015    Neck pain     PONV (postoperative nausea and vomiting)     Right foot pain     Scoliosis     Sjogren's syndrome     Snoring     uses cpap, states not ROSSY    Unspecified hypothyroidism 08/25/2015     Past Surgical History:   Procedure Laterality Date    BILATERAL OOPHORECTOMY  2004    BREAST BIOPSY Left 2010    Benign    CHOLECYSTECTOMY  2010    COLONOSCOPY  2016    [facility]    Epidural Steroid Injection      Pain managment,  at another facility, cervical, thoracic    Epidural Steroid injection      Pain management, cervical    EPIDURAL STEROID INJECTION INTO CERVICAL SPINE N/A 11/11/2021    Procedure: Injection-steroid-epidural-cervical, C7/T1 interlaminer;  Surgeon: Floyd Coleman MD;  Location: [facility] OR;  Service: Pain Management;  Laterality: N/A;    EPIDURAL STEROID INJECTION INTO CERVICAL SPINE N/A 9/27/2024    Procedure: Injection-steroid-epidural-cervical    C7/T1;  Surgeon: Floyd Coleman MD;  Location: [facility] OR;  Service: Pain Management;  Laterality: N/A;    EPIDURAL STEROID INJECTION INTO LUMBAR SPINE Right 01/16/2019    Procedure: Injection-steroid-epidural-lumbar L5/S1;  Surgeon: Floyd Coleman MD;  Location: [facility] OR;  Service: Pain Management;  Laterality: Right;  to right    EPIDURAL STEROID INJECTION INTO LUMBAR SPINE N/A 05/14/2019    Procedure: Injection-steroid-epidural-lumbar;  Surgeon: Floyd Coleman MD;  Location: [facility] OR;  Service: Pain Management;  Laterality: N/A;  L5/S1 interlaminar    EPIDURAL STEROID INJECTION INTO LUMBAR SPINE N/A 03/22/2021    Procedure: Injection-steroid-epidural-lumbar L5/S1 interlaminer;  Surgeon: Floyd Coleman MD;  Location: [facility] OR;  Service: Pain Management;  Laterality: N/A;    EPIDURAL STEROID INJECTION INTO LUMBAR SPINE N/A 11/18/2022    Procedure: Injection-steroid-epidural-lumbar L5/S1 to right;  Surgeon: Floyd Coleman MD;  Location: [facility] OR;  Service: Pain Management;  Laterality: N/A;    EPIDURAL STEROID INJECTION INTO LUMBAR SPINE N/A 07/21/2023    Procedure: Injection-steroid-epidural-lumbar Interlaminar L5/S1 to the right;  Surgeon: Floyd Coleman MD;  Location: [facility] OR;  Service: Pain Management;  Laterality: N/A;    EPIDURAL STEROID INJECTION INTO LUMBAR SPINE N/A 12/06/2023    Procedure: Injection-steroid-epidural-lumbar     L5/S1;  Surgeon: Floyd Coleman MD;  Location: [facility] OR;  Service: Pain Management;  Laterality: N/A;     EPIDURAL STEROID INJECTION INTO LUMBAR SPINE N/A 02/08/2024    Procedure: Injection-steroid-epidural-lumbar     L5/S1;  Surgeon: Floyd Coleman MD;  Location: [facility] OR;  Service: Pain Management;  Laterality: N/A;    HYSTERECTOMY  2004    benign    MOUTH SURGERY      Braces, with temporary metal implants in upper gum    OOPHORECTOMY  2004    w/ hysterectomy    RADIOFREQUENCY ABLATION  02/2016    thoracic nerve    RADIOFREQUENCY ABLATION Right 06/04/2019    Procedure: Radiofrequency Ablation T4,5,6,7;  Surgeon: Floyd Coleman MD;  Location: [facility] OR;  Service: Pain Management;  Laterality: Right;    RADIOFREQUENCY ABLATION Right 05/21/2021    Procedure: RADIOFREQUENCY ABLATION,THORACIC  T4,T5,T6, T7;  Surgeon: Floyd Coleman MD;  Location: [facility] OR;  Service: Pain Management;  Laterality: Right;    RADIOFREQUENCY ABLATION Right 06/30/2022    Procedure: Radiofrequency Ablation Thoracic T4, T5, T6, T7;  Surgeon: Floyd Coleman MD;  Location: [facility] OR;  Service: Pain Management;  Laterality: Right;    RADIOFREQUENCY ABLATION Right 10/12/2023    Procedure: RADIOFREQUENCY ABLATION,THORACIC Right T4, T5, T6, T7;  Surgeon: Floyd Coleman MD;  Location: [facility] OR;  Service: Pain Management;  Laterality: Right;  THORACIC Right T4, T5, T6, T7    RADIOFREQUENCY ABLATION Right 7/29/2024    Procedure: RADIOFREQUENCY ABLATION,THORACIC     T4,5,6,7;  Surgeon: Floyd Coleman MD;  Location: [facility] OR;  Service: Pain Management;  Laterality: Right;    TONSILLECTOMY      TRANSFORAMINAL EPIDURAL INJECTION OF STEROID Left 06/14/2024    Procedure: Injection,steroid,epidural,transforaminal approach    L4/5;  Surgeon: Floyd Coleman MD;  Location: [facility] OR;  Service: Pain Management;  Laterality: Left;    VULVA SURGERY  03/2016     Social History     Socioeconomic History    Marital status:    Tobacco Use    Smoking status: Never    Smokeless tobacco: Never   Substance and Sexual Activity    Alcohol use:  "No    Drug use: No    Sexual activity: Yes     Partners: Male     Social Drivers of Health     Financial Resource Strain: Low Risk  (2/27/2024)    Overall Financial Resource Strain (CARDIA)     Difficulty of Paying Living Expenses: Not hard at all   Food Insecurity: No Food Insecurity (2/27/2024)    Hunger Vital Sign     Worried About Running Out of Food in the Last Year: Never true     Ran Out of Food in the Last Year: Never true   Transportation Needs: No Transportation Needs (2/27/2024)    PRAPARE - Transportation     Lack of Transportation (Medical): No     Lack of Transportation (Non-Medical): No   Physical Activity: Insufficiently Active (2/27/2024)    Exercise Vital Sign     Days of Exercise per Week: 3 days     Minutes of Exercise per Session: 20 min   Stress: Stress Concern Present (2/27/2024)    [employer] [place] [employer] - Occupational Stress Questionnaire     Feeling of Stress : Rather much   Housing Stability: Unknown (2/27/2024)    Housing Stability Vital Sign     Unable to Pay for Housing in the Last Year: No     Unstable Housing in the Last Year: No      She is a  at [employer].    Medications/Allergies: See med card    Vitals:    09/30/24 1305   BP: (!) 143/67   Pulse: (!) 55   Weight: 83.8 kg (184 lb 13.7 oz)   Height: 5' 1" (1.549 m)   PainSc:   5   PainLoc: Back         9/30/2024     1:04 PM 8/22/2024     9:42 AM 6/27/2024    11:35 AM   Last 3 PDI Scores   Pain Disability Index (PDI) 30 31 24     Body mass index is 34.93 kg/m².        Physical exam:  Gen: A and O x3, pleasant, well-groomed  Skin: No rashes or obvious lesions  HEENT: PERRLA, no obvious deformities on ears or in canals. Trachea midline.  CVS: Regular rate and rhythm, normal palpable pulses.  Resp:No increased work of breathing, symmetrical chest rise.  Abdomen: Soft, NT/ND.  Musculoskeletal:No antalgic gait.      Neuro:  Upper extremities: 5/5 strength bilaterally.  Lower extremities: 5/5 strength " bilaterally.    Reflexes: Patellar 0+, Achilles 0+ bilaterally.  Upper extremity reflexes 2+ bilaterally equal.  Sensory:  Intact and symmetrical to light touch and pinprick in L2-S1 dermatomes bilaterally.     Cervical spine exam: Range of motion is mildly reduced with flexion, extension and lateral rotation with increased right neck pain during right lateral rotation and extension.  Myofascial exam:  Exquisite tenderness to palpation to the right   Cervical paraspinous, trapezius and scapular muscles.    Imagin/16/15 Xray Scoliosis survey: There is thoracic dextroscoliosis with mild lumbar compensatory levoscoliosis. No structural abnormalities are evident. Diffuse spondylosis noted in the cervical, thoracic and to lesser extent lumbar spine. No fractures are identified. There is slight increased kyphotic curvature of the thoracic spine with alignment being otherwise normal. Measurements and evaluation are limited due to underpenetration. Land angle measures approximately in the thoracic spine is of approximately 17.0 degrees and for the lumbar spine 17.4 degrees. Soft tissues are unremarkable. IMPRESSION: 1. S shaped scoliotic curvature of the thoracolumbar spine with Land angle of approximately 17 degrees. 2. Diffuse spondylosis.    MRI report 12: Report notes that there is dextroconvex rotary curvature of the thoracic spine.  Posterior alignment is maintained.  There are scattered vertebral body hemangiomas.  There is minimal posterior disc bulges noted at T5/6, T6/7, T7/8 and T8/9.  There is no central spinal stenosis or neural foraminal narrowing.    MRI right shoulder 11: Minor distal subscapularis tendinosis.  Negative for full-thickness or significant partial-thickness rotator cuff tendon tear.  There is a small amount of subacromial subdeltoid bursal fluid which can be seen with recent injection or mild bursitis.    Cervical spine MRI 16  At C2-C3, the minimal interstitial excision  covers a very mild broad-based disc bulge most prominent centrally but does not deform the ventral cord.  There is no canal or foraminal stenosis.  At C3-C4, there is mildly decreased disc height with a broad-based disc bulge most prominent centrally.  This minimally contact the ventral cord without significant deformity.  The canal is widely patent.  There is uncovertebral hypertrophy and facet arthropathy, but the foramina are patent.  At C4-C5, there is decreased disc height with a broad disc bulge-marginal osteophyte complex that mildly lateralizes to the left and blends imperceptibly with right greater than left uncovertebral hypertrophy.  With ligamentum hypertrophy, there is no significant central canal stenosis.  Uncovertebral hypertrophy and facet arthropathy are causing mild left and moderate right foraminal stenoses.  At C5-C6, there is a broad-based disc bulge and osteophyte complex most prominent centrally.  There is also ligamentum flavum hypertrophy present, but the canal is patent.  There is mild left foraminal stenosis.  The right foramen is patent.  At C6-C7, there is disc desiccation with a minimal broad-based disc bulge most prominent in the right paracentral canal.  The canal is widely patent.  There is mild left foraminal stenosis.  At C7-T1, there is no significant disc bulge, protrusion, or herniation/extrusion.  The canal and foramina are widely patent.    Hip x-rays 9/22/17  AP view of the pelvis and frog leg views of both hips were obtained. No evidence of acute displaced fracture or dislocation is visualized.  No radiopaque foreign bodies are visualized. The bilateral superior joint spaces are grossly maintained. Mild bilateral sacroiliac joint degenerative changes are seen including subchondral sclerosis and small marginal osteophytes. Mild to moderate symphyseal degenerative changes are seen. There is a slightly expansile cortically-based focus along the lateral proximal right femoral  metadiaphysis. This could reflect a sessile osteochondroma, but correlation with MRI of the right hip is recommended.    8/18/19 MRI L-spine:  T12-L1: No disc herniation or significant posterior osteophytic ridging.  No significant spinal canal or foraminal stenosis.   L1-L2: No disc herniation or significant posterior osteophytic ridging.  Minimal left facet hypertrophy.  No significant spinal canal or foraminal stenosis.   L2-L3: No disc herniation or significant posterior osteophytic ridging.  Minimal left facet hypertrophy.  No significant spinal canal or foraminal stenosis.   L3-L4: No disc herniation or significant posterior osteophytic ridging.  Minimal bilateral facet hypertrophy.  No significant spinal canal or foraminal stenosis.   L4-L5: Mild disc bulge.  Mild bilateral facet hypertrophy.  Mild ligamentum flavum thickening.  Minimal narrowing of the bilateral lateral recesses. No significant overall spinal canal stenosis. Mild bilateral foraminal stenosis.   L5-S1: Minimal disc bulge contained in the ventral epidural fat.  Mild bilateral facet hypertrophy.  No significant spinal canal or foraminal stenosis.    Assessment:  The patient is a 63-year-old woman with history of hypothyroidism, otherwise fairly healthy but a long history of scoliosis causing back pain, self-referred for continued back pain.    1. Cervical radiculopathy        2. Lumbar radiculopathy        3. Thoracic spondylosis        4. Opioid contract exists            Plan:  1. She did well following the C7-T1 interlaminar epidural steroid injection.  This can be repeated in the future if necessary.    2. She may contact us in the near future to repeat a left L4/5 transforaminal epidural steroid injection.    3. Dr. Coleman provided prescriptions for hydrocodone-acetaminophen 7.5/325 mg, 90 tablets for the next month and 60 tablets for each month thereafter.  I have reviewed the [place] Board of Pharmacy website and there are no  abberancies.    4. Follow-up in 3 months or sooner as needed.

## 2024-11-27 DIAGNOSIS — M51.360 DEGENERATION OF INTERVERTEBRAL DISC OF LUMBAR REGION WITH DISCOGENIC BACK PAIN: Primary | ICD-10-CM

## 2024-11-27 RX ORDER — PROPRANOLOL HYDROCHLORIDE 60 MG/1
60 CAPSULE, EXTENDED RELEASE ORAL DAILY
Qty: 90 CAPSULE | Refills: 3 | Status: SHIPPED | OUTPATIENT
Start: 2024-11-27

## 2024-11-28 ENCOUNTER — PATIENT MESSAGE (OUTPATIENT)
Dept: PAIN MEDICINE | Facility: CLINIC | Age: 64
End: 2024-11-28
Payer: COMMERCIAL

## 2024-12-02 NOTE — TELEPHONE ENCOUNTER
Physician - Dr Coleman    Type of Procedure/Injection - Lumbar Transforaminal Epidural  L4/5           Laterality - Left      Priority - Normal      Anxiolysis- Local      Need to hold medication - No      N/A    None      Clearance needed - No      Follow up - 4 week

## 2024-12-03 DIAGNOSIS — M54.16 LUMBAR RADICULOPATHY: Primary | ICD-10-CM

## 2024-12-03 RX ORDER — ALPRAZOLAM 1 MG/1
1 TABLET, ORALLY DISINTEGRATING ORAL ONCE AS NEEDED
OUTPATIENT
Start: 2024-12-03 | End: 2036-05-01

## 2024-12-05 DIAGNOSIS — M51.369 DDD (DEGENERATIVE DISC DISEASE), LUMBAR: ICD-10-CM

## 2024-12-05 RX ORDER — HYDROCODONE BITARTRATE AND ACETAMINOPHEN 7.5; 325 MG/1; MG/1
1 TABLET ORAL EVERY 6 HOURS PRN
Qty: 60 TABLET | Refills: 0 | Status: SHIPPED | OUTPATIENT
Start: 2024-12-20 | End: 2025-01-19

## 2024-12-05 NOTE — TELEPHONE ENCOUNTER
Patient leaving the country and requested refill prior to her departure.  I change the instructions so hopefully she can get this filled.  Please send prescription to her pharmacy.

## 2024-12-05 NOTE — TELEPHONE ENCOUNTER
Please let the patient know that I will send a refill request to Dr. Coleman to change the instructions on her next prescription which should allow her to fill this before she leaves on her trip.  Ask her to contact her pharmacy to confirm she can pick this up once it has been sent.  Also, please find out what she needs said in the letter and I will complete this for her.  She does not need an appointment for this.  Thanks

## 2024-12-10 ENCOUNTER — OFFICE VISIT (OUTPATIENT)
Dept: RHEUMATOLOGY | Facility: CLINIC | Age: 64
End: 2024-12-10
Payer: COMMERCIAL

## 2024-12-10 VITALS
BODY MASS INDEX: 34.93 KG/M2 | HEART RATE: 72 BPM | SYSTOLIC BLOOD PRESSURE: 164 MMHG | WEIGHT: 185 LBS | HEIGHT: 61 IN | DIASTOLIC BLOOD PRESSURE: 89 MMHG

## 2024-12-10 DIAGNOSIS — M54.16 LUMBAR RADICULOPATHY: ICD-10-CM

## 2024-12-10 DIAGNOSIS — M47.812 CERVICAL SPONDYLOSIS: ICD-10-CM

## 2024-12-10 DIAGNOSIS — M51.369 DEGENERATION OF INTERVERTEBRAL DISC OF LUMBAR REGION, UNSPECIFIED WHETHER PAIN PRESENT: ICD-10-CM

## 2024-12-10 DIAGNOSIS — G47.00 INSOMNIA, UNSPECIFIED TYPE: ICD-10-CM

## 2024-12-10 DIAGNOSIS — M17.0 PRIMARY OSTEOARTHRITIS OF BOTH KNEES: Primary | ICD-10-CM

## 2024-12-10 DIAGNOSIS — A18.01 POTT DISEASE: ICD-10-CM

## 2024-12-10 DIAGNOSIS — K90.0 CELIAC SYNDROME: ICD-10-CM

## 2024-12-10 PROCEDURE — 1160F RVW MEDS BY RX/DR IN RCRD: CPT | Mod: CPTII,S$GLB,, | Performed by: INTERNAL MEDICINE

## 2024-12-10 PROCEDURE — 3008F BODY MASS INDEX DOCD: CPT | Mod: CPTII,S$GLB,, | Performed by: INTERNAL MEDICINE

## 2024-12-10 PROCEDURE — 3079F DIAST BP 80-89 MM HG: CPT | Mod: CPTII,S$GLB,, | Performed by: INTERNAL MEDICINE

## 2024-12-10 PROCEDURE — 99215 OFFICE O/P EST HI 40 MIN: CPT | Mod: S$GLB,,, | Performed by: INTERNAL MEDICINE

## 2024-12-10 PROCEDURE — 1159F MED LIST DOCD IN RCRD: CPT | Mod: CPTII,S$GLB,, | Performed by: INTERNAL MEDICINE

## 2024-12-10 PROCEDURE — 99999 PR PBB SHADOW E&M-EST. PATIENT-LVL IV: CPT | Mod: PBBFAC,,, | Performed by: INTERNAL MEDICINE

## 2024-12-10 PROCEDURE — 3044F HG A1C LEVEL LT 7.0%: CPT | Mod: CPTII,S$GLB,, | Performed by: INTERNAL MEDICINE

## 2024-12-10 PROCEDURE — 3077F SYST BP >= 140 MM HG: CPT | Mod: CPTII,S$GLB,, | Performed by: INTERNAL MEDICINE

## 2024-12-10 RX ORDER — IBUPROFEN AND FAMOTIDINE 26.6; 8 MG/1; MG/1
1 TABLET ORAL 3 TIMES DAILY
Qty: 90 TABLET | Refills: 12 | Status: SHIPPED | OUTPATIENT
Start: 2024-12-10 | End: 2025-12-10

## 2024-12-10 RX ORDER — AZITHROMYCIN 250 MG/1
TABLET, FILM COATED ORAL
Qty: 6 TABLET | Refills: 0 | Status: SHIPPED | OUTPATIENT
Start: 2024-12-10

## 2024-12-10 RX ORDER — PREDNISONE 2.5 MG/1
7.5 TABLET ORAL DAILY
Qty: 90 TABLET | Refills: 4 | Status: SHIPPED | OUTPATIENT
Start: 2024-12-10 | End: 2025-04-09

## 2024-12-10 RX ORDER — KETOROLAC TROMETHAMINE 30 MG/ML
60 INJECTION, SOLUTION INTRAMUSCULAR; INTRAVENOUS ONCE
Qty: 2 ML | Refills: 0 | Status: SHIPPED | OUTPATIENT
Start: 2024-12-10 | End: 2024-12-10

## 2024-12-10 NOTE — PROGRESS NOTES
Subjective:     Patient ID:  Vonnie Garces    Chief Complaint:  Disease Management     History of Present Illness:  Pt is a 64 y.o. female   w/reactive arthritis. She has been dx with Pott's dz She is having a severe heat intolerance and  has no trigger  with sweating. She will be going [place] next [place]. She has  some stress her  has atrial fib.She has history of reactive arthritis and hashimoto's thyroiditis currently on duexis and occasional use of prednisone for flares. She is doing poorly. She complains of pain in her R hip, hands, shoulders, R shoulder blade, bilateral knees, and L ankle. Pain is described as burning sensation. Prior to onset of pain she reports increased fatigue. Arthritis flares are occurring more frequently typically every 8 weeks. She also complains of difficulty with temperature regulation, brain fog, constipation, and hair loss.      Current treatment:  1. Prednisone prn  2. Plaquenil 200 mg      Rheumatologic History:   - Diagnosis/es:  - Positive serologies:  - Infectious screening labs:  - Previous Treatments:  - Current Treatments:     Interval History:   Hospitalization since last office visit: No    Patient Active Problem List    Diagnosis Date Noted    Vision changes 06/27/2024    Weakness of both hips 11/07/2023    Gait abnormality 11/07/2023    Left knee pain 10/11/2023    Diaphoresis 02/23/2023    Primary hypertension 10/27/2022    Spondylosis of thoracic joint 05/21/2021    Dysautonomia     Iliotibial band tendonitis of right side 01/14/2020    Obesity (BMI 30.0-34.9) 02/26/2019    Thoracic spondylosis 10/12/2018    Right foot pain 12/14/2017    Facet hypertrophy of lumbar region 10/17/2017    Facet arthropathy, cervical 06/27/2017    Cervical radiculopathy 07/14/2016    Lumbar radiculopathy 06/14/2016    Facet arthropathy, thoracic 02/05/2016    DDD (degenerative disc disease), cervical 10/19/2015    Scoliosis, adolescent acquired 09/27/2015    Scoliosis of  lumbosacral spine 09/27/2015    Scoliosis of thoracic spine 09/27/2015    DDD (degenerative disc disease), lumbar 09/27/2015    Cervical spondylosis 09/27/2015    Hypothyroidism 08/25/2015    Insomnia 08/25/2015    Anxiety 08/25/2015     Past Surgical History:   Procedure Laterality Date    BILATERAL OOPHORECTOMY  2004    BREAST BIOPSY Left 2010    Benign    CHOLECYSTECTOMY  2010    COLONOSCOPY  2016    [facility]    Epidural Steroid Injection      Pain managment, at another facility, cervical, thoracic    Epidural Steroid injection      Pain management, cervical    EPIDURAL STEROID INJECTION INTO CERVICAL SPINE N/A 11/11/2021    Procedure: Injection-steroid-epidural-cervical, C7/T1 interlaminer;  Surgeon: Floyd Coleman MD;  Location: [facility] OR;  Service: Pain Management;  Laterality: N/A;    EPIDURAL STEROID INJECTION INTO CERVICAL SPINE N/A 9/27/2024    Procedure: Injection-steroid-epidural-cervical    C7/T1;  Surgeon: Floyd Coleman MD;  Location: [facility] OR;  Service: Pain Management;  Laterality: N/A;    EPIDURAL STEROID INJECTION INTO LUMBAR SPINE Right 01/16/2019    Procedure: Injection-steroid-epidural-lumbar L5/S1;  Surgeon: Floyd Coleman MD;  Location: [facility] OR;  Service: Pain Management;  Laterality: Right;  to right    EPIDURAL STEROID INJECTION INTO LUMBAR SPINE N/A 05/14/2019    Procedure: Injection-steroid-epidural-lumbar;  Surgeon: Floyd Coleman MD;  Location: [facility] OR;  Service: Pain Management;  Laterality: N/A;  L5/S1 interlaminar    EPIDURAL STEROID INJECTION INTO LUMBAR SPINE N/A 03/22/2021    Procedure: Injection-steroid-epidural-lumbar L5/S1 interlaminer;  Surgeon: Floyd Coleman MD;  Location: [facility] OR;  Service: Pain Management;  Laterality: N/A;    EPIDURAL STEROID INJECTION INTO LUMBAR SPINE N/A 11/18/2022    Procedure: Injection-steroid-epidural-lumbar L5/S1 to right;  Surgeon: Floyd Coleman MD;  Location: [facility] OR;  Service: Pain Management;  Laterality: N/A;     EPIDURAL STEROID INJECTION INTO LUMBAR SPINE N/A 07/21/2023    Procedure: Injection-steroid-epidural-lumbar Interlaminar L5/S1 to the right;  Surgeon: Floyd Coleman MD;  Location: [facility] OR;  Service: Pain Management;  Laterality: N/A;    EPIDURAL STEROID INJECTION INTO LUMBAR SPINE N/A 12/06/2023    Procedure: Injection-steroid-epidural-lumbar     L5/S1;  Surgeon: Floyd Coleman MD;  Location: [facility] OR;  Service: Pain Management;  Laterality: N/A;    EPIDURAL STEROID INJECTION INTO LUMBAR SPINE N/A 02/08/2024    Procedure: Injection-steroid-epidural-lumbar     L5/S1;  Surgeon: Floyd Coleman MD;  Location: [facility] OR;  Service: Pain Management;  Laterality: N/A;    HYSTERECTOMY  2004    benign    MOUTH SURGERY      Braces, with temporary metal implants in upper gum    OOPHORECTOMY  2004    w/ hysterectomy    RADIOFREQUENCY ABLATION  02/2016    thoracic nerve    RADIOFREQUENCY ABLATION Right 06/04/2019    Procedure: Radiofrequency Ablation T4,5,6,7;  Surgeon: Floyd Coleman MD;  Location: [facility] OR;  Service: Pain Management;  Laterality: Right;    RADIOFREQUENCY ABLATION Right 05/21/2021    Procedure: RADIOFREQUENCY ABLATION,THORACIC  T4,T5,T6, T7;  Surgeon: Floyd Coleman MD;  Location: [facility] OR;  Service: Pain Management;  Laterality: Right;    RADIOFREQUENCY ABLATION Right 06/30/2022    Procedure: Radiofrequency Ablation Thoracic T4, T5, T6, T7;  Surgeon: Floyd Coleman MD;  Location: [facility] OR;  Service: Pain Management;  Laterality: Right;    RADIOFREQUENCY ABLATION Right 10/12/2023    Procedure: RADIOFREQUENCY ABLATION,THORACIC Right T4, T5, T6, T7;  Surgeon: Floyd Coleman MD;  Location: [facility] OR;  Service: Pain Management;  Laterality: Right;  THORACIC Right T4, T5, T6, T7    RADIOFREQUENCY ABLATION Right 7/29/2024    Procedure: RADIOFREQUENCY ABLATION,THORACIC     T4,5,6,7;  Surgeon: Floyd Coleman MD;  Location: [facility] OR;  Service: Pain Management;  Laterality: Right;     TONSILLECTOMY      TRANSFORAMINAL EPIDURAL INJECTION OF STEROID Left 06/14/2024    Procedure: Injection,steroid,epidural,transforaminal approach    L4/5;  Surgeon: Floyd Coleman MD;  Location: [facility] OR;  Service: Pain Management;  Laterality: Left;    VULVA SURGERY  03/2016     Social History     Tobacco Use    Smoking status: Never    Smokeless tobacco: Never   Substance Use Topics    Alcohol use: No    Drug use: No     Family History   Problem Relation Name Age of Onset    Macular degeneration Mother      Arthritis Mother      Celiac disease Mother      COPD Father      Glaucoma Neg Hx       Review of patient's allergies indicates:   Allergen Reactions    Compazine [prochlorperazine edisylate] Anaphylaxis    Avelox [moxifloxacin] Rash       Review of Systems   Review of Systems   Constitutional:  Positive for chills and fatigue. Negative for activity change, appetite change, diaphoresis, fever and unexpected weight change.   HENT:  Negative for congestion, dental problem, ear discharge, ear pain, facial swelling, mouth sores, nosebleeds, postnasal drip, rhinorrhea, sinus pressure, sneezing, sore throat, tinnitus, trouble swallowing and voice change.    Eyes:  Negative for photophobia, pain, discharge, redness and itching.   Respiratory:  Positive for cough. Negative for apnea, chest tightness, shortness of breath and wheezing.    Cardiovascular:  Positive for leg swelling. Negative for chest pain and palpitations.   Gastrointestinal:  Positive for abdominal pain. Negative for abdominal distention, constipation, diarrhea, nausea and vomiting.   Endocrine: Negative for cold intolerance, heat intolerance, polydipsia and polyuria.   Genitourinary:  Negative for decreased urine volume, difficulty urinating, dysuria, flank pain, frequency, hematuria and urgency.   Musculoskeletal:  Positive for arthralgias, back pain, gait problem, joint swelling, myalgias, neck pain and neck stiffness.   Skin:  Negative for  "pallor, rash and wound.   Allergic/Immunologic: Negative for immunocompromised state.   Neurological:  Negative for dizziness, tremors, numbness and headaches.   Hematological:  Negative for adenopathy. Does not bruise/bleed easily.   Psychiatric/Behavioral:  Negative for sleep disturbance. The patient is not nervous/anxious.         Current Medications:  Current Outpatient Medications   Medication Instructions    ALPRAZolam (XANAX) 0.25 MG tablet TAKE 1 TABLET BY MOUTH TWICE DAILY AS NEEDED FOR ANXIETY    azithromycin (Z-YONY) 250 MG tablet Take 2 tablets by mouth on day 1; Take 1 tablet by mouth on days 2-5    cycloSPORINE (RESTASIS) 0.05 % ophthalmic emulsion place 1 drop into affected eye(s) TWICE DAILY    estradioL (DIVIGEL) 1 mg/gram (0.1 %) topical gel apply 1 gram onto skin once daily    estradioL (ESTRACE) 1 g, Vaginal, Every Mon, Thurs    gabapentin (NEURONTIN) 100 MG capsule Take one capsule (100mg) by mouth in the morning and four capsules (400mg) by mouth in the evening    [START ON 12/20/2024] HYDROcodone-acetaminophen (NORCO) 7.5-325 mg per tablet 1 tablet, Oral, Every 6 hours PRN    hydroxychloroquine (PLAQUENIL) 200 mg, Oral, 2 times daily    ibuprofen-famotidine (DUEXIS) 800-26.6 mg Tab 1 tablet, Oral, 3 times daily    ketorolac (TORADOL) 60 mg, Intravenous, Once    LIDOcaine-prilocaine (EMLA) cream Topical (Top), 2 times daily PRN    loteprednol (LOTEMAX) 0.5 % ophthalmic suspension 1 drop, Both Eyes, 2 times daily PRN    METHYL SALICYLATE/MENTH/CAMPH (PAIN RELIEVING CREAM TOP) 1 application , Daily PRN    predniSONE (DELTASONE) 7.5 mg, Oral, Daily    propranoloL (INDERAL LA) 60 mg, Oral, Daily    TIROSINT 125 mcg Cap 1 capsule, Oral         Objective:     Vitals:    12/10/24 1636   BP: (!) 164/89   Pulse: 72   Weight: 83.9 kg (185 lb)   Height: 5' 1" (1.549 m)   PainSc:   6   PainLoc: Generalized      Body mass index is 34.96 kg/m².     Physical Examinations:  Physical Exam   Constitutional: She " is oriented to person, place, and time. No distress.   HENT:   Head: Normocephalic and atraumatic.   Eyes: Pupils are equal, round, and reactive to light. Right eye exhibits no discharge. Left eye exhibits no discharge.   Neck: No thyromegaly present.   Cardiovascular: Normal rate, regular rhythm and normal heart sounds. Exam reveals no gallop and no friction rub.   No murmur heard.  Pulmonary/Chest: Breath sounds normal. She has no wheezes. She has no rales. She exhibits no tenderness.   Abdominal: There is no abdominal tenderness. There is no rebound and no guarding.   Musculoskeletal:         General: Tenderness and deformity present.      Right shoulder: Tenderness present.      Left shoulder: Tenderness present.      Right elbow: Normal.      Left elbow: Tenderness present.      Right wrist: Tenderness present.      Left wrist: Tenderness present.      Cervical back: Neck supple.      Right knee: Effusion present. Tenderness present.      Left knee: Effusion present. Tenderness present.   Lymphadenopathy:     She has no cervical adenopathy.   Neurological: She is alert and oriented to person, place, and time. Gait normal.   Skin: Skin is dry. No rash noted. No erythema. There is pallor.   Psychiatric: Mood and affect normal.   Vitals reviewed.      Right Side Rheumatological Exam     Examination finds the elbow normal.    The patient is tender to palpation of the shoulder, wrist, knee, 1st PIP, 1st MCP, 2nd PIP, 2nd MCP, 3rd PIP, 3rd MCP, 4th PIP, 4th MCP, 5th PIP and 5th MCP    She has swelling of the 1st PIP, 1st MCP, 2nd PIP, 2nd MCP, 3rd PIP, 3rd MCP, 4th PIP, 4th MCP, 5th PIP and 5th MCP    Shoulder Exam   Tenderness Location: no tenderness    Range of Motion   Active abduction:  abnormal   Adduction: abnormal  Sensation: normal    Knee Exam   Patellofemoral Crepitus: positive  Effusion: positive  Sensation: normal    Hip Exam   Tenderness Location: posterior  Sensation: normal    Elbow/Wrist Exam    Tenderness Location: no tenderness  Sensation: normal    Left Side Rheumatological Exam     The patient is tender to palpation of the shoulder, elbow, wrist, knee, 1st PIP, 1st MCP, 2nd PIP, 2nd MCP, 3rd PIP, 3rd MCP, 4th PIP, 4th MCP, 5th PIP and 5th MCP.    She has swelling of the 1st PIP, 1st MCP, 2nd PIP, 2nd MCP, 3rd PIP, 3rd MCP, 4th PIP, 4th MCP, 5th PIP and 5th MCP    Shoulder Exam   Tenderness Location: no tenderness    Range of Motion   Active abduction:  abnormal   Sensation: normal    Knee Exam     Patellofemoral Crepitus: positive  Effusion: positive  Sensation: normal    Hip Exam   Tenderness Location: posterior  Sensation: normal    Elbow/Wrist Exam   Sensation: normal      Back/Neck Exam   General Inspection   Gait: normal            Disease Assessment Scores:  Patient's Global Assessment of arthritis (0-10): 1  Physician's Global Assessment of arthritis (0-10): 1  Number of Tender Joints (0-28): 1  Number of Swollen Joints (0-28): 2        5/28/2024     8:02 AM   Rapid3 Question Responses and Scores   MDHAQ Score 1.1   Psychologic Score 5.5   Pain Score 7   When you awakened in the morning OVER THE LAST WEEK, did you feel stiff? Yes   If Yes, please indicate the number of hours until you are as limber as you will be for the day 3   Fatigue Score 7   Global Health Score 5.5   RAPID3 Score 5.39       Monitoring Lab Results:  Lab Results   Component Value Date    WBC 7.29 02/27/2024    RBC 4.51 02/27/2024    HGB 13.3 02/27/2024    HCT 39.7 02/27/2024    MCV 88 02/27/2024    MCH 29.5 02/27/2024    MCHC 33.5 02/27/2024    RDW 13.2 02/27/2024     02/27/2024        Lab Results   Component Value Date     02/27/2024    K 4.3 02/27/2024     02/27/2024    CO2 32 (H) 02/27/2024    GLU 97 02/27/2024    BUN 12 02/27/2024    CREATININE 0.66 02/27/2024    CALCIUM 10.0 02/27/2024    PROT 7.4 02/27/2024    ALBUMIN 4.8 02/27/2024    BILITOT 0.7 02/27/2024    ALKPHOS 83 02/27/2024    AST 25  "02/27/2024    ALT 29 02/27/2024    ANIONGAP 8 02/27/2024    EGFRNORACEVR >60 02/27/2024       Lab Results   Component Value Date    SEDRATE 9 02/27/2024    CRP <0.50 02/27/2024        Lab Results   Component Value Date    CMMJXONH50VP 27 (L) 02/20/2023        Lab Results   Component Value Date    CHOL 190 03/05/2024    HDL 70 03/05/2024    LDLCALC 103.8 03/05/2024    TRIG 81 03/05/2024       Lab Results   Component Value Date    RF <8.6 08/08/2018     Lab Results   Component Value Date    ANASCREEN None Detected 02/08/2022    DSDNA 2 02/08/2022    SMRNPAB 2 11/23/2021    SSAANTIBODY 1 11/23/2021    SSBANTIBODY 3 11/23/2021    WIZ72AR 9 08/08/2018     No results found for: "HLABB27"    Infectious Disease Screening:  Lab Results   Component Value Date    HEPBSAG Negative 08/30/2023    HEPBSAB <3.10 08/30/2023    HEPBSAB <3.10 08/30/2023     Lab Results   Component Value Date    HEPCAB Negative 08/30/2023     Results for orders placed or performed in visit on 07/23/24   HIV 1/2 Ag/Ab (4th Gen)    Collection Time: 07/23/24  1:56 PM   Result Value Ref Range    HIV 1/2 Ag/Ab Non-reactive Non-reactive     *Note: Due to a large number of results and/or encounters for the requested time period, some results have not been displayed. A complete set of results can be found in Results Review.         Imaging: DEXA, Xrays, MRIs, CTs, etc    Old & Outside Medical Records:  Reviewed old and all outside medical records available in Care Everywhere     Assessment:     Encounter Diagnoses   Name Primary?    Primary osteoarthritis of both knees Yes    Lumbar radiculopathy     Cervical spondylosis     Degeneration of intervertebral disc of lumbar region, unspecified whether pain present     Pott disease        Plan:      Encounter Diagnoses   Name Primary?    Primary osteoarthritis of both knees Yes    Lumbar radiculopathy     Cervical spondylosis     Degeneration of intervertebral disc of lumbar region, unspecified whether pain present "     Pott disease      Vonnie was seen today for osteopenia.    Diagnoses and all orders for this visit:    Primary osteoarthritis of both knees  -     azithromycin (Z-YONY) 250 MG tablet; Take 2 tablets by mouth on day 1; Take 1 tablet by mouth on days 2-5  -     ketorolac (TORADOL) 30 mg/mL (1 mL) injection; Inject 2 mLs (60 mg total) into the vein once. for 1 dose  -     predniSONE (DELTASONE) 2.5 MG tablet; Take 3 tablets (7.5 mg total) by mouth once daily.  -     Vitamin D; Future  -     Sedimentation rate; Future  -     Sjogrens syndrome-B extractable nuclear antibody; Future  -     Sjogrens syndrome-A extractable nuclear antibody; Future  -     Comprehensive Metabolic Panel; Future  -     CBC Auto Differential; Future  -     Anti-Scleroderma Antibody; Future  -     Anti-Histone Antibody; Future  -     Anti-DNA Ab, Double-Stranded; Future  -     Anti Sm/RNP Antibody; Future  -     FERNANDO Screen w/Reflex; Future  -     Anti-Smith Antibody; Future    Lumbar radiculopathy  -     azithromycin (Z-YONY) 250 MG tablet; Take 2 tablets by mouth on day 1; Take 1 tablet by mouth on days 2-5  -     ketorolac (TORADOL) 30 mg/mL (1 mL) injection; Inject 2 mLs (60 mg total) into the vein once. for 1 dose  -     ibuprofen-famotidine (DUEXIS) 800-26.6 mg Tab; Take 1 tablet by mouth 3 (three) times daily.  -     predniSONE (DELTASONE) 2.5 MG tablet; Take 3 tablets (7.5 mg total) by mouth once daily.  -     Vitamin D; Future  -     Sedimentation rate; Future  -     Sjogrens syndrome-B extractable nuclear antibody; Future  -     Sjogrens syndrome-A extractable nuclear antibody; Future  -     Comprehensive Metabolic Panel; Future  -     CBC Auto Differential; Future  -     Anti-Scleroderma Antibody; Future  -     Anti-Histone Antibody; Future  -     Anti-DNA Ab, Double-Stranded; Future  -     Anti Sm/RNP Antibody; Future  -     FERNANDO Screen w/Reflex; Future  -     Anti-Smith Antibody; Future    Cervical spondylosis  -     azithromycin  (Z-YONY) 250 MG tablet; Take 2 tablets by mouth on day 1; Take 1 tablet by mouth on days 2-5  -     ketorolac (TORADOL) 30 mg/mL (1 mL) injection; Inject 2 mLs (60 mg total) into the vein once. for 1 dose  -     ibuprofen-famotidine (DUEXIS) 800-26.6 mg Tab; Take 1 tablet by mouth 3 (three) times daily.  -     Vitamin D; Future  -     Sedimentation rate; Future  -     Sjogrens syndrome-B extractable nuclear antibody; Future  -     Sjogrens syndrome-A extractable nuclear antibody; Future  -     Comprehensive Metabolic Panel; Future  -     CBC Auto Differential; Future  -     Anti-Scleroderma Antibody; Future  -     Anti-Histone Antibody; Future  -     Anti-DNA Ab, Double-Stranded; Future  -     Anti Sm/RNP Antibody; Future  -     FERNANDO Screen w/Reflex; Future  -     Anti-Smith Antibody; Future    Degeneration of intervertebral disc of lumbar region, unspecified whether pain present  -     ibuprofen-famotidine (DUEXIS) 800-26.6 mg Tab; Take 1 tablet by mouth 3 (three) times daily.    Pott disease  -     predniSONE (DELTASONE) 2.5 MG tablet; Take 3 tablets (7.5 mg total) by mouth once daily.  -     Vitamin D; Future  -     Sedimentation rate; Future  -     Sjogrens syndrome-B extractable nuclear antibody; Future  -     Sjogrens syndrome-A extractable nuclear antibody; Future  -     Comprehensive Metabolic Panel; Future  -     CBC Auto Differential; Future  -     Anti-Scleroderma Antibody; Future  -     Anti-Histone Antibody; Future  -     Anti-DNA Ab, Double-Stranded; Future  -     Anti Sm/RNP Antibody; Future  -     FERNANDO Screen w/Reflex; Future  -     Anti-Smith Antibody; Future    Celiac syndrome  -     Vitamin D; Future  -     Sedimentation rate; Future  -     Sjogrens syndrome-B extractable nuclear antibody; Future  -     Sjogrens syndrome-A extractable nuclear antibody; Future  -     Comprehensive Metabolic Panel; Future  -     CBC Auto Differential; Future  -     Anti-Scleroderma Antibody; Future  -     Anti-Histone  Antibody; Future  -     Anti-DNA Ab, Double-Stranded; Future  -     Anti Sm/RNP Antibody; Future  -     FERNANDO Screen w/Reflex; Future  -     Anti-Smith Antibody; Future       1. Labs ordered  2. Letter  for travel  3. F/u 6 month     Follow-up 6 months    More than 50% of the  40 minute encounter was spent face to face counseling the patient regarding current status and future plan of care as well as side effects  of the medications. All questions were answered to patient's satisfaction also includes  non-face to face time preparing to see the patient (eg, review of tests), Obtaining and/or reviewing separately obtained history, Documenting clinical information in the electronic or other health record, Independently interpreting results

## 2024-12-11 ENCOUNTER — TELEPHONE (OUTPATIENT)
Dept: RHEUMATOLOGY | Facility: CLINIC | Age: 64
End: 2024-12-11
Payer: COMMERCIAL

## 2024-12-11 NOTE — TELEPHONE ENCOUNTER
----- Message from Alicia sent at 12/11/2024  7:35 AM CST -----  Contact: [facility]  Type:  Needs Medical Advice    Who Called: [facility] Phar   Symptoms (please be specific): has a question about her rx, ketorolac, please call phar    Pharmacy name and phone #:    [facility] - [address] [address]  Phone: [phone] Fax: [phone]      Would the patient rather a call back or a response via [facility]? call  Best Call Back Number: [phone] (home)     Additional Information: please advise and thank you.

## 2024-12-20 ENCOUNTER — PATIENT MESSAGE (OUTPATIENT)
Dept: PAIN MEDICINE | Facility: CLINIC | Age: 64
End: 2024-12-20
Payer: COMMERCIAL

## 2024-12-20 ENCOUNTER — HOSPITAL ENCOUNTER (OUTPATIENT)
Facility: HOSPITAL | Age: 64
Discharge: HOME | End: 2024-12-20
Attending: ANESTHESIOLOGY | Admitting: ANESTHESIOLOGY
Payer: COMMERCIAL

## 2024-12-20 ENCOUNTER — PATIENT MESSAGE (OUTPATIENT)
Dept: RHEUMATOLOGY | Facility: CLINIC | Age: 64
End: 2024-12-20
Payer: COMMERCIAL

## 2024-12-20 ENCOUNTER — HOSPITAL ENCOUNTER (OUTPATIENT)
Dept: RADIOLOGY | Facility: HOSPITAL | Age: 64
Discharge: HOME | End: 2024-12-20
Attending: ANESTHESIOLOGY | Admitting: ANESTHESIOLOGY
Payer: COMMERCIAL

## 2024-12-20 VITALS
SYSTOLIC BLOOD PRESSURE: 121 MMHG | HEIGHT: 61 IN | DIASTOLIC BLOOD PRESSURE: 66 MMHG | OXYGEN SATURATION: 98 % | RESPIRATION RATE: 16 BRPM | HEART RATE: 67 BPM | TEMPERATURE: 97 F | BODY MASS INDEX: 34.93 KG/M2 | WEIGHT: 185 LBS

## 2024-12-20 DIAGNOSIS — M54.50 LOWER BACK PAIN: ICD-10-CM

## 2024-12-20 DIAGNOSIS — M51.369 DDD (DEGENERATIVE DISC DISEASE), LUMBAR: ICD-10-CM

## 2024-12-20 DIAGNOSIS — M54.16 LUMBAR RADICULOPATHY: ICD-10-CM

## 2024-12-20 PROCEDURE — 64483 NJX AA&/STRD TFRM EPI L/S 1: CPT | Mod: LT,,, | Performed by: ANESTHESIOLOGY

## 2024-12-20 PROCEDURE — 25500020 PHARM REV CODE 255: Mod: PO | Performed by: ANESTHESIOLOGY

## 2024-12-20 PROCEDURE — 63600175 PHARM REV CODE 636 W HCPCS: Mod: PO | Performed by: ANESTHESIOLOGY

## 2024-12-20 PROCEDURE — 64483 NJX AA&/STRD TFRM EPI L/S 1: CPT | Mod: PO,LT | Performed by: ANESTHESIOLOGY

## 2024-12-20 RX ORDER — MIDAZOLAM HYDROCHLORIDE 1 MG/ML
INJECTION INTRAMUSCULAR; INTRAVENOUS
Status: DISCONTINUED | OUTPATIENT
Start: 2024-12-20 | End: 2024-12-20 | Stop reason: HOSPADM

## 2024-12-20 RX ORDER — ESZOPICLONE 3 MG/1
3 TABLET, FILM COATED ORAL NIGHTLY
Qty: 30 TABLET | Refills: 3 | Status: SHIPPED | OUTPATIENT
Start: 2024-12-20 | End: 2025-04-19

## 2024-12-20 RX ORDER — METHYLPREDNISOLONE ACETATE 40 MG/ML
INJECTION, SUSPENSION INTRA-ARTICULAR; INTRALESIONAL; INTRAMUSCULAR; SOFT TISSUE
Status: DISCONTINUED | OUTPATIENT
Start: 2024-12-20 | End: 2024-12-20 | Stop reason: HOSPADM

## 2024-12-20 RX ORDER — LIDOCAINE HYDROCHLORIDE 10 MG/ML
INJECTION, SOLUTION EPIDURAL; INFILTRATION; INTRACAUDAL; PERINEURAL
Status: DISCONTINUED | OUTPATIENT
Start: 2024-12-20 | End: 2024-12-20 | Stop reason: HOSPADM

## 2024-12-20 RX ORDER — ALPRAZOLAM 0.5 MG/1
1 TABLET, ORALLY DISINTEGRATING ORAL ONCE AS NEEDED
Status: DISCONTINUED | OUTPATIENT
Start: 2024-12-20 | End: 2024-12-20 | Stop reason: HOSPADM

## 2024-12-20 RX ORDER — HYDROCODONE BITARTRATE AND ACETAMINOPHEN 7.5; 325 MG/1; MG/1
1 TABLET ORAL EVERY 6 HOURS PRN
Qty: 60 TABLET | Refills: 0 | OUTPATIENT
Start: 2024-12-20

## 2024-12-20 RX ORDER — BUPIVACAINE HYDROCHLORIDE 2.5 MG/ML
INJECTION, SOLUTION EPIDURAL; INFILTRATION; INTRACAUDAL
Status: DISCONTINUED | OUTPATIENT
Start: 2024-12-20 | End: 2024-12-20 | Stop reason: HOSPADM

## 2024-12-20 NOTE — H&P
CC: Back pain    HPI: The patient is a 63yo woman with a history of lumbar radiculopathy here for left L4/5 TFESI. There are no major changes in history and physical from 9/30/24.    Past Medical History:   Diagnosis Date    Anxiety 08/25/2015    Arthritis     BRCA1 negative     BRCA2 negative     Celiac disease     Cervical spondylosis     Chronic back pain     DDD (degenerative disc disease), lumbar     Difficult intubation 03/2016    anterior larynx, pt with metal dental appliance, unable to do glidescope, used LMA    Dysautonomia     Encounter for blood transfusion     Facet arthropathy, thoracic     Hormone replacement therapy (HRT)     Insomnia 08/25/2015    Neck pain     PONV (postoperative nausea and vomiting)     Right foot pain     Scoliosis     Sjogren's syndrome     Snoring     uses cpap, states not ROSSY    Unspecified hypothyroidism 08/25/2015       Past Surgical History:   Procedure Laterality Date    BILATERAL OOPHORECTOMY  2004    BREAST BIOPSY Left 2010    Benign    CHOLECYSTECTOMY  2010    COLONOSCOPY  2016    [place]    Epidural Steroid Injection      Pain managment, at another facility, cervical, thoracic    Epidural Steroid injection      Pain management, cervical    EPIDURAL STEROID INJECTION INTO CERVICAL SPINE N/A 11/11/2021    Procedure: Injection-steroid-epidural-cervical, C7/T1 interlaminer;  Surgeon: Floyd Coleman MD;  Location: [facility] OR;  Service: Pain Management;  Laterality: N/A;    EPIDURAL STEROID INJECTION INTO CERVICAL SPINE N/A 9/27/2024    Procedure: Injection-steroid-epidural-cervical    C7/T1;  Surgeon: Floyd Coleman MD;  Location: [facility] OR;  Service: Pain Management;  Laterality: N/A;    EPIDURAL STEROID INJECTION INTO LUMBAR SPINE Right 01/16/2019    Procedure: Injection-steroid-epidural-lumbar L5/S1;  Surgeon: Floyd Coleman MD;  Location: [facility] OR;  Service: Pain Management;  Laterality: Right;  to right    EPIDURAL STEROID INJECTION INTO LUMBAR SPINE N/A  05/14/2019    Procedure: Injection-steroid-epidural-lumbar;  Surgeon: Floyd Coleman MD;  Location: [facility] OR;  Service: Pain Management;  Laterality: N/A;  L5/S1 interlaminar    EPIDURAL STEROID INJECTION INTO LUMBAR SPINE N/A 03/22/2021    Procedure: Injection-steroid-epidural-lumbar L5/S1 interlaminer;  Surgeon: Floyd Coleman MD;  Location: [facility] OR;  Service: Pain Management;  Laterality: N/A;    EPIDURAL STEROID INJECTION INTO LUMBAR SPINE N/A 11/18/2022    Procedure: Injection-steroid-epidural-lumbar L5/S1 to right;  Surgeon: Floyd Coleman MD;  Location: [facility] OR;  Service: Pain Management;  Laterality: N/A;    EPIDURAL STEROID INJECTION INTO LUMBAR SPINE N/A 07/21/2023    Procedure: Injection-steroid-epidural-lumbar Interlaminar L5/S1 to the right;  Surgeon: Floyd Coleman MD;  Location: [facility] OR;  Service: Pain Management;  Laterality: N/A;    EPIDURAL STEROID INJECTION INTO LUMBAR SPINE N/A 12/06/2023    Procedure: Injection-steroid-epidural-lumbar     L5/S1;  Surgeon: Floyd Coleman MD;  Location: [facility] OR;  Service: Pain Management;  Laterality: N/A;    EPIDURAL STEROID INJECTION INTO LUMBAR SPINE N/A 02/08/2024    Procedure: Injection-steroid-epidural-lumbar     L5/S1;  Surgeon: Floyd Coleman MD;  Location: [facility] OR;  Service: Pain Management;  Laterality: N/A;    HYSTERECTOMY  2004    benign    MOUTH SURGERY      Braces, with temporary metal implants in upper gum    OOPHORECTOMY  2004    w/ hysterectomy    RADIOFREQUENCY ABLATION  02/2016    thoracic nerve    RADIOFREQUENCY ABLATION Right 06/04/2019    Procedure: Radiofrequency Ablation T4,5,6,7;  Surgeon: Floyd Coleman MD;  Location: [facility] OR;  Service: Pain Management;  Laterality: Right;    RADIOFREQUENCY ABLATION Right 05/21/2021    Procedure: RADIOFREQUENCY ABLATION,THORACIC  T4,T5,T6, T7;  Surgeon: Floyd Coleman MD;  Location: [facility] OR;  Service: Pain Management;  Laterality: Right;    RADIOFREQUENCY ABLATION  Right 06/30/2022    Procedure: Radiofrequency Ablation Thoracic T4, T5, T6, T7;  Surgeon: Floyd Coleman MD;  Location: [facility] OR;  Service: Pain Management;  Laterality: Right;    RADIOFREQUENCY ABLATION Right 10/12/2023    Procedure: RADIOFREQUENCY ABLATION,THORACIC Right T4, T5, T6, T7;  Surgeon: Floyd Coleman MD;  Location: [facility] OR;  Service: Pain Management;  Laterality: Right;  THORACIC Right T4, T5, T6, T7    RADIOFREQUENCY ABLATION Right 7/29/2024    Procedure: RADIOFREQUENCY ABLATION,THORACIC     T4,5,6,7;  Surgeon: Floyd Coleman MD;  Location: [facility] OR;  Service: Pain Management;  Laterality: Right;    TONSILLECTOMY      TRANSFORAMINAL EPIDURAL INJECTION OF STEROID Left 06/14/2024    Procedure: Injection,steroid,epidural,transforaminal approach    L4/5;  Surgeon: Floyd Coleman MD;  Location: [facility] OR;  Service: Pain Management;  Laterality: Left;    VULVA SURGERY  03/2016       Family History   Problem Relation Name Age of Onset    Macular degeneration Mother      Arthritis Mother      Celiac disease Mother      COPD Father      Glaucoma Neg Hx         Social History     Socioeconomic History    Marital status:    Tobacco Use    Smoking status: Never    Smokeless tobacco: Never   Substance and Sexual Activity    Alcohol use: No    Drug use: No    Sexual activity: Yes     Partners: Male     Social Drivers of Health     Financial Resource Strain: Low Risk  (2/27/2024)    Overall Financial Resource Strain (CARDIA)     Difficulty of Paying Living Expenses: Not hard at all   Food Insecurity: No Food Insecurity (2/27/2024)    Hunger Vital Sign     Worried About Running Out of Food in the Last Year: Never true     Ran Out of Food in the Last Year: Never true   Transportation Needs: No Transportation Needs (2/27/2024)    PRAPARE - Transportation     Lack of Transportation (Medical): No     Lack of Transportation (Non-Medical): No   Physical Activity: Insufficiently Active (2/27/2024)  "   Exercise Vital Sign     Days of Exercise per Week: 3 days     Minutes of Exercise per Session: 20 min   Stress: Stress Concern Present (2/27/2024)    [employer] [place] [employer] - Occupational Stress Questionnaire     Feeling of Stress : Rather much   Housing Stability: Unknown (2/27/2024)    Housing Stability Vital Sign     Unable to Pay for Housing in the Last Year: No     Unstable Housing in the Last Year: No       Current Facility-Administered Medications   Medication Dose Route Frequency Provider Last Rate Last Admin    alprazolam ODT dissolvable tablet 1 mg  1 mg Oral Once PRN Floyd Coleman MD           Review of patient's allergies indicates:   Allergen Reactions    Compazine [prochlorperazine edisylate] Anaphylaxis    Avelox [moxifloxacin] Rash       Vitals:    12/16/24 1044 12/20/24 1420   BP:  (!) 176/86   Pulse:  69   Resp:  18   Temp:  97.2 °F (36.2 °C)   TempSrc:  Temporal   SpO2:  96%   Weight: 83.9 kg (185 lb)    Height: 5' 1" (1.549 m)        ASA 2, Mallampati 2    REVIEW OF SYSTEMS:     GENERAL: No weight loss, malaise or fevers.  HEENT:  No recent changes in vision or hearing  NECK: Negative for lumps, no difficulty with swallowing.  RESPIRATORY: Negative for cough, wheezing or shortness of breath, patient denies any recent URI.  CARDIOVASCULAR: Negative for chest pain, leg swelling or palpitations.  GI: Negative for abdominal discomfort, blood in stools or black stools or change in bowel habits.  MUSCULOSKELETAL: See HPI.  SKIN: Negative for lesions, rash, and itching.  PSYCH: No suicidal or homicidal ideations, no current mood disturbances.  HEMATOLOGY/LYMPHOLOGY: Negative for prolonged bleeding, bruising easily or swollen nodes. Patient is not currently taking any anti-coagulants  ENDO: No history of diabetes or thyroid dysfunction  NEURO: No history of syncope, paralysis, seizures or tremors.All other reviewed and negative other than HPI.    Physical exam:  Gen: A and O " x3, pleasant, well-groomed  Skin: No rashes or obvious lesions  HEENT: PERRLA, no obvious deformities on ears or in canals. No thyroid masses, trachea midline, no palpable lymph nodes in neck, axilla.  CVS: Regular rate and rhythm, normal S1 and S2, no murmurs.  Resp: Clear to auscultation bilaterally.  Abdomen: Soft, NT/ND, normal bowel sounds present.  Musculoskeletal/Neuro: Moving all extremities    Assessment:  Lumbar radiculopathy  -     Place in Outpatient; Standing  -     Vital signs; Standing  -     Verify informed consent; Standing  -     Notify physician ; Standing  -     Notify physician ; Standing  -     Notify physician (specify); Standing  -     Diet NPO; Standing  -     alprazolam ODT dissolvable tablet 1 mg    Other orders  -     IP VTE LOW RISK PATIENT; Standing

## 2025-01-15 ENCOUNTER — PATIENT MESSAGE (OUTPATIENT)
Dept: PAIN MEDICINE | Facility: CLINIC | Age: 65
End: 2025-01-15
Payer: COMMERCIAL

## 2025-01-16 ENCOUNTER — OFFICE VISIT (OUTPATIENT)
Dept: PAIN MEDICINE | Facility: CLINIC | Age: 65
End: 2025-01-16
Payer: COMMERCIAL

## 2025-01-16 VITALS
SYSTOLIC BLOOD PRESSURE: 138 MMHG | BODY MASS INDEX: 34.31 KG/M2 | DIASTOLIC BLOOD PRESSURE: 64 MMHG | HEART RATE: 56 BPM | HEIGHT: 61 IN | WEIGHT: 181.75 LBS

## 2025-01-16 DIAGNOSIS — M51.361 DEGENERATION OF INTERVERTEBRAL DISC OF LUMBAR REGION WITH LOWER EXTREMITY PAIN: ICD-10-CM

## 2025-01-16 DIAGNOSIS — M54.16 LUMBAR RADICULOPATHY: Primary | ICD-10-CM

## 2025-01-16 DIAGNOSIS — M54.12 CERVICAL RADICULOPATHY: ICD-10-CM

## 2025-01-16 DIAGNOSIS — M51.360 DEGENERATION OF INTERVERTEBRAL DISC OF LUMBAR REGION WITH DISCOGENIC BACK PAIN: ICD-10-CM

## 2025-01-16 PROCEDURE — 3075F SYST BP GE 130 - 139MM HG: CPT | Mod: CPTII,S$GLB,, | Performed by: ANESTHESIOLOGY

## 2025-01-16 PROCEDURE — 3078F DIAST BP <80 MM HG: CPT | Mod: CPTII,S$GLB,, | Performed by: ANESTHESIOLOGY

## 2025-01-16 PROCEDURE — 1159F MED LIST DOCD IN RCRD: CPT | Mod: CPTII,S$GLB,, | Performed by: ANESTHESIOLOGY

## 2025-01-16 PROCEDURE — 99999 PR PBB SHADOW E&M-EST. PATIENT-LVL III: CPT | Mod: PBBFAC,,, | Performed by: ANESTHESIOLOGY

## 2025-01-16 PROCEDURE — 3008F BODY MASS INDEX DOCD: CPT | Mod: CPTII,S$GLB,, | Performed by: ANESTHESIOLOGY

## 2025-01-16 PROCEDURE — 99213 OFFICE O/P EST LOW 20 MIN: CPT | Mod: S$GLB,,, | Performed by: ANESTHESIOLOGY

## 2025-01-16 RX ORDER — HYDROCODONE BITARTRATE AND ACETAMINOPHEN 7.5; 325 MG/1; MG/1
1 TABLET ORAL EVERY 12 HOURS PRN
Qty: 60 TABLET | Refills: 0 | Status: SHIPPED | OUTPATIENT
Start: 2025-01-19 | End: 2025-02-18

## 2025-01-16 RX ORDER — HYDROCODONE BITARTRATE AND ACETAMINOPHEN 7.5; 325 MG/1; MG/1
1 TABLET ORAL EVERY 12 HOURS PRN
Qty: 60 TABLET | Refills: 0 | Status: SHIPPED | OUTPATIENT
Start: 2025-02-18 | End: 2025-03-20

## 2025-01-16 RX ORDER — HYDROCODONE BITARTRATE AND ACETAMINOPHEN 7.5; 325 MG/1; MG/1
1 TABLET ORAL EVERY 12 HOURS PRN
Qty: 60 TABLET | Refills: 0 | Status: SHIPPED | OUTPATIENT
Start: 2025-03-20 | End: 2025-04-19

## 2025-01-16 NOTE — PROGRESS NOTES
This note was completed with dictation software and grammatical errors may exist.    CC:Back pain, neck pain    HPI: The patient is a 64-year-old woman with history of hypothyroidism, otherwise fairly healthy but a long history of scoliosis causing back pain, self-referred for continued back pain.  She is status post left L4/5 transforaminal injection on 12/20/2024 with 90% relief.  She had been having severe left lateral thigh pain but especially beginning at the left knee and had tried doing physical therapy and found out that her left knee pain was actually related to her back and not pathology of the knee itself.  Overall her neck, midback and low back pain are doing fairly well.  She continues to take pain medication with good relief.      Pain intervention history: She has been seeing Dr. Huffman for the last several years and has undergone epidural steroid injections and radiofrequency ablations with good relief.  According to her last pain management physician, she had undergone a right side T3, 4, 5, 6 medial branch radiofrequency ablation on 8/8/14 with good relief.  He had scheduled her for another one but did not complete this.  As far as medications she has been taking Hydrocodone 7.5/325 one to 2 times a day at most and gabapentin 300 mg at night. She is status post cervical epidural steroid injection on 10/19/15 with 50% relief of her neck pain. She is status post right L4 transforaminal epidural steroid injection on 11/23/15 with 100% relief.   She is status post right T3, 4, 5 and 6 medial branch radio frequency ablation on 2/5/16 with greater than 50% relief.  She is status post right L4 transforaminal epidural sterile injection on 6/14/16 with significant relief lasting only about a month.  She is status post C7-T1 cervical interlaminar epidural steroid injection on 7/14/16 with 30-40% relief.   She is status post L5/S1 interlaminar epidural steroidal injection to the right on 8/12/16 with  100% relief of her right leg pain.  She is status post C7-T1 cervical interlaminar epidural steroid injection on 12/9/16 with almost complete relief.  She is status post right T4, 5, 6 and 7 medial branch radiofrequency ablation on 5/30/17 with 100% relief.  She is status post C7-T1 cervical interlaminar epidural steroid injection on 6/27/17 with 80% relief.  She is status post L5/S1 interlaminar epidural steroid injection on a/7/17 with excellent relief lasting 2 weeks, now reporting 0% relief.  She is status post right L3, 4 and 5 medial branch radiofrequency ablation on 10/19/17 with 80% relief.   She is status post right T4, 5, 6 and 7 medial branch radiofrequency ablation on 12/14/17 with 100% relief.  She is status post C7-T1 cervical interlaminar epidural steroid injection on 3/16/18 with at least 80% relief.  She is status post lumbar epidural steroid injection at L5/S1 on 5/15/18 with 90% relief of her bilateral low back and right leg pain.  The she is status post right T4, 5, 6 and 7 medial branch radiofrequency ablation on 10/12/2018 with 100% relief.  She is status post L5/S1 interlaminar epidural steroid injection on 01/16/2019 with almost 100% relief of her low back pain. She is status post right T4, 5, 6 and 7 medial branch radiofrequency ablation on 06/04/2019 with 80% relief.  She is status post L5/S1 interlaminar epidural steroid injection on 03/22/2021 with 85-90% relief.   She is status post right T4, 5, 6, 7 medial branch radiofrequency ablation on 05/21/2021 with 85% relief. She is status post cervical JINA C7/T1 on 11/11/2021 with greater than 70% relief. She is status post right T4, 5, 6, 7 medial branch radiofrequency ablation on 06/30/2022 with 85% relief. She is status post L5/S1 interlaminar epidural steroid injection to the right on 11/18/2022 with 75% relief. She is status post L5/S1 JINA with spread to the right on 07/21/2023 with greater than 50% relief. She is status post right T4, 5,  6, 7 medial branch radiofrequency ablation on 10/12/2023 with 80-85% relief.    She is status post L5/S1 interlaminar epidural steroid injection on 12/06/2023 with 90% relief lasting 6 weeks.   She is status post L5/S1 interlaminar epidural steroid injection on 02/08/2024 with 90% relief.    She is status post left L4/5 transforaminal epidural steroid injection on 06/14/2024 with 90% relief.She is status post C7-T1 interlaminar epidural steroid injection on 09/27/2024 with 90% relief.     Spine surgeries:  None    Antineuropathics:  Lidoderm 5%  NSAIDs:  Duexis 800 up to TID  Physical therapy:  She has consistently been in physical therapy, reports exercise, dry needling with at least temporary relief  Antidepressants:  Muscle relaxers:  Opioids:  Hydrocodone 7.5/325 twice daily  Antiplatelets/Anticoagulants:    ROS: She reports fatigability, headaches, hypothyroidism, joint stiffness, back pain, difficulty sleeping and anxiety.  Balance of review of systems is negative.      Past Medical History:   Diagnosis Date    Anxiety 08/25/2015    Arthritis     BRCA1 negative     BRCA2 negative     Celiac disease     Cervical spondylosis     Chronic back pain     DDD (degenerative disc disease), lumbar     Difficult intubation 03/2016    anterior larynx, pt with metal dental appliance, unable to do glidescope, used LMA    Dysautonomia     Encounter for blood transfusion     Facet arthropathy, thoracic     Hormone replacement therapy (HRT)     Insomnia 08/25/2015    Neck pain     PONV (postoperative nausea and vomiting)     Right foot pain     Scoliosis     Sjogren's syndrome     Snoring     uses cpap, states not ROSSY    Unspecified hypothyroidism 08/25/2015     Past Surgical History:   Procedure Laterality Date    BILATERAL OOPHORECTOMY  2004    BREAST BIOPSY Left 2010    Benign    CHOLECYSTECTOMY  2010    COLONOSCOPY  2016    [facility]    Epidural Steroid Injection      Pain managment, at another facility, cervical, thoracic     Epidural Steroid injection      Pain management, cervical    EPIDURAL STEROID INJECTION INTO CERVICAL SPINE N/A 11/11/2021    Procedure: Injection-steroid-epidural-cervical, C7/T1 interlaminer;  Surgeon: Floyd Coleman MD;  Location: [facility] OR;  Service: Pain Management;  Laterality: N/A;    EPIDURAL STEROID INJECTION INTO CERVICAL SPINE N/A 9/27/2024    Procedure: Injection-steroid-epidural-cervical    C7/T1;  Surgeon: Floyd Coleman MD;  Location: [facility] OR;  Service: Pain Management;  Laterality: N/A;    EPIDURAL STEROID INJECTION INTO LUMBAR SPINE Right 01/16/2019    Procedure: Injection-steroid-epidural-lumbar L5/S1;  Surgeon: Floyd Coleman MD;  Location: [facility] OR;  Service: Pain Management;  Laterality: Right;  to right    EPIDURAL STEROID INJECTION INTO LUMBAR SPINE N/A 05/14/2019    Procedure: Injection-steroid-epidural-lumbar;  Surgeon: Floyd Coleman MD;  Location: [facility] OR;  Service: Pain Management;  Laterality: N/A;  L5/S1 interlaminar    EPIDURAL STEROID INJECTION INTO LUMBAR SPINE N/A 03/22/2021    Procedure: Injection-steroid-epidural-lumbar L5/S1 interlaminer;  Surgeon: Floyd Coleman MD;  Location: [facility] OR;  Service: Pain Management;  Laterality: N/A;    EPIDURAL STEROID INJECTION INTO LUMBAR SPINE N/A 11/18/2022    Procedure: Injection-steroid-epidural-lumbar L5/S1 to right;  Surgeon: Floyd Coleman MD;  Location: [facility] OR;  Service: Pain Management;  Laterality: N/A;    EPIDURAL STEROID INJECTION INTO LUMBAR SPINE N/A 07/21/2023    Procedure: Injection-steroid-epidural-lumbar Interlaminar L5/S1 to the right;  Surgeon: Floyd Coleman MD;  Location: [facility] OR;  Service: Pain Management;  Laterality: N/A;    EPIDURAL STEROID INJECTION INTO LUMBAR SPINE N/A 12/06/2023    Procedure: Injection-steroid-epidural-lumbar     L5/S1;  Surgeon: Floyd Coleman MD;  Location: [facility] OR;  Service: Pain Management;  Laterality: N/A;    EPIDURAL STEROID INJECTION INTO LUMBAR  SPINE N/A 02/08/2024    Procedure: Injection-steroid-epidural-lumbar     L5/S1;  Surgeon: Floyd Coleman MD;  Location: [facility] OR;  Service: Pain Management;  Laterality: N/A;    HYSTERECTOMY  2004    benign    MOUTH SURGERY      Braces, with temporary metal implants in upper gum    OOPHORECTOMY  2004    w/ hysterectomy    RADIOFREQUENCY ABLATION  02/2016    thoracic nerve    RADIOFREQUENCY ABLATION Right 06/04/2019    Procedure: Radiofrequency Ablation T4,5,6,7;  Surgeon: Floyd Coleman MD;  Location: [facility] OR;  Service: Pain Management;  Laterality: Right;    RADIOFREQUENCY ABLATION Right 05/21/2021    Procedure: RADIOFREQUENCY ABLATION,THORACIC  T4,T5,T6, T7;  Surgeon: Floyd Coleman MD;  Location: [facility] OR;  Service: Pain Management;  Laterality: Right;    RADIOFREQUENCY ABLATION Right 06/30/2022    Procedure: Radiofrequency Ablation Thoracic T4, T5, T6, T7;  Surgeon: Floyd Coleman MD;  Location: [facility] OR;  Service: Pain Management;  Laterality: Right;    RADIOFREQUENCY ABLATION Right 10/12/2023    Procedure: RADIOFREQUENCY ABLATION,THORACIC Right T4, T5, T6, T7;  Surgeon: Floyd Coleman MD;  Location: [facility] OR;  Service: Pain Management;  Laterality: Right;  THORACIC Right T4, T5, T6, T7    RADIOFREQUENCY ABLATION Right 7/29/2024    Procedure: RADIOFREQUENCY ABLATION,THORACIC     T4,5,6,7;  Surgeon: Floyd Coleman MD;  Location: [facility] OR;  Service: Pain Management;  Laterality: Right;    TONSILLECTOMY      TRANSFORAMINAL EPIDURAL INJECTION OF STEROID Left 06/14/2024    Procedure: Injection,steroid,epidural,transforaminal approach    L4/5;  Surgeon: Floyd Coleman MD;  Location: [facility] OR;  Service: Pain Management;  Laterality: Left;    TRANSFORAMINAL EPIDURAL INJECTION OF STEROID Left 12/20/2024    Procedure: Injection,steroid,epidural,transforaminal approach  L4/5;  Surgeon: Floyd Coleman MD;  Location: [facility] OR;  Service: Pain Management;  Laterality: Left;  normal     "VULVA SURGERY  03/2016     Social History     Socioeconomic History    Marital status:    Tobacco Use    Smoking status: Never    Smokeless tobacco: Never   Substance and Sexual Activity    Alcohol use: No    Drug use: No    Sexual activity: Yes     Partners: Male     Social Drivers of Health     Financial Resource Strain: Low Risk  (2/27/2024)    Overall Financial Resource Strain (CARDIA)     Difficulty of Paying Living Expenses: Not hard at all   Food Insecurity: No Food Insecurity (2/27/2024)    Hunger Vital Sign     Worried About Running Out of Food in the Last Year: Never true     Ran Out of Food in the Last Year: Never true   Transportation Needs: No Transportation Needs (2/27/2024)    PRAPARE - Transportation     Lack of Transportation (Medical): No     Lack of Transportation (Non-Medical): No   Physical Activity: Insufficiently Active (2/27/2024)    Exercise Vital Sign     Days of Exercise per Week: 3 days     Minutes of Exercise per Session: 20 min   Stress: Stress Concern Present (2/27/2024)    [employer] [place] [employer] - Occupational Stress Questionnaire     Feeling of Stress : Rather much   Housing Stability: Unknown (2/27/2024)    Housing Stability Vital Sign     Unable to Pay for Housing in the Last Year: No     Unstable Housing in the Last Year: No      She is a  at [employer].    Medications/Allergies: See med card    Vitals:    01/16/25 0904   BP: 138/64   Pulse: (!) 56   Weight: 82.5 kg (181 lb 12.3 oz)   Height: 5' 1" (1.549 m)   PainSc:   4   PainLoc: Back           1/16/2025     9:05 AM 9/30/2024     1:04 PM 8/22/2024     9:42 AM   Last 3 PDI Scores   Pain Disability Index (PDI) 28 30 31     Body mass index is 34.35 kg/m².        Physical exam:  Gen: A and O x3, pleasant, well-groomed  Skin: No rashes or obvious lesions  HEENT: PERRLA, no obvious deformities on ears or in canals. Trachea midline.  CVS: Regular rate and rhythm, normal palpable pulses.  Resp:No " increased work of breathing, symmetrical chest rise.  Abdomen: Soft, NT/ND.  Musculoskeletal:No antalgic gait.      Neuro:  Upper extremities: 5/5 strength bilaterally.  Lower extremities: 5/5 strength bilaterally.    Reflexes: Patellar 0+, Achilles 0+ bilaterally.  Upper extremity reflexes 2+ bilaterally equal.  Sensory:  Intact and symmetrical to light touch and pinprick in L2-S1 dermatomes bilaterally.     Cervical spine exam: Range of motion is mildly reduced with flexion, extension and lateral rotation with increased right neck pain during right lateral rotation and extension.  Myofascial exam:  Exquisite tenderness to palpation to the right   Cervical paraspinous, trapezius and scapular muscles.    Imagin/16/15 Xray Scoliosis survey: There is thoracic dextroscoliosis with mild lumbar compensatory levoscoliosis. No structural abnormalities are evident. Diffuse spondylosis noted in the cervical, thoracic and to lesser extent lumbar spine. No fractures are identified. There is slight increased kyphotic curvature of the thoracic spine with alignment being otherwise normal. Measurements and evaluation are limited due to underpenetration. Land angle measures approximately in the thoracic spine is of approximately 17.0 degrees and for the lumbar spine 17.4 degrees. Soft tissues are unremarkable. IMPRESSION: 1. S shaped scoliotic curvature of the thoracolumbar spine with Land angle of approximately 17 degrees. 2. Diffuse spondylosis.    MRI report 12: Report notes that there is dextroconvex rotary curvature of the thoracic spine.  Posterior alignment is maintained.  There are scattered vertebral body hemangiomas.  There is minimal posterior disc bulges noted at T5/6, T6/7, T7/8 and T8/9.  There is no central spinal stenosis or neural foraminal narrowing.    MRI right shoulder 11: Minor distal subscapularis tendinosis.  Negative for full-thickness or significant partial-thickness rotator cuff tendon  tear.  There is a small amount of subacromial subdeltoid bursal fluid which can be seen with recent injection or mild bursitis.    Cervical spine MRI 6/29/16  At C2-C3, the minimal interstitial excision covers a very mild broad-based disc bulge most prominent centrally but does not deform the ventral cord.  There is no canal or foraminal stenosis.  At C3-C4, there is mildly decreased disc height with a broad-based disc bulge most prominent centrally.  This minimally contact the ventral cord without significant deformity.  The canal is widely patent.  There is uncovertebral hypertrophy and facet arthropathy, but the foramina are patent.  At C4-C5, there is decreased disc height with a broad disc bulge-marginal osteophyte complex that mildly lateralizes to the left and blends imperceptibly with right greater than left uncovertebral hypertrophy.  With ligamentum hypertrophy, there is no significant central canal stenosis.  Uncovertebral hypertrophy and facet arthropathy are causing mild left and moderate right foraminal stenoses.  At C5-C6, there is a broad-based disc bulge and osteophyte complex most prominent centrally.  There is also ligamentum flavum hypertrophy present, but the canal is patent.  There is mild left foraminal stenosis.  The right foramen is patent.  At C6-C7, there is disc desiccation with a minimal broad-based disc bulge most prominent in the right paracentral canal.  The canal is widely patent.  There is mild left foraminal stenosis.  At C7-T1, there is no significant disc bulge, protrusion, or herniation/extrusion.  The canal and foramina are widely patent.    Hip x-rays 9/22/17  AP view of the pelvis and frog leg views of both hips were obtained. No evidence of acute displaced fracture or dislocation is visualized.  No radiopaque foreign bodies are visualized. The bilateral superior joint spaces are grossly maintained. Mild bilateral sacroiliac joint degenerative changes are seen including  subchondral sclerosis and small marginal osteophytes. Mild to moderate symphyseal degenerative changes are seen. There is a slightly expansile cortically-based focus along the lateral proximal right femoral metadiaphysis. This could reflect a sessile osteochondroma, but correlation with MRI of the right hip is recommended.    8/18/19 MRI L-spine:  T12-L1: No disc herniation or significant posterior osteophytic ridging.  No significant spinal canal or foraminal stenosis.   L1-L2: No disc herniation or significant posterior osteophytic ridging.  Minimal left facet hypertrophy.  No significant spinal canal or foraminal stenosis.   L2-L3: No disc herniation or significant posterior osteophytic ridging.  Minimal left facet hypertrophy.  No significant spinal canal or foraminal stenosis.   L3-L4: No disc herniation or significant posterior osteophytic ridging.  Minimal bilateral facet hypertrophy.  No significant spinal canal or foraminal stenosis.   L4-L5: Mild disc bulge.  Mild bilateral facet hypertrophy.  Mild ligamentum flavum thickening.  Minimal narrowing of the bilateral lateral recesses. No significant overall spinal canal stenosis. Mild bilateral foraminal stenosis.   L5-S1: Minimal disc bulge contained in the ventral epidural fat.  Mild bilateral facet hypertrophy.  No significant spinal canal or foraminal stenosis.    Assessment:  The patient is a 64-year-old woman with history of hypothyroidism, otherwise fairly healthy but a long history of scoliosis causing back pain, self-referred for continued back pain.    1. Lumbar radiculopathy        2. Degeneration of intervertebral disc of lumbar region with lower extremity pain        3. Cervical radiculopathy        4. DDD (degenerative disc disease), lumbar  HYDROcodone-acetaminophen (NORCO) 7.5-325 mg per tablet    HYDROcodone-acetaminophen (NORCO) 7.5-325 mg per tablet    HYDROcodone-acetaminophen (NORCO) 7.5-325 mg per tablet            Plan:  1. She seems to  be doing well, I will have her follow-up for regularly scheduled three-month visit.  I have refilled her hydrocodone for the next 3 months.

## 2025-02-20 DIAGNOSIS — G47.00 INSOMNIA, UNSPECIFIED TYPE: ICD-10-CM

## 2025-02-20 RX ORDER — LEVOTHYROXINE SODIUM 125 UG/1
1 CAPSULE ORAL
Qty: 90 CAPSULE | Refills: 3 | Status: SHIPPED | OUTPATIENT
Start: 2025-02-20

## 2025-02-20 NOTE — TELEPHONE ENCOUNTER
LOV:12/19/2024  Last refill: 03/08/2024    Would like for you to take over this refill if you are okay with it.

## 2025-02-24 RX ORDER — ESZOPICLONE 2 MG/1
2 TABLET, FILM COATED ORAL NIGHTLY
Qty: 30 TABLET | Refills: 5 | Status: SHIPPED | OUTPATIENT
Start: 2025-02-24

## 2025-03-19 ENCOUNTER — PATIENT MESSAGE (OUTPATIENT)
Dept: RHEUMATOLOGY | Facility: CLINIC | Age: 65
End: 2025-03-19
Payer: COMMERCIAL

## 2025-04-01 ENCOUNTER — OFFICE VISIT (OUTPATIENT)
Dept: PAIN MEDICINE | Facility: CLINIC | Age: 65
End: 2025-04-01
Payer: COMMERCIAL

## 2025-04-01 ENCOUNTER — TELEPHONE (OUTPATIENT)
Dept: PAIN MEDICINE | Facility: CLINIC | Age: 65
End: 2025-04-01
Payer: COMMERCIAL

## 2025-04-01 VITALS
WEIGHT: 180 LBS | BODY MASS INDEX: 33.99 KG/M2 | SYSTOLIC BLOOD PRESSURE: 144 MMHG | DIASTOLIC BLOOD PRESSURE: 82 MMHG | HEIGHT: 61 IN | HEART RATE: 59 BPM

## 2025-04-01 DIAGNOSIS — M51.360 DEGENERATION OF INTERVERTEBRAL DISC OF LUMBAR REGION WITH DISCOGENIC BACK PAIN: ICD-10-CM

## 2025-04-01 DIAGNOSIS — M54.12 CERVICAL RADICULOPATHY: ICD-10-CM

## 2025-04-01 DIAGNOSIS — Z79.891 OPIOID CONTRACT EXISTS: ICD-10-CM

## 2025-04-01 DIAGNOSIS — M54.16 LUMBAR RADICULOPATHY: ICD-10-CM

## 2025-04-01 DIAGNOSIS — M47.814 THORACIC SPONDYLOSIS: Primary | ICD-10-CM

## 2025-04-01 PROCEDURE — 99999 PR PBB SHADOW E&M-EST. PATIENT-LVL IV: CPT | Mod: PBBFAC,,, | Performed by: PHYSICIAN ASSISTANT

## 2025-04-01 PROCEDURE — 1159F MED LIST DOCD IN RCRD: CPT | Mod: CPTII,S$GLB,, | Performed by: PHYSICIAN ASSISTANT

## 2025-04-01 PROCEDURE — 3077F SYST BP >= 140 MM HG: CPT | Mod: CPTII,S$GLB,, | Performed by: PHYSICIAN ASSISTANT

## 2025-04-01 PROCEDURE — 1160F RVW MEDS BY RX/DR IN RCRD: CPT | Mod: CPTII,S$GLB,, | Performed by: PHYSICIAN ASSISTANT

## 2025-04-01 PROCEDURE — 3079F DIAST BP 80-89 MM HG: CPT | Mod: CPTII,S$GLB,, | Performed by: PHYSICIAN ASSISTANT

## 2025-04-01 PROCEDURE — 99214 OFFICE O/P EST MOD 30 MIN: CPT | Mod: S$GLB,,, | Performed by: PHYSICIAN ASSISTANT

## 2025-04-01 PROCEDURE — 3008F BODY MASS INDEX DOCD: CPT | Mod: CPTII,S$GLB,, | Performed by: PHYSICIAN ASSISTANT

## 2025-04-01 NOTE — TELEPHONE ENCOUNTER
Physician - Dr Coleman    Type of Procedure/Injection - Thoracic Radiofrequency Ablation T4, 5, 6, 7      Laterality - Right      Priority - Normal      Anxiolysis- RNIV      Need to hold medication - No      N/A    None      Clearance needed - No      Follow up - 2 week

## 2025-04-02 RX ORDER — HYDROCODONE BITARTRATE AND ACETAMINOPHEN 7.5; 325 MG/1; MG/1
1 TABLET ORAL EVERY 12 HOURS PRN
Qty: 60 TABLET | Refills: 0 | Status: SHIPPED | OUTPATIENT
Start: 2025-05-24 | End: 2025-06-23

## 2025-04-02 RX ORDER — HYDROCODONE BITARTRATE AND ACETAMINOPHEN 7.5; 325 MG/1; MG/1
1 TABLET ORAL EVERY 12 HOURS PRN
Qty: 60 TABLET | Refills: 0 | Status: SHIPPED | OUTPATIENT
Start: 2025-04-24 | End: 2025-05-24

## 2025-04-02 RX ORDER — HYDROCODONE BITARTRATE AND ACETAMINOPHEN 7.5; 325 MG/1; MG/1
1 TABLET ORAL EVERY 12 HOURS PRN
Qty: 60 TABLET | Refills: 0 | Status: SHIPPED | OUTPATIENT
Start: 2025-06-23 | End: 2025-07-23

## 2025-04-04 ENCOUNTER — PATIENT MESSAGE (OUTPATIENT)
Dept: PAIN MEDICINE | Facility: CLINIC | Age: 65
End: 2025-04-04
Payer: COMMERCIAL

## 2025-04-04 DIAGNOSIS — M47.814 THORACIC SPONDYLOSIS: Primary | ICD-10-CM

## 2025-04-04 RX ORDER — SODIUM CHLORIDE, SODIUM LACTATE, POTASSIUM CHLORIDE, CALCIUM CHLORIDE 600; 310; 30; 20 MG/100ML; MG/100ML; MG/100ML; MG/100ML
INJECTION, SOLUTION INTRAVENOUS CONTINUOUS
OUTPATIENT
Start: 2025-04-04

## 2025-04-08 ENCOUNTER — PATIENT MESSAGE (OUTPATIENT)
Dept: PAIN MEDICINE | Facility: CLINIC | Age: 65
End: 2025-04-08
Payer: COMMERCIAL

## 2025-04-10 NOTE — PROGRESS NOTES

## 2025-04-10 NOTE — H&P (VIEW-ONLY)
This note was completed with dictation software and grammatical errors may exist.    CC:Back pain, neck pain    HPI: The patient is a 64-year-old woman with history of hypothyroidism, otherwise fairly healthy but a long history of scoliosis causing back pain, self-referred for continued back pain.  She returns in follow-up today with worsening pain.  Currently her right thoracic back pain is greater than her low back and leg pain, greater than her neck pain.  She would like to repeat thoracic radiofrequency ablation.  In regards to her neck pain this is bilateral with hand numbness at night.  Currently her low back pain is radiating into the left buttock and left anterolateral thigh an into her knee.  She also feels the pain in the bottom of her foot.  It is intermittent, worse with sitting, driving or prolonged walking.  We also discussed the difficulty she has had getting her compounding pain cream from [facility].  They have had a  issue and she would like to see if we have a local pharmacy that will compound the medication for her.  She reports significant relief with this.  She also continues to take hydrocodone with relief of her symptoms.    Pain intervention history: She has been seeing Dr. Huffman for the last several years and has undergone epidural steroid injections and radiofrequency ablations with good relief.  According to her last pain management physician, she had undergone a right side T3, 4, 5, 6 medial branch radiofrequency ablation on 8/8/14 with good relief.  He had scheduled her for another one but did not complete this.  As far as medications she has been taking Hydrocodone 7.5/325 one to 2 times a day at most and gabapentin 300 mg at night. She is status post cervical epidural steroid injection on 10/19/15 with 50% relief of her neck pain. She is status post right L4 transforaminal epidural steroid injection on 11/23/15 with 100% relief.   She is status  post right T3, 4, 5 and 6 medial branch radio frequency ablation on 2/5/16 with greater than 50% relief.  She is status post right L4 transforaminal epidural sterile injection on 6/14/16 with significant relief lasting only about a month.  She is status post C7-T1 cervical interlaminar epidural steroid injection on 7/14/16 with 30-40% relief.   She is status post L5/S1 interlaminar epidural steroidal injection to the right on 8/12/16 with 100% relief of her right leg pain.  She is status post C7-T1 cervical interlaminar epidural steroid injection on 12/9/16 with almost complete relief.  She is status post right T4, 5, 6 and 7 medial branch radiofrequency ablation on 5/30/17 with 100% relief.  She is status post C7-T1 cervical interlaminar epidural steroid injection on 6/27/17 with 80% relief.  She is status post L5/S1 interlaminar epidural steroid injection on a/7/17 with excellent relief lasting 2 weeks, now reporting 0% relief.  She is status post right L3, 4 and 5 medial branch radiofrequency ablation on 10/19/17 with 80% relief.   She is status post right T4, 5, 6 and 7 medial branch radiofrequency ablation on 12/14/17 with 100% relief.  She is status post C7-T1 cervical interlaminar epidural steroid injection on 3/16/18 with at least 80% relief.  She is status post lumbar epidural steroid injection at L5/S1 on 5/15/18 with 90% relief of her bilateral low back and right leg pain.  The she is status post right T4, 5, 6 and 7 medial branch radiofrequency ablation on 10/12/2018 with 100% relief.  She is status post L5/S1 interlaminar epidural steroid injection on 01/16/2019 with almost 100% relief of her low back pain. She is status post right T4, 5, 6 and 7 medial branch radiofrequency ablation on 06/04/2019 with 80% relief.  She is status post L5/S1 interlaminar epidural steroid injection on 03/22/2021 with 85-90% relief.   She is status post right T4, 5, 6, 7 medial branch radiofrequency ablation on 05/21/2021  with 85% relief. She is status post cervical JINA C7/T1 on 11/11/2021 with greater than 70% relief. She is status post right T4, 5, 6, 7 medial branch radiofrequency ablation on 06/30/2022 with 85% relief. She is status post L5/S1 interlaminar epidural steroid injection to the right on 11/18/2022 with 75% relief. She is status post L5/S1 JINA with spread to the right on 07/21/2023 with greater than 50% relief. She is status post right T4, 5, 6, 7 medial branch radiofrequency ablation on 10/12/2023 with 80-85% relief.    She is status post L5/S1 interlaminar epidural steroid injection on 12/06/2023 with 90% relief lasting 6 weeks.   She is status post L5/S1 interlaminar epidural steroid injection on 02/08/2024 with 90% relief.    She is status post left L4/5 transforaminal epidural steroid injection on 06/14/2024 with 90% relief.She is status post C7-T1 interlaminar epidural steroid injection on 09/27/2024 with 90% relief.     Spine surgeries:  None    Antineuropathics:  Lidoderm 5%  NSAIDs:  Duexis 800 up to TID  Physical therapy:  She has consistently been in physical therapy, reports exercise, dry needling with at least temporary relief  Antidepressants:  Muscle relaxers:  Opioids:  Hydrocodone 7.5/325 twice daily  Antiplatelets/Anticoagulants:    ROS: She reports fatigability, headaches, hypothyroidism, joint stiffness, back pain, difficulty sleeping and anxiety.  Balance of review of systems is negative.      Past Medical History:   Diagnosis Date    Anxiety 08/25/2015    Arthritis     BRCA1 negative     BRCA2 negative     Celiac disease     Cervical spondylosis     Chronic back pain     DDD (degenerative disc disease), lumbar     Difficult intubation 03/2016    anterior larynx, pt with metal dental appliance, unable to do glidescope, used LMA    Dysautonomia     Encounter for blood transfusion     Facet arthropathy, thoracic     Hormone replacement therapy (HRT)     Insomnia 08/25/2015    Neck pain     PONV  (postoperative nausea and vomiting)     Right foot pain     Scoliosis     Sjogren's syndrome     Snoring     uses cpap, states not ROSSY    Unspecified hypothyroidism 08/25/2015     Past Surgical History:   Procedure Laterality Date    BILATERAL OOPHORECTOMY  2004    BREAST BIOPSY Left 2010    Benign    CHOLECYSTECTOMY  2010    COLONOSCOPY  2016    [facility]    Epidural Steroid Injection      Pain managment, at another facility, cervical, thoracic    Epidural Steroid injection      Pain management, cervical    EPIDURAL STEROID INJECTION INTO CERVICAL SPINE N/A 11/11/2021    Procedure: Injection-steroid-epidural-cervical, C7/T1 interlaminer;  Surgeon: Floyd Coleman MD;  Location: [facility] OR;  Service: Pain Management;  Laterality: N/A;    EPIDURAL STEROID INJECTION INTO CERVICAL SPINE N/A 9/27/2024    Procedure: Injection-steroid-epidural-cervical    C7/T1;  Surgeon: Floyd Coleman MD;  Location: [facility] OR;  Service: Pain Management;  Laterality: N/A;    EPIDURAL STEROID INJECTION INTO LUMBAR SPINE Right 01/16/2019    Procedure: Injection-steroid-epidural-lumbar L5/S1;  Surgeon: Floyd Coleman MD;  Location: [facility] OR;  Service: Pain Management;  Laterality: Right;  to right    EPIDURAL STEROID INJECTION INTO LUMBAR SPINE N/A 05/14/2019    Procedure: Injection-steroid-epidural-lumbar;  Surgeon: Floyd Coleman MD;  Location: [facility] OR;  Service: Pain Management;  Laterality: N/A;  L5/S1 interlaminar    EPIDURAL STEROID INJECTION INTO LUMBAR SPINE N/A 03/22/2021    Procedure: Injection-steroid-epidural-lumbar L5/S1 interlaminer;  Surgeon: Floyd Coleman MD;  Location: [facility] OR;  Service: Pain Management;  Laterality: N/A;    EPIDURAL STEROID INJECTION INTO LUMBAR SPINE N/A 11/18/2022    Procedure: Injection-steroid-epidural-lumbar L5/S1 to right;  Surgeon: Floyd Coleman MD;  Location: [facility] OR;  Service: Pain Management;  Laterality: N/A;    EPIDURAL STEROID INJECTION INTO LUMBAR SPINE N/A 07/21/2023     Procedure: Injection-steroid-epidural-lumbar Interlaminar L5/S1 to the right;  Surgeon: Floyd Coleman MD;  Location: [facility] OR;  Service: Pain Management;  Laterality: N/A;    EPIDURAL STEROID INJECTION INTO LUMBAR SPINE N/A 12/06/2023    Procedure: Injection-steroid-epidural-lumbar     L5/S1;  Surgeon: Floyd Coleman MD;  Location: [facility] OR;  Service: Pain Management;  Laterality: N/A;    EPIDURAL STEROID INJECTION INTO LUMBAR SPINE N/A 02/08/2024    Procedure: Injection-steroid-epidural-lumbar     L5/S1;  Surgeon: Floyd Coleman MD;  Location: [facility] OR;  Service: Pain Management;  Laterality: N/A;    HYSTERECTOMY  2004    benign    MOUTH SURGERY      Braces, with temporary metal implants in upper gum    OOPHORECTOMY  2004    w/ hysterectomy    RADIOFREQUENCY ABLATION  02/2016    thoracic nerve    RADIOFREQUENCY ABLATION Right 06/04/2019    Procedure: Radiofrequency Ablation T4,5,6,7;  Surgeon: Floyd Coleman MD;  Location: [facility] OR;  Service: Pain Management;  Laterality: Right;    RADIOFREQUENCY ABLATION Right 05/21/2021    Procedure: RADIOFREQUENCY ABLATION,THORACIC  T4,T5,T6, T7;  Surgeon: Floyd Coleman MD;  Location: [facility] OR;  Service: Pain Management;  Laterality: Right;    RADIOFREQUENCY ABLATION Right 06/30/2022    Procedure: Radiofrequency Ablation Thoracic T4, T5, T6, T7;  Surgeon: Floyd Coleman MD;  Location: [facility] OR;  Service: Pain Management;  Laterality: Right;    RADIOFREQUENCY ABLATION Right 10/12/2023    Procedure: RADIOFREQUENCY ABLATION,THORACIC Right T4, T5, T6, T7;  Surgeon: Floyd Coleman MD;  Location: [facility] OR;  Service: Pain Management;  Laterality: Right;  THORACIC Right T4, T5, T6, T7    RADIOFREQUENCY ABLATION Right 7/29/2024    Procedure: RADIOFREQUENCY ABLATION,THORACIC     T4,5,6,7;  Surgeon: Floyd Coleman MD;  Location: [facility] OR;  Service: Pain Management;  Laterality: Right;    TONSILLECTOMY      TRANSFORAMINAL EPIDURAL INJECTION OF  "STEROID Left 06/14/2024    Procedure: Injection,steroid,epidural,transforaminal approach    L4/5;  Surgeon: Floyd Coleman MD;  Location: [facility] OR;  Service: Pain Management;  Laterality: Left;    TRANSFORAMINAL EPIDURAL INJECTION OF STEROID Left 12/20/2024    Procedure: Injection,steroid,epidural,transforaminal approach  L4/5;  Surgeon: Floyd Coleman MD;  Location: [facility] OR;  Service: Pain Management;  Laterality: Left;  normal    VULVA SURGERY  03/2016     Social History     Socioeconomic History    Marital status:    Tobacco Use    Smoking status: Never    Smokeless tobacco: Never   Substance and Sexual Activity    Alcohol use: No    Drug use: No    Sexual activity: Yes     Partners: Male     Social Drivers of Health     Financial Resource Strain: Low Risk  (2/27/2024)    Overall Financial Resource Strain (CARDIA)     Difficulty of Paying Living Expenses: Not hard at all   Food Insecurity: No Food Insecurity (2/27/2024)    Hunger Vital Sign     Worried About Running Out of Food in the Last Year: Never true     Ran Out of Food in the Last Year: Never true   Transportation Needs: No Transportation Needs (2/27/2024)    PRAPARE - Transportation     Lack of Transportation (Medical): No     Lack of Transportation (Non-Medical): No   Physical Activity: Insufficiently Active (2/27/2024)    Exercise Vital Sign     Days of Exercise per Week: 3 days     Minutes of Exercise per Session: 20 min   Stress: Stress Concern Present (2/27/2024)    [employer] - Occupational Stress Questionnaire     Feeling of Stress : Rather much   Housing Stability: Unknown (2/27/2024)    Housing Stability Vital Sign     Unable to Pay for Housing in the Last Year: No     Unstable Housing in the Last Year: No      She is a  at [employer].    Medications/Allergies: See med card    Vitals:    04/01/25 1518   BP: (!) 144/82   Pulse: (!) 59   Weight: 81.7 kg (180 lb 0.1 oz)   Height: 5' 1" " (1.549 m)   PainSc:   4   PainLoc: Back           2025     3:17 PM 2025     9:05 AM 2024     1:04 PM   Last 3 PDI Scores   Pain Disability Index (PDI) 38 28 30     Body mass index is 34.01 kg/m².        Physical exam:  Gen: A and O x3, pleasant, well-groomed  Skin: No rashes or obvious lesions  HEENT: PERRLA, no obvious deformities on ears or in canals. Trachea midline.  CVS: Regular rate and rhythm, normal palpable pulses.  Resp:No increased work of breathing, symmetrical chest rise.  Abdomen: Soft, NT/ND.  Musculoskeletal:No antalgic gait.      Neuro:  Upper extremities: 5/5 strength bilaterally.  Lower extremities: 5/5 strength bilaterally.    Reflexes: Patellar 0+, Achilles 0+ bilaterally.  Upper extremity reflexes 2+ bilaterally equal.  Sensory:  Intact and symmetrical to light touch and pinprick in L2-S1 dermatomes bilaterally.     Cervical spine exam: Range of motion is mildly reduced with flexion, extension and lateral rotation with increased right neck pain during right lateral rotation and extension.  Myofascial exam:  Exquisite tenderness to palpation to the right   Cervical paraspinous, trapezius and scapular muscles.    Imagin/16/15 Xray Scoliosis survey: There is thoracic dextroscoliosis with mild lumbar compensatory levoscoliosis. No structural abnormalities are evident. Diffuse spondylosis noted in the cervical, thoracic and to lesser extent lumbar spine. No fractures are identified. There is slight increased kyphotic curvature of the thoracic spine with alignment being otherwise normal. Measurements and evaluation are limited due to underpenetration. Land angle measures approximately in the thoracic spine is of approximately 17.0 degrees and for the lumbar spine 17.4 degrees. Soft tissues are unremarkable. IMPRESSION: 1. S shaped scoliotic curvature of the thoracolumbar spine with Land angle of approximately 17 degrees. 2. Diffuse spondylosis.    MRI report 12: Report notes  that there is dextroconvex rotary curvature of the thoracic spine.  Posterior alignment is maintained.  There are scattered vertebral body hemangiomas.  There is minimal posterior disc bulges noted at T5/6, T6/7, T7/8 and T8/9.  There is no central spinal stenosis or neural foraminal narrowing.    MRI right shoulder 6/24/11: Minor distal subscapularis tendinosis.  Negative for full-thickness or significant partial-thickness rotator cuff tendon tear.  There is a small amount of subacromial subdeltoid bursal fluid which can be seen with recent injection or mild bursitis.    Cervical spine MRI 6/29/16  At C2-C3, the minimal interstitial excision covers a very mild broad-based disc bulge most prominent centrally but does not deform the ventral cord.  There is no canal or foraminal stenosis.  At C3-C4, there is mildly decreased disc height with a broad-based disc bulge most prominent centrally.  This minimally contact the ventral cord without significant deformity.  The canal is widely patent.  There is uncovertebral hypertrophy and facet arthropathy, but the foramina are patent.  At C4-C5, there is decreased disc height with a broad disc bulge-marginal osteophyte complex that mildly lateralizes to the left and blends imperceptibly with right greater than left uncovertebral hypertrophy.  With ligamentum hypertrophy, there is no significant central canal stenosis.  Uncovertebral hypertrophy and facet arthropathy are causing mild left and moderate right foraminal stenoses.  At C5-C6, there is a broad-based disc bulge and osteophyte complex most prominent centrally.  There is also ligamentum flavum hypertrophy present, but the canal is patent.  There is mild left foraminal stenosis.  The right foramen is patent.  At C6-C7, there is disc desiccation with a minimal broad-based disc bulge most prominent in the right paracentral canal.  The canal is widely patent.  There is mild left foraminal stenosis.  At C7-T1, there is no  significant disc bulge, protrusion, or herniation/extrusion.  The canal and foramina are widely patent.    Hip x-rays 9/22/17  AP view of the pelvis and frog leg views of both hips were obtained. No evidence of acute displaced fracture or dislocation is visualized.  No radiopaque foreign bodies are visualized. The bilateral superior joint spaces are grossly maintained. Mild bilateral sacroiliac joint degenerative changes are seen including subchondral sclerosis and small marginal osteophytes. Mild to moderate symphyseal degenerative changes are seen. There is a slightly expansile cortically-based focus along the lateral proximal right femoral metadiaphysis. This could reflect a sessile osteochondroma, but correlation with MRI of the right hip is recommended.    8/18/19 MRI L-spine:  T12-L1: No disc herniation or significant posterior osteophytic ridging.  No significant spinal canal or foraminal stenosis.   L1-L2: No disc herniation or significant posterior osteophytic ridging.  Minimal left facet hypertrophy.  No significant spinal canal or foraminal stenosis.   L2-L3: No disc herniation or significant posterior osteophytic ridging.  Minimal left facet hypertrophy.  No significant spinal canal or foraminal stenosis.   L3-L4: No disc herniation or significant posterior osteophytic ridging.  Minimal bilateral facet hypertrophy.  No significant spinal canal or foraminal stenosis.   L4-L5: Mild disc bulge.  Mild bilateral facet hypertrophy.  Mild ligamentum flavum thickening.  Minimal narrowing of the bilateral lateral recesses. No significant overall spinal canal stenosis. Mild bilateral foraminal stenosis.   L5-S1: Minimal disc bulge contained in the ventral epidural fat.  Mild bilateral facet hypertrophy.  No significant spinal canal or foraminal stenosis.    Assessment:  The patient is a 64-year-old woman with history of hypothyroidism, otherwise fairly healthy but a long history of scoliosis causing back pain,  self-referred for continued back pain.    1. Thoracic spondylosis        2. Lumbar radiculopathy        3. Cervical radiculopathy        4. Opioid contract exists              Plan:  1. I will schedule the patient to repeat right T4, 5, 6, 7 medial branch radiofrequency ablation.  She had greater than 50% relief lasting greater than 6 months following the last procedure.    2. Dr. Coleman provided prescriptions for hydrocodone-acetaminophen 7.5/325 mg twice daily as needed for pain. I have reviewed the [place] Board of Pharmacy website and there are no abberancies.    3. I will have her follow-up 2 weeks after her procedure and at that time we will likely schedule a left L4/5 transforaminal epidural steroid injection.  We discussed following up 2 weeks after that to schedule a C7-T1 interlaminar epidural steroid injection.

## 2025-04-10 NOTE — TELEPHONE ENCOUNTER
It is ok to combine them if insurance covers.  They were  by the other pharmacy to get insurance coverage.

## 2025-04-10 NOTE — TELEPHONE ENCOUNTER
Spoke with Belkys at [facility]. The patient is request for both creams to be compounded into one and the pharmacy wants clarify that this is what the provider wants.

## 2025-04-10 NOTE — TELEPHONE ENCOUNTER
Spoke with patient and [facility] is out of pocket so she asked that we combine them. Spoke with [facility] and they will combine them

## 2025-04-21 ENCOUNTER — PATIENT MESSAGE (OUTPATIENT)
Dept: PAIN MEDICINE | Facility: CLINIC | Age: 65
End: 2025-04-21
Payer: COMMERCIAL

## 2025-04-22 NOTE — TELEPHONE ENCOUNTER
Spoke with preservice and [employer] does not require prior auth. Spoke with patient and rescheduled procedure for 4/25 and follow up for 2 wks after

## 2025-04-25 ENCOUNTER — HOSPITAL ENCOUNTER (OUTPATIENT)
Dept: RADIOLOGY | Facility: HOSPITAL | Age: 65
Discharge: HOME | End: 2025-04-25
Attending: ANESTHESIOLOGY | Admitting: ANESTHESIOLOGY
Payer: COMMERCIAL

## 2025-04-25 ENCOUNTER — HOSPITAL ENCOUNTER (OUTPATIENT)
Facility: HOSPITAL | Age: 65
Discharge: HOME | End: 2025-04-25
Attending: ANESTHESIOLOGY | Admitting: ANESTHESIOLOGY
Payer: COMMERCIAL

## 2025-04-25 VITALS
BODY MASS INDEX: 33.99 KG/M2 | SYSTOLIC BLOOD PRESSURE: 99 MMHG | WEIGHT: 180 LBS | HEIGHT: 61 IN | DIASTOLIC BLOOD PRESSURE: 63 MMHG | OXYGEN SATURATION: 97 % | HEART RATE: 52 BPM | TEMPERATURE: 98 F | RESPIRATION RATE: 16 BRPM

## 2025-04-25 DIAGNOSIS — M47.814 THORACIC SPONDYLOSIS: ICD-10-CM

## 2025-04-25 DIAGNOSIS — M54.9 MID BACK PAIN: ICD-10-CM

## 2025-04-25 PROCEDURE — 25000003 PHARM REV CODE 250: Mod: PO | Performed by: ANESTHESIOLOGY

## 2025-04-25 PROCEDURE — 64633 DESTROY CERV/THOR FACET JNT: CPT | Mod: PO,RT | Performed by: ANESTHESIOLOGY

## 2025-04-25 PROCEDURE — 64633 DESTROY CERV/THOR FACET JNT: CPT | Mod: RT,,, | Performed by: ANESTHESIOLOGY

## 2025-04-25 PROCEDURE — 64634 DESTROY C/TH FACET JNT ADDL: CPT | Mod: RT,,, | Performed by: ANESTHESIOLOGY

## 2025-04-25 PROCEDURE — 63600175 PHARM REV CODE 636 W HCPCS: Mod: PO | Performed by: ANESTHESIOLOGY

## 2025-04-25 PROCEDURE — 64634 DESTROY C/TH FACET JNT ADDL: CPT | Mod: PO,RT | Performed by: ANESTHESIOLOGY

## 2025-04-25 PROCEDURE — A4216 STERILE WATER/SALINE, 10 ML: HCPCS | Mod: PO | Performed by: ANESTHESIOLOGY

## 2025-04-25 RX ORDER — LIDOCAINE HYDROCHLORIDE 20 MG/ML
INJECTION, SOLUTION EPIDURAL; INFILTRATION; INTRACAUDAL; PERINEURAL
Status: DISCONTINUED | OUTPATIENT
Start: 2025-04-25 | End: 2025-04-25 | Stop reason: HOSPADM

## 2025-04-25 RX ORDER — METHYLPREDNISOLONE ACETATE 40 MG/ML
INJECTION, SUSPENSION INTRA-ARTICULAR; INTRALESIONAL; INTRAMUSCULAR; SOFT TISSUE
Status: DISCONTINUED | OUTPATIENT
Start: 2025-04-25 | End: 2025-04-25 | Stop reason: HOSPADM

## 2025-04-25 RX ORDER — MIDAZOLAM HYDROCHLORIDE 1 MG/ML
INJECTION INTRAMUSCULAR; INTRAVENOUS
Status: DISCONTINUED | OUTPATIENT
Start: 2025-04-25 | End: 2025-04-25 | Stop reason: HOSPADM

## 2025-04-25 RX ORDER — LIDOCAINE HYDROCHLORIDE 10 MG/ML
INJECTION, SOLUTION EPIDURAL; INFILTRATION; INTRACAUDAL; PERINEURAL
Status: DISCONTINUED | OUTPATIENT
Start: 2025-04-25 | End: 2025-04-25 | Stop reason: HOSPADM

## 2025-04-25 RX ORDER — SODIUM CHLORIDE 9 MG/ML
INJECTION, SOLUTION INTRAVENOUS CONTINUOUS
Status: DISCONTINUED | OUTPATIENT
Start: 2025-04-25 | End: 2025-04-25 | Stop reason: HOSPADM

## 2025-04-25 RX ORDER — SODIUM CHLORIDE 9 MG/ML
INJECTION, SOLUTION INTRAMUSCULAR; INTRAVENOUS; SUBCUTANEOUS
Status: DISCONTINUED | OUTPATIENT
Start: 2025-04-25 | End: 2025-04-25 | Stop reason: HOSPADM

## 2025-04-25 RX ORDER — FENTANYL CITRATE 50 UG/ML
INJECTION, SOLUTION INTRAMUSCULAR; INTRAVENOUS
Status: DISCONTINUED | OUTPATIENT
Start: 2025-04-25 | End: 2025-04-25 | Stop reason: HOSPADM

## 2025-04-25 RX ORDER — SODIUM CHLORIDE, SODIUM LACTATE, POTASSIUM CHLORIDE, CALCIUM CHLORIDE 600; 310; 30; 20 MG/100ML; MG/100ML; MG/100ML; MG/100ML
INJECTION, SOLUTION INTRAVENOUS CONTINUOUS
Status: DISCONTINUED | OUTPATIENT
Start: 2025-04-25 | End: 2025-04-25

## 2025-04-25 RX ADMIN — SODIUM CHLORIDE: 9 INJECTION, SOLUTION INTRAVENOUS at 01:04

## 2025-04-25 NOTE — DISCHARGE SUMMARY
[facility] - Surgery  Discharge Note  Short Stay    Procedure(s) (LRB):  RADIOFREQUENCY ABLATION,THORACIC  T4, 5, 6, 7 (Right)      OUTCOME: Patient tolerated treatment/procedure well without complication and is now ready for discharge.    DISPOSITION: Home or Self Care    FINAL DIAGNOSIS:  Thoracic spondylosis    FOLLOWUP: In clinic    DISCHARGE INSTRUCTIONS:    Discharge Procedure Orders   Diet Adult Regular     No dressing needed     Notify your health care provider if you experience any of the following:  temperature >100.4     Activity as tolerated

## 2025-04-25 NOTE — DISCHARGE INSTRUCTIONS
PAIN MANAGEMENT    HOME CARE INSTRUCTIONS   Do not use heat (such as a heating pad) for 24 hours.  You may apply an ice pack to the injection site for 20 minutes at a time for the first 24 hours for soreness/discomfort at injection site   Keep site clean and dry for 24 hours. If bandaid is present, remove when desired.              Do not soak for 48 hrs.   Do not drive until tomorrow.  Take care when walking after a lumbar injection.   Resume home medication as prescribed today.  Resume Aspirin, Plavix, or Coumadin the day after the procedure unless other wise instructed.    STEROIDS OR RADIOFREQUENCY    May take 10-14 days for full effects.  Avoid strenuous exercises for 2 days.      BLOCKS  Resume regular activities today.  Pain office will call in next 2 days.      CALL PHYSICIAN FOR:  Severe increase in your usual pain or the appearance of new pain.  Prolonged or increasing weakness or numbness in the legs or arms.  Fever greater than 100 degrees F.  Drainage, redness, active bleeding, or increased swelling at the injection site.  Headache that increases when your head is upright and decreases when you lie flat.    FOR EMERGENCIES:   Go directly to the emergency department for any shortness of breath, chest pain, or problems breathing.

## 2025-04-25 NOTE — INTERVAL H&P NOTE
Right sided headache s/p July 25th surgery post-op hydrocephlus The patient has been examined and the H&P has been reviewed:    I concur with the findings and no changes have occurred since H&P was written.    Procedure risks, benefits and alternative options discussed and understood by patient/family.    ASA 2, mallampati 2        There are no hospital problems to display for this patient.

## 2025-04-25 NOTE — OP NOTE
PROCEDURE DATE: 4/25/2025    PROCEDURE:  Radiofrequency ablation of the right T4,5,6,7 medial branch nerves on the right-side utilizing fluoroscopy    DIAGNOSIS:  Thoracic spondylosis    Post op Diagnosis: Same    PHYSICIAN: Floyd Coleman MD    MEDICATIONS INJECTED:  From a mixture of 4ml of 2% lidocaine and 40mg of methylprednisone, 1ml of this solution was injected at each level.    LOCAL ANESTHETIC USED: Lidocaine 1%, 3 ml given at each site.    SEDATION MEDICATIONS: 4mg versed, 75mcg fentanyl    ESTIMATED BLOOD LOSS:  none    COMPLICATIONS:  none    TECHNIQUE:  A time out was taken to identify patient and procedure side prior to starting the procedure. Laying in a prone position, the patient was prepped and draped in the usual sterile fashion using ChloraPrep and sterile towels.  The levels were determined under fluoroscopic guidance and then marked.  Local anesthetic was given by raising a wheal at the skin over each site and then infiltrated approximately 2cm deeper.  A 20-gauge  100 mm  RF needle was introduced to the anatomic location of the right T4,5,6,7 medial branch nerves.  Motor stimulation up to 2 Volts at each level confirmed no motor nerve involvement.  Impedance was less than 800 ohms at each level. The above noted medication was then injected slowly.  Ablation was performed per level utilizing [employer] radiofrequency generator 80°C for 90 seconds. The patient tolerated the procedure well.     The patient was monitored after the procedure.  Patient was given post procedure and discharge instructions to follow at home.  The patient was discharged in a stable condition

## 2025-04-30 ENCOUNTER — PATIENT MESSAGE (OUTPATIENT)
Dept: PAIN MEDICINE | Facility: CLINIC | Age: 65
End: 2025-04-30
Payer: COMMERCIAL

## 2025-05-01 NOTE — TELEPHONE ENCOUNTER
Ok to double book me at 11AM on 5/8 or offer a visit with Derrell or Clara.  She will need a visit in between procedures so we can schedule the cervical epidural steroid injection.

## 2025-05-01 NOTE — TELEPHONE ENCOUNTER
I see in your notes that you wanted her to f/u in two weeks to discuss the transforaminal JINA. She is scheduled 5/15 for the f/u of her RFA on 4/25. Would you like her to be scheduled with Derrell sooner? Please advise.

## 2025-05-06 ENCOUNTER — TELEPHONE (OUTPATIENT)
Dept: PAIN MEDICINE | Facility: CLINIC | Age: 65
End: 2025-05-06
Payer: COMMERCIAL

## 2025-05-06 ENCOUNTER — OFFICE VISIT (OUTPATIENT)
Dept: PAIN MEDICINE | Facility: CLINIC | Age: 65
End: 2025-05-06
Payer: COMMERCIAL

## 2025-05-06 VITALS
HEART RATE: 52 BPM | BODY MASS INDEX: 33.36 KG/M2 | HEIGHT: 61 IN | WEIGHT: 176.69 LBS | DIASTOLIC BLOOD PRESSURE: 65 MMHG | SYSTOLIC BLOOD PRESSURE: 137 MMHG

## 2025-05-06 DIAGNOSIS — M47.814 THORACIC SPONDYLOSIS: ICD-10-CM

## 2025-05-06 DIAGNOSIS — M54.12 CERVICAL RADICULOPATHY: Primary | ICD-10-CM

## 2025-05-06 DIAGNOSIS — M54.16 LUMBAR RADICULOPATHY: ICD-10-CM

## 2025-05-06 DIAGNOSIS — Z79.891 OPIOID CONTRACT EXISTS: ICD-10-CM

## 2025-05-06 PROCEDURE — 99214 OFFICE O/P EST MOD 30 MIN: CPT | Mod: 25,S$GLB,, | Performed by: PHYSICIAN ASSISTANT

## 2025-05-06 PROCEDURE — 3075F SYST BP GE 130 - 139MM HG: CPT | Mod: CPTII,S$GLB,, | Performed by: PHYSICIAN ASSISTANT

## 2025-05-06 PROCEDURE — 1159F MED LIST DOCD IN RCRD: CPT | Mod: CPTII,S$GLB,, | Performed by: PHYSICIAN ASSISTANT

## 2025-05-06 PROCEDURE — 99999 PR PBB SHADOW E&M-EST. PATIENT-LVL IV: CPT | Mod: PBBFAC,,, | Performed by: PHYSICIAN ASSISTANT

## 2025-05-06 PROCEDURE — 3078F DIAST BP <80 MM HG: CPT | Mod: CPTII,S$GLB,, | Performed by: PHYSICIAN ASSISTANT

## 2025-05-06 PROCEDURE — 3008F BODY MASS INDEX DOCD: CPT | Mod: CPTII,S$GLB,, | Performed by: PHYSICIAN ASSISTANT

## 2025-05-06 PROCEDURE — 96372 THER/PROPH/DIAG INJ SC/IM: CPT | Mod: S$GLB,,, | Performed by: PHYSICIAN ASSISTANT

## 2025-05-06 PROCEDURE — 1160F RVW MEDS BY RX/DR IN RCRD: CPT | Mod: CPTII,S$GLB,, | Performed by: PHYSICIAN ASSISTANT

## 2025-05-06 RX ORDER — SODIUM CHLORIDE, SODIUM LACTATE, POTASSIUM CHLORIDE, CALCIUM CHLORIDE 600; 310; 30; 20 MG/100ML; MG/100ML; MG/100ML; MG/100ML
INJECTION, SOLUTION INTRAVENOUS CONTINUOUS
OUTPATIENT
Start: 2025-05-06

## 2025-05-06 RX ORDER — KETOROLAC TROMETHAMINE 30 MG/ML
30 INJECTION, SOLUTION INTRAMUSCULAR; INTRAVENOUS
Status: COMPLETED | OUTPATIENT
Start: 2025-05-06 | End: 2025-05-06

## 2025-05-06 RX ADMIN — KETOROLAC TROMETHAMINE 30 MG: 30 INJECTION, SOLUTION INTRAMUSCULAR; INTRAVENOUS at 02:05
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
## 2025-05-12 ENCOUNTER — HOSPITAL ENCOUNTER (OUTPATIENT)
Dept: RADIOLOGY | Facility: HOSPITAL | Age: 65
Discharge: HOME | End: 2025-05-12
Attending: ANESTHESIOLOGY | Admitting: ANESTHESIOLOGY
Payer: COMMERCIAL

## 2025-05-12 ENCOUNTER — HOSPITAL ENCOUNTER (OUTPATIENT)
Facility: HOSPITAL | Age: 65
Discharge: HOME | End: 2025-05-12
Attending: ANESTHESIOLOGY | Admitting: ANESTHESIOLOGY
Payer: COMMERCIAL

## 2025-05-12 VITALS
DIASTOLIC BLOOD PRESSURE: 67 MMHG | HEIGHT: 61 IN | OXYGEN SATURATION: 98 % | TEMPERATURE: 98 F | HEART RATE: 68 BPM | WEIGHT: 176 LBS | BODY MASS INDEX: 33.23 KG/M2 | SYSTOLIC BLOOD PRESSURE: 125 MMHG | RESPIRATION RATE: 15 BRPM

## 2025-05-12 DIAGNOSIS — M54.12 CERVICAL RADICULOPATHY: ICD-10-CM

## 2025-05-12 DIAGNOSIS — M54.2 NECK PAIN: ICD-10-CM

## 2025-05-12 PROCEDURE — A4216 STERILE WATER/SALINE, 10 ML: HCPCS | Mod: PO | Performed by: ANESTHESIOLOGY

## 2025-05-12 PROCEDURE — 25000003 PHARM REV CODE 250: Mod: PO | Performed by: ANESTHESIOLOGY

## 2025-05-12 PROCEDURE — 62321 NJX INTERLAMINAR CRV/THRC: CPT | Mod: PO | Performed by: ANESTHESIOLOGY

## 2025-05-12 PROCEDURE — 25500020 PHARM REV CODE 255: Mod: PO | Performed by: ANESTHESIOLOGY

## 2025-05-12 PROCEDURE — 63600175 PHARM REV CODE 636 W HCPCS: Mod: JZ,TB,PO | Performed by: ANESTHESIOLOGY

## 2025-05-12 PROCEDURE — 62321 NJX INTERLAMINAR CRV/THRC: CPT | Mod: ,,, | Performed by: ANESTHESIOLOGY

## 2025-05-12 RX ORDER — SODIUM CHLORIDE 9 MG/ML
INJECTION, SOLUTION INTRAMUSCULAR; INTRAVENOUS; SUBCUTANEOUS
Status: DISCONTINUED | OUTPATIENT
Start: 2025-05-12 | End: 2025-05-12 | Stop reason: HOSPADM

## 2025-05-12 RX ORDER — METHYLPREDNISOLONE ACETATE 80 MG/ML
INJECTION, SUSPENSION INTRA-ARTICULAR; INTRALESIONAL; INTRAMUSCULAR; SOFT TISSUE
Status: DISCONTINUED | OUTPATIENT
Start: 2025-05-12 | End: 2025-05-12 | Stop reason: HOSPADM

## 2025-05-12 RX ORDER — MIDAZOLAM HYDROCHLORIDE 1 MG/ML
INJECTION INTRAMUSCULAR; INTRAVENOUS
Status: DISCONTINUED | OUTPATIENT
Start: 2025-05-12 | End: 2025-05-12 | Stop reason: HOSPADM

## 2025-05-12 RX ORDER — SODIUM CHLORIDE, SODIUM LACTATE, POTASSIUM CHLORIDE, CALCIUM CHLORIDE 600; 310; 30; 20 MG/100ML; MG/100ML; MG/100ML; MG/100ML
INJECTION, SOLUTION INTRAVENOUS CONTINUOUS
Status: DISCONTINUED | OUTPATIENT
Start: 2025-05-12 | End: 2025-05-12 | Stop reason: HOSPADM

## 2025-05-12 RX ORDER — SODIUM CHLORIDE 9 MG/ML
INJECTION, SOLUTION INTRAVENOUS CONTINUOUS
Status: DISCONTINUED | OUTPATIENT
Start: 2025-05-12 | End: 2025-05-12 | Stop reason: HOSPADM

## 2025-05-12 RX ORDER — LIDOCAINE HYDROCHLORIDE 10 MG/ML
INJECTION, SOLUTION EPIDURAL; INFILTRATION; INTRACAUDAL; PERINEURAL
Status: DISCONTINUED | OUTPATIENT
Start: 2025-05-12 | End: 2025-05-12 | Stop reason: HOSPADM

## 2025-05-12 RX ADMIN — SODIUM CHLORIDE: 9 INJECTION, SOLUTION INTRAVENOUS at 03:05

## 2025-05-12 NOTE — DISCHARGE INSTRUCTIONS

## 2025-05-12 NOTE — OP NOTE
PROCEDURE DATE: 5/12/2025    Procedure: C7-T1 cervical interlaminar epidural steroid injection under utilizing fluoroscopy.    Diagnosis: Cervical Radiculopathy    POSTOP DIAGNOSIS: SAME    Physician: Floyd Coleman MD    Medications injected:  Methylprednisone 80mg followed by a slow injection of 4 mL sterile, preservative-free normal saline.    Local anesthetic used: Lidocaine 1%, 4 ml.    Sedation Medications: 4mg versed    Complications:  none    Estimated blood loss: none    Technique:  A time-out was taken to identify patient and procedure prior to starting the procedure.  With the patient laying in a prone position with the neck in a mid-flexed forward position, the area was prepped and draped in the usual sterile fashion using ChloraPrep and a fenestrated drape.  The area was determined under AP fluoroscopic guidance.  Local anesthetic was given using a 25-gauge 1.5 inch needle by raising a wheal and then infiltrating ventrally.  A 3.5 inch 20-gauge Touhy needle was introduced under fluoroscopic guidance to meet the lamina of C7.  The needle was then hinged under the lamina then advanced using loss of resistance technique.  Once the tip of the needle was in the desired position, the contrast dye Omnipaque was injected to determine placement and no uptake.  The steroid was then injected slowly followed by a slow injection of 4 mL of the sterile preservative-free normal saline.  The patient tolerated the procedure well.    The patient was monitored after the procedure and was given post-procedure and discharge instructions to follow at home. The patient was discharged in a stable condition.

## 2025-05-12 NOTE — DISCHARGE SUMMARY
[facility] - Surgery  Discharge Note  Short Stay    Procedure(s) (LRB):  Injection-steroid-epidural-cervical  C7/T1 (N/A)      OUTCOME: Patient tolerated treatment/procedure well without complication and is now ready for discharge.    DISPOSITION: Home or Self Care    FINAL DIAGNOSIS:  Cervical radiculopathy    FOLLOWUP: In clinic    DISCHARGE INSTRUCTIONS:    Discharge Procedure Orders   Diet Adult Regular     No dressing needed     Notify your health care provider if you experience any of the following:  temperature >100.4     Activity as tolerated

## 2025-05-26 ENCOUNTER — OFFICE VISIT (OUTPATIENT)
Dept: PAIN MEDICINE | Facility: CLINIC | Age: 65
End: 2025-05-26
Payer: COMMERCIAL

## 2025-05-26 ENCOUNTER — TELEPHONE (OUTPATIENT)
Dept: PAIN MEDICINE | Facility: CLINIC | Age: 65
End: 2025-05-26
Payer: COMMERCIAL

## 2025-05-26 VITALS — HEIGHT: 61 IN | WEIGHT: 179.25 LBS | BODY MASS INDEX: 33.84 KG/M2

## 2025-05-26 DIAGNOSIS — M79.18 MYOFASCIAL PAIN: ICD-10-CM

## 2025-05-26 DIAGNOSIS — M47.812 CERVICAL SPONDYLOSIS: ICD-10-CM

## 2025-05-26 DIAGNOSIS — M54.12 CERVICAL RADICULOPATHY: Primary | ICD-10-CM

## 2025-05-26 PROCEDURE — 3008F BODY MASS INDEX DOCD: CPT | Mod: CPTII,S$GLB,, | Performed by: ANESTHESIOLOGY

## 2025-05-26 PROCEDURE — 20553 NJX 1/MLT TRIGGER POINTS 3/>: CPT | Mod: S$GLB,,, | Performed by: ANESTHESIOLOGY

## 2025-05-26 PROCEDURE — 99214 OFFICE O/P EST MOD 30 MIN: CPT | Mod: 25,S$GLB,, | Performed by: ANESTHESIOLOGY

## 2025-05-26 PROCEDURE — 99999 PR PBB SHADOW E&M-EST. PATIENT-LVL III: CPT | Mod: PBBFAC,,, | Performed by: ANESTHESIOLOGY

## 2025-05-26 PROCEDURE — 1159F MED LIST DOCD IN RCRD: CPT | Mod: CPTII,S$GLB,, | Performed by: ANESTHESIOLOGY

## 2025-05-26 NOTE — TELEPHONE ENCOUNTER
Physician - Dr Coleman    Type of Procedure/Injection - Lumbar Transforaminal Epidural  L4/5           Laterality - Bilateral      Priority - Normal      Anxiolysis- RNIV      Need to hold medication - No      N/A    None      Clearance needed - No      Follow up - 4 week

## 2025-05-27 DIAGNOSIS — M54.16 LUMBAR RADICULOPATHY: Primary | ICD-10-CM

## 2025-05-27 RX ORDER — SODIUM CHLORIDE, SODIUM LACTATE, POTASSIUM CHLORIDE, CALCIUM CHLORIDE 600; 310; 30; 20 MG/100ML; MG/100ML; MG/100ML; MG/100ML
INJECTION, SOLUTION INTRAVENOUS CONTINUOUS
OUTPATIENT
Start: 2025-05-27

## 2025-06-17 ENCOUNTER — PATIENT MESSAGE (OUTPATIENT)
Dept: PAIN MEDICINE | Facility: CLINIC | Age: 65
End: 2025-06-17
Payer: COMMERCIAL

## 2025-07-16 ENCOUNTER — OFFICE VISIT (OUTPATIENT)
Dept: OPTOMETRY | Facility: CLINIC | Age: 65
End: 2025-07-16
Payer: COMMERCIAL

## 2025-07-16 DIAGNOSIS — H52.4 MYOPIA WITH ASTIGMATISM AND PRESBYOPIA, BILATERAL: ICD-10-CM

## 2025-07-16 DIAGNOSIS — H01.119 CONTACT AND ALLERGIC DERMATITIS OF EYELID: Primary | ICD-10-CM

## 2025-07-16 DIAGNOSIS — H52.13 MYOPIA WITH ASTIGMATISM AND PRESBYOPIA, BILATERAL: ICD-10-CM

## 2025-07-16 DIAGNOSIS — Z79.899 LONG-TERM USE OF PLAQUENIL: ICD-10-CM

## 2025-07-16 DIAGNOSIS — H04.123 DRY EYE SYNDROME OF BILATERAL LACRIMAL GLANDS: ICD-10-CM

## 2025-07-16 DIAGNOSIS — M35.01 SJOGREN'S SYNDROME WITH KERATOCONJUNCTIVITIS SICCA: ICD-10-CM

## 2025-07-16 DIAGNOSIS — H52.203 MYOPIA WITH ASTIGMATISM AND PRESBYOPIA, BILATERAL: ICD-10-CM

## 2025-07-16 PROCEDURE — 92310 CONTACT LENS FITTING OU: CPT | Mod: CSM,,, | Performed by: OPTOMETRIST

## 2025-07-16 PROCEDURE — 99999 PR PBB SHADOW E&M-EST. PATIENT-LVL III: CPT | Mod: PBBFAC,,, | Performed by: OPTOMETRIST

## 2025-07-16 RX ORDER — LOTEPREDNOL ETABONATE 5 MG/ML
1 SUSPENSION/ DROPS OPHTHALMIC 4 TIMES DAILY
Qty: 5 ML | Refills: 1 | Status: SHIPPED | OUTPATIENT
Start: 2025-07-16 | End: 2026-07-16

## 2025-07-16 NOTE — PROGRESS NOTES
HPI    Pt here for annual eye exam with CL DLS- 08/09/24    Pt denies vision changes.  Happy with current CL.   Denies sleeping in CL.   Occasional floaters and occasional FOL at night.   Using Restasis BID OU and Lotemax PRN OU (started using again yesterday)   Some skin irritation around eyes, OS.OD  Last edited by Vicente Jackson, OD on 7/16/2025  2:04 PM.            Assessment /Plan     For exam results, see Encounter Report.    Contact and allergic dermatitis of eyelid    Sjogren's syndrome with keratoconjunctivitis sicca  -     loteprednol (LOTEMAX) 0.5 % ophthalmic suspension; Place 1 drop into both eyes 4 (four) times daily.  Dispense: 5 mL; Refill: 1    Dry eye syndrome of bilateral lacrimal glands  -     loteprednol (LOTEMAX) 0.5 % ophthalmic suspension; Place 1 drop into both eyes 4 (four) times daily.  Dispense: 5 mL; Refill: 1    Long-term use of Plaquenil    Myopia with astigmatism and presbyopia, bilateral      Cont Lotemax drops to skin, rtc or call after several days if no better, can do stronger steroid   2,3. Cont Restasis and artificial tears  4. No longer on plaq  5. Contact lens Rx released to pt. Daily wear only advised, with education to risks of extended wear.  Discussed lens care, compliance and solutions. RTC yearly contact lens follow up.

## 2025-07-17 ENCOUNTER — TELEPHONE (OUTPATIENT)
Dept: SURGERY | Facility: HOSPITAL | Age: 65
End: 2025-07-17
Payer: COMMERCIAL

## 2025-07-17 NOTE — TELEPHONE ENCOUNTER
Spoke with pt who is concerned about proceeding with her scheduled procedure d/t  recent diagnosis of shingles and potential exposure. Pt reports having a history of chickenpox but has an autoimmune issue and is unsure how to proceed under these circumstances. Pt requests guidance regarding the safety of continuing with the procedure.     Pt stated that if she does not answer a phone call with follow-up information, to please send her a Specle message.

## 2025-07-17 NOTE — TELEPHONE ENCOUNTER
"Pt is scheduled for INJECTION, SPINE, LUMBOSACRAL, TRANSFORAMINAL APPROACH L4/5 - Bilateral on Thurs., 7/24/2025. Pt states "I am really nervous about proceeding with this procedure because my  was just diagnosed with shingles & I don't know if I should delay this procedure or not". Please advise. Thank you.   "

## 2025-07-21 ENCOUNTER — TELEPHONE (OUTPATIENT)
Dept: PAIN MEDICINE | Facility: CLINIC | Age: 65
End: 2025-07-21
Payer: COMMERCIAL

## 2025-07-23 ENCOUNTER — PATIENT MESSAGE (OUTPATIENT)
Dept: CARDIOLOGY | Facility: CLINIC | Age: 65
End: 2025-07-23
Payer: COMMERCIAL

## 2025-07-23 DIAGNOSIS — M51.360 DEGENERATION OF INTERVERTEBRAL DISC OF LUMBAR REGION WITH DISCOGENIC BACK PAIN: ICD-10-CM

## 2025-07-23 RX ORDER — HYDROCODONE BITARTRATE AND ACETAMINOPHEN 7.5; 325 MG/1; MG/1
1 TABLET ORAL EVERY 12 HOURS PRN
Qty: 60 TABLET | Refills: 0 | Status: SHIPPED | OUTPATIENT
Start: 2025-07-23 | End: 2025-07-23

## 2025-07-23 RX ORDER — HYDROCODONE BITARTRATE AND ACETAMINOPHEN 7.5; 325 MG/1; MG/1
1 TABLET ORAL EVERY 12 HOURS PRN
Qty: 60 TABLET | Refills: 0 | Status: SHIPPED | OUTPATIENT
Start: 2025-07-23 | End: 2025-08-22

## 2025-07-23 NOTE — TELEPHONE ENCOUNTER
Copied from CRM #[id number]. Topic: Medications - Pharmacy  >> Jul 23, 2025  2:08 PM Cyndi wrote:  Type:  Pharmacy Calling to Clarify an RX    Name of Caller:  Naveed   Pharmacy Name:  [facility]  Prescription Name:  HYDROcodone-acetaminophen (NORCO) 7.5-325 mg per tablet  What do they need to clarify?:  written for more than a 30 dqay supply but it is not written that it is for more than 30 day supply  Best Call Back Number:  [phone]  Additional Information:  Needs to have prescription stating that it is for more than a 30 day supply. Please resend corrected rx. Thank You

## 2025-07-24 ENCOUNTER — HOSPITAL ENCOUNTER (OUTPATIENT)
Dept: RADIOLOGY | Facility: HOSPITAL | Age: 65
Discharge: HOME | End: 2025-07-24
Attending: ANESTHESIOLOGY | Admitting: ANESTHESIOLOGY
Payer: COMMERCIAL

## 2025-07-24 ENCOUNTER — HOSPITAL ENCOUNTER (OUTPATIENT)
Facility: HOSPITAL | Age: 65
Discharge: HOME | End: 2025-07-24
Attending: ANESTHESIOLOGY | Admitting: ANESTHESIOLOGY
Payer: COMMERCIAL

## 2025-07-24 ENCOUNTER — PATIENT MESSAGE (OUTPATIENT)
Dept: OPTOMETRY | Facility: CLINIC | Age: 65
End: 2025-07-24
Payer: COMMERCIAL

## 2025-07-24 VITALS
RESPIRATION RATE: 16 BRPM | DIASTOLIC BLOOD PRESSURE: 60 MMHG | BODY MASS INDEX: 33.79 KG/M2 | HEIGHT: 61 IN | OXYGEN SATURATION: 98 % | HEART RATE: 58 BPM | TEMPERATURE: 97 F | WEIGHT: 179 LBS | SYSTOLIC BLOOD PRESSURE: 125 MMHG

## 2025-07-24 DIAGNOSIS — M54.50 LOWER BACK PAIN: ICD-10-CM

## 2025-07-24 DIAGNOSIS — M54.16 LUMBAR RADICULOPATHY: ICD-10-CM

## 2025-07-24 PROCEDURE — 64483 NJX AA&/STRD TFRM EPI L/S 1: CPT | Mod: 50,PO | Performed by: ANESTHESIOLOGY

## 2025-07-24 PROCEDURE — 64483 NJX AA&/STRD TFRM EPI L/S 1: CPT | Mod: ,,, | Performed by: ANESTHESIOLOGY

## 2025-07-24 PROCEDURE — 63600175 PHARM REV CODE 636 W HCPCS: Mod: PO | Performed by: ANESTHESIOLOGY

## 2025-07-24 PROCEDURE — 25500020 PHARM REV CODE 255: Mod: PO | Performed by: ANESTHESIOLOGY

## 2025-07-24 PROCEDURE — 25000003 PHARM REV CODE 250: Mod: PO | Performed by: ANESTHESIOLOGY

## 2025-07-24 RX ORDER — METHYLPREDNISOLONE ACETATE 80 MG/ML
INJECTION, SUSPENSION INTRA-ARTICULAR; INTRALESIONAL; INTRAMUSCULAR; SOFT TISSUE
Status: DISCONTINUED | OUTPATIENT
Start: 2025-07-24 | End: 2025-07-24 | Stop reason: HOSPADM

## 2025-07-24 RX ORDER — METHYLPREDNISOLONE ACETATE 40 MG/ML
INJECTION, SUSPENSION INTRA-ARTICULAR; INTRALESIONAL; INTRAMUSCULAR; SOFT TISSUE
Status: DISCONTINUED | OUTPATIENT
Start: 2025-07-24 | End: 2025-07-24 | Stop reason: HOSPADM

## 2025-07-24 RX ORDER — MIDAZOLAM HYDROCHLORIDE 1 MG/ML
INJECTION INTRAMUSCULAR; INTRAVENOUS
Status: DISCONTINUED | OUTPATIENT
Start: 2025-07-24 | End: 2025-07-24 | Stop reason: HOSPADM

## 2025-07-24 RX ORDER — SODIUM CHLORIDE 9 MG/ML
INJECTION, SOLUTION INTRAVENOUS CONTINUOUS
Status: DISCONTINUED | OUTPATIENT
Start: 2025-07-24 | End: 2025-07-24 | Stop reason: HOSPADM

## 2025-07-24 RX ORDER — SODIUM CHLORIDE, SODIUM LACTATE, POTASSIUM CHLORIDE, CALCIUM CHLORIDE 600; 310; 30; 20 MG/100ML; MG/100ML; MG/100ML; MG/100ML
INJECTION, SOLUTION INTRAVENOUS CONTINUOUS
Status: DISCONTINUED | OUTPATIENT
Start: 2025-07-24 | End: 2025-07-24 | Stop reason: HOSPADM

## 2025-07-24 RX ORDER — LIDOCAINE HYDROCHLORIDE 10 MG/ML
INJECTION, SOLUTION EPIDURAL; INFILTRATION; INTRACAUDAL; PERINEURAL
Status: DISCONTINUED | OUTPATIENT
Start: 2025-07-24 | End: 2025-07-24 | Stop reason: HOSPADM

## 2025-07-24 RX ADMIN — SODIUM CHLORIDE: 9 INJECTION, SOLUTION INTRAVENOUS at 03:07

## 2025-07-24 NOTE — H&P
CC: Back pain    HPI: The patient is a 65yo woman with a history of lumbar radiculopathy here for bilateral L4/5 TFESI. There are no major changes in history and physical from 5/26/25.    Past Medical History:   Diagnosis Date    Anxiety 08/25/2015    Arthritis     BRCA1 negative     BRCA2 negative     Celiac disease     Cervical spondylosis     Chronic back pain     DDD (degenerative disc disease), lumbar     Difficult intubation 03/2016    anterior larynx, pt with metal dental appliance, unable to do glidescope, used LMA    Dysautonomia     Encounter for blood transfusion     Facet arthropathy, thoracic     Hormone replacement therapy (HRT)     Insomnia 08/25/2015    Neck pain     PONV (postoperative nausea and vomiting)     Right foot pain     Scoliosis     Sjogren's syndrome     Snoring     uses cpap, states not ROSSY    Unspecified hypothyroidism 08/25/2015       Past Surgical History:   Procedure Laterality Date    BILATERAL OOPHORECTOMY  2004    BREAST BIOPSY Left 2010    Benign    CHOLECYSTECTOMY  2010    COLONOSCOPY  2016    [place]    Epidural Steroid Injection      Pain managment, at another facility, cervical, thoracic    Epidural Steroid injection      Pain management, cervical    EPIDURAL STEROID INJECTION INTO CERVICAL SPINE N/A 11/11/2021    Procedure: Injection-steroid-epidural-cervical, C7/T1 interlaminer;  Surgeon: Floyd Coleman MD;  Location: [facility] OR;  Service: Pain Management;  Laterality: N/A;    EPIDURAL STEROID INJECTION INTO CERVICAL SPINE N/A 9/27/2024    Procedure: Injection-steroid-epidural-cervical    C7/T1;  Surgeon: Floyd Coleman MD;  Location: [facility] OR;  Service: Pain Management;  Laterality: N/A;    EPIDURAL STEROID INJECTION INTO CERVICAL SPINE N/A 5/12/2025    Procedure: Injection-steroid-epidural-cervical  C7/T1;  Surgeon: Floyd Coleman MD;  Location: [facility] OR;  Service: Pain Management;  Laterality: N/A;  high    EPIDURAL STEROID INJECTION INTO LUMBAR SPINE Right  01/16/2019    Procedure: Injection-steroid-epidural-lumbar L5/S1;  Surgeon: Floyd Coleman MD;  Location: [facility] OR;  Service: Pain Management;  Laterality: Right;  to right    EPIDURAL STEROID INJECTION INTO LUMBAR SPINE N/A 05/14/2019    Procedure: Injection-steroid-epidural-lumbar;  Surgeon: Floyd Coleman MD;  Location: [facility] OR;  Service: Pain Management;  Laterality: N/A;  L5/S1 interlaminar    EPIDURAL STEROID INJECTION INTO LUMBAR SPINE N/A 03/22/2021    Procedure: Injection-steroid-epidural-lumbar L5/S1 interlaminer;  Surgeon: Floyd Coleman MD;  Location: [facility] OR;  Service: Pain Management;  Laterality: N/A;    EPIDURAL STEROID INJECTION INTO LUMBAR SPINE N/A 11/18/2022    Procedure: Injection-steroid-epidural-lumbar L5/S1 to right;  Surgeon: Floyd Coleman MD;  Location: [facility] OR;  Service: Pain Management;  Laterality: N/A;    EPIDURAL STEROID INJECTION INTO LUMBAR SPINE N/A 07/21/2023    Procedure: Injection-steroid-epidural-lumbar Interlaminar L5/S1 to the right;  Surgeon: Floyd Coleman MD;  Location: [facility] OR;  Service: Pain Management;  Laterality: N/A;    EPIDURAL STEROID INJECTION INTO LUMBAR SPINE N/A 12/06/2023    Procedure: Injection-steroid-epidural-lumbar     L5/S1;  Surgeon: Floyd Coleman MD;  Location: [facility] OR;  Service: Pain Management;  Laterality: N/A;    EPIDURAL STEROID INJECTION INTO LUMBAR SPINE N/A 02/08/2024    Procedure: Injection-steroid-epidural-lumbar     L5/S1;  Surgeon: Floyd Coleman MD;  Location: [facility] OR;  Service: Pain Management;  Laterality: N/A;    HYSTERECTOMY  2004    benign    MOUTH SURGERY      Braces, with temporary metal implants in upper gum    OOPHORECTOMY  2004    w/ hysterectomy    RADIOFREQUENCY ABLATION  02/2016    thoracic nerve    RADIOFREQUENCY ABLATION Right 06/04/2019    Procedure: Radiofrequency Ablation T4,5,6,7;  Surgeon: Floyd Coleman MD;  Location: [facility] OR;  Service: Pain Management;  Laterality: Right;     RADIOFREQUENCY ABLATION Right 05/21/2021    Procedure: RADIOFREQUENCY ABLATION,THORACIC  T4,T5,T6, T7;  Surgeon: Floyd Coleman MD;  Location: [facility] OR;  Service: Pain Management;  Laterality: Right;    RADIOFREQUENCY ABLATION Right 06/30/2022    Procedure: Radiofrequency Ablation Thoracic T4, T5, T6, T7;  Surgeon: Floyd Coleman MD;  Location: [facility] OR;  Service: Pain Management;  Laterality: Right;    RADIOFREQUENCY ABLATION Right 10/12/2023    Procedure: RADIOFREQUENCY ABLATION,THORACIC Right T4, T5, T6, T7;  Surgeon: Floyd Coleman MD;  Location: [facility] OR;  Service: Pain Management;  Laterality: Right;  THORACIC Right T4, T5, T6, T7    RADIOFREQUENCY ABLATION Right 7/29/2024    Procedure: RADIOFREQUENCY ABLATION,THORACIC     T4,5,6,7;  Surgeon: Floyd Coleman MD;  Location: [facility] OR;  Service: Pain Management;  Laterality: Right;    RADIOFREQUENCY ABLATION Right 4/25/2025    Procedure: RADIOFREQUENCY ABLATION,THORACIC  T4, 5, 6, 7;  Surgeon: Floyd Coleman MD;  Location: [facility] OR;  Service: Pain Management;  Laterality: Right;  normal    TONSILLECTOMY      TRANSFORAMINAL EPIDURAL INJECTION OF STEROID Left 06/14/2024    Procedure: Injection,steroid,epidural,transforaminal approach    L4/5;  Surgeon: Floyd Coleman MD;  Location: [facility] OR;  Service: Pain Management;  Laterality: Left;    TRANSFORAMINAL EPIDURAL INJECTION OF STEROID Left 12/20/2024    Procedure: Injection,steroid,epidural,transforaminal approach  L4/5;  Surgeon: Floyd Coleman MD;  Location: [facility] OR;  Service: Pain Management;  Laterality: Left;  normal    VULVA SURGERY  03/2016       Family History   Problem Relation Name Age of Onset    Macular degeneration Mother      Arthritis Mother      Celiac disease Mother      COPD Father      Glaucoma Neg Hx         Social History     Socioeconomic History    Marital status:    Tobacco Use    Smoking status: Never    Smokeless tobacco: Never   Substance and Sexual  "Activity    Alcohol use: No    Drug use: No    Sexual activity: Yes     Partners: Male     Social Drivers of Health     Financial Resource Strain: Low Risk  (2/27/2024)    Overall Financial Resource Strain (CARDIA)     Difficulty of Paying Living Expenses: Not hard at all   Food Insecurity: No Food Insecurity (2/27/2024)    Hunger Vital Sign     Worried About Running Out of Food in the Last Year: Never true     Ran Out of Food in the Last Year: Never true   Transportation Needs: No Transportation Needs (2/27/2024)    PRAPARE - Transportation     Lack of Transportation (Medical): No     Lack of Transportation (Non-Medical): No   Physical Activity: Insufficiently Active (2/27/2024)    Exercise Vital Sign     Days of Exercise per Week: 3 days     Minutes of Exercise per Session: 20 min   Stress: Stress Concern Present (2/27/2024)    [employer] - Occupational Stress Questionnaire     Feeling of Stress : Rather much   Housing Stability: Unknown (2/27/2024)    Housing Stability Vital Sign     Unable to Pay for Housing in the Last Year: No     Unstable Housing in the Last Year: No       Current Medications[1]    Review of patient's allergies indicates:   Allergen Reactions    Compazine [prochlorperazine edisylate] Anaphylaxis    Avelox [moxifloxacin] Rash       Vitals:    07/22/25 0926 07/24/25 1541   BP:  (!) 175/77   Pulse:  (!) 52   Resp:  18   Temp:  97.2 °F (36.2 °C)   TempSrc:  Skin   SpO2:  100%   Weight: 81.2 kg (179 lb) 81.2 kg (179 lb)   Height: 5' 1" (1.549 m) 5' 1" (1.549 m)       ASA 2, Mallampati 2    REVIEW OF SYSTEMS:     GENERAL: No weight loss, malaise or fevers.  HEENT:  No recent changes in vision or hearing  NECK: Negative for lumps, no difficulty with swallowing.  RESPIRATORY: Negative for cough, wheezing or shortness of breath, patient denies any recent URI.  CARDIOVASCULAR: Negative for chest pain, leg swelling or palpitations.  GI: Negative for abdominal discomfort, " blood in stools or black stools or change in bowel habits.  MUSCULOSKELETAL: See HPI.  SKIN: Negative for lesions, rash, and itching.  PSYCH: No suicidal or homicidal ideations, no current mood disturbances.  HEMATOLOGY/LYMPHOLOGY: Negative for prolonged bleeding, bruising easily or swollen nodes. Patient is not currently taking any anti-coagulants  ENDO: No history of diabetes or thyroid dysfunction  NEURO: No history of syncope, paralysis, seizures or tremors.All other reviewed and negative other than HPI.    Physical exam:  Gen: A and O x3, pleasant, well-groomed  Skin: No rashes or obvious lesions  HEENT: PERRLA, no obvious deformities on ears or in canals. No thyroid masses, trachea midline, no palpable lymph nodes in neck, axilla.  CVS: Regular rate and rhythm, normal S1 and S2, no murmurs.  Resp: Clear to auscultation bilaterally.  Abdomen: Soft, NT/ND, normal bowel sounds present.  Musculoskeletal/Neuro: Moving all extremities    Assessment:  Lumbar radiculopathy  -     Place in Outpatient; Standing  -     Vital signs; Standing  -     [facility]  -     Verify informed consent; Standing  -     Notify physician ; Standing  -     Notify physician ; Standing  -     Notify physician (specify); Standing  -     Notify physician (specify); Standing  -     Notify physician (specify); Standing  -     Diet NPO; Standing  -     lactated ringers infusion  -     POCT urine pregnancy; Standing    Other orders  -     IP VTE LOW RISK PATIENT; Standing  -     0.9% NaCl infusion               [1]   Current Facility-Administered Medications   Medication Dose Route Frequency Provider Last Rate Last Admin    0.9% NaCl infusion   Intravenous Continuous Floyd Coleman MD 50 mL/hr at 07/24/25 1559 New Bag at 07/24/25 1559    lactated ringers infusion   Intravenous Continuous Floyd Coleman MD

## 2025-07-24 NOTE — DISCHARGE SUMMARY
[facility] - Surgery  Discharge Note  Short Stay    Procedure(s) (LRB):  INJECTION, SPINE, LUMBOSACRAL, TRANSFORAMINAL APPROACH   L4/5 (Bilateral)      OUTCOME: Patient tolerated treatment/procedure well without complication and is now ready for discharge.    DISPOSITION: Home or Self Care    FINAL DIAGNOSIS:  Lumbar radiculopathy    FOLLOWUP: In clinic    DISCHARGE INSTRUCTIONS:    Discharge Procedure Orders   Diet Adult Regular     No dressing needed     Notify your health care provider if you experience any of the following:  temperature >100.4     Activity as tolerated

## 2025-07-24 NOTE — OP NOTE
PROCEDURE DATE: 7/24/2025    PROCEDURE: Bilateral L4/5 transforaminal epidural steroid injection under fluoroscopy    DIAGNOSIS: Lumbar  Radiculopathy    Post op diagnosis: Same    PHYSICIAN: Floyd Coleman MD    MEDICATIONS INJECTED:  Methylprednisolone 40mg (1ml) and 1ml 0.25% bupivicaine at each nerve root.     LOCAL ANESTHETIC INJECTED:  Lidocaine 1%. 4 ml per site.    SEDATION MEDICATIONS: 4mg versed    ESTIMATED BLOOD LOSS:  none    COMPLICATIONS:  none    TECHNIQUE:   A time-out was taken to identify patient and procedure side prior to starting the procedure. The patient was placed in a prone position, prepped and draped in the usual sterile fashion using ChloraPrep and sterile towels.  The area to be injected was determined under fluoroscopic guidance in AP and oblique view.  Local anesthetic was given by raising a wheal and going down to the hub of a 25-gauge 1.5 inch needle.  In oblique view, a 5 inch 22-gauge bent-tip spinal needle was introduced towards 6 oclock position of the pedicle of each above named nerve root level.  The needle was walked medially then hinged into the neural foramen and position was confirmed in AP and lateral views.  Omnipaque contrast dye was injected to confirm appropriate placement and that there was no vascular uptake.  After negative aspiration for blood or CSF, the medication was then injected. This was performed at the right and left L4/5 level(s). The patient tolerated the procedure well.    The patient was monitored after the procedure.  Patient was given post procedure and discharge instructions to follow at home. The patient was discharged in a stable condition.

## 2025-07-24 NOTE — DISCHARGE INSTRUCTIONS

## 2025-08-07 ENCOUNTER — OFFICE VISIT (OUTPATIENT)
Dept: OBSTETRICS AND GYNECOLOGY | Facility: CLINIC | Age: 65
End: 2025-08-07
Payer: COMMERCIAL

## 2025-08-07 VITALS
BODY MASS INDEX: 33.28 KG/M2 | SYSTOLIC BLOOD PRESSURE: 114 MMHG | DIASTOLIC BLOOD PRESSURE: 76 MMHG | WEIGHT: 176.13 LBS

## 2025-08-07 DIAGNOSIS — Z01.419 WELL WOMAN EXAM WITH ROUTINE GYNECOLOGICAL EXAM: Primary | ICD-10-CM

## 2025-08-07 DIAGNOSIS — Z79.890 HORMONE REPLACEMENT THERAPY (HRT): ICD-10-CM

## 2025-08-07 DIAGNOSIS — Z12.31 BREAST CANCER SCREENING BY MAMMOGRAM: ICD-10-CM

## 2025-08-07 PROCEDURE — 3078F DIAST BP <80 MM HG: CPT | Mod: CPTII,S$GLB,,

## 2025-08-07 PROCEDURE — 3008F BODY MASS INDEX DOCD: CPT | Mod: CPTII,S$GLB,,

## 2025-08-07 PROCEDURE — 99999 PR PBB SHADOW E&M-EST. PATIENT-LVL IV: CPT | Mod: PBBFAC,,,

## 2025-08-07 PROCEDURE — 3074F SYST BP LT 130 MM HG: CPT | Mod: CPTII,S$GLB,,

## 2025-08-07 PROCEDURE — 1159F MED LIST DOCD IN RCRD: CPT | Mod: CPTII,S$GLB,,

## 2025-08-07 PROCEDURE — 99396 PREV VISIT EST AGE 40-64: CPT | Mod: S$GLB,,,

## 2025-08-07 PROCEDURE — 1160F RVW MEDS BY RX/DR IN RCRD: CPT | Mod: CPTII,S$GLB,,

## 2025-08-07 RX ORDER — ESZOPICLONE 3 MG/1
3 TABLET, FILM COATED ORAL NIGHTLY
COMMUNITY
Start: 2025-05-22

## 2025-08-07 RX ORDER — ESTRADIOL 1 MG/G
GEL TOPICAL
Qty: 90 G | Refills: 3 | Status: SHIPPED | OUTPATIENT
Start: 2025-08-07

## 2025-08-07 RX ORDER — ESTRADIOL 0.1 MG/G
1 CREAM VAGINAL
Qty: 42.5 G | Refills: 1 | Status: SHIPPED | OUTPATIENT
Start: 2025-08-07 | End: 2026-08-07

## 2025-08-07 NOTE — PROGRESS NOTES
Chief Complaint   Patient presents with    refill estradiol       History and Physical:  No LMP recorded (lmp unknown). Patient has had a hysterectomy.       Vonnie Garces is a 64 y.o. WF who presents today for her routine annual GYN exam. The patient has no Gynecology complaints today. Requests Divigel refills       Allergies:   Review of patient's allergies indicates:   Allergen Reactions    Compazine [prochlorperazine edisylate] Anaphylaxis    Avelox [moxifloxacin] Rash       Past Medical History:   Diagnosis Date    Anxiety 08/25/2015    Arthritis     BRCA1 negative     BRCA2 negative     Celiac disease     Cervical spondylosis     Chronic back pain     DDD (degenerative disc disease), lumbar     Difficult intubation 03/2016    anterior larynx, pt with metal dental appliance, unable to do glidescope, used LMA    Dysautonomia     Encounter for blood transfusion     Facet arthropathy, thoracic     Hormone replacement therapy (HRT)     Insomnia 08/25/2015    Neck pain     PONV (postoperative nausea and vomiting)     Right foot pain     Scoliosis     Sjogren's syndrome     Snoring     uses cpap, states not ROSSY    Unspecified hypothyroidism 08/25/2015       Past Surgical History:   Procedure Laterality Date    BILATERAL OOPHORECTOMY  2004    BREAST BIOPSY Left 2010    Benign    CHOLECYSTECTOMY  2010    COLONOSCOPY  2016    [facility]    Epidural Steroid Injection      Pain managment, at another facility, cervical, thoracic    Epidural Steroid injection      Pain management, cervical    EPIDURAL STEROID INJECTION INTO CERVICAL SPINE N/A 11/11/2021    Procedure: Injection-steroid-epidural-cervical, C7/T1 interlaminer;  Surgeon: Floyd Coleman MD;  Location: [facility] OR;  Service: Pain Management;  Laterality: N/A;    EPIDURAL STEROID INJECTION INTO CERVICAL SPINE N/A 9/27/2024    Procedure: Injection-steroid-epidural-cervical    C7/T1;  Surgeon: Floyd Coleman MD;  Location: [facility] OR;  Service: Pain  Management;  Laterality: N/A;    EPIDURAL STEROID INJECTION INTO CERVICAL SPINE N/A 5/12/2025    Procedure: Injection-steroid-epidural-cervical  C7/T1;  Surgeon: Floyd Coleman MD;  Location: [facility] OR;  Service: Pain Management;  Laterality: N/A;  high    EPIDURAL STEROID INJECTION INTO LUMBAR SPINE Right 01/16/2019    Procedure: Injection-steroid-epidural-lumbar L5/S1;  Surgeon: Flody Coleman MD;  Location: [facility] OR;  Service: Pain Management;  Laterality: Right;  to right    EPIDURAL STEROID INJECTION INTO LUMBAR SPINE N/A 05/14/2019    Procedure: Injection-steroid-epidural-lumbar;  Surgeon: Floyd Coleman MD;  Location: [facility] OR;  Service: Pain Management;  Laterality: N/A;  L5/S1 interlaminar    EPIDURAL STEROID INJECTION INTO LUMBAR SPINE N/A 03/22/2021    Procedure: Injection-steroid-epidural-lumbar L5/S1 interlaminer;  Surgeon: Floyd Coleman MD;  Location: [facility] OR;  Service: Pain Management;  Laterality: N/A;    EPIDURAL STEROID INJECTION INTO LUMBAR SPINE N/A 11/18/2022    Procedure: Injection-steroid-epidural-lumbar L5/S1 to right;  Surgeon: Floyd Coleman MD;  Location: [facility] OR;  Service: Pain Management;  Laterality: N/A;    EPIDURAL STEROID INJECTION INTO LUMBAR SPINE N/A 07/21/2023    Procedure: Injection-steroid-epidural-lumbar Interlaminar L5/S1 to the right;  Surgeon: Floyd Coleman MD;  Location: [facility] OR;  Service: Pain Management;  Laterality: N/A;    EPIDURAL STEROID INJECTION INTO LUMBAR SPINE N/A 12/06/2023    Procedure: Injection-steroid-epidural-lumbar     L5/S1;  Surgeon: Floyd Coleman MD;  Location: [facility] OR;  Service: Pain Management;  Laterality: N/A;    EPIDURAL STEROID INJECTION INTO LUMBAR SPINE N/A 02/08/2024    Procedure: Injection-steroid-epidural-lumbar     L5/S1;  Surgeon: Floyd Coleman MD;  Location: [facility] OR;  Service: Pain Management;  Laterality: N/A;    HYSTERECTOMY  2004    benign    INJECTION, SPINE, LUMBOSACRAL, TRANSFORAMINAL  APPROACH Bilateral 7/24/2025    Procedure: INJECTION, SPINE, LUMBOSACRAL, TRANSFORAMINAL APPROACH   L4/5;  Surgeon: Floyd Coleman MD;  Location: [facility] OR;  Service: Pain Management;  Laterality: Bilateral;  normal    MOUTH SURGERY      Braces, with temporary metal implants in upper gum    OOPHORECTOMY  2004    w/ hysterectomy    RADIOFREQUENCY ABLATION  02/2016    thoracic nerve    RADIOFREQUENCY ABLATION Right 06/04/2019    Procedure: Radiofrequency Ablation T4,5,6,7;  Surgeon: Floyd Coleman MD;  Location: [facility] OR;  Service: Pain Management;  Laterality: Right;    RADIOFREQUENCY ABLATION Right 05/21/2021    Procedure: RADIOFREQUENCY ABLATION,THORACIC  T4,T5,T6, T7;  Surgeon: Floyd Coleman MD;  Location: [facility] OR;  Service: Pain Management;  Laterality: Right;    RADIOFREQUENCY ABLATION Right 06/30/2022    Procedure: Radiofrequency Ablation Thoracic T4, T5, T6, T7;  Surgeon: Floyd Coleman MD;  Location: [facility] OR;  Service: Pain Management;  Laterality: Right;    RADIOFREQUENCY ABLATION Right 10/12/2023    Procedure: RADIOFREQUENCY ABLATION,THORACIC Right T4, T5, T6, T7;  Surgeon: Floyd Coleman MD;  Location: [facility] OR;  Service: Pain Management;  Laterality: Right;  THORACIC Right T4, T5, T6, T7    RADIOFREQUENCY ABLATION Right 7/29/2024    Procedure: RADIOFREQUENCY ABLATION,THORACIC     T4,5,6,7;  Surgeon: Floyd Coleman MD;  Location: [facility] OR;  Service: Pain Management;  Laterality: Right;    RADIOFREQUENCY ABLATION Right 4/25/2025    Procedure: RADIOFREQUENCY ABLATION,THORACIC  T4, 5, 6, 7;  Surgeon: Floyd Coleman MD;  Location: [facility] OR;  Service: Pain Management;  Laterality: Right;  normal    TONSILLECTOMY      TRANSFORAMINAL EPIDURAL INJECTION OF STEROID Left 06/14/2024    Procedure: Injection,steroid,epidural,transforaminal approach    L4/5;  Surgeon: Floyd Coleman MD;  Location: [facility] OR;  Service: Pain Management;  Laterality: Left;    TRANSFORAMINAL EPIDURAL  INJECTION OF STEROID Left 2024    Procedure: Injection,steroid,epidural,transforaminal approach  L4/5;  Surgeon: Floyd Coleman MD;  Location: [facility];  Service: Pain Management;  Laterality: Left;  normal    VULVA SURGERY  2016       MEDS: Medications Ordered Prior to Encounter[1]    OB History          3    Para   3    Term   3            AB        Living   3         SAB        IAB        Ectopic        Multiple        Live Births   3                 Social History[2]    Family History   Problem Relation Name Age of Onset    COPD Father      Macular degeneration Mother      Arthritis Mother      Celiac disease Mother      Glaucoma Neg Hx      Breast cancer Neg Hx      Ovarian cancer Neg Hx      Cervical cancer Neg Hx           Past medical and surgical history reviewed.   I have reviewed the patient's medical history in detail and updated the computerized patient record.        Review of System:   General: no chills, fever, night sweats, weight gain or weight loss  Psychological: no depression or suicidal ideation  Breasts: no new or changing breast lumps, nipple discharge or masses.  Respiratory: no cough, shortness of breath, or wheezing  Cardiovascular: no chest pain or dyspnea on exertion  Gastrointestinal: no abdominal pain, change in bowel habits, or black or bloody stools  Genito-Urinary: no incontinence, urinary frequency/urgency or vulvar/vaginal symptoms, pelvic pain or abnormal vaginal bleeding.  Musculoskeletal: no gait disturbance or muscular weakness      Physical Exam:   /76 (BP Location: Left arm, Patient Position: Sitting)   Wt 79.9 kg (176 lb 2.4 oz)   LMP  (LMP Unknown)   BMI 33.28 kg/m²   Constitutional: She is oriented to person, place, and time. She appears well-developed and well-nourished. No distress. OverWeight  HENT:   Head: Normocephalic and atraumatic.   Eyes: Conjunctivae and EOM are normal. No scleral icterus.   Neck: Normal range of motion. Neck  supple. No tracheal deviation present.   Cardiovascular: Normal rate.    Pulmonary/Chest: Effort normal. No respiratory distress. She exhibits no tenderness.  Breasts: are symmetrical.   Right breast exhibits no inverted nipple, no mass, no nipple discharge, no skin change and no tenderness.   Left breast exhibits no inverted nipple, no mass, no nipple discharge, no skin change and no tenderness.  Abdominal: Soft. She exhibits no distension and no mass. There is no tenderness. There is no rebound and no guarding.   Genitourinary:   Deferred, denies concerns. Offered exam, declines.   Musculoskeletal: Normal range of motion.   Neurological: She is alert and oriented to person, place, and time. Coordination normal.   Skin: Skin is warm and dry. She is not diaphoretic.   Psychiatric: She has a normal mood and affect.        Assessment:   Normal annual GYN exam  1. Well woman exam with routine gynecological exam  CBC Auto Differential    Comprehensive Metabolic Panel    TSH    Hemoglobin A1C    Lipid Panel      2. Breast cancer screening by mammogram  Mammo Digital Screening Bilat w/ [facility] (XPD)      3. Hormone replacement therapy (HRT)  estradioL (DIVIGEL) 1 mg/gram (0.1 %) topical gel          Plan:   PAP  Not Needed  Mammogram ordered   Follow up in 1 year.           [1]   Current Outpatient Medications on File Prior to Visit   Medication Sig Dispense Refill    ALPRAZolam (XANAX) 0.25 MG tablet TAKE ONE TABLET BY MOUTH TWICE DAILY AS NEEDED FOR ANXIETY (Patient taking differently: Take 0.25 mg by mouth 2 (two) times daily as needed for Anxiety.) 30 tablet 0    cycloSPORINE (RESTASIS) 0.05 % ophthalmic emulsion place 1 drop into affected eye(s) TWICE DAILY 60 each 11    eszopiclone (LUNESTA) 2 MG Tab TAKE 1 TABLET BY MOUTH EVERY EVENING 30 tablet 5    eszopiclone (LUNESTA) 3 mg Tab Take 3 mg by mouth every evening.      gabapentin (NEURONTIN) 100 MG capsule Take one capsule (100mg) by mouth in the morning and four  capsules (400mg) by mouth in the evening (Patient taking differently: Take 100 mg by mouth daily as needed. Take one capsule (100mg) by mouth in the morning and four capsules (400mg) by mouth in the evening) 450 capsule 3    HYDROcodone-acetaminophen (NORCO) 7.5-325 mg per tablet Take 1 tablet by mouth every 12 (twelve) hours as needed for Pain. 60 tablet 0    loteprednol (LOTEMAX) 0.5 % ophthalmic suspension Place 1 drop into both eyes 4 (four) times daily. (Patient taking differently: Place 1 drop into both eyes daily as needed.) 5 mL 1    METHYL SALICYLATE/MENTH/CAMPH (PAIN RELIEVING CREAM TOP) Apply 1 application  topically daily as needed.      propranoloL (INDERAL LA) 60 MG 24 hr capsule Take 1 capsule (60 mg total) by mouth once daily. 90 capsule 3    TIROSINT 125 mcg Cap TAKE 1 CAPSULE BY MOUTH once daily 90 capsule 3    ondansetron (ZOFRAN-ODT) 4 MG TbDL Take 1 tablet (4 mg total) by mouth 2 (two) times daily. (Patient not taking: Reported on 8/7/2025) 20 tablet 0    [DISCONTINUED] estradioL (DIVIGEL) 1 mg/gram (0.1 %) topical gel apply 1 gram onto skin once daily 90 g 3     No current facility-administered medications on file prior to visit.   [2]   Social History  Socioeconomic History    Marital status:    Tobacco Use    Smoking status: Never    Smokeless tobacco: Never   Substance and Sexual Activity    Alcohol use: No    Drug use: No    Sexual activity: Yes     Partners: Male     Social Drivers of Health     Financial Resource Strain: Low Risk  (2/27/2024)    Overall Financial Resource Strain (CARDI)     Difficulty of Paying Living Expenses: Not hard at all   Food Insecurity: No Food Insecurity (2/27/2024)    Hunger Vital Sign     Worried About Running Out of Food in the Last Year: Never true     Ran Out of Food in the Last Year: Never true   Transportation Needs: No Transportation Needs (2/27/2024)    PRAPARE - Transportation     Lack of Transportation (Medical): No     Lack of Transportation  (Non-Medical): No   Physical Activity: Insufficiently Active (2/27/2024)    Exercise Vital Sign     Days of Exercise per Week: 3 days     Minutes of Exercise per Session: 20 min   Stress: Stress Concern Present (2/27/2024)    [employer] [employer] - Occupational Stress Questionnaire     Feeling of Stress : Rather much   Housing Stability: Unknown (2/27/2024)    Housing Stability Vital Sign     Unable to Pay for Housing in the Last Year: No     Unstable Housing in the Last Year: No

## 2025-08-13 DIAGNOSIS — H04.123 DRY EYE SYNDROME OF BILATERAL LACRIMAL GLANDS: ICD-10-CM

## 2025-08-13 RX ORDER — CYCLOSPORINE 0.5 MG/ML
EMULSION OPHTHALMIC
Qty: 60 EACH | Refills: 11 | Status: SHIPPED | OUTPATIENT
Start: 2025-08-13

## 2025-08-20 ENCOUNTER — PATIENT MESSAGE (OUTPATIENT)
Dept: PAIN MEDICINE | Facility: CLINIC | Age: 65
End: 2025-08-20
Payer: COMMERCIAL

## 2025-08-20 DIAGNOSIS — M51.360 DEGENERATION OF INTERVERTEBRAL DISC OF LUMBAR REGION WITH DISCOGENIC BACK PAIN: ICD-10-CM

## 2025-08-21 RX ORDER — GABAPENTIN 100 MG/1
CAPSULE ORAL
Qty: 450 CAPSULE | Refills: 3 | Status: SHIPPED | OUTPATIENT
Start: 2025-08-21

## 2025-08-21 RX ORDER — HYDROCODONE BITARTRATE AND ACETAMINOPHEN 7.5; 325 MG/1; MG/1
1 TABLET ORAL EVERY 12 HOURS PRN
Qty: 60 TABLET | Refills: 0 | Status: SHIPPED | OUTPATIENT
Start: 2025-08-21 | End: 2025-09-20

## 2025-08-22 ENCOUNTER — PATIENT MESSAGE (OUTPATIENT)
Dept: RHEUMATOLOGY | Facility: CLINIC | Age: 65
End: 2025-08-22
Payer: COMMERCIAL

## (undated) DEVICE — SEE MEDLINE ITEM 152622

## (undated) DEVICE — TRAY NERVE BLOCK

## (undated) DEVICE — APPLICATOR CHLORAPREP CLR 10.5

## (undated) DEVICE — MARKER SKIN STND TIP BLUE BARR

## (undated) DEVICE — HANDLE SURG LIGHT NONRIGID

## (undated) DEVICE — GLOVE SURGICAL LATEX SZ 7

## (undated) DEVICE — MARKER SKIN RULER STERILE

## (undated) DEVICE — GLOVE SENSICARE PI MICRO 7

## (undated) DEVICE — SYR GLASS 5CC LUER LOK

## (undated) DEVICE — NDL 18GA X1 1/2 REG BEVEL

## (undated) DEVICE — NDL TUOHY EPIDURAL 20G X 3.5

## (undated) DEVICE — CANNULA CVD 100MM X 20G

## (undated) DEVICE — NDL SPINAL SPINOCAN 22GX3.5

## (undated) DEVICE — PAD ELECTROSURGICAL PAT PLATE

## (undated) DEVICE — TOWEL OR DISP STRL BLUE 4/PK

## (undated) DEVICE — SPONGE DERMACEA 4X4IN 12PLY